# Patient Record
Sex: MALE | Race: WHITE | HISPANIC OR LATINO | Employment: UNEMPLOYED | URBAN - METROPOLITAN AREA
[De-identification: names, ages, dates, MRNs, and addresses within clinical notes are randomized per-mention and may not be internally consistent; named-entity substitution may affect disease eponyms.]

---

## 2017-12-24 ENCOUNTER — HOSPITAL ENCOUNTER (EMERGENCY)
Facility: MEDICAL CENTER | Age: 52
End: 2017-12-24
Payer: MEDICAID

## 2017-12-24 ENCOUNTER — APPOINTMENT (OUTPATIENT)
Dept: RADIOLOGY | Facility: MEDICAL CENTER | Age: 52
End: 2017-12-24
Attending: ORTHOPAEDIC SURGERY
Payer: MEDICAID

## 2017-12-24 ENCOUNTER — HOSPITAL ENCOUNTER (EMERGENCY)
Facility: MEDICAL CENTER | Age: 52
End: 2017-12-24
Attending: EMERGENCY MEDICINE | Admitting: ORTHOPAEDIC SURGERY
Payer: MEDICAID

## 2017-12-24 ENCOUNTER — APPOINTMENT (OUTPATIENT)
Dept: RADIOLOGY | Facility: MEDICAL CENTER | Age: 52
End: 2017-12-24
Attending: EMERGENCY MEDICINE
Payer: MEDICAID

## 2017-12-24 VITALS
HEART RATE: 94 BPM | BODY MASS INDEX: 29.76 KG/M2 | RESPIRATION RATE: 18 BRPM | HEIGHT: 66 IN | WEIGHT: 185.19 LBS | OXYGEN SATURATION: 93 % | DIASTOLIC BLOOD PRESSURE: 90 MMHG | TEMPERATURE: 97.7 F | SYSTOLIC BLOOD PRESSURE: 184 MMHG

## 2017-12-24 DIAGNOSIS — S42.92XA CLOSED FRACTURE OF LEFT SHOULDER, INITIAL ENCOUNTER: ICD-10-CM

## 2017-12-24 DIAGNOSIS — S42.92XA CLOSED FRACTURE DISLOCATION OF LEFT SHOULDER JOINT, INITIAL ENCOUNTER: ICD-10-CM

## 2017-12-24 DIAGNOSIS — S44.22XA INJURY OF RADIAL NERVE AT LEFT UPPER ARM LEVEL, INITIAL ENCOUNTER: ICD-10-CM

## 2017-12-24 DIAGNOSIS — S43.005A DISLOCATION OF LEFT SHOULDER JOINT, INITIAL ENCOUNTER: ICD-10-CM

## 2017-12-24 PROCEDURE — 96376 TX/PRO/DX INJ SAME DRUG ADON: CPT | Mod: XU

## 2017-12-24 PROCEDURE — 99152 MOD SED SAME PHYS/QHP 5/>YRS: CPT

## 2017-12-24 PROCEDURE — A9270 NON-COVERED ITEM OR SERVICE: HCPCS | Performed by: EMERGENCY MEDICINE

## 2017-12-24 PROCEDURE — 700111 HCHG RX REV CODE 636 W/ 250 OVERRIDE (IP)

## 2017-12-24 PROCEDURE — 160048 HCHG OR STATISTICAL LEVEL 1-5: Performed by: ORTHOPAEDIC SURGERY

## 2017-12-24 PROCEDURE — 99291 CRITICAL CARE FIRST HOUR: CPT

## 2017-12-24 PROCEDURE — 73030 X-RAY EXAM OF SHOULDER: CPT | Mod: LT

## 2017-12-24 PROCEDURE — A9270 NON-COVERED ITEM OR SERVICE: HCPCS

## 2017-12-24 PROCEDURE — 96374 THER/PROPH/DIAG INJ IV PUSH: CPT | Mod: XU

## 2017-12-24 PROCEDURE — 160035 HCHG PACU - 1ST 60 MINS PHASE I: Performed by: ORTHOPAEDIC SURGERY

## 2017-12-24 PROCEDURE — 700102 HCHG RX REV CODE 250 W/ 637 OVERRIDE(OP): Performed by: EMERGENCY MEDICINE

## 2017-12-24 PROCEDURE — 96375 TX/PRO/DX INJ NEW DRUG ADDON: CPT | Mod: XU

## 2017-12-24 PROCEDURE — 700105 HCHG RX REV CODE 258: Performed by: EMERGENCY MEDICINE

## 2017-12-24 PROCEDURE — 700101 HCHG RX REV CODE 250

## 2017-12-24 PROCEDURE — 700102 HCHG RX REV CODE 250 W/ 637 OVERRIDE(OP)

## 2017-12-24 PROCEDURE — 160009 HCHG ANES TIME/MIN: Performed by: ORTHOPAEDIC SURGERY

## 2017-12-24 PROCEDURE — 160002 HCHG RECOVERY MINUTES (STAT): Performed by: ORTHOPAEDIC SURGERY

## 2017-12-24 PROCEDURE — 23650 CLTX SHO DSLC W/MNPJ WO ANES: CPT

## 2017-12-24 PROCEDURE — 160026 HCHG SURGERY MINUTES - 1ST 30 MINS LEVEL 1: Performed by: ORTHOPAEDIC SURGERY

## 2017-12-24 PROCEDURE — 700111 HCHG RX REV CODE 636 W/ 250 OVERRIDE (IP): Performed by: EMERGENCY MEDICINE

## 2017-12-24 PROCEDURE — L3670 SO ACRO/CLAV CAN WEB PRE OTS: HCPCS | Performed by: ORTHOPAEDIC SURGERY

## 2017-12-24 PROCEDURE — 160036 HCHG PACU - EA ADDL 30 MINS PHASE I: Performed by: ORTHOPAEDIC SURGERY

## 2017-12-24 PROCEDURE — 73020 X-RAY EXAM OF SHOULDER: CPT | Mod: LT

## 2017-12-24 RX ORDER — DOCUSATE SODIUM 100 MG/1
100 CAPSULE, LIQUID FILLED ORAL 2 TIMES DAILY
Qty: 60 CAP | Refills: 0 | Status: SHIPPED | OUTPATIENT
Start: 2017-12-24 | End: 2020-09-11

## 2017-12-24 RX ORDER — HYDROMORPHONE HYDROCHLORIDE 2 MG/ML
1 INJECTION, SOLUTION INTRAMUSCULAR; INTRAVENOUS; SUBCUTANEOUS ONCE
Status: COMPLETED | OUTPATIENT
Start: 2017-12-24 | End: 2017-12-24

## 2017-12-24 RX ORDER — MORPHINE SULFATE 4 MG/ML
4 INJECTION, SOLUTION INTRAMUSCULAR; INTRAVENOUS ONCE
Status: COMPLETED | OUTPATIENT
Start: 2017-12-24 | End: 2017-12-24

## 2017-12-24 RX ORDER — OXYCODONE HYDROCHLORIDE AND ACETAMINOPHEN 5; 325 MG/1; MG/1
TABLET ORAL
Status: COMPLETED
Start: 2017-12-24 | End: 2017-12-24

## 2017-12-24 RX ORDER — MELOXICAM 7.5 MG/1
7.5 TABLET ORAL DAILY
COMMUNITY
End: 2017-12-24

## 2017-12-24 RX ORDER — IBUPROFEN 800 MG/1
800 TABLET ORAL EVERY 8 HOURS PRN
Qty: 30 TAB | Refills: 0 | Status: SHIPPED | OUTPATIENT
Start: 2017-12-24 | End: 2020-09-11

## 2017-12-24 RX ORDER — HYDROCODONE BITARTRATE AND ACETAMINOPHEN 5; 325 MG/1; MG/1
1 TABLET ORAL ONCE
Status: COMPLETED | OUTPATIENT
Start: 2017-12-24 | End: 2017-12-24

## 2017-12-24 RX ORDER — NICOTINE 21 MG/24HR
1 PATCH, TRANSDERMAL 24 HOURS TRANSDERMAL ONCE
Status: DISCONTINUED | OUTPATIENT
Start: 2017-12-24 | End: 2017-12-25 | Stop reason: HOSPADM

## 2017-12-24 RX ORDER — SODIUM CHLORIDE 9 MG/ML
INJECTION, SOLUTION INTRAVENOUS CONTINUOUS
Status: DISCONTINUED | OUTPATIENT
Start: 2017-12-24 | End: 2017-12-25 | Stop reason: HOSPADM

## 2017-12-24 RX ORDER — OXYCODONE AND ACETAMINOPHEN 10; 325 MG/1; MG/1
1 TABLET ORAL EVERY 4 HOURS PRN
Qty: 42 TAB | Refills: 0 | Status: SHIPPED | OUTPATIENT
Start: 2017-12-24 | End: 2017-12-29

## 2017-12-24 RX ORDER — MELOXICAM 15 MG/1
15 TABLET ORAL DAILY
COMMUNITY
End: 2020-09-11

## 2017-12-24 RX ORDER — DOXAZOSIN MESYLATE 4 MG/1
4 TABLET ORAL DAILY
COMMUNITY
End: 2020-09-11

## 2017-12-24 RX ADMIN — FENTANYL CITRATE 50 MCG: 50 INJECTION, SOLUTION INTRAMUSCULAR; INTRAVENOUS at 19:04

## 2017-12-24 RX ADMIN — HYDROCODONE BITARTRATE AND ACETAMINOPHEN 1 TABLET: 5; 325 TABLET ORAL at 08:53

## 2017-12-24 RX ADMIN — MORPHINE SULFATE 4 MG: 4 INJECTION INTRAVENOUS at 12:14

## 2017-12-24 RX ADMIN — PROPOFOL 140 MG: 10 INJECTION, EMULSION INTRAVENOUS at 10:46

## 2017-12-24 RX ADMIN — SODIUM CHLORIDE: 9 INJECTION, SOLUTION INTRAVENOUS at 14:50

## 2017-12-24 RX ADMIN — HYDROMORPHONE HYDROCHLORIDE 1 MG: 2 INJECTION INTRAMUSCULAR; INTRAVENOUS; SUBCUTANEOUS at 15:53

## 2017-12-24 RX ADMIN — NICOTINE 1 PATCH: 21 PATCH TRANSDERMAL at 12:30

## 2017-12-24 RX ADMIN — FENTANYL CITRATE 50 MCG: 50 INJECTION, SOLUTION INTRAMUSCULAR; INTRAVENOUS at 18:50

## 2017-12-24 RX ADMIN — MORPHINE SULFATE 4 MG: 4 INJECTION INTRAVENOUS at 14:24

## 2017-12-24 RX ADMIN — OXYCODONE AND ACETAMINOPHEN 2 TABLET: 5; 325 TABLET ORAL at 18:25

## 2017-12-24 RX ADMIN — HYDROMORPHONE HYDROCHLORIDE 1 MG: 2 INJECTION INTRAMUSCULAR; INTRAVENOUS; SUBCUTANEOUS at 14:49

## 2017-12-24 ASSESSMENT — PAIN SCALES - GENERAL
PAINLEVEL_OUTOF10: 2
PAINLEVEL_OUTOF10: 4
PAINLEVEL_OUTOF10: 4
PAINLEVEL_OUTOF10: 10
PAINLEVEL_OUTOF10: 6
PAINLEVEL_OUTOF10: 5
PAINLEVEL_OUTOF10: 4
PAINLEVEL_OUTOF10: 4
PAINLEVEL_OUTOF10: 2
PAINLEVEL_OUTOF10: 2
PAINLEVEL_OUTOF10: 4
PAINLEVEL_OUTOF10: 10

## 2017-12-24 NOTE — ED PROVIDER NOTES
"ED Provider Note    CHIEF COMPLAINT  Chief Complaint   Patient presents with   • Arm Injury     left        HPI  Dillon Centeno is a 52 y.o. male who presents with history of falling in the shower last night. He slipped, no loss of consciousness no neck pain complains of left upper arm. No other injuries. Unable to extend left wrist.    REVIEW OF SYSTEMS  See HPI for further details. All other systems are negative.     PAST MEDICAL HISTORY  No past medical history on file.    FAMILY HISTORY  No family history on file.    SOCIAL HISTORY  Social History     Social History   • Marital status: N/A     Spouse name: N/A   • Number of children: N/A   • Years of education: N/A     Social History Main Topics   • Smoking status: Not on file   • Smokeless tobacco: Not on file   • Alcohol use Not on file   • Drug use: Unknown   • Sexual activity: Not on file     Other Topics Concern   • Not on file     Social History Narrative   • No narrative on file       SURGICAL HISTORY  No past surgical history on file.    CURRENT MEDICATIONS  Home Medications    **Home medications have not yet been reviewed for this encounter**         ALLERGIES  Allergies   Allergen Reactions   • Sulfa Drugs Hives and Shortness of Breath       PHYSICAL EXAM  VITAL SIGNS: /97   Pulse 92   Temp 37.2 °C (99 °F)   Ht 1.676 m (5' 6\")   Wt 84 kg (185 lb 3 oz)   BMI 29.89 kg/m²   Constitutional: Well developed, Well nourished,Appears to be in moderate pain  Extremities: Intact distal pulses, examination of left upper arm, tenderness, laterally, possibly in the glenoid, unable to move the shoulder. Unable to extend left wrist  Musculoskeletal: Good range of motion in all major joints. Except left shoulder as above Neurologic: Alert & oriented x 3, unable to extend left wrist, decreased sensation tips of fingers, left hand, No focal deficits noted.   Psychiatric: Affect normal, Judgment normal, Mood normal.       RADIOLOGY/PROCEDURES  DX-SHOULDER " 2+ LEFT   Final Result      Fracture of the left greater tuberosity with anterior dislocation of the humeral head            COURSE & MEDICAL DECISION MAKING  Pertinent Labs & Imaging studies reviewed. (See chart for details)    He had a fall in the shower, causing a dislocation of his left shoulder, along with fracture of the greater tuberosity, left shoulder. He was given protocol for conscious sedation, multiple maneuvers were done to reduce the fracture/dislocation without success. I will talk with the on-call orthopedist about further disposition. Patient also has been given morphine and Zofran IV  FINAL IMPRESSION  1.   1. Dislocation of left shoulder joint, initial encounter    2. Closed fracture of left shoulder, initial encounter    3. Injury of radial nerve at left upper arm level, initial encounter              2.   3.     Disposition  Procedure note: This patient has a dislocated left shoulder with avulsion, greater tuberosity. I have talked to the patient about indications and complications of anesthesia and closed reduction. He appears to understand his indications palpitations. He was connected to a blood pressure monitor. EKG monitor. Oxygen monitor. He was then given a total of 120 mg of propofol, 40 mg increments until he had a reached adequate sedation. When adequate anesthesia had been achieved I attempted multiple maneuvers to reduce the dislocation, internal/external rotation, flexion and extension however was unable to reduce the dislocation.. He continued to be monitored, blood pressure monitor, EKG monitor, oxygen monitor until he is once again awake alert and oriented.    I have attempted to call the on-call orthopedic surgeon about further disposition  Electronically signed by: Hollis Burns, 12/24/2017 8:36 AM

## 2017-12-24 NOTE — ED NOTES
Patient was in the shower last night when he knee when out causing him to fall.  Injury, deformity noted to the left arm.  Pain rated at 15-10.  Patient called ems this am.  Unable to really move that arm at all due to deformity and pain.

## 2017-12-24 NOTE — ED PROVIDER NOTES
2:12 PM I received check out from Dr. Burns stating that he had spoken with the OR to consult orthopedics for the patient after failed sedation/reduction of a left fracture shoulder dislocation.    DX-SHOULDER 2+ LEFT   Final Result      Persistent anterior dislocation of the left humeral head with greater tuberosity fracture.      DX-SHOULDER 2+ LEFT   Final Result      Fracture of the left greater tuberosity with anterior dislocation of the humeral head            2:38 PM I spoke with Dr. Subramanian, orthopedics. He states he will take the patient to the OR for a closed reduction/possible open reduction of this fracture dislocation. I informed the patient of the plan of care, started maintenance IV fluids to keep him nothing by mouth. He has gotten another dose of IV morphine as well as IV Dilaudid for pain control.    Dr. Subramanian came down to evaluate the patient is going to take him to the OR. Patient was Comfortable with multiple doses of IV narcotics.  Orthopedist will try to discharge him home after being in the OR. I have requested a bed for the patient just in case he does need to be admitted after his procedure.    Disposition:  Admission for observation by orthopedist versus discharge after OR to be determined by Dr. Subramanian.    1. Dislocation of left shoulder joint, initial encounter    2. Closed fracture of left shoulder, initial encounter    3. Injury of radial nerve at left upper arm level, initial encounter    4. Closed fracture dislocation of left shoulder joint, initial encounter

## 2017-12-25 NOTE — DISCHARGE INSTRUCTIONS
ACTIVITY: Rest and take it easy for the first 24 hours.  A responsible adult is recommended to remain with you during that time.  It is normal to feel sleepy.  We encourage you to not do anything that requires balance, judgment or coordination.    MILD FLU-LIKE SYMPTOMS ARE NORMAL. YOU MAY EXPERIENCE GENERALIZED MUSCLE ACHES, THROAT IRRITATION, HEADACHE AND/OR SOME NAUSEA.    FOR 24 HOURS DO NOT:  Drive, operate machinery or run household appliances.  Drink beer or alcoholic beverages.   Make important decisions or sign legal documents.    SPECIAL INSTRUCTIONS: How to Use a Shoulder Immobilizer  A shoulder immobilizer is a device that you may have to wear after a shoulder injury or surgery. This device keeps your arm from moving. This prevents additional pain or injury. It also supports your arm next to your body as your shoulder heals.  You may need to wear a shoulder immobilizer to treat a broken bone (fracture) in your shoulder. You may also need to wear one if you have an injury that moves your shoulder out of position (dislocation). There are different types of shoulder immobilizers. The one that you get depends on your injury.  RISKS AND COMPLICATIONS  Wearing a shoulder immobilizer in the wrong way can let your injured shoulder move around too much. This may delay healing and make your pain and swelling worse.  HOW TO USE YOUR SHOULDER IMMOBILIZER  · The part of the immobilizer that goes around your neck (sling) should support your upper arm, with your elbow bent and your lower arm and hand across your chest.  · Make sure that your elbow:  ¨ Is snug against the back pocket of the sling.  ¨ Does not move away from your body.  · The strap of the immobilizer should go over your shoulder and support your arm and hand. Your hand should be slightly higher than your elbow. It should not hang loosely over the edge of the sling.  · If the long strap has a pad, place it where it is most comfortable on your  neck.  · Carefully follow your health care provider's instructions for wearing your shoulder immobilizer. Your health care provider may want you to:  ¨ Loosen your immobilizer to straighten your elbow and move your wrist and fingers. You may have to do this several times each day. Ask your health care provider when you should do this and how often.  ¨ Remove your immobilizer once every day to shower, but limit the movement in your injured arm. Before putting the immobilizer back on, use a towel to dry the area under your arm completely.  ¨ Remove your immobilizer to do shoulder exercises at home as directed by your health care provider.  ¨ Wear your immobilizer while you sleep. You may sleep more comfortably if you have your upper body raised on pillows.  SEEK MEDICAL CARE IF:  · Your immobilizer is not supporting your arm properly.  · Your immobilizer gets damaged.  · You have worsening pain or swelling in your shoulder, arm, or hand.  · Your shoulder, arm, or hand changes color or temperature.  · You lose feeling in your shoulder, arm, or hand.     This information is not intended to replace advice given to you by your health care provider. Make sure you discuss any questions you have with your health care provider.     Document Released: 01/25/2006 Document Revised: 05/03/2016 Document Reviewed: 11/25/2015  [employer] Patient Education ©2016 [employer]      DIET: To avoid nausea, slowly advance diet as tolerated, avoiding spicy or greasy foods for the first day.  Add more substantial food to your diet according to your physician's instructions.  Babies can be fed formula or breast milk as soon as they are hungry.  INCREASE FLUIDS AND FIBER TO AVOID CONSTIPATION.    SURGICAL DRESSING/BATHING: No dressing. May shower as needed.    FOLLOW-UP APPOINTMENT:  A follow-up appointment should be arranged with your doctor in 12/29/17, Friday; call to schedule.    You should CALL YOUR PHYSICIAN if you  develop:  Fever greater than 101 degrees F.  Pain not relieved by medication, or persistent nausea or vomiting.  Excessive bleeding (blood soaking through dressing) or unexpected drainage from the wound.  Extreme redness or swelling around the incision site, drainage of pus or foul smelling drainage.  Inability to urinate or empty your bladder within 8 hours.  Problems with breathing or chest pain.    You should call 911 if you develop problems with breathing or chest pain.  If you are unable to contact your doctor or surgical center, you should go to the nearest emergency room or urgent care center.  Physician's telephone #: [phone]    If any questions arise, call your doctor.  If your doctor is not available, please feel free to call the Surgical Center at [phone].  The Center is open Monday through Friday from 7AM to 7PM.  You can also call the [facility] HOTLINE open 24 hours/day, 7 days/week and speak to a nurse at [phone], or toll free at [phone].    A registered nurse may call you a few days after your surgery to see how you are doing after your procedure.    MEDICATIONS: Resume taking daily medication.  Take prescribed pain medication with food.  If no medication is prescribed, you may take non-aspirin pain medication if needed.  PAIN MEDICATION CAN BE VERY CONSTIPATING.  Take a stool softener or laxative such as senokot, pericolace, or milk of magnesia if needed.    Prescription given for Motrin, Percocet and Colace.  Last pain medication given at 6:25PM.    If your physician has prescribed pain medication that includes Acetaminophen (Tylenol), do not take additional Acetaminophen (Tylenol) while taking the prescribed medication.    Depression / Suicide Risk    As you are discharged from this [facility] facility, it is important to learn how to keep safe from harming yourself.    Recognize the warning signs:  · Abrupt changes in personality, positive or negative- including increase  in energy   · Giving away possessions  · Change in eating patterns- significant weight changes-  positive or negative  · Change in sleeping patterns- unable to sleep or sleeping all the time   · Unwillingness or inability to communicate  · Depression  · Unusual sadness, discouragement and loneliness  · Talk of wanting to die  · Neglect of personal appearance   · Rebelliousness- reckless behavior  · Withdrawal from people/activities they love  · Confusion- inability to concentrate     If you or a loved one observes any of these behaviors or has concerns about self-harm, here's what you can do:  · Talk about it- your feelings and reasons for harming yourself  · Remove any means that you might use to hurt yourself (examples: pills, rope, extension cords, firearm)  · Get professional help from the community (Mental Health, Substance Abuse, psychological counseling)  · Do not be alone:Call your Safe Contact- someone whom you trust who will be there for you.  · Call your local CRISIS HOTLINE 974-7567 or 992-940-8881  · Call your local Children's Mobile Crisis Response Team [place] (892) 116-5663 or www.[employer]  · Call the toll free National Suicide Prevention Hotlines   · National Suicide Prevention Lifeline 107-144-OPFZ (3899)  · National Hope Line Network 800-SUICIDE (603-6774)

## 2017-12-25 NOTE — PROGRESS NOTES
"Brief Ortho Trauma PA note  52 M fell in shower last night with persistent pain and loss of ROM  Eval in ER by Dr Subramanian, on call  BP (!) 184/90   Pulse 93   Temp 37.2 °C (99 °F)   Resp 16   Ht 1.676 m (5' 6\")   Wt 84 kg (185 lb 3 oz)   SpO2 94%   BMI 29.89 kg/m²     Pt XR reveals tuberosity fx prox humerus with dislocation  Consents for closed, possible open relocation of shoulder , possible ORIF proximal L humerus  Dr Subramanian has seen and evaluated pt  To OR this afternoon  "

## 2017-12-25 NOTE — OP REPORT
DATE OF SERVICE:  12/24/2017    PREOPERATIVE DIAGNOSIS:  Left shoulder fracture dislocation, left upper   extremity palsy.    POSTOPERATIVE DIAGNOSIS:  Left shoulder fracture dislocation, left upper   extremity palsy.    PROCEDURE:  Left shoulder closed reduction.    SURGEON:  Keith Subramanian MD    ASSISTANT:  Carlos Manuel Marcano PA-C    ANESTHESIA:  General.    SPECIMENS:  None.    IMPLANTS:  None.    DRAINS:  None.    CONDITION:  Stable to PACU.    INDICATIONS:  The patient is a very pleasant 52-year-old gentleman with left   shoulder fracture dislocation with an obvious extension palsy.    Preoperatively, he is indicated for closed reduction versus open reduction and   taken to the operating room in stable condition.  Following smooth induction   of anesthesia, the patient was placed in a traction countertraction setup.    The arm was pulled using gentle traction, and the shoulder was readily   relocated with one attempt.  The patient was then awakened by anesthesia.  I   was present for the entirety of the procedure.    POSTOPERATIVE PLAN:  The patient would be discharged the same night.   He   would follow up with Dr. Sergio Watkins in his clinic on Friday.  I have   already discussed the case with Dr. Watkins.       ____________________________________     Keith Subramanian MD    BTB / NTS    DD:  12/24/2017 17:33:00  DT:  12/24/2017 18:22:08    D#:  [id number]  Job#:  [id number]

## 2017-12-25 NOTE — OR NURSING
Patient awake; denies numbness or tingling in left arm/hand and fingers.States pain is tolerable.  Up with assist to the bathroom, voided QS.    Discharged with brother via wheelchair.

## 2017-12-25 NOTE — CONSULTS
DATE OF SERVICE:  12/24/2017    I was asked to see the patient by the emergency room physician.    HISTORY OF PRESENT ILLNESS:  The patient is a very pleasant 52-year-old   gentleman who sustained a left shoulder injury while slipping and falling. He   has been unable to extend the wrist at this time.  He underwent a closed   reduction.     IMAGING:  Includes x-rays that demonstrate greater tuberosity fracture with a   glenohumeral dislocation.    IMPRESSION:  Glenohumeral dislocation with greater tuberosity fracture.    ASSESSMENT AND PLAN:  I feel the patient would benefit from a closed reduction   again.  At this point, I would prefer not to do this in the emergency room as   one has already been attempted.  I have indicated the patient to have this   procedure done under general anesthesia in the operating room as I would much   prefer to do this all in one go and certainly there is a chance that he would   need a closed versus open reduction of the left shoulder.  I do not see any   sort of surgical neck fracture and I think the patient can probably just be   treated the same with this greater tuberosity fracture once he goes back into   place.  We will put him in a sling.  Obviously the patient has some sort of   neurological injury at this time.  I do not think we know the extent of the   neurological injury until following an additional closed reduction in the   operating room.  Patient expressed understanding of the procedure that I have   indicated him for and will move forward with that operation later today.       ____________________________________     MD PAMELA Ramon / HALINA    DD:  12/24/2017 16:52:11  DT:  12/24/2017 18:16:54    D#:  [id number]  Job#:  [id number]

## 2018-01-04 ENCOUNTER — APPOINTMENT (OUTPATIENT)
Dept: RADIOLOGY | Facility: MEDICAL CENTER | Age: 53
End: 2018-01-04
Attending: ORTHOPAEDIC SURGERY
Payer: MEDICAID

## 2018-01-04 ENCOUNTER — HOSPITAL ENCOUNTER (OUTPATIENT)
Facility: MEDICAL CENTER | Age: 53
End: 2018-01-04
Attending: ORTHOPAEDIC SURGERY | Admitting: ORTHOPAEDIC SURGERY
Payer: MEDICAID

## 2018-01-04 VITALS
WEIGHT: 181.44 LBS | SYSTOLIC BLOOD PRESSURE: 168 MMHG | RESPIRATION RATE: 18 BRPM | HEIGHT: 71 IN | DIASTOLIC BLOOD PRESSURE: 90 MMHG | BODY MASS INDEX: 25.4 KG/M2 | HEART RATE: 70 BPM | TEMPERATURE: 98.7 F | OXYGEN SATURATION: 97 %

## 2018-01-04 DIAGNOSIS — S42.92XA CLOSED FRACTURE DISLOCATION OF LEFT SHOULDER JOINT, INITIAL ENCOUNTER: ICD-10-CM

## 2018-01-04 PROCEDURE — A6454 SELF-ADHER BAND W>=3" <5"/YD: HCPCS | Performed by: ORTHOPAEDIC SURGERY

## 2018-01-04 PROCEDURE — 160029 HCHG SURGERY MINUTES - 1ST 30 MINS LEVEL 4: Performed by: ORTHOPAEDIC SURGERY

## 2018-01-04 PROCEDURE — 700101 HCHG RX REV CODE 250

## 2018-01-04 PROCEDURE — 700111 HCHG RX REV CODE 636 W/ 250 OVERRIDE (IP)

## 2018-01-04 PROCEDURE — C1713 ANCHOR/SCREW BN/BN,TIS/BN: HCPCS | Performed by: ORTHOPAEDIC SURGERY

## 2018-01-04 PROCEDURE — 160046 HCHG PACU - 1ST 60 MINS PHASE II: Performed by: ORTHOPAEDIC SURGERY

## 2018-01-04 PROCEDURE — 160009 HCHG ANES TIME/MIN: Performed by: ORTHOPAEDIC SURGERY

## 2018-01-04 PROCEDURE — 73030 X-RAY EXAM OF SHOULDER: CPT | Mod: LT

## 2018-01-04 PROCEDURE — 501480 HCHG STOCKINETTE: Performed by: ORTHOPAEDIC SURGERY

## 2018-01-04 PROCEDURE — 160041 HCHG SURGERY MINUTES - EA ADDL 1 MIN LEVEL 4: Performed by: ORTHOPAEDIC SURGERY

## 2018-01-04 PROCEDURE — 500891 HCHG PACK, ORTHO MAJOR: Performed by: ORTHOPAEDIC SURGERY

## 2018-01-04 PROCEDURE — A6402 STERILE GAUZE <= 16 SQ IN: HCPCS | Performed by: ORTHOPAEDIC SURGERY

## 2018-01-04 PROCEDURE — 160002 HCHG RECOVERY MINUTES (STAT): Performed by: ORTHOPAEDIC SURGERY

## 2018-01-04 PROCEDURE — 160035 HCHG PACU - 1ST 60 MINS PHASE I: Performed by: ORTHOPAEDIC SURGERY

## 2018-01-04 PROCEDURE — 160036 HCHG PACU - EA ADDL 30 MINS PHASE I: Performed by: ORTHOPAEDIC SURGERY

## 2018-01-04 PROCEDURE — 160048 HCHG OR STATISTICAL LEVEL 1-5: Performed by: ORTHOPAEDIC SURGERY

## 2018-01-04 PROCEDURE — 160025 RECOVERY II MINUTES (STATS): Performed by: ORTHOPAEDIC SURGERY

## 2018-01-04 PROCEDURE — 700102 HCHG RX REV CODE 250 W/ 637 OVERRIDE(OP)

## 2018-01-04 PROCEDURE — A6222 GAUZE <=16 IN NO W/SAL W/O B: HCPCS | Performed by: ORTHOPAEDIC SURGERY

## 2018-01-04 PROCEDURE — 501838 HCHG SUTURE GENERAL: Performed by: ORTHOPAEDIC SURGERY

## 2018-01-04 PROCEDURE — 500562 HCHG FIBERWIRE: Performed by: ORTHOPAEDIC SURGERY

## 2018-01-04 PROCEDURE — A9270 NON-COVERED ITEM OR SERVICE: HCPCS

## 2018-01-04 PROCEDURE — A9270 NON-COVERED ITEM OR SERVICE: HCPCS | Performed by: ORTHOPAEDIC SURGERY

## 2018-01-04 PROCEDURE — 700102 HCHG RX REV CODE 250 W/ 637 OVERRIDE(OP): Performed by: ORTHOPAEDIC SURGERY

## 2018-01-04 DEVICE — SCREW 3.5 MM LOCKING TI X 30MM LONG (6TX8=48): Type: IMPLANTABLE DEVICE | Status: FUNCTIONAL

## 2018-01-04 DEVICE — SCREW 3.5 MM LOCKING TI X 50MM LONG (6TX8=48): Type: IMPLANTABLE DEVICE | Status: FUNCTIONAL

## 2018-01-04 DEVICE — SCREW 3.5 MM LOCKING TI X 38MM LONG (6TX8=48): Type: IMPLANTABLE DEVICE | Status: FUNCTIONAL

## 2018-01-04 DEVICE — SCREW 3.5 MM NON-LOCKING TI X 30MM LONG (6TX8=48): Type: IMPLANTABLE DEVICE | Status: FUNCTIONAL

## 2018-01-04 DEVICE — SCREW 3.5 MM LOCKING TO X 45 MM LONG (6TX8=48): Type: IMPLANTABLE DEVICE | Status: FUNCTIONAL

## 2018-01-04 DEVICE — SCREW 3.5 MM LOCKING TI X 36MM LONG (6TX8=48): Type: IMPLANTABLE DEVICE | Status: FUNCTIONAL

## 2018-01-04 DEVICE — SCREW 3.5 MM LOCKING TI X 40MM LONG (6TX8=48): Type: IMPLANTABLE DEVICE | Status: FUNCTIONAL

## 2018-01-04 DEVICE — PLATE PROX HUM (PHP) LEFT 3HX27X100MM (2TX2=4): Type: IMPLANTABLE DEVICE | Status: FUNCTIONAL

## 2018-01-04 RX ORDER — HALOPERIDOL 5 MG/ML
1 INJECTION INTRAMUSCULAR EVERY 6 HOURS PRN
Status: DISCONTINUED | OUTPATIENT
Start: 2018-01-04 | End: 2018-01-04 | Stop reason: HOSPADM

## 2018-01-04 RX ORDER — SODIUM CHLORIDE, SODIUM LACTATE, POTASSIUM CHLORIDE, CALCIUM CHLORIDE 600; 310; 30; 20 MG/100ML; MG/100ML; MG/100ML; MG/100ML
INJECTION, SOLUTION INTRAVENOUS CONTINUOUS
Status: DISCONTINUED | OUTPATIENT
Start: 2018-01-04 | End: 2018-01-04 | Stop reason: HOSPADM

## 2018-01-04 RX ORDER — MIDAZOLAM HYDROCHLORIDE 1 MG/ML
INJECTION INTRAMUSCULAR; INTRAVENOUS
Status: COMPLETED
Start: 2018-01-04 | End: 2018-01-04

## 2018-01-04 RX ORDER — ONDANSETRON 2 MG/ML
4 INJECTION INTRAMUSCULAR; INTRAVENOUS EVERY 4 HOURS PRN
Status: DISCONTINUED | OUTPATIENT
Start: 2018-01-04 | End: 2018-01-04 | Stop reason: HOSPADM

## 2018-01-04 RX ORDER — LIDOCAINE AND PRILOCAINE 25; 25 MG/G; MG/G
1 CREAM TOPICAL
Status: DISCONTINUED | OUTPATIENT
Start: 2018-01-04 | End: 2018-01-04 | Stop reason: HOSPADM

## 2018-01-04 RX ORDER — LIDOCAINE HYDROCHLORIDE 10 MG/ML
0.5 INJECTION, SOLUTION INFILTRATION; PERINEURAL
Status: DISCONTINUED | OUTPATIENT
Start: 2018-01-04 | End: 2018-01-04 | Stop reason: HOSPADM

## 2018-01-04 RX ORDER — LIDOCAINE HYDROCHLORIDE 10 MG/ML
INJECTION, SOLUTION INFILTRATION; PERINEURAL
Status: COMPLETED
Start: 2018-01-04 | End: 2018-01-04

## 2018-01-04 RX ORDER — DEXAMETHASONE SODIUM PHOSPHATE 4 MG/ML
4 INJECTION, SOLUTION INTRA-ARTICULAR; INTRALESIONAL; INTRAMUSCULAR; INTRAVENOUS; SOFT TISSUE
Status: DISCONTINUED | OUTPATIENT
Start: 2018-01-04 | End: 2018-01-04 | Stop reason: HOSPADM

## 2018-01-04 RX ORDER — HYDRALAZINE HYDROCHLORIDE 20 MG/ML
INJECTION INTRAMUSCULAR; INTRAVENOUS
Status: COMPLETED
Start: 2018-01-04 | End: 2018-01-04

## 2018-01-04 RX ORDER — OXYCODONE HYDROCHLORIDE 5 MG/1
5-10 TABLET ORAL EVERY 4 HOURS PRN
Qty: 60 TAB | Refills: 0 | Status: SHIPPED | OUTPATIENT
Start: 2018-01-04 | End: 2018-01-09

## 2018-01-04 RX ORDER — OXYCODONE HCL 5 MG/5 ML
SOLUTION, ORAL ORAL
Status: COMPLETED
Start: 2018-01-04 | End: 2018-01-04

## 2018-01-04 RX ORDER — SCOLOPAMINE TRANSDERMAL SYSTEM 1 MG/1
1 PATCH, EXTENDED RELEASE TRANSDERMAL
Status: DISCONTINUED | OUTPATIENT
Start: 2018-01-04 | End: 2018-01-04 | Stop reason: HOSPADM

## 2018-01-04 RX ORDER — METOPROLOL TARTRATE 1 MG/ML
INJECTION, SOLUTION INTRAVENOUS
Status: COMPLETED
Start: 2018-01-04 | End: 2018-01-04

## 2018-01-04 RX ORDER — HYDROMORPHONE HYDROCHLORIDE 2 MG/ML
INJECTION, SOLUTION INTRAMUSCULAR; INTRAVENOUS; SUBCUTANEOUS
Status: COMPLETED
Start: 2018-01-04 | End: 2018-01-04

## 2018-01-04 RX ORDER — HYDROMORPHONE HYDROCHLORIDE 2 MG/ML
0.5 INJECTION, SOLUTION INTRAMUSCULAR; INTRAVENOUS; SUBCUTANEOUS
Status: DISCONTINUED | OUTPATIENT
Start: 2018-01-04 | End: 2018-01-04 | Stop reason: HOSPADM

## 2018-01-04 RX ORDER — OXYCODONE HYDROCHLORIDE 10 MG/1
10 TABLET ORAL EVERY 4 HOURS PRN
Status: DISCONTINUED | OUTPATIENT
Start: 2018-01-04 | End: 2018-01-04 | Stop reason: HOSPADM

## 2018-01-04 RX ORDER — DIPHENHYDRAMINE HYDROCHLORIDE 50 MG/ML
25 INJECTION INTRAMUSCULAR; INTRAVENOUS EVERY 6 HOURS PRN
Status: DISCONTINUED | OUTPATIENT
Start: 2018-01-04 | End: 2018-01-04 | Stop reason: HOSPADM

## 2018-01-04 RX ORDER — OXYCODONE HYDROCHLORIDE 10 MG/1
5 TABLET ORAL EVERY 4 HOURS PRN
Status: DISCONTINUED | OUTPATIENT
Start: 2018-01-04 | End: 2018-01-04 | Stop reason: HOSPADM

## 2018-01-04 RX ADMIN — HYDROMORPHONE HYDROCHLORIDE 0.5 MG: 2 INJECTION INTRAMUSCULAR; INTRAVENOUS; SUBCUTANEOUS at 16:35

## 2018-01-04 RX ADMIN — HYDROMORPHONE HYDROCHLORIDE 0.5 MG: 2 INJECTION INTRAMUSCULAR; INTRAVENOUS; SUBCUTANEOUS at 16:25

## 2018-01-04 RX ADMIN — LIDOCAINE HYDROCHLORIDE 0.5 ML: 10 INJECTION, SOLUTION INFILTRATION; PERINEURAL at 11:10

## 2018-01-04 RX ADMIN — MIDAZOLAM 0.5 MG: 1 INJECTION INTRAMUSCULAR; INTRAVENOUS at 16:15

## 2018-01-04 RX ADMIN — HYDROMORPHONE HYDROCHLORIDE 0.5 MG: 2 INJECTION INTRAMUSCULAR; INTRAVENOUS; SUBCUTANEOUS at 16:45

## 2018-01-04 RX ADMIN — SODIUM CHLORIDE, SODIUM LACTATE, POTASSIUM CHLORIDE, CALCIUM CHLORIDE: 600; 310; 30; 20 INJECTION, SOLUTION INTRAVENOUS at 11:10

## 2018-01-04 RX ADMIN — OXYCODONE HYDROCHLORIDE 10 MG: 5 SOLUTION ORAL at 16:10

## 2018-01-04 RX ADMIN — METOPROLOL TARTRATE 1 MG: 5 INJECTION INTRAVENOUS at 16:45

## 2018-01-04 RX ADMIN — HYDROMORPHONE HYDROCHLORIDE 0.5 MG: 2 INJECTION INTRAMUSCULAR; INTRAVENOUS; SUBCUTANEOUS at 16:05

## 2018-01-04 RX ADMIN — FENTANYL CITRATE 50 MCG: 50 INJECTION, SOLUTION INTRAMUSCULAR; INTRAVENOUS at 17:05

## 2018-01-04 RX ADMIN — METOPROLOL TARTRATE 1 MG: 5 INJECTION INTRAVENOUS at 16:04

## 2018-01-04 RX ADMIN — METOPROLOL TARTRATE 1 MG: 5 INJECTION INTRAVENOUS at 16:15

## 2018-01-04 RX ADMIN — FENTANYL CITRATE 50 MCG: 50 INJECTION, SOLUTION INTRAMUSCULAR; INTRAVENOUS at 16:08

## 2018-01-04 RX ADMIN — HYDRALAZINE HYDROCHLORIDE 5 MG: 20 INJECTION INTRAMUSCULAR; INTRAVENOUS at 17:10

## 2018-01-04 ASSESSMENT — PAIN SCALES - GENERAL
PAINLEVEL_OUTOF10: 9
PAINLEVEL_OUTOF10: 5
PAINLEVEL_OUTOF10: 5
PAINLEVEL_OUTOF10: 8
PAINLEVEL_OUTOF10: 7
PAINLEVEL_OUTOF10: 4
PAINLEVEL_OUTOF10: 5
PAINLEVEL_OUTOF10: 9
PAINLEVEL_OUTOF10: 9

## 2018-01-04 NOTE — H&P
1/4/2018    Dillon Centeno is a 52 y.o. male who presents with a left proximal humerus fracture dislocation s/p closed reduction, with subsequent greater tuberosity fracture.  The patient noted immediate pain and inability to move the affected extremity due to pain.  Patient denies numbness, paresthesias, loss of consciousness or other symptoms.    Past Medical History:   Diagnosis Date   • Hypertension        Past Surgical History:   Procedure Laterality Date   • CLOSED REDUCTION Left 12/24/2017    Procedure: CLOSED REDUCTION;  Surgeon: Keith Subramanian M.D.;  Location: SURGERY [facility];  Service: Orthopedics   • OTHER      multiple surgeries lower legs from past injuries       Medications  No current facility-administered medications on file prior to encounter.      Current Outpatient Prescriptions on File Prior to Encounter   Medication Sig Dispense Refill   • meloxicam (MOBIC) 15 MG tablet Take 15 mg by mouth every day.     • doxazosin (CARDURA) 4 MG Tab Take 4 mg by mouth every day.     • docusate sodium (COLACE) 100 MG Cap Take 1 Cap by mouth 2 times a day. 60 Cap 0   • ibuprofen (MOTRIN) 800 MG Tab Take 1 Tab by mouth every 8 hours as needed. 30 Tab 0       Allergies  Sulfa drugs    ROS  Per HPI. All other systems were reviewed and found to be negative    History reviewed. No pertinent family history.    Social History     Social History   • Marital status: Single     Spouse name: N/A   • Number of children: N/A   • Years of education: N/A     Social History Main Topics   • Smoking status: Current Every Day Smoker     Years: 30.00     Types: Cigars   • Smokeless tobacco: Never Used   • Alcohol use Yes      Comment: a few beers a week   • Drug use: No   • Sexual activity: Not on file     Other Topics Concern   • Not on file     Social History Narrative   • No narrative on file       Physical Exam  Vitals  Blood pressure 154/99, pulse 78, temperature 37.5 °C (99.5 °F), resp. rate 18, height 1.803  "m (5' 11\"), weight 82.3 kg (181 lb 7 oz), SpO2 96 %.  General: Well Developed, Well Nourished, no acute distress  Psychiatric: Alert and oriented x3, appropriate responses to questions, pleasant mood and affect.  HEENT: Normocephalic, atraumatic  Eyes: Anicteric, PERRLA, EOMI  Neck: Supple, nontender, no masses  Chest: Symmetric expansion of the chest wall, non-tender to palpation, no distress.  Heart: RRR, palpable peripheral pulses  Abdomen: Soft, NT, ND  Skin: Intact, no open wounds  Extremities: Tender to palpation left shoulder  Neuro: Intact light touch and motor function median/radial/ulnar distribution left upper extremity  Vascular: 2+ radial on left, Capillary refill <2 seconds    Radiographs:  DX-PORTABLE FLUORO > 1 HOUR    (Results Pending)   DX-SHOULDER 2+ LEFT    (Results Pending)       Laboratory Values      No results for input(s): SODIUM, POTASSIUM, CHLORIDE, CO2, GLUCOSE, BUN, CPKTOTAL in the last 72 hours.          Impression:    #1 Left proximal humerus greater tuberosity fracture    Plan:    I recommended operative treatment of his freacture. Risks and benefits of surgery were discussed which include, but are not limited to bleeding, infection, neurovascular damage, malunion, nonunion, instability, stiffness, symptomatic hardware, DVT, PE, MI, Stroke and death.  Benefits of surgery discussed included improved chance of union in acceptable alignment and improved function.  We also discussed therapeutic alternatives to surgery, including non-operative management, which he did not elect.    They understand these risks and benefits and wish to proceed.      Please keep NPO pending surgery.  Non-weightbearing affected extremity pending surgery.    Please see operative note for detailed post-operative plan, including post-op weightbearing status.      "

## 2018-01-05 NOTE — OP REPORT
DATE OF SERVICE:  01/04/2018    PREOPERATIVE DIAGNOSIS:  Left proximal humeral fracture dislocation with   greater tuberosity fracture.    POSTOPERATIVE DIAGNOSIS:  Left proximal humeral fracture dislocation with   greater tuberosity fracture.    PROCEDURE:  Open reduction internal fixation of left shoulder greater   tuberosity fracture.    SURGEON:  Sergio Watkins MD    ASSISTANT:  Roman Wright MD    ANESTHESIOLOGIST:  Easton Bailey MD    ANESTHESIA TYPE:  General.    SPECIMENS:  None.    ESTIMATED BLOOD LOSS:  100 mL.    COMPLICATIONS:  None.    OPERATIVE INDICATIONS:  The patient is a 52-year-old male, who sustained a   glenohumeral dislocation with subsequent reduction by Dr. Subramanian.  He was   referred to my clinic.  Radiographs demonstrated displaced greater tuberosity   fracture with significant interval displacement since his reduction.   He has   a normal neurovascular exam.  He is a smoker.  He has normal skin envelope.    Given these findings, he is an appropriately indicated candidate for open   reduction and internal fixation of his greater tuberosity fracture.  I   discussed the risks, benefits, alternatives including risk of infection, wound   healing complications, neurovascular injury, blood loss, DVT, PE, malunion,   nonunion, stiffness, symptomatic hardware, need for additional procedures, and   the medical risk of anesthesia including MI, stroke, and death.  We discussed   alternatives including nonoperative management.  We discussed benefits   including improved chance of union and acceptable alignment, improved   function.  He is happy with the plan to proceed.  His informed consent was   signed and documented in the medical record.  I met with him preoperatively   and marked the operative extremity with his agreement.  We proceeded to the   operating room at [facility].    OPERATIVE COURSE:  He underwent general anesthesia with Dr. Bailey and was   positioned supine.  All bony prominences were well  padded.  The left upper   extremity was prepped and draped in the sterile orthopedic fashion using   ChloraPrep and the surgical team scrubbed in.  Procedural pause was conducted   to verify correct patient, correct extremity, presence of the surgeon's   initials on the operative extremity, and administration of IV antibiotics, in   this case Ancef.  Following generalized agreement, a deltopectoral approach to   the proximal humerus was performed incising the skin and subcutaneous tissue   sharply, dissected down to the deltopectoral interval.  The cephalic vein was   initially preserved, but later, a traction injury to it distally required   ligation.  Some of the clavipectoral fascia was incised, taking care to   preserve the CA ligament.  Some sort of hypertrophic bursa was debrided away.    The fracture fragment was identified and found to be quite comminuted.  On   attempt to placing of traction sutures, one piece of comminution gave way and   was removed from the wound with no soft tissue attachments.  Sequential 0   Vicryl traction sutures were placed and used to mobilize the greater   tuberosity fracture from posterior to the humerus anteriorly into the fracture   bed.  We progressively mobilized the piece anteriorly.  Given the amount of   comminution and the lack of a large enough individual fragment to achieve an   anatomic reduction, we reduced the fragments into the fracture bed, although   an anatomic reduction was not achieved.  When we were satisfied with our   reduction using the traction sutures, some #2 FiberWire sutures were placed in   the rotator cuff superiorly and posteriorly, and [facility] proximal humeral locking   plate was then positioned within the wound.  The rotator cuff sutures were   passed through the plate.  The plate was then secured to the shaft with a   combination of 3.5 mm locking and nonlocking screws to the humeral head with   multiple 3.5 mm locking screws trapping the  comminution and portions of the   rotator cuff tendon beneath the plate.  The FiberWire sutures then tied to the   plate holes.  All screws were final tightened.  Final fluoroscopic images   demonstrated good reduction of fracture, good position of all hardware.  We   then thoroughly irrigated the wound and closed in layers with 0 Vicryl in the   deltopectoral interval, 2-0 Vicryl in the subcutaneous tissue, and staples on   the skin.  Sterile dressings were applied.  Patient was placed back in his   immobilizer and returned to recovery room in stable condition with no   complications.    POSTOPERATIVE PLAN:  1.  Nonweightbearing operative extremity, remain in immobilizer at all times   except hygiene.  2.  DVT prophylaxis -- none.  3.  IV antibiotic prophylaxis -- none.  4.  Discharge when criteria met.  Follow up with the [facility] in   2 weeks.       ____________________________________     MD TARA Koch / HALINA    DD:  01/04/2018 16:02:58  DT:  01/04/2018 17:16:52    D#:  [id number]  Job#:  [id number]

## 2018-01-05 NOTE — DISCHARGE INSTRUCTIONS
ACTIVITY: Rest and take it easy for the first 24 hours.  A responsible adult is recommended to remain with you during that time.  It is normal to feel sleepy.  We encourage you to not do anything that requires balance, judgment or coordination.    MILD FLU-LIKE SYMPTOMS ARE NORMAL. YOU MAY EXPERIENCE GENERALIZED MUSCLE ACHES, THROAT IRRITATION, HEADACHE AND/OR SOME NAUSEA.    FOR 24 HOURS DO NOT:  Drive, operate machinery or run household appliances.  Drink beer or alcoholic beverages.   Make important decisions or sign legal documents.    SPECIAL INSTRUCTIONS & SURGICAL DRESSING/BATHIN.  Nonweightbearing operative extremity, remain in immobilizer at all times   except hygiene.  2.  DVT prophylaxis -- none.  3.  IV antibiotic prophylaxis -- none.  4.  Discharge when criteria met.  Follow up with the [facility] in   2 weeks.    DIET: To avoid nausea, slowly advance diet as tolerated, avoiding spicy or greasy foods for the first day.  Add more substantial food to your diet according to your physician's instructions. INCREASE FLUIDS AND FIBER TO AVOID CONSTIPATION.    FOLLOW-UP APPOINTMENT:  A follow-up appointment should be arranged with your doctor; call to schedule.    You should CALL YOUR PHYSICIAN if you develop:  Fever greater than 101 degrees F.  Pain not relieved by medication, or persistent nausea or vomiting.  Excessive bleeding (blood soaking through dressing) or unexpected drainage from the wound.  Extreme redness or swelling around the incision site, drainage of pus or foul smelling drainage.  Inability to urinate or empty your bladder within 8 hours.  Problems with breathing or chest pain.    You should call 911 if you develop problems with breathing or chest pain.  If you are unable to contact your doctor or surgical center, you should go to the nearest emergency room or urgent care center.  Physician's telephone #: [phone]    If any questions arise, call your doctor.  If your  doctor is not available, please feel free to call the Surgical Center at [phone].  The Center is open Monday through Friday from 7AM to 7PM.  You can also call the HEALTH HOTLINE open 24 hours/day, 7 days/week and speak to a nurse at [phone], or toll free at [phone].    A registered nurse may call you a few days after your surgery to see how you are doing after your procedure.    MEDICATIONS: Resume taking daily medication.  Take prescribed pain medication with food.  If no medication is prescribed, you may take non-aspirin pain medication if needed.  PAIN MEDICATION CAN BE VERY CONSTIPATING.  Take a stool softener or laxative such as senokot, pericolace, or milk of magnesia if needed.    Prescription given for Oxycodone.  Last pain medication given at 4:10 PM.    If your physician has prescribed pain medication that includes Acetaminophen (Tylenol), do not take additional Acetaminophen (Tylenol) while taking the prescribed medication.    Depression / Suicide Risk    As you are discharged from this Critical access hospital facility, it is important to learn how to keep safe from harming yourself.    Recognize the warning signs:  · Abrupt changes in personality, positive or negative- including increase in energy   · Giving away possessions  · Change in eating patterns- significant weight changes-  positive or negative  · Change in sleeping patterns- unable to sleep or sleeping all the time   · Unwillingness or inability to communicate  · Depression  · Unusual sadness, discouragement and loneliness  · Talk of wanting to die  · Neglect of personal appearance   · Rebelliousness- reckless behavior  · Withdrawal from people/activities they love  · Confusion- inability to concentrate     If you or a loved one observes any of these behaviors or has concerns about self-harm, here's what you can do:  · Talk about it- your feelings and reasons for harming yourself  · Remove any means that you might use to hurt  yourself (examples: pills, rope, extension cords, firearm)  · Get professional help from the community (Mental Health, Substance Abuse, psychological counseling)  · Do not be alone:Call your Safe Contact- someone whom you trust who will be there for you.  · Call your local CRISIS HOTLINE 652-3403 or 915-726-7358  · Call your local Children's Mobile Crisis Response Team [place] (620) 829-6885 or www.[employer]  · Call the toll free National Suicide Prevention Hotlines   · National Suicide Prevention Lifeline 558-577-NHSN (9493)  · National Hope Line Network 800-SUICIDE (100-7033)

## 2018-01-05 NOTE — PROGRESS NOTES
Late entry 1750    Pt has not been taking his BP meds because he ran out, pt urged to refill his BP meds ASAP    Pt's VSS within 20% of baseline, pt alert/awake/oriented, pt states he wants to go home, pt voided in restroom, pt states his pain is tolerable, denies nausea, pt d/c with pt's friend.

## 2018-02-27 ENCOUNTER — HOSPITAL ENCOUNTER (OUTPATIENT)
Facility: MEDICAL CENTER | Age: 53
End: 2018-02-27
Attending: ORTHOPAEDIC SURGERY | Admitting: ORTHOPAEDIC SURGERY
Payer: MEDICAID

## 2018-02-28 ENCOUNTER — HOSPITAL ENCOUNTER (OUTPATIENT)
Facility: MEDICAL CENTER | Age: 53
End: 2018-02-28
Attending: ORTHOPAEDIC SURGERY | Admitting: ORTHOPAEDIC SURGERY
Payer: MEDICAID

## 2018-03-04 ENCOUNTER — HOSPITAL ENCOUNTER (OUTPATIENT)
Facility: MEDICAL CENTER | Age: 53
End: 2018-03-04
Attending: ORTHOPAEDIC SURGERY | Admitting: ORTHOPAEDIC SURGERY
Payer: MEDICAID

## 2018-03-04 VITALS
OXYGEN SATURATION: 97 % | WEIGHT: 186.29 LBS | RESPIRATION RATE: 16 BRPM | HEIGHT: 71 IN | TEMPERATURE: 97.2 F | BODY MASS INDEX: 26.08 KG/M2 | SYSTOLIC BLOOD PRESSURE: 153 MMHG | HEART RATE: 87 BPM | DIASTOLIC BLOOD PRESSURE: 87 MMHG

## 2018-03-04 DIAGNOSIS — S42.92XA CLOSED FRACTURE DISLOCATION OF LEFT SHOULDER JOINT, INITIAL ENCOUNTER: ICD-10-CM

## 2018-03-04 LAB
GRAM STN SPEC: NORMAL
SIGNIFICANT IND 70042: NORMAL
SITE SITE: NORMAL
SOURCE SOURCE: NORMAL

## 2018-03-04 PROCEDURE — 160038 HCHG SURGERY MINUTES - EA ADDL 1 MIN LEVEL 2: Performed by: ORTHOPAEDIC SURGERY

## 2018-03-04 PROCEDURE — 500881 HCHG PACK, EXTREMITY: Performed by: ORTHOPAEDIC SURGERY

## 2018-03-04 PROCEDURE — 87070 CULTURE OTHR SPECIMN AEROBIC: CPT

## 2018-03-04 PROCEDURE — 160048 HCHG OR STATISTICAL LEVEL 1-5: Performed by: ORTHOPAEDIC SURGERY

## 2018-03-04 PROCEDURE — 160036 HCHG PACU - EA ADDL 30 MINS PHASE I: Performed by: ORTHOPAEDIC SURGERY

## 2018-03-04 PROCEDURE — 87075 CULTR BACTERIA EXCEPT BLOOD: CPT

## 2018-03-04 PROCEDURE — A6222 GAUZE <=16 IN NO W/SAL W/O B: HCPCS | Performed by: ORTHOPAEDIC SURGERY

## 2018-03-04 PROCEDURE — A9270 NON-COVERED ITEM OR SERVICE: HCPCS

## 2018-03-04 PROCEDURE — A9270 NON-COVERED ITEM OR SERVICE: HCPCS | Performed by: ORTHOPAEDIC SURGERY

## 2018-03-04 PROCEDURE — 160009 HCHG ANES TIME/MIN: Performed by: ORTHOPAEDIC SURGERY

## 2018-03-04 PROCEDURE — 501838 HCHG SUTURE GENERAL: Performed by: ORTHOPAEDIC SURGERY

## 2018-03-04 PROCEDURE — A6454 SELF-ADHER BAND W>=3" <5"/YD: HCPCS | Performed by: ORTHOPAEDIC SURGERY

## 2018-03-04 PROCEDURE — 160035 HCHG PACU - 1ST 60 MINS PHASE I: Performed by: ORTHOPAEDIC SURGERY

## 2018-03-04 PROCEDURE — 501330 HCHG SET, CYSTO IRRIG TUBING: Performed by: ORTHOPAEDIC SURGERY

## 2018-03-04 PROCEDURE — 160046 HCHG PACU - 1ST 60 MINS PHASE II: Performed by: ORTHOPAEDIC SURGERY

## 2018-03-04 PROCEDURE — 160025 RECOVERY II MINUTES (STATS): Performed by: ORTHOPAEDIC SURGERY

## 2018-03-04 PROCEDURE — 700102 HCHG RX REV CODE 250 W/ 637 OVERRIDE(OP): Performed by: ORTHOPAEDIC SURGERY

## 2018-03-04 PROCEDURE — 700111 HCHG RX REV CODE 636 W/ 250 OVERRIDE (IP)

## 2018-03-04 PROCEDURE — 700102 HCHG RX REV CODE 250 W/ 637 OVERRIDE(OP)

## 2018-03-04 PROCEDURE — 160027 HCHG SURGERY MINUTES - 1ST 30 MINS LEVEL 2: Performed by: ORTHOPAEDIC SURGERY

## 2018-03-04 PROCEDURE — 700111 HCHG RX REV CODE 636 W/ 250 OVERRIDE (IP): Performed by: ORTHOPAEDIC SURGERY

## 2018-03-04 PROCEDURE — 160002 HCHG RECOVERY MINUTES (STAT): Performed by: ORTHOPAEDIC SURGERY

## 2018-03-04 PROCEDURE — 87205 SMEAR GRAM STAIN: CPT

## 2018-03-04 RX ORDER — SODIUM CHLORIDE, SODIUM LACTATE, POTASSIUM CHLORIDE, CALCIUM CHLORIDE 600; 310; 30; 20 MG/100ML; MG/100ML; MG/100ML; MG/100ML
INJECTION, SOLUTION INTRAVENOUS CONTINUOUS
Status: DISCONTINUED | OUTPATIENT
Start: 2018-03-04 | End: 2018-03-04 | Stop reason: HOSPADM

## 2018-03-04 RX ORDER — OXYCODONE HYDROCHLORIDE 10 MG/1
10 TABLET ORAL EVERY 4 HOURS PRN
Status: DISCONTINUED | OUTPATIENT
Start: 2018-03-04 | End: 2018-03-04 | Stop reason: HOSPADM

## 2018-03-04 RX ORDER — SCOLOPAMINE TRANSDERMAL SYSTEM 1 MG/1
1 PATCH, EXTENDED RELEASE TRANSDERMAL
Status: DISCONTINUED | OUTPATIENT
Start: 2018-03-04 | End: 2018-03-04 | Stop reason: HOSPADM

## 2018-03-04 RX ORDER — DEXAMETHASONE SODIUM PHOSPHATE 4 MG/ML
4 INJECTION, SOLUTION INTRA-ARTICULAR; INTRALESIONAL; INTRAMUSCULAR; INTRAVENOUS; SOFT TISSUE
Status: DISCONTINUED | OUTPATIENT
Start: 2018-03-04 | End: 2018-03-04 | Stop reason: HOSPADM

## 2018-03-04 RX ORDER — ONDANSETRON 2 MG/ML
4 INJECTION INTRAMUSCULAR; INTRAVENOUS EVERY 4 HOURS PRN
Status: DISCONTINUED | OUTPATIENT
Start: 2018-03-04 | End: 2018-03-04 | Stop reason: HOSPADM

## 2018-03-04 RX ORDER — DIPHENHYDRAMINE HYDROCHLORIDE 50 MG/ML
25 INJECTION INTRAMUSCULAR; INTRAVENOUS EVERY 6 HOURS PRN
Status: DISCONTINUED | OUTPATIENT
Start: 2018-03-04 | End: 2018-03-04 | Stop reason: HOSPADM

## 2018-03-04 RX ORDER — OXYCODONE HYDROCHLORIDE 10 MG/1
5 TABLET ORAL EVERY 4 HOURS PRN
Status: DISCONTINUED | OUTPATIENT
Start: 2018-03-04 | End: 2018-03-04 | Stop reason: HOSPADM

## 2018-03-04 RX ORDER — HYDROMORPHONE HYDROCHLORIDE 2 MG/ML
0.5 INJECTION, SOLUTION INTRAMUSCULAR; INTRAVENOUS; SUBCUTANEOUS
Status: DISCONTINUED | OUTPATIENT
Start: 2018-03-04 | End: 2018-03-04 | Stop reason: HOSPADM

## 2018-03-04 RX ORDER — OXYCODONE HYDROCHLORIDE 5 MG/1
5 TABLET ORAL EVERY 4 HOURS PRN
Qty: 40 TAB | Refills: 0 | Status: SHIPPED | OUTPATIENT
Start: 2018-03-04 | End: 2018-03-11

## 2018-03-04 RX ORDER — CLINDAMYCIN HYDROCHLORIDE 300 MG/1
300 CAPSULE ORAL 3 TIMES DAILY
Qty: 63 CAP | Refills: 0 | Status: SHIPPED | OUTPATIENT
Start: 2018-03-04 | End: 2018-03-25

## 2018-03-04 RX ORDER — HALOPERIDOL 5 MG/ML
1 INJECTION INTRAMUSCULAR EVERY 6 HOURS PRN
Status: DISCONTINUED | OUTPATIENT
Start: 2018-03-04 | End: 2018-03-04 | Stop reason: HOSPADM

## 2018-03-04 RX ORDER — DOXYCYCLINE 100 MG/1
100 CAPSULE ORAL 2 TIMES DAILY
Qty: 42 CAP | Refills: 0 | Status: SHIPPED | OUTPATIENT
Start: 2018-03-04 | End: 2018-03-25

## 2018-03-04 RX ADMIN — FENTANYL CITRATE 25 MCG: 50 INJECTION, SOLUTION INTRAMUSCULAR; INTRAVENOUS at 14:34

## 2018-03-04 RX ADMIN — SODIUM CHLORIDE, SODIUM LACTATE, POTASSIUM CHLORIDE, CALCIUM CHLORIDE: 600; 310; 30; 20 INJECTION, SOLUTION INTRAVENOUS at 10:25

## 2018-03-04 RX ADMIN — FENTANYL CITRATE 25 MCG: 50 INJECTION, SOLUTION INTRAMUSCULAR; INTRAVENOUS at 14:45

## 2018-03-04 RX ADMIN — HYDROCODONE BITARTRATE AND ACETAMINOPHEN 30 ML: 2.5; 108 SOLUTION ORAL at 14:13

## 2018-03-04 ASSESSMENT — PAIN SCALES - GENERAL
PAINLEVEL_OUTOF10: 5
PAINLEVEL_OUTOF10: 3
PAINLEVEL_OUTOF10: 2
PAINLEVEL_OUTOF10: 0
PAINLEVEL_OUTOF10: 7
PAINLEVEL_OUTOF10: 7
PAINLEVEL_OUTOF10: 8
PAINLEVEL_OUTOF10: 2

## 2018-03-04 NOTE — DISCHARGE INSTRUCTIONS
ACTIVITY: Rest and take it easy for the first 24 hours.  A responsible adult is recommended to remain with you during that time.  It is normal to feel sleepy.  We encourage you to not do anything that requires balance, judgment or coordination.    MILD FLU-LIKE SYMPTOMS ARE NORMAL. YOU MAY EXPERIENCE GENERALIZED MUSCLE ACHES, THROAT IRRITATION, HEADACHE AND/OR SOME NAUSEA.    FOR 24 HOURS DO NOT:  Drive, operate machinery or run household appliances.  Drink beer or alcoholic beverages.   Make important decisions or sign legal documents.    SPECIAL INSTRUCTIONS & SURGICAL DRESSING/BATHIN) Okay to shower tomorrow 3/5/18 with wound covered   2) weight bearing as tolerated    DIET: To avoid nausea, slowly advance diet as tolerated, avoiding spicy or greasy foods for the first day.  Add more substantial food to your diet according to your physician's instructions.  Babies can be fed formula or breast milk as soon as they are hungry.  INCREASE FLUIDS AND FIBER TO AVOID CONSTIPATION.    FOLLOW-UP APPOINTMENT:  A follow-up appointment should be arranged with your doctor please call Monday March 3, 2018 to schedule [phone]     You should CALL YOUR PHYSICIAN if you develop:  Fever greater than 101 degrees F.  Pain not relieved by medication, or persistent nausea or vomiting.  Excessive bleeding (blood soaking through dressing) or unexpected drainage from the wound.  Extreme redness or swelling around the incision site, drainage of pus or foul smelling drainage.  Inability to urinate or empty your bladder within 8 hours.  Problems with breathing or chest pain.    You should call 911 if you develop problems with breathing or chest pain.  If you are unable to contact your doctor or surgical center, you should go to the nearest emergency room or urgent care center.  Physician's telephone #: *Dr. Watkins [phone]*    If any questions arise, call your doctor.  If your doctor is not available, please feel free to  call the Surgical Center at [phone].  The Center is open Monday through Friday from 7AM to 7PM.  You can also call the HEALTH HOTLINE open 24 hours/day, 7 days/week and speak to a nurse at [phone], or toll free at [phone].    A registered nurse may call you a few days after your surgery to see how you are doing after your procedure.    MEDICATIONS: Resume taking daily medication.  Take prescribed pain medication with food.  If no medication is prescribed, you may take non-aspirin pain medication if needed.  PAIN MEDICATION CAN BE VERY CONSTIPATING.  Take a stool softener or laxative such as senokot, pericolace, or milk of magnesia if needed.    Prescription given for **Clidamycin, Monodox, Oxycodone*.  Last pain medication given at *2:13pm*.    If your physician has prescribed pain medication that includes Acetaminophen (Tylenol), do not take additional Acetaminophen (Tylenol) while taking the prescribed medication.    Depression / Suicide Risk    As you are discharged from this [facility] facility, it is important to learn how to keep safe from harming yourself.    Recognize the warning signs:  · Abrupt changes in personality, positive or negative- including increase in energy   · Giving away possessions  · Change in eating patterns- significant weight changes-  positive or negative  · Change in sleeping patterns- unable to sleep or sleeping all the time   · Unwillingness or inability to communicate  · Depression  · Unusual sadness, discouragement and loneliness  · Talk of wanting to die  · Neglect of personal appearance   · Rebelliousness- reckless behavior  · Withdrawal from people/activities they love  · Confusion- inability to concentrate     If you or a loved one observes any of these behaviors or has concerns about self-harm, here's what you can do:  · Talk about it- your feelings and reasons for harming yourself  · Remove any means that you might use to hurt yourself (examples: pills,  rope, extension cords, firearm)  · Get professional help from the community (Mental Health, Substance Abuse, psychological counseling)  · Do not be alone:Call your Safe Contact- someone whom you trust who will be there for you.  · Call your local CRISIS HOTLINE 865-3878 or 608-956-7351  · Call your local Children's Mobile Crisis Response Team [place] (480) 662-7377 or www.[employer]  · Call the toll free National Suicide Prevention Hotlines   · National Suicide Prevention Lifeline 003-075-LNJQ (3633)  · National Hope Line Network 800-SUICIDE (116-5044)

## 2018-03-04 NOTE — H&P
3/4/2018    Dillon Centeno is a 52 y.o. male who presents with an infected left shoulder/arm wound after left greater tuberosity ORIF.  We have attempted surgical treatment of this infection on several occasions before today , but the patient did not show for surgery and was not NPO.  He endorses occasional drainage, denies fevers, chills, or night sweats.    Past Medical History:   Diagnosis Date   • Hypertension        Past Surgical History:   Procedure Laterality Date   • SHOULDER ORIF Left 1/4/2018    Procedure: SHOULDER ORIF;  Surgeon: Sergio Watkins M.D.;  Location: [facility];  Service: Orthopedics   • CLOSED REDUCTION Left 12/24/2017    Procedure: CLOSED REDUCTION;  Surgeon: Keith Subramanian M.D.;  Location: [facility];  Service: Orthopedics   • OTHER      multiple surgeries lower legs from past injuries       Medications  No current facility-administered medications on file prior to encounter.      Current Outpatient Prescriptions on File Prior to Encounter   Medication Sig Dispense Refill   • meloxicam (MOBIC) 15 MG tablet Take 15 mg by mouth every day.     • doxazosin (CARDURA) 4 MG Tab Take 4 mg by mouth every day.     • docusate sodium (COLACE) 100 MG Cap Take 1 Cap by mouth 2 times a day. 60 Cap 0   • ibuprofen (MOTRIN) 800 MG Tab Take 1 Tab by mouth every 8 hours as needed. 30 Tab 0       Allergies  Sulfa drugs    ROS  Per HPI. All other systems were reviewed and found to be negative    History reviewed. No pertinent family history.    Social History     Social History   • Marital status: Single     Spouse name: N/A   • Number of children: N/A   • Years of education: N/A     Social History Main Topics   • Smoking status: Current Every Day Smoker     Years: 30.00     Types: Cigars   • Smokeless tobacco: Never Used   • Alcohol use Yes      Comment: a few beers a week   • Drug use: No   • Sexual activity: Not on file     Other Topics Concern   • Not on file     Social  "History Narrative   • No narrative on file       Physical Exam  Vitals  Blood pressure 153/87, pulse 69, temperature 36.9 °C (98.4 °F), resp. rate 16, height 1.803 m (5' 11\"), weight 84.5 kg (186 lb 4.6 oz), SpO2 97 %.  General: Well Developed, Well Nourished, no acute distress  Psychiatric: Alert and oriented x3, appropriate responses to questions, pleasant mood and affect.  HEENT: Normocephalic, atraumatic  Eyes: Anicteric, PERRLA, EOMI  Neck: Supple, nontender, no masses  Chest: Symmetric expansion of the chest wall, non-tender to palpation, no distress.  Heart: RRR, palpable peripheral pulses  Abdomen: Soft, NT, ND  Skin: Small area of drainage from left arm wound, mostly serous, no erythema  Extremities: No deformity, limited ROM  Neuro: Intact motor and sensory function median/radial/ulnar left arm  Vascular: 2+ radial pulse on left, Capillary refill <2 seconds    Radiographs:  No orders to display       Laboratory Values      No results for input(s): SODIUM, POTASSIUM, CHLORIDE, CO2, GLUCOSE, BUN, CPKTOTAL in the last 72 hours.          Impression:    #1 Left shoulder infected surgical wound    Plan:    I recommended operative treatment of his infection - incision and drainage. Risks and benefits of surgery were discussed which include, but are not limited to bleeding, infection, neurovascular damage, malunion, nonunion, stiffness, symptomatic hardware, DVT, PE, MI, Stroke and death.  Benefits of surgery discussed included improved chance of infection clearance.  We also discussed therapeutic alternatives to surgery, including non-operative management, which I did not recommend.    They understand these risks and benefits and wish to proceed.      Please keep NPO pending surgery.  Non-weightbearing affected extremity pending surgery.    Please see operative note for detailed post-operative plan, including post-op weightbearing status.      "

## 2018-03-04 NOTE — OP REPORT
DATE OF SERVICE:  03/04/2018    PREOPERATIVE DIAGNOSIS:  Left shoulder wound infection.    POSTOPERATIVE DIAGNOSIS:  Left shoulder wound infection.    PROCEDURE:  Incision and drainage of left shoulder wound infection with   primary closure.    SURGEON:  Sergio Watkins MD    ASSISTANT:  None.    ANESTHESIOLOGIST:  Easton Laurent MD    ANESTHESIA TYPE:  General.    SPECIMENS:  Cultures.    ESTIMATED BLOOD LOSS:  50 mL    COMPLICATIONS:  None.    OPERATIVE INDICATIONS:  The patient is a 52-year-old male who underwent ORIF   of greater tuberosity months ago.  He subsequently developed a small what   appeared to be a suture abscess, which did not resolve with overall   antibiotics.  He has had increased drainage from this area.  We attempted to   get him in for surgical management multiple times; however, he has not shown.    He returns today now for surgical management of his infection.  We discussed   risks, benefits, and alternatives of the planned procedure including the risk   of failure to clear the infection, wound healing complication, neurovascular   injury, blood loss, DVT, PE, and medical the risk of anesthesia.  We discussed   benefits including improved chance of infection clearance and alternatives   including medical management, which I advised to stay out at this point.  He   is in agreement with the plan to proceed.  His informed consent was signed and   documented.  Of note, I did advise him that he did need to be admitted for IV   antibiotics and culture results, but he has entirely refused stating that he   needs to go get a hearing aid tomorrow.  He cannot miss this appointment and   will not be staying in the hospital.  I did advise him this is not in   accordance with the best medical practice, he indicates understanding.  I did   indicate that we would proceed given the need to clean out his infection to gain at least partial control of his infection and empiric   antibiotics than to simply move  on with medical management.  I met with him   preoperatively and marked the operative extremity.  We proceeded to the   operating room at [facility].    OPERATIVE COURSE:  He underwent general anesthesia with Dr. Laurent.  He was   positioned supine.  All bony prominences were well padded.  The left shoulder   and upper extremity were prepped and draped in the sterile orthopedic fashion   using iodine prep given his open wound.  The surgical team scrubbed in.  A   procedural pause was conducted to verify correct patient, correct extremity,   presence of the surgeon's initials on the operative extremity, administration   of antibiotics, in this case Ancef.  Following generalized agreement, his old   deltopectoral approach was opened sharply.  We dissected down to the deltoid   musculature.  The deltopectoral interval was difficult to identify given the   abundance of scar tissue present.  We dissected in this interval.  We also   followed the area of purulent drainage distally down.  This extended down to   the plate.  Cultures were obtained from the purulent material distally.  We   opened the deltopectoral interval and also the area of purulent drainage from   about the plate.  Once the initial purulence was irrigated and debrided away,   there were no additional signs of purulence.  The majority of the tissues   appeared to be healthy and quite viable.  The infection did not appear to   track up to plate and indeed, we had to dissect scar tissue off the plate   proximal to the area of infection in order to further expose it.  We then   thoroughly irrigated the wound with copious amounts of normal saline.  All   unhealthy-appearing tissue was removed with forceps and scissors.  When   irrigation and excisional debridement of the wound was complete, only healthy   remaining tissue appeared.  Deepest level of debridement was bone.  When we   completed irrigating with copious amounts of normal saline, we then loosely   closed  with some 2-0 Monocryl suture and nylon interrupted suture in the skin.    The wound was left loosely closed distally to allow drainage.  A drain was   not placed nor was the wound left open, as the patient is refusing to stay   inpatient.  When that was complete, sterile dressings were applied.  The   patient was returned to the recovery room in stable condition suffering no   complications.    POSTOPERATIVE PLAN:  1.  Weightbearing as tolerated to operative extremity.  2.  Deep venous thrombosis prophylaxis with sequential compression devices in   the hospital, none upon discharge.  3.  Antibiotics with clindamycin and doxycycline.  Given his sulfa allergy,   Bactrim will not be employed.  We will await results of his cultures.  He   understands he may need to see our infectious disease specialist to arrange IV   antibiotic management after his hearing aids are obtained.  4.  Discharge him when criteria met.  I did write a prescription for narcotics   with the patient and discussed the risks of narcotics with the patient.  We   had the discussion previously at our office and a risk assessment was   performed on the patient.  His  was checked today.  He has not received any   prescriptions since he received his last one from me postoperatively in   January.  He does not appear elevated risk for abuse.       ____________________________________     MD TARA Koch / HALINA    DD:  03/04/2018 14:01:07  DT:  03/04/2018 15:12:16    D#:  [id number]  Job#:  [id number]

## 2018-03-07 LAB
BACTERIA SPEC ANAEROBE CULT: ABNORMAL
BACTERIA SPEC ANAEROBE CULT: ABNORMAL
BACTERIA WND AEROBE CULT: NORMAL
GRAM STN SPEC: NORMAL
SIGNIFICANT IND 70042: ABNORMAL
SIGNIFICANT IND 70042: NORMAL
SITE SITE: ABNORMAL
SITE SITE: NORMAL
SOURCE SOURCE: ABNORMAL
SOURCE SOURCE: NORMAL

## 2020-02-26 ENCOUNTER — HOSPITAL ENCOUNTER (EMERGENCY)
Dept: HOSPITAL 8 - ED | Age: 55
Discharge: LEFT BEFORE DISCHARGE | End: 2020-02-26
Payer: MEDICAID

## 2020-02-26 VITALS — SYSTOLIC BLOOD PRESSURE: 195 MMHG | DIASTOLIC BLOOD PRESSURE: 113 MMHG

## 2020-02-26 VITALS — WEIGHT: 202.83 LBS | HEIGHT: 72 IN | BODY MASS INDEX: 27.47 KG/M2

## 2020-02-26 DIAGNOSIS — S00.81XA: Primary | ICD-10-CM
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
## 2020-02-26 DIAGNOSIS — F10.120: ICD-10-CM
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
## 2020-02-26 PROCEDURE — 99283 EMERGENCY DEPT VISIT LOW MDM: CPT

## 2020-02-26 NOTE — NUR
biba. glf. witnessed. no loc. small abrasion l forehead. admits 2 beers. fsbs 
125. breathalyzer per rpd .182. sutures noted in L shoulder. sts has had them 
in x1 year. psych hx. appears unkempt. labile mood. hypertensive. c/o some 
dizziness and ha. supposed to be on meds but cannot recall names and is 
nonompliant. call bell in reach. awaiting md. as

## 2020-09-11 ENCOUNTER — APPOINTMENT (OUTPATIENT)
Dept: RADIOLOGY | Facility: MEDICAL CENTER | Age: 55
DRG: 183 | End: 2020-09-11
Attending: EMERGENCY MEDICINE
Payer: MEDICAID

## 2020-09-11 ENCOUNTER — HOSPITAL ENCOUNTER (INPATIENT)
Facility: MEDICAL CENTER | Age: 55
LOS: 8 days | DRG: 183 | End: 2020-09-19
Attending: EMERGENCY MEDICINE | Admitting: SURGERY
Payer: MEDICAID

## 2020-09-11 DIAGNOSIS — J93.83 PNEUMOTHORAX, ACUTE: ICD-10-CM

## 2020-09-11 DIAGNOSIS — S22.5XXA CLOSED FRACTURE OF MULTIPLE RIBS WITH FLAIL CHEST, INITIAL ENCOUNTER: ICD-10-CM

## 2020-09-11 DIAGNOSIS — S27.2XXA TRAUMATIC PNEUMOHEMOTHORAX, INITIAL ENCOUNTER: ICD-10-CM

## 2020-09-11 DIAGNOSIS — S22.41XA CLOSED FRACTURE OF MULTIPLE RIBS OF RIGHT SIDE, INITIAL ENCOUNTER: ICD-10-CM

## 2020-09-11 DIAGNOSIS — T14.90XA TRAUMA: ICD-10-CM

## 2020-09-11 PROBLEM — J98.2 PNEUMOMEDIASTINUM (HCC): Status: ACTIVE | Noted: 2020-09-11

## 2020-09-11 PROBLEM — T79.7XXA SUBCUTANEOUS EMPHYSEMA (HCC): Status: ACTIVE | Noted: 2020-09-11

## 2020-09-11 PROBLEM — N28.9 KIDNEY LESION: Status: ACTIVE | Noted: 2020-09-11

## 2020-09-11 PROBLEM — Z53.09 CONTRAINDICATION TO DEEP VEIN THROMBOSIS (DVT) PROPHYLAXIS: Status: ACTIVE | Noted: 2020-09-11

## 2020-09-11 PROBLEM — R94.31 ABNORMAL EKG: Status: ACTIVE | Noted: 2020-09-11

## 2020-09-11 PROBLEM — Z11.9 SCREENING EXAMINATION FOR INFECTIOUS DISEASE: Status: ACTIVE | Noted: 2020-09-11

## 2020-09-11 PROBLEM — N28.89 NODULE OF KIDNEY: Status: ACTIVE | Noted: 2020-09-11

## 2020-09-11 LAB
ALBUMIN SERPL BCP-MCNC: 4.6 G/DL (ref 3.2–4.9)
ALBUMIN/GLOB SERPL: 1.5 G/DL
ALP SERPL-CCNC: 50 U/L (ref 30–99)
ALT SERPL-CCNC: 22 U/L (ref 2–50)
ANION GAP SERPL CALC-SCNC: 19 MMOL/L (ref 7–16)
APTT PPP: 24.5 SEC (ref 24.7–36)
AST SERPL-CCNC: 29 U/L (ref 12–45)
BASOPHILS # BLD AUTO: 0.3 % (ref 0–1.8)
BASOPHILS # BLD: 0.04 K/UL (ref 0–0.12)
BILIRUB SERPL-MCNC: 1.1 MG/DL (ref 0.1–1.5)
BUN SERPL-MCNC: 14 MG/DL (ref 8–22)
CALCIUM SERPL-MCNC: 9.5 MG/DL (ref 8.5–10.5)
CHLORIDE SERPL-SCNC: 91 MMOL/L (ref 96–112)
CO2 SERPL-SCNC: 22 MMOL/L (ref 20–33)
COVID ORDER STATUS COVID19: NORMAL
CREAT SERPL-MCNC: 0.6 MG/DL (ref 0.5–1.4)
EKG IMPRESSION: NORMAL
EOSINOPHIL # BLD AUTO: 0.01 K/UL (ref 0–0.51)
EOSINOPHIL NFR BLD: 0.1 % (ref 0–6.9)
ERYTHROCYTE [DISTWIDTH] IN BLOOD BY AUTOMATED COUNT: 48.3 FL (ref 35.9–50)
ETHANOL BLD-MCNC: <10.1 MG/DL (ref 0–10.1)
GLOBULIN SER CALC-MCNC: 3 G/DL (ref 1.9–3.5)
GLUCOSE SERPL-MCNC: 142 MG/DL (ref 65–99)
HCT VFR BLD AUTO: 49.7 % (ref 42–52)
HGB BLD-MCNC: 17.6 G/DL (ref 14–18)
IMM GRANULOCYTES # BLD AUTO: 0.07 K/UL (ref 0–0.11)
IMM GRANULOCYTES NFR BLD AUTO: 0.5 % (ref 0–0.9)
INR PPP: 0.91 (ref 0.87–1.13)
LYMPHOCYTES # BLD AUTO: 0.62 K/UL (ref 1–4.8)
LYMPHOCYTES NFR BLD: 4.2 % (ref 22–41)
MCH RBC QN AUTO: 35.7 PG (ref 27–33)
MCHC RBC AUTO-ENTMCNC: 35.4 G/DL (ref 33.7–35.3)
MCV RBC AUTO: 100.8 FL (ref 81.4–97.8)
MONOCYTES # BLD AUTO: 0.73 K/UL (ref 0–0.85)
MONOCYTES NFR BLD AUTO: 5 % (ref 0–13.4)
NEUTROPHILS # BLD AUTO: 13.13 K/UL (ref 1.82–7.42)
NEUTROPHILS NFR BLD: 89.9 % (ref 44–72)
NRBC # BLD AUTO: 0 K/UL
NRBC BLD-RTO: 0 /100 WBC
PLATELET # BLD AUTO: 288 K/UL (ref 164–446)
PMV BLD AUTO: 9.3 FL (ref 9–12.9)
POTASSIUM SERPL-SCNC: 4.3 MMOL/L (ref 3.6–5.5)
PROT SERPL-MCNC: 7.6 G/DL (ref 6–8.2)
PROTHROMBIN TIME: 12.5 SEC (ref 12–14.6)
RBC # BLD AUTO: 4.93 M/UL (ref 4.7–6.1)
SARS-COV-2 RNA RESP QL NAA+PROBE: NOTDETECTED
SODIUM SERPL-SCNC: 132 MMOL/L (ref 135–145)
SPECIMEN SOURCE: NORMAL
WBC # BLD AUTO: 14.6 K/UL (ref 4.8–10.8)

## 2020-09-11 PROCEDURE — 85610 PROTHROMBIN TIME: CPT

## 2020-09-11 PROCEDURE — 70450 CT HEAD/BRAIN W/O DYE: CPT

## 2020-09-11 PROCEDURE — 80307 DRUG TEST PRSMV CHEM ANLYZR: CPT

## 2020-09-11 PROCEDURE — 72131 CT LUMBAR SPINE W/O DYE: CPT

## 2020-09-11 PROCEDURE — 71045 X-RAY EXAM CHEST 1 VIEW: CPT

## 2020-09-11 PROCEDURE — 96374 THER/PROPH/DIAG INJ IV PUSH: CPT

## 2020-09-11 PROCEDURE — U0003 INFECTIOUS AGENT DETECTION BY NUCLEIC ACID (DNA OR RNA); SEVERE ACUTE RESPIRATORY SYNDROME CORONAVIRUS 2 (SARS-COV-2) (CORONAVIRUS DISEASE [COVID-19]), AMPLIFIED PROBE TECHNIQUE, MAKING USE OF HIGH THROUGHPUT TECHNOLOGIES AS DESCRIBED BY CMS-2020-01-R: HCPCS

## 2020-09-11 PROCEDURE — 80053 COMPREHEN METABOLIC PANEL: CPT

## 2020-09-11 PROCEDURE — 99291 CRITICAL CARE FIRST HOUR: CPT

## 2020-09-11 PROCEDURE — 93005 ELECTROCARDIOGRAM TRACING: CPT

## 2020-09-11 PROCEDURE — 93005 ELECTROCARDIOGRAM TRACING: CPT | Performed by: EMERGENCY MEDICINE

## 2020-09-11 PROCEDURE — 700111 HCHG RX REV CODE 636 W/ 250 OVERRIDE (IP): Performed by: EMERGENCY MEDICINE

## 2020-09-11 PROCEDURE — 302220 CHEST TUBE TRAY: Performed by: EMERGENCY MEDICINE

## 2020-09-11 PROCEDURE — 72125 CT NECK SPINE W/O DYE: CPT

## 2020-09-11 PROCEDURE — 770022 HCHG ROOM/CARE - ICU (200)

## 2020-09-11 PROCEDURE — 700111 HCHG RX REV CODE 636 W/ 250 OVERRIDE (IP): Performed by: SURGERY

## 2020-09-11 PROCEDURE — 700117 HCHG RX CONTRAST REV CODE 255: Performed by: EMERGENCY MEDICINE

## 2020-09-11 PROCEDURE — 85025 COMPLETE CBC W/AUTO DIFF WBC: CPT

## 2020-09-11 PROCEDURE — G0390 TRAUMA RESPONS W/HOSP CRITI: HCPCS

## 2020-09-11 PROCEDURE — C9803 HOPD COVID-19 SPEC COLLECT: HCPCS | Performed by: EMERGENCY MEDICINE

## 2020-09-11 PROCEDURE — 85730 THROMBOPLASTIN TIME PARTIAL: CPT

## 2020-09-11 PROCEDURE — 72128 CT CHEST SPINE W/O DYE: CPT

## 2020-09-11 PROCEDURE — 700102 HCHG RX REV CODE 250 W/ 637 OVERRIDE(OP): Performed by: SURGERY

## 2020-09-11 PROCEDURE — 96375 TX/PRO/DX INJ NEW DRUG ADDON: CPT

## 2020-09-11 PROCEDURE — 700105 HCHG RX REV CODE 258: Performed by: SURGERY

## 2020-09-11 PROCEDURE — 0W9930Z DRAINAGE OF RIGHT PLEURAL CAVITY WITH DRAINAGE DEVICE, PERCUTANEOUS APPROACH: ICD-10-PCS | Performed by: EMERGENCY MEDICINE

## 2020-09-11 PROCEDURE — 32551 INSERTION OF CHEST TUBE: CPT

## 2020-09-11 PROCEDURE — A9270 NON-COVERED ITEM OR SERVICE: HCPCS | Performed by: SURGERY

## 2020-09-11 PROCEDURE — 70486 CT MAXILLOFACIAL W/O DYE: CPT

## 2020-09-11 PROCEDURE — 74177 CT ABD & PELVIS W/CONTRAST: CPT

## 2020-09-11 RX ORDER — FAMOTIDINE 20 MG/1
20 TABLET, FILM COATED ORAL 2 TIMES DAILY
Status: DISCONTINUED | OUTPATIENT
Start: 2020-09-11 | End: 2020-09-14

## 2020-09-11 RX ORDER — BISACODYL 10 MG
10 SUPPOSITORY, RECTAL RECTAL
Status: DISCONTINUED | OUTPATIENT
Start: 2020-09-11 | End: 2020-09-19 | Stop reason: HOSPADM

## 2020-09-11 RX ORDER — MIDAZOLAM HYDROCHLORIDE 1 MG/ML
1-5 INJECTION INTRAMUSCULAR; INTRAVENOUS ONCE
Status: COMPLETED | OUTPATIENT
Start: 2020-09-11 | End: 2020-09-11

## 2020-09-11 RX ORDER — HYDROMORPHONE HYDROCHLORIDE 2 MG/ML
.5-1 INJECTION, SOLUTION INTRAMUSCULAR; INTRAVENOUS; SUBCUTANEOUS
Status: DISCONTINUED | OUTPATIENT
Start: 2020-09-11 | End: 2020-09-12

## 2020-09-11 RX ORDER — DOCUSATE SODIUM 100 MG/1
100 CAPSULE, LIQUID FILLED ORAL 2 TIMES DAILY
Status: DISCONTINUED | OUTPATIENT
Start: 2020-09-11 | End: 2020-09-19 | Stop reason: HOSPADM

## 2020-09-11 RX ORDER — MORPHINE SULFATE 4 MG/ML
4 INJECTION, SOLUTION INTRAMUSCULAR; INTRAVENOUS ONCE
Status: COMPLETED | OUTPATIENT
Start: 2020-09-11 | End: 2020-09-11

## 2020-09-11 RX ORDER — IBUPROFEN 800 MG/1
800 TABLET ORAL 3 TIMES DAILY
Status: DISPENSED | OUTPATIENT
Start: 2020-09-11 | End: 2020-09-16

## 2020-09-11 RX ORDER — SODIUM CHLORIDE, SODIUM LACTATE, POTASSIUM CHLORIDE, CALCIUM CHLORIDE 600; 310; 30; 20 MG/100ML; MG/100ML; MG/100ML; MG/100ML
INJECTION, SOLUTION INTRAVENOUS CONTINUOUS
Status: DISCONTINUED | OUTPATIENT
Start: 2020-09-11 | End: 2020-09-13

## 2020-09-11 RX ORDER — ONDANSETRON 2 MG/ML
4 INJECTION INTRAMUSCULAR; INTRAVENOUS EVERY 4 HOURS PRN
Status: DISCONTINUED | OUTPATIENT
Start: 2020-09-11 | End: 2020-09-19 | Stop reason: HOSPADM

## 2020-09-11 RX ORDER — ACETAMINOPHEN 325 MG/1
650 TABLET ORAL EVERY 6 HOURS
Status: DISCONTINUED | OUTPATIENT
Start: 2020-09-11 | End: 2020-09-12

## 2020-09-11 RX ORDER — LABETALOL HYDROCHLORIDE 5 MG/ML
10 INJECTION, SOLUTION INTRAVENOUS EVERY 4 HOURS PRN
Status: DISCONTINUED | OUTPATIENT
Start: 2020-09-11 | End: 2020-09-19

## 2020-09-11 RX ORDER — AMOXICILLIN 250 MG
1 CAPSULE ORAL
Status: DISCONTINUED | OUTPATIENT
Start: 2020-09-11 | End: 2020-09-19 | Stop reason: HOSPADM

## 2020-09-11 RX ORDER — IBUPROFEN 200 MG
800-1000 TABLET ORAL EVERY 8 HOURS PRN
COMMUNITY
End: 2023-09-26

## 2020-09-11 RX ORDER — AMOXICILLIN 250 MG
1 CAPSULE ORAL NIGHTLY
Status: DISCONTINUED | OUTPATIENT
Start: 2020-09-11 | End: 2020-09-19 | Stop reason: HOSPADM

## 2020-09-11 RX ORDER — POLYETHYLENE GLYCOL 3350 17 G/17G
1 POWDER, FOR SOLUTION ORAL 2 TIMES DAILY
Status: DISCONTINUED | OUTPATIENT
Start: 2020-09-11 | End: 2020-09-19 | Stop reason: HOSPADM

## 2020-09-11 RX ORDER — OXYCODONE HYDROCHLORIDE 5 MG/1
5-15 TABLET ORAL
Status: DISCONTINUED | OUTPATIENT
Start: 2020-09-11 | End: 2020-09-12

## 2020-09-11 RX ORDER — HYDROMORPHONE HYDROCHLORIDE 1 MG/ML
0.5 INJECTION, SOLUTION INTRAMUSCULAR; INTRAVENOUS; SUBCUTANEOUS
Status: DISCONTINUED | OUTPATIENT
Start: 2020-09-11 | End: 2020-09-11

## 2020-09-11 RX ORDER — ENEMA 19; 7 G/133ML; G/133ML
1 ENEMA RECTAL
Status: DISCONTINUED | OUTPATIENT
Start: 2020-09-11 | End: 2020-09-19 | Stop reason: HOSPADM

## 2020-09-11 RX ADMIN — HYDROMORPHONE HYDROCHLORIDE 0.5 MG: 1 INJECTION, SOLUTION INTRAMUSCULAR; INTRAVENOUS; SUBCUTANEOUS at 17:35

## 2020-09-11 RX ADMIN — MORPHINE SULFATE 4 MG: 4 INJECTION INTRAVENOUS at 12:15

## 2020-09-11 RX ADMIN — OXYCODONE 10 MG: 5 TABLET ORAL at 21:29

## 2020-09-11 RX ADMIN — IOHEXOL 100 ML: 350 INJECTION, SOLUTION INTRAVENOUS at 11:30

## 2020-09-11 RX ADMIN — HYDROMORPHONE HYDROCHLORIDE 0.5 MG: 1 INJECTION, SOLUTION INTRAMUSCULAR; INTRAVENOUS; SUBCUTANEOUS at 15:35

## 2020-09-11 RX ADMIN — SODIUM CHLORIDE, POTASSIUM CHLORIDE, SODIUM LACTATE AND CALCIUM CHLORIDE: 600; 310; 30; 20 INJECTION, SOLUTION INTRAVENOUS at 14:04

## 2020-09-11 RX ADMIN — MIDAZOLAM HYDROCHLORIDE 5 MG: 1 INJECTION, SOLUTION INTRAMUSCULAR; INTRAVENOUS at 10:30

## 2020-09-11 RX ADMIN — HYDROMORPHONE HYDROCHLORIDE 0.5 MG: 1 INJECTION, SOLUTION INTRAMUSCULAR; INTRAVENOUS; SUBCUTANEOUS at 14:04

## 2020-09-11 RX ADMIN — HYDROMORPHONE HYDROCHLORIDE 0.5 MG: 1 INJECTION, SOLUTION INTRAMUSCULAR; INTRAVENOUS; SUBCUTANEOUS at 19:53

## 2020-09-11 RX ADMIN — FAMOTIDINE 20 MG: 10 INJECTION INTRAVENOUS at 17:46

## 2020-09-11 RX ADMIN — FENTANYL CITRATE 100 MCG: 50 INJECTION INTRAMUSCULAR; INTRAVENOUS at 10:30

## 2020-09-11 RX ADMIN — HYDROMORPHONE HYDROCHLORIDE 0.5 MG: 1 INJECTION, SOLUTION INTRAMUSCULAR; INTRAVENOUS; SUBCUTANEOUS at 18:49

## 2020-09-11 ASSESSMENT — FIBROSIS 4 INDEX: FIB4 SCORE: 1.2

## 2020-09-11 ASSESSMENT — COPD QUESTIONNAIRES
DO YOU EVER COUGH UP ANY MUCUS OR PHLEGM?: NO/ONLY WITH OCCASIONAL COLDS OR INFECTIONS
DURING THE PAST 4 WEEKS HOW MUCH DID YOU FEEL SHORT OF BREATH: NONE/LITTLE OF THE TIME
HAVE YOU SMOKED AT LEAST 100 CIGARETTES IN YOUR ENTIRE LIFE: NO/DON'T KNOW
COPD SCREENING SCORE: 2

## 2020-09-11 NOTE — ED NOTES
Pt noted to have sub cuteneous air in chest, face, bilateral extremities. Upon xray it was noted that pt had a pneumothorax on Right side. Chest tube setup and ERP preformed a right sided chest tube insertion.

## 2020-09-11 NOTE — PROGRESS NOTES
This ICU RN to red 11. Patient alert, oriented x 4, calm, cooperative and in no apparent distress. Given call button to be able to watch TV. Jarvis with RT at bedside. Blanca WADDELL at bedside.

## 2020-09-11 NOTE — ED NOTES
Pt BIB EMS from home.  Pt woke up this morning with swelling to left eye and face, SOB and hives to chest.  Pt denies allergies except to sulfa but hasn't take any of this.  Pt c/o right shoulder pain going down his back starting yesterday.  Pt hypertensive for /142.  Pt placed on 6 liters NC by EMS, 88% on RA.  ERP to see.

## 2020-09-11 NOTE — ASSESSMENT & PLAN NOTE
Complex hypodense left superior pole kidney mass with possible posterior enhancement. Renal malignancy is possible.  Recommend outpatient follow up with renal ultrasound and urology.     IP consult oncology

## 2020-09-11 NOTE — ED PROVIDER NOTES
ED Provider Note    Scribed for Naren Sellers M.D. by Maribel Miramontes. 9/11/2020, 9:52 AM.    Primary care provider: Pcp Pt States None  Means of arrival: Ambulance  History obtained from: Patient  History limited by: None    CHIEF COMPLAINT  Chief Complaint   Patient presents with   • Shortness of Breath   • Eye Swelling   • Facial Swelling   • Hives     chest    • Blood Pressure Problem     HPI  Dillon Centeno is a 55 y.o. male who presents to the Emergency Department brought in by ambulance from home for sudden left sided facial swelling and left eye swelling since waking up this morning. Associated symptoms include hives to his chest and shortness of breath since this morning. Patient reports allergies to Sulfa but denies taking any of this and states he just woke up like this. Additionally, he endorses right shoulder pain radiating to his back starting yesterday. Denies fall yesterday, although does mention a fall a couple of days ago from his bed after having a nightmare. Per nurses note, patient has a past medical history of hypertension and was hypertensive at 214/142 for EMS.  She stated that he fell out of his bed because of a bad dream 2 nights ago and is complaining of right shoulder pain as well.    REVIEW OF SYSTEMS  As above otherwise all other systems are negative    PAST MEDICAL HISTORY  Patient has a past medical history of Hypertension.    SURGICAL HISTORY  Patient has a past surgical history that includes closed reduction (Left, 12/24/2017); other; shoulder orif (Left, 1/4/2018); and irrigation & debridement ortho (Left, 3/4/2018).    SOCIAL HISTORY  Social History     Tobacco Use   • Smoking status: Current Every Day Smoker     Years: 30.00     Types: Cigars   • Smokeless tobacco: Never Used   Substance Use Topics   • Alcohol use: Yes     Comment: a few beers a week   • Drug use: No      Social History     Substance and Sexual Activity   Drug Use No       FAMILY HISTORY  No family  "history on file.    CURRENT MEDICATIONS  Home Medications     Reviewed by Karen Manuel (Pharmacy Tech) on 09/11/20 at 1252  Med List Status: Complete   Medication Last Dose Status   Ascorbic Acid (VITAMIN C PO) LAST WEEK Active   ibuprofen (MOTRIN) 200 MG Tab 9/10/2020 Active                ALLERGIES  Allergies   Allergen Reactions   • Sulfa Drugs Hives and Shortness of Breath       PHYSICAL EXAM  VITAL SIGNS: BP (!) 235/127   Pulse (!) 112   Temp 37.4 °C (99.4 °F) (Temporal)   Resp (!) 24   Ht 1.803 m (5' 11\")   Wt 86.2 kg (190 lb)   SpO2 97%   BMI 26.50 kg/m²     Constitutional: Appears to be in significant distress, Non-toxic appearance.   HENT: Left side of his face is exquisitely swollen, feels like subcutaneous air. Atraumatic, Bilateral external ears normal, oropharynx moist, No oral exudates, Nose normal.   Eyes:conjunctiva is normal, there are no signs of exudate.   Neck: Supple, no meningeal signs. Nontender.. Has subcutaneous air up into the neck and chest.   Lymphatic: No lymphadenopathy noted.   Cardiovascular: Tachycardic rate and rhythm without murmurs gallops or rubs.   Thorax & Lungs: Tachypnic. Breath sounds are diminished. Bedside ultrasonography shows good sliding on the left but no sliding on the right side.There are no wheezes no rales.   Abdomen: Soft, mildly tender in right upper quadrant. Nondistended. Bowel sounds are present.   Skin: Warm, Dry, No erythema,   Back: Tender about the right flank.   Musculoskeletal: Good range of motion in all major joints. Tender about the right shoulder and right flank. No major deformities noted. Intact distal pulses, no clubbing, no cyanosis, no edema,   Neurologic: Alert & oriented x 3, Moving all extremities. No gross abnormalities.    Psychiatric: Affect normal, Judgment normal, Mood normal.       PROCEDURE    Chest Tube Placement Procedure Note    Indication: pneumothorax    Consent: Unable to be obtained due to the emergent nature of " this procedure.    Pre-Medication: midazolam (Versed) intravenously and fentanyl intravenously    Procedure: The patient was placed in a semirecumbent position with the head of the bed at 30 degrees. The right side was prepped with betadine and draped in a sterile fashion and prepped with chlorhexidine.  Local anesthesia over the insertion site was obtained by infiltration using 1% Lidocaine without epinephrine.  An incision was made laterally in the midaxillary line.  Blunt dissection up and over the rib was performed until access was obtained into the pleural cavity.  A 36 Swedish chest tube was placed and connected to a pleurivac at 20 cm H2O.  Initial output from the tube was air. The tube was sutured in place and the site was covered with an occlusive dressing.  All connections were banded.  Breath sounds after the procedure were improved.  A chest x-ray was obtained to evaluate placement which showed good tube position.    The patient tolerated the procedure well.    Complications: None      LABS  Results for orders placed or performed during the hospital encounter of 09/11/20   DIAGNOSTIC ALCOHOL   Result Value Ref Range    Diagnostic Alcohol <10.1 0.0 - 10.1 mg/dL   CBC WITH DIFFERENTIAL   Result Value Ref Range    WBC 14.6 (H) 4.8 - 10.8 K/uL    RBC 4.93 4.70 - 6.10 M/uL    Hemoglobin 17.6 14.0 - 18.0 g/dL    Hematocrit 49.7 42.0 - 52.0 %    .8 (H) 81.4 - 97.8 fL    MCH 35.7 (H) 27.0 - 33.0 pg    MCHC 35.4 (H) 33.7 - 35.3 g/dL    RDW 48.3 35.9 - 50.0 fL    Platelet Count 288 164 - 446 K/uL    MPV 9.3 9.0 - 12.9 fL    Neutrophils-Polys 89.90 (H) 44.00 - 72.00 %    Lymphocytes 4.20 (L) 22.00 - 41.00 %    Monocytes 5.00 0.00 - 13.40 %    Eosinophils 0.10 0.00 - 6.90 %    Basophils 0.30 0.00 - 1.80 %    Immature Granulocytes 0.50 0.00 - 0.90 %    Nucleated RBC 0.00 /100 WBC    Neutrophils (Absolute) 13.13 (H) 1.82 - 7.42 K/uL    Lymphs (Absolute) 0.62 (L) 1.00 - 4.80 K/uL    Monos (Absolute) 0.73 0.00 -  0.85 K/uL    Eos (Absolute) 0.01 0.00 - 0.51 K/uL    Baso (Absolute) 0.04 0.00 - 0.12 K/uL    Immature Granulocytes (abs) 0.07 0.00 - 0.11 K/uL    NRBC (Absolute) 0.00 K/uL   PROTHROMBIN TIME   Result Value Ref Range    PT 12.5 12.0 - 14.6 sec    INR 0.91 0.87 - 1.13   APTT   Result Value Ref Range    APTT 24.5 (L) 24.7 - 36.0 sec   COMP METABOLIC PANEL   Result Value Ref Range    Sodium 132 (L) 135 - 145 mmol/L    Potassium 4.3 3.6 - 5.5 mmol/L    Chloride 91 (L) 96 - 112 mmol/L    Co2 22 20 - 33 mmol/L    Anion Gap 19.0 (H) 7.0 - 16.0    Glucose 142 (H) 65 - 99 mg/dL    Bun 14 8 - 22 mg/dL    Creatinine 0.60 0.50 - 1.40 mg/dL    Calcium 9.5 8.5 - 10.5 mg/dL    AST(SGOT) 29 12 - 45 U/L    ALT(SGPT) 22 2 - 50 U/L    Alkaline Phosphatase 50 30 - 99 U/L    Total Bilirubin 1.1 0.1 - 1.5 mg/dL    Albumin 4.6 3.2 - 4.9 g/dL    Total Protein 7.6 6.0 - 8.2 g/dL    Globulin 3.0 1.9 - 3.5 g/dL    A-G Ratio 1.5 g/dL   ESTIMATED GFR   Result Value Ref Range    GFR If African American >60 >60 mL/min/1.73 m 2    GFR If Non African American >60 >60 mL/min/1.73 m 2   COVID/SARS CoV-2 PCR    Specimen: Nasopharyngeal; Respirate   Result Value Ref Range    COVID Order Status Received    SARS-CoV-2, PCR (In-House)   Result Value Ref Range    SARS-CoV-2 Source NP Swab     SARS-CoV-2 by PCR NotDetected    EKG   Result Value Ref Range    Report       [facility] Emergency Dept[facility]    Test Date:  2020  Pt Name:    ELI GREWAL                 Department: ER  MRN:        [id number]                      Room:       RD 11  Gender:     Male                         Technician: [id number]  :        1965                   Requested By:ER TRIAGE PROTOCOL  Order #:    [id number]                    Reading MD: OWEN SHERWOOD MD    Measurements  Intervals                                Axis  Rate:       111                          P:          80  AK:         172                          QRS:        -56  QRSD:       68                            T:          82  QT:         312  QTc:        424    Interpretive Statements  SINUS TACHYCARDIA  CONSIDER INFERIOR INFARCT  ANTERIOR INFARCT, AGE INDETERMINATE  No previous ECG available for comparison  Electronically Signed On 9- 12:06:19 PDT by OWEN SHERWOOD MD       All labs reviewed by me.    EKG  Interpreted by me as above.      RADIOLOGY  CT-CHEST,ABDOMEN,PELVIS WITH   Final Result      1.  There are right-sided rib fractures, some of which are displaced and segmental involving the right 2nd through 9th rib levels which may represent a flail chest.   2.  There are bilateral anterior and apical pneumothoraces with a right chest tube in place. There is no left-sided chest tube identified.   3.  There is dependent atelectasis. There is no pulmonary contusion.   4.  There is no evidence of solid organ injury or bowel injury.   5.  There is extensive subcutaneous air throughout the entire lower neck, chest, abdomen, and upper pelvis. There is diffuse mediastinal air in all compartments as discussed above in further detail.   6.  There is a complex hypodense left superior pole kidney mass with possible posterior enhancement. Renal malignancy is possible.   7.  There is possible enlarged prostate gland extending into the posterior bladder base versus underlying bladder mass or area of hematoma.      CT-TSPINE W/O PLUS RECONS   Final Result      1.  There is no acute fracture of the thoracic spine.      CT-LSPINE W/O PLUS RECONS   Final Result      1.  No fracture   2.  Multilevel multifactorial degenerative changes   3.  Soft tissue gas   4.  Atherosclerosis   5.  Hypodense LEFT upper pole renal lesion, incompletely evaluated on this exam      CT-MAXILLOFACIAL W/O PLUS RECONS   Final Result      Diffuse soft tissue gas as described above which extends into the LEFT orbit where there is mild LEFT proptosis      CT-HEAD W/O   Final Result      1.  Unremarkable appearance of the brain for  age with mild atrophy   2.  Diffuse soft tissue gas as described above including LEFT orbital extraconal gas      CT-CSPINE WITHOUT PLUS RECONS   Final Result      1.  There is no acute fracture of the cervical spine.   2.  There is degenerative disc disease and arthropathy with a variable amount of neural foraminal narrowing as discussed above.   3.  There are biapical pneumothoraces with superior pneumomediastinum.   4.  There is extensive soft tissue subcutaneous air bilaterally with associated retropharyngeal and prevertebral air.         DX-CHEST-FOR LINE PLACEMENT Perform procedure in: Patient's Room   Final Result      1.  Interval placement of a right-sided chest tube with small amount of residual pneumothorax.   2.  There is a persistent left basilar pneumothorax as well.      DX-CHEST-PORTABLE (1 VIEW)   Final Result      1.  There are bibasilar bilateral pneumothoraces.   2.  There is a large amount of bilateral subcutaneous air.   3.  Linear opacities in the central and lower lung zones may be related to vascular congestion or atelectasis.      Findings were discussed with the nurse working with OWEN SHERWOOD on 9/11/2020 at 10:25 AM.        The radiologist's interpretation of all radiological studies have been reviewed by me.      COURSE & MEDICAL DECISION MAKING  Pertinent Labs & Imaging studies reviewed. (See chart for details)    9:52 AM - Patient seen and examined at bedside. The differential diagnoses include but are not limited to: Spontaneous pneumothorax, traumatic pneumothorax    9:56 AM - Performed bedside ultrasound.     9:59 AM - Called for stat chest x-ray. DX-chest ordered.     10:06 AM - Given bedside US and presentation, I suspect pneumothorax and am waiting for pending chest x-ray to confirm. Prepared for chest tube placement.    10:15 AM - Administered Versed 4 mg.     10:21 AM - Administered lidocaine.     10:24 AM - Performed chest tube procedure at this time. Patient appears to  have multiple rib fractures which I was unaware about prior to procedure.     10:38 AM - Repeat chest x-ray post chest-tube placement.     10:42 AM - Paged trauma. Patient upgraded to trauma green.     10:48 AM - Consulted with Dr. Salazar (Trauma).     10:51 AM - Ordered for CT-head, CT-chest, abdomen, pelvis, CBC with differential, prothrombin time, APTT, CMP, diagnostic alcohol, and EKG to further evaluate.    11:26 AM - Patient will switch to nasal cannula and we will continue to monitor his sats.    12:16 PM - Recheck. Blood pressure is improved and coming down. Patient does not feel much better. Discussed with him that he appears more comfortable and he is breathing more comfortably which is reassuring. Updated him on his radiology results, see above.    CRITICAL CARE  The very real possibilty of a deterioration of this patient's condition required the highest level of my preparedness for sudden, emergent intervention.  I provided critical care services, which included medication orders, frequent reevaluations of the patient's condition and response to treatment, ordering and reviewing test results, and discussing the case with various consultants.  The critical care time associated with the care of the patient was 45 minutes. This time is exclusive of any other billable procedures.     Decision Making:  Patient presented the emergency department and stated that he woke up in this fashion and thinks that he might have an allergic reaction.  Clinically it was very obvious that this was subcutaneous air I did use an ultrasound to identify the side of the pneumothorax.  Chest x-ray actually arrived before I was able to get the chest tube set up so we did get a chest tube which identified bilateral pneumothoraces however based on the ultrasound I just chose to do the right tube thoracostomy first, during the thoracostomy however I noticed that the rib was clinically fractured by feeling a fractured rib in the site  of the tube thoracostomy but chest tube is still placed in the area.  Repeat chest x-ray does confirm multiple rib fractures on the right side.  After the tube thoracostomy was done the patient did require a small amount of sedation for this ox saturations were improved his patient's heart rate did improve and his blood pressure has come down repeat chest x-ray shows much improved right-sided lung.  Because of the both clinical as well as now seen on x-ray multiple rib fractures I did do CT scans of the whole body because the patient stated he rolled out he does have some mild blood to his right side of the head and this is most likely all traumatic CT scan of the chest and pelvis does confirm 2-9 rib fractures.  The patient still has a residual pneumothorax in the left side but he did have multiple rib fractures that are old on that side he states that in 1996 he had bilateral pneumothoraces with multiple rib fractures in the past.  At this point I do feel that this is a traumatic pneumothorax secondary to his falling out of the bed with multiple rib fractures.  I did upgrade the patient to a trauma green after I placed the chest tube.  Dr. Berry he has evaluate the patient will admit the patient to the ICU for further treatment and care.    DISPOSITION:  Patient will be hospitalized by Dr. Berry he in guarded condition.      FINAL IMPRESSION   Chest Tube Procedure, see above.   Performed 45 minutes of Critical Care Time  1. Closed fracture of multiple ribs of right side, initial encounter    2. Pneumothorax, acute          Maribel ABDALLA (Juan F), am scribing for, and in the presence of, Naren Sellers M.D..    Electronically signed by: Maribel Miramontes (Juan F), 9/11/2020    Naren ABDALLA M.D. personally performed the services described in this documentation, as scribed by Maribel Miramontes in my presence, and it is both accurate and complete. C    The note accurately reflects work and  decisions made by me.  Naren Sellers M.D.  9/11/2020  3:51 PM

## 2020-09-11 NOTE — ASSESSMENT & PLAN NOTE
Extensive subcutaneous air throughout the entire lower neck, chest, abdomen, and upper pelvis. Extensive soft tissue subcutaneous air bilaterally with associated retropharyngeal and prevertebral air. Diffuse soft tissue gas as described above which extends into the left orbit where there is mild left proptosis.  9/18 CXR with decreasing SQ emphysema   9/19 CXR stable.

## 2020-09-11 NOTE — H&P
Trauma History and Physical  9/11/2020    Attending Physician: Ambrose Salazar MD.     CC: Trauma The patient was triaged as a T-[id number] in accordance with established pre hospital protocols. An expeditious primary and secondary survey with required adjuncts was conducted. See trauma narrator for full details.    HPI: This is a 55 y.o. male who woke up this morning with significant left sided facial swelling and associated shortness of breath. He says he fell out of bed two days ago but had no fall yesterday. He was brought in with concern for an acute allergic reaction but was found to have SQ air instead of facial swelling. A right sided chest tube was placed and I have been called to evaluate the patient for multiple rib fractures and a pneumothorax.     Past Medical History:   Diagnosis Date   • Hypertension        Past Surgical History:   Procedure Laterality Date   • IRRIGATION & DEBRIDEMENT ORTHO Left 3/4/2018    Procedure: IRRIGATION & DEBRIDEMENT ORTHO - HUMERUS;  Surgeon: Sergio Watkins M.D.;  Location: [facility];  Service: Orthopedics   • SHOULDER ORIF Left 1/4/2018    Procedure: SHOULDER ORIF;  Surgeon: Sergio Watkins M.D.;  Location: [facility];  Service: Orthopedics   • CLOSED REDUCTION Left 12/24/2017    Procedure: CLOSED REDUCTION;  Surgeon: Keith Subramanian M.D.;  Location: [facility];  Service: Orthopedics   • OTHER      multiple surgeries lower legs from past injuries       Current Facility-Administered Medications   Medication Dose Route Frequency Provider Last Rate Last Dose   • iohexol (OMNIPAQUE) 350 mg/mL  100 mL Intravenous Once Naren Sellers M.D.       • Respiratory Therapy Consult   Nebulization Continuous RT Ambrose Salazar M.D.       • Pharmacy Consult Request ...Pain Management Review 1 Each  1 Each Other PHARMACY TO DOSE Ambrose Salazar M.D.       • docusate sodium (COLACE) capsule 100 mg  100 mg Oral BID Ambrose aSlazar M.D.        • senna-docusate (PERICOLACE or SENOKOT S) 8.6-50 MG per tablet 1 Tab  1 Tab Oral Nightly Ambrose Salazar M.D.       • senna-docusate (PERICOLACE or SENOKOT S) 8.6-50 MG per tablet 1 Tab  1 Tab Oral Q24HRS PRN Ambrose Salazar M.D.       • polyethylene glycol/lytes (MIRALAX) PACKET 1 Packet  1 Packet Oral BID Ambrose Salazar M.D.       • magnesium hydroxide (MILK OF MAGNESIA) suspension 30 mL  30 mL Oral DAILY Ambrose Salazar M.D.   Stopped at 09/11/20 1230   • bisacodyl (DULCOLAX) suppository 10 mg  10 mg Rectal Q24HRS PRN Ambrose Salazar M.D.       • fleet enema 133 mL  1 Each Rectal Once PRN Ambrose Salazar M.D.       • LR infusion   Intravenous Continuous Ambrose Salazar M.D.       • acetaminophen (TYLENOL) tablet 650 mg  650 mg Oral Q6HRS Ambrose Salazar M.D.   Stopped at 09/11/20 1230   • ibuprofen (MOTRIN) tablet 800 mg  800 mg Oral TID Ambrose Salazar M.D.   Stopped at 09/11/20 1230   • oxyCODONE immediate-release (ROXICODONE) tablet 5-15 mg  5-15 mg Oral Q3HRS PRN Ambrose Salazar M.D.       • HYDROmorphone pf (DILAUDID) injection 0.5 mg  0.5 mg Intravenous Q HOUR PRN Ambrose Salazar M.D.       • famotidine (PEPCID) tablet 20 mg  20 mg Oral BID Ambrose Salazar M.D.        Or   • famotidine (PEPCID) injection 20 mg  20 mg Intravenous BID Ambrose Salaazr M.D.       • ondansetron (ZOFRAN) syringe/vial injection 4 mg  4 mg Intravenous Q4HRS PRN Ambrose Salazar M.D.       • labetalol (NORMODYNE/TRANDATE) injection 10 mg  10 mg Intravenous Q4HRS PRN Ambrose Salazar M.D.         Current Outpatient Medications   Medication Sig Dispense Refill   • ibuprofen (MOTRIN) 200 MG Tab Take 800-1,000 mg by mouth every 8 hours as needed for Mild Pain.     • Ascorbic Acid (VITAMIN C PO) Take 1 Tab by mouth every morning.         Social History     Tobacco Use   • Smoking status: Current Every Day Smoker     Years: 30.00     Types: Cigars   • Smokeless tobacco:  "Never Used   Substance Use Topics   • Alcohol use: Yes     Comment: a few beers a week       No family history on file.    Allergies:  Sulfa drugs    Review of Systems:  Unable to assess due to the acuity of the situation    Physical Exam:  BP (!) 167/85   Pulse 95   Temp 37.4 °C (99.4 °F) (Temporal)   Resp 20   Ht 1.803 m (5' 11\")   Wt 86.2 kg (190 lb)   SpO2 92%     Constitutional: Large amount of SQ air in face, neck, chest, abdomen.  Awake, alert, oriented x3. No acute distress. GCS 15. E4 V5 M6.  Head: No cephalohematoma. Pupils 4-3 reactive bilaterally. Midface stable. No malocclusion.    Neck: No tracheal deviation. No midline cervical spine tenderness. No cervical seatbelt sign.  Cardiovascular: Normal rate, regular rhythm, normal heart sounds and intact distal pulses.    Pulmonary/Chest: Clavicles nontender to palpation. He has right sided chest wall tenderness on the right. Right chest tube in place.  No crepitus. Positive breath sounds bilaterally.   Abdominal: Soft, nondistended. Nontender to palpation. Pelvis is stable to anterior-posterior compression. No abdominal seatbelt sign.   Musculoskeletal: Right upper extremity grossly atraumatic, palpable radial pulse. 5/5  strength. Full ROM and strength at elbow.  Left upper extremity grossly atraumatic, palpable radial pulse. 5/5  strength. Full ROM and strength at elbow.  Right lower extremity grossly atraumatic. 5/5 strength in ankle plantar flexion and dorsiflexion. No pain and full ROM at right knee and hip. 2+ DP pulse.  Left  lower extremity grossly atraumatic. 5/5 strength in ankle plantar flexion and dorsiflexion. No pain and full ROM at left knee and hip. 2+ DP pulse.  Back: Midline thoracic and lumbar spines are nontender to palpation. No step-offs. Mild sacral erythema present.  : Normal male external genitalia. Rectal exam not done. No blood visible at urethral meatus.   Neurological: Sensation grossly intact to light touch " dorsum and plantar surfaces of both feet and the medial and lateral aspects of both lower legs.  Sensation grossly intact to light touch dorsum and plantar surfaces of both hands.   Skin: Skin is warm and dry.  No diaphoresis. No erythema. No pallor.   Psychiatric:  Normal mood and affect.  Behavior is appropriate.       Labs:  Recent Labs     09/11/20  0959   WBC 14.6*   RBC 4.93   HEMOGLOBIN 17.6   HEMATOCRIT 49.7   .8*   MCH 35.7*   MCHC 35.4*   RDW 48.3   PLATELETCT 288   MPV 9.3     Recent Labs     09/11/20  0959   SODIUM 132*   POTASSIUM 4.3   CHLORIDE 91*   CO2 22   GLUCOSE 142*   BUN 14   CREATININE 0.60   CALCIUM 9.5     Recent Labs     09/11/20  0959   APTT 24.5*   INR 0.91     Recent Labs     09/11/20  0959   ASTSGOT 29   ALTSGPT 22   TBILIRUBIN 1.1   ALKPHOSPHAT 50   GLOBULIN 3.0   INR 0.91         Radiology:  CT-CHEST,ABDOMEN,PELVIS WITH   Final Result      1.  There are right-sided rib fractures, some of which are displaced and segmental involving the right 2nd through 9th rib levels which may represent a flail chest.   2.  There are bilateral anterior and apical pneumothoraces with a right chest tube in place. There is no left-sided chest tube identified.   3.  There is dependent atelectasis. There is no pulmonary contusion.   4.  There is no evidence of solid organ injury or bowel injury.   5.  There is extensive subcutaneous air throughout the entire lower neck, chest, abdomen, and upper pelvis. There is diffuse mediastinal air in all compartments as discussed above in further detail.   6.  There is a complex hypodense left superior pole kidney mass with possible posterior enhancement. Renal malignancy is possible.   7.  There is possible enlarged prostate gland extending into the posterior bladder base versus underlying bladder mass or area of hematoma.      CT-TSPINE W/O PLUS RECONS   Final Result      1.  There is no acute fracture of the thoracic spine.      CT-LSPINE W/O PLUS RECONS    Final Result      1.  No fracture   2.  Multilevel multifactorial degenerative changes   3.  Soft tissue gas   4.  Atherosclerosis   5.  Hypodense LEFT upper pole renal lesion, incompletely evaluated on this exam      CT-MAXILLOFACIAL W/O PLUS RECONS   Final Result      Diffuse soft tissue gas as described above which extends into the LEFT orbit where there is mild LEFT proptosis      CT-HEAD W/O   Final Result      1.  Unremarkable appearance of the brain for age with mild atrophy   2.  Diffuse soft tissue gas as described above including LEFT orbital extraconal gas      CT-CSPINE WITHOUT PLUS RECONS   Final Result      1.  There is no acute fracture of the cervical spine.   2.  There is degenerative disc disease and arthropathy with a variable amount of neural foraminal narrowing as discussed above.   3.  There are biapical pneumothoraces with superior pneumomediastinum.   4.  There is extensive soft tissue subcutaneous air bilaterally with associated retropharyngeal and prevertebral air.         DX-CHEST-FOR LINE PLACEMENT Perform procedure in: Patient's Room   Final Result      1.  Interval placement of a right-sided chest tube with small amount of residual pneumothorax.   2.  There is a persistent left basilar pneumothorax as well.      DX-CHEST-PORTABLE (1 VIEW)   Final Result      1.  There are bibasilar bilateral pneumothoraces.   2.  There is a large amount of bilateral subcutaneous air.   3.  Linear opacities in the central and lower lung zones may be related to vascular congestion or atelectasis.      Findings were discussed with the nurse working with OWEN SHERWOOD on 9/11/2020 at 10:25 AM.            Assessment: This is a 55 y.o. male with underlying chronic pulmonary disease with severe blunt chest trauma and a pneumothorax with a large amount of SQ air.    Plan: Admit to ICU.   Closed fracture of multiple ribs of right side- (present on admission)  Assessment & Plan  Right-sided rib fractures,  some of which are displaced and segmental involving the right 2nd through 9th rib levels which may represent a flail chest.  Aggressive pulmonary hygiene and multimodal pain management.    Subcutaneous emphysema (HCC)- (present on admission)  Assessment & Plan  Extensive subcutaneous air throughout the entire lower neck, chest, abdomen, and upper pelvis. Extensive soft tissue subcutaneous air bilaterally with associated retropharyngeal and prevertebral air. Diffuse soft tissue gas as described above which extends into the left orbit where there is mild left proptosis.    Pneumomediastinum (HCC)- (present on admission)  Assessment & Plan  Superior pneumomediastinum.    Traumatic pneumohemothorax- (present on admission)  Assessment & Plan  Biapical pneumothoraces.  Right chest tube placement.  Chest tube suction.    Abnormal EKG- (present on admission)  Assessment & Plan  Admission EKG:  Sinus tachycardia.  Consider inferior infarct.  Anterior infarct, age indeterminate.    Screening examination for infectious disease- (present on admission)  Assessment & Plan  9/11 COVID-19 specimen sent.    Contraindication to deep vein thrombosis (DVT) prophylaxis- (present on admission)  Assessment & Plan  Systemic anticoagulation contraindicated secondary to elevated bleeding risk.  Consider surveillance venous duplex scanning if unable to initiate chemical DVT prophylaxis within 48 hrs of admission.    Kidney lesion- (present on admission)  Assessment & Plan  Complex hypodense left superior pole kidney mass with possible posterior enhancement. Renal malignancy is possible.    Trauma- (present on admission)  Assessment & Plan  Unclear to circumstances surround injury.  Non Trauma Activation.  Ambrose Salazar MD. Trauma Surgery.        The patient is/remains critically ill with severe blunt chest trauma. Patient is at high risk for decompensation with the threat of imminent deterioration, life threatening deterioration, and  loss of vital organ function.    I provided the following critical care services: resuscitation, management of above.    Critical care time spent exclusive of procedures: 84 minutes thus far.            Ambrose Salazar MD  [phone]

## 2020-09-11 NOTE — ASSESSMENT & PLAN NOTE
Systemic anticoagulation contraindicated secondary to elevated bleeding risk.  9/12 Chemical DVT prophylaxis (Lovenox) initiated  Ambulate TID.

## 2020-09-11 NOTE — ASSESSMENT & PLAN NOTE
Admission SARS-CoV-2 testing negative. LOW RISK patient. Repeat SARS-CoV-2 testing not indicated. Isolation precautions de-escalated.

## 2020-09-11 NOTE — ASSESSMENT & PLAN NOTE
Right-sided rib fractures, some of which are displaced and segmental involving the right 2nd through 9th rib levels which represents a flail chest.  Aggressive multimodal pain management, and pulmonary hygiene.   Serial chest radiographs.

## 2020-09-11 NOTE — CARE PLAN
Problem: Pain Management  Goal: Pain level will decrease to patient's comfort goal  Outcome: DISCHARGED-GOAL NOT MET   Assessed for pain and medicated per the MAR  Problem: Urinary Elimination:  Goal: Ability to reestablish a normal urinary elimination pattern will improve  Outcome: DISCHARGED-GOAL NOT MET  Voiding appropriately at this time. Will continue to monitor.

## 2020-09-11 NOTE — PROGRESS NOTES
Pt transferred to S124 with 2 ACLS RN's on monitor, VSS en route.   Time: 1340  Temp: 97.4  Weight: 83.2 kg  Belongings: shirt, jeans, 2 socks, boots, RTC bus ticket, set of keys, small amount of pocket change, less than a dollar of coins.  2 RN skin check complete:  Areas of note include:  Severe swelling/subcutaneous air to left eye. Subcutaneous air also present in neck, posterior chest, left arm to back of palm, right arm to wrist.  Small scattered abrasions to arms and hands.  Red peeling skin between all toes, with dirt - cleaned with CHG and soap and water.  Sacrum intact, mepilex in place  Elbows red and blanching  Right chest tube hooked up to -20 cm suction per order, no leaks present.

## 2020-09-11 NOTE — ASSESSMENT & PLAN NOTE
Unclear to circumstances surround injury. Fell out of bed two days prior to presentation.   Non Trauma Activation.  Ambrose Salazar MD. Trauma Surgery.

## 2020-09-11 NOTE — ASSESSMENT & PLAN NOTE
Admission EKG:  Sinus tachycardia.  Consider inferior infarct.  Anterior infarct, age indeterminate.

## 2020-09-11 NOTE — ED NOTES
Pharmacy Medication Reconciliation      Medication reconciliation updated and complete per pt at bedside  Allergies have been verified and updated   No oral ABX within the last 14 days  Pt home pharmacy:[facility]

## 2020-09-11 NOTE — ASSESSMENT & PLAN NOTE
Biapical pneumothoraces.  Right chest tube placement.  9/15 Chest tube to water seal  9/17 Interval removal of chest tube  9/18 Follow up CXR post chest removal with no pneumothorax  9/19 Follow up CXR with no pneumothorax

## 2020-09-12 ENCOUNTER — APPOINTMENT (OUTPATIENT)
Dept: RADIOLOGY | Facility: MEDICAL CENTER | Age: 55
DRG: 183 | End: 2020-09-12
Attending: SURGERY
Payer: MEDICAID

## 2020-09-12 LAB
ALBUMIN SERPL BCP-MCNC: 3.7 G/DL (ref 3.2–4.9)
ALBUMIN/GLOB SERPL: 1.4 G/DL
ALP SERPL-CCNC: 41 U/L (ref 30–99)
ALT SERPL-CCNC: 18 U/L (ref 2–50)
ANION GAP SERPL CALC-SCNC: 13 MMOL/L (ref 7–16)
AST SERPL-CCNC: 24 U/L (ref 12–45)
BASOPHILS # BLD AUTO: 0.4 % (ref 0–1.8)
BASOPHILS # BLD: 0.05 K/UL (ref 0–0.12)
BILIRUB SERPL-MCNC: 1 MG/DL (ref 0.1–1.5)
BUN SERPL-MCNC: 15 MG/DL (ref 8–22)
CALCIUM SERPL-MCNC: 8.8 MG/DL (ref 8.5–10.5)
CHLORIDE SERPL-SCNC: 96 MMOL/L (ref 96–112)
CO2 SERPL-SCNC: 22 MMOL/L (ref 20–33)
CREAT SERPL-MCNC: 0.44 MG/DL (ref 0.5–1.4)
EOSINOPHIL # BLD AUTO: 0.26 K/UL (ref 0–0.51)
EOSINOPHIL NFR BLD: 2.3 % (ref 0–6.9)
ERYTHROCYTE [DISTWIDTH] IN BLOOD BY AUTOMATED COUNT: 47.8 FL (ref 35.9–50)
GLOBULIN SER CALC-MCNC: 2.6 G/DL (ref 1.9–3.5)
GLUCOSE SERPL-MCNC: 108 MG/DL (ref 65–99)
HCT VFR BLD AUTO: 46.4 % (ref 42–52)
HGB BLD-MCNC: 16 G/DL (ref 14–18)
IMM GRANULOCYTES # BLD AUTO: 0.04 K/UL (ref 0–0.11)
IMM GRANULOCYTES NFR BLD AUTO: 0.4 % (ref 0–0.9)
LYMPHOCYTES # BLD AUTO: 0.97 K/UL (ref 1–4.8)
LYMPHOCYTES NFR BLD: 8.7 % (ref 22–41)
MCH RBC QN AUTO: 35 PG (ref 27–33)
MCHC RBC AUTO-ENTMCNC: 34.5 G/DL (ref 33.7–35.3)
MCV RBC AUTO: 101.5 FL (ref 81.4–97.8)
MONOCYTES # BLD AUTO: 0.46 K/UL (ref 0–0.85)
MONOCYTES NFR BLD AUTO: 4.1 % (ref 0–13.4)
NEUTROPHILS # BLD AUTO: 9.4 K/UL (ref 1.82–7.42)
NEUTROPHILS NFR BLD: 84.1 % (ref 44–72)
NRBC # BLD AUTO: 0 K/UL
NRBC BLD-RTO: 0 /100 WBC
PLATELET # BLD AUTO: 256 K/UL (ref 164–446)
PMV BLD AUTO: 9.2 FL (ref 9–12.9)
POTASSIUM SERPL-SCNC: 4.3 MMOL/L (ref 3.6–5.5)
PROT SERPL-MCNC: 6.3 G/DL (ref 6–8.2)
RBC # BLD AUTO: 4.57 M/UL (ref 4.7–6.1)
SODIUM SERPL-SCNC: 131 MMOL/L (ref 135–145)
WBC # BLD AUTO: 11.2 K/UL (ref 4.8–10.8)

## 2020-09-12 PROCEDURE — 700102 HCHG RX REV CODE 250 W/ 637 OVERRIDE(OP): Performed by: SURGERY

## 2020-09-12 PROCEDURE — 80053 COMPREHEN METABOLIC PANEL: CPT

## 2020-09-12 PROCEDURE — 700105 HCHG RX REV CODE 258: Performed by: SURGERY

## 2020-09-12 PROCEDURE — 700111 HCHG RX REV CODE 636 W/ 250 OVERRIDE (IP): Performed by: NURSE PRACTITIONER

## 2020-09-12 PROCEDURE — 71045 X-RAY EXAM CHEST 1 VIEW: CPT

## 2020-09-12 PROCEDURE — 99233 SBSQ HOSP IP/OBS HIGH 50: CPT | Performed by: SURGERY

## 2020-09-12 PROCEDURE — A9270 NON-COVERED ITEM OR SERVICE: HCPCS | Performed by: NURSE PRACTITIONER

## 2020-09-12 PROCEDURE — 85025 COMPLETE CBC W/AUTO DIFF WBC: CPT

## 2020-09-12 PROCEDURE — 770001 HCHG ROOM/CARE - MED/SURG/GYN PRIV*

## 2020-09-12 PROCEDURE — 700102 HCHG RX REV CODE 250 W/ 637 OVERRIDE(OP): Performed by: NURSE PRACTITIONER

## 2020-09-12 PROCEDURE — A9270 NON-COVERED ITEM OR SERVICE: HCPCS | Performed by: SURGERY

## 2020-09-12 RX ORDER — OXYCODONE HYDROCHLORIDE 5 MG/1
5-10 TABLET ORAL
Status: DISCONTINUED | OUTPATIENT
Start: 2020-09-12 | End: 2020-09-19 | Stop reason: HOSPADM

## 2020-09-12 RX ORDER — HYDROMORPHONE HYDROCHLORIDE 2 MG/ML
0.5 INJECTION, SOLUTION INTRAMUSCULAR; INTRAVENOUS; SUBCUTANEOUS
Status: DISCONTINUED | OUTPATIENT
Start: 2020-09-12 | End: 2020-09-14

## 2020-09-12 RX ORDER — ACETAMINOPHEN 500 MG
1000 TABLET ORAL EVERY 6 HOURS
Status: COMPLETED | OUTPATIENT
Start: 2020-09-12 | End: 2020-09-16

## 2020-09-12 RX ADMIN — ACETAMINOPHEN 1000 MG: 500 TABLET ORAL at 17:27

## 2020-09-12 RX ADMIN — IBUPROFEN 800 MG: 800 TABLET, FILM COATED ORAL at 06:01

## 2020-09-12 RX ADMIN — OXYCODONE 10 MG: 5 TABLET ORAL at 13:47

## 2020-09-12 RX ADMIN — FAMOTIDINE 20 MG: 20 TABLET, FILM COATED ORAL at 17:27

## 2020-09-12 RX ADMIN — OXYCODONE 10 MG: 5 TABLET ORAL at 07:37

## 2020-09-12 RX ADMIN — SODIUM CHLORIDE, POTASSIUM CHLORIDE, SODIUM LACTATE AND CALCIUM CHLORIDE: 600; 310; 30; 20 INJECTION, SOLUTION INTRAVENOUS at 10:55

## 2020-09-12 RX ADMIN — DOCUSATE SODIUM 100 MG: 100 CAPSULE ORAL at 17:27

## 2020-09-12 RX ADMIN — FAMOTIDINE 20 MG: 20 TABLET, FILM COATED ORAL at 06:01

## 2020-09-12 RX ADMIN — HYDROMORPHONE HYDROCHLORIDE 1 MG: 2 INJECTION, SOLUTION INTRAMUSCULAR; INTRAVENOUS; SUBCUTANEOUS at 09:54

## 2020-09-12 RX ADMIN — HYDROMORPHONE HYDROCHLORIDE 0.5 MG: 2 INJECTION, SOLUTION INTRAMUSCULAR; INTRAVENOUS; SUBCUTANEOUS at 15:24

## 2020-09-12 RX ADMIN — OXYCODONE 15 MG: 5 TABLET ORAL at 04:27

## 2020-09-12 RX ADMIN — HYDROMORPHONE HYDROCHLORIDE 1 MG: 2 INJECTION, SOLUTION INTRAMUSCULAR; INTRAVENOUS; SUBCUTANEOUS at 06:47

## 2020-09-12 RX ADMIN — DOCUSATE SODIUM 100 MG: 100 CAPSULE ORAL at 06:01

## 2020-09-12 RX ADMIN — OXYCODONE HYDROCHLORIDE 10 MG: 5 TABLET ORAL at 21:21

## 2020-09-12 RX ADMIN — HYDROMORPHONE HYDROCHLORIDE 0.5 MG: 2 INJECTION, SOLUTION INTRAMUSCULAR; INTRAVENOUS; SUBCUTANEOUS at 19:19

## 2020-09-12 RX ADMIN — ENOXAPARIN SODIUM 30 MG: 30 INJECTION SUBCUTANEOUS at 17:27

## 2020-09-12 RX ADMIN — SODIUM CHLORIDE, POTASSIUM CHLORIDE, SODIUM LACTATE AND CALCIUM CHLORIDE: 600; 310; 30; 20 INJECTION, SOLUTION INTRAVENOUS at 00:18

## 2020-09-12 RX ADMIN — IBUPROFEN 800 MG: 800 TABLET, FILM COATED ORAL at 11:49

## 2020-09-12 RX ADMIN — ACETAMINOPHEN 1000 MG: 500 TABLET ORAL at 11:49

## 2020-09-12 RX ADMIN — ACETAMINOPHEN 650 MG: 325 TABLET, FILM COATED ORAL at 06:01

## 2020-09-12 RX ADMIN — IBUPROFEN 800 MG: 800 TABLET, FILM COATED ORAL at 17:27

## 2020-09-12 RX ADMIN — ACETAMINOPHEN 650 MG: 325 TABLET, FILM COATED ORAL at 00:18

## 2020-09-12 ASSESSMENT — COGNITIVE AND FUNCTIONAL STATUS - GENERAL
SUGGESTED CMS G CODE MODIFIER DAILY ACTIVITY: CK
SUGGESTED CMS G CODE MODIFIER MOBILITY: CK
HELP NEEDED FOR BATHING: A LITTLE
MOVING FROM LYING ON BACK TO SITTING ON SIDE OF FLAT BED: A LITTLE
TURNING FROM BACK TO SIDE WHILE IN FLAT BAD: A LITTLE
STANDING UP FROM CHAIR USING ARMS: A LITTLE
DRESSING REGULAR UPPER BODY CLOTHING: A LITTLE
MOVING TO AND FROM BED TO CHAIR: A LITTLE
MOBILITY SCORE: 18
DAILY ACTIVITIY SCORE: 19
PERSONAL GROOMING: A LITTLE
WALKING IN HOSPITAL ROOM: A LITTLE
TOILETING: A LITTLE
CLIMB 3 TO 5 STEPS WITH RAILING: A LITTLE
DRESSING REGULAR LOWER BODY CLOTHING: A LITTLE

## 2020-09-12 ASSESSMENT — FIBROSIS 4 INDEX: FIB4 SCORE: 1.2

## 2020-09-12 ASSESSMENT — LIFESTYLE VARIABLES
HAVE PEOPLE ANNOYED YOU BY CRITICIZING YOUR DRINKING: NO
TOTAL SCORE: 0
TOTAL SCORE: 0
ON A TYPICAL DAY WHEN YOU DRINK ALCOHOL HOW MANY DRINKS DO YOU HAVE: 1
SUBSTANCE_ABUSE: 0
HAVE YOU EVER FELT YOU SHOULD CUT DOWN ON YOUR DRINKING: NO
EVER HAD A DRINK FIRST THING IN THE MORNING TO STEADY YOUR NERVES TO GET RID OF A HANGOVER: NO
AVERAGE NUMBER OF DAYS PER WEEK YOU HAVE A DRINK CONTAINING ALCOHOL: 0.5
ALCOHOL_USE: YES
CONSUMPTION TOTAL: NEGATIVE
TOTAL SCORE: 0
HOW MANY TIMES IN THE PAST YEAR HAVE YOU HAD 5 OR MORE DRINKS IN A DAY: 0
DOES PATIENT WANT TO STOP DRINKING: NO
EVER FELT BAD OR GUILTY ABOUT YOUR DRINKING: NO

## 2020-09-12 ASSESSMENT — ENCOUNTER SYMPTOMS
SHORTNESS OF BREATH: 1
ROS GI COMMENTS: PTA
MYALGIAS: 1
SPEECH CHANGE: 0
FEVER: 0
DOUBLE VISION: 0

## 2020-09-12 ASSESSMENT — PATIENT HEALTH QUESTIONNAIRE - PHQ9
SUM OF ALL RESPONSES TO PHQ9 QUESTIONS 1 AND 2: 0
1. LITTLE INTEREST OR PLEASURE IN DOING THINGS: NOT AT ALL
2. FEELING DOWN, DEPRESSED, IRRITABLE, OR HOPELESS: NOT AT ALL

## 2020-09-12 NOTE — PROGRESS NOTES
"Trauma Progress Note 9/12/2020 4:25 AM    This is a 55 y.o. male who reportedly fell out of bed two days prior to presentation. He sustained multiple right sided rib fractures that are segmental with bilateral pneumothoraces and extensive subcutaneous emphysema. Right chest tube was placed in the ED.    Plan:   - continue chest tube to suction   - aggressive pulmonary hygiene   - pain management   - AM chest xray pending     Assessment: awake, alert, improved pain control through the night, IS ~1000ml,     /87   Pulse 87   Temp 37.1 °C (98.8 °F) (Temporal)   Resp 16   Ht 1.803 m (5' 11\")   Wt 84.5 kg (186 lb 4.6 oz)   SpO2 95%   BMI 25.98 kg/m²     Hemoglobin: 16.0 g/dL  Hematocrit: 46.4 %    Urine Output: voiding / adequate amount    Recent Labs     09/11/20  0959   APTT 24.5*   INR 0.91      Flail chest- (present on admission)  Assessment & Plan  Right-sided rib fractures, some of which are displaced and segmental involving the right 2nd through 9th rib levels which may represent a flail chest.  Aggressive multimodal pain management, and pulmonary hygiene.   Serial chest radiographs.    Subcutaneous emphysema (HCC)- (present on admission)  Assessment & Plan  Extensive subcutaneous air throughout the entire lower neck, chest, abdomen, and upper pelvis. Extensive soft tissue subcutaneous air bilaterally with associated retropharyngeal and prevertebral air. Diffuse soft tissue gas as described above which extends into the left orbit where there is mild left proptosis.    Pneumomediastinum (HCC)- (present on admission)  Assessment & Plan  Superior pneumomediastinum.    Traumatic pneumohemothorax- (present on admission)  Assessment & Plan  Biapical pneumothoraces.  Right chest tube placement.  Chest tube to suction.  Supplemental oxygen to maintain O2 saturations greater than 95%  Aggressive pulmonary hygiene. Serial chest radiographs.    Abnormal EKG- (present on admission)  Assessment & Plan  Admission " EKG:  Sinus tachycardia.  Consider inferior infarct.  Anterior infarct, age indeterminate.    Screening examination for infectious disease- (present on admission)  Assessment & Plan  Admission SARS-CoV-2 testing negative. LOW RISK patient. Repeat SARS-CoV-2 testing not indicated. Isolation precautions de-escalated.    Contraindication to deep vein thrombosis (DVT) prophylaxis- (present on admission)  Assessment & Plan  Systemic anticoagulation contraindicated secondary to elevated bleeding risk.  Consider surveillance venous duplex scanning if unable to initiate chemical DVT prophylaxis within 48 hrs of admission.    Kidney lesion- (present on admission)  Assessment & Plan  Complex hypodense left superior pole kidney mass with possible posterior enhancement. Renal malignancy is possible.  Recommend outpatient follow up with renal ultrasound and urology.     Trauma- (present on admission)  Assessment & Plan  Unclear to circumstances surround injury. Fell out of bed two days prior to presentation.   Non Trauma Activation.  Ambrose Salazar MD. Trauma Surgery.

## 2020-09-12 NOTE — PROGRESS NOTES
"TRAUMA TERTIARY SURVEY     Mental status adequate for full examination?: Yes    Spine cleared (radiologically and/or clinically): Yes    PHYSICAL EXAMINATION:  Vitals: /87   Pulse 87   Temp 36.9 °C (98.4 °F) (Temporal)   Resp 13   Ht 1.803 m (5' 11\")   Wt 84.5 kg (186 lb 4.6 oz)   SpO2 100%   BMI 25.98 kg/m²   Constitutional:     General Appearance: appears stated age.  HEENT:    facial edema from subcutaneous emphysema. The pupils are equal, round, and reactive to light bilaterally. The extraocular muscles are intact bilaterally. The extraocular muscles cannot be assessed..of left eye with edema, pt able to open eye slightly and states he has good vision. The nares and oropharynx are clear. The midface and jaw are stable. No malocclusion is evident.  Neck:    Normal range of motion.  Respiratory:   Inspection: Unlabored respirations, no intercostal retractions, paradoxical motion, or accessory muscle use.   Palpation:  The chest is crepitus noted. The clavicles are non deformed bilaterally..   Auscultation: normal.  Cardiovascular:   Auscultation: normal.   Peripheral Pulses: Normal.   Abdomen:   Abdomen is soft, nontender, without organomegaly or masses.  Genitourinary:   (MALE):  Musculoskeletal:   The pelvis is stable.  No significant angulation, deformity, or soft tissue injury involving the upper and lower extremities. Normal range of motion.   Back:   The thoracolumbar spine was examined. Examination is remarkable for no significant tenderness, swelling, or deformity in the thoracolumbar region.  Skin:   The skin is warm.  Neurologic:    Sharath Coma Scale (GCS) 15 E4V5M6. Neurologic examination revealed no focal deficits noted, mental status intact.  Psychiatric:   The patient does not appear depressed or anxious.    IMAGING:  DX-CHEST-PORTABLE (1 VIEW)   Final Result         1.  Hazy left lung base infiltrates. Air density around the left lung and mediastinum, slightly increased since prior " study, appearance suggests subpleural aorta, small left pneumothorax not excluded.   2.  Cardiomegaly   3.  Atherosclerosis   4.  Subcutaneous gas in the bilateral chest wall and neck.   5.  Right rib fractures      CT-CHEST,ABDOMEN,PELVIS WITH   Final Result      1.  There are right-sided rib fractures, some of which are displaced and segmental involving the right 2nd through 9th rib levels which may represent a flail chest.   2.  There are bilateral anterior and apical pneumothoraces with a right chest tube in place. There is no left-sided chest tube identified.   3.  There is dependent atelectasis. There is no pulmonary contusion.   4.  There is no evidence of solid organ injury or bowel injury.   5.  There is extensive subcutaneous air throughout the entire lower neck, chest, abdomen, and upper pelvis. There is diffuse mediastinal air in all compartments as discussed above in further detail.   6.  There is a complex hypodense left superior pole kidney mass with possible posterior enhancement. Renal malignancy is possible.   7.  There is possible enlarged prostate gland extending into the posterior bladder base versus underlying bladder mass or area of hematoma.      CT-TSPINE W/O PLUS RECONS   Final Result      1.  There is no acute fracture of the thoracic spine.      CT-LSPINE W/O PLUS RECONS   Final Result      1.  No fracture   2.  Multilevel multifactorial degenerative changes   3.  Soft tissue gas   4.  Atherosclerosis   5.  Hypodense LEFT upper pole renal lesion, incompletely evaluated on this exam      CT-MAXILLOFACIAL W/O PLUS RECONS   Final Result      Diffuse soft tissue gas as described above which extends into the LEFT orbit where there is mild LEFT proptosis      CT-HEAD W/O   Final Result      1.  Unremarkable appearance of the brain for age with mild atrophy   2.  Diffuse soft tissue gas as described above including LEFT orbital extraconal gas      CT-CSPINE WITHOUT PLUS RECONS   Final Result       1.  There is no acute fracture of the cervical spine.   2.  There is degenerative disc disease and arthropathy with a variable amount of neural foraminal narrowing as discussed above.   3.  There are biapical pneumothoraces with superior pneumomediastinum.   4.  There is extensive soft tissue subcutaneous air bilaterally with associated retropharyngeal and prevertebral air.         DX-CHEST-FOR LINE PLACEMENT Perform procedure in: Patient's Room   Final Result      1.  Interval placement of a right-sided chest tube with small amount of residual pneumothorax.   2.  There is a persistent left basilar pneumothorax as well.      DX-CHEST-PORTABLE (1 VIEW)   Final Result      1.  There are bibasilar bilateral pneumothoraces.   2.  There is a large amount of bilateral subcutaneous air.   3.  Linear opacities in the central and lower lung zones may be related to vascular congestion or atelectasis.      Findings were discussed with the nurse working with OWEN SHERWOOD on 9/11/2020 at 10:25 AM.        All current laboratory studies/radiology exams reviewed: Yes    Completed Consultations:  No consult at this time     Pending Consultations:  No pending consults    Newly Identified Diagnoses and Injuries:  No further findings during this exam    TOTAL RAP SCORE:  RAP Score Total: 4      ETOH Screening     Assessment complete date: 9/12/2020

## 2020-09-12 NOTE — PROGRESS NOTES
Trauma / Surgical Daily Progress Note    Date of Service  9/12/2020    Chief Complaint  55 y.o. male admitted 9/11/2020 with Trauma - Fall with PTX and subcutaneous empysema    Interval Events    Pneumothorax requiring CT placement yesterday  No air leak noted on CXR.   Significant decrease in subcutaneous emphysema.  CXR shows air density around left lung and mediastinum, increased since prior study, small left pneumothorax    Continue CT on suction  Serial CXRs  Lovenox initiated  Encourage ambulation.    Review of Systems  Review of Systems   Constitutional: Negative for fever.   HENT: Negative for hearing loss.    Eyes: Negative for double vision.   Respiratory: Positive for shortness of breath.         Supplemental O2   Cardiovascular: Negative for chest pain.   Gastrointestinal:        PTA   Genitourinary:        Voiding   Musculoskeletal: Positive for myalgias.   Skin: Negative for rash.   Neurological: Negative for speech change.   Psychiatric/Behavioral: Negative for substance abuse.        Vital Signs  Temp:  [36.4 °C (97.6 °F)-37.2 °C (98.9 °F)] 36.4 °C (97.6 °F)  Pulse:  [] 65  Resp:  [11-25] 11  BP: (129-186)/() 163/94  SpO2:  [90 %-100 %] 95 %    Physical Exam  Physical Exam  HENT:      Head:      Comments: Facial edema with sub cutaneous  empysema     Mouth/Throat:      Mouth: Mucous membranes are moist.   Eyes:      Comments: Left eye swollen shut, able to open and vision clear   Neck:      Musculoskeletal: Normal range of motion.   Cardiovascular:      Rate and Rhythm: Normal rate.   Pulmonary:      Effort: Pulmonary effort is normal.   Abdominal:      General: There is distension.      Comments: obese   Musculoskeletal: Normal range of motion.   Skin:     General: Skin is warm.      Capillary Refill: Capillary refill takes less than 2 seconds.   Neurological:      General: No focal deficit present.      Mental Status: He is alert and oriented to person, place, and time.   Psychiatric:          Mood and Affect: Mood normal.         Behavior: Behavior normal.         Laboratory  Recent Results (from the past 24 hour(s))   CBC with Differential: Tomorrow AM    Collection Time: 09/12/20  4:45 AM   Result Value Ref Range    WBC 11.2 (H) 4.8 - 10.8 K/uL    RBC 4.57 (L) 4.70 - 6.10 M/uL    Hemoglobin 16.0 14.0 - 18.0 g/dL    Hematocrit 46.4 42.0 - 52.0 %    .5 (H) 81.4 - 97.8 fL    MCH 35.0 (H) 27.0 - 33.0 pg    MCHC 34.5 33.7 - 35.3 g/dL    RDW 47.8 35.9 - 50.0 fL    Platelet Count 256 164 - 446 K/uL    MPV 9.2 9.0 - 12.9 fL    Neutrophils-Polys 84.10 (H) 44.00 - 72.00 %    Lymphocytes 8.70 (L) 22.00 - 41.00 %    Monocytes 4.10 0.00 - 13.40 %    Eosinophils 2.30 0.00 - 6.90 %    Basophils 0.40 0.00 - 1.80 %    Immature Granulocytes 0.40 0.00 - 0.90 %    Nucleated RBC 0.00 /100 WBC    Neutrophils (Absolute) 9.40 (H) 1.82 - 7.42 K/uL    Lymphs (Absolute) 0.97 (L) 1.00 - 4.80 K/uL    Monos (Absolute) 0.46 0.00 - 0.85 K/uL    Eos (Absolute) 0.26 0.00 - 0.51 K/uL    Baso (Absolute) 0.05 0.00 - 0.12 K/uL    Immature Granulocytes (abs) 0.04 0.00 - 0.11 K/uL    NRBC (Absolute) 0.00 K/uL   Comp Metabolic Panel (CMP): Tomorrow AM    Collection Time: 09/12/20  4:45 AM   Result Value Ref Range    Sodium 131 (L) 135 - 145 mmol/L    Potassium 4.3 3.6 - 5.5 mmol/L    Chloride 96 96 - 112 mmol/L    Co2 22 20 - 33 mmol/L    Anion Gap 13.0 7.0 - 16.0    Glucose 108 (H) 65 - 99 mg/dL    Bun 15 8 - 22 mg/dL    Creatinine 0.44 (L) 0.50 - 1.40 mg/dL    Calcium 8.8 8.5 - 10.5 mg/dL    AST(SGOT) 24 12 - 45 U/L    ALT(SGPT) 18 2 - 50 U/L    Alkaline Phosphatase 41 30 - 99 U/L    Total Bilirubin 1.0 0.1 - 1.5 mg/dL    Albumin 3.7 3.2 - 4.9 g/dL    Total Protein 6.3 6.0 - 8.2 g/dL    Globulin 2.6 1.9 - 3.5 g/dL    A-G Ratio 1.4 g/dL   ESTIMATED GFR    Collection Time: 09/12/20  4:45 AM   Result Value Ref Range    GFR If African American >60 >60 mL/min/1.73 m 2    GFR If Non African American >60 >60 mL/min/1.73 m 2        Fluids    Intake/Output Summary (Last 24 hours) at 9/12/2020 1359  Last data filed at 9/12/2020 1200  Gross per 24 hour   Intake 3603.95 ml   Output 1275 ml   Net 2328.95 ml       Core Measures & Quality Metrics  Labs reviewed, Medications reviewed and Radiology images reviewed  Holguin catheter: No Holguin      DVT Prophylaxis: Enoxaparin (Lovenox)    Ulcer prophylaxis: Not indicated    Assessed for rehab: Patient returned to prior level of function, rehabilitation not indicated at this time    RAP Score Total: 6    ETOH Screening     Assessment complete date: 9/12/2020        Assessment/Plan  Flail chest- (present on admission)  Assessment & Plan  Right-sided rib fractures, some of which are displaced and segmental involving the right 2nd through 9th rib levels which may represent a flail chest.  Aggressive multimodal pain management, and pulmonary hygiene.   Serial chest radiographs.    Traumatic pneumohemothorax- (present on admission)  Assessment & Plan  Biapical pneumothoraces.  Right chest tube placement.  Chest tube to suction.  Supplemental oxygen to maintain O2 saturations greater than 95%  Aggressive pulmonary hygiene. Serial chest radiographs.    Abnormal EKG- (present on admission)  Assessment & Plan  Admission EKG:  Sinus tachycardia.  Consider inferior infarct.  Anterior infarct, age indeterminate.    Contraindication to deep vein thrombosis (DVT) prophylaxis- (present on admission)  Assessment & Plan  Systemic anticoagulation contraindicated secondary to elevated bleeding risk.  Consider surveillance venous duplex scanning if unable to initiate chemical DVT prophylaxis within 48 hrs of admission.    Subcutaneous emphysema (HCC)- (present on admission)  Assessment & Plan  Extensive subcutaneous air throughout the entire lower neck, chest, abdomen, and upper pelvis. Extensive soft tissue subcutaneous air bilaterally with associated retropharyngeal and prevertebral air. Diffuse soft tissue gas as  described above which extends into the left orbit where there is mild left proptosis.    Pneumomediastinum (HCC)- (present on admission)  Assessment & Plan  Superior pneumomediastinum.    Screening examination for infectious disease- (present on admission)  Assessment & Plan  Admission SARS-CoV-2 testing negative. LOW RISK patient. Repeat SARS-CoV-2 testing not indicated. Isolation precautions de-escalated.    Trauma- (present on admission)  Assessment & Plan  Unclear to circumstances surround injury. Fell out of bed two days prior to presentation.   Non Trauma Activation.  Ambrose Salazar MD. Trauma Surgery.    Kidney lesion- (present on admission)  Assessment & Plan  Complex hypodense left superior pole kidney mass with possible posterior enhancement. Renal malignancy is possible.  Recommend outpatient follow up with renal ultrasound and urology.         Discussed patient condition with RN, Patient and trauma surgery. Dr. RUSTY Levin.    SUPERVISING PHYSICIAN ADDENDUM:     I have personally evaluated and reviewed pertinent aspects of the chart for this patient.  I have independently evaluated this patient at bedside.  The patient was discussed with the mid-level practitioner on the day of this addendum.  I agree with the assessment and plan as outlined above by the mid-level practitioner with these exceptions/changes:   Continue chest tube to suction  Multimodal pain management      Medically cleared for ICU transfer = Day 1  ____________________________________   I have independently reviewed pertinent clinical lab tests from the last 48 hours and ordered additional follow up clinical lab tests.  I have independently reviewed pertinent radiographic images and the radiologist's reports from the last 48 hours and ordered additional follow up radiographic studies.  I have reviewed the details of the available patient records and documentation by consulting physicians in [facility] up to today, summated the information,  and utilized the information as warranted in today's medical decision making for this patient.  I have personally evaluated the patient condition at bedside, discussed the daily plan(s) with available nursing staff, dieticians, social workers, pharmacists on rounds and performed reassessments through out the day as clinically warranted.     Aggregated care time spent evaluating, reassessing, reviewing documentation, providing care, and managing this patient exclusive of procedures: 35 minutes

## 2020-09-12 NOTE — CARE PLAN
Problem: Respiratory:  Goal: Respiratory status will improve  Intervention: Assess and monitor pulmonary status  Note: Pulmonary status assessed with each encounter. RT notified of changes, and oxygen titrated as needed. Oral care provided per protocol. Will continue to monitor.          Problem: Pain Management  Goal: Pain level will decrease to patient's comfort goal  Intervention: Follow pain managment plan developed in collaboration with patient and Interdisciplinary Team  Note: Pharmacological and nonpharmacological methods used to control pain. Pain assessed Q2. Medications administered as needed per the MAR.

## 2020-09-12 NOTE — PROGRESS NOTES
IDT at bedside for interdisciplinary rounds. Orders received to increase tylenol dose per MAR, start a clear liquid diet with place to advance to regular diet, if tolerate by dinner, mobilize patient as tolerated, and saline lock if taking adequate PO.

## 2020-09-12 NOTE — CARE PLAN
Problem: Venous Thromboembolism (VTW)/Deep Vein Thrombosis (DVT) Prevention:  Goal: Patient will participate in Venous Thrombosis (VTE)/Deep Vein Thrombosis (DVT)Prevention Measures  Outcome: PROGRESSING AS EXPECTED  Intervention: Assess and monitor for anticoagulation complications  Note: Pt wearing SCD's.     Problem: Pain Management  Goal: Pain level will decrease to patient's comfort goal  Outcome: PROGRESSING AS EXPECTED  Intervention: Follow pain managment plan developed in collaboration with patient and Interdisciplinary Team  Note: Pt medicated with appropriate PRN's per MAR.

## 2020-09-13 ENCOUNTER — APPOINTMENT (OUTPATIENT)
Dept: RADIOLOGY | Facility: MEDICAL CENTER | Age: 55
DRG: 183 | End: 2020-09-13
Attending: SURGERY
Payer: MEDICAID

## 2020-09-13 PROBLEM — Z78.9 NO CONTRAINDICATION TO DEEP VEIN THROMBOSIS (DVT) PROPHYLAXIS: Status: ACTIVE | Noted: 2020-09-11

## 2020-09-13 LAB
ALBUMIN SERPL BCP-MCNC: 3.2 G/DL (ref 3.2–4.9)
ALBUMIN/GLOB SERPL: 1.3 G/DL
ALP SERPL-CCNC: 39 U/L (ref 30–99)
ALT SERPL-CCNC: 12 U/L (ref 2–50)
ANION GAP SERPL CALC-SCNC: 11 MMOL/L (ref 7–16)
AST SERPL-CCNC: 15 U/L (ref 12–45)
BASOPHILS # BLD AUTO: 0.3 % (ref 0–1.8)
BASOPHILS # BLD: 0.03 K/UL (ref 0–0.12)
BILIRUB SERPL-MCNC: 0.6 MG/DL (ref 0.1–1.5)
BUN SERPL-MCNC: 12 MG/DL (ref 8–22)
CALCIUM SERPL-MCNC: 8.3 MG/DL (ref 8.5–10.5)
CHLORIDE SERPL-SCNC: 95 MMOL/L (ref 96–112)
CO2 SERPL-SCNC: 25 MMOL/L (ref 20–33)
CREAT SERPL-MCNC: 0.46 MG/DL (ref 0.5–1.4)
EOSINOPHIL # BLD AUTO: 0.4 K/UL (ref 0–0.51)
EOSINOPHIL NFR BLD: 4.4 % (ref 0–6.9)
ERYTHROCYTE [DISTWIDTH] IN BLOOD BY AUTOMATED COUNT: 45.5 FL (ref 35.9–50)
GLOBULIN SER CALC-MCNC: 2.5 G/DL (ref 1.9–3.5)
GLUCOSE SERPL-MCNC: 106 MG/DL (ref 65–99)
HCT VFR BLD AUTO: 43.4 % (ref 42–52)
HGB BLD-MCNC: 15 G/DL (ref 14–18)
IMM GRANULOCYTES # BLD AUTO: 0.06 K/UL (ref 0–0.11)
IMM GRANULOCYTES NFR BLD AUTO: 0.7 % (ref 0–0.9)
LYMPHOCYTES # BLD AUTO: 1.01 K/UL (ref 1–4.8)
LYMPHOCYTES NFR BLD: 11.1 % (ref 22–41)
MCH RBC QN AUTO: 35.4 PG (ref 27–33)
MCHC RBC AUTO-ENTMCNC: 34.6 G/DL (ref 33.7–35.3)
MCV RBC AUTO: 102.4 FL (ref 81.4–97.8)
MONOCYTES # BLD AUTO: 0.41 K/UL (ref 0–0.85)
MONOCYTES NFR BLD AUTO: 4.5 % (ref 0–13.4)
NEUTROPHILS # BLD AUTO: 7.22 K/UL (ref 1.82–7.42)
NEUTROPHILS NFR BLD: 79 % (ref 44–72)
NRBC # BLD AUTO: 0 K/UL
NRBC BLD-RTO: 0 /100 WBC
PLATELET # BLD AUTO: 236 K/UL (ref 164–446)
PMV BLD AUTO: 9.3 FL (ref 9–12.9)
POTASSIUM SERPL-SCNC: 3.8 MMOL/L (ref 3.6–5.5)
PROT SERPL-MCNC: 5.7 G/DL (ref 6–8.2)
RBC # BLD AUTO: 4.24 M/UL (ref 4.7–6.1)
SODIUM SERPL-SCNC: 131 MMOL/L (ref 135–145)
WBC # BLD AUTO: 9.1 K/UL (ref 4.8–10.8)

## 2020-09-13 PROCEDURE — 85025 COMPLETE CBC W/AUTO DIFF WBC: CPT

## 2020-09-13 PROCEDURE — 700102 HCHG RX REV CODE 250 W/ 637 OVERRIDE(OP): Performed by: NURSE PRACTITIONER

## 2020-09-13 PROCEDURE — A9270 NON-COVERED ITEM OR SERVICE: HCPCS | Performed by: SURGERY

## 2020-09-13 PROCEDURE — 700101 HCHG RX REV CODE 250: Performed by: SURGERY

## 2020-09-13 PROCEDURE — 94760 N-INVAS EAR/PLS OXIMETRY 1: CPT

## 2020-09-13 PROCEDURE — 700102 HCHG RX REV CODE 250 W/ 637 OVERRIDE(OP): Performed by: SURGERY

## 2020-09-13 PROCEDURE — 770001 HCHG ROOM/CARE - MED/SURG/GYN PRIV*

## 2020-09-13 PROCEDURE — 700111 HCHG RX REV CODE 636 W/ 250 OVERRIDE (IP): Performed by: NURSE PRACTITIONER

## 2020-09-13 PROCEDURE — A9270 NON-COVERED ITEM OR SERVICE: HCPCS | Performed by: NURSE PRACTITIONER

## 2020-09-13 PROCEDURE — 99232 SBSQ HOSP IP/OBS MODERATE 35: CPT | Performed by: SURGERY

## 2020-09-13 PROCEDURE — 71045 X-RAY EXAM CHEST 1 VIEW: CPT

## 2020-09-13 PROCEDURE — 700111 HCHG RX REV CODE 636 W/ 250 OVERRIDE (IP): Performed by: SURGERY

## 2020-09-13 PROCEDURE — 80053 COMPREHEN METABOLIC PANEL: CPT

## 2020-09-13 RX ORDER — METAXALONE 800 MG/1
800 TABLET ORAL 3 TIMES DAILY
Status: DISCONTINUED | OUTPATIENT
Start: 2020-09-13 | End: 2020-09-17

## 2020-09-13 RX ORDER — GABAPENTIN 100 MG/1
100 CAPSULE ORAL 3 TIMES DAILY
Status: DISCONTINUED | OUTPATIENT
Start: 2020-09-13 | End: 2020-09-19 | Stop reason: HOSPADM

## 2020-09-13 RX ADMIN — ACETAMINOPHEN 1000 MG: 500 TABLET ORAL at 17:13

## 2020-09-13 RX ADMIN — ENOXAPARIN SODIUM 30 MG: 30 INJECTION SUBCUTANEOUS at 05:18

## 2020-09-13 RX ADMIN — HYDROMORPHONE HYDROCHLORIDE 0.5 MG: 2 INJECTION, SOLUTION INTRAMUSCULAR; INTRAVENOUS; SUBCUTANEOUS at 04:00

## 2020-09-13 RX ADMIN — ACETAMINOPHEN 1000 MG: 500 TABLET ORAL at 05:18

## 2020-09-13 RX ADMIN — NICOTINE 7 MG: 7 PATCH TRANSDERMAL at 15:40

## 2020-09-13 RX ADMIN — HYDROMORPHONE HYDROCHLORIDE 0.5 MG: 2 INJECTION, SOLUTION INTRAMUSCULAR; INTRAVENOUS; SUBCUTANEOUS at 13:37

## 2020-09-13 RX ADMIN — IBUPROFEN 800 MG: 800 TABLET, FILM COATED ORAL at 11:31

## 2020-09-13 RX ADMIN — ACETAMINOPHEN 1000 MG: 500 TABLET ORAL at 11:31

## 2020-09-13 RX ADMIN — HYDROMORPHONE HYDROCHLORIDE 0.5 MG: 2 INJECTION, SOLUTION INTRAMUSCULAR; INTRAVENOUS; SUBCUTANEOUS at 00:15

## 2020-09-13 RX ADMIN — GABAPENTIN 100 MG: 100 CAPSULE ORAL at 17:13

## 2020-09-13 RX ADMIN — ENOXAPARIN SODIUM 30 MG: 30 INJECTION SUBCUTANEOUS at 17:13

## 2020-09-13 RX ADMIN — IBUPROFEN 800 MG: 800 TABLET, FILM COATED ORAL at 05:18

## 2020-09-13 RX ADMIN — HYDROMORPHONE HYDROCHLORIDE 0.5 MG: 2 INJECTION, SOLUTION INTRAMUSCULAR; INTRAVENOUS; SUBCUTANEOUS at 19:10

## 2020-09-13 RX ADMIN — GABAPENTIN 100 MG: 100 CAPSULE ORAL at 11:31

## 2020-09-13 RX ADMIN — FAMOTIDINE 20 MG: 10 INJECTION INTRAVENOUS at 05:19

## 2020-09-13 RX ADMIN — IBUPROFEN 800 MG: 800 TABLET, FILM COATED ORAL at 17:13

## 2020-09-13 RX ADMIN — HYDROMORPHONE HYDROCHLORIDE 0.5 MG: 2 INJECTION, SOLUTION INTRAMUSCULAR; INTRAVENOUS; SUBCUTANEOUS at 10:46

## 2020-09-13 RX ADMIN — ACETAMINOPHEN 1000 MG: 500 TABLET ORAL at 00:15

## 2020-09-13 RX ADMIN — LABETALOL HYDROCHLORIDE 10 MG: 5 INJECTION, SOLUTION INTRAVENOUS at 00:15

## 2020-09-13 RX ADMIN — OXYCODONE HYDROCHLORIDE 10 MG: 5 TABLET ORAL at 05:18

## 2020-09-13 RX ADMIN — DOCUSATE SODIUM 100 MG: 100 CAPSULE ORAL at 17:13

## 2020-09-13 RX ADMIN — METAXALONE 800 MG: 800 TABLET ORAL at 17:13

## 2020-09-13 RX ADMIN — FAMOTIDINE 20 MG: 20 TABLET, FILM COATED ORAL at 17:13

## 2020-09-13 RX ADMIN — NICOTINE POLACRILEX 2 MG: 2 GUM, CHEWING BUCCAL at 15:39

## 2020-09-13 RX ADMIN — DOCUSATE SODIUM 100 MG: 100 CAPSULE ORAL at 05:18

## 2020-09-13 RX ADMIN — HYDROMORPHONE HYDROCHLORIDE 0.5 MG: 2 INJECTION, SOLUTION INTRAMUSCULAR; INTRAVENOUS; SUBCUTANEOUS at 21:07

## 2020-09-13 RX ADMIN — OXYCODONE HYDROCHLORIDE 10 MG: 5 TABLET ORAL at 09:26

## 2020-09-13 RX ADMIN — METAXALONE 800 MG: 800 TABLET ORAL at 11:31

## 2020-09-13 RX ADMIN — HYDROMORPHONE HYDROCHLORIDE 0.5 MG: 2 INJECTION, SOLUTION INTRAMUSCULAR; INTRAVENOUS; SUBCUTANEOUS at 07:59

## 2020-09-13 RX ADMIN — OXYCODONE HYDROCHLORIDE 10 MG: 5 TABLET ORAL at 15:21

## 2020-09-13 RX ADMIN — POLYETHYLENE GLYCOL 3350 1 PACKET: 17 POWDER, FOR SOLUTION ORAL at 17:13

## 2020-09-13 ASSESSMENT — ENCOUNTER SYMPTOMS
DOUBLE VISION: 0
SPEECH CHANGE: 0
SHORTNESS OF BREATH: 1
FEVER: 0
ROS GI COMMENTS: PTA
MYALGIAS: 1
NERVOUS/ANXIOUS: 1

## 2020-09-13 ASSESSMENT — FIBROSIS 4 INDEX: FIB4 SCORE: 1.03

## 2020-09-13 ASSESSMENT — PATIENT HEALTH QUESTIONNAIRE - PHQ9
1. LITTLE INTEREST OR PLEASURE IN DOING THINGS: NOT AT ALL
SUM OF ALL RESPONSES TO PHQ9 QUESTIONS 1 AND 2: 0
SUM OF ALL RESPONSES TO PHQ9 QUESTIONS 1 AND 2: 0
2. FEELING DOWN, DEPRESSED, IRRITABLE, OR HOPELESS: NOT AT ALL
2. FEELING DOWN, DEPRESSED, IRRITABLE, OR HOPELESS: NOT AT ALL
1. LITTLE INTEREST OR PLEASURE IN DOING THINGS: NOT AT ALL

## 2020-09-13 ASSESSMENT — LIFESTYLE VARIABLES: SUBSTANCE_ABUSE: 0

## 2020-09-13 NOTE — PROGRESS NOTES
Trauma / Surgical Daily Progress Note    Date of Service  9/13/2020    Chief Complaint  55 y.o. male admitted 9/11/2020 with Trauma - [facility]    Interval Events    No acute events overnight  Chest tube drainage at 220 in 24 hours  Pain remains poorly controlled with movement  Very anxious over needing to work    - Multimodals added  - Has hx of home oxygen   - Bowel protocol    -Transfer to [facility]    Review of Systems  Review of Systems   Constitutional: Negative for fever.   HENT: Negative for hearing loss.    Eyes: Negative for double vision.   Respiratory: Positive for shortness of breath.    Cardiovascular: Negative for chest pain.   Gastrointestinal:        PTA   Genitourinary:        Voiding   Musculoskeletal: Positive for myalgias.   Skin: Negative for rash.   Neurological: Negative for speech change.   Psychiatric/Behavioral: Negative for substance abuse. The patient is nervous/anxious.         Vital Signs  Temp:  [36.1 °C (97 °F)-37.2 °C (98.9 °F)] 36.2 °C (97.2 °F)  Pulse:  [64-97] 90  Resp:  [11-27] 18  BP: (132-185)/(71-94) 143/74  SpO2:  [90 %-98 %] 93 %    Physical Exam  Physical Exam  HENT:      Head:      Comments: Facial edema with sub cutaneous emphysema     Mouth/Throat:      Mouth: Mucous membranes are moist.   Eyes:      Comments: Left eyelid swelling   Neck:      Musculoskeletal: Normal range of motion.   Cardiovascular:      Rate and Rhythm: Normal rate.   Pulmonary:      Effort: Pulmonary effort is normal.      Comments: Chest tube with no air leak noted  Chest:      Chest wall: Tenderness present.   Abdominal:      General: There is distension.      Comments: obese   Musculoskeletal: Normal range of motion.      Comments: Moves all extremities    Skin:     General: Skin is warm and dry.      Capillary Refill: Capillary refill takes 2 to 3 seconds.   Neurological:      General: No focal deficit present.      Mental Status: He is alert and oriented to person, place, and time.   Psychiatric:          Mood and Affect: Mood normal.         Behavior: Behavior normal.         Laboratory  Recent Results (from the past 24 hour(s))   CBC with Differential: Tomorrow AM    Collection Time: 09/13/20  3:50 AM   Result Value Ref Range    WBC 9.1 4.8 - 10.8 K/uL    RBC 4.24 (L) 4.70 - 6.10 M/uL    Hemoglobin 15.0 14.0 - 18.0 g/dL    Hematocrit 43.4 42.0 - 52.0 %    .4 (H) 81.4 - 97.8 fL    MCH 35.4 (H) 27.0 - 33.0 pg    MCHC 34.6 33.7 - 35.3 g/dL    RDW 45.5 35.9 - 50.0 fL    Platelet Count 236 164 - 446 K/uL    MPV 9.3 9.0 - 12.9 fL    Neutrophils-Polys 79.00 (H) 44.00 - 72.00 %    Lymphocytes 11.10 (L) 22.00 - 41.00 %    Monocytes 4.50 0.00 - 13.40 %    Eosinophils 4.40 0.00 - 6.90 %    Basophils 0.30 0.00 - 1.80 %    Immature Granulocytes 0.70 0.00 - 0.90 %    Nucleated RBC 0.00 /100 WBC    Neutrophils (Absolute) 7.22 1.82 - 7.42 K/uL    Lymphs (Absolute) 1.01 1.00 - 4.80 K/uL    Monos (Absolute) 0.41 0.00 - 0.85 K/uL    Eos (Absolute) 0.40 0.00 - 0.51 K/uL    Baso (Absolute) 0.03 0.00 - 0.12 K/uL    Immature Granulocytes (abs) 0.06 0.00 - 0.11 K/uL    NRBC (Absolute) 0.00 K/uL   Comp Metabolic Panel (CMP): Tomorrow AM    Collection Time: 09/13/20  3:50 AM   Result Value Ref Range    Sodium 131 (L) 135 - 145 mmol/L    Potassium 3.8 3.6 - 5.5 mmol/L    Chloride 95 (L) 96 - 112 mmol/L    Co2 25 20 - 33 mmol/L    Anion Gap 11.0 7.0 - 16.0    Glucose 106 (H) 65 - 99 mg/dL    Bun 12 8 - 22 mg/dL    Creatinine 0.46 (L) 0.50 - 1.40 mg/dL    Calcium 8.3 (L) 8.5 - 10.5 mg/dL    AST(SGOT) 15 12 - 45 U/L    ALT(SGPT) 12 2 - 50 U/L    Alkaline Phosphatase 39 30 - 99 U/L    Total Bilirubin 0.6 0.1 - 1.5 mg/dL    Albumin 3.2 3.2 - 4.9 g/dL    Total Protein 5.7 (L) 6.0 - 8.2 g/dL    Globulin 2.5 1.9 - 3.5 g/dL    A-G Ratio 1.3 g/dL   ESTIMATED GFR    Collection Time: 09/13/20  3:50 AM   Result Value Ref Range    GFR If African American >60 >60 mL/min/1.73 m 2    GFR If Non African American >60 >60 mL/min/1.73 m 2        Fluids    Intake/Output Summary (Last 24 hours) at 9/13/2020 0944  Last data filed at 9/13/2020 0937  Gross per 24 hour   Intake 1760.62 ml   Output 720 ml   Net 1040.62 ml       Core Measures & Quality Metrics  Labs reviewed, Medications reviewed and Radiology images reviewed  Holguin catheter: No Holguin      DVT Prophylaxis: Enoxaparin (Lovenox)    Ulcer prophylaxis: Not indicated    Assessed for rehab: Patient returned to prior level of function, rehabilitation not indicated at this time    RAP Score Total: 6    ETOH Screening  CAGE Score: 0  Assessment complete date: 9/12/2020        Assessment/Plan  Flail chest- (present on admission)  Assessment & Plan  Right-sided rib fractures, some of which are displaced and segmental involving the right 2nd through 9th rib levels which may represent a flail chest.  Aggressive multimodal pain management, and pulmonary hygiene.   Serial chest radiographs.    Traumatic pneumohemothorax- (present on admission)  Assessment & Plan  Biapical pneumothoraces.  Right chest tube placement.  9/13 Chest tube to suction/No air leak/400ml in Pleural Vac/220 in 24 hours  Supplemental oxygen to maintain O2 saturations greater than 95%  Aggressive pulmonary hygiene. Serial chest radiographs.    Abnormal EKG- (present on admission)  Assessment & Plan  Admission EKG:  Sinus tachycardia.  Consider inferior infarct.  Anterior infarct, age indeterminate.    Subcutaneous emphysema (HCC)- (present on admission)  Assessment & Plan  Extensive subcutaneous air throughout the entire lower neck, chest, abdomen, and upper pelvis. Extensive soft tissue subcutaneous air bilaterally with associated retropharyngeal and prevertebral air. Diffuse soft tissue gas as described above which extends into the left orbit where there is mild left proptosis.    Pneumomediastinum (HCC)- (present on admission)  Assessment & Plan  Superior pneumomediastinum.    Screening examination for infectious disease- (present on  admission)  Assessment & Plan  Admission SARS-CoV-2 testing negative. LOW RISK patient. Repeat SARS-CoV-2 testing not indicated. Isolation precautions de-escalated.    No contraindication to deep vein thrombosis (DVT) prophylaxis- (present on admission)  Assessment & Plan  Systemic anticoagulation contraindicated secondary to elevated bleeding risk.  Consider surveillance venous duplex scanning if unable to initiate chemical DVT prophylaxis within 48 hrs of admission.    Trauma- (present on admission)  Assessment & Plan  Unclear to circumstances surround injury. Fell out of bed two days prior to presentation.   Non Trauma Activation.  Ambrose Salazar MD. Trauma Surgery.    Kidney lesion- (present on admission)  Assessment & Plan  Complex hypodense left superior pole kidney mass with possible posterior enhancement. Renal malignancy is possible.  Recommend outpatient follow up with renal ultrasound and urology.       Discussed patient condition with RN, Patient and trauma surgery Dr. Levin.    SUPERVISING PHYSICIAN ADDENDUM:     I have personally evaluated and reviewed pertinent aspects of the chart for this patient.  I have independently evaluated this patient at bedside.  The patient was discussed with the mid-level practitioner on the day of this addendum.  I agree with the assessment and plan as outlined above by the mid-level practitioner with these exceptions/changes:   Add multimodal pain management  Continue CT to suction        Medically cleared for ICU transfer = Day 1  ____________________________________   I have independently reviewed pertinent clinical lab tests from the last 48 hours and ordered additional follow up clinical lab tests.  I have independently reviewed pertinent radiographic images and the radiologist's reports from the last 48 hours and ordered additional follow up radiographic studies.  I have reviewed the details of the available patient records and documentation by consulting  physicians in [facility] up to today, summated the information, and utilized the information as warranted in today's medical decision making for this patient.  I have personally evaluated the patient condition at bedside, discussed the daily plan(s) with available nursing staff, dieticians, social workers, pharmacists on rounds and performed reassessments through out the day as clinically warranted.     Aggregated care time spent evaluating, reassessing, reviewing documentation, providing care, and managing this patient exclusive of procedures: 33 minutes

## 2020-09-14 ENCOUNTER — APPOINTMENT (OUTPATIENT)
Dept: RADIOLOGY | Facility: MEDICAL CENTER | Age: 55
DRG: 183 | End: 2020-09-14
Attending: SURGERY
Payer: MEDICAID

## 2020-09-14 LAB
ALBUMIN SERPL BCP-MCNC: 3.4 G/DL (ref 3.2–4.9)
ALBUMIN/GLOB SERPL: 1.3 G/DL
ALP SERPL-CCNC: 38 U/L (ref 30–99)
ALT SERPL-CCNC: 11 U/L (ref 2–50)
ANION GAP SERPL CALC-SCNC: 13 MMOL/L (ref 7–16)
AST SERPL-CCNC: 15 U/L (ref 12–45)
BASOPHILS # BLD AUTO: 0.3 % (ref 0–1.8)
BASOPHILS # BLD: 0.02 K/UL (ref 0–0.12)
BILIRUB SERPL-MCNC: 0.6 MG/DL (ref 0.1–1.5)
BUN SERPL-MCNC: 12 MG/DL (ref 8–22)
CALCIUM SERPL-MCNC: 8.7 MG/DL (ref 8.5–10.5)
CHLORIDE SERPL-SCNC: 92 MMOL/L (ref 96–112)
CO2 SERPL-SCNC: 23 MMOL/L (ref 20–33)
CREAT SERPL-MCNC: 0.46 MG/DL (ref 0.5–1.4)
EOSINOPHIL # BLD AUTO: 0.35 K/UL (ref 0–0.51)
EOSINOPHIL NFR BLD: 4.5 % (ref 0–6.9)
ERYTHROCYTE [DISTWIDTH] IN BLOOD BY AUTOMATED COUNT: 43.8 FL (ref 35.9–50)
GLOBULIN SER CALC-MCNC: 2.6 G/DL (ref 1.9–3.5)
GLUCOSE SERPL-MCNC: 120 MG/DL (ref 65–99)
HCT VFR BLD AUTO: 41.9 % (ref 42–52)
HGB BLD-MCNC: 14.7 G/DL (ref 14–18)
IMM GRANULOCYTES # BLD AUTO: 0.05 K/UL (ref 0–0.11)
IMM GRANULOCYTES NFR BLD AUTO: 0.6 % (ref 0–0.9)
LYMPHOCYTES # BLD AUTO: 0.74 K/UL (ref 1–4.8)
LYMPHOCYTES NFR BLD: 9.4 % (ref 22–41)
MAGNESIUM SERPL-MCNC: 1.8 MG/DL (ref 1.5–2.5)
MCH RBC QN AUTO: 35.5 PG (ref 27–33)
MCHC RBC AUTO-ENTMCNC: 35.1 G/DL (ref 33.7–35.3)
MCV RBC AUTO: 101.2 FL (ref 81.4–97.8)
MONOCYTES # BLD AUTO: 0.46 K/UL (ref 0–0.85)
MONOCYTES NFR BLD AUTO: 5.9 % (ref 0–13.4)
NEUTROPHILS # BLD AUTO: 6.24 K/UL (ref 1.82–7.42)
NEUTROPHILS NFR BLD: 79.3 % (ref 44–72)
NRBC # BLD AUTO: 0 K/UL
NRBC BLD-RTO: 0 /100 WBC
PHOSPHATE SERPL-MCNC: 3.2 MG/DL (ref 2.5–4.5)
PLATELET # BLD AUTO: 243 K/UL (ref 164–446)
PMV BLD AUTO: 9.2 FL (ref 9–12.9)
POTASSIUM SERPL-SCNC: 3.6 MMOL/L (ref 3.6–5.5)
PROT SERPL-MCNC: 6 G/DL (ref 6–8.2)
RBC # BLD AUTO: 4.14 M/UL (ref 4.7–6.1)
SODIUM SERPL-SCNC: 128 MMOL/L (ref 135–145)
WBC # BLD AUTO: 7.9 K/UL (ref 4.8–10.8)

## 2020-09-14 PROCEDURE — 80053 COMPREHEN METABOLIC PANEL: CPT

## 2020-09-14 PROCEDURE — 700111 HCHG RX REV CODE 636 W/ 250 OVERRIDE (IP): Performed by: SURGERY

## 2020-09-14 PROCEDURE — A9270 NON-COVERED ITEM OR SERVICE: HCPCS | Performed by: SURGERY

## 2020-09-14 PROCEDURE — 700102 HCHG RX REV CODE 250 W/ 637 OVERRIDE(OP): Performed by: NURSE PRACTITIONER

## 2020-09-14 PROCEDURE — 85025 COMPLETE CBC W/AUTO DIFF WBC: CPT

## 2020-09-14 PROCEDURE — 700102 HCHG RX REV CODE 250 W/ 637 OVERRIDE(OP): Performed by: SURGERY

## 2020-09-14 PROCEDURE — 99232 SBSQ HOSP IP/OBS MODERATE 35: CPT | Performed by: SURGERY

## 2020-09-14 PROCEDURE — 71045 X-RAY EXAM CHEST 1 VIEW: CPT

## 2020-09-14 PROCEDURE — A9270 NON-COVERED ITEM OR SERVICE: HCPCS | Performed by: NURSE PRACTITIONER

## 2020-09-14 PROCEDURE — 94760 N-INVAS EAR/PLS OXIMETRY 1: CPT

## 2020-09-14 PROCEDURE — 83735 ASSAY OF MAGNESIUM: CPT

## 2020-09-14 PROCEDURE — 700111 HCHG RX REV CODE 636 W/ 250 OVERRIDE (IP): Performed by: NURSE PRACTITIONER

## 2020-09-14 PROCEDURE — 84100 ASSAY OF PHOSPHORUS: CPT

## 2020-09-14 PROCEDURE — 770001 HCHG ROOM/CARE - MED/SURG/GYN PRIV*

## 2020-09-14 RX ORDER — HYDROMORPHONE HYDROCHLORIDE 2 MG/ML
.5-1 INJECTION, SOLUTION INTRAMUSCULAR; INTRAVENOUS; SUBCUTANEOUS
Status: DISCONTINUED | OUTPATIENT
Start: 2020-09-14 | End: 2020-09-16

## 2020-09-14 RX ORDER — ALPRAZOLAM 0.5 MG/1
0.5 TABLET ORAL 3 TIMES DAILY PRN
Status: DISCONTINUED | OUTPATIENT
Start: 2020-09-14 | End: 2020-09-17

## 2020-09-14 RX ADMIN — ACETAMINOPHEN 1000 MG: 500 TABLET ORAL at 05:04

## 2020-09-14 RX ADMIN — HYDROMORPHONE HYDROCHLORIDE 0.5 MG: 2 INJECTION, SOLUTION INTRAMUSCULAR; INTRAVENOUS; SUBCUTANEOUS at 05:03

## 2020-09-14 RX ADMIN — IBUPROFEN 800 MG: 800 TABLET, FILM COATED ORAL at 05:06

## 2020-09-14 RX ADMIN — IBUPROFEN 800 MG: 800 TABLET, FILM COATED ORAL at 12:37

## 2020-09-14 RX ADMIN — DOCUSATE SODIUM 50 MG AND SENNOSIDES 8.6 MG 1 TABLET: 8.6; 5 TABLET, FILM COATED ORAL at 20:28

## 2020-09-14 RX ADMIN — GABAPENTIN 100 MG: 100 CAPSULE ORAL at 12:37

## 2020-09-14 RX ADMIN — FAMOTIDINE 20 MG: 10 INJECTION INTRAVENOUS at 05:04

## 2020-09-14 RX ADMIN — OXYCODONE HYDROCHLORIDE 10 MG: 5 TABLET ORAL at 08:43

## 2020-09-14 RX ADMIN — ACETAMINOPHEN 1000 MG: 500 TABLET ORAL at 12:37

## 2020-09-14 RX ADMIN — OXYCODONE HYDROCHLORIDE 10 MG: 5 TABLET ORAL at 02:48

## 2020-09-14 RX ADMIN — ACETAMINOPHEN 1000 MG: 500 TABLET ORAL at 17:57

## 2020-09-14 RX ADMIN — METAXALONE 800 MG: 800 TABLET ORAL at 05:05

## 2020-09-14 RX ADMIN — OXYCODONE HYDROCHLORIDE 5 MG: 5 TABLET ORAL at 20:29

## 2020-09-14 RX ADMIN — ENOXAPARIN SODIUM 30 MG: 30 INJECTION SUBCUTANEOUS at 05:04

## 2020-09-14 RX ADMIN — HYDROMORPHONE HYDROCHLORIDE 1 MG: 2 INJECTION, SOLUTION INTRAMUSCULAR; INTRAVENOUS; SUBCUTANEOUS at 11:02

## 2020-09-14 RX ADMIN — GABAPENTIN 100 MG: 100 CAPSULE ORAL at 05:06

## 2020-09-14 RX ADMIN — ALPRAZOLAM 0.5 MG: 0.5 TABLET ORAL at 20:28

## 2020-09-14 RX ADMIN — METAXALONE 800 MG: 800 TABLET ORAL at 12:37

## 2020-09-14 RX ADMIN — ALPRAZOLAM 0.5 MG: 0.5 TABLET ORAL at 11:02

## 2020-09-14 RX ADMIN — OXYCODONE HYDROCHLORIDE 10 MG: 5 TABLET ORAL at 16:00

## 2020-09-14 RX ADMIN — HYDROMORPHONE HYDROCHLORIDE 1 MG: 2 INJECTION, SOLUTION INTRAMUSCULAR; INTRAVENOUS; SUBCUTANEOUS at 21:37

## 2020-09-14 RX ADMIN — NICOTINE 7 MG: 7 PATCH TRANSDERMAL at 05:05

## 2020-09-14 RX ADMIN — ENOXAPARIN SODIUM 30 MG: 30 INJECTION SUBCUTANEOUS at 17:57

## 2020-09-14 RX ADMIN — GABAPENTIN 100 MG: 100 CAPSULE ORAL at 17:58

## 2020-09-14 RX ADMIN — IBUPROFEN 800 MG: 800 TABLET, FILM COATED ORAL at 17:57

## 2020-09-14 RX ADMIN — DOCUSATE SODIUM 100 MG: 100 CAPSULE ORAL at 17:58

## 2020-09-14 RX ADMIN — DOCUSATE SODIUM 100 MG: 100 CAPSULE ORAL at 05:04

## 2020-09-14 RX ADMIN — NICOTINE POLACRILEX 2 MG: 2 GUM, CHEWING BUCCAL at 11:02

## 2020-09-14 RX ADMIN — METAXALONE 800 MG: 800 TABLET ORAL at 17:58

## 2020-09-14 RX ADMIN — ACETAMINOPHEN 1000 MG: 500 TABLET ORAL at 00:33

## 2020-09-14 RX ADMIN — HYDROMORPHONE HYDROCHLORIDE 1 MG: 2 INJECTION, SOLUTION INTRAMUSCULAR; INTRAVENOUS; SUBCUTANEOUS at 16:58

## 2020-09-14 ASSESSMENT — ENCOUNTER SYMPTOMS
DOUBLE VISION: 0
MYALGIAS: 1
COUGH: 0
FEVER: 0
ROS GI COMMENTS: PTA
NERVOUS/ANXIOUS: 1
SPEECH CHANGE: 0

## 2020-09-14 ASSESSMENT — LIFESTYLE VARIABLES: SUBSTANCE_ABUSE: 0

## 2020-09-14 ASSESSMENT — FIBROSIS 4 INDEX: FIB4 SCORE: 1

## 2020-09-14 NOTE — PROGRESS NOTES
Trauma / Surgical Daily Progress Note    Date of Service  9/14/2020    Chief Complaint  55 y.o. male admitted 9/11/2020 with Trauma - [facility]    Interval Events    Patient very anxious this am and threatened to leave AMA  Does take Xanax at home for anxiety  No BM since admit    - Chest tube remains on suction /200 out in 24hrs / no air leak noted  - Pain better managed this am  - Ambulate TID  - Advance bowel protocol     Review of Systems  Review of Systems   Constitutional: Negative for fever.   HENT: Negative for hearing loss.    Eyes: Negative for double vision.   Respiratory: Negative for cough.    Cardiovascular: Negative for chest pain.   Gastrointestinal:        PTA   Genitourinary:        Voiding   Musculoskeletal: Positive for myalgias.   Skin: Negative for rash.   Neurological: Negative for speech change.   Psychiatric/Behavioral: Negative for substance abuse. The patient is nervous/anxious.         Vital Signs  Temp:  [36.3 °C (97.4 °F)-36.8 °C (98.2 °F)] 36.8 °C (98.2 °F)  Pulse:  [] 88  Resp:  [16-18] 18  BP: (139-166)/(82-94) 151/89  SpO2:  [80 %-98 %] 93 %    Physical Exam  Physical Exam  HENT:      Head:      Comments: Facial edema with sub cutaneous emphysema     Mouth/Throat:      Mouth: Mucous membranes are moist.   Eyes:      Comments: Left eyelid swelling   Neck:      Musculoskeletal: Normal range of motion.   Cardiovascular:      Rate and Rhythm: Normal rate.   Pulmonary:      Effort: Pulmonary effort is normal.      Comments: Chest tube with no air leak noted  Chest:      Chest wall: Tenderness present.   Abdominal:      General: There is distension.      Comments: obese   Musculoskeletal: Normal range of motion.      Comments: Moves all extremities    Skin:     General: Skin is warm and dry.      Capillary Refill: Capillary refill takes 2 to 3 seconds.   Neurological:      General: No focal deficit present.      Mental Status: He is alert and oriented to person, place, and time.    Psychiatric:         Mood and Affect: Mood is anxious.         Behavior: Behavior normal.         Laboratory  Recent Results (from the past 24 hour(s))   Comp Metabolic Panel (CMP): Tomorrow AM    Collection Time: 09/14/20  4:10 AM   Result Value Ref Range    Sodium 128 (L) 135 - 145 mmol/L    Potassium 3.6 3.6 - 5.5 mmol/L    Chloride 92 (L) 96 - 112 mmol/L    Co2 23 20 - 33 mmol/L    Anion Gap 13.0 7.0 - 16.0    Glucose 120 (H) 65 - 99 mg/dL    Bun 12 8 - 22 mg/dL    Creatinine 0.46 (L) 0.50 - 1.40 mg/dL    Calcium 8.7 8.5 - 10.5 mg/dL    AST(SGOT) 15 12 - 45 U/L    ALT(SGPT) 11 2 - 50 U/L    Alkaline Phosphatase 38 30 - 99 U/L    Total Bilirubin 0.6 0.1 - 1.5 mg/dL    Albumin 3.4 3.2 - 4.9 g/dL    Total Protein 6.0 6.0 - 8.2 g/dL    Globulin 2.6 1.9 - 3.5 g/dL    A-G Ratio 1.3 g/dL   CBC with Differential: Tomorrow AM    Collection Time: 09/14/20  4:10 AM   Result Value Ref Range    WBC 7.9 4.8 - 10.8 K/uL    RBC 4.14 (L) 4.70 - 6.10 M/uL    Hemoglobin 14.7 14.0 - 18.0 g/dL    Hematocrit 41.9 (L) 42.0 - 52.0 %    .2 (H) 81.4 - 97.8 fL    MCH 35.5 (H) 27.0 - 33.0 pg    MCHC 35.1 33.7 - 35.3 g/dL    RDW 43.8 35.9 - 50.0 fL    Platelet Count 243 164 - 446 K/uL    MPV 9.2 9.0 - 12.9 fL    Neutrophils-Polys 79.30 (H) 44.00 - 72.00 %    Lymphocytes 9.40 (L) 22.00 - 41.00 %    Monocytes 5.90 0.00 - 13.40 %    Eosinophils 4.50 0.00 - 6.90 %    Basophils 0.30 0.00 - 1.80 %    Immature Granulocytes 0.60 0.00 - 0.90 %    Nucleated RBC 0.00 /100 WBC    Neutrophils (Absolute) 6.24 1.82 - 7.42 K/uL    Lymphs (Absolute) 0.74 (L) 1.00 - 4.80 K/uL    Monos (Absolute) 0.46 0.00 - 0.85 K/uL    Eos (Absolute) 0.35 0.00 - 0.51 K/uL    Baso (Absolute) 0.02 0.00 - 0.12 K/uL    Immature Granulocytes (abs) 0.05 0.00 - 0.11 K/uL    NRBC (Absolute) 0.00 K/uL   MAGNESIUM    Collection Time: 09/14/20  4:10 AM   Result Value Ref Range    Magnesium 1.8 1.5 - 2.5 mg/dL   PHOSPHORUS    Collection Time: 09/14/20  4:10 AM   Result Value  Ref Range    Phosphorus 3.2 2.5 - 4.5 mg/dL   ESTIMATED GFR    Collection Time: 09/14/20  4:10 AM   Result Value Ref Range    GFR If African American >60 >60 mL/min/1.73 m 2    GFR If Non African American >60 >60 mL/min/1.73 m 2       Fluids    Intake/Output Summary (Last 24 hours) at 9/14/2020 1021  Last data filed at 9/14/2020 0800  Gross per 24 hour   Intake 400 ml   Output 1510 ml   Net -1110 ml       Core Measures & Quality Metrics  Labs reviewed, Medications reviewed and Radiology images reviewed  Holguin catheter: No Holguin      DVT Prophylaxis: Enoxaparin (Lovenox)    Ulcer prophylaxis: Not indicated    Assessed for rehab: Patient returned to prior level of function, rehabilitation not indicated at this time    JAMES Score  ETOH Screening    Assessment/Plan  Flail chest- (present on admission)  Assessment & Plan  Right-sided rib fractures, some of which are displaced and segmental involving the right 2nd through 9th rib levels which represents a flail chest.  Aggressive multimodal pain management, and pulmonary hygiene.   Serial chest radiographs.    Traumatic pneumohemothorax- (present on admission)  Assessment & Plan  Biapical pneumothoraces.  Right chest tube placement.  9/13 Chest tube to suction/No air leak/400ml in Pleural Vac/220 in 24 hours  Supplemental oxygen to maintain O2 saturations greater than 95%  Aggressive pulmonary hygiene. Serial chest radiographs.    Abnormal EKG- (present on admission)  Assessment & Plan  Admission EKG:  Sinus tachycardia.  Consider inferior infarct.  Anterior infarct, age indeterminate.    Subcutaneous emphysema (HCC)- (present on admission)  Assessment & Plan  Extensive subcutaneous air throughout the entire lower neck, chest, abdomen, and upper pelvis. Extensive soft tissue subcutaneous air bilaterally with associated retropharyngeal and prevertebral air. Diffuse soft tissue gas as described above which extends into the left orbit where there is mild left  proptosis.    Pneumomediastinum (HCC)- (present on admission)  Assessment & Plan  Superior pneumomediastinum.    Screening examination for infectious disease- (present on admission)  Assessment & Plan  Admission SARS-CoV-2 testing negative. LOW RISK patient. Repeat SARS-CoV-2 testing not indicated. Isolation precautions de-escalated.    No contraindication to deep vein thrombosis (DVT) prophylaxis- (present on admission)  Assessment & Plan  Systemic anticoagulation contraindicated secondary to elevated bleeding risk.  Chemical DVT prophylaxis (Lovenox) initiated  Ambulate TID.      Trauma- (present on admission)  Assessment & Plan  Unclear to circumstances surround injury. Fell out of bed two days prior to presentation.   Non Trauma Activation.  Ambrose Salazar MD. Trauma Surgery.    Kidney lesion- (present on admission)  Assessment & Plan  Complex hypodense left superior pole kidney mass with possible posterior enhancement. Renal malignancy is possible.  Recommend outpatient follow up with renal ultrasound and urology.         Discussed patient condition with RN, Patient and trauma surgery Dr. Levin.    SUPERVISING PHYSICIAN ADDENDUM:     I have personally evaluated and reviewed pertinent aspects of the chart for this patient.  I have independently evaluated this patient at bedside.  The patient was discussed with the mid-level practitioner on the day of this addendum.  I agree with the assessment and plan as outlined above by the mid-level practitioner with these exceptions/changes:   Continue CT to suction  MOM      Medically cleared for ICU transfer = Day 2  ____________________________________   I have independently reviewed pertinent clinical lab tests from the last 48 hours and ordered additional follow up clinical lab tests.  I have independently reviewed pertinent radiographic images and the radiologist's reports from the last 48 hours and ordered additional follow up radiographic studies.  I have  reviewed the details of the available patient records and documentation by consulting physicians in [facility] up to today, summated the information, and utilized the information as warranted in today's medical decision making for this patient.  I have personally evaluated the patient condition at bedside, discussed the daily plan(s) with available nursing staff, dieticians, social workers, pharmacists on rounds and performed reassessments through out the day as clinically warranted.     Aggregated care time spent evaluating, reassessing, reviewing documentation, providing care, and managing this patient exclusive of procedures: 32 minutes

## 2020-09-14 NOTE — CARE PLAN
Problem: Hyperinflation:  Goal: Prevent or improve atelectasis  Outcome: PROGRESSING AS EXPECTED   IS qid

## 2020-09-15 ENCOUNTER — HOSPITAL ENCOUNTER (OUTPATIENT)
Dept: RADIOLOGY | Facility: MEDICAL CENTER | Age: 55
End: 2020-09-15
Attending: SURGERY
Payer: MEDICAID

## 2020-09-15 ENCOUNTER — PATIENT OUTREACH (OUTPATIENT)
Dept: HEALTH INFORMATION MANAGEMENT | Facility: OTHER | Age: 55
End: 2020-09-15

## 2020-09-15 LAB
ALBUMIN SERPL BCP-MCNC: 3.4 G/DL (ref 3.2–4.9)
ALBUMIN/GLOB SERPL: 1.4 G/DL
ALP SERPL-CCNC: 42 U/L (ref 30–99)
ALT SERPL-CCNC: 14 U/L (ref 2–50)
ANION GAP SERPL CALC-SCNC: 10 MMOL/L (ref 7–16)
AST SERPL-CCNC: 13 U/L (ref 12–45)
BASOPHILS # BLD AUTO: 0.4 % (ref 0–1.8)
BASOPHILS # BLD: 0.03 K/UL (ref 0–0.12)
BILIRUB SERPL-MCNC: 0.5 MG/DL (ref 0.1–1.5)
BUN SERPL-MCNC: 10 MG/DL (ref 8–22)
CALCIUM SERPL-MCNC: 8.8 MG/DL (ref 8.5–10.5)
CHLORIDE SERPL-SCNC: 96 MMOL/L (ref 96–112)
CO2 SERPL-SCNC: 27 MMOL/L (ref 20–33)
CREAT SERPL-MCNC: 0.4 MG/DL (ref 0.5–1.4)
EOSINOPHIL # BLD AUTO: 0.43 K/UL (ref 0–0.51)
EOSINOPHIL NFR BLD: 6 % (ref 0–6.9)
ERYTHROCYTE [DISTWIDTH] IN BLOOD BY AUTOMATED COUNT: 45.3 FL (ref 35.9–50)
GLOBULIN SER CALC-MCNC: 2.4 G/DL (ref 1.9–3.5)
GLUCOSE SERPL-MCNC: 107 MG/DL (ref 65–99)
HCT VFR BLD AUTO: 43.1 % (ref 42–52)
HGB BLD-MCNC: 15 G/DL (ref 14–18)
IMM GRANULOCYTES # BLD AUTO: 0.06 K/UL (ref 0–0.11)
IMM GRANULOCYTES NFR BLD AUTO: 0.8 % (ref 0–0.9)
LYMPHOCYTES # BLD AUTO: 0.98 K/UL (ref 1–4.8)
LYMPHOCYTES NFR BLD: 13.6 % (ref 22–41)
MCH RBC QN AUTO: 35.2 PG (ref 27–33)
MCHC RBC AUTO-ENTMCNC: 34.8 G/DL (ref 33.7–35.3)
MCV RBC AUTO: 101.2 FL (ref 81.4–97.8)
MONOCYTES # BLD AUTO: 0.56 K/UL (ref 0–0.85)
MONOCYTES NFR BLD AUTO: 7.8 % (ref 0–13.4)
NEUTROPHILS # BLD AUTO: 5.15 K/UL (ref 1.82–7.42)
NEUTROPHILS NFR BLD: 71.4 % (ref 44–72)
NRBC # BLD AUTO: 0 K/UL
NRBC BLD-RTO: 0 /100 WBC
PLATELET # BLD AUTO: 268 K/UL (ref 164–446)
PMV BLD AUTO: 8.9 FL (ref 9–12.9)
POTASSIUM SERPL-SCNC: 3.6 MMOL/L (ref 3.6–5.5)
PROT SERPL-MCNC: 5.8 G/DL (ref 6–8.2)
RBC # BLD AUTO: 4.26 M/UL (ref 4.7–6.1)
SODIUM SERPL-SCNC: 133 MMOL/L (ref 135–145)
WBC # BLD AUTO: 7.2 K/UL (ref 4.8–10.8)

## 2020-09-15 PROCEDURE — A9270 NON-COVERED ITEM OR SERVICE: HCPCS | Performed by: SURGERY

## 2020-09-15 PROCEDURE — 700102 HCHG RX REV CODE 250 W/ 637 OVERRIDE(OP): Performed by: SURGERY

## 2020-09-15 PROCEDURE — 770001 HCHG ROOM/CARE - MED/SURG/GYN PRIV*

## 2020-09-15 PROCEDURE — 71045 X-RAY EXAM CHEST 1 VIEW: CPT

## 2020-09-15 PROCEDURE — A9270 NON-COVERED ITEM OR SERVICE: HCPCS | Performed by: NURSE PRACTITIONER

## 2020-09-15 PROCEDURE — 80053 COMPREHEN METABOLIC PANEL: CPT

## 2020-09-15 PROCEDURE — 99233 SBSQ HOSP IP/OBS HIGH 50: CPT | Performed by: SURGERY

## 2020-09-15 PROCEDURE — 700102 HCHG RX REV CODE 250 W/ 637 OVERRIDE(OP): Performed by: NURSE PRACTITIONER

## 2020-09-15 PROCEDURE — 700111 HCHG RX REV CODE 636 W/ 250 OVERRIDE (IP): Performed by: NURSE PRACTITIONER

## 2020-09-15 PROCEDURE — 85025 COMPLETE CBC W/AUTO DIFF WBC: CPT

## 2020-09-15 PROCEDURE — 700101 HCHG RX REV CODE 250: Performed by: SURGERY

## 2020-09-15 RX ORDER — MORPHINE SULFATE 15 MG/1
15 TABLET, FILM COATED, EXTENDED RELEASE ORAL EVERY 12 HOURS
Status: DISCONTINUED | OUTPATIENT
Start: 2020-09-15 | End: 2020-09-16

## 2020-09-15 RX ADMIN — IBUPROFEN 800 MG: 800 TABLET, FILM COATED ORAL at 04:59

## 2020-09-15 RX ADMIN — MORPHINE SULFATE 15 MG: 15 TABLET, FILM COATED, EXTENDED RELEASE ORAL at 17:19

## 2020-09-15 RX ADMIN — ENOXAPARIN SODIUM 30 MG: 30 INJECTION SUBCUTANEOUS at 17:18

## 2020-09-15 RX ADMIN — OXYCODONE HYDROCHLORIDE 5 MG: 5 TABLET ORAL at 02:17

## 2020-09-15 RX ADMIN — IBUPROFEN 800 MG: 800 TABLET, FILM COATED ORAL at 17:18

## 2020-09-15 RX ADMIN — MAGNESIUM HYDROXIDE 30 ML: 400 SUSPENSION ORAL at 04:59

## 2020-09-15 RX ADMIN — DOCUSATE SODIUM 100 MG: 100 CAPSULE ORAL at 04:59

## 2020-09-15 RX ADMIN — GABAPENTIN 100 MG: 100 CAPSULE ORAL at 04:59

## 2020-09-15 RX ADMIN — METAXALONE 800 MG: 800 TABLET ORAL at 11:37

## 2020-09-15 RX ADMIN — OXYCODONE HYDROCHLORIDE 10 MG: 5 TABLET ORAL at 14:30

## 2020-09-15 RX ADMIN — HYDROMORPHONE HYDROCHLORIDE 1 MG: 2 INJECTION, SOLUTION INTRAMUSCULAR; INTRAVENOUS; SUBCUTANEOUS at 23:19

## 2020-09-15 RX ADMIN — ACETAMINOPHEN 1000 MG: 500 TABLET ORAL at 00:07

## 2020-09-15 RX ADMIN — NICOTINE 7 MG: 7 PATCH TRANSDERMAL at 05:00

## 2020-09-15 RX ADMIN — ACETAMINOPHEN 1000 MG: 500 TABLET ORAL at 23:19

## 2020-09-15 RX ADMIN — ALPRAZOLAM 0.5 MG: 0.5 TABLET ORAL at 21:50

## 2020-09-15 RX ADMIN — DOCUSATE SODIUM 50 MG AND SENNOSIDES 8.6 MG 1 TABLET: 8.6; 5 TABLET, FILM COATED ORAL at 20:28

## 2020-09-15 RX ADMIN — ACETAMINOPHEN 1000 MG: 500 TABLET ORAL at 04:59

## 2020-09-15 RX ADMIN — MORPHINE SULFATE 15 MG: 15 TABLET, FILM COATED, EXTENDED RELEASE ORAL at 11:38

## 2020-09-15 RX ADMIN — GABAPENTIN 100 MG: 100 CAPSULE ORAL at 11:38

## 2020-09-15 RX ADMIN — ACETAMINOPHEN 1000 MG: 500 TABLET ORAL at 17:19

## 2020-09-15 RX ADMIN — ACETAMINOPHEN 1000 MG: 500 TABLET ORAL at 11:38

## 2020-09-15 RX ADMIN — METAXALONE 800 MG: 800 TABLET ORAL at 17:19

## 2020-09-15 RX ADMIN — ENOXAPARIN SODIUM 30 MG: 30 INJECTION SUBCUTANEOUS at 05:00

## 2020-09-15 RX ADMIN — HYDROMORPHONE HYDROCHLORIDE 1 MG: 2 INJECTION, SOLUTION INTRAMUSCULAR; INTRAVENOUS; SUBCUTANEOUS at 09:08

## 2020-09-15 RX ADMIN — POLYETHYLENE GLYCOL 3350 1 PACKET: 17 POWDER, FOR SOLUTION ORAL at 04:59

## 2020-09-15 RX ADMIN — HYDROMORPHONE HYDROCHLORIDE 1 MG: 2 INJECTION, SOLUTION INTRAMUSCULAR; INTRAVENOUS; SUBCUTANEOUS at 03:24

## 2020-09-15 RX ADMIN — DOCUSATE SODIUM 100 MG: 100 CAPSULE ORAL at 17:18

## 2020-09-15 RX ADMIN — OXYCODONE HYDROCHLORIDE 5 MG: 5 TABLET ORAL at 21:51

## 2020-09-15 RX ADMIN — METAXALONE 800 MG: 800 TABLET ORAL at 04:59

## 2020-09-15 RX ADMIN — OXYCODONE HYDROCHLORIDE 10 MG: 5 TABLET ORAL at 08:11

## 2020-09-15 RX ADMIN — IBUPROFEN 800 MG: 800 TABLET, FILM COATED ORAL at 11:37

## 2020-09-15 RX ADMIN — GABAPENTIN 100 MG: 100 CAPSULE ORAL at 17:19

## 2020-09-15 ASSESSMENT — ENCOUNTER SYMPTOMS
SPEECH CHANGE: 0
DOUBLE VISION: 0
ROS GI COMMENTS: PTA
FEVER: 0
MYALGIAS: 1
NERVOUS/ANXIOUS: 0
COUGH: 0

## 2020-09-15 ASSESSMENT — FIBROSIS 4 INDEX: FIB4 SCORE: 1.04

## 2020-09-15 ASSESSMENT — LIFESTYLE VARIABLES: SUBSTANCE_ABUSE: 0

## 2020-09-15 NOTE — PROGRESS NOTES
Patient states has pain 12/10. Given PO pain medication and he states is ineffective. Explained that we can give him IV pain medication in between doses. Will continue to monitor.

## 2020-09-15 NOTE — PROGRESS NOTES
Patient given Oxy for c/o pain 110/10 in left rib area. After approximately 1 hour patient states he has had no relief and requesting additional pain medication. Given Dilauadid 1mg IV at this time.

## 2020-09-15 NOTE — CARE PLAN
Problem: Communication  Goal: The ability to communicate needs accurately and effectively will improve  Outcome: PROGRESSING AS EXPECTED     Problem: Safety  Goal: Will remain free from injury  Outcome: PROGRESSING AS EXPECTED  Goal: Will remain free from falls  Outcome: PROGRESSING AS EXPECTED     Problem: Infection  Goal: Will remain free from infection  Outcome: PROGRESSING AS EXPECTED     Problem: Venous Thromboembolism (VTW)/Deep Vein Thrombosis (DVT) Prevention:  Goal: Patient will participate in Venous Thrombosis (VTE)/Deep Vein Thrombosis (DVT)Prevention Measures  Outcome: PROGRESSING AS EXPECTED     Problem: Psychosocial Needs:  Goal: Level of anxiety will decrease  Outcome: PROGRESSING AS EXPECTED     Problem: Fluid Volume:  Goal: Will maintain balanced intake and output  Outcome: PROGRESSING AS EXPECTED     Problem: Respiratory:  Goal: Respiratory status will improve  Outcome: PROGRESSING AS EXPECTED     Problem: Bowel/Gastric:  Goal: Normal bowel function is maintained or improved  Outcome: PROGRESSING AS EXPECTED  Goal: Will not experience complications related to bowel motility  Outcome: PROGRESSING AS EXPECTED     Problem: Skin Integrity  Goal: Risk for impaired skin integrity will decrease  Outcome: PROGRESSING AS EXPECTED     Problem: Mobility  Goal: Risk for activity intolerance will decrease  Outcome: PROGRESSING AS EXPECTED     Problem: Medication  Goal: Compliance with prescribed medication will improve  Outcome: PROGRESSING AS EXPECTED     Problem: Knowledge Deficit  Goal: Knowledge of disease process/condition, treatment plan, diagnostic tests, and medications will improve  Outcome: PROGRESSING AS EXPECTED  Goal: Knowledge of the prescribed therapeutic regimen will improve  Outcome: PROGRESSING AS EXPECTED     Problem: Discharge Barriers/Planning  Goal: Patient's continuum of care needs will be met  Outcome: PROGRESSING AS EXPECTED     Problem: Pain Management  Goal: Pain level will decrease  to patient's comfort goal  Outcome: PROGRESSING AS EXPECTED

## 2020-09-15 NOTE — PROGRESS NOTES
Trauma / Surgical Daily Progress Note    Date of Service  9/15/2020    Chief Complaint  55 y.o. male admitted 9/11/2020 with Trauma    Interval Events  No acute events overnight  No BM since admit  Placed on MS contin for better overall pain control  Anxiety is improved     -Advance bowel protocol  -Chest tube to water seal  -Ambulate  -Has used home oxygen in the past       Review of Systems  Review of Systems   Constitutional: Negative for fever.   HENT: Negative for hearing loss.    Eyes: Negative for double vision.   Respiratory: Negative for cough.    Cardiovascular: Negative for chest pain.   Gastrointestinal:        PTA   Genitourinary:        Voiding   Musculoskeletal: Positive for myalgias.   Skin: Negative for rash.   Neurological: Negative for speech change.   Psychiatric/Behavioral: Negative for substance abuse. The patient is not nervous/anxious.         Vital Signs  Temp:  [36.2 °C (97.2 °F)-37.1 °C (98.8 °F)] 36.6 °C (97.9 °F)  Pulse:  [] 84  Resp:  [18-22] 18  BP: (108-153)/(59-86) 108/59  SpO2:  [92 %-99 %] 95 %    Physical Exam  Physical Exam  HENT:      Head:      Comments: Facial edema with sub cutaneous emphysema improved     Mouth/Throat:      Mouth: Mucous membranes are moist.   Eyes:      Comments: Left eyelid swelling resolving    Neck:      Musculoskeletal: Normal range of motion.   Cardiovascular:      Rate and Rhythm: Normal rate.   Pulmonary:      Effort: Pulmonary effort is normal.      Comments: Chest tube with no air leak noted  Supplemental oxygen   Chest:      Chest wall: Tenderness present.   Abdominal:      General: There is distension.      Comments: obese   Musculoskeletal: Normal range of motion.      Comments: Moves all extremities    Skin:     General: Skin is warm and dry.      Capillary Refill: Capillary refill takes 2 to 3 seconds.   Neurological:      General: No focal deficit present.      Mental Status: He is alert and oriented to person, place, and time.    Psychiatric:         Mood and Affect: Mood is anxious.         Behavior: Behavior normal.         Laboratory  Recent Results (from the past 24 hour(s))   Comp Metabolic Panel (CMP): Tomorrow AM    Collection Time: 09/15/20  4:00 AM   Result Value Ref Range    Sodium 133 (L) 135 - 145 mmol/L    Potassium 3.6 3.6 - 5.5 mmol/L    Chloride 96 96 - 112 mmol/L    Co2 27 20 - 33 mmol/L    Anion Gap 10.0 7.0 - 16.0    Glucose 107 (H) 65 - 99 mg/dL    Bun 10 8 - 22 mg/dL    Creatinine 0.40 (L) 0.50 - 1.40 mg/dL    Calcium 8.8 8.5 - 10.5 mg/dL    AST(SGOT) 13 12 - 45 U/L    ALT(SGPT) 14 2 - 50 U/L    Alkaline Phosphatase 42 30 - 99 U/L    Total Bilirubin 0.5 0.1 - 1.5 mg/dL    Albumin 3.4 3.2 - 4.9 g/dL    Total Protein 5.8 (L) 6.0 - 8.2 g/dL    Globulin 2.4 1.9 - 3.5 g/dL    A-G Ratio 1.4 g/dL   CBC with Differential: Tomorrow AM    Collection Time: 09/15/20  4:00 AM   Result Value Ref Range    WBC 7.2 4.8 - 10.8 K/uL    RBC 4.26 (L) 4.70 - 6.10 M/uL    Hemoglobin 15.0 14.0 - 18.0 g/dL    Hematocrit 43.1 42.0 - 52.0 %    .2 (H) 81.4 - 97.8 fL    MCH 35.2 (H) 27.0 - 33.0 pg    MCHC 34.8 33.7 - 35.3 g/dL    RDW 45.3 35.9 - 50.0 fL    Platelet Count 268 164 - 446 K/uL    MPV 8.9 (L) 9.0 - 12.9 fL    Neutrophils-Polys 71.40 44.00 - 72.00 %    Lymphocytes 13.60 (L) 22.00 - 41.00 %    Monocytes 7.80 0.00 - 13.40 %    Eosinophils 6.00 0.00 - 6.90 %    Basophils 0.40 0.00 - 1.80 %    Immature Granulocytes 0.80 0.00 - 0.90 %    Nucleated RBC 0.00 /100 WBC    Neutrophils (Absolute) 5.15 1.82 - 7.42 K/uL    Lymphs (Absolute) 0.98 (L) 1.00 - 4.80 K/uL    Monos (Absolute) 0.56 0.00 - 0.85 K/uL    Eos (Absolute) 0.43 0.00 - 0.51 K/uL    Baso (Absolute) 0.03 0.00 - 0.12 K/uL    Immature Granulocytes (abs) 0.06 0.00 - 0.11 K/uL    NRBC (Absolute) 0.00 K/uL   ESTIMATED GFR    Collection Time: 09/15/20  4:00 AM   Result Value Ref Range    GFR If African American >60 >60 mL/min/1.73 m 2    GFR If Non  >60 >60  mL/min/1.73 m 2       Fluids    Intake/Output Summary (Last 24 hours) at 9/15/2020 1153  Last data filed at 9/15/2020 1100  Gross per 24 hour   Intake 1400 ml   Output 2110 ml   Net -710 ml       Core Measures & Quality Metrics  Labs reviewed, Medications reviewed and Radiology images reviewed  Holguin catheter: No Holguin      DVT Prophylaxis: Enoxaparin (Lovenox)    Ulcer prophylaxis: Not indicated    Assessed for rehab: Patient returned to prior level of function, rehabilitation not indicated at this time    RAP Score Total: 6    ETOH Screening  CAGE Score: 0  Assessment complete date: 9/12/2020        Assessment/Plan  Flail chest- (present on admission)  Assessment & Plan  Right-sided rib fractures, some of which are displaced and segmental involving the right 2nd through 9th rib levels which represents a flail chest.  Aggressive multimodal pain management, and pulmonary hygiene.   Serial chest radiographs.    Traumatic pneumohemothorax- (present on admission)  Assessment & Plan  Biapical pneumothoraces.  Right chest tube placement.  9/15 Chest tube to water seal/No air leak/750ml in Pleural Vac/150ml in 24 hours  Supplemental oxygen to maintain O2 saturations greater than 95%  Aggressive pulmonary hygiene. Serial chest radiographs.    Abnormal EKG- (present on admission)  Assessment & Plan  Admission EKG:  Sinus tachycardia.  Consider inferior infarct.  Anterior infarct, age indeterminate.    Subcutaneous emphysema (HCC)- (present on admission)  Assessment & Plan  Extensive subcutaneous air throughout the entire lower neck, chest, abdomen, and upper pelvis. Extensive soft tissue subcutaneous air bilaterally with associated retropharyngeal and prevertebral air. Diffuse soft tissue gas as described above which extends into the left orbit where there is mild left proptosis.    Pneumomediastinum (HCC)- (present on admission)  Assessment & Plan  Superior pneumomediastinum.    Screening examination for infectious  disease- (present on admission)  Assessment & Plan  Admission SARS-CoV-2 testing negative. LOW RISK patient. Repeat SARS-CoV-2 testing not indicated. Isolation precautions de-escalated.    No contraindication to deep vein thrombosis (DVT) prophylaxis- (present on admission)  Assessment & Plan  Systemic anticoagulation contraindicated secondary to elevated bleeding risk.  Chemical DVT prophylaxis (Lovenox) initiated  Ambulate TID.      Trauma- (present on admission)  Assessment & Plan  Unclear to circumstances surround injury. Fell out of bed two days prior to presentation.   Non Trauma Activation.  Ambrose Salazar MD. Trauma Surgery.    Kidney lesion- (present on admission)  Assessment & Plan  Complex hypodense left superior pole kidney mass with possible posterior enhancement. Renal malignancy is possible.  Recommend outpatient follow up with renal ultrasound and urology.         Discussed patient condition with RN, Patient and trauma surgery   Dr. Bower.  Patient seen, data reviewed and discussed.  Agree with assessment and plan.   The APN and I have collaborated in the patient assessment chart documentation and care plan. The clinical decision making , diagnoses and management are mine and the APN has assisted in the creation of the clinical document . The [facility] has no independent billing for this patient.      Resuming PO and home meds   I have personally evaluated this patient at the bedside and performed a global high level assessment to allow clinical decision making regarding the patient's risk tolerance for transfer to a lower level of care in this in-house risk environment. This decision and takes into account the patient's current active clinical problems and  Comorbidities. This includes:  Complete neurologic assessment  Assessment of respiratory function considering the need his ongoing therapies and the patient's tolerance'  Cardiovascular status  Coordination of care needs

## 2020-09-15 NOTE — DISCHARGE PLANNING
Anticipated Discharge Disposition: TBD    Action: Pt discussed in IDT rounds. No case management/dc planning needs at this time. Spoke with bedside RN who reports pt is much more relaxed today & has no concerns regarding the safety of his step father.      Barriers to Discharge: medical clearance, unknown dc needs    Plan: Continue to follow up and assist with social/dc needs

## 2020-09-16 ENCOUNTER — APPOINTMENT (OUTPATIENT)
Dept: RADIOLOGY | Facility: MEDICAL CENTER | Age: 55
DRG: 183 | End: 2020-09-16
Attending: SURGERY
Payer: MEDICAID

## 2020-09-16 LAB
BASOPHILS # BLD AUTO: 0.6 % (ref 0–1.8)
BASOPHILS # BLD: 0.05 K/UL (ref 0–0.12)
EOSINOPHIL # BLD AUTO: 0.43 K/UL (ref 0–0.51)
EOSINOPHIL NFR BLD: 5.1 % (ref 0–6.9)
ERYTHROCYTE [DISTWIDTH] IN BLOOD BY AUTOMATED COUNT: 45.8 FL (ref 35.9–50)
HCT VFR BLD AUTO: 44.2 % (ref 42–52)
HGB BLD-MCNC: 15.4 G/DL (ref 14–18)
IMM GRANULOCYTES # BLD AUTO: 0.09 K/UL (ref 0–0.11)
IMM GRANULOCYTES NFR BLD AUTO: 1.1 % (ref 0–0.9)
LYMPHOCYTES # BLD AUTO: 1.11 K/UL (ref 1–4.8)
LYMPHOCYTES NFR BLD: 13.2 % (ref 22–41)
MCH RBC QN AUTO: 35.2 PG (ref 27–33)
MCHC RBC AUTO-ENTMCNC: 34.8 G/DL (ref 33.7–35.3)
MCV RBC AUTO: 101.1 FL (ref 81.4–97.8)
MONOCYTES # BLD AUTO: 0.76 K/UL (ref 0–0.85)
MONOCYTES NFR BLD AUTO: 9.1 % (ref 0–13.4)
NEUTROPHILS # BLD AUTO: 5.94 K/UL (ref 1.82–7.42)
NEUTROPHILS NFR BLD: 70.9 % (ref 44–72)
NRBC # BLD AUTO: 0 K/UL
NRBC BLD-RTO: 0 /100 WBC
PLATELET # BLD AUTO: 281 K/UL (ref 164–446)
PMV BLD AUTO: 8.8 FL (ref 9–12.9)
RBC # BLD AUTO: 4.37 M/UL (ref 4.7–6.1)
WBC # BLD AUTO: 8.4 K/UL (ref 4.8–10.8)

## 2020-09-16 PROCEDURE — 700111 HCHG RX REV CODE 636 W/ 250 OVERRIDE (IP): Performed by: NURSE PRACTITIONER

## 2020-09-16 PROCEDURE — A9270 NON-COVERED ITEM OR SERVICE: HCPCS | Performed by: NURSE PRACTITIONER

## 2020-09-16 PROCEDURE — 700101 HCHG RX REV CODE 250: Performed by: SURGERY

## 2020-09-16 PROCEDURE — 700102 HCHG RX REV CODE 250 W/ 637 OVERRIDE(OP): Performed by: NURSE PRACTITIONER

## 2020-09-16 PROCEDURE — 85025 COMPLETE CBC W/AUTO DIFF WBC: CPT

## 2020-09-16 PROCEDURE — 700102 HCHG RX REV CODE 250 W/ 637 OVERRIDE(OP): Performed by: SURGERY

## 2020-09-16 PROCEDURE — 71045 X-RAY EXAM CHEST 1 VIEW: CPT

## 2020-09-16 PROCEDURE — 99233 SBSQ HOSP IP/OBS HIGH 50: CPT | Performed by: SURGERY

## 2020-09-16 PROCEDURE — A9270 NON-COVERED ITEM OR SERVICE: HCPCS | Performed by: SURGERY

## 2020-09-16 PROCEDURE — 770001 HCHG ROOM/CARE - MED/SURG/GYN PRIV*

## 2020-09-16 RX ORDER — CLONIDINE HYDROCHLORIDE 0.1 MG/1
0.2 TABLET ORAL TWICE DAILY
Status: DISCONTINUED | OUTPATIENT
Start: 2020-09-16 | End: 2020-09-19 | Stop reason: HOSPADM

## 2020-09-16 RX ORDER — HYDROMORPHONE HYDROCHLORIDE 2 MG/ML
.5-1 INJECTION, SOLUTION INTRAMUSCULAR; INTRAVENOUS; SUBCUTANEOUS
Status: DISCONTINUED | OUTPATIENT
Start: 2020-09-16 | End: 2020-09-18

## 2020-09-16 RX ORDER — MORPHINE SULFATE 15 MG/1
30 TABLET, FILM COATED, EXTENDED RELEASE ORAL EVERY 12 HOURS
Status: DISCONTINUED | OUTPATIENT
Start: 2020-09-16 | End: 2020-09-17

## 2020-09-16 RX ADMIN — OXYCODONE HYDROCHLORIDE 10 MG: 5 TABLET ORAL at 13:49

## 2020-09-16 RX ADMIN — ALPRAZOLAM 0.5 MG: 0.5 TABLET ORAL at 19:53

## 2020-09-16 RX ADMIN — DOCUSATE SODIUM 100 MG: 100 CAPSULE ORAL at 05:15

## 2020-09-16 RX ADMIN — ENOXAPARIN SODIUM 30 MG: 30 INJECTION SUBCUTANEOUS at 17:23

## 2020-09-16 RX ADMIN — GABAPENTIN 100 MG: 100 CAPSULE ORAL at 05:15

## 2020-09-16 RX ADMIN — METAXALONE 800 MG: 800 TABLET ORAL at 17:22

## 2020-09-16 RX ADMIN — NICOTINE 7 MG: 7 PATCH TRANSDERMAL at 05:14

## 2020-09-16 RX ADMIN — MAGNESIUM HYDROXIDE 30 ML: 400 SUSPENSION ORAL at 05:15

## 2020-09-16 RX ADMIN — OXYCODONE HYDROCHLORIDE 10 MG: 5 TABLET ORAL at 08:57

## 2020-09-16 RX ADMIN — OXYCODONE HYDROCHLORIDE 10 MG: 5 TABLET ORAL at 22:36

## 2020-09-16 RX ADMIN — IBUPROFEN 800 MG: 800 TABLET, FILM COATED ORAL at 05:15

## 2020-09-16 RX ADMIN — CLONIDINE HYDROCHLORIDE 0.2 MG: 0.1 TABLET ORAL at 22:36

## 2020-09-16 RX ADMIN — GABAPENTIN 100 MG: 100 CAPSULE ORAL at 17:23

## 2020-09-16 RX ADMIN — ENOXAPARIN SODIUM 30 MG: 30 INJECTION SUBCUTANEOUS at 05:15

## 2020-09-16 RX ADMIN — MORPHINE SULFATE 30 MG: 15 TABLET, FILM COATED, EXTENDED RELEASE ORAL at 17:23

## 2020-09-16 RX ADMIN — DOCUSATE SODIUM 100 MG: 100 CAPSULE ORAL at 17:23

## 2020-09-16 RX ADMIN — MORPHINE SULFATE 15 MG: 15 TABLET, FILM COATED, EXTENDED RELEASE ORAL at 05:15

## 2020-09-16 RX ADMIN — ACETAMINOPHEN 1000 MG: 500 TABLET ORAL at 05:15

## 2020-09-16 RX ADMIN — METAXALONE 800 MG: 800 TABLET ORAL at 05:15

## 2020-09-16 RX ADMIN — HYDROMORPHONE HYDROCHLORIDE 1 MG: 2 INJECTION, SOLUTION INTRAMUSCULAR; INTRAVENOUS; SUBCUTANEOUS at 19:53

## 2020-09-16 RX ADMIN — DOCUSATE SODIUM 50 MG AND SENNOSIDES 8.6 MG 1 TABLET: 8.6; 5 TABLET, FILM COATED ORAL at 19:53

## 2020-09-16 RX ADMIN — POLYETHYLENE GLYCOL 3350 1 PACKET: 17 POWDER, FOR SOLUTION ORAL at 05:14

## 2020-09-16 RX ADMIN — METAXALONE 800 MG: 800 TABLET ORAL at 11:28

## 2020-09-16 RX ADMIN — GABAPENTIN 100 MG: 100 CAPSULE ORAL at 11:28

## 2020-09-16 ASSESSMENT — ENCOUNTER SYMPTOMS
MYALGIAS: 1
COUGH: 0
SENSORY CHANGE: 0
FEVER: 0
SHORTNESS OF BREATH: 1
CHILLS: 0
NAUSEA: 0
PALPITATIONS: 0
DOUBLE VISION: 0
VOMITING: 0
ABDOMINAL PAIN: 0

## 2020-09-16 NOTE — PROGRESS NOTES
Trauma / Surgical Daily Progress Note    Date of Service  9/16/2020    Chief Complaint  55 y.o. male admitted 9/11/2020 with Trauma    Interval Events  Ongoing complaints of pain  Chest x-ray with stable small apical pneumothorax  Oxygen demands stable    - Increase MS Contin  - Continue chest tube to water seal  - Continue aggressive pulmonary hygiene  - Mobilize  - Transfer to [facility]     Review of Systems  Review of Systems   Constitutional: Negative for chills and fever.   Eyes: Negative for double vision.   Respiratory: Positive for shortness of breath. Negative for cough.    Cardiovascular: Negative for palpitations.   Gastrointestinal: Negative for abdominal pain, nausea and vomiting.   Genitourinary:        Voiding   Musculoskeletal: Positive for myalgias (right chest wall). Negative for joint pain.   Neurological: Negative for sensory change.        Vital Signs  Temp:  [36.5 °C (97.7 °F)-36.6 °C (97.8 °F)] 36.5 °C (97.7 °F)  Pulse:  [61-96] 76  Resp:  [18] 18  BP: (108-171)/(59-89) 145/87  SpO2:  [82 %-98 %] 97 %    Physical Exam  Physical Exam  Vitals signs and nursing note reviewed.   Constitutional:       General: He is not in acute distress.     Appearance: He is not toxic-appearing.      Interventions: Nasal cannula in place.   HENT:      Head: Normocephalic.      Nose: Nose normal.      Mouth/Throat:      Mouth: Mucous membranes are moist.   Eyes:      Extraocular Movements: Extraocular movements intact.      Conjunctiva/sclera: Conjunctivae normal.   Neck:      Musculoskeletal: Neck supple.   Cardiovascular:      Rate and Rhythm: Normal rate and regular rhythm.      Pulses: Normal pulses.   Pulmonary:      Effort: Pulmonary effort is normal. No respiratory distress.      Breath sounds: Examination of the right-lower field reveals decreased breath sounds. Examination of the left-lower field reveals decreased breath sounds. Decreased breath sounds present.      Comments: Crepitus noted palpable throughout  chest wall  Right chest tube in place with serosanguineous drainage, no air leak   IS 1250  Chest:      Chest wall: Tenderness present.   Abdominal:      Palpations: Abdomen is soft.      Tenderness: There is no abdominal tenderness.   Musculoskeletal:         General: No tenderness.      Comments: Moves all extremities   Skin:     General: Skin is warm and dry.      Capillary Refill: Capillary refill takes less than 2 seconds.   Neurological:      Mental Status: He is alert and oriented to person, place, and time.   Psychiatric:         Behavior: Behavior normal.         Laboratory  Recent Results (from the past 24 hour(s))   CBC with Differential: Tomorrow AM    Collection Time: 09/16/20  4:15 AM   Result Value Ref Range    WBC 8.4 4.8 - 10.8 K/uL    RBC 4.37 (L) 4.70 - 6.10 M/uL    Hemoglobin 15.4 14.0 - 18.0 g/dL    Hematocrit 44.2 42.0 - 52.0 %    .1 (H) 81.4 - 97.8 fL    MCH 35.2 (H) 27.0 - 33.0 pg    MCHC 34.8 33.7 - 35.3 g/dL    RDW 45.8 35.9 - 50.0 fL    Platelet Count 281 164 - 446 K/uL    MPV 8.8 (L) 9.0 - 12.9 fL    Neutrophils-Polys 70.90 44.00 - 72.00 %    Lymphocytes 13.20 (L) 22.00 - 41.00 %    Monocytes 9.10 0.00 - 13.40 %    Eosinophils 5.10 0.00 - 6.90 %    Basophils 0.60 0.00 - 1.80 %    Immature Granulocytes 1.10 (H) 0.00 - 0.90 %    Nucleated RBC 0.00 /100 WBC    Neutrophils (Absolute) 5.94 1.82 - 7.42 K/uL    Lymphs (Absolute) 1.11 1.00 - 4.80 K/uL    Monos (Absolute) 0.76 0.00 - 0.85 K/uL    Eos (Absolute) 0.43 0.00 - 0.51 K/uL    Baso (Absolute) 0.05 0.00 - 0.12 K/uL    Immature Granulocytes (abs) 0.09 0.00 - 0.11 K/uL    NRBC (Absolute) 0.00 K/uL       Fluids    Intake/Output Summary (Last 24 hours) at 9/16/2020 0849  Last data filed at 9/16/2020 0800  Gross per 24 hour   Intake 1200 ml   Output 1250 ml   Net -50 ml       Core Measures & Quality Metrics  Labs reviewed, Medications reviewed and Radiology images reviewed  Holguin catheter: No Holguin      DVT Prophylaxis: Enoxaparin  (Lovenox)  DVT prophylaxis - mechanical: SCDs  Ulcer prophylaxis: Not indicated        RAP Score Total: 6    ETOH Screening  CAGE Score: 0  Assessment complete date: 9/12/2020        Assessment/Plan  Flail chest- (present on admission)  Assessment & Plan  Right-sided rib fractures, some of which are displaced and segmental involving the right 2nd through 9th rib levels which represents a flail chest.  Aggressive multimodal pain management, and pulmonary hygiene.   Serial chest radiographs.     Traumatic pneumohemothorax- (present on admission)  Assessment & Plan  Biapical pneumothoraces.  Right chest tube placement.  9/16 Chest x-ray with small apical pneumothorax  - PleuraVac total 850ml / 100ml 24 hour total output  Aggressive pulmonary hygiene. Serial chest radiographs.     Abnormal EKG- (present on admission)  Assessment & Plan  Admission EKG:  Sinus tachycardia.  Consider inferior infarct.  Anterior infarct, age indeterminate.    Subcutaneous emphysema (HCC)- (present on admission)  Assessment & Plan  Extensive subcutaneous air throughout the entire lower neck, chest, abdomen, and upper pelvis. Extensive soft tissue subcutaneous air bilaterally with associated retropharyngeal and prevertebral air. Diffuse soft tissue gas as described above which extends into the left orbit where there is mild left proptosis.    Pneumomediastinum (HCC)- (present on admission)  Assessment & Plan  Superior pneumomediastinum.    Screening examination for infectious disease- (present on admission)  Assessment & Plan  Admission SARS-CoV-2 testing negative. LOW RISK patient. Repeat SARS-CoV-2 testing not indicated. Isolation precautions de-escalated.    No contraindication to deep vein thrombosis (DVT) prophylaxis- (present on admission)  Assessment & Plan  Systemic anticoagulation contraindicated secondary to elevated bleeding risk.  9/12 Chemical DVT prophylaxis (Lovenox) initiated  Ambulate TID.      Trauma- (present on  admission)  Assessment & Plan  Unclear to circumstances surround injury. Fell out of bed two days prior to presentation.   Non Trauma Activation.  Ambrose Salazar MD. Trauma Surgery.    Kidney lesion- (present on admission)  Assessment & Plan  Complex hypodense left superior pole kidney mass with possible posterior enhancement. Renal malignancy is possible.  Recommend outpatient follow up with renal ultrasound and urology.         Discussed patient condition with RN, Patient and trauma surgery, Dr. Bower.  Patient seen, data reviewed and discussed.  Agree with assessment and plan.   The APN and I have collaborated in the patient assessment chart documentation and care plan. The clinical decision making , diagnoses and management are mine and the APN has assisted in the creation of the clinical document . The APN has no independent billing for this patient.      I have personally evaluated this patient at the bedside and performed a global high level assessment to allow clinical decision making regarding the patient's risk tolerance for transfer to a lower level of care in this in-house risk environment. This decision and takes into account the patient's current active clinical problems and  Comorbidities. This includes:  Complete neurologic assessment  Assessment of respiratory function considering the need his ongoing therapies and the patient's tolerance'  Cardiovascular status  Coordination of care needs  30 minutes    ALLIE Bower MD

## 2020-09-17 ENCOUNTER — APPOINTMENT (OUTPATIENT)
Dept: RADIOLOGY | Facility: MEDICAL CENTER | Age: 55
DRG: 183 | End: 2020-09-17
Attending: SURGERY
Payer: MEDICAID

## 2020-09-17 LAB
BASOPHILS # BLD AUTO: 0.5 % (ref 0–1.8)
BASOPHILS # BLD: 0.05 K/UL (ref 0–0.12)
EOSINOPHIL # BLD AUTO: 0.38 K/UL (ref 0–0.51)
EOSINOPHIL NFR BLD: 3.9 % (ref 0–6.9)
ERYTHROCYTE [DISTWIDTH] IN BLOOD BY AUTOMATED COUNT: 45.6 FL (ref 35.9–50)
HCT VFR BLD AUTO: 43.9 % (ref 42–52)
HGB BLD-MCNC: 15 G/DL (ref 14–18)
IMM GRANULOCYTES # BLD AUTO: 0.13 K/UL (ref 0–0.11)
IMM GRANULOCYTES NFR BLD AUTO: 1.3 % (ref 0–0.9)
LYMPHOCYTES # BLD AUTO: 1.14 K/UL (ref 1–4.8)
LYMPHOCYTES NFR BLD: 11.6 % (ref 22–41)
MCH RBC QN AUTO: 34.7 PG (ref 27–33)
MCHC RBC AUTO-ENTMCNC: 34.2 G/DL (ref 33.7–35.3)
MCV RBC AUTO: 101.6 FL (ref 81.4–97.8)
MONOCYTES # BLD AUTO: 1.07 K/UL (ref 0–0.85)
MONOCYTES NFR BLD AUTO: 10.9 % (ref 0–13.4)
NEUTROPHILS # BLD AUTO: 7.08 K/UL (ref 1.82–7.42)
NEUTROPHILS NFR BLD: 71.8 % (ref 44–72)
NRBC # BLD AUTO: 0 K/UL
NRBC BLD-RTO: 0 /100 WBC
PLATELET # BLD AUTO: 341 K/UL (ref 164–446)
PMV BLD AUTO: 9 FL (ref 9–12.9)
RBC # BLD AUTO: 4.32 M/UL (ref 4.7–6.1)
WBC # BLD AUTO: 9.9 K/UL (ref 4.8–10.8)

## 2020-09-17 PROCEDURE — 85025 COMPLETE CBC W/AUTO DIFF WBC: CPT

## 2020-09-17 PROCEDURE — 700102 HCHG RX REV CODE 250 W/ 637 OVERRIDE(OP): Performed by: NURSE PRACTITIONER

## 2020-09-17 PROCEDURE — 770006 HCHG ROOM/CARE - MED/SURG/GYN SEMI*

## 2020-09-17 PROCEDURE — A9270 NON-COVERED ITEM OR SERVICE: HCPCS | Performed by: NURSE PRACTITIONER

## 2020-09-17 PROCEDURE — 700101 HCHG RX REV CODE 250: Performed by: SURGERY

## 2020-09-17 PROCEDURE — 99233 SBSQ HOSP IP/OBS HIGH 50: CPT | Performed by: SURGERY

## 2020-09-17 PROCEDURE — 71045 X-RAY EXAM CHEST 1 VIEW: CPT

## 2020-09-17 PROCEDURE — 700102 HCHG RX REV CODE 250 W/ 637 OVERRIDE(OP): Performed by: SURGERY

## 2020-09-17 PROCEDURE — 700111 HCHG RX REV CODE 636 W/ 250 OVERRIDE (IP): Performed by: NURSE PRACTITIONER

## 2020-09-17 PROCEDURE — A9270 NON-COVERED ITEM OR SERVICE: HCPCS | Performed by: SURGERY

## 2020-09-17 RX ORDER — MORPHINE SULFATE 15 MG/1
30 TABLET, FILM COATED, EXTENDED RELEASE ORAL EVERY 8 HOURS
Status: DISCONTINUED | OUTPATIENT
Start: 2020-09-17 | End: 2020-09-19

## 2020-09-17 RX ORDER — DIAZEPAM 2 MG/1
2 TABLET ORAL EVERY 6 HOURS PRN
Status: DISCONTINUED | OUTPATIENT
Start: 2020-09-17 | End: 2020-09-18

## 2020-09-17 RX ADMIN — DIAZEPAM 2 MG: 2 TABLET ORAL at 12:39

## 2020-09-17 RX ADMIN — NICOTINE 7 MG: 7 PATCH TRANSDERMAL at 05:40

## 2020-09-17 RX ADMIN — POLYETHYLENE GLYCOL 3350 1 PACKET: 17 POWDER, FOR SOLUTION ORAL at 05:40

## 2020-09-17 RX ADMIN — GABAPENTIN 100 MG: 100 CAPSULE ORAL at 05:49

## 2020-09-17 RX ADMIN — MORPHINE SULFATE 30 MG: 15 TABLET, FILM COATED, EXTENDED RELEASE ORAL at 13:53

## 2020-09-17 RX ADMIN — GABAPENTIN 100 MG: 100 CAPSULE ORAL at 17:14

## 2020-09-17 RX ADMIN — DOCUSATE SODIUM 100 MG: 100 CAPSULE ORAL at 17:14

## 2020-09-17 RX ADMIN — OXYCODONE HYDROCHLORIDE 10 MG: 5 TABLET ORAL at 09:20

## 2020-09-17 RX ADMIN — MORPHINE SULFATE 30 MG: 15 TABLET, FILM COATED, EXTENDED RELEASE ORAL at 05:41

## 2020-09-17 RX ADMIN — DOCUSATE SODIUM 100 MG: 100 CAPSULE ORAL at 05:41

## 2020-09-17 RX ADMIN — DOCUSATE SODIUM 50 MG AND SENNOSIDES 8.6 MG 1 TABLET: 8.6; 5 TABLET, FILM COATED ORAL at 21:53

## 2020-09-17 RX ADMIN — ENOXAPARIN SODIUM 30 MG: 30 INJECTION SUBCUTANEOUS at 17:13

## 2020-09-17 RX ADMIN — MAGNESIUM CITRATE 148 ML: 1.75 LIQUID ORAL at 13:54

## 2020-09-17 RX ADMIN — CLONIDINE HYDROCHLORIDE 0.2 MG: 0.1 TABLET ORAL at 05:40

## 2020-09-17 RX ADMIN — CLONIDINE HYDROCHLORIDE 0.2 MG: 0.1 TABLET ORAL at 17:14

## 2020-09-17 RX ADMIN — HYDROMORPHONE HYDROCHLORIDE 1 MG: 2 INJECTION, SOLUTION INTRAMUSCULAR; INTRAVENOUS; SUBCUTANEOUS at 05:49

## 2020-09-17 RX ADMIN — METAXALONE 800 MG: 800 TABLET ORAL at 05:40

## 2020-09-17 RX ADMIN — MORPHINE SULFATE 30 MG: 15 TABLET, FILM COATED, EXTENDED RELEASE ORAL at 21:53

## 2020-09-17 RX ADMIN — GABAPENTIN 100 MG: 100 CAPSULE ORAL at 12:40

## 2020-09-17 RX ADMIN — MAGNESIUM HYDROXIDE 30 ML: 400 SUSPENSION ORAL at 05:40

## 2020-09-17 RX ADMIN — ENOXAPARIN SODIUM 30 MG: 30 INJECTION SUBCUTANEOUS at 05:41

## 2020-09-17 RX ADMIN — OXYCODONE HYDROCHLORIDE 10 MG: 5 TABLET ORAL at 15:30

## 2020-09-17 ASSESSMENT — ENCOUNTER SYMPTOMS
MYALGIAS: 1
DOUBLE VISION: 0
NAUSEA: 0
VOMITING: 0
CONSTIPATION: 1
COUGH: 0
ABDOMINAL PAIN: 0
CHILLS: 0
SENSORY CHANGE: 0
PALPITATIONS: 0
FEVER: 0
SHORTNESS OF BREATH: 1

## 2020-09-17 NOTE — PROGRESS NOTES
2 RN skin check complete    -subcutaneous air present in neck, chest, left arm and hand  -scattered abrasions to bilateral arms and hands  -elbows red and blanching, mepilex applied  -sacrum intact  -right chest tube dressing C/D/I, to water seal, no leaks present

## 2020-09-17 NOTE — PROGRESS NOTES
"Patient states pain is 10/10 and is requesting IV Dilaudid. Patient states when he got out of bed earlier today heard and felt a \"pop\"  To right side of chest wall when he moved. Remains on O2 NC at 1L with O2 sat's greater than 95%. Is able to sit up and reposition self with minimal assistance. VSS at this time. Good aeration to all lung fields. CT remains to water seal with minimal amount of serous drainage in chest tube. Also requests to speak with  regarding home services.   "

## 2020-09-17 NOTE — PROGRESS NOTES
"Pt arrived to floor from [facility]. Pt A+Ox4, able to make needs known, PIV to right forearm Patent and SL. Pain 9/10 to right sided rib fx, PRN pain medication given.  IS education provided, Oxygen via NC at 2 liter for comfort in place. Declined ice pack at this time. CSMT's and NADJA's WNL, Dressing to chest tube entrance CDISCD\"s inplace.  Educated pt on call light and TV remote and Incentive Spirometer.   Skin:  scattered abrasions to bilateral arms and hands  elbows red and blanching, mepilex  sacrum intact  right chest tube dressing C/D/I.      "

## 2020-09-17 NOTE — CARE PLAN
Problem: Communication  Goal: The ability to communicate needs accurately and effectively will improve  Outcome: PROGRESSING AS EXPECTED     Problem: Venous Thromboembolism (VTW)/Deep Vein Thrombosis (DVT) Prevention:  Goal: Patient will participate in Venous Thrombosis (VTE)/Deep Vein Thrombosis (DVT)Prevention Measures  Outcome: PROGRESSING AS EXPECTED     Problem: Respiratory:  Goal: Respiratory status will improve  Outcome: PROGRESSING AS EXPECTED

## 2020-09-17 NOTE — PROGRESS NOTES
Interval removal of chest tube  Patient tolerated procedure well  Follow up chest x-ray ordered   no

## 2020-09-17 NOTE — PROGRESS NOTES
Trauma / Surgical Daily Progress Note    Date of Service  9/17/2020    Chief Complaint  55 y.o. male admitted 9/11/2020 with blunt chest trauma after a fall    Interval Events  Ongoing complaints of pain  Chest tube with minimal output, no air leak  Constipation     - Plan chest tube removal  - Continue aggressive pulmonary hygiene  - MS Contin increased to TID  - Valium for muscle spasm, stop Skelaxin  - Mag citrate today   - Transfer to [facility]      Review of Systems  Review of Systems   Constitutional: Negative for chills and fever.   Eyes: Negative for double vision.   Respiratory: Positive for shortness of breath. Negative for cough.    Cardiovascular: Negative for palpitations.   Gastrointestinal: Positive for constipation. Negative for abdominal pain, nausea and vomiting.   Genitourinary:        Voiding    Musculoskeletal: Positive for myalgias (right chest wall). Negative for joint pain.   Neurological: Negative for sensory change.        Vital Signs  Temp:  [36.4 °C (97.6 °F)-36.9 °C (98.4 °F)] 36.4 °C (97.6 °F)  Pulse:  [64-96] 80  Resp:  [18] 18  BP: (116-191)/(69-99) 116/78  SpO2:  [91 %-97 %] 95 %    Physical Exam  Physical Exam  Vitals signs and nursing note reviewed.   Constitutional:       General: He is not in acute distress.     Appearance: He is not toxic-appearing.      Interventions: Nasal cannula in place.   HENT:      Head: Normocephalic.      Nose: Nose normal.      Mouth/Throat:      Mouth: Mucous membranes are moist.   Eyes:      Extraocular Movements: Extraocular movements intact.      Conjunctiva/sclera: Conjunctivae normal.   Neck:      Musculoskeletal: Neck supple.   Cardiovascular:      Rate and Rhythm: Normal rate and regular rhythm.      Pulses: Normal pulses.   Pulmonary:      Effort: Pulmonary effort is normal. No respiratory distress.      Breath sounds: Examination of the right-lower field reveals decreased breath sounds. Examination of the left-lower field reveals decreased breath  sounds. Decreased breath sounds present.      Comments: Crepitus noted palpable throughout chest wall  Right chest tube in place with serosanguineous drainage, no air leak    IS 1250   Chest:      Chest wall: Tenderness present.   Abdominal:      General: Bowel sounds are normal.      Palpations: Abdomen is soft.      Tenderness: There is no abdominal tenderness.   Musculoskeletal:         General: No tenderness.      Comments: Moves all extremities    Skin:     General: Skin is warm and dry.      Capillary Refill: Capillary refill takes less than 2 seconds.   Neurological:      Mental Status: He is alert and oriented to person, place, and time.   Psychiatric:         Behavior: Behavior normal.         Laboratory  Recent Results (from the past 24 hour(s))   CBC with Differential: Tomorrow AM    Collection Time: 09/17/20  3:30 AM   Result Value Ref Range    WBC 9.9 4.8 - 10.8 K/uL    RBC 4.32 (L) 4.70 - 6.10 M/uL    Hemoglobin 15.0 14.0 - 18.0 g/dL    Hematocrit 43.9 42.0 - 52.0 %    .6 (H) 81.4 - 97.8 fL    MCH 34.7 (H) 27.0 - 33.0 pg    MCHC 34.2 33.7 - 35.3 g/dL    RDW 45.6 35.9 - 50.0 fL    Platelet Count 341 164 - 446 K/uL    MPV 9.0 9.0 - 12.9 fL    Neutrophils-Polys 71.80 44.00 - 72.00 %    Lymphocytes 11.60 (L) 22.00 - 41.00 %    Monocytes 10.90 0.00 - 13.40 %    Eosinophils 3.90 0.00 - 6.90 %    Basophils 0.50 0.00 - 1.80 %    Immature Granulocytes 1.30 (H) 0.00 - 0.90 %    Nucleated RBC 0.00 /100 WBC    Neutrophils (Absolute) 7.08 1.82 - 7.42 K/uL    Lymphs (Absolute) 1.14 1.00 - 4.80 K/uL    Monos (Absolute) 1.07 (H) 0.00 - 0.85 K/uL    Eos (Absolute) 0.38 0.00 - 0.51 K/uL    Baso (Absolute) 0.05 0.00 - 0.12 K/uL    Immature Granulocytes (abs) 0.13 (H) 0.00 - 0.11 K/uL    NRBC (Absolute) 0.00 K/uL       Fluids    Intake/Output Summary (Last 24 hours) at 9/17/2020 1000  Last data filed at 9/17/2020 0800  Gross per 24 hour   Intake 980 ml   Output 2150 ml   Net -1170 ml       Core Measures & Quality  Metrics  Labs reviewed, Medications reviewed and Radiology images reviewed  Holguin catheter: No Holguin      DVT Prophylaxis: Enoxaparin (Lovenox)  DVT prophylaxis - mechanical: SCDs  Ulcer prophylaxis: Not indicated        RAP Score Total: 6    ETOH Screening  CAGE Score: 0  Assessment complete date: 9/12/2020        Assessment/Plan  Flail chest- (present on admission)  Assessment & Plan  Right-sided rib fractures, some of which are displaced and segmental involving the right 2nd through 9th rib levels which represents a flail chest.  Aggressive multimodal pain management, and pulmonary hygiene.   Serial chest radiographs.    Traumatic pneumohemothorax- (present on admission)  Assessment & Plan  Biapical pneumothoraces.  Right chest tube placement.  9/15 Chest tube to water seal  9/17 Chest x-ray with slightly small right apical pneumothorax  - PleuraVac total 870ml / 20ml 24 hour total output  Aggressive pulmonary hygiene. Serial chest radiographs.     Abnormal EKG- (present on admission)  Assessment & Plan  Admission EKG:  Sinus tachycardia.  Consider inferior infarct.  Anterior infarct, age indeterminate.     Subcutaneous emphysema (HCC)- (present on admission)  Assessment & Plan  Extensive subcutaneous air throughout the entire lower neck, chest, abdomen, and upper pelvis. Extensive soft tissue subcutaneous air bilaterally with associated retropharyngeal and prevertebral air. Diffuse soft tissue gas as described above which extends into the left orbit where there is mild left proptosis.    Pneumomediastinum (HCC)- (present on admission)  Assessment & Plan  Superior pneumomediastinum.    Screening examination for infectious disease- (present on admission)  Assessment & Plan  Admission SARS-CoV-2 testing negative. LOW RISK patient. Repeat SARS-CoV-2 testing not indicated. Isolation precautions de-escalated.    No contraindication to deep vein thrombosis (DVT) prophylaxis- (present on admission)  Assessment &  Plan  Systemic anticoagulation contraindicated secondary to elevated bleeding risk.  9/12 Chemical DVT prophylaxis (Lovenox) initiated  Ambulate TID.      Trauma- (present on admission)  Assessment & Plan  Unclear to circumstances surround injury. Fell out of bed two days prior to presentation.   Non Trauma Activation.  Ambrose Salazar MD. Trauma Surgery.    Kidney lesion- (present on admission)  Assessment & Plan  Complex hypodense left superior pole kidney mass with possible posterior enhancement. Renal malignancy is possible.  Recommend outpatient follow up with renal ultrasound and urology.         Discussed patient condition with RN, Patient and trauma surgery, Dr. Bower.  Patient seen, data reviewed and discussed.  Agree with assessment and plan.   The APN and I have collaborated in the patient assessment chart documentation and care plan. The clinical decision making , diagnoses and management are mine and the APN has assisted in the creation of the clinical document . The APN has no independent billing for this patient.        I independently reviewed pertinent clinical lab tests from the last 48 hours and ordered additional follow up clinical lab tests.  I independently reviewed pertinent radiographic images and the radiologist's reports from the last 48 hours and ordered additional follow up radiographic studies.  I reviewed the details of the available patient records and documentation by consulting physicians in [facility] up to today, summated the information, and utilized the information as warranted in today's medical decision making for this patient.  I personally evaluated the patient condition at bedside and discussed the daily plan(s) with available nursing staff, dieticians, social workers, pharmacists on rounds.  I am actively managing this patient based on my personal bedside evaluation of this patient's radiographic, and laboratory findings and clinical changes throughout the day.   Removed   Chest Tube   I have personally evaluated this patient at the bedside and performed a global high level assessment to allow clinical decision making regarding the patient's risk tolerance for transfer to a lower level of care in this in-house risk environment. This decision and takes into account the patient's current active clinical problems and  Comorbidities. This includes:  Complete neurologic assessment  Assessment of respiratory function considering the need his ongoing therapies and the patient's tolerance'  Cardiovascular status  Coordination of care needs    Mallika MACHADO

## 2020-09-17 NOTE — PROGRESS NOTES
Spoke with Dr. Bower regarding patient ongoing pain and request for pain management medications. Orders received.

## 2020-09-18 ENCOUNTER — APPOINTMENT (OUTPATIENT)
Dept: RADIOLOGY | Facility: MEDICAL CENTER | Age: 55
DRG: 183 | End: 2020-09-18
Attending: SURGERY
Payer: MEDICAID

## 2020-09-18 LAB
BASOPHILS # BLD AUTO: 0.4 % (ref 0–1.8)
BASOPHILS # BLD: 0.03 K/UL (ref 0–0.12)
EOSINOPHIL # BLD AUTO: 0.27 K/UL (ref 0–0.51)
EOSINOPHIL NFR BLD: 3.3 % (ref 0–6.9)
ERYTHROCYTE [DISTWIDTH] IN BLOOD BY AUTOMATED COUNT: 46.4 FL (ref 35.9–50)
HCT VFR BLD AUTO: 43.1 % (ref 42–52)
HGB BLD-MCNC: 14.9 G/DL (ref 14–18)
IMM GRANULOCYTES # BLD AUTO: 0.18 K/UL (ref 0–0.11)
IMM GRANULOCYTES NFR BLD AUTO: 2.2 % (ref 0–0.9)
LYMPHOCYTES # BLD AUTO: 1.25 K/UL (ref 1–4.8)
LYMPHOCYTES NFR BLD: 15.2 % (ref 22–41)
MCH RBC QN AUTO: 34.9 PG (ref 27–33)
MCHC RBC AUTO-ENTMCNC: 34.6 G/DL (ref 33.7–35.3)
MCV RBC AUTO: 100.9 FL (ref 81.4–97.8)
MONOCYTES # BLD AUTO: 0.98 K/UL (ref 0–0.85)
MONOCYTES NFR BLD AUTO: 11.9 % (ref 0–13.4)
NEUTROPHILS # BLD AUTO: 5.54 K/UL (ref 1.82–7.42)
NEUTROPHILS NFR BLD: 67 % (ref 44–72)
NRBC # BLD AUTO: 0 K/UL
NRBC BLD-RTO: 0 /100 WBC
PLATELET # BLD AUTO: 356 K/UL (ref 164–446)
PMV BLD AUTO: 8.6 FL (ref 9–12.9)
RBC # BLD AUTO: 4.27 M/UL (ref 4.7–6.1)
WBC # BLD AUTO: 8.3 K/UL (ref 4.8–10.8)

## 2020-09-18 PROCEDURE — 700102 HCHG RX REV CODE 250 W/ 637 OVERRIDE(OP): Performed by: SURGERY

## 2020-09-18 PROCEDURE — 85025 COMPLETE CBC W/AUTO DIFF WBC: CPT

## 2020-09-18 PROCEDURE — 700111 HCHG RX REV CODE 636 W/ 250 OVERRIDE (IP): Performed by: NURSE PRACTITIONER

## 2020-09-18 PROCEDURE — 700101 HCHG RX REV CODE 250: Performed by: SURGERY

## 2020-09-18 PROCEDURE — 71045 X-RAY EXAM CHEST 1 VIEW: CPT

## 2020-09-18 PROCEDURE — 700102 HCHG RX REV CODE 250 W/ 637 OVERRIDE(OP): Performed by: NURSE PRACTITIONER

## 2020-09-18 PROCEDURE — A9270 NON-COVERED ITEM OR SERVICE: HCPCS | Performed by: NURSE PRACTITIONER

## 2020-09-18 PROCEDURE — A9270 NON-COVERED ITEM OR SERVICE: HCPCS | Performed by: SURGERY

## 2020-09-18 PROCEDURE — 770006 HCHG ROOM/CARE - MED/SURG/GYN SEMI*

## 2020-09-18 PROCEDURE — 36415 COLL VENOUS BLD VENIPUNCTURE: CPT

## 2020-09-18 RX ORDER — HYDROMORPHONE HYDROCHLORIDE 1 MG/ML
0.5 INJECTION, SOLUTION INTRAMUSCULAR; INTRAVENOUS; SUBCUTANEOUS
Status: DISCONTINUED | OUTPATIENT
Start: 2020-09-18 | End: 2020-09-19

## 2020-09-18 RX ADMIN — OXYCODONE HYDROCHLORIDE 10 MG: 5 TABLET ORAL at 01:07

## 2020-09-18 RX ADMIN — ENOXAPARIN SODIUM 30 MG: 30 INJECTION SUBCUTANEOUS at 17:57

## 2020-09-18 RX ADMIN — GABAPENTIN 100 MG: 100 CAPSULE ORAL at 17:57

## 2020-09-18 RX ADMIN — POLYETHYLENE GLYCOL 3350 1 PACKET: 17 POWDER, FOR SOLUTION ORAL at 05:16

## 2020-09-18 RX ADMIN — MORPHINE SULFATE 30 MG: 15 TABLET, FILM COATED, EXTENDED RELEASE ORAL at 13:24

## 2020-09-18 RX ADMIN — GABAPENTIN 100 MG: 100 CAPSULE ORAL at 05:16

## 2020-09-18 RX ADMIN — OXYCODONE HYDROCHLORIDE 10 MG: 5 TABLET ORAL at 07:55

## 2020-09-18 RX ADMIN — CLONIDINE HYDROCHLORIDE 0.2 MG: 0.1 TABLET ORAL at 05:17

## 2020-09-18 RX ADMIN — OXYCODONE HYDROCHLORIDE 10 MG: 5 TABLET ORAL at 11:11

## 2020-09-18 RX ADMIN — MAGNESIUM HYDROXIDE 30 ML: 400 SUSPENSION ORAL at 05:16

## 2020-09-18 RX ADMIN — MORPHINE SULFATE 30 MG: 15 TABLET, FILM COATED, EXTENDED RELEASE ORAL at 21:27

## 2020-09-18 RX ADMIN — ENOXAPARIN SODIUM 30 MG: 30 INJECTION SUBCUTANEOUS at 05:16

## 2020-09-18 RX ADMIN — MORPHINE SULFATE 30 MG: 15 TABLET, FILM COATED, EXTENDED RELEASE ORAL at 05:16

## 2020-09-18 RX ADMIN — NICOTINE 7 MG: 7 PATCH TRANSDERMAL at 05:16

## 2020-09-18 RX ADMIN — GABAPENTIN 100 MG: 100 CAPSULE ORAL at 11:11

## 2020-09-18 RX ADMIN — CLONIDINE HYDROCHLORIDE 0.2 MG: 0.1 TABLET ORAL at 17:57

## 2020-09-18 RX ADMIN — DOCUSATE SODIUM 100 MG: 100 CAPSULE ORAL at 05:16

## 2020-09-18 RX ADMIN — OXYCODONE HYDROCHLORIDE 10 MG: 5 TABLET ORAL at 17:57

## 2020-09-18 ASSESSMENT — COGNITIVE AND FUNCTIONAL STATUS - GENERAL
WALKING IN HOSPITAL ROOM: A LITTLE
STANDING UP FROM CHAIR USING ARMS: A LITTLE
MOBILITY SCORE: 18
HELP NEEDED FOR BATHING: A LITTLE
STANDING UP FROM CHAIR USING ARMS: A LITTLE
EATING MEALS: A LITTLE
SUGGESTED CMS G CODE MODIFIER DAILY ACTIVITY: CK
EATING MEALS: A LITTLE
DRESSING REGULAR UPPER BODY CLOTHING: A LITTLE
DAILY ACTIVITIY SCORE: 18
DRESSING REGULAR UPPER BODY CLOTHING: A LITTLE
SUGGESTED CMS G CODE MODIFIER MOBILITY: CK
MOVING FROM LYING ON BACK TO SITTING ON SIDE OF FLAT BED: A LITTLE
DAILY ACTIVITIY SCORE: 18
TURNING FROM BACK TO SIDE WHILE IN FLAT BAD: A LITTLE
PERSONAL GROOMING: A LITTLE
SUGGESTED CMS G CODE MODIFIER DAILY ACTIVITY: CK
MOVING TO AND FROM BED TO CHAIR: A LITTLE
TURNING FROM BACK TO SIDE WHILE IN FLAT BAD: A LITTLE
MOVING TO AND FROM BED TO CHAIR: A LITTLE
PERSONAL GROOMING: A LITTLE
DRESSING REGULAR LOWER BODY CLOTHING: A LITTLE
CLIMB 3 TO 5 STEPS WITH RAILING: A LITTLE
DRESSING REGULAR LOWER BODY CLOTHING: A LITTLE
CLIMB 3 TO 5 STEPS WITH RAILING: A LITTLE
TOILETING: A LITTLE
WALKING IN HOSPITAL ROOM: A LITTLE
HELP NEEDED FOR BATHING: A LITTLE
TOILETING: A LITTLE

## 2020-09-18 ASSESSMENT — ENCOUNTER SYMPTOMS
FEVER: 0
CONSTIPATION: 0
MYALGIAS: 1
VOMITING: 0
CHILLS: 0
ABDOMINAL PAIN: 0
SHORTNESS OF BREATH: 1
NAUSEA: 0

## 2020-09-18 NOTE — PROGRESS NOTES
Trauma / Surgical Daily Progress Note    Date of Service  9/18/2020    Chief Complaint  55 y.o. male admitted 9/11/2020 with Trauma    Interval Events    Transferred to [facility].  CXR with improving subcutaneous emphysema and no pneumothorax post chest removal.  Nasal cannula.   IS 1500 cc.     - Continue multimodal pain control,   - AM CXR,   - Obtain resting and walking oxygen saturations. Obtain DME for supplemental oxygen if needed  - PT/OT evals   - Disposition: home when medically cleared.  - Counseld    Review of Systems  Review of Systems   Constitutional: Negative for chills and fever.   Respiratory: Positive for shortness of breath.    Cardiovascular: Negative for chest pain.   Gastrointestinal: Negative for abdominal pain, constipation (9/18 (+) BM ), nausea and vomiting.   Musculoskeletal: Positive for myalgias.        Vital Signs  Temp:  [36.4 °C (97.6 °F)-36.8 °C (98.3 °F)] 36.8 °C (98.3 °F)  Pulse:  [63-72] 69  Resp:  [15-18] 18  BP: (111-153)/(71-84) 111/71  SpO2:  [91 %-98 %] 92 %    Physical Exam  Physical Exam  Vitals signs and nursing note reviewed.   Constitutional:       General: He is not in acute distress.     Appearance: He is not ill-appearing, toxic-appearing or diaphoretic.      Interventions: Nasal cannula in place.   HENT:      Head: Normocephalic.      Nose: Nose normal.      Mouth/Throat:      Mouth: Mucous membranes are moist.   Eyes:      Conjunctiva/sclera: Conjunctivae normal.   Neck:      Musculoskeletal: Neck supple.   Cardiovascular:      Rate and Rhythm: Normal rate and regular rhythm.      Pulses: Normal pulses.   Pulmonary:      Effort: Pulmonary effort is normal. No respiratory distress.      Breath sounds: Examination of the right-lower field reveals decreased breath sounds. Examination of the left-lower field reveals decreased breath sounds. Decreased breath sounds present.      Comments: IS 1500 cc  Nasal cannula   Chest:      Chest wall: Tenderness present.   Abdominal:       General: Bowel sounds are normal.      Palpations: Abdomen is soft.      Tenderness: There is no abdominal tenderness.   Musculoskeletal:         General: No tenderness.      Comments: Moves all extremities    Skin:     General: Skin is warm and dry.      Capillary Refill: Capillary refill takes less than 2 seconds.   Neurological:      Mental Status: He is alert and oriented to person, place, and time.   Psychiatric:         Behavior: Behavior normal.         Laboratory  Recent Results (from the past 24 hour(s))   CBC with Differential: Tomorrow AM    Collection Time: 09/18/20  5:35 AM   Result Value Ref Range    WBC 8.3 4.8 - 10.8 K/uL    RBC 4.27 (L) 4.70 - 6.10 M/uL    Hemoglobin 14.9 14.0 - 18.0 g/dL    Hematocrit 43.1 42.0 - 52.0 %    .9 (H) 81.4 - 97.8 fL    MCH 34.9 (H) 27.0 - 33.0 pg    MCHC 34.6 33.7 - 35.3 g/dL    RDW 46.4 35.9 - 50.0 fL    Platelet Count 356 164 - 446 K/uL    MPV 8.6 (L) 9.0 - 12.9 fL    Neutrophils-Polys 67.00 44.00 - 72.00 %    Lymphocytes 15.20 (L) 22.00 - 41.00 %    Monocytes 11.90 0.00 - 13.40 %    Eosinophils 3.30 0.00 - 6.90 %    Basophils 0.40 0.00 - 1.80 %    Immature Granulocytes 2.20 (H) 0.00 - 0.90 %    Nucleated RBC 0.00 /100 WBC    Neutrophils (Absolute) 5.54 1.82 - 7.42 K/uL    Lymphs (Absolute) 1.25 1.00 - 4.80 K/uL    Monos (Absolute) 0.98 (H) 0.00 - 0.85 K/uL    Eos (Absolute) 0.27 0.00 - 0.51 K/uL    Baso (Absolute) 0.03 0.00 - 0.12 K/uL    Immature Granulocytes (abs) 0.18 (H) 0.00 - 0.11 K/uL    NRBC (Absolute) 0.00 K/uL       Fluids    Intake/Output Summary (Last 24 hours) at 9/18/2020 1224  Last data filed at 9/18/2020 0701  Gross per 24 hour   Intake 480 ml   Output 900 ml   Net -420 ml       Core Measures & Quality Metrics  Labs reviewed, Medications reviewed and Radiology images reviewed  Holguin catheter: No Holguin      DVT Prophylaxis: Enoxaparin (Lovenox)  DVT prophylaxis - mechanical: SCDs  Ulcer prophylaxis: Not indicated        RAP Score Total:  6    ETOH Screening  CAGE Score: 0  Assessment complete date: 9/12/2020        Assessment/Plan  Flail chest- (present on admission)  Assessment & Plan  Right-sided rib fractures, some of which are displaced and segmental involving the right 2nd through 9th rib levels which represents a flail chest.  Aggressive multimodal pain management, and pulmonary hygiene.   Serial chest radiographs.     Subcutaneous emphysema (HCC)- (present on admission)  Assessment & Plan  Extensive subcutaneous air throughout the entire lower neck, chest, abdomen, and upper pelvis. Extensive soft tissue subcutaneous air bilaterally with associated retropharyngeal and prevertebral air. Diffuse soft tissue gas as described above which extends into the left orbit where there is mild left proptosis.  9/18 CXR with decreasing SQ emphysema     Pneumomediastinum (HCC)- (present on admission)  Assessment & Plan  Superior pneumomediastinum.     Traumatic pneumohemothorax- (present on admission)  Assessment & Plan  Biapical pneumothoraces.  Right chest tube placement.  9/15 Chest tube to water seal  9/17 Interval removal of chest tube  9/18 Follow up CXR post chest removal with no pneumothorax    Abnormal EKG- (present on admission)  Assessment & Plan  Admission EKG:  Sinus tachycardia.  Consider inferior infarct.  Anterior infarct, age indeterminate.     Screening examination for infectious disease- (present on admission)  Assessment & Plan  Admission SARS-CoV-2 testing negative. LOW RISK patient. Repeat SARS-CoV-2 testing not indicated. Isolation precautions de-escalated.     No contraindication to deep vein thrombosis (DVT) prophylaxis- (present on admission)  Assessment & Plan  Systemic anticoagulation contraindicated secondary to elevated bleeding risk.  9/12 Chemical DVT prophylaxis (Lovenox) initiated  Ambulate TID.    Trauma- (present on admission)  Assessment & Plan  Unclear to circumstances surround injury. Fell out of bed two days prior to  presentation.   Non Trauma Activation.  Ambrose Salazar MD. Trauma Surgery.     Kidney lesion- (present on admission)  Assessment & Plan  Complex hypodense left superior pole kidney mass with possible posterior enhancement. Renal malignancy is possible.  Recommend outpatient follow up with renal ultrasound and urology.          Discussed patient condition with Patient and trauma surgery, Dr. Ambrose Salazar.    Patient seen, data reviewed and discussed.  Agree with assessment and plan.         Ambrose Salazar MD  [phone]

## 2020-09-18 NOTE — PROGRESS NOTES
Bedside report received. Assessment completed.  Pt is A&O x4. Pt on 1L nasal cannula  Medicating for pain PRN per MAR Pain 3/10  Denies nausea.   - numbness, - tingling.  Last BM 9/15/20 . +flatus,   +void.  Reg diet. Tolerates well.   Pt up SBA. Bed alarm on.  Call light and belongings within reach. All needs met at this time. Fall Precautions and hourly rounding in place.

## 2020-09-18 NOTE — PROGRESS NOTES
Bedside report received.  Assessment complete.  A&O x 4. Patient calls appropriately.  Patient ambulates with stand by assist. Bed alarm on.   Patient has 8/10 pain. Pain managed with prescribed medications.  Denies N&V. Tolerating regular diet.  Surgical old chest tube site with gauze and tegaderm, scattered bruiseing and abrasions..  + void, + flatus, + BM.  Patient denies SOB.  SCD's on.  Patient is calm and cooperative resting in bed.  Review plan with of care with patient. Call light and personal belongings with in reach. Hourly rounding in place. All needs met at this time.

## 2020-09-18 NOTE — CARE PLAN
Problem: Communication  Goal: The ability to communicate needs accurately and effectively will improve  Outcome: PROGRESSING AS EXPECTED     Problem: Safety  Goal: Will remain free from falls  9/18/2020 0004 by Dionna Mercedes R.N.  Outcome: PROGRESSING AS EXPECTED  9/18/2020 0004 by Dionna Mercedes RMELIDA.  Outcome: PROGRESSING AS EXPECTED

## 2020-09-18 NOTE — FACE TO FACE
"Face to Face Note  -  Durable Medical Equipment    CHARLES Odom - NPI: [id number]  I certify that this patient is under my care and that they had a durable medical equipment(DME)face to face encounter by myself that meets the physician DME face-to-face encounter requirements with this patient on:    Date of encounter:   Patient:                    MRN:                       YOB: 2021  Dillon Centeno  [id number]  1965     The encounter with the patient was in whole, or in part, for the following medical condition, which is the primary reason for durable medical equipment:  Other - Trauma resulting in pneumothorax and rib fractures    I certify that, based on my findings, the following durable medical equipment is medically necessary:  Oxygen.    HOME O2 Saturation Measurements:(Values must be present for Home Oxygen orders)  Room air sat at rest: 82  Room air sat with amb: 79  With liters of O2: 3, O2 sat at rest with O2: 95  With Liters of O2: 3, O2 sat with amb with O2 : 86  Is the patient mobile?: Yes    My Clinical findings support the need for the above equipment due to:  Hypoxia    Supporting Symptoms: The patient requires supplemental oxygen, as the following interventions have been tried with limited or no improvement: \"Ambulation with oximetry and \"Incentive spirometry    "

## 2020-09-19 ENCOUNTER — APPOINTMENT (OUTPATIENT)
Dept: RADIOLOGY | Facility: MEDICAL CENTER | Age: 55
DRG: 183 | End: 2020-09-19
Attending: SURGERY
Payer: MEDICAID

## 2020-09-19 VITALS
HEART RATE: 63 BPM | RESPIRATION RATE: 18 BRPM | DIASTOLIC BLOOD PRESSURE: 67 MMHG | WEIGHT: 179.9 LBS | SYSTOLIC BLOOD PRESSURE: 116 MMHG | HEIGHT: 71 IN | BODY MASS INDEX: 25.19 KG/M2 | TEMPERATURE: 97.7 F | OXYGEN SATURATION: 95 %

## 2020-09-19 PROCEDURE — A9270 NON-COVERED ITEM OR SERVICE: HCPCS | Performed by: NURSE PRACTITIONER

## 2020-09-19 PROCEDURE — 97165 OT EVAL LOW COMPLEX 30 MIN: CPT

## 2020-09-19 PROCEDURE — 700102 HCHG RX REV CODE 250 W/ 637 OVERRIDE(OP): Performed by: NURSE PRACTITIONER

## 2020-09-19 PROCEDURE — A9270 NON-COVERED ITEM OR SERVICE: HCPCS | Performed by: SURGERY

## 2020-09-19 PROCEDURE — 700102 HCHG RX REV CODE 250 W/ 637 OVERRIDE(OP): Performed by: SURGERY

## 2020-09-19 PROCEDURE — 71045 X-RAY EXAM CHEST 1 VIEW: CPT

## 2020-09-19 PROCEDURE — 700111 HCHG RX REV CODE 636 W/ 250 OVERRIDE (IP): Performed by: NURSE PRACTITIONER

## 2020-09-19 PROCEDURE — 97161 PT EVAL LOW COMPLEX 20 MIN: CPT

## 2020-09-19 RX ORDER — OXYCODONE HYDROCHLORIDE 5 MG/1
5 TABLET ORAL EVERY 4 HOURS PRN
Qty: 30 TAB | Refills: 0 | Status: SHIPPED | OUTPATIENT
Start: 2020-09-19 | End: 2020-09-24

## 2020-09-19 RX ORDER — GABAPENTIN 100 MG/1
100 CAPSULE ORAL 3 TIMES DAILY
Qty: 21 CAP | Refills: 0 | Status: SHIPPED | OUTPATIENT
Start: 2020-09-19 | End: 2020-09-26

## 2020-09-19 RX ORDER — ACETAMINOPHEN 325 MG/1
650 TABLET ORAL
COMMUNITY
Start: 2020-09-19 | End: 2023-09-26

## 2020-09-19 RX ORDER — LIDOCAINE 50 MG/G
1-2 PATCH TOPICAL EVERY 24 HOURS
Qty: 14 PATCH | Refills: 0 | Status: SHIPPED | OUTPATIENT
Start: 2020-09-19 | End: 2020-09-26

## 2020-09-19 RX ORDER — CLONIDINE HYDROCHLORIDE 0.2 MG/1
0.2 TABLET ORAL 2 TIMES DAILY
Qty: 28 TAB | Refills: 0 | Status: SHIPPED | OUTPATIENT
Start: 2020-09-19 | End: 2020-10-03

## 2020-09-19 RX ORDER — MORPHINE SULFATE 15 MG/1
15 TABLET, FILM COATED, EXTENDED RELEASE ORAL 3 TIMES DAILY
Status: DISCONTINUED | OUTPATIENT
Start: 2020-09-19 | End: 2020-09-19 | Stop reason: HOSPADM

## 2020-09-19 RX ORDER — MORPHINE SULFATE 15 MG/1
15 TABLET, FILM COATED, EXTENDED RELEASE ORAL 3 TIMES DAILY
Qty: 15 TAB | Refills: 0 | Status: SHIPPED | OUTPATIENT
Start: 2020-09-19 | End: 2020-09-24

## 2020-09-19 RX ORDER — PSEUDOEPHEDRINE HCL 30 MG
100 TABLET ORAL 2 TIMES DAILY PRN
Qty: 10 CAP | Refills: 0 | Status: SHIPPED | OUTPATIENT
Start: 2020-09-19 | End: 2020-09-24

## 2020-09-19 RX ADMIN — ENOXAPARIN SODIUM 30 MG: 30 INJECTION SUBCUTANEOUS at 05:02

## 2020-09-19 RX ADMIN — OXYCODONE HYDROCHLORIDE 10 MG: 5 TABLET ORAL at 08:48

## 2020-09-19 RX ADMIN — MORPHINE SULFATE 30 MG: 15 TABLET, FILM COATED, EXTENDED RELEASE ORAL at 05:02

## 2020-09-19 RX ADMIN — NICOTINE 7 MG: 7 PATCH TRANSDERMAL at 05:01

## 2020-09-19 RX ADMIN — GABAPENTIN 100 MG: 100 CAPSULE ORAL at 12:19

## 2020-09-19 RX ADMIN — GABAPENTIN 100 MG: 100 CAPSULE ORAL at 05:02

## 2020-09-19 RX ADMIN — MORPHINE SULFATE 15 MG: 15 TABLET, FILM COATED, EXTENDED RELEASE ORAL at 12:19

## 2020-09-19 RX ADMIN — CLONIDINE HYDROCHLORIDE 0.2 MG: 0.1 TABLET ORAL at 05:02

## 2020-09-19 ASSESSMENT — COGNITIVE AND FUNCTIONAL STATUS - GENERAL
SUGGESTED CMS G CODE MODIFIER DAILY ACTIVITY: CI
HELP NEEDED FOR BATHING: A LITTLE
DAILY ACTIVITIY SCORE: 23
SUGGESTED CMS G CODE MODIFIER MOBILITY: CJ
MOVING FROM LYING ON BACK TO SITTING ON SIDE OF FLAT BED: A LITTLE
MOBILITY SCORE: 21
TURNING FROM BACK TO SIDE WHILE IN FLAT BAD: A LITTLE
MOVING TO AND FROM BED TO CHAIR: A LITTLE

## 2020-09-19 ASSESSMENT — ENCOUNTER SYMPTOMS
MYALGIAS: 1
NAUSEA: 0
FEVER: 0
SHORTNESS OF BREATH: 1
CONSTIPATION: 0
ABDOMINAL PAIN: 0
CHILLS: 0
VOMITING: 0

## 2020-09-19 ASSESSMENT — ACTIVITIES OF DAILY LIVING (ADL): TOILETING: INDEPENDENT

## 2020-09-19 ASSESSMENT — GAIT ASSESSMENTS
GAIT LEVEL OF ASSIST: SUPERVISED
DISTANCE (FEET): 200

## 2020-09-19 NOTE — PROGRESS NOTES
Trauma / Surgical Daily Progress Note    Date of Service  9/19/2020    Chief Complaint  55 y.o. male admitted 9/11/2020 with Trauma    Interval Events    CXR stable, No pneumothorax.   Arranging home oxygen   Afebrile and nontoxic in appearance  Therapy note reviewed     - Kidney lesion and importance of urology follow up, renal ultrasound, discussed.  - HTN discussed and importance of follow up with PCP.  - Follow up with Dr. Ambrose Salazar, trauma surgery, discussed.  Script provided for follow up 2 view CXR provided to patient.  -  Discharge pending arrangement of home oxygen.  - Counseled     Review of Systems  Review of Systems   Constitutional: Negative for chills and fever.   Respiratory: Positive for shortness of breath.    Cardiovascular: Negative for chest pain.   Gastrointestinal: Negative for abdominal pain, constipation (9/18 (+) BM ), nausea and vomiting.   Musculoskeletal: Positive for myalgias.        Vital Signs  Temp:  [36.4 °C (97.6 °F)-36.8 °C (98.2 °F)] 36.5 °C (97.7 °F)  Pulse:  [63-73] 63  Resp:  [17-19] 18  BP: (116-137)/(67-82) 116/67  SpO2:  [94 %-95 %] 95 %    Physical Exam  Physical Exam  Vitals signs and nursing note reviewed.   Constitutional:       General: He is not in acute distress.     Appearance: He is not ill-appearing, toxic-appearing or diaphoretic.      Interventions: Nasal cannula in place.   HENT:      Head: Normocephalic.      Nose: Nose normal.      Mouth/Throat:      Mouth: Mucous membranes are moist.   Eyes:      Conjunctiva/sclera: Conjunctivae normal.   Neck:      Musculoskeletal: Neck supple.   Cardiovascular:      Rate and Rhythm: Normal rate and regular rhythm.      Pulses: Normal pulses.   Pulmonary:      Effort: Pulmonary effort is normal. No respiratory distress.      Breath sounds: Examination of the right-lower field reveals decreased breath sounds. Examination of the left-lower field reveals decreased breath sounds. Decreased breath sounds present.       Comments: IS 1500 cc  Nasal cannula   Chest:      Chest wall: Tenderness present.   Abdominal:      General: Bowel sounds are normal.      Palpations: Abdomen is soft.      Tenderness: There is no abdominal tenderness.   Musculoskeletal:         General: No tenderness.      Comments: Moves all extremities    Skin:     General: Skin is warm and dry.      Capillary Refill: Capillary refill takes less than 2 seconds.   Neurological:      Mental Status: He is alert and oriented to person, place, and time.   Psychiatric:         Behavior: Behavior normal.         Laboratory  No results found for this or any previous visit (from the past 24 hour(s)).    Fluids    Intake/Output Summary (Last 24 hours) at 9/19/2020 1145  Last data filed at 9/19/2020 0850  Gross per 24 hour   Intake 480 ml   Output 1150 ml   Net -670 ml       Core Measures & Quality Metrics  Labs reviewed, Medications reviewed and Radiology images reviewed  Holguin catheter: No Holguin      DVT Prophylaxis: Enoxaparin (Lovenox)  DVT prophylaxis - mechanical: SCDs  Ulcer prophylaxis: Not indicated        RAP Score Total: 6    ETOH Screening  CAGE Score: 0  Assessment complete date: 9/12/2020        Assessment/Plan  Flail chest- (present on admission)  Assessment & Plan  Right-sided rib fractures, some of which are displaced and segmental involving the right 2nd through 9th rib levels which represents a flail chest.  Aggressive multimodal pain management, and pulmonary hygiene.   Serial chest radiographs.      Subcutaneous emphysema (HCC)- (present on admission)  Assessment & Plan  Extensive subcutaneous air throughout the entire lower neck, chest, abdomen, and upper pelvis. Extensive soft tissue subcutaneous air bilaterally with associated retropharyngeal and prevertebral air. Diffuse soft tissue gas as described above which extends into the left orbit where there is mild left proptosis.  9/18 CXR with decreasing SQ emphysema   9/19 CXR stable.      Pneumomediastinum (HCC)- (present on admission)  Assessment & Plan  Superior pneumomediastinum.      Traumatic pneumohemothorax- (present on admission)  Assessment & Plan  Biapical pneumothoraces.  Right chest tube placement.  9/15 Chest tube to water seal  9/17 Interval removal of chest tube  9/18 Follow up CXR post chest removal with no pneumothorax  9/19 Follow up CXR with no pneumothorax    Abnormal EKG- (present on admission)  Assessment & Plan  Admission EKG:  Sinus tachycardia.  Consider inferior infarct.  Anterior infarct, age indeterminate.      Screening examination for infectious disease- (present on admission)  Assessment & Plan  Admission SARS-CoV-2 testing negative. LOW RISK patient. Repeat SARS-CoV-2 testing not indicated. Isolation precautions de-escalated.      No contraindication to deep vein thrombosis (DVT) prophylaxis- (present on admission)  Assessment & Plan  Systemic anticoagulation contraindicated secondary to elevated bleeding risk.  9/12 Chemical DVT prophylaxis (Lovenox) initiated  Ambulate TID.     Trauma- (present on admission)  Assessment & Plan  Unclear to circumstances surround injury. Fell out of bed two days prior to presentation.   Non Trauma Activation.  Ambrose Salazar MD. Trauma Surgery.      Kidney lesion- (present on admission)  Assessment & Plan  Complex hypodense left superior pole kidney mass with possible posterior enhancement. Renal malignancy is possible.  Recommend outpatient follow up with renal ultrasound and urology.     IP consult oncology         Discussed patient condition with Patient and trauma surgery, Dr. Ashley Baird.

## 2020-09-19 NOTE — PROGRESS NOTES
Bedside report received. Assessment completed.  Pt is A&O x4. Pt on 1L nasal cannula  Medicating for pain PRN per MAR Pain 7/10  Old chest tube site with gauze and tegaderm; CDI  Scattered bruising and abrasions.  Denies nausea.   - numbness, - tingling.  Last BM 9/18/20 . +flatus,   +void.  Reg diet. Tolerates well.   Pt up SBA. Bed alarm off.  SCD's refused.  Call light and belongings within reach. All needs met at this time. Fall Precautions and hourly rounding in place.

## 2020-09-19 NOTE — THERAPY
Physical Therapy   Initial Evaluation     Patient Name: Dillon Centeno  Age:  55 y.o., Sex:  male  Medical Record #: [id number]  Today's Date: 9/19/2020     Precautions: Fall Risk    Assessment  Patient is 55 y.o. male with a diagnosis of traumatic pneumothorax.  Patient demonstrates community level functional mobility, with good safety awareness.  Recommend home oxygen as patient desaturated to high 70s after ambulation.  PT will not follow, recommend home discharge.         09/19/20 1305   Precautions   Precautions Fall Risk   Comments pt reports PTSD   Prior Living Situation   Prior Services None   Housing / Facility 1 Story House   Steps Into Home 0   Steps In Home 0   Equipment Owned Single Point Cane   Lives with - Patient's Self Care Capacity Other (Comments)   Comments lives with brother who is an RN   Prior Level of Functional Mobility   Bed Mobility Independent   Transfer Status Independent   Ambulation Independent   Distance Ambulation (Feet) 150   Assistive Devices Used Single Point Cane   Stairs Independent   Cognition    Cognition / Consciousness WDL   Level of Consciousness Alert   Passive ROM Lower Body   Passive ROM Lower Body WDL   Active ROM Lower Body    Active ROM Lower Body  WDL   Strength Lower Body   Lower Body Strength  WDL   Sensation Lower Body   Lower Extremity Sensation   WDL   Balance Assessment   Sitting Balance (Static) Good   Sitting Balance (Dynamic) Good   Standing Balance (Static) Fair +   Standing Balance (Dynamic) Fair   Weight Shift Sitting Good   Weight Shift Standing Good   Comments w/ 4L O2   Gait Analysis   Gait Level Of Assist Supervised   Assistive Device None   Distance (Feet) 200   # of Times Distance was Traveled 1   Weight Bearing Status fwb   Bed Mobility    Supine to Sit Supervised   Sit to Supine Supervised   Scooting Supervised   Functional Mobility   Sit to Stand Supervised   Bed, Chair, Wheelchair Transfer Supervised   How much difficulty does the patient  currently have...   Turning over in bed (including adjusting bedclothes, sheets and blankets)? 3   Sitting down on and standing up from a chair with arms (e.g., wheelchair, bedside commode, etc.) 3   Moving from lying on back to sitting on the side of the bed? 3   How much help from another person does the patient currently need...   Moving to and from a bed to a chair (including a wheelchair)? 4   Need to walk in a hospital room? 4   Climbing 3-5 steps with a railing? 4   6 clicks Mobility Score 21   Education Group   Education Provided Role of Physical Therapist;Gait Training   Role of Physical Therapist Patient Response Patient;Acceptance;Explanation;Demonstration;Verbal Demonstration;Action Demonstration   Gait Training Patient Response Patient;Acceptance;Explanation;Demonstration;Verbal Demonstration;Action Demonstration   Anticipated Discharge Equipment and Recommendations   DC Equipment Recommendations None     Plan    Recommend Physical Therapy for Evaluation only     DC Equipment Recommendations: None  Discharge Recommendations: Anticipate that the patient will have no further physical therapy needs after discharge from the hospital

## 2020-09-19 NOTE — DISCHARGE INSTRUCTIONS
Discharge Instructions    Discharged to home by car with friend. Discharged via wheelchair, hospital escort: Yes.  Special equipment needed: Oxygen    Be sure to schedule a follow-up appointment with your primary care doctor or any specialists as instructed.     Discharge Plan:   Diet Plan: Discussed  Activity Level: Discussed  Confirmed Follow up Appointment: Patient to Call and Schedule Appointment  Confirmed Symptoms Management: Discussed  Medication Reconciliation Updated: Yes    I understand that a diet low in cholesterol, fat, and sodium is recommended for good health. Unless I have been given specific instructions below for another diet, I accept this instruction as my diet prescription.   Other diet: Advance diet as tolerated.    Special Instructions: None    · Is patient discharged on Warfarin / Coumadin?   No   1.  Call or seek medical attention if questions or concerns arise   2.  Follow up with Dr. Ambrose Salazar, trauma surgery, within one weeks time with a repeat CXR, as needed, if symptoms worsen.  3. Follow up with Urology for kidney lesion, renal ultra sound, as soon as possible upon discharge.    4. Follow up with primary care provider as soon as possible upon discharge for ongoing assessment, management, and hypertension care  5.  If unable to follow up with Urology or PCP please notify Dr. Ambrose Salazar, at follow up appointment.   6.  No contact sports, heavy lifting, or strenuous activities until cleared by Ambrose Salazar  7. No flying or scuba diving x 2 weeks  8.  May remove chest tube dressing 72 hours post discharge and apply dry dressing.  Change as needed  9.. No swimming, hot tubs, baths or wound submersion. May shower  10.  Supplemental oxygen via nasal cannula at 3 liters at all times  11.  No smoking with oxygen in place  12.  No operation of machinery or motorized vehicles under the influence of narcotics  13. No alcohol use under the influence of narcotics  14. Seek immediate  medical attention for signs and symptoms of infection and/or new or respiratory decompensation      Fracture Care, Generic  The 206 bones in our body are important for supporting our body (skeleton) and also for production of blood cells by the bone marrow. A fracture is a break in a tissue. A tissue is a collection of cells that performs a function or job in our body. We most commonly think of fractures in bones.  When a bone is broken, or fractured, it affects not only blood production and function. There may also be other damage when structures near the bones are injured.  There are three main types of fractures:  · Open - where there is a wound leading to the fracture site or the bone is protruding from the skin.   · Closed - where the bone has fractured but has no external wound.   · Complicated - this may involve damage to associated vital organs and major blood vessels as a result of the fracture.   Fractures are usually managed by keeping the bones in place long enough for them to heal. This is usually done with splints or casts. Sometimes surgery is required and pins, plates and screws may be used to hold fractures in proper position. The amount of time it takes a fracture to heal depends mostly on the age and health of the patient.  Young children are prone to fractures. These fractures heal rather quickly. The common fractures suffered by children tend to be associated with the arms and wrists. As young bones do not harden for some years, children's fractures tend to 'bend and splinter', similar to a broken branch on a tree. This the reason for the name, 'greenstick fracture'.  As we grow older, there may be a loss of bone known as osteopenia or osteoporosis. These conditions make breaking a bone much easier. Sometimes a minor accident or simply over-use may produce a fracture. These fractures do not heal as fast a younger person's.  SYMPTOMS  The signs and symptoms of fractures of bones depend on how bad  the injury is. If shock is present, there may be pale, cool, clammy skin with a rapid, weak pulse. There is usually pain and tenderness in the area of the fracture. There may or may not be deformity of the bone. There may be injury to surrounding tissues.  TREATMENT  · Care and treatment of fractures relies on immobilization and adequate splinting of the injury. If the fracture is complex, the wound associated with an open fracture may be difficult to handle without professional help.   · If the pulse to the end part of the limb (distal pulse) cannot be restored by gentle traction, then the limb should be stabilized in its current position. Urgent ambulance transport should be obtained. Do not waste time with splinting.   · Generally, fractured limbs should be made immobile and left for medical aid. In remote areas or some distance from medical aid, you may be required to treat as follows.   FRACTURED FOREARM  · Check for pulse at the end part of the limb. If none - gentle traction until pulse returns   · Treat any wounds   · Pad bony prominences   · Apply adequate splinting.   · Secure above and below fracture, secure wrist.   · Reassess pulse or return of color and/or warmth.   · Elevate injury with arm sling.    FRACTURED UPPER ARM  · Check for pulse at the end part of the limb, if none - gentle traction until pulse returns.   · Treat any wounds.   · Pad between arm and chest.   · Apply 'collar and cuff' sling, secure above and below fracture firmly against chest with triangular bandages.   · Reassess pulse or return of color and/or warmth.    FRACTURED LEG  · Check for circulation and pulse at the end part of the limb (skin color and temperature). If no circulation, apply gentle traction until pulse or color returns.   · Call '911' for an ambulance.   · Treat any wounds.   · Immobilize (keep it from moving) the limb.   · Pad bony prominences.   · Reassess circulation below injury.   FRACTURED PELVIS  · Call  '911' for an ambulance.   · Check for pulses in both legs.   · Bend legs at knees, elevate lower legs slightly and support on pillows or something padded.   · Support both hips with folded blankets either side.   · Discourage attempts to urinate.   · Care must be exercised with a suspected fractured pelvis. This injury may have serious complications. The casualty should always be transported by ambulance and not by alternative means unless absolutely necessary.   FRACTURED JAW  · A common injury in certain contact sports is dislocation, or fracture of the lower jaw (mandible). The casualty will have pain in the jaw, be unable to speak properly, and may have trouble swallowing.   · Call '911' for an ambulance.   · Support the jaw.   · Sit the injured person leaning slightly forward.   · Rest the injured jaw on a pad held by the injured person.   · DO NOT apply a bandage to support the jaw.   · Observe the casualty carefully for signs of breathing difficulties and any indication that he or she is becoming drowsy or unconscious.   SLINGS  · Slings are used to support an injured arm, or to supplement treatment for another injury such as fractured ribs. Generally, the most effective sling is made with a triangular bandage. Every first aid kit, no matter how small, should have at least one of these bandages.   · Although triangular bandages are preferable, any material (tie, belt, or piece of thick twine or rope) can be used in an emergency. If no likely material is at hand, an injured arm can be adequately supported by inserting it inside the injured person's shirt or blouse. Similarly, a safety pin applied to a sleeve and secured to clothing on the chest may work well enough.   · There are essentially three types of sling; the arm sling for injuries to the forearm, the elevated sling for injuries to the shoulder, and the 'collar-and-cuff' or clove hitch for injuries to the upper arm and as supplementary support to  "fractured ribs.   · After application of any sling, always check the circulation to the limb by feeling for the pulse at the wrist, or by squeezing a fingernail and observing for change of color in the nail bed. All slings must be in a position that is comfortable for the injured person. Never force an arm into the 'right position'.   ARM SLING  · Support the injured forearm approximately parallel to the ground with the wrist slightly higher than the elbow.   · Place an opened triangular bandage between the body and the arm, with its apex towards the elbow.   · Extend the upper point of the bandage over the shoulder on the uninjured side.   · Bring the lower point up over the arm, across the shoulder on the injured side to join the upper point and tie firmly with a reef knot.   · Ensure the elbow is secured by folding the excess bandage over the elbow and securing with a safety pin.   ELEVATED SLING  · Support the injured arm with the elbow beside the body and the hand extended towards the uninjured shoulder.   · Place an opened triangular bandage over the forearm and hand, with the apex towards the elbow.   · Extend the upper point of the bandage over the uninjured shoulder.   · Tuck the lower part of the bandage under the injured arm, bring it under the elbow and around the back and extend the lower point up to meet the upper point at the shoulder.   · Tie firmly with a reef knot.   · Secure the elbow by folding the excess material and applying a safety pin, then ensure that the sling is tucked under the arm giving firm support.   COLLAR-AND-CUFF (CLOVE HITCH)   · Allow the elbow to hang naturally at the side and place the hand extended towards the shoulder on the uninjured side.   · Form a clove hitch by forming two loops - one towards you, one away from you.   · Put the loops together by sliding your hands under the loops and closing with a \"clapping\" motion. If you are experienced at forming a clove hitch, then " apply a clove hitch directly on the wrist, but take care not to move the injured arm.   · Slide the clove hitch over the hand and gently pull it firmly to secure the wrist.   · Extend the points of the bandage to either side of the neck and tie firmly with a reef knot.   · Allow the arm to hang comfortably. For support to fractured ribs, apply triangular bandages around the body and upper arm to hold the arm firmly against the chest.   If your caregiver has given you a follow-up appointment, it is very important to keep that appointment. This includes any orthopedic referrals, physical therapy, and rehabilitation. Any delay in obtaining necessary care could result in a delay or failure of the bones to heal. Not keeping the appointment could result in a chronic or permanent injury, pain, and disability. If there is any problem keeping the appointment, you must call back to this facility for assistance.       Depression / Suicide Risk    As you are discharged from this [facility] facility, it is important to learn how to keep safe from harming yourself.    Recognize the warning signs:  · Abrupt changes in personality, positive or negative- including increase in energy   · Giving away possessions  · Change in eating patterns- significant weight changes-  positive or negative  · Change in sleeping patterns- unable to sleep or sleeping all the time   · Unwillingness or inability to communicate  · Depression  · Unusual sadness, discouragement and loneliness  · Talk of wanting to die  · Neglect of personal appearance   · Rebelliousness- reckless behavior  · Withdrawal from people/activities they love  · Confusion- inability to concentrate     If you or a loved one observes any of these behaviors or has concerns about self-harm, here's what you can do:  · Talk about it- your feelings and reasons for harming yourself  · Remove any means that you might use to hurt yourself (examples: pills, rope, extension cords,  firearm)  · Get professional help from the community (Mental Health, Substance Abuse, psychological counseling)  · Do not be alone:Call your Safe Contact- someone whom you trust who will be there for you.  · Call your local CRISIS HOTLINE 671-7992 or 442-555-3811  · Call your local Children's Mobile Crisis Response Team [place] (804) 746-5342 or www.[employer]  · Call the toll free National Suicide Prevention Hotlines   · National Suicide Prevention Lifeline 482-930-WURX (9043)  · National Hope Line Network 800-SUICIDE (120-7844)

## 2020-09-19 NOTE — PROGRESS NOTES
Patient discharged to home with friend and staff escort. IV discontinued. Prescriptions given to patient, verbalized understanding, consent signed and in chart. Discharge instructions given regarding follow up appointments, home O2, and activity.  Education provided, patient asked questions and verbalized understanding. Discharge paperwork signed and in chart. Patient is tolerating diet, stable when ambulating, and pain is well controlled. All belongings collected. No further questions or concerns at this time.

## 2020-09-19 NOTE — DISCHARGE PLANNING
Anticipated Discharge Disposition: home with oxygen    Action: RN HIRAM met with patient at bedside to discuss home oxygen as ordered. Patient verified his address on facesheet and gave verbal consent to send referral to [facility]. [facility] faxed to [facility].  Per BS RNMini, patient is cleared to discharge today if oxygen delivered.    Barriers to Discharge: oxygen set up    Plan: Case coordination to f/u with oxygen referral

## 2020-09-19 NOTE — DISCHARGE PLANNING
Received Choice form at 1008  Agency/Facility Name: [facility]   Referral sent per Choice form @ 1016  For DME O2      @1103  Agency/Facility Name: [facility]   Spoke To: Ritesh   Outcome: Per Ritesh, O2 will be delivered today and  will call to provide an ETA.     @1302  Agency/Facility Name: [facility]   Spoke To: Veronica   Outcome: Per Veronica, O2 has been delivered to pt.

## 2020-09-19 NOTE — PROGRESS NOTES
Bedside report received.  Assessment complete.  A&O x 4. Patient calls appropriately.  Patient ambulates with stand by assist. Bed alarm off.   Patient has 7/10 pain. Pain managed with prescribed medications.  Denies N&V. Tolerating regular diet.  Old chest tube site dressing in place, with old drainage noted.  + void, + flatus, - BM.  Patient denies SOB.  SCD's off.  Patient is awaiting PT sign off and home O2 for discharge.  Review plan with of care with patient. Call light and personal belongings with in reach. Hourly rounding in place. All needs met at this time.

## 2020-09-19 NOTE — THERAPY
"Occupational Therapy   Initial Evaluation     Patient Name: Dillon Centeno  Age:  55 y.o., Sex:  male  Medical Record #: [id number]  Today's Date: 9/19/2020     Precautions:  Fall Risk, Other (See Comments)  Comments:  new O2 use; per pt report hx of PTSD     Assessment  Patient is 55 y.o. male admitted for left facial swelling and SOB, also noted to have fallen out of bed ~2 days prior to admit. Pt self reports hx of PTSD, this admission pt is dx w/flail chest, subcutaneous emphysema, pneumomediastinum, traumatic pneumohemothorax, and kidney lesion. During today's' session pt demonstrated ability to complete ADL's and txfs w/spv, pt will likely have no O2 use at d/c and was able to manage tank during OT session. Pt expresses no concerns regarding d/c and will have support from brother.     Plan  Recommend Occupational Therapy for Evaluation only   .    DC Equipment Recommendations:  None  Discharge Recommendations:  Anticipate that the patient will have no further occupational therapy needs after discharge from the hospital     Subjective  \"Lets do this, I was in the  for 25 yrs I am good at adapting\"      Objective     09/19/20 0849   Prior Living Situation   Prior Services None   Housing / Facility 1 Story House   Steps Into Home 0   Steps In Home 0   Bathroom Set up Walk In Shower   Equipment Owned Single Point Cane   Lives with - Patient's Self Care Capacity Other (Comments)   Comments pt reports living w/brother who is an RN    Prior Level of ADL Function   Self Feeding Independent   Grooming / Hygiene Independent   Bathing Independent   Dressing Independent   Toileting Independent   Prior Level of IADL Function   Medication Management Independent   Laundry Independent   Kitchen Mobility Independent   Finances Independent   Home Management Independent   Shopping Independent   Prior Level Of Mobility Independent With Device in Community   Occupation (Pre-Hospital Vocational)   (vet)   Precautions "   Precautions Fall Risk;Other (See Comments)   Comments new O2 use; per pt report hx of PTSD    Pain 0 - 10 Group   Therapist Pain Assessment Nurse Notified;7  (RN present to medicate )   Cognition    Cognition / Consciousness WDL   Level of Consciousness Alert   Passive ROM Upper Body   Passive ROM Upper Body X   Comments frozen shoulder L hx of sx    Active ROM Upper Body   Active ROM Upper Body  X   Dominant Hand Right   Comments see above    Strength Upper Body   Upper Body Strength  WDL   Coordination Upper Body   Coordination X   Comments L shoulder grossly limited at baseline    Bed Mobility    Supine to Sit Supervised   Sit to Supine Supervised   Scooting Supervised   Rolling Supervised   ADL Assessment   Grooming Supervision;Standing   Upper Body Dressing Supervision   Lower Body Dressing Supervision   Toileting Supervision   Functional Mobility   Sit to Stand Supervised   Bed, Chair, Wheelchair Transfer Supervised   Toilet Transfers Supervised   Mobility walking in room no AD use of O2 tank in hallway    Visual Perception   Visual Perception  Not Tested   Activity Tolerance   Comments limited by pain    Education Group   Education Provided Role of Occupational Therapist   Role of Occupational Therapist Patient Response Patient;Acceptance;Demonstration   Problem List   Problem List None   Anticipated Discharge Equipment and Recommendations   DC Equipment Recommendations None   Discharge Recommendations Anticipate that the patient will have no further occupational therapy needs after discharge from the hospital   Interdisciplinary Plan of Care Collaboration   IDT Collaboration with  Nursing   Patient Position at End of Therapy In Bed;Tray Table within Reach;Call Light within Reach   Collaboration Comments RN aware of OT eval and pts efforts    Session Information   Date / Session Number  9/19 #1    Priority 0  (1x only )

## 2020-09-20 NOTE — DISCHARGE SUMMARY
DATE OF ADMISSION:  09/11/2020    DATE OF DISCHARGE:  09/19/2020    LENGTH OF STAY:  8 days.    ATTENDING PHYSICIAN:  Ambrose Salazar MD, trauma surgery    CONSULTING PHYSICIANS:  None.    DISCHARGE DIAGNOSES:  1. Trauma sustained from a ground-level fall.  2. Flail chest.  3. Pneumomediastinum.  4. Subcutaneous emphysema.  5. Traumatic pneumothorax.  6. Abnormal EKG.  7. Kidney lesion.  8. No contraindication to deep vein thrombosis (DVT prophylaxis).  9. Screening examination for infectious disease.    PROCEDURES:  None.    HISTORY OF PRESENT ILLNESS:  The patient is a 55-year-old male who reportedly   woke up on the day of admission with significant left-sided facial swelling   and associated shortness of breath.  Review of the records indicate that the   patient fell out of bed approximately 48 hours prior to this.  He had concern   about an allergic reaction and was found to have subcutaneous air instead of   facial swelling.  A right chest tube was placed.  The patient was triaged as a    in accordance with established prehospital protocols.    HOSPITAL COURSE:  On arrival, the patient underwent extensive radiographic and   laboratory studies.  He was admitted to the critical care team under the   direction and supervision of Dr. Ambrose Salazar.  He sustained the above   injuries and incurred the above diagnoses during his stay.    He transferred from the trauma bay to the trauma intensive care unit for   ongoing resuscitation, evaluation, and completion of a tertiary exam.  CT   imaging demonstrated multiple right-sided rib fractures, some of which were   displaced and segmental.  These involved the right 2nd through 9th ribs, which   did represent a flail chest.  He further sustained a pneumomediastinum,   significant subcutaneous emphysema that radiated throughout his lower neck,   chest, abdomen, and upper pelvis.  Biapical pneumothoraces were also noted.    He did have a right-sided chest tube  placement by the ER physician in the   emergency department.  The patient did not require intubation or mechanical   ventilation.  His chest tube was trended and ultimately removed on 09/17/2020.    Followup chest x-ray imaging post chest tube removal demonstrated no   pneumothorax with improving to a stable subcutaneous emphysema.    An incidental finding did include a complex hypodense left superior pole   kidney mass with possible posterior enhancement.  Renal malignancy was   possible.  An IP oncology consult was placed and the patient was extensively   counseled regarding this with a urology followup.    Screening examination for infectious disease was negative for COVID.  The   patient was placed on chemical DVT prophylaxis on 09/12/2020.    Admission EKG did show sinus tachycardia.  There was a consideration for an   anterior age-indeterminate infarcts.  He had no acute cardiac chest pain   during his stay.    He transferred from the trauma intensive care unit to the general surgical   [facility].  Therapists worked with him.  On the day of discharge, the patient was a   Sharath coma score of 15.  He was afebrile, nontoxic in appearance.  He still   did require supplemental oxygen and outpatient oxygen therapy was arranged.    He was tolerating an oral diet.  His bowel and bladder function had returned   to normal and smoking cessation counseling was provided.    DISCHARGE PHYSICAL EXAM:  Please see exam dated 09/19/2020.    DISCHARGE MEDICATIONS:  1. A narcotic check was completed as provided by [facility].  2. The opioid risk tool was completed.  3. Clonidine (Catapres) 0.2 mg tablet, take 1 tablet by mouth 2 times a day   for 14 days, dispensed 28 tabs, refills 0.  4. Docusate sodium 100 mg capsules, take 100 mg by mouth 2 times a day as   needed for constipation for up to 5 days, dispensed 10 capsules, refills 0.  5. Neurontin 100 mg capsule, take 1 capsule by mouth 3 times a day for 7  days,   dispensed 21 capsules, refills 0.  6. Lidocaine 5% patch, apply 1-2 patches to skin as directed every 24 hours   for 7 days, dispensed 14 patches, refills 0.  7. MS Contin 15 mg sustained-release tablet, take 1 tablet by mouth 3 times a   day for 5 days, dispensed 15 tablets, refills 0.  8. Oxycodone 5 mg tablet, take 1 tablet by mouth every 4 hours as needed for   up to 5 days, dispensed 30 tablets, refills 0.    DISPOSITION:  Patient will be discharged on 09/19/2020 with home oxygen at 3   liters nasal cannula.  He will follow up with Dr. Ambrose Salazar, trauma   surgery, within 1 week's time with a repeat chest x-ray, a script was   provided.  He may follow with him as needed or if symptoms worsen.  He will   need to follow up with the urologist for his renal lesion as soon as possible   upon discharge.  He will need to follow with a primary care provider as soon   as possible upon discharge for ongoing assessment, management, preventative   care, and reassessment of his hypertension.  The patient was instructed if he   was unable to follow up with the urologist or the primary care provider that   he may follow with Dr. Ambrose Salazar since he is established at his   followup appointment in 1 week's time.  Special attention was paid to the   signs and symptoms of infection, respiratory decompensation, and not smoking   while having oxygen in place.  He is to seek immediate medical attention if he   develops respiratory decompensation or the signs and symptoms of infection.    He demonstrated understanding of his discharge instructions and gave verbal   compliance.    TIME SPENT ON DISCHARGE:  55 minutes.             ____________________________________     LYN LANDERS / HALINA    DD:  09/19/2020 13:47:21  DT:  09/19/2020 15:15:08    D#:  [id number]  Job#:  [id number]

## 2020-09-21 NOTE — PROGRESS NOTES
9/21/2020- PHUONG Reyes contacted pt via home phone to introduce CCM services. Pt states he only needs contact info for scheduling follow up appointment. Pt acknowledges he will follow up with Dr. Ambrose Salazar. But was also given [facility] information for PCP follow up. Also gave patient CCM contact info for any future questions or concerns. Pt declined all CCM services at this time. Pt will be d/c from CCM services due to declining.

## 2020-09-26 ENCOUNTER — HOSPITAL ENCOUNTER (OUTPATIENT)
Dept: RADIOLOGY | Facility: MEDICAL CENTER | Age: 55
End: 2020-09-26
Attending: NURSE PRACTITIONER
Payer: MEDICAID

## 2021-03-15 DIAGNOSIS — Z23 NEED FOR VACCINATION: ICD-10-CM

## 2021-08-01 ENCOUNTER — HOSPITAL ENCOUNTER (EMERGENCY)
Dept: HOSPITAL 8 - ED | Age: 56
Discharge: HOME | End: 2021-08-01
Payer: MEDICAID

## 2021-08-01 VITALS — WEIGHT: 198.42 LBS | BODY MASS INDEX: 26.87 KG/M2 | HEIGHT: 72 IN

## 2021-08-01 VITALS — DIASTOLIC BLOOD PRESSURE: 90 MMHG | SYSTOLIC BLOOD PRESSURE: 140 MMHG

## 2021-08-01 DIAGNOSIS — M54.2: ICD-10-CM

## 2021-08-01 DIAGNOSIS — S09.90XA: Primary | ICD-10-CM
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
## 2021-08-01 DIAGNOSIS — F10.120: ICD-10-CM
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
## 2021-08-01 LAB
INR PPP: 0.98 (ref 0.93–1.1)
PROTHROMBIN TIME: 10.5 SECONDS (ref 9.6–11.5)

## 2021-08-01 PROCEDURE — 72125 CT NECK SPINE W/O DYE: CPT

## 2021-08-01 PROCEDURE — 36415 COLL VENOUS BLD VENIPUNCTURE: CPT

## 2021-08-01 PROCEDURE — 99285 EMERGENCY DEPT VISIT HI MDM: CPT

## 2021-08-01 PROCEDURE — 85610 PROTHROMBIN TIME: CPT

## 2021-08-01 PROCEDURE — 70450 CT HEAD/BRAIN W/O DYE: CPT

## 2021-08-01 NOTE — NUR
INITIAL PT CONTACT. PT BIBA C/O GLF AFTER TRIPPING ON SHOE LACE, "SOMETIMES IT 
JUST HAPPENS." +ETOH. +BLOOD THINNER USE, HX OF STROKE AND DVT. NO LOC. PT 
SITTING UPRIGHT ON GUABRAN DISLA, VSS. PT DENIES ANY NEEDS AT THIS TIME. CALL 
LIGHT AND BELONGINGS WITHIN REACH. ERP AT BEDSIDE.

## 2023-03-21 ENCOUNTER — HOSPITAL ENCOUNTER (EMERGENCY)
Facility: MEDICAL CENTER | Age: 58
End: 2023-03-21
Attending: STUDENT IN AN ORGANIZED HEALTH CARE EDUCATION/TRAINING PROGRAM
Payer: MEDICAID

## 2023-03-21 ENCOUNTER — APPOINTMENT (OUTPATIENT)
Dept: RADIOLOGY | Facility: MEDICAL CENTER | Age: 58
End: 2023-03-21
Attending: STUDENT IN AN ORGANIZED HEALTH CARE EDUCATION/TRAINING PROGRAM
Payer: MEDICAID

## 2023-03-21 VITALS
OXYGEN SATURATION: 97 % | HEART RATE: 88 BPM | TEMPERATURE: 97.3 F | WEIGHT: 180 LBS | BODY MASS INDEX: 25.2 KG/M2 | SYSTOLIC BLOOD PRESSURE: 138 MMHG | RESPIRATION RATE: 17 BRPM | DIASTOLIC BLOOD PRESSURE: 88 MMHG | HEIGHT: 71 IN

## 2023-03-21 DIAGNOSIS — M79.642 PAIN IN BOTH HANDS: ICD-10-CM
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
## 2023-03-21 DIAGNOSIS — F10.930 ALCOHOL WITHDRAWAL SYNDROME WITHOUT COMPLICATION (HCC): ICD-10-CM

## 2023-03-21 DIAGNOSIS — I96 NECROTIC TOES (HCC): ICD-10-CM

## 2023-03-21 DIAGNOSIS — M79.641 PAIN IN BOTH HANDS: ICD-10-CM

## 2023-03-21 LAB
ALBUMIN SERPL BCP-MCNC: 4.1 G/DL (ref 3.2–4.9)
ALBUMIN/GLOB SERPL: 1.6 G/DL
ALP SERPL-CCNC: 67 U/L (ref 30–99)
ALT SERPL-CCNC: 36 U/L (ref 2–50)
ANION GAP SERPL CALC-SCNC: 14 MMOL/L (ref 7–16)
AST SERPL-CCNC: 32 U/L (ref 12–45)
BASOPHILS # BLD AUTO: 0.1 % (ref 0–1.8)
BASOPHILS # BLD: 0.01 K/UL (ref 0–0.12)
BILIRUB SERPL-MCNC: 0.4 MG/DL (ref 0.1–1.5)
BUN SERPL-MCNC: 39 MG/DL (ref 8–22)
CALCIUM ALBUM COR SERPL-MCNC: 9.3 MG/DL (ref 8.5–10.5)
CALCIUM SERPL-MCNC: 9.4 MG/DL (ref 8.5–10.5)
CHLORIDE SERPL-SCNC: 96 MMOL/L (ref 96–112)
CO2 SERPL-SCNC: 23 MMOL/L (ref 20–33)
CREAT SERPL-MCNC: 0.71 MG/DL (ref 0.5–1.4)
EKG IMPRESSION: NORMAL
EOSINOPHIL # BLD AUTO: 0.05 K/UL (ref 0–0.51)
EOSINOPHIL NFR BLD: 0.6 % (ref 0–6.9)
ERYTHROCYTE [DISTWIDTH] IN BLOOD BY AUTOMATED COUNT: 53.6 FL (ref 35.9–50)
GFR SERPLBLD CREATININE-BSD FMLA CKD-EPI: 106 ML/MIN/1.73 M 2
GLOBULIN SER CALC-MCNC: 2.6 G/DL (ref 1.9–3.5)
GLUCOSE SERPL-MCNC: 124 MG/DL (ref 65–99)
HCT VFR BLD AUTO: 42.6 % (ref 42–52)
HGB BLD-MCNC: 14.9 G/DL (ref 14–18)
IMM GRANULOCYTES # BLD AUTO: 0.08 K/UL (ref 0–0.11)
IMM GRANULOCYTES NFR BLD AUTO: 0.9 % (ref 0–0.9)
LYMPHOCYTES # BLD AUTO: 0.61 K/UL (ref 1–4.8)
LYMPHOCYTES NFR BLD: 7.2 % (ref 22–41)
MCH RBC QN AUTO: 35.3 PG (ref 27–33)
MCHC RBC AUTO-ENTMCNC: 35 G/DL (ref 33.7–35.3)
MCV RBC AUTO: 100.9 FL (ref 81.4–97.8)
MONOCYTES # BLD AUTO: 0.62 K/UL (ref 0–0.85)
MONOCYTES NFR BLD AUTO: 7.3 % (ref 0–13.4)
NEUTROPHILS # BLD AUTO: 7.12 K/UL (ref 1.82–7.42)
NEUTROPHILS NFR BLD: 83.9 % (ref 44–72)
NRBC # BLD AUTO: 0 K/UL
NRBC BLD-RTO: 0 /100 WBC
PLATELET # BLD AUTO: 195 K/UL (ref 164–446)
PMV BLD AUTO: 9.3 FL (ref 9–12.9)
POTASSIUM SERPL-SCNC: 3.8 MMOL/L (ref 3.6–5.5)
PROT SERPL-MCNC: 6.7 G/DL (ref 6–8.2)
RBC # BLD AUTO: 4.22 M/UL (ref 4.7–6.1)
SODIUM SERPL-SCNC: 133 MMOL/L (ref 135–145)
WBC # BLD AUTO: 8.5 K/UL (ref 4.8–10.8)

## 2023-03-21 PROCEDURE — 80053 COMPREHEN METABOLIC PANEL: CPT

## 2023-03-21 PROCEDURE — 85025 COMPLETE CBC W/AUTO DIFF WBC: CPT

## 2023-03-21 PROCEDURE — 73130 X-RAY EXAM OF HAND: CPT | Mod: RT

## 2023-03-21 PROCEDURE — 700111 HCHG RX REV CODE 636 W/ 250 OVERRIDE (IP): Performed by: STUDENT IN AN ORGANIZED HEALTH CARE EDUCATION/TRAINING PROGRAM

## 2023-03-21 PROCEDURE — 700101 HCHG RX REV CODE 250: Performed by: STUDENT IN AN ORGANIZED HEALTH CARE EDUCATION/TRAINING PROGRAM

## 2023-03-21 PROCEDURE — 96366 THER/PROPH/DIAG IV INF ADDON: CPT

## 2023-03-21 PROCEDURE — 99285 EMERGENCY DEPT VISIT HI MDM: CPT

## 2023-03-21 PROCEDURE — 73130 X-RAY EXAM OF HAND: CPT | Mod: LT

## 2023-03-21 PROCEDURE — 700102 HCHG RX REV CODE 250 W/ 637 OVERRIDE(OP): Performed by: STUDENT IN AN ORGANIZED HEALTH CARE EDUCATION/TRAINING PROGRAM

## 2023-03-21 PROCEDURE — 93005 ELECTROCARDIOGRAM TRACING: CPT | Performed by: STUDENT IN AN ORGANIZED HEALTH CARE EDUCATION/TRAINING PROGRAM

## 2023-03-21 PROCEDURE — 96375 TX/PRO/DX INJ NEW DRUG ADDON: CPT

## 2023-03-21 PROCEDURE — 36415 COLL VENOUS BLD VENIPUNCTURE: CPT

## 2023-03-21 PROCEDURE — A9270 NON-COVERED ITEM OR SERVICE: HCPCS | Performed by: STUDENT IN AN ORGANIZED HEALTH CARE EDUCATION/TRAINING PROGRAM

## 2023-03-21 PROCEDURE — 96365 THER/PROPH/DIAG IV INF INIT: CPT

## 2023-03-21 RX ORDER — KETOROLAC TROMETHAMINE 30 MG/ML
15 INJECTION, SOLUTION INTRAMUSCULAR; INTRAVENOUS ONCE
Status: COMPLETED | OUTPATIENT
Start: 2023-03-21 | End: 2023-03-21

## 2023-03-21 RX ORDER — DIAZEPAM 5 MG/ML
10 INJECTION, SOLUTION INTRAMUSCULAR; INTRAVENOUS ONCE
Status: COMPLETED | OUTPATIENT
Start: 2023-03-21 | End: 2023-03-21

## 2023-03-21 RX ORDER — OXYCODONE HCL 10 MG/1
10 TABLET, FILM COATED, EXTENDED RELEASE ORAL ONCE
Status: COMPLETED | OUTPATIENT
Start: 2023-03-21 | End: 2023-03-21

## 2023-03-21 RX ADMIN — KETOROLAC TROMETHAMINE 15 MG: 30 INJECTION, SOLUTION INTRAMUSCULAR at 02:39

## 2023-03-21 RX ADMIN — OXYCODONE HYDROCHLORIDE 10 MG: 10 TABLET, FILM COATED, EXTENDED RELEASE ORAL at 04:42

## 2023-03-21 RX ADMIN — THIAMINE HYDROCHLORIDE: 100 INJECTION, SOLUTION INTRAMUSCULAR; INTRAVENOUS at 02:48

## 2023-03-21 RX ADMIN — DIAZEPAM 10 MG: 5 INJECTION, SOLUTION INTRAMUSCULAR; INTRAVENOUS at 02:40

## 2023-03-21 ASSESSMENT — LIFESTYLE VARIABLES
TOTAL SCORE: 0
HAVE YOU EVER FELT YOU SHOULD CUT DOWN ON YOUR DRINKING: NO
CONSUMPTION TOTAL: INCOMPLETE
TOTAL SCORE: 0
EVER FELT BAD OR GUILTY ABOUT YOUR DRINKING: NO
HEADACHE, FULLNESS IN HEAD: NOT PRESENT
TREMOR: MODERATE TREMOR WITH ARMS EXTENDED
AGITATION: SOMEWHAT MORE THAN NORMAL ACTIVITY
TOTAL SCORE: 0
ORIENTATION AND CLOUDING OF SENSORIUM: CANNOT DO SERIAL ADDITIONS OR IS UNCERTAIN ABOUT DATE
NAUSEA AND VOMITING: NO NAUSEA AND NO VOMITING
TOTAL SCORE: 14
PAROXYSMAL SWEATS: BEADS OF SWEAT OBVIOUS ON FOREHEAD
VISUAL DISTURBANCES: NOT PRESENT
DO YOU DRINK ALCOHOL: YES
AUDITORY DISTURBANCES: NOT PRESENT
ANXIETY: MODERATELY ANXIOUS OR GUARDED, SO ANXIETY IS INFERRED
HAVE PEOPLE ANNOYED YOU BY CRITICIZING YOUR DRINKING: NO
EVER HAD A DRINK FIRST THING IN THE MORNING TO STEADY YOUR NERVES TO GET RID OF A HANGOVER: NO

## 2023-03-21 ASSESSMENT — PAIN DESCRIPTION - PAIN TYPE: TYPE: ACUTE PAIN

## 2023-03-21 NOTE — ED NOTES
Attempted to assist pt with calling [facility] for transport home. [facility] unable to provide pt with a ride. [employer] able to give pt a bus pass.   Pt discharged independent via wheelchair and all belongings to the lobby. Discharge instructions reviewed with pt and pt has no follow up questions. Vitals and condition stable for discharge.

## 2023-03-21 NOTE — ED PROVIDER NOTES
ED Provider Note    CHIEF COMPLAINT  Chief Complaint   Patient presents with    GLF     Pt states that he tripped and fell this evening at home. Pt reports drinking 2 beers and a mixed drink this evening. Pt is disoriented to time and states that that is not normal for him to not know the year  (-)LOC  (-)head strike (-)thinners       EXTERNAL RECORDS REVIEWED  External ED Note 8/2021 at [facility] for alcohol intoxication     HPI/ROS  LIMITATION TO HISTORY   Select: Poor Historian  OUTSIDE HISTORIAN(S):  EMS Report included below    Dillon Centeno is a 58 y.o. male who presents s/p GLF.  He states he tripped and fell earlier this evening at home.  He denies hitting his head and says he put his hands out to prevent him from hitting his head.  His main complaint is 'hand pain' but states 'I know I didn't break nothing'.  He denies other injuries.     Patient states he has never had alcohol withdrawal however says he has seizures, though not in setting of withdrawal 'doesn't seem to matter if I drink or not'.  He says he last drank last night he does not drink every day.  He states he has tremors 'from time to time'    PAST MEDICAL HISTORY   has a past medical history of Hypertension.    SURGICAL HISTORY   has a past surgical history that includes closed reduction (Left, 12/24/2017); other; orif, shoulder (Left, 1/4/2018); and irrigation & debridement ortho (Left, 3/4/2018).    FAMILY HISTORY  No family history on file.    SOCIAL HISTORY  Social History     Tobacco Use    Smoking status: Every Day     Types: Cigars    Smokeless tobacco: Never   Substance and Sexual Activity    Alcohol use: Yes     Comment: a few beers a week    Drug use: No    Sexual activity: Not on file       CURRENT MEDICATIONS  Home Medications    **Home medications have not yet been reviewed for this encounter**         ALLERGIES  Allergies   Allergen Reactions    Sulfa Drugs Hives and Shortness of Breath       PHYSICAL EXAM  VITAL SIGNS: BP  "138/88   Pulse 88   Temp 36.3 °C (97.3 °F) (Temporal)   Resp 17   Ht 1.803 m (5' 11\")   Wt 81.6 kg (180 lb)   SpO2 97%   BMI 25.10 kg/m²    Constitutional: Awake and alert. Diaphoretic  HENT: Normocephalic, atraumatic  Eyes: Grossly normal  Neck: Normal range of motion  Cardiovascular: Normal heart rate   Thorax & Lungs: No respiratory distress  Abdomen: Nontender  Skin:  No pathologic rash.   Extremities:Bilateral tremors  Bilateral Hand Exam  No erythema, edema, ecchymosis, or broken skin.    Vascular: Symmetrically palpable radial and ulnar pulses. Capillary refill <2 seconds to all digits.  Sensory: Intact sensation to light touch of the radial, median and ulnar nerves demonstrated by testing in the dorsal web space of the thumb, the distal palmar aspect of the index finger, and the lateral surface of the fifth finger.  Motor: Intact motor function of the radial, median and ulnar nerves demonstrated by strength of '”Thumb's Up”, “Ok Sign”, and spreading of the 2nd through 5th digits. Intact recurrent median nerve as demonstrated by ability to move thumb fully through opposition, abduction and flexion. Full ROM intact at the PIP and DIP of all digits.  No snuffbox TTP or pain with axial load  Neuro: He is alert and oriented x 3, moving all extremities symmetrically  Psychiatric: Affect normal      DIAGNOSTIC STUDIES / PROCEDURES  EKG  I have independently interpreted this EKG  Sinus tachycardia, normal rhythm, normal axis.  No ST changes.  No significant and changes compared to prior.    LABS  Results for orders placed or performed during the hospital encounter of 03/21/23   CBC WITH DIFFERENTIAL   Result Value Ref Range    WBC 8.5 4.8 - 10.8 K/uL    RBC 4.22 (L) 4.70 - 6.10 M/uL    Hemoglobin 14.9 14.0 - 18.0 g/dL    Hematocrit 42.6 42.0 - 52.0 %    .9 (H) 81.4 - 97.8 fL    MCH 35.3 (H) 27.0 - 33.0 pg    MCHC 35.0 33.7 - 35.3 g/dL    RDW 53.6 (H) 35.9 - 50.0 fL    Platelet Count 195 164 - 446 K/uL "    MPV 9.3 9.0 - 12.9 fL    Neutrophils-Polys 83.90 (H) 44.00 - 72.00 %    Lymphocytes 7.20 (L) 22.00 - 41.00 %    Monocytes 7.30 0.00 - 13.40 %    Eosinophils 0.60 0.00 - 6.90 %    Basophils 0.10 0.00 - 1.80 %    Immature Granulocytes 0.90 0.00 - 0.90 %    Nucleated RBC 0.00 /100 WBC    Neutrophils (Absolute) 7.12 1.82 - 7.42 K/uL    Lymphs (Absolute) 0.61 (L) 1.00 - 4.80 K/uL    Monos (Absolute) 0.62 0.00 - 0.85 K/uL    Eos (Absolute) 0.05 0.00 - 0.51 K/uL    Baso (Absolute) 0.01 0.00 - 0.12 K/uL    Immature Granulocytes (abs) 0.08 0.00 - 0.11 K/uL    NRBC (Absolute) 0.00 K/uL   COMP METABOLIC PANEL   Result Value Ref Range    Sodium 133 (L) 135 - 145 mmol/L    Potassium 3.8 3.6 - 5.5 mmol/L    Chloride 96 96 - 112 mmol/L    Co2 23 20 - 33 mmol/L    Anion Gap 14.0 7.0 - 16.0    Glucose 124 (H) 65 - 99 mg/dL    Bun 39 (H) 8 - 22 mg/dL    Creatinine 0.71 0.50 - 1.40 mg/dL    Calcium 9.4 8.5 - 10.5 mg/dL    AST(SGOT) 32 12 - 45 U/L    ALT(SGPT) 36 2 - 50 U/L    Alkaline Phosphatase 67 30 - 99 U/L    Total Bilirubin 0.4 0.1 - 1.5 mg/dL    Albumin 4.1 3.2 - 4.9 g/dL    Total Protein 6.7 6.0 - 8.2 g/dL    Globulin 2.6 1.9 - 3.5 g/dL    A-G Ratio 1.6 g/dL   ESTIMATED GFR   Result Value Ref Range    GFR (CKD-EPI) 106 >60 mL/min/1.73 m 2   CORRECTED CALCIUM   Result Value Ref Range    Correct Calcium 9.3 8.5 - 10.5 mg/dL   EKG   Result Value Ref Range    Report       [facility] Emergency Dept.    Test Date:  2023  Pt Name:    ELI GREWAL                 Department: ER  MRN:        [id number]                      Room:        12  Gender:     Male                         Technician: [id number]  :        1965                   Requested By:ER TRIAGE PROTOCOL  Order #:    [id number]                    Reading MD:    Measurements  Intervals                                Axis  Rate:       115                          P:          26  AZ:         166                          QRS:        15  QRSD:        78                           T:          76  QT:         328  QTc:        454    Interpretive Statements  Sinus tachycardia  Anterior infarct, old  Baseline wander in lead(s) V5  Compared to ECG 09/11/2020 09:31:05  No significant changes           RADIOLOGY  I have independently interpreted the diagnostic imaging associated with this visit and am waiting the final reading from the radiologist.   My preliminary interpretation is as follows: XR no fracture  Radiologist interpretation:   DX-HAND 3+ RIGHT   Final Result         1.  No acute traumatic bony injury.      DX-HAND 3+ LEFT   Final Result         1.  No acute traumatic bony injury.              COURSE & MEDICAL DECISION MAKING    ED Observation Status? Yes; I am placing the patient in to an observation status due to a diagnostic uncertainty as well as therapeutic intensity. Patient placed in observation status at 2:19 AM, 3/21/2023.     Observation plan is as follows: IV, Labs, Fluids, Reevaluation    Upon Reevaluation, the patient's condition has: Not improved, leaving AMA    Patient discharged from ED Observation status at 3.46 AM 3/21    INITIAL ASSESSMENT, COURSE AND PLAN  Care Narrative: This is a 58-year-old male who is extremely pleasant but a very poor historian.  He presents initially for hand pain after sustaining a ground-level fall.  He adamantly denies any head strike and says that this is actually why he put his hands out to prevent this.  X-ray does not reveal any evidence of fracture.   Of note however patient arrives diaphoretic, tachycardic and hypertensive and tremulous and I am quite concerned for alcohol withdrawal.  He is a very poor historian but it sounds like he has been binge drinking and has not drunk in for a day also.  I did give him Valium and a rally bag which he responded well to and his vital signs improved as did his symptoms.  I reevaluated the patient and he does have significantly improved.  I had planned to observe him  in the ER to ensure that we had adequate control of his alcohol withdrawal . During this time, he tells his primary nurse that he is having shooting pain in his toes.  His sock was removed and he was noted to have a completely necrotic toe in addition to many open wounds.  These do appear to be fairly chronic without any obvious signs to suggest an acute infection.  He is additionally not septic.  I am concerned about the possibility of osteomyelitis.  Patient is stating that he needs to leave immediately to let his brother into the house as he has keys.  He does not want to stay in the emergency department for any further testing or work-up of this at this time.  He says that he will go home and then will return immediately to the emergency department for evaluation of this.  He clearly has decisional capacity is not altered or intoxicated and understands the risk of leaving including disability and even death and is willing to accept these risks. Patient left AMA in stable condition      HYDRATION: Based on the patient's presentation of Tachycardia the patient was given IV fluids. IV Hydration was used because oral hydration was not adequate alone. Upon recheck following hydration, the patient was improved.        DISPOSITION AND DISCUSSIONS  I have discussed management of the patient with the following physicians and KIMBERLI's:  None    Discussion of management with other QHP or appropriate source(s): None     Escalation of care considered, and ultimately not performed:after discussion with the patient / family, they have elected to decline an escalation in care    Barriers to care at this time, including but not limited to:  None .     Decision tools and prescription drugs considered including, but not limited to: Antibiotics Not currently indicated .    FINAL DIAGNOSIS  1. Fall from ground level Acute   2. Pain in both hands Acute   3. Alcohol withdrawal syndrome without complication (HCC) Acute   4. Necrotic toes  (HCC) Acute          Electronically signed by: Tenisha Marsh M.D., 3/21/2023 2:09 AM

## 2023-03-21 NOTE — DISCHARGE INSTRUCTIONS
Please return to the ER for evaluation of your feet as you will need further work-up for this to make sure that there is no infection and to provide wound care.

## 2023-03-21 NOTE — ED TRIAGE NOTES
"Chief Complaint   Patient presents with    GLF     Pt states that he tripped and fell this evening at home. Pt reports drinking 2 beers and a mixed drink this evening. Pt is disoriented to time and states that that is not normal for him to not know the year  (-)LOC  (-)head strike (-)thinners      Pt BIB EMS from home for the above complaint. Pt states he was drinking and tripped and fell onto his hands. Pt only complains of mild hand pain and denies hitting his head. Pt is tachycardic, diaphoretic, and tremulous upon arrival. He denies hx of alcohol withdrawal and drug use.     Pt was restrained by EMS for attempting to jump out of the ambulance upon arrival, but pt is cooperative with staff. No interventions from EMS PTA. EKG protocol ordered for tachycardia    BP (!) 161/97   Pulse (!) 114   Temp 36.6 °C (97.8 °F) (Temporal)   Resp (!) 21   Ht 1.803 m (5' 11\")   Wt 81.6 kg (180 lb)   SpO2 92%   BMI 25.10 kg/m²    "

## 2023-03-21 NOTE — ED NOTES
"Pt complaining of sharp pain in both of his feet. This RN took off pt's socks to examine feet, while pulling the sock on his right foot down his ankle, I noticed a hard, shriveled, dead toe stuck to the pt's sock. This RN proceeded to pull the sock off his foot and noticed a wound at the base of the third toe that appeared red with purulent drainage and that his 3rd toe was missing. (Pictures of wounds uploaded to pt's chart). An additional wound on pt's left 2nd toe was also noted with redness to the entire left foot. ERP notified of finding and at bedside to assess wounds.     Pt states that he wants to leave AMA and go home because \"I have to let my brother into the house and I am the only one with a key\". ERP at bedside to discuss risks of leaving AMA. Pt agreeing to finish IV fluids before he leaves to go home.  "

## 2023-09-26 ENCOUNTER — APPOINTMENT (OUTPATIENT)
Dept: RADIOLOGY | Facility: MEDICAL CENTER | Age: 58
DRG: 239 | End: 2023-09-26
Attending: EMERGENCY MEDICINE
Payer: MEDICAID

## 2023-09-26 ENCOUNTER — HOSPITAL ENCOUNTER (INPATIENT)
Facility: MEDICAL CENTER | Age: 58
LOS: 38 days | DRG: 239 | End: 2023-11-03
Attending: EMERGENCY MEDICINE | Admitting: INTERNAL MEDICINE
Payer: MEDICAID

## 2023-09-26 DIAGNOSIS — I99.8 LIMB ISCHEMIA: ICD-10-CM

## 2023-09-26 DIAGNOSIS — R78.81 BACTEREMIA: ICD-10-CM

## 2023-09-26 DIAGNOSIS — E87.1 HYPONATREMIA: ICD-10-CM

## 2023-09-26 DIAGNOSIS — N40.0 BENIGN PROSTATIC HYPERPLASIA, UNSPECIFIED WHETHER LOWER URINARY TRACT SYMPTOMS PRESENT: ICD-10-CM

## 2023-09-26 DIAGNOSIS — T14.90XA TRAUMA: ICD-10-CM

## 2023-09-26 DIAGNOSIS — I73.9 PAD (PERIPHERAL ARTERY DISEASE) (HCC): ICD-10-CM

## 2023-09-26 DIAGNOSIS — I96 GANGRENE OF LEFT FOOT (HCC): ICD-10-CM

## 2023-09-26 DIAGNOSIS — S31.000A SACRAL WOUND, INITIAL ENCOUNTER: ICD-10-CM

## 2023-09-26 DIAGNOSIS — S42.92XA CLOSED FRACTURE DISLOCATION OF LEFT SHOULDER JOINT, INITIAL ENCOUNTER: ICD-10-CM

## 2023-09-26 PROBLEM — D72.829 LEUKOCYTOSIS: Status: ACTIVE | Noted: 2023-09-26

## 2023-09-26 PROBLEM — R74.8 ELEVATED LIVER ENZYMES: Status: ACTIVE | Noted: 2023-09-26

## 2023-09-26 LAB
ALBUMIN SERPL BCP-MCNC: 3 G/DL (ref 3.2–4.9)
ALBUMIN/GLOB SERPL: 0.8 G/DL
ALP SERPL-CCNC: 100 U/L (ref 30–99)
ALT SERPL-CCNC: 47 U/L (ref 2–50)
AMORPH CRY #/AREA URNS HPF: PRESENT /HPF
ANION GAP SERPL CALC-SCNC: 11 MMOL/L (ref 7–16)
APPEARANCE UR: CLEAR
AST SERPL-CCNC: 46 U/L (ref 12–45)
BACTERIA #/AREA URNS HPF: ABNORMAL /HPF
BASOPHILS # BLD AUTO: 0.3 % (ref 0–1.8)
BASOPHILS # BLD: 0.03 K/UL (ref 0–0.12)
BILIRUB SERPL-MCNC: 0.4 MG/DL (ref 0.1–1.5)
BILIRUB UR QL STRIP.AUTO: ABNORMAL
BUN SERPL-MCNC: 14 MG/DL (ref 8–22)
CALCIUM ALBUM COR SERPL-MCNC: 9.1 MG/DL (ref 8.5–10.5)
CALCIUM SERPL-MCNC: 8.3 MG/DL (ref 8.5–10.5)
CHLORIDE SERPL-SCNC: 91 MMOL/L (ref 96–112)
CO2 SERPL-SCNC: 23 MMOL/L (ref 20–33)
COLOR UR: ABNORMAL
CREAT SERPL-MCNC: 0.47 MG/DL (ref 0.5–1.4)
EKG IMPRESSION: NORMAL
EOSINOPHIL # BLD AUTO: 0.02 K/UL (ref 0–0.51)
EOSINOPHIL NFR BLD: 0.2 % (ref 0–6.9)
EPI CELLS #/AREA URNS HPF: NEGATIVE /HPF
ERYTHROCYTE [DISTWIDTH] IN BLOOD BY AUTOMATED COUNT: 45.3 FL (ref 35.9–50)
GFR SERPLBLD CREATININE-BSD FMLA CKD-EPI: 119 ML/MIN/1.73 M 2
GLOBULIN SER CALC-MCNC: 3.9 G/DL (ref 1.9–3.5)
GLUCOSE BLD STRIP.AUTO-MCNC: 119 MG/DL (ref 65–99)
GLUCOSE SERPL-MCNC: 114 MG/DL (ref 65–99)
GLUCOSE UR STRIP.AUTO-MCNC: NEGATIVE MG/DL
HCT VFR BLD AUTO: 30.6 % (ref 42–52)
HGB BLD-MCNC: 9.9 G/DL (ref 14–18)
HYALINE CASTS #/AREA URNS LPF: ABNORMAL /LPF
IMM GRANULOCYTES # BLD AUTO: 0.09 K/UL (ref 0–0.11)
IMM GRANULOCYTES NFR BLD AUTO: 0.8 % (ref 0–0.9)
KETONES UR STRIP.AUTO-MCNC: ABNORMAL MG/DL
LACTATE SERPL-SCNC: 1.8 MMOL/L (ref 0.5–2)
LEUKOCYTE ESTERASE UR QL STRIP.AUTO: ABNORMAL
LYMPHOCYTES # BLD AUTO: 0.99 K/UL (ref 1–4.8)
LYMPHOCYTES NFR BLD: 8.9 % (ref 22–41)
MCH RBC QN AUTO: 28.1 PG (ref 27–33)
MCHC RBC AUTO-ENTMCNC: 32.4 G/DL (ref 32.3–36.5)
MCV RBC AUTO: 86.9 FL (ref 81.4–97.8)
MICRO URNS: ABNORMAL
MONOCYTES # BLD AUTO: 0.56 K/UL (ref 0–0.85)
MONOCYTES NFR BLD AUTO: 5 % (ref 0–13.4)
NEUTROPHILS # BLD AUTO: 9.46 K/UL (ref 1.82–7.42)
NEUTROPHILS NFR BLD: 84.8 % (ref 44–72)
NITRITE UR QL STRIP.AUTO: POSITIVE
NRBC # BLD AUTO: 0 K/UL
NRBC BLD-RTO: 0 /100 WBC (ref 0–0.2)
PH UR STRIP.AUTO: 5.5 [PH] (ref 5–8)
PLATELET # BLD AUTO: 805 K/UL (ref 164–446)
PMV BLD AUTO: 9.1 FL (ref 9–12.9)
POTASSIUM SERPL-SCNC: 4.6 MMOL/L (ref 3.6–5.5)
PROT SERPL-MCNC: 6.9 G/DL (ref 6–8.2)
PROT UR QL STRIP: NEGATIVE MG/DL
RBC # BLD AUTO: 3.52 M/UL (ref 4.7–6.1)
RBC # URNS HPF: ABNORMAL /HPF
RBC UR QL AUTO: NEGATIVE
SODIUM SERPL-SCNC: 125 MMOL/L (ref 135–145)
SP GR UR STRIP.AUTO: 1.02
UROBILINOGEN UR STRIP.AUTO-MCNC: 2 MG/DL
WBC # BLD AUTO: 11.2 K/UL (ref 4.8–10.8)
WBC #/AREA URNS HPF: ABNORMAL /HPF

## 2023-09-26 PROCEDURE — 700111 HCHG RX REV CODE 636 W/ 250 OVERRIDE (IP): Performed by: INTERNAL MEDICINE

## 2023-09-26 PROCEDURE — 99254 IP/OBS CNSLTJ NEW/EST MOD 60: CPT | Mod: 57 | Performed by: STUDENT IN AN ORGANIZED HEALTH CARE EDUCATION/TRAINING PROGRAM

## 2023-09-26 PROCEDURE — 93005 ELECTROCARDIOGRAM TRACING: CPT | Performed by: EMERGENCY MEDICINE

## 2023-09-26 PROCEDURE — 99254 IP/OBS CNSLTJ NEW/EST MOD 60: CPT | Performed by: SURGERY

## 2023-09-26 PROCEDURE — 83605 ASSAY OF LACTIC ACID: CPT

## 2023-09-26 PROCEDURE — 700102 HCHG RX REV CODE 250 W/ 637 OVERRIDE(OP): Performed by: INTERNAL MEDICINE

## 2023-09-26 PROCEDURE — 99223 1ST HOSP IP/OBS HIGH 75: CPT | Performed by: INTERNAL MEDICINE

## 2023-09-26 PROCEDURE — 99285 EMERGENCY DEPT VISIT HI MDM: CPT

## 2023-09-26 PROCEDURE — 82962 GLUCOSE BLOOD TEST: CPT

## 2023-09-26 PROCEDURE — 36415 COLL VENOUS BLD VENIPUNCTURE: CPT

## 2023-09-26 PROCEDURE — 87015 SPECIMEN INFECT AGNT CONCNTJ: CPT | Mod: 91

## 2023-09-26 PROCEDURE — 80053 COMPREHEN METABOLIC PANEL: CPT

## 2023-09-26 PROCEDURE — 770006 HCHG ROOM/CARE - MED/SURG/GYN SEMI*

## 2023-09-26 PROCEDURE — 96375 TX/PRO/DX INJ NEW DRUG ADDON: CPT

## 2023-09-26 PROCEDURE — 71045 X-RAY EXAM CHEST 1 VIEW: CPT

## 2023-09-26 PROCEDURE — 85025 COMPLETE CBC W/AUTO DIFF WBC: CPT

## 2023-09-26 PROCEDURE — 87086 URINE CULTURE/COLONY COUNT: CPT

## 2023-09-26 PROCEDURE — 87077 CULTURE AEROBIC IDENTIFY: CPT | Mod: 91

## 2023-09-26 PROCEDURE — 96376 TX/PRO/DX INJ SAME DRUG ADON: CPT

## 2023-09-26 PROCEDURE — 87186 SC STD MICRODIL/AGAR DIL: CPT | Mod: 91

## 2023-09-26 PROCEDURE — A9270 NON-COVERED ITEM OR SERVICE: HCPCS | Performed by: INTERNAL MEDICINE

## 2023-09-26 PROCEDURE — 96374 THER/PROPH/DIAG INJ IV PUSH: CPT

## 2023-09-26 PROCEDURE — 81001 URINALYSIS AUTO W/SCOPE: CPT

## 2023-09-26 PROCEDURE — 700105 HCHG RX REV CODE 258: Performed by: INTERNAL MEDICINE

## 2023-09-26 PROCEDURE — 87040 BLOOD CULTURE FOR BACTERIA: CPT | Mod: 91

## 2023-09-26 RX ORDER — NICOTINE 21 MG/24HR
21 PATCH, TRANSDERMAL 24 HOURS TRANSDERMAL
Status: DISCONTINUED | OUTPATIENT
Start: 2023-09-26 | End: 2023-10-26

## 2023-09-26 RX ORDER — OXYCODONE HYDROCHLORIDE 5 MG/1
2.5 TABLET ORAL
Status: DISCONTINUED | OUTPATIENT
Start: 2023-09-26 | End: 2023-09-27

## 2023-09-26 RX ORDER — POLYETHYLENE GLYCOL 3350 17 G/17G
1 POWDER, FOR SOLUTION ORAL
Status: DISCONTINUED | OUTPATIENT
Start: 2023-09-26 | End: 2023-11-03 | Stop reason: HOSPADM

## 2023-09-26 RX ORDER — OXYCODONE HYDROCHLORIDE 5 MG/1
5 TABLET ORAL
Status: DISCONTINUED | OUTPATIENT
Start: 2023-09-26 | End: 2023-09-27

## 2023-09-26 RX ORDER — PROMETHAZINE HYDROCHLORIDE 25 MG/1
12.5-25 TABLET ORAL EVERY 4 HOURS PRN
Status: DISCONTINUED | OUTPATIENT
Start: 2023-09-26 | End: 2023-10-26

## 2023-09-26 RX ORDER — ONDANSETRON 2 MG/ML
4 INJECTION INTRAMUSCULAR; INTRAVENOUS EVERY 4 HOURS PRN
Status: DISCONTINUED | OUTPATIENT
Start: 2023-09-26 | End: 2023-10-01

## 2023-09-26 RX ORDER — AMOXICILLIN 250 MG
2 CAPSULE ORAL 2 TIMES DAILY
Status: DISCONTINUED | OUTPATIENT
Start: 2023-09-26 | End: 2023-11-03 | Stop reason: HOSPADM

## 2023-09-26 RX ORDER — ACETAMINOPHEN 325 MG/1
650 TABLET ORAL EVERY 6 HOURS PRN
Status: DISCONTINUED | OUTPATIENT
Start: 2023-09-26 | End: 2023-10-01

## 2023-09-26 RX ORDER — HYDRALAZINE HYDROCHLORIDE 20 MG/ML
10 INJECTION INTRAMUSCULAR; INTRAVENOUS EVERY 4 HOURS PRN
Status: DISCONTINUED | OUTPATIENT
Start: 2023-09-26 | End: 2023-10-01

## 2023-09-26 RX ORDER — ONDANSETRON 4 MG/1
4 TABLET, ORALLY DISINTEGRATING ORAL EVERY 4 HOURS PRN
Status: DISCONTINUED | OUTPATIENT
Start: 2023-09-26 | End: 2023-11-03 | Stop reason: HOSPADM

## 2023-09-26 RX ORDER — SODIUM CHLORIDE, SODIUM LACTATE, POTASSIUM CHLORIDE, CALCIUM CHLORIDE 600; 310; 30; 20 MG/100ML; MG/100ML; MG/100ML; MG/100ML
INJECTION, SOLUTION INTRAVENOUS CONTINUOUS
Status: DISCONTINUED | OUTPATIENT
Start: 2023-09-26 | End: 2023-09-29

## 2023-09-26 RX ORDER — PROCHLORPERAZINE EDISYLATE 5 MG/ML
5-10 INJECTION INTRAMUSCULAR; INTRAVENOUS EVERY 4 HOURS PRN
Status: DISCONTINUED | OUTPATIENT
Start: 2023-09-26 | End: 2023-10-01

## 2023-09-26 RX ORDER — HYDROMORPHONE HYDROCHLORIDE 1 MG/ML
0.25 INJECTION, SOLUTION INTRAMUSCULAR; INTRAVENOUS; SUBCUTANEOUS
Status: DISCONTINUED | OUTPATIENT
Start: 2023-09-26 | End: 2023-09-27

## 2023-09-26 RX ORDER — BISACODYL 10 MG
10 SUPPOSITORY, RECTAL RECTAL
Status: DISCONTINUED | OUTPATIENT
Start: 2023-09-26 | End: 2023-11-03 | Stop reason: HOSPADM

## 2023-09-26 RX ORDER — PROMETHAZINE HYDROCHLORIDE 25 MG/1
12.5-25 SUPPOSITORY RECTAL EVERY 4 HOURS PRN
Status: DISCONTINUED | OUTPATIENT
Start: 2023-09-26 | End: 2023-10-26

## 2023-09-26 RX ADMIN — SODIUM CHLORIDE, POTASSIUM CHLORIDE, SODIUM LACTATE AND CALCIUM CHLORIDE: 600; 310; 30; 20 INJECTION, SOLUTION INTRAVENOUS at 15:21

## 2023-09-26 RX ADMIN — VANCOMYCIN HYDROCHLORIDE 2000 MG: 5 INJECTION, POWDER, LYOPHILIZED, FOR SOLUTION INTRAVENOUS at 12:55

## 2023-09-26 RX ADMIN — PIPERACILLIN AND TAZOBACTAM 4.5 G: 4; .5 INJECTION, POWDER, FOR SOLUTION INTRAVENOUS at 21:55

## 2023-09-26 RX ADMIN — OXYCODONE HYDROCHLORIDE 5 MG: 5 TABLET ORAL at 20:50

## 2023-09-26 RX ADMIN — NICOTINE TRANSDERMAL SYSTEM 21 MG: 21 PATCH, EXTENDED RELEASE TRANSDERMAL at 15:20

## 2023-09-26 RX ADMIN — PIPERACILLIN AND TAZOBACTAM 4.5 G: 4; .5 INJECTION, POWDER, FOR SOLUTION INTRAVENOUS at 11:44

## 2023-09-26 RX ADMIN — PIPERACILLIN AND TAZOBACTAM 4.5 G: 4; .5 INJECTION, POWDER, FOR SOLUTION INTRAVENOUS at 16:49

## 2023-09-26 RX ADMIN — OXYCODONE HYDROCHLORIDE 5 MG: 5 TABLET ORAL at 16:57

## 2023-09-26 RX ADMIN — HYDROMORPHONE HYDROCHLORIDE 0.25 MG: 1 INJECTION, SOLUTION INTRAMUSCULAR; INTRAVENOUS; SUBCUTANEOUS at 18:35

## 2023-09-26 RX ADMIN — OXYCODONE HYDROCHLORIDE 5 MG: 5 TABLET ORAL at 13:02

## 2023-09-26 ASSESSMENT — PAIN DESCRIPTION - PAIN TYPE
TYPE: ACUTE PAIN
TYPE: ACUTE PAIN

## 2023-09-26 ASSESSMENT — ENCOUNTER SYMPTOMS
PHOTOPHOBIA: 0
SPEECH CHANGE: 0
DIARRHEA: 0
CHILLS: 0
ABDOMINAL PAIN: 0
SENSORY CHANGE: 0
MYALGIAS: 1
CONSTIPATION: 0
DOUBLE VISION: 0
ORTHOPNEA: 0
HEADACHES: 0
SPUTUM PRODUCTION: 0
WEIGHT LOSS: 0
EYE PAIN: 0
HALLUCINATIONS: 0
NAUSEA: 0
TREMORS: 0
VOMITING: 0
FOCAL WEAKNESS: 0
BLURRED VISION: 0
NECK PAIN: 0
PALPITATIONS: 0
BACK PAIN: 0
TINGLING: 0
SHORTNESS OF BREATH: 0
COUGH: 0
FEVER: 0
DIZZINESS: 0

## 2023-09-26 ASSESSMENT — LIFESTYLE VARIABLES: SUBSTANCE_ABUSE: 1

## 2023-09-26 ASSESSMENT — FIBROSIS 4 INDEX: FIB4 SCORE: 1.61

## 2023-09-26 NOTE — H&P (VIEW-ONLY)
"9/26/2023    Time Called: 1300  Time Arrived: 1320    HPI: Dillon Centeno is a 58 y.o. male who presents with left foot necrosis/dry gangrene.  He presented to the ED with longstanding necrotic foot.  Patient is reportedly homeless previous diagnosis of diabetes and peripheral vascular disease.    Past Medical History:   Diagnosis Date    Hypertension        Past Surgical History:   Procedure Laterality Date    IRRIGATION & DEBRIDEMENT ORTHO Left 3/4/2018    Procedure: IRRIGATION & DEBRIDEMENT ORTHO - HUMERUS;  Surgeon: Sergio Watkins M.D.;  Location: SURGERY [facility];  Service: Orthopedics    ORIF, SHOULDER Left 1/4/2018    Procedure: SHOULDER ORIF;  Surgeon: Sergio Watkins M.D.;  Location: SURGERY [facility];  Service: Orthopedics    CLOSED REDUCTION Left 12/24/2017    Procedure: CLOSED REDUCTION;  Surgeon: Keith Subramanian M.D.;  Location: SURGERY [facility];  Service: Orthopedics    OTHER      multiple surgeries lower legs from past injuries       Medications  No current facility-administered medications on file prior to encounter.     No current outpatient medications on file prior to encounter.       Allergies  Sulfa drugs    ROS  . All other systems were reviewed and found to be negative    No family history on file.    Social History     Socioeconomic History    Marital status: Single   Tobacco Use    Smoking status: Every Day     Types: Cigars    Smokeless tobacco: Never   Substance and Sexual Activity    Alcohol use: Yes     Comment: a few beers a week    Drug use: No       Physical Exam  Vitals  /83   Pulse 83   Temp 36.1 °C (97 °F)   Resp 18   Ht 1.803 m (5' 11\")   Wt 77.1 kg (170 lb)   SpO2 97%   General: Well Developed, Well Nourished, Age appropriate appearance  HEENT: Normocephalic, atraumatic  Psych: Normal mood and affect  Neck: Supple, nontender, no masses  Lungs: Breathing unlabored, No audible wheezing  Heart: Regular heart rate and rhythm  Abdomen: Soft, " NT, ND  Skin: Necrosis left foot  Vascular: No flow distally.  Obviously necrotic foot and distal leg  MSK: Dry gangrene left foot and distal leg.      Radiographs:  DX-CHEST-PORTABLE (1 VIEW)   Final Result      1.  LEFT basilar atelectasis or pneumonia   2.  Trace LEFT pleural effusion or pleural scar          Laboratory Values  Recent Labs     09/26/23  0846   WBC 11.2*   RBC 3.52*   HEMOGLOBIN 9.9*   HEMATOCRIT 30.6*   MCV 86.9   MCH 28.1   MCHC 32.4   RDW 45.3   PLATELETCT 805*   MPV 9.1     Recent Labs     09/26/23  0938   SODIUM 125*   POTASSIUM 4.6   CHLORIDE 91*   CO2 23   GLUCOSE 114*   BUN 14             Impression: 58-year-old male history of peripheral vascular disease with left foot dry gangrene/necrosis.  Discussed with vascular surgery.  No plan for revascularization of the left lower extremity as he is obviously well past this point.  Patient will require below-knee amputation.  Discussed high risk of wound complications and/or need for further surgery including above-knee amputation possibly if he is unable to heal balloon amputation.  We will wait for medical clearance and plan for surgery likely tomorrow if cleared.    Plan:We discussed the diagnosis and findings with the patient at length.  We reviewed possible non operative and operative interventions and the risks and benefits of each of these.  he had a chance to ask questions and all of these were answered to his satisfaction. The patient chose to proceed with surgical intervention. Risks and benefits of surgery were discussed which include but are not limited to bleeding, infection, neurovascular damage, malunion, nonunion, instability, limb length discrepancy, DVT, PE, MI, Stroke and death. They understand these risks and wish to proceed.      Boo Dumont MD  Orthopedic Trauma Surgery

## 2023-09-26 NOTE — ED NOTES
Med Rec COMPLETE per PATIENT   Allergies reviewed  No oral antibiotics in the last 30 days  Preferred pharmacy: [facility] on [address]    Pt states he does not take any RX medications

## 2023-09-26 NOTE — CONSULTS
Vascular Surgery          New Patient Consultation    Patient:Dillon Centeno  MRN:[id number]    Date: 9/26/2023    Referring Provider: Luz Mejia M.d.    Consulting Physician: Quan Seth MD  _____________________________________________________    Reason for consultation:  Evaluate patient with necrotic left foot    HPI:  This is a 58 y.o. male who is homeless and has been to the emergency room several times for various issues.  The patient has known gangrene of his left foot.  The patient presents with essentially a dead foot.  There is substantial tissue loss which is clearly unsalvageable.  Patient has some scattered ulcerations on the contralateral foot.  Patient's disheveled and dirty.  Patient is being admitted by medicine and will require a left lower extremity amputation.  See discussion below    Past Medical History:   Diagnosis Date    Hypertension        Past Surgical History:   Procedure Laterality Date    IRRIGATION & DEBRIDEMENT ORTHO Left 3/4/2018    Procedure: IRRIGATION & DEBRIDEMENT ORTHO - HUMERUS;  Surgeon: Sergio Watkins M.D.;  Location: SURGERY [facility];  Service: Orthopedics    ORIF, SHOULDER Left 1/4/2018    Procedure: SHOULDER ORIF;  Surgeon: Sergio Watkins M.D.;  Location: SURGERY [facility];  Service: Orthopedics    CLOSED REDUCTION Left 12/24/2017    Procedure: CLOSED REDUCTION;  Surgeon: Keith Subramanian M.D.;  Location: SURGERY [facility];  Service: Orthopedics    OTHER      multiple surgeries lower legs from past injuries       Current Facility-Administered Medications   Medication Dose Route Frequency Provider Last Rate Last Admin    senna-docusate (Pericolace Or Senokot S) 8.6-50 MG per tablet 2 Tablet  2 Tablet Oral BID Luz Mejia M.D.        And    polyethylene glycol/lytes (Miralax) PACKET 1 Packet  1 Packet Oral QDAY PRN Luz Mejia M.D.        And    magnesium hydroxide (Milk Of Magnesia) suspension  30 mL  30 mL Oral QDAY PRN Luz Mejia M.D.        And    bisacodyl (Dulcolax) suppository 10 mg  10 mg Rectal QDAY PRN Luz Mejia M.D.        lactated ringers infusion   Intravenous Continuous Luz Mejia M.D.        acetaminophen (Tylenol) tablet 650 mg  650 mg Oral Q6HRS PRN Luz Mejia M.D.        Pharmacy Consult Request ...Pain Management Review 1 Each  1 Each Other PHARMACY TO DOSE Luz Mejia M.D.        oxyCODONE immediate-release (Roxicodone) tablet 2.5 mg  2.5 mg Oral Q3HRS PRN Luz Mejia M.D.        Or    oxyCODONE immediate-release (Roxicodone) tablet 5 mg  5 mg Oral Q3HRS PRN Luz Mejia M.D.        Or    HYDROmorphone (Dilaudid) injection 0.25 mg  0.25 mg Intravenous Q3HRS PRN Luz Mejia M.D.        ondansetron (Zofran) syringe/vial injection 4 mg  4 mg Intravenous Q4HRS PRN Luz Mejia M.D.        ondansetron (Zofran ODT) dispertab 4 mg  4 mg Oral Q4HRS PRN Luz Mejia M.D.        promethazine (Phenergan) tablet 12.5-25 mg  12.5-25 mg Oral Q4HRS PRN Luz Mejia M.D.        promethazine (Phenergan) suppository 12.5-25 mg  12.5-25 mg Rectal Q4HRS PRN Luz Mejia M.D.        prochlorperazine (Compazine) injection 5-10 mg  5-10 mg Intravenous Q4HRS PRN Luz Mejia M.D.        hydrALAZINE (Apresoline) injection 10 mg  10 mg Intravenous Q4HRS PRN Luz Mejia M.D.        nicotine (Nicoderm) 21 MG/24HR 21 mg  21 mg Transdermal Daily-0600 Luz Mejia M.D.        And    nicotine polacrilex (Nicorette) 2 MG piece 2 mg  2 mg Oral Q HOUR PRN Luz Mejia M.D.        piperacillin-tazobactam (Zosyn) 4.5 g in  mL IVPB  4.5 g Intravenous Q8HRS Luz Mejia M.D.        MD Alert...Vancomycin per Pharmacy   Other PHARMACY TO DOSE Luz Mejia M.D.        vancomycin (Vancocin) 2,000 mg in  mL IVPB  25 mg/kg  "Intravenous Once Luz Mejia M.D.         No current outpatient medications on file.       Social History     Socioeconomic History    Marital status: Single     Spouse name: Not on file    Number of children: Not on file    Years of education: Not on file    Highest education level: Not on file   Occupational History    Not on file   Tobacco Use    Smoking status: Every Day     Types: Cigars    Smokeless tobacco: Never   Substance and Sexual Activity    Alcohol use: Yes     Comment: a few beers a week    Drug use: No    Sexual activity: Not on file   Other Topics Concern    Not on file   Social History Narrative    Not on file     Social Determinants of Health     Financial Resource Strain: Not on file   Food Insecurity: Not on file   Transportation Needs: Not on file   Physical Activity: Not on file   Stress: Not on file   Social Connections: Not on file   Intimate Partner Violence: Not on file   Housing Stability: Not on file       No family history on file.    Allergies:  Sulfa drugs    Review of Systems:     Poor historian    Physical Exam:  /83   Pulse 83   Temp 36.1 °C (97 °F)   Resp 18   Ht 1.803 m (5' 11\")   Wt 77.1 kg (170 lb)   SpO2 97%   BMI 23.71 kg/m²     Constitutional: Disheveled deconditioned , poor dentition   HEENT:  Normocephalic and atraumatic, EOMI  Neck:   Supple, no JVD,   Cardiovascular: Regular rate and rhythm,   Pulmonary:  Good air entry bilaterally,    Abdominal:  Soft, non-tender, non-distended       Musculoskeletal: Extensive gangrene left foot scattered ulcerations right lower extremity   neurological:  CN II-XII grossly intact, no focal deficits  Skin:   Skin is warm and dry. No rash noted.  Vascular exam: Palpable femoral but absent pedal pulses     Labs:  Recent Labs     09/26/23  0846   WBC 11.2*   RBC 3.52*   HEMOGLOBIN 9.9*   HEMATOCRIT 30.6*   MCV 86.9   MCH 28.1   MCHC 32.4   RDW 45.3   PLATELETCT 805*   MPV 9.1     Recent Labs     09/26/23  0938 "   SODIUM 125*   POTASSIUM 4.6   CHLORIDE 91*   CO2 23   GLUCOSE 114*   BUN 14   CREATININE 0.47*   CALCIUM 8.3*         Recent Labs     09/26/23  0938   ASTSGOT 46*   ALTSGPT 47   TBILIRUBIN 0.4   ALKPHOSPHAT 100*   GLOBULIN 3.9*         Radiology:        Assessment/Plan:   -Severe gangrene left foot unsalvageable patient will require below the knee amputation.  Have recommended orthopedic surgery be consulted for the amputation.  Right lower extremity has some scattered ulcerations and will obtain arterial duplex to further assess the circulation of the right leg.  Patient needs the left leg removed before proceeding with any consideration for vascular reconstruction of the contralateral leg.  Vascular surgery will follow along.        Quan Seth MD  [facility]   Vo[place] preferred or call my office [phone]  __________________________________________________________________  Patient:Dillon Centeno   MRN:[id number]   CSN:[id number]

## 2023-09-26 NOTE — ED TRIAGE NOTES
"Chief Complaint   Patient presents with    Wound Check     Bilateral feet. Pts L foot is black and sloughing. Pt states unable to ambulate due to pain       Pt BIB EMS from his home. Pt states this happened over a few weeks after a scratch to his foot and wearing boots a size too small. Pt given 1 gram tylenol IV and 600 of ibuprofen by [facility]. Maggots noted in L foot wound    Pt aox4, gcs 15 .          /86   Pulse 90   Temp 36.1 °C (97 °F)   Resp 18   Ht 1.803 m (5' 11\")   Wt 77.1 kg (170 lb)   SpO2 98%   BMI 23.71 kg/m²   "

## 2023-09-26 NOTE — ASSESSMENT & PLAN NOTE
- s/p Left BKA on 9/27/2023.   - Vascular studies showed severe arterial disease. S/p bilateral common femoral artery endarterectomy and profundoplasty with saphenous vein angioplasty with stent placement in the right external iliac artery on 9/30/2023. Prevena was placed (5-7 days).  Continue Plavix for 1 month (ends 10/31) and aspirin for life. Follow-up with Dr. Navarro (vascular surgery). Staples to be removed 14 to 21 days postoperatively.   - Completed course of IV Zosyn on 10/6/2023.   - Noted dehiscence of incision site on the left BKA.  s/p debridement and secondary closure of dehisced surgical wound 10/20. Maintain NWB LLE.  -Now also having wound dehiscence on the left upper thigh surgical site.    S/p L AKA 10/23 by vascular surgery, wound vac on  Completed perioperative antibiotics  -Continue pain control with oral oxycodone and IV Dilaudid.  Wound care, wound vac management

## 2023-09-26 NOTE — ED NOTES
Medicated pt per MAR. Hospitalist okay for pt to have regular diet as surgery will happen tomorrow

## 2023-09-26 NOTE — PROGRESS NOTES
"Pharmacy Vancomycin Kinetics Note for 9/26/2023     58 y.o. male on Vancomycin day # 1 (Extensive L foot gangrene and tissue necrosis)     Vancomycin Indication (AUC Dosing): Skin/skin structure infection    Provider specified end date: 10/01/23    Active Antibiotics (From admission, onward)      Ordered     Ordering Provider       Tue Sep 26, 2023 11:33 AM    09/26/23 1133  MD Alert...Vancomycin per Pharmacy  PHARMACY TO DOSE        Question:  Indication(s) for vancomycin?  Answer:  Skin and soft tissue infection    Luz Mejia M.D.    09/26/23 1133  piperacillin-tazobactam (Zosyn) 4.5 g in  mL IVPB  (piperacillin-tazobactam (ZOSYN) IV (Extended-infusion) PANEL )  EVERY 8 HOURS        See [facility] for full Linked Orders Report.    Luz Mejia M.D.    09/26/23 1133  vancomycin (Vancocin) 2,000 mg in  mL IVPB  (vancomycin (VANCOCIN) IV (LD + Maintenance))  ONCE         Luz Mejia M.D.            Dosing Weight: 77.1 kg (169 lb 15.6 oz)      Admission History: Admitted on 9/26/2023 for Gangrene of left foot (HCC) [I96]  Pertinent history: Patient admitted w/ extensive L foot gangrene and tissue necrosis, PMH of diabetes and PVD. Vascular and orthopedic surgery consulted, planning for BKA. WBC 11, afebrile. Patient started on vancomycin and Zosyn.    Allergies:     Sulfa drugs     Pertinent cultures to date:     Results       Procedure Component Value Units Date/Time    BLOOD CULTURE [[id number]] Collected: 09/26/23 0937    Order Status: Sent Specimen: Blood from Peripheral Updated: 09/26/23 0956    Narrative:      Per Hospital Policy: Only change Specimen Src: to \"Line\" if  specified by physician order.  Release to patient->Immediate    URINALYSIS [[id number]]  (Abnormal) Collected: 09/26/23 0901    Order Status: Completed Specimen: Urine Updated: 09/26/23 0951     Color DK Yellow     Character Clear     Specific Gravity 1.021     Ph 5.5     Glucose Negative mg/dL      " "Ketones Trace mg/dL      Protein Negative mg/dL      Bilirubin Small     Urobilinogen, Urine 2.0     Nitrite Positive     Leukocyte Esterase Trace     Occult Blood Negative     Micro Urine Req Microscopic    Narrative:      Release to patient->Immediate  Indication for culture:->Emergency Room Patient    URINE CULTURE(NEW) [[id number]] Collected: 23    Order Status: Sent Specimen: Urine Updated: 23    Narrative:      Release to patient->Immediate  Indication for culture:->Emergency Room Patient    BLOOD CULTURE [[id number]] Collected: 23    Order Status: Sent Specimen: Blood from Peripheral Updated: 23    Narrative:      Per Hospital Policy: Only change Specimen Src: to \"Line\" if  specified by physician order.  Release to patient->Immediate            Labs:     Estimated Creatinine Clearance: 180.2 mL/min (A) (by C-G formula based on SCr of 0.47 mg/dL (L)).  Recent Labs     23   WBC 11.2*   NEUTSPOLYS 84.80*     Recent Labs     23   BUN 14   CREATININE 0.47*   ALBUMIN 3.0*     No intake or output data in the 24 hours ending 23 1331   /83   Pulse 83   Temp 36.1 °C (97 °F)   Resp 18   Ht 1.803 m (5' 11\")   Wt 77.1 kg (170 lb)   SpO2 97%  Temp (24hrs), Av.1 °C (97 °F), Min:36.1 °C (97 °F), Max:36.1 °C (97 °F)      List concerns for Vancomycin clearance:     Malnutrition/Low albumin    Pharmacokinetics:     AUC kinetics:   Ke (hr ^-1): 0.104 hr^-1  Half life: 6.66 hr  Clearance: 5.212  Estimated TDD: 2606  Estimated Dose: 945  Estimated interval: 8.7    A/P:     -  Vancomycin dose: 1250mg q12h (0100, 1300    -  Next vancomycin level(s): Should treatment continue would obtain levels prior to 5th maintenance dose.     -  Predicted vancomycin AUC from initial AUC test calculator: 480 mg·hr/L    -  Comments: Scr 0.47, stable within historical range, calculations made based off rounded Scr of 0.7. Surgery planned, follow for any operative " cultures. CMP ordered for 9/27.     Keith Witt, PharmD

## 2023-09-26 NOTE — H&P
Hospital Medicine History & Physical Note    Date of Service  9/26/2023    Primary Care Physician  Pcp Pt States None    Consultants  vascular surgery    Specialist Names: Dr. Seth    Code Status  Full Code    Chief Complaint  Chief Complaint   Patient presents with    Wound Check     Bilateral feet. Pts L foot is black and sloughing. Pt states unable to ambulate due to pain       History of Presenting Illness    Dillon Centeno is a 58 y.o. male with past medical history of hypertension who presented to the hospital on 9/26/2023 with complaint of bilateral lower extremity wound.  Patient reported that he found to a significant wound on his left lower extremity especially involving his left foot that for started 2 weeks ago.  He reported that he was wearing a shoe that was to size smaller than his actual size and he developed this wound.  He reported pain in his left lower extremity that is radiating towards his whole leg.  He was taking Tylenol at home but there is no specific improvement.  There is no specific aggravating or alleviating factors.  There is no specific associated symptoms.  He does not take any medications on regular basis.  He smokes 1 pack a day and drinks 1-2 drinks of alcohol daily and sometimes he does not drink alcohol at all.  He denies any history of symptoms of alcohol withdrawal.    ER course: Patient found to have gangrene on his left foot most likely due to vascular disease.  ER physician consulted with vascular surgeon Dr. Seth.    I discussed about this admission with ER physician Dr. Dey    I discussed the plan of care with patient, bedside RN, and pharmacy.    Review of Systems  Review of Systems   Constitutional:  Negative for chills, fever and weight loss.   HENT:  Negative for hearing loss and tinnitus.    Eyes:  Negative for blurred vision, double vision, photophobia and pain.   Respiratory:  Negative for cough, sputum production and shortness of breath.     Cardiovascular:  Negative for chest pain, palpitations, orthopnea and leg swelling.   Gastrointestinal:  Negative for abdominal pain, constipation, diarrhea, nausea and vomiting.   Genitourinary:  Negative for dysuria, frequency and urgency.   Musculoskeletal:  Positive for myalgias. Negative for back pain, joint pain and neck pain.   Skin:  Negative for rash.        Wounds on bilateral legs   Neurological:  Negative for dizziness, tingling, tremors, sensory change, speech change, focal weakness and headaches.   Psychiatric/Behavioral:  Positive for substance abuse. Negative for hallucinations.    All other systems reviewed and are negative.      Past Medical History   has a past medical history of Hypertension.    Surgical History   has a past surgical history that includes closed reduction (Left, 12/24/2017); other; orif, shoulder (Left, 1/4/2018); and irrigation & debridement ortho (Left, 3/4/2018).     Family History  family history is not on file.   Family history reviewed with patient. There is no family history that is pertinent to the chief complaint.     Social History   reports that he has been smoking cigars. He has never used smokeless tobacco. He reports current alcohol use. He reports that he does not use drugs.    Allergies  Allergies   Allergen Reactions    Sulfa Drugs Hives and Shortness of Breath       Medications  None       Physical Exam  Temp:  [36.1 °C (97 °F)] 36.1 °C (97 °F)  Pulse:  [77-90] 83  Resp:  [18] 18  BP: (131-153)/(83-89) 138/83  SpO2:  [97 %-98 %] 97 %  Blood Pressure: 138/83   Temperature: 36.1 °C (97 °F)   Pulse: 83   Respiration: 18   Pulse Oximetry: 97 %       Physical Exam  Vitals reviewed.   Constitutional:       General: He is not in acute distress.     Appearance: He is ill-appearing.   HENT:      Head: Normocephalic and atraumatic. No contusion.      Right Ear: External ear normal.      Left Ear: External ear normal.      Nose: Nose normal.      Mouth/Throat:       "Mouth: Mucous membranes are dry.      Pharynx: No oropharyngeal exudate.   Eyes:      General:         Right eye: No discharge.         Left eye: No discharge.      Pupils: Pupils are equal, round, and reactive to light.   Cardiovascular:      Rate and Rhythm: Normal rate and regular rhythm.      Heart sounds: No murmur heard.     No friction rub. No gallop.   Pulmonary:      Effort: Pulmonary effort is normal.      Breath sounds: No wheezing or rhonchi.   Abdominal:      General: Bowel sounds are normal. There is no distension.      Palpations: Abdomen is soft.      Tenderness: There is no abdominal tenderness. There is no rebound.   Musculoskeletal:         General: No swelling or tenderness.      Cervical back: No rigidity. No muscular tenderness.      Comments: Gangrene on left foot   Skin:     General: Skin is warm and dry.      Coloration: Skin is not jaundiced.   Neurological:      General: No focal deficit present.      Mental Status: He is alert and oriented to person, place, and time.      Cranial Nerves: No cranial nerve deficit.      Sensory: No sensory deficit.   Psychiatric:         Mood and Affect: Mood normal.         Laboratory:  Recent Labs     09/26/23  0846   WBC 11.2*   RBC 3.52*   HEMOGLOBIN 9.9*   HEMATOCRIT 30.6*   MCV 86.9   MCH 28.1   MCHC 32.4   RDW 45.3   PLATELETCT 805*   MPV 9.1     Recent Labs     09/26/23  0938   SODIUM 125*   POTASSIUM 4.6   CHLORIDE 91*   CO2 23   GLUCOSE 114*   BUN 14   CREATININE 0.47*   CALCIUM 8.3*     Recent Labs     09/26/23  0938   ALTSGPT 47   ASTSGOT 46*   ALKPHOSPHAT 100*   TBILIRUBIN 0.4   GLUCOSE 114*         No results for input(s): \"NTPROBNP\" in the last 72 hours.      No results for input(s): \"TROPONINT\" in the last 72 hours.    Imaging:  DX-CHEST-PORTABLE (1 VIEW)   Final Result      1.  LEFT basilar atelectasis or pneumonia   2.  Trace LEFT pleural effusion or pleural scar          X-Ray:  My impression is: Chest x-ray my interpretation showed " possible small left pleural effusion    Assessment/Plan:  Justification for Admission Status  I anticipate this patient will require at least two midnights for appropriate medical management, necessitating inpatient admission because due to left foot gangrene that will require surgical intervention and he will need broad-spectrum antibiotic for underlying infection    Patient will need a Med/Surg bed on MEDICAL service .  The need is secondary to left foot gangrene.    * Gangrene of left foot (HCC)- (present on admission)  Assessment & Plan  Patient found to have significant gangrene on his left foot that may need surgical intervention.  Vascular surgery consulted by ERP  I started him on pain medication with oxycodone and IV Dilaudid.  Monitor for adverse effect of narcotic pain medications.  Due to signs of cellulitis I started him on broad-spectrum antibiotic vancomycin and Zosyn.  Vancomycin dose adjustment as per vancomycin trough level and monitor for vancomycin toxicity.  Follow culture results been  I am keeping him n.p.o. for now.  Holding pharmacological DVT prophylaxis for possible surgical intervention.    Hyponatremia  Assessment & Plan  Patient found to have hyponatremia most likely due to dehydration.  I started him on IV fluid  Ordered repeat sodium after 4 hours.      Elevated liver enzymes  Assessment & Plan  Elevated liver enzymes.  No abdominal pain.  I have ordered acute hepatitis panel.    Leukocytosis  Assessment & Plan  Patient found to have leukocytosis most likely secondary to lower extremity wound.  Ordered CBC for tomorrow.  I started him on broad-spectrum antibiotics.      I discussed about this admission with ER physician Dr. Dey    VTE prophylaxis: SCDs/TEDs

## 2023-09-26 NOTE — ED NOTES
Pt being transported to [place] [id number] in stable condition with transport. All belongings and chart with pt

## 2023-09-26 NOTE — ED PROVIDER NOTES
"ED Provider Note    CHIEF COMPLAINT  Chief Complaint   Patient presents with    Wound Check     Bilateral feet. Pts L foot is black and sloughing. Pt states unable to ambulate due to pain       EXTERNAL RECORDS REVIEWED  Reviewed previous ED visits and baseline imaging for comparison    HPI/ROS  LIMITATION TO HISTORY   The patient is a poor historian.  OUTSIDE HISTORIAN(S):  None    Dillon Centeno is a 58 y.o. male who presents to the emergency department for evaluation of black feet.  The patient's left foot is black and clearly necrotic.  Right foot has some discoloration as well.  Patient has been there for 2 weeks.  Denies any pain or injury.  Denies any fevers or chills.    PAST MEDICAL HISTORY   has a past medical history of Hypertension.    SURGICAL HISTORY   has a past surgical history that includes closed reduction (Left, 12/24/2017); other; orif, shoulder (Left, 1/4/2018); and irrigation & debridement ortho (Left, 3/4/2018).    FAMILY HISTORY  No family history on file.    SOCIAL HISTORY  Social History     Tobacco Use    Smoking status: Every Day     Types: Cigars    Smokeless tobacco: Never   Substance and Sexual Activity    Alcohol use: Yes     Comment: a few beers a week    Drug use: No    Sexual activity: Not on file       CURRENT MEDICATIONS  Home Medications       Reviewed by Svetlana Brandon R.N. (Registered Nurse) on 09/26/23 at 0837  Med List Status: Partial     Medication Last Dose Status   acetaminophen (TYLENOL) 325 MG Tab  Active   Ascorbic Acid (VITAMIN C PO)  Active   ibuprofen (MOTRIN) 200 MG Tab  Active                    ALLERGIES  Allergies   Allergen Reactions    Sulfa Drugs Hives and Shortness of Breath       PHYSICAL EXAM  VITAL SIGNS: /86   Pulse 90   Temp 36.1 °C (97 °F)   Resp 18   Ht 1.803 m (5' 11\")   Wt 77.1 kg (170 lb)   SpO2 98%   BMI 23.71 kg/m²    Constitutional: Awake alert chronically ill-appearing no acute cardiopulmonary distress.  HENT: Normocephalic, " Atraumatic,   Eyes: PERRL, EOMI, Conjunctiva normal, No discharge.   Neck: Normal range of motion, No tenderness, Supple, No stridor.   Cardiovascular: Normal heart rate, Normal rhythm, No murmurs, No rubs, No gallops.   Thorax & Lungs: Normal breath sounds, No respiratory distress,  Abdomen: Bowel sounds normal, Soft, No tenderness  Musculoskeletal: Good range of motion in all major joints.  Right lower extremity  Left lower extremity from the mid shin down is black and necrotic.  Neurologic: Alert, No focal deficits noted.   Psychiatric: Affect normal      DIAGNOSTIC STUDIES / PROCEDURES    Results for orders placed or performed during the hospital encounter of 09/26/23   LACTIC ACID   Result Value Ref Range    Lactic Acid 1.8 0.5 - 2.0 mmol/L   CBC WITH DIFFERENTIAL   Result Value Ref Range    WBC 11.2 (H) 4.8 - 10.8 K/uL    RBC 3.52 (L) 4.70 - 6.10 M/uL    Hemoglobin 9.9 (L) 14.0 - 18.0 g/dL    Hematocrit 30.6 (L) 42.0 - 52.0 %    MCV 86.9 81.4 - 97.8 fL    MCH 28.1 27.0 - 33.0 pg    MCHC 32.4 32.3 - 36.5 g/dL    RDW 45.3 35.9 - 50.0 fL    Platelet Count 805 (H) 164 - 446 K/uL    MPV 9.1 9.0 - 12.9 fL    Neutrophils-Polys 84.80 (H) 44.00 - 72.00 %    Lymphocytes 8.90 (L) 22.00 - 41.00 %    Monocytes 5.00 0.00 - 13.40 %    Eosinophils 0.20 0.00 - 6.90 %    Basophils 0.30 0.00 - 1.80 %    Immature Granulocytes 0.80 0.00 - 0.90 %    Nucleated RBC 0.00 0.00 - 0.20 /100 WBC    Neutrophils (Absolute) 9.46 (H) 1.82 - 7.42 K/uL    Lymphs (Absolute) 0.99 (L) 1.00 - 4.80 K/uL    Monos (Absolute) 0.56 0.00 - 0.85 K/uL    Eos (Absolute) 0.02 0.00 - 0.51 K/uL    Baso (Absolute) 0.03 0.00 - 0.12 K/uL    Immature Granulocytes (abs) 0.09 0.00 - 0.11 K/uL    NRBC (Absolute) 0.00 K/uL   URINALYSIS    Specimen: Urine   Result Value Ref Range    Color DK Yellow     Character Clear     Specific Gravity 1.021 <1.035    Ph 5.5 5.0 - 8.0    Glucose Negative Negative mg/dL    Ketones Trace (A) Negative mg/dL    Protein Negative Negative  mg/dL    Bilirubin Small (A) Negative    Urobilinogen, Urine 2.0 Negative    Nitrite Positive (A) Negative    Leukocyte Esterase Trace (A) Negative    Occult Blood Negative Negative    Micro Urine Req Microscopic    URINE MICROSCOPIC (W/UA)   Result Value Ref Range    WBC 0-2 (A) /hpf    RBC 0-2 (A) /hpf    Bacteria Many (A) None /hpf    Epithelial Cells Negative /hpf    Amorphous Crystal Present /hpf    Hyaline Cast 0-2 /lpf   Comp Metabolic Panel   Result Value Ref Range    Sodium 125 (L) 135 - 145 mmol/L    Potassium 4.6 3.6 - 5.5 mmol/L    Chloride 91 (L) 96 - 112 mmol/L    Co2 23 20 - 33 mmol/L    Anion Gap 11.0 7.0 - 16.0    Glucose 114 (H) 65 - 99 mg/dL    Bun 14 8 - 22 mg/dL    Creatinine 0.47 (L) 0.50 - 1.40 mg/dL    Calcium 8.3 (L) 8.5 - 10.5 mg/dL    Correct Calcium 9.1 8.5 - 10.5 mg/dL    AST(SGOT) 46 (H) 12 - 45 U/L    ALT(SGPT) 47 2 - 50 U/L    Alkaline Phosphatase 100 (H) 30 - 99 U/L    Total Bilirubin 0.4 0.1 - 1.5 mg/dL    Albumin 3.0 (L) 3.2 - 4.9 g/dL    Total Protein 6.9 6.0 - 8.2 g/dL    Globulin 3.9 (H) 1.9 - 3.5 g/dL    A-G Ratio 0.8 g/dL   ESTIMATED GFR   Result Value Ref Range    GFR (CKD-EPI) 119 >60 mL/min/1.73 m 2   EKG (NOW)   Result Value Ref Range    Report       [facility] Emergency Dept.    Test Date:  2023  Pt Name:    ELI GREWAL                 Department: ER  MRN:        [id number]                      Room:       Health system  Gender:     Male                         Technician: [id number]  :        1965                   Requested By:SAMIRA JACOB  Order #:    [id number]                    Reading MD: SAMIRA JACOB. AMD    Measurements  Intervals                                Axis  Rate:       87                           P:          -36  MO:         187                          QRS:        34  QRSD:       87                           T:          80  QT:         400  QTc:        482    Interpretive Statements  Sinus rhythm  Low voltage, extremity and  precordial leads  Borderline prolonged QT interval  Compared to ECG 03/21/2023 01:43:00  Low QRS voltage now present  Sinus tachycardia no longer present  Myocardial infarct finding no longer present  Electronically Signed On 09- 09:18:37 PDT by SAMIRA CLARK. [facility]          RADIOLOGY  I have independently interpreted the diagnostic imaging associated with this visit and am waiting the final reading from the radiologist.   My preliminary interpretation is as follows: I reviewed the images and agree with radiologist interpretation.  Radiologist interpretation:     DX-CHEST-PORTABLE (1 VIEW)   Final Result      1.  LEFT basilar atelectasis or pneumonia   2.  Trace LEFT pleural effusion or pleural scar            COURSE & MEDICAL DECISION MAKING    ED Observation Status? No; Patient does not meet criteria for ED Observation.     INITIAL ASSESSMENT, COURSE AND PLAN  Care Narrative:     Patient presents the ER with a necrotic left foot and wounds on his right foot.  He states he been present only for a short period of time but this is chronically ischemic.    Differential diagnosis includes vascular etiology versus infection versus frostbite versus other.    The patient's work-up with labs, and imaging.  I do not think x-rays of his foot again to be helpful he has a delay that will need to be amputated.  He has poor perfusion to both legs and difficult to appreciate a pulse in the right groin but are present.  Left groin.  I suspect this is most likely to be vascular.    Labs are obtained a consult to the pharmacy for antibiotic administration.  Consulted vascular surgery to get some direction on imaging and they want to see the patient in consultation prior to imaging.    I spoke with Dr. Dumont on-call for orthopedics who will see the patient for amputation surgical management of his wounds.    Patient be hospitalized under the care of the hospital further work-up and treatment.  Care transferred to   Samantha            DISPOSITION AND DISCUSSIONS  I have discussed management of the patient with the following physicians and KIMBERLI's: Vascular surgery, hospitalist and orthopedic surgeon..    Case discussed with pharmacy for antibiotic selection.    Barriers to care at this time, including but not limited to: Patient does not have established PCP and Patient lacks transportation .       FINAL DIAGNOSIS  1. Limb ischemia    2. Hyponatremia    3.  Necrotic left leg   4.  Necrotic wound on right foot.    Electronically signed by: Easton Dey M.D., 9/26/2023 8:58 AM

## 2023-09-26 NOTE — ED NOTES
Break RN: Patient medicated per MAR for pain. IV abx started per MAR. No other needs or complaints at this time.

## 2023-09-26 NOTE — CONSULTS
"9/26/2023    Time Called: 1300  Time Arrived: 1320    HPI: Dillon Centeno is a 58 y.o. male who presents with left foot necrosis/dry gangrene.  He presented to the ED with longstanding necrotic foot.  Patient is reportedly homeless previous diagnosis of diabetes and peripheral vascular disease.    Past Medical History:   Diagnosis Date    Hypertension        Past Surgical History:   Procedure Laterality Date    IRRIGATION & DEBRIDEMENT ORTHO Left 3/4/2018    Procedure: IRRIGATION & DEBRIDEMENT ORTHO - HUMERUS;  Surgeon: Sergio Watkins M.D.;  Location: SURGERY [facility];  Service: Orthopedics    ORIF, SHOULDER Left 1/4/2018    Procedure: SHOULDER ORIF;  Surgeon: Sergio Watkins M.D.;  Location: SURGERY [facility];  Service: Orthopedics    CLOSED REDUCTION Left 12/24/2017    Procedure: CLOSED REDUCTION;  Surgeon: Keith Subramanian M.D.;  Location: SURGERY [facility];  Service: Orthopedics    OTHER      multiple surgeries lower legs from past injuries       Medications  No current facility-administered medications on file prior to encounter.     No current outpatient medications on file prior to encounter.       Allergies  Sulfa drugs    ROS  . All other systems were reviewed and found to be negative    No family history on file.    Social History     Socioeconomic History    Marital status: Single   Tobacco Use    Smoking status: Every Day     Types: Cigars    Smokeless tobacco: Never   Substance and Sexual Activity    Alcohol use: Yes     Comment: a few beers a week    Drug use: No       Physical Exam  Vitals  /83   Pulse 83   Temp 36.1 °C (97 °F)   Resp 18   Ht 1.803 m (5' 11\")   Wt 77.1 kg (170 lb)   SpO2 97%   General: Well Developed, Well Nourished, Age appropriate appearance  HEENT: Normocephalic, atraumatic  Psych: Normal mood and affect  Neck: Supple, nontender, no masses  Lungs: Breathing unlabored, No audible wheezing  Heart: Regular heart rate and rhythm  Abdomen: Soft, " NT, ND  Skin: Necrosis left foot  Vascular: No flow distally.  Obviously necrotic foot and distal leg  MSK: Dry gangrene left foot and distal leg.      Radiographs:  DX-CHEST-PORTABLE (1 VIEW)   Final Result      1.  LEFT basilar atelectasis or pneumonia   2.  Trace LEFT pleural effusion or pleural scar          Laboratory Values  Recent Labs     09/26/23  0846   WBC 11.2*   RBC 3.52*   HEMOGLOBIN 9.9*   HEMATOCRIT 30.6*   MCV 86.9   MCH 28.1   MCHC 32.4   RDW 45.3   PLATELETCT 805*   MPV 9.1     Recent Labs     09/26/23  0938   SODIUM 125*   POTASSIUM 4.6   CHLORIDE 91*   CO2 23   GLUCOSE 114*   BUN 14             Impression: 58-year-old male history of peripheral vascular disease with left foot dry gangrene/necrosis.  Discussed with vascular surgery.  No plan for revascularization of the left lower extremity as he is obviously well past this point.  Patient will require below-knee amputation.  Discussed high risk of wound complications and/or need for further surgery including above-knee amputation possibly if he is unable to heal balloon amputation.  We will wait for medical clearance and plan for surgery likely tomorrow if cleared.    Plan:We discussed the diagnosis and findings with the patient at length.  We reviewed possible non operative and operative interventions and the risks and benefits of each of these.  he had a chance to ask questions and all of these were answered to his satisfaction. The patient chose to proceed with surgical intervention. Risks and benefits of surgery were discussed which include but are not limited to bleeding, infection, neurovascular damage, malunion, nonunion, instability, limb length discrepancy, DVT, PE, MI, Stroke and death. They understand these risks and wish to proceed.      Boo Dumont MD  Orthopedic Trauma Surgery

## 2023-09-27 ENCOUNTER — ANESTHESIA EVENT (OUTPATIENT)
Dept: SURGERY | Facility: MEDICAL CENTER | Age: 58
DRG: 239 | End: 2023-09-27
Payer: MEDICAID

## 2023-09-27 ENCOUNTER — ANESTHESIA (OUTPATIENT)
Dept: SURGERY | Facility: MEDICAL CENTER | Age: 58
DRG: 239 | End: 2023-09-27
Payer: MEDICAID

## 2023-09-27 PROBLEM — D64.9 ANEMIA: Status: ACTIVE | Noted: 2023-09-27

## 2023-09-27 PROBLEM — R78.81 BACTEREMIA: Status: ACTIVE | Noted: 2023-09-27

## 2023-09-27 LAB
ALBUMIN SERPL BCP-MCNC: 2.4 G/DL (ref 3.2–4.9)
ALBUMIN/GLOB SERPL: 0.7 G/DL
ALP SERPL-CCNC: 77 U/L (ref 30–99)
ALT SERPL-CCNC: 34 U/L (ref 2–50)
ANION GAP SERPL CALC-SCNC: 9 MMOL/L (ref 7–16)
AST SERPL-CCNC: 32 U/L (ref 12–45)
BILIRUB SERPL-MCNC: 0.2 MG/DL (ref 0.1–1.5)
BUN SERPL-MCNC: 12 MG/DL (ref 8–22)
CALCIUM ALBUM COR SERPL-MCNC: 9 MG/DL (ref 8.5–10.5)
CALCIUM SERPL-MCNC: 7.7 MG/DL (ref 8.5–10.5)
CHLORIDE SERPL-SCNC: 96 MMOL/L (ref 96–112)
CO2 SERPL-SCNC: 21 MMOL/L (ref 20–33)
CREAT SERPL-MCNC: 0.55 MG/DL (ref 0.5–1.4)
ERYTHROCYTE [DISTWIDTH] IN BLOOD BY AUTOMATED COUNT: 45.7 FL (ref 35.9–50)
FERRITIN SERPL-MCNC: 743 NG/ML (ref 22–322)
GFR SERPLBLD CREATININE-BSD FMLA CKD-EPI: 114 ML/MIN/1.73 M 2
GLOBULIN SER CALC-MCNC: 3.5 G/DL (ref 1.9–3.5)
GLUCOSE SERPL-MCNC: 106 MG/DL (ref 65–99)
HAV IGM SERPL QL IA: NORMAL
HBV CORE IGM SER QL: NORMAL
HBV SURFACE AG SER QL: NORMAL
HCT VFR BLD AUTO: 26.9 % (ref 42–52)
HCV AB SER QL: NORMAL
HGB BLD-MCNC: 8.3 G/DL (ref 14–18)
IRON SATN MFR SERPL: 11 % (ref 15–55)
IRON SERPL-MCNC: 18 UG/DL (ref 50–180)
MAGNESIUM SERPL-MCNC: 1.8 MG/DL (ref 1.5–2.5)
MCH RBC QN AUTO: 27.2 PG (ref 27–33)
MCHC RBC AUTO-ENTMCNC: 30.9 G/DL (ref 32.3–36.5)
MCV RBC AUTO: 88.2 FL (ref 81.4–97.8)
PARASITE SPEC INSPECT: ABNORMAL
PARASITE SPEC INSPECT: ABNORMAL
PATHOLOGY CONSULT NOTE: NORMAL
PLATELET # BLD AUTO: 670 K/UL (ref 164–446)
PMV BLD AUTO: 8.7 FL (ref 9–12.9)
POTASSIUM SERPL-SCNC: 4.5 MMOL/L (ref 3.6–5.5)
PROT SERPL-MCNC: 5.9 G/DL (ref 6–8.2)
RBC # BLD AUTO: 3.05 M/UL (ref 4.7–6.1)
SIGNIFICANT IND 70042: ABNORMAL
SITE SITE: ABNORMAL
SODIUM SERPL-SCNC: 126 MMOL/L (ref 135–145)
SOURCE SOURCE: ABNORMAL
TIBC SERPL-MCNC: 157 UG/DL (ref 250–450)
TSH SERPL DL<=0.005 MIU/L-ACNC: 1.54 UIU/ML (ref 0.38–5.33)
UIBC SERPL-MCNC: 139 UG/DL (ref 110–370)
VIT B12 SERPL-MCNC: 1021 PG/ML (ref 211–911)
WBC # BLD AUTO: 9.1 K/UL (ref 4.8–10.8)

## 2023-09-27 PROCEDURE — 700101 HCHG RX REV CODE 250: Performed by: STUDENT IN AN ORGANIZED HEALTH CARE EDUCATION/TRAINING PROGRAM

## 2023-09-27 PROCEDURE — 83540 ASSAY OF IRON: CPT

## 2023-09-27 PROCEDURE — 27880 AMPUTATION OF LOWER LEG: CPT | Mod: LT | Performed by: ORTHOPAEDIC SURGERY

## 2023-09-27 PROCEDURE — 80053 COMPREHEN METABOLIC PANEL: CPT

## 2023-09-27 PROCEDURE — 80074 ACUTE HEPATITIS PANEL: CPT

## 2023-09-27 PROCEDURE — 160036 HCHG PACU - EA ADDL 30 MINS PHASE I: Performed by: ORTHOPAEDIC SURGERY

## 2023-09-27 PROCEDURE — 84443 ASSAY THYROID STIM HORMONE: CPT

## 2023-09-27 PROCEDURE — A9270 NON-COVERED ITEM OR SERVICE: HCPCS | Performed by: STUDENT IN AN ORGANIZED HEALTH CARE EDUCATION/TRAINING PROGRAM

## 2023-09-27 PROCEDURE — 36415 COLL VENOUS BLD VENIPUNCTURE: CPT

## 2023-09-27 PROCEDURE — 700105 HCHG RX REV CODE 258: Performed by: INTERNAL MEDICINE

## 2023-09-27 PROCEDURE — 160002 HCHG RECOVERY MINUTES (STAT): Performed by: ORTHOPAEDIC SURGERY

## 2023-09-27 PROCEDURE — 83735 ASSAY OF MAGNESIUM: CPT

## 2023-09-27 PROCEDURE — 160048 HCHG OR STATISTICAL LEVEL 1-5: Performed by: ORTHOPAEDIC SURGERY

## 2023-09-27 PROCEDURE — 700111 HCHG RX REV CODE 636 W/ 250 OVERRIDE (IP): Mod: JZ | Performed by: INTERNAL MEDICINE

## 2023-09-27 PROCEDURE — 88311 DECALCIFY TISSUE: CPT

## 2023-09-27 PROCEDURE — 87169 MACROSCOPIC EXAM PARASITE: CPT

## 2023-09-27 PROCEDURE — 83550 IRON BINDING TEST: CPT

## 2023-09-27 PROCEDURE — 700102 HCHG RX REV CODE 250 W/ 637 OVERRIDE(OP): Performed by: STUDENT IN AN ORGANIZED HEALTH CARE EDUCATION/TRAINING PROGRAM

## 2023-09-27 PROCEDURE — 82607 VITAMIN B-12: CPT

## 2023-09-27 PROCEDURE — 160039 HCHG SURGERY MINUTES - EA ADDL 1 MIN LEVEL 3: Performed by: ORTHOPAEDIC SURGERY

## 2023-09-27 PROCEDURE — 160028 HCHG SURGERY MINUTES - 1ST 30 MINS LEVEL 3: Performed by: ORTHOPAEDIC SURGERY

## 2023-09-27 PROCEDURE — 700111 HCHG RX REV CODE 636 W/ 250 OVERRIDE (IP): Performed by: STUDENT IN AN ORGANIZED HEALTH CARE EDUCATION/TRAINING PROGRAM

## 2023-09-27 PROCEDURE — 85027 COMPLETE CBC AUTOMATED: CPT

## 2023-09-27 PROCEDURE — A9270 NON-COVERED ITEM OR SERVICE: HCPCS | Performed by: INTERNAL MEDICINE

## 2023-09-27 PROCEDURE — 770006 HCHG ROOM/CARE - MED/SURG/GYN SEMI*

## 2023-09-27 PROCEDURE — 88307 TISSUE EXAM BY PATHOLOGIST: CPT

## 2023-09-27 PROCEDURE — 700111 HCHG RX REV CODE 636 W/ 250 OVERRIDE (IP): Mod: JZ | Performed by: STUDENT IN AN ORGANIZED HEALTH CARE EDUCATION/TRAINING PROGRAM

## 2023-09-27 PROCEDURE — 700102 HCHG RX REV CODE 250 W/ 637 OVERRIDE(OP): Performed by: INTERNAL MEDICINE

## 2023-09-27 PROCEDURE — 700111 HCHG RX REV CODE 636 W/ 250 OVERRIDE (IP): Performed by: INTERNAL MEDICINE

## 2023-09-27 PROCEDURE — 82728 ASSAY OF FERRITIN: CPT

## 2023-09-27 PROCEDURE — 27880 AMPUTATION OF LOWER LEG: CPT | Mod: 80ROC,LT | Performed by: STUDENT IN AN ORGANIZED HEALTH CARE EDUCATION/TRAINING PROGRAM

## 2023-09-27 PROCEDURE — 99233 SBSQ HOSP IP/OBS HIGH 50: CPT | Performed by: INTERNAL MEDICINE

## 2023-09-27 PROCEDURE — 160009 HCHG ANES TIME/MIN: Performed by: ORTHOPAEDIC SURGERY

## 2023-09-27 PROCEDURE — 0Y6J0Z1 DETACHMENT AT LEFT LOWER LEG, HIGH, OPEN APPROACH: ICD-10-PCS | Performed by: ORTHOPAEDIC SURGERY

## 2023-09-27 PROCEDURE — 160035 HCHG PACU - 1ST 60 MINS PHASE I: Performed by: ORTHOPAEDIC SURGERY

## 2023-09-27 PROCEDURE — 87040 BLOOD CULTURE FOR BACTERIA: CPT

## 2023-09-27 PROCEDURE — 700101 HCHG RX REV CODE 250: Performed by: ORTHOPAEDIC SURGERY

## 2023-09-27 RX ORDER — OXYCODONE HYDROCHLORIDE 5 MG/1
5 TABLET ORAL
Status: DISCONTINUED | OUTPATIENT
Start: 2023-09-27 | End: 2023-09-28

## 2023-09-27 RX ORDER — MEPERIDINE HYDROCHLORIDE 25 MG/ML
12.5 INJECTION INTRAMUSCULAR; INTRAVENOUS; SUBCUTANEOUS
Status: DISCONTINUED | OUTPATIENT
Start: 2023-09-27 | End: 2023-09-27 | Stop reason: HOSPADM

## 2023-09-27 RX ORDER — GABAPENTIN 300 MG/1
300 CAPSULE ORAL ONCE
Status: COMPLETED | OUTPATIENT
Start: 2023-09-27 | End: 2023-09-27

## 2023-09-27 RX ORDER — DEXAMETHASONE SODIUM PHOSPHATE 4 MG/ML
INJECTION, SOLUTION INTRA-ARTICULAR; INTRALESIONAL; INTRAMUSCULAR; INTRAVENOUS; SOFT TISSUE PRN
Status: DISCONTINUED | OUTPATIENT
Start: 2023-09-27 | End: 2023-09-28 | Stop reason: SURG

## 2023-09-27 RX ORDER — OXYCODONE HYDROCHLORIDE 10 MG/1
10 TABLET ORAL
Status: DISCONTINUED | OUTPATIENT
Start: 2023-09-27 | End: 2023-09-28

## 2023-09-27 RX ORDER — IPRATROPIUM BROMIDE AND ALBUTEROL SULFATE 2.5; .5 MG/3ML; MG/3ML
3 SOLUTION RESPIRATORY (INHALATION)
Status: DISCONTINUED | OUTPATIENT
Start: 2023-09-27 | End: 2023-09-27 | Stop reason: HOSPADM

## 2023-09-27 RX ORDER — HYDROMORPHONE HYDROCHLORIDE 2 MG/ML
INJECTION, SOLUTION INTRAMUSCULAR; INTRAVENOUS; SUBCUTANEOUS PRN
Status: DISCONTINUED | OUTPATIENT
Start: 2023-09-27 | End: 2023-09-28 | Stop reason: SURG

## 2023-09-27 RX ORDER — KETAMINE HYDROCHLORIDE 50 MG/ML
INJECTION, SOLUTION, CONCENTRATE INTRAMUSCULAR; INTRAVENOUS PRN
Status: DISCONTINUED | OUTPATIENT
Start: 2023-09-27 | End: 2023-09-28 | Stop reason: SURG

## 2023-09-27 RX ORDER — ONDANSETRON 2 MG/ML
INJECTION INTRAMUSCULAR; INTRAVENOUS PRN
Status: DISCONTINUED | OUTPATIENT
Start: 2023-09-27 | End: 2023-09-28 | Stop reason: SURG

## 2023-09-27 RX ORDER — DIPHENHYDRAMINE HYDROCHLORIDE 50 MG/ML
12.5 INJECTION INTRAMUSCULAR; INTRAVENOUS
Status: DISCONTINUED | OUTPATIENT
Start: 2023-09-27 | End: 2023-09-27 | Stop reason: HOSPADM

## 2023-09-27 RX ORDER — LABETALOL HYDROCHLORIDE 5 MG/ML
5 INJECTION, SOLUTION INTRAVENOUS
Status: DISCONTINUED | OUTPATIENT
Start: 2023-09-27 | End: 2023-09-27 | Stop reason: HOSPADM

## 2023-09-27 RX ORDER — HALOPERIDOL 5 MG/ML
1 INJECTION INTRAMUSCULAR
Status: DISCONTINUED | OUTPATIENT
Start: 2023-09-27 | End: 2023-09-27 | Stop reason: HOSPADM

## 2023-09-27 RX ORDER — CEFAZOLIN SODIUM 1 G/3ML
INJECTION, POWDER, FOR SOLUTION INTRAMUSCULAR; INTRAVENOUS PRN
Status: DISCONTINUED | OUTPATIENT
Start: 2023-09-27 | End: 2023-09-28 | Stop reason: SURG

## 2023-09-27 RX ORDER — HYDROMORPHONE HYDROCHLORIDE 1 MG/ML
0.5 INJECTION, SOLUTION INTRAMUSCULAR; INTRAVENOUS; SUBCUTANEOUS
Status: DISCONTINUED | OUTPATIENT
Start: 2023-09-27 | End: 2023-09-28

## 2023-09-27 RX ORDER — EPHEDRINE SULFATE 50 MG/ML
5 INJECTION, SOLUTION INTRAVENOUS
Status: DISCONTINUED | OUTPATIENT
Start: 2023-09-27 | End: 2023-09-27 | Stop reason: HOSPADM

## 2023-09-27 RX ORDER — ACETAMINOPHEN 500 MG
1000 TABLET ORAL ONCE
Status: COMPLETED | OUTPATIENT
Start: 2023-09-27 | End: 2023-09-27

## 2023-09-27 RX ORDER — OXYCODONE HCL 5 MG/5 ML
5 SOLUTION, ORAL ORAL
Status: COMPLETED | OUTPATIENT
Start: 2023-09-27 | End: 2023-09-27

## 2023-09-27 RX ORDER — HYDRALAZINE HYDROCHLORIDE 20 MG/ML
5 INJECTION INTRAMUSCULAR; INTRAVENOUS
Status: DISCONTINUED | OUTPATIENT
Start: 2023-09-27 | End: 2023-09-27 | Stop reason: HOSPADM

## 2023-09-27 RX ORDER — HYDROMORPHONE HYDROCHLORIDE 1 MG/ML
0.4 INJECTION, SOLUTION INTRAMUSCULAR; INTRAVENOUS; SUBCUTANEOUS
Status: DISCONTINUED | OUTPATIENT
Start: 2023-09-27 | End: 2023-09-27 | Stop reason: HOSPADM

## 2023-09-27 RX ORDER — HYDROMORPHONE HYDROCHLORIDE 1 MG/ML
0.1 INJECTION, SOLUTION INTRAMUSCULAR; INTRAVENOUS; SUBCUTANEOUS
Status: DISCONTINUED | OUTPATIENT
Start: 2023-09-27 | End: 2023-09-27 | Stop reason: HOSPADM

## 2023-09-27 RX ORDER — ONDANSETRON 2 MG/ML
4 INJECTION INTRAMUSCULAR; INTRAVENOUS
Status: DISCONTINUED | OUTPATIENT
Start: 2023-09-27 | End: 2023-09-27 | Stop reason: HOSPADM

## 2023-09-27 RX ORDER — PHENYLEPHRINE HYDROCHLORIDE 10 MG/ML
INJECTION, SOLUTION INTRAMUSCULAR; INTRAVENOUS; SUBCUTANEOUS PRN
Status: DISCONTINUED | OUTPATIENT
Start: 2023-09-27 | End: 2023-09-28 | Stop reason: SURG

## 2023-09-27 RX ORDER — METOPROLOL TARTRATE 1 MG/ML
1 INJECTION, SOLUTION INTRAVENOUS
Status: DISCONTINUED | OUTPATIENT
Start: 2023-09-27 | End: 2023-09-27 | Stop reason: HOSPADM

## 2023-09-27 RX ORDER — MAGNESIUM HYDROXIDE 1200 MG/15ML
LIQUID ORAL
Status: COMPLETED | OUTPATIENT
Start: 2023-09-27 | End: 2023-09-27

## 2023-09-27 RX ORDER — OXYCODONE HCL 5 MG/5 ML
10 SOLUTION, ORAL ORAL
Status: COMPLETED | OUTPATIENT
Start: 2023-09-27 | End: 2023-09-27

## 2023-09-27 RX ORDER — HYDROMORPHONE HYDROCHLORIDE 1 MG/ML
0.2 INJECTION, SOLUTION INTRAMUSCULAR; INTRAVENOUS; SUBCUTANEOUS
Status: DISCONTINUED | OUTPATIENT
Start: 2023-09-27 | End: 2023-09-27 | Stop reason: HOSPADM

## 2023-09-27 RX ADMIN — ROCURONIUM BROMIDE 50 MG: 10 INJECTION, SOLUTION INTRAVENOUS at 13:27

## 2023-09-27 RX ADMIN — HYDROMORPHONE HYDROCHLORIDE 0.25 MG: 1 INJECTION, SOLUTION INTRAMUSCULAR; INTRAVENOUS; SUBCUTANEOUS at 00:57

## 2023-09-27 RX ADMIN — FENTANYL CITRATE 50 MCG: 50 INJECTION, SOLUTION INTRAMUSCULAR; INTRAVENOUS at 13:26

## 2023-09-27 RX ADMIN — PROPOFOL 50 MG: 10 INJECTION, EMULSION INTRAVENOUS at 13:48

## 2023-09-27 RX ADMIN — OXYCODONE HYDROCHLORIDE 10 MG: 10 TABLET ORAL at 18:14

## 2023-09-27 RX ADMIN — OXYCODONE HYDROCHLORIDE 5 MG: 5 TABLET ORAL at 09:51

## 2023-09-27 RX ADMIN — HYDROMORPHONE HYDROCHLORIDE 0.4 MG: 2 INJECTION INTRAMUSCULAR; INTRAVENOUS; SUBCUTANEOUS at 13:46

## 2023-09-27 RX ADMIN — HYDROMORPHONE HYDROCHLORIDE 0.2 MG: 2 INJECTION INTRAMUSCULAR; INTRAVENOUS; SUBCUTANEOUS at 13:57

## 2023-09-27 RX ADMIN — HYDROMORPHONE HYDROCHLORIDE 0.5 MG: 1 INJECTION, SOLUTION INTRAMUSCULAR; INTRAVENOUS; SUBCUTANEOUS at 19:37

## 2023-09-27 RX ADMIN — CEFEPIME 2 G: 2 INJECTION, POWDER, FOR SOLUTION INTRAVENOUS at 23:05

## 2023-09-27 RX ADMIN — PHENYLEPHRINE HYDROCHLORIDE 100 MCG: 10 INJECTION INTRAVENOUS at 14:12

## 2023-09-27 RX ADMIN — HYDROMORPHONE HYDROCHLORIDE 0.2 MG: 2 INJECTION INTRAMUSCULAR; INTRAVENOUS; SUBCUTANEOUS at 13:50

## 2023-09-27 RX ADMIN — PROPOFOL 50 MG: 10 INJECTION, EMULSION INTRAVENOUS at 13:27

## 2023-09-27 RX ADMIN — FENTANYL CITRATE 25 MCG: 50 INJECTION, SOLUTION INTRAMUSCULAR; INTRAVENOUS at 15:05

## 2023-09-27 RX ADMIN — OXYCODONE HYDROCHLORIDE 10 MG: 5 SOLUTION ORAL at 14:49

## 2023-09-27 RX ADMIN — HYDROMORPHONE HYDROCHLORIDE 0.4 MG: 1 INJECTION, SOLUTION INTRAMUSCULAR; INTRAVENOUS; SUBCUTANEOUS at 15:19

## 2023-09-27 RX ADMIN — OXYCODONE HYDROCHLORIDE 10 MG: 10 TABLET ORAL at 21:31

## 2023-09-27 RX ADMIN — VANCOMYCIN HYDROCHLORIDE 1250 MG: 5 INJECTION, POWDER, LYOPHILIZED, FOR SOLUTION INTRAVENOUS at 00:57

## 2023-09-27 RX ADMIN — ONDANSETRON 4 MG: 2 INJECTION INTRAMUSCULAR; INTRAVENOUS at 13:28

## 2023-09-27 RX ADMIN — Medication 50 MG: at 13:48

## 2023-09-27 RX ADMIN — OXYCODONE HYDROCHLORIDE 5 MG: 5 TABLET ORAL at 04:36

## 2023-09-27 RX ADMIN — SODIUM CHLORIDE, POTASSIUM CHLORIDE, SODIUM LACTATE AND CALCIUM CHLORIDE: 600; 310; 30; 20 INJECTION, SOLUTION INTRAVENOUS at 04:36

## 2023-09-27 RX ADMIN — FENTANYL CITRATE 50 MCG: 50 INJECTION, SOLUTION INTRAMUSCULAR; INTRAVENOUS at 14:26

## 2023-09-27 RX ADMIN — ACETAMINOPHEN 1000 MG: 500 TABLET, FILM COATED ORAL at 13:17

## 2023-09-27 RX ADMIN — MIDAZOLAM 2 MG: 1 INJECTION, SOLUTION INTRAMUSCULAR; INTRAVENOUS at 13:23

## 2023-09-27 RX ADMIN — FENTANYL CITRATE 25 MCG: 50 INJECTION, SOLUTION INTRAMUSCULAR; INTRAVENOUS at 14:49

## 2023-09-27 RX ADMIN — CEFEPIME 2 G: 2 INJECTION, POWDER, FOR SOLUTION INTRAVENOUS at 16:36

## 2023-09-27 RX ADMIN — HYDROMORPHONE HYDROCHLORIDE 0.2 MG: 1 INJECTION, SOLUTION INTRAMUSCULAR; INTRAVENOUS; SUBCUTANEOUS at 15:25

## 2023-09-27 RX ADMIN — DOCUSATE SODIUM 50 MG AND SENNOSIDES 8.6 MG 2 TABLET: 8.6; 5 TABLET, FILM COATED ORAL at 18:16

## 2023-09-27 RX ADMIN — PIPERACILLIN AND TAZOBACTAM 4.5 G: 4; .5 INJECTION, POWDER, FOR SOLUTION INTRAVENOUS at 04:37

## 2023-09-27 RX ADMIN — FENTANYL CITRATE 50 MCG: 50 INJECTION, SOLUTION INTRAMUSCULAR; INTRAVENOUS at 15:00

## 2023-09-27 RX ADMIN — GABAPENTIN 300 MG: 300 CAPSULE ORAL at 13:17

## 2023-09-27 RX ADMIN — CEFAZOLIN 2 G: 1 INJECTION, POWDER, FOR SOLUTION INTRAMUSCULAR; INTRAVENOUS at 13:28

## 2023-09-27 RX ADMIN — NICOTINE TRANSDERMAL SYSTEM 21 MG: 21 PATCH, EXTENDED RELEASE TRANSDERMAL at 04:35

## 2023-09-27 RX ADMIN — HYDROMORPHONE HYDROCHLORIDE 0.2 MG: 2 INJECTION INTRAMUSCULAR; INTRAVENOUS; SUBCUTANEOUS at 14:26

## 2023-09-27 RX ADMIN — DEXAMETHASONE SODIUM PHOSPHATE 4 MG: 4 INJECTION INTRA-ARTICULAR; INTRALESIONAL; INTRAMUSCULAR; INTRAVENOUS; SOFT TISSUE at 13:28

## 2023-09-27 ASSESSMENT — COGNITIVE AND FUNCTIONAL STATUS - GENERAL
TURNING FROM BACK TO SIDE WHILE IN FLAT BAD: A LITTLE
MOVING FROM LYING ON BACK TO SITTING ON SIDE OF FLAT BED: A LOT
WALKING IN HOSPITAL ROOM: A LOT
DRESSING REGULAR LOWER BODY CLOTHING: A LOT
MOVING TO AND FROM BED TO CHAIR: A LITTLE
DRESSING REGULAR UPPER BODY CLOTHING: A LOT
SUGGESTED CMS G CODE MODIFIER DAILY ACTIVITY: CK
SUGGESTED CMS G CODE MODIFIER MOBILITY: CL
DAILY ACTIVITIY SCORE: 16
CLIMB 3 TO 5 STEPS WITH RAILING: A LOT
MOBILITY SCORE: 14
STANDING UP FROM CHAIR USING ARMS: A LOT
HELP NEEDED FOR BATHING: A LOT
PERSONAL GROOMING: A LOT

## 2023-09-27 ASSESSMENT — ENCOUNTER SYMPTOMS
VOMITING: 0
CHILLS: 0
HEADACHES: 0
SHORTNESS OF BREATH: 0
DIZZINESS: 0
FEVER: 0
DEPRESSION: 0
MYALGIAS: 1
ABDOMINAL PAIN: 0
NAUSEA: 0

## 2023-09-27 ASSESSMENT — PAIN DESCRIPTION - PAIN TYPE
TYPE: SURGICAL PAIN
TYPE: ACUTE PAIN
TYPE: SURGICAL PAIN

## 2023-09-27 ASSESSMENT — PATIENT HEALTH QUESTIONNAIRE - PHQ9
1. LITTLE INTEREST OR PLEASURE IN DOING THINGS: NOT AT ALL
SUM OF ALL RESPONSES TO PHQ9 QUESTIONS 1 AND 2: 0
2. FEELING DOWN, DEPRESSED, IRRITABLE, OR HOPELESS: NOT AT ALL

## 2023-09-27 ASSESSMENT — FIBROSIS 4 INDEX: FIB4 SCORE: 0.48

## 2023-09-27 NOTE — PROGRESS NOTES
[facility] Medicine Daily Progress Note    Date of Service  9/27/2023    Chief Complaint  Dillon Centeno is a 58 y.o. male admitted 9/26/2023 with bilateral foot wounds.    Hospital Course  58 y.o. male with past medical history of hypertension who presented to the hospital on 9/26/2023 with complaint of bilateral lower extremity wound.  Patient reported that he found to a significant wound on his left lower extremity especially involving his left foot that for started 2 weeks ago.  He reported that he was wearing a shoe that was to size smaller than his actual size and he developed this wound. He does not take any medications on regular basis.  He smokes 1 pack a day and drinks 1-2 drinks of alcohol daily and sometimes he does not drink alcohol at all.  He denies any history of symptoms of alcohol withdrawal. In the ER patient found to have gangrene on his left foot most likely due to vascular disease.  Vascular surgery and orthopedic surgery consulted.    Interval Problem Update  Vascular surgery reported that severe gangrene of the left foot was unsalvageable and he will require a amputation, right lower extremity has some scattered ulcerations, arterial ultrasound ordered to evaluate.  Per orthopedic surgery plan for amputation of left leg today.  Vital signs stable.  Leukocytosis resolved.  Hepatitis panel negative.  Hemoglobin 8.3, will check iron panel, ferritin, B12. Sodium still low 126, check TSH, urine Na and Urine Osm. Blood culture with 1/2 with morganella morganii and the other with possible strep. Echo pending. Repeat blood cultures ordered.  Patient's main complaint is pain in his bilateral feet.    I have discussed this patient's plan of care and discharge plan at IDT rounds today with Case Management, Nursing, Nursing leadership, and other members of the IDT team.    Consultants/Specialty  orthopedics and vascular surgery    Code Status  Full Code    Disposition  The patient is not medically  cleared for discharge to home or a post-acute facility.  Anticipate discharge to: skilled nursing facility    I have placed the appropriate orders for post-discharge needs.    Review of Systems  Review of Systems   Constitutional:  Positive for malaise/fatigue. Negative for chills and fever.   Respiratory:  Negative for shortness of breath.    Cardiovascular:  Negative for chest pain.   Gastrointestinal:  Negative for abdominal pain, nausea and vomiting.   Genitourinary:  Negative for dysuria.   Musculoskeletal:  Positive for joint pain and myalgias.   Skin:  Negative for rash.   Neurological:  Negative for dizziness and headaches.   Psychiatric/Behavioral:  Negative for depression.    All other systems reviewed and are negative.       Physical Exam  Temp:  [36.2 °C (97.2 °F)-36.9 °C (98.4 °F)] 36.4 °C (97.5 °F)  Pulse:  [78-99] 85  Resp:  [14-20] 14  BP: (106-159)/(74-88) 159/84  SpO2:  [94 %-100 %] 94 %    Physical Exam  Vitals and nursing note reviewed.   Constitutional:       General: He is not in acute distress.     Appearance: He is ill-appearing.   HENT:      Head: Normocephalic and atraumatic.      Mouth/Throat:      Pharynx: Oropharynx is clear.   Eyes:      Conjunctiva/sclera: Conjunctivae normal.   Cardiovascular:      Rate and Rhythm: Normal rate and regular rhythm.      Pulses: Normal pulses.   Pulmonary:      Effort: Pulmonary effort is normal. No respiratory distress.      Breath sounds: Normal breath sounds. No wheezing.   Abdominal:      General: Abdomen is flat. There is no distension.      Palpations: Abdomen is soft.      Tenderness: There is no abdominal tenderness.   Musculoskeletal:         General: Tenderness, deformity and signs of injury present.      Cervical back: Normal range of motion and neck supple.      Right lower leg: No edema.      Left lower leg: Edema present.   Skin:     General: Skin is warm and dry.      Findings: Lesion present.      Comments: Dry gangrene left foot   Right  foot with multiple scabbed lesions  (Pictures below)   Neurological:      General: No focal deficit present.      Mental Status: He is alert and oriented to person, place, and time.      Cranial Nerves: No cranial nerve deficit.   Psychiatric:         Mood and Affect: Mood normal.         Behavior: Behavior normal.      Left foot      Right foot       Fluids    Intake/Output Summary (Last 24 hours) at 9/27/2023 1342  Last data filed at 9/27/2023 1333  Gross per 24 hour   Intake 650 ml   Output 400 ml   Net 250 ml       Laboratory  Recent Labs     09/26/23  0846 09/27/23  0503   WBC 11.2* 9.1   RBC 3.52* 3.05*   HEMOGLOBIN 9.9* 8.3*   HEMATOCRIT 30.6* 26.9*   MCV 86.9 88.2   MCH 28.1 27.2   MCHC 32.4 30.9*   RDW 45.3 45.7   PLATELETCT 805* 670*   MPV 9.1 8.7*     Recent Labs     09/26/23  0938 09/27/23  0503   SODIUM 125* 126*   POTASSIUM 4.6 4.5   CHLORIDE 91* 96   CO2 23 21   GLUCOSE 114* 106*   BUN 14 12   CREATININE 0.47* 0.55   CALCIUM 8.3* 7.7*                   Imaging  DX-CHEST-PORTABLE (1 VIEW)   Final Result      1.  LEFT basilar atelectasis or pneumonia   2.  Trace LEFT pleural effusion or pleural scar      US-EXTREMITY ARTERY LOWER UNILAT W/INGA (COMBO) RIGHT    (Results Pending)   EC-ECHOCARDIOGRAM COMPLETE W/O CONT    (Results Pending)        Assessment/Plan  * Gangrene of left foot (HCC)- (present on admission)  Assessment & Plan  Patient found to have significant gangrene on his left foot that may need surgical intervention.  Vascular surgery consulted, right LE arterial ultrasound pending  I started him on pain medication with oxycodone and IV Dilaudid.  Monitor for adverse effect of narcotic pain medications.  Due to signs of cellulitis I started him on broad-spectrum antibiotic vancomycin and Zosyn.  Vancomycin dose adjustment as per vancomycin trough level and monitor for vancomycin toxicity.  Orthopedic surgery consulted, plan for amputation today    Anemia- (present on admission)  Assessment &  Plan  Iron studies consistent with chronic disease   B12 wnl   Trend CBC, transfuse Hb<7     Bacteremia- (present on admission)  Assessment & Plan  1/2 bcx with morganella   1/2 bcx with possible strep   Repeat blood cultures ordered   Continue current abx   Echo pending     Hyponatremia  Assessment & Plan  Patient found to have hyponatremia most likely due to dehydration.  Continue IV fluids  Check TSH, urine osmolality, urine sodium  Daily BMP    Elevated liver enzymes  Assessment & Plan  Elevated liver enzymes.  No abdominal pain.  Hepatitis panel negative    Leukocytosis  Assessment & Plan  Patient found to have leukocytosis most likely secondary to lower extremity wound.  Ordered CBC for tomorrow.  I started him on broad-spectrum antibiotics.         VTE prophylaxis: SCDs    I have performed a physical exam and reviewed and updated ROS and Plan today (9/27/2023). In review of yesterday's note (9/26/2023), there are no changes except as documented above.

## 2023-09-27 NOTE — PROGRESS NOTES
Maggots were found in patients room crawling on the floor and in prevalon boot. Removed patients roommate. Isolated the patient.  Changed all linen for patient.  Double wrapped patients bilateral feet with clear bags    NADIRA Urban was notified about maggots.

## 2023-09-27 NOTE — OR NURSING
1432: Pt arrives to PACU asleep and calm. VSS. Left leg Is ace bandaged and elevated on pillows.    1530: Dressing CDI. PT reports pain is now tolerable and denies nausea. Report called to Elizabeth LÓPEZ.

## 2023-09-27 NOTE — CARE PLAN
The patient is Stable - Low risk of patient condition declining or worsening    Shift Goals  Clinical Goals: remain safe  Patient Goals: pain control  Family Goals: LUTHER    Progress made toward(s) clinical / shift goals:  pain controlled    Patient is not progressing towards the following goals:

## 2023-09-27 NOTE — ASSESSMENT & PLAN NOTE
- Blood cultures from 9/26/2023 grew Morganella morganii and Vagococcus. Echocardiogram showed no evidence of vegetations.   - ID consulted, completed course of IV Zosyn on 10/6/2023. Repeat blood cultures from 9/27/2023 have been negative.

## 2023-09-27 NOTE — OP REPORT
DATE OF OPERATION: 9/27/2023     PREOPERATIVE DIAGNOSIS: Left foot gangrene and critical limb ischemia, maggots present    POSTOPERATIVE DIAGNOSIS: Same    PROCEDURE PERFORMED: Left below-knee amputation    SURGEON: Pradip Altamirano M.D.     ASSISTANT: Cain Prieto MD     ANESTHESIA: General    SPECIMEN: Gross pathology    ESTIMATED BLOOD LOSS: 10 mL    IMPLANTS: None      INDICATIONS: The patient is a 58 y.o. male who presented with critical limb ischemia to the left lower extremity with dry gangrene and mummification of the left foot.  Vascular surgery and I recommended below-knee amputation.  He still has a high risk of nonhealing wounds given his poor vascular status.  This may need conversion to a higher level of amputation in the future if this does not heal.  I discussed the risks and benefits of the procedure which include but are not limited to risks of infection, wound healing complication, neurovascular injury, pain, phantom limb pain, and the medical risks of anesthesia including MI, stroke, and death.  Alternatives to surgery were also discussed, including non-operative management, which I did not recommend.  The patient was in agreement with the plan to proceed, and the informed consent was signed and documented.  I met with the patient pre-operatively and marked the operative extremity with their agreement.  We proceeded to the operating room.     DESCRIPTION OF PROCEDURE:  Patient was seen in the preoperative holding area on the day of surgery. The operative site was marked with my initials.  he was taken to the operating room and placed supine on the operative table.  Anesthesia was induced.  The operative extremity was prepped and draped in the normal sterile fashion.  Operative pause was conducted and the correct patient, site, side, procedure, and surgeon's initials on extremity were identified.  the left foot was bagged with an impervious bag prior to prepping.  This was then prepped and  draped normally.  This was then again rebagged with a separate sterile bag and coban.  A below-knee amputation was planned out with the tibial cut hand breath beneath the tibial tubercle.  The skin incision made just centimeter distal to this.  The posterior limb was defined.  The skin was incised with a knife Bovie cautery was used to control bleeding in the subcutaneous tissues.  The anterior compartment was incised and the anterior neurovascular structures were clamped.  The fibula was cut first centimeter proximal to the planned tibial cut.  The tibia was Parallel to the joint.  The anterior edge was chamfered.  A amputation knife was then used to fillet the posterior musculature off of the tibia and fibula.  The distal limb was then removed from the field.  The deep posterior compartment and soleus were then excised.  This allowed for coverage of the stump with the gastroc and gastroc fascia.  The posterior neurovascular structures were  identified.  These were multiply tied with 0 silk suture as well as the anterior neurovascular bundles.  Neural structures were cut and allowed to retract.  The flap was again contoured and then thoroughly irrigated.  No further bleeding tissues were present.  The wounds were closed in layered fashion with #1 and 2-0 Vicryl and 3-0 nylon.  Sterile dressings were applied and the patient awoke in the operating room and was taken to PACU in stable condition.  Nonweightbearing to the    POSTOPERATIVE PLAN: Left lower extremity.  Elevate the leg at rest.  Dressing changes as needed.  Sutures out in 4 weeks if wound has healed well.      ____________________________________   Pradip Altamirano M.D.   DD: 9/27/2023  2:36 PM

## 2023-09-27 NOTE — ANESTHESIA PROCEDURE NOTES
Airway    Date/Time: 9/27/2023 1:28 PM    Performed by: Naren Cabrera M.D.  Authorized by: Naren Cabrera M.D.    Location:  OR  Urgency:  Elective  Indications for Airway Management:  Anesthesia      Spontaneous Ventilation: absent    Sedation Level:  Deep  Preoxygenated: Yes    Final Airway Type:  Supraglottic airway  Final Supraglottic Airway:  Standard LMA    SGA Size:  5  Number of Attempts at Approach:  1  Ventilation Between Attempts:  None  Number of Other Approaches Attempted:  0

## 2023-09-27 NOTE — ANESTHESIA PREPROCEDURE EVALUATION
Case: [id number] Date/Time: 09/27/23 1135    Procedure: AMPUTATION, BELOW KNEE (Left)    Location: [facility] OR  / SURGERY [facility]    Surgeons: Pradip Altamirano M.D.          Relevant Problems      (positive) Kidney lesion       Physical Exam    Airway   Mallampati: II  TM distance: >3 FB  Neck ROM: full       Cardiovascular - normal exam  Rhythm: regular  Rate: normal  (-) murmur     Dental - normal exam           Pulmonary - normal exam  Breath sounds clear to auscultation     Abdominal    Neurological - normal exam                 Anesthesia Plan    ASA 3- EMERGENT   ASA physical status 3 criteria: hypertension - poorly controlledASA physical status emergent criteria: compromised vital organ, limb or tissue and acutely contaminated wound or identified infection source    Plan - general       Airway plan will be ETT          Induction: intravenous    Postoperative Plan: Postoperative administration of opioids is intended.    Pertinent diagnostic labs and testing reviewed    Informed Consent:    Anesthetic plan and risks discussed with patient.    Use of blood products discussed with: patient whom consented to blood products.

## 2023-09-27 NOTE — PROGRESS NOTES
4 Eyes Skin Assessment Completed by MARIBEL Cassidy and MARIBEL Barfield.    Head WDL  Ears WDL  Nose WDL  Mouth WDL  Neck WDL  Breast/Chest Scar  Shoulder Blades WDL  Spine WDL  (R) Arm/Elbow/Hand Scab and Discoloration  (L) Arm/Elbow/Hand Bruising and Discoloration  Abdomen WDL  Groin WDL  Scrotum/Coccyx/Buttocks WDL  (R) Leg Redness, Bruising, and Swelling, dark discolorations flaking  (L) Leg Bruising and Swelling; dry/dark-dead tissue/black necrotic  (R) Heel/Foot/Toe Discoloration and Boggy  (L) Heel/Foot/Toe Discoloration and Boggy          Devices In Places SCD's-refusal      Interventions In Place Heel Mepilex, Heel Float Boots, and TAP System    Possible Skin Injury Yes    Pictures Uploaded Into Epic Yes  Wound Consult Placed Yes  RN Wound Prevention Protocol Ordered Yes

## 2023-09-27 NOTE — PROGRESS NOTES
Pt to return from surgery f/u L BKA. Pt states pain is manageable at this time at a 7/10 now. Pt answering appropriately. Report was given prior to pt arrival.

## 2023-09-27 NOTE — CARE PLAN
The patient is Watcher - Medium risk of patient condition declining or worsening    Shift Goals  Clinical Goals: Assist pt to admit/room set up  Patient Goals: to get better  Family Goals: LUTHER    Progress made toward(s) clinical / shift goals:        Problem: Knowledge Deficit - Standard  Goal: Patient and family/care givers will demonstrate understanding of plan of care, disease process/condition, diagnostic tests and medications  Outcome: Progressing     Problem: Pain - Standard  Goal: Alleviation of pain or a reduction in pain to the patient’s comfort goal  Outcome: Progressing

## 2023-09-28 ENCOUNTER — APPOINTMENT (OUTPATIENT)
Dept: RADIOLOGY | Facility: MEDICAL CENTER | Age: 58
DRG: 239 | End: 2023-09-28
Attending: INTERNAL MEDICINE
Payer: MEDICAID

## 2023-09-28 ENCOUNTER — APPOINTMENT (OUTPATIENT)
Dept: RADIOLOGY | Facility: MEDICAL CENTER | Age: 58
DRG: 239 | End: 2023-09-28
Attending: SURGERY
Payer: MEDICAID

## 2023-09-28 ENCOUNTER — APPOINTMENT (OUTPATIENT)
Dept: CARDIOLOGY | Facility: MEDICAL CENTER | Age: 58
DRG: 239 | End: 2023-09-28
Attending: INTERNAL MEDICINE
Payer: MEDICAID

## 2023-09-28 LAB
ANION GAP SERPL CALC-SCNC: 10 MMOL/L (ref 7–16)
BACTERIA UR CULT: ABNORMAL
BACTERIA UR CULT: ABNORMAL
BUN SERPL-MCNC: 9 MG/DL (ref 8–22)
CALCIUM SERPL-MCNC: 8 MG/DL (ref 8.5–10.5)
CHLORIDE SERPL-SCNC: 97 MMOL/L (ref 96–112)
CO2 SERPL-SCNC: 22 MMOL/L (ref 20–33)
CREAT SERPL-MCNC: 0.38 MG/DL (ref 0.5–1.4)
ERYTHROCYTE [DISTWIDTH] IN BLOOD BY AUTOMATED COUNT: 44.3 FL (ref 35.9–50)
GFR SERPLBLD CREATININE-BSD FMLA CKD-EPI: 127 ML/MIN/1.73 M 2
GLUCOSE SERPL-MCNC: 120 MG/DL (ref 65–99)
HCT VFR BLD AUTO: 28.8 % (ref 42–52)
HGB BLD-MCNC: 8.9 G/DL (ref 14–18)
LV EJECT FRACT  99904: 60
LV EJECT FRACT MOD 2C 99903: 60.2
LV EJECT FRACT MOD 4C 99902: 58.29
LV EJECT FRACT MOD BP 99901: 59.07
MCH RBC QN AUTO: 26.9 PG (ref 27–33)
MCHC RBC AUTO-ENTMCNC: 30.9 G/DL (ref 32.3–36.5)
MCV RBC AUTO: 87 FL (ref 81.4–97.8)
PLATELET # BLD AUTO: 638 K/UL (ref 164–446)
PMV BLD AUTO: 8.9 FL (ref 9–12.9)
POTASSIUM SERPL-SCNC: 4.6 MMOL/L (ref 3.6–5.5)
RBC # BLD AUTO: 3.31 M/UL (ref 4.7–6.1)
SIGNIFICANT IND 70042: ABNORMAL
SITE SITE: ABNORMAL
SODIUM SERPL-SCNC: 129 MMOL/L (ref 135–145)
SOURCE SOURCE: ABNORMAL
WBC # BLD AUTO: 10.2 K/UL (ref 4.8–10.8)

## 2023-09-28 PROCEDURE — 700111 HCHG RX REV CODE 636 W/ 250 OVERRIDE (IP): Mod: JZ | Performed by: INTERNAL MEDICINE

## 2023-09-28 PROCEDURE — A9270 NON-COVERED ITEM OR SERVICE: HCPCS | Performed by: INTERNAL MEDICINE

## 2023-09-28 PROCEDURE — 93306 TTE W/DOPPLER COMPLETE: CPT

## 2023-09-28 PROCEDURE — 700102 HCHG RX REV CODE 250 W/ 637 OVERRIDE(OP): Performed by: INTERNAL MEDICINE

## 2023-09-28 PROCEDURE — 770006 HCHG ROOM/CARE - MED/SURG/GYN SEMI*

## 2023-09-28 PROCEDURE — 93926 LOWER EXTREMITY STUDY: CPT | Mod: RT

## 2023-09-28 PROCEDURE — 700117 HCHG RX CONTRAST REV CODE 255: Performed by: SURGERY

## 2023-09-28 PROCEDURE — 36415 COLL VENOUS BLD VENIPUNCTURE: CPT

## 2023-09-28 PROCEDURE — 93306 TTE W/DOPPLER COMPLETE: CPT | Mod: 26 | Performed by: INTERNAL MEDICINE

## 2023-09-28 PROCEDURE — 93922 UPR/L XTREMITY ART 2 LEVELS: CPT | Mod: 26,52 | Performed by: INTERNAL MEDICINE

## 2023-09-28 PROCEDURE — 75635 CT ANGIO ABDOMINAL ARTERIES: CPT

## 2023-09-28 PROCEDURE — 700105 HCHG RX REV CODE 258: Performed by: INTERNAL MEDICINE

## 2023-09-28 PROCEDURE — 93978 VASCULAR STUDY: CPT | Mod: 26 | Performed by: INTERNAL MEDICINE

## 2023-09-28 PROCEDURE — 93922 UPR/L XTREMITY ART 2 LEVELS: CPT | Mod: 52,RT

## 2023-09-28 PROCEDURE — 85027 COMPLETE CBC AUTOMATED: CPT

## 2023-09-28 PROCEDURE — 93978 VASCULAR STUDY: CPT

## 2023-09-28 PROCEDURE — 97166 OT EVAL MOD COMPLEX 45 MIN: CPT

## 2023-09-28 PROCEDURE — 80048 BASIC METABOLIC PNL TOTAL CA: CPT

## 2023-09-28 PROCEDURE — 97162 PT EVAL MOD COMPLEX 30 MIN: CPT

## 2023-09-28 PROCEDURE — 99233 SBSQ HOSP IP/OBS HIGH 50: CPT | Performed by: INTERNAL MEDICINE

## 2023-09-28 PROCEDURE — 93926 LOWER EXTREMITY STUDY: CPT | Mod: 26,RT | Performed by: INTERNAL MEDICINE

## 2023-09-28 RX ORDER — OXYCODONE HYDROCHLORIDE 15 MG/1
15 TABLET ORAL
Status: DISCONTINUED | OUTPATIENT
Start: 2023-09-28 | End: 2023-10-01

## 2023-09-28 RX ORDER — ENOXAPARIN SODIUM 100 MG/ML
40 INJECTION SUBCUTANEOUS DAILY
Status: DISCONTINUED | OUTPATIENT
Start: 2023-09-28 | End: 2023-10-01

## 2023-09-28 RX ORDER — GABAPENTIN 100 MG/1
100 CAPSULE ORAL 3 TIMES DAILY
Status: DISCONTINUED | OUTPATIENT
Start: 2023-09-28 | End: 2023-10-01

## 2023-09-28 RX ORDER — OXYCODONE HYDROCHLORIDE 10 MG/1
10 TABLET ORAL
Status: DISCONTINUED | OUTPATIENT
Start: 2023-09-28 | End: 2023-09-28

## 2023-09-28 RX ORDER — OXYCODONE HYDROCHLORIDE 10 MG/1
10 TABLET ORAL
Status: DISCONTINUED | OUTPATIENT
Start: 2023-09-28 | End: 2023-10-01

## 2023-09-28 RX ORDER — HYDROMORPHONE HYDROCHLORIDE 1 MG/ML
0.5 INJECTION, SOLUTION INTRAMUSCULAR; INTRAVENOUS; SUBCUTANEOUS
Status: DISCONTINUED | OUTPATIENT
Start: 2023-09-28 | End: 2023-09-28

## 2023-09-28 RX ORDER — CYCLOBENZAPRINE HCL 10 MG
10 TABLET ORAL 3 TIMES DAILY PRN
Status: DISCONTINUED | OUTPATIENT
Start: 2023-09-28 | End: 2023-11-03 | Stop reason: HOSPADM

## 2023-09-28 RX ORDER — HYDROMORPHONE HYDROCHLORIDE 1 MG/ML
1 INJECTION, SOLUTION INTRAMUSCULAR; INTRAVENOUS; SUBCUTANEOUS
Status: DISCONTINUED | OUTPATIENT
Start: 2023-09-28 | End: 2023-10-01

## 2023-09-28 RX ORDER — OXYCODONE HYDROCHLORIDE 15 MG/1
15 TABLET ORAL
Status: DISCONTINUED | OUTPATIENT
Start: 2023-09-28 | End: 2023-09-28

## 2023-09-28 RX ADMIN — SODIUM CHLORIDE, POTASSIUM CHLORIDE, SODIUM LACTATE AND CALCIUM CHLORIDE 1000 ML: 600; 310; 30; 20 INJECTION, SOLUTION INTRAVENOUS at 17:57

## 2023-09-28 RX ADMIN — HYDROMORPHONE HYDROCHLORIDE 0.5 MG: 1 INJECTION, SOLUTION INTRAMUSCULAR; INTRAVENOUS; SUBCUTANEOUS at 00:10

## 2023-09-28 RX ADMIN — DOCUSATE SODIUM 50 MG AND SENNOSIDES 8.6 MG 2 TABLET: 8.6; 5 TABLET, FILM COATED ORAL at 17:11

## 2023-09-28 RX ADMIN — OXYCODONE HYDROCHLORIDE 15 MG: 15 TABLET ORAL at 15:29

## 2023-09-28 RX ADMIN — OXYCODONE HYDROCHLORIDE 15 MG: 15 TABLET ORAL at 19:28

## 2023-09-28 RX ADMIN — ENOXAPARIN SODIUM 40 MG: 100 INJECTION SUBCUTANEOUS at 17:10

## 2023-09-28 RX ADMIN — OXYCODONE HYDROCHLORIDE 15 MG: 15 TABLET ORAL at 10:11

## 2023-09-28 RX ADMIN — CEFEPIME 2 G: 2 INJECTION, POWDER, FOR SOLUTION INTRAVENOUS at 05:07

## 2023-09-28 RX ADMIN — HYDROMORPHONE HYDROCHLORIDE 0.5 MG: 1 INJECTION, SOLUTION INTRAMUSCULAR; INTRAVENOUS; SUBCUTANEOUS at 07:47

## 2023-09-28 RX ADMIN — HYDROMORPHONE HYDROCHLORIDE 0.5 MG: 1 INJECTION, SOLUTION INTRAMUSCULAR; INTRAVENOUS; SUBCUTANEOUS at 12:24

## 2023-09-28 RX ADMIN — GABAPENTIN 100 MG: 100 CAPSULE ORAL at 13:46

## 2023-09-28 RX ADMIN — IOHEXOL 95 ML: 350 INJECTION, SOLUTION INTRAVENOUS at 23:40

## 2023-09-28 RX ADMIN — PIPERACILLIN AND TAZOBACTAM 4.5 G: 4; .5 INJECTION, POWDER, FOR SOLUTION INTRAVENOUS at 12:04

## 2023-09-28 RX ADMIN — PIPERACILLIN AND TAZOBACTAM 4.5 G: 4; .5 INJECTION, POWDER, FOR SOLUTION INTRAVENOUS at 13:49

## 2023-09-28 RX ADMIN — NICOTINE TRANSDERMAL SYSTEM 21 MG: 21 PATCH, EXTENDED RELEASE TRANSDERMAL at 05:07

## 2023-09-28 RX ADMIN — GABAPENTIN 100 MG: 100 CAPSULE ORAL at 19:28

## 2023-09-28 RX ADMIN — OXYCODONE HYDROCHLORIDE 15 MG: 15 TABLET ORAL at 22:57

## 2023-09-28 RX ADMIN — PIPERACILLIN AND TAZOBACTAM 4.5 G: 4; .5 INJECTION, POWDER, FOR SOLUTION INTRAVENOUS at 19:30

## 2023-09-28 RX ADMIN — HYDROMORPHONE HYDROCHLORIDE 1 MG: 1 INJECTION, SOLUTION INTRAMUSCULAR; INTRAVENOUS; SUBCUTANEOUS at 21:39

## 2023-09-28 RX ADMIN — DOCUSATE SODIUM 50 MG AND SENNOSIDES 8.6 MG 2 TABLET: 8.6; 5 TABLET, FILM COATED ORAL at 05:07

## 2023-09-28 RX ADMIN — CYCLOBENZAPRINE 10 MG: 10 TABLET, FILM COATED ORAL at 21:39

## 2023-09-28 RX ADMIN — HYDROMORPHONE HYDROCHLORIDE 1 MG: 1 INJECTION, SOLUTION INTRAMUSCULAR; INTRAVENOUS; SUBCUTANEOUS at 17:11

## 2023-09-28 RX ADMIN — OXYCODONE HYDROCHLORIDE 10 MG: 10 TABLET ORAL at 05:07

## 2023-09-28 ASSESSMENT — COGNITIVE AND FUNCTIONAL STATUS - GENERAL
TOILETING: A LOT
SUGGESTED CMS G CODE MODIFIER DAILY ACTIVITY: CK
DRESSING REGULAR LOWER BODY CLOTHING: A LOT
MOVING FROM LYING ON BACK TO SITTING ON SIDE OF FLAT BED: UNABLE
DRESSING REGULAR UPPER BODY CLOTHING: A LITTLE
MOBILITY SCORE: 10
SUGGESTED CMS G CODE MODIFIER MOBILITY: CL
WALKING IN HOSPITAL ROOM: TOTAL
MOVING TO AND FROM BED TO CHAIR: A LOT
CLIMB 3 TO 5 STEPS WITH RAILING: TOTAL
STANDING UP FROM CHAIR USING ARMS: A LOT
TURNING FROM BACK TO SIDE WHILE IN FLAT BAD: A LITTLE
DAILY ACTIVITIY SCORE: 15
EATING MEALS: A LITTLE
PERSONAL GROOMING: A LITTLE
HELP NEEDED FOR BATHING: A LOT

## 2023-09-28 ASSESSMENT — GAIT ASSESSMENTS: GAIT LEVEL OF ASSIST: UNABLE TO PARTICIPATE

## 2023-09-28 ASSESSMENT — PAIN SCALES - GENERAL: PAIN_LEVEL: 10

## 2023-09-28 ASSESSMENT — ENCOUNTER SYMPTOMS
VOMITING: 0
HEADACHES: 0
ABDOMINAL PAIN: 0
FEVER: 0
SHORTNESS OF BREATH: 0
NAUSEA: 0
DIZZINESS: 0
DEPRESSION: 0
MYALGIAS: 1
CHILLS: 0

## 2023-09-28 ASSESSMENT — PAIN DESCRIPTION - PAIN TYPE
TYPE: SURGICAL PAIN

## 2023-09-28 ASSESSMENT — ACTIVITIES OF DAILY LIVING (ADL): TOILETING: INDEPENDENT

## 2023-09-28 NOTE — CARE PLAN
The patient is Watcher - Medium risk of patient condition declining or worsening    Shift Goals  Clinical Goals: pain control, monitor vs  Patient Goals: comfort and rest  Family Goals: LUTHER    Patient is aaox4, c/o pain 7/9 at incisional site to L leg. He is pleasant and cooperative. Prn dilaudid and oxycodone available for pain control. Surgical site assessed - dressing cdi with no drainage present. Patient has extremity elevated. Call light and personal items within reach. Encouraged to use call light for assistance

## 2023-09-28 NOTE — PROGRESS NOTES
"      Orthopaedic Progress Note    Interval changes:  Patient doing well post op  L BKA dressings are CDI  Patient with knee bent and refusing to straighten leg- explained the need to keep leg straight to prevent contracture  Cleared for DC to SNF by ortho pending medicine clearance    ROS - Patient denies any new issues.  Pain well controlled.    BP (!) 156/82   Pulse 83   Temp 36.2 °C (97.2 °F) (Temporal)   Resp 18   Ht 1.803 m (5' 11\")   Wt 62.7 kg (138 lb 3.7 oz)   SpO2 100%     Patient seen and examined  No acute distress  Breathing non labored  RRR  LLE dressings CDI, knee bent at 90 deg, refusing to straighten leg.     Recent Labs     09/26/23  0846 09/27/23  0503 09/28/23  0621   WBC 11.2* 9.1 10.2   RBC 3.52* 3.05* 3.31*   HEMOGLOBIN 9.9* 8.3* 8.9*   HEMATOCRIT 30.6* 26.9* 28.8*   MCV 86.9 88.2 87.0   MCH 28.1 27.2 26.9*   MCHC 32.4 30.9* 30.9*   RDW 45.3 45.7 44.3   PLATELETCT 805* 670* 638*   MPV 9.1 8.7* 8.9*       Active Hospital Problems    Diagnosis     Bacteremia [R78.81]     Anemia [D64.9]     Gangrene of left foot (HCC) [I96]     Leukocytosis [D72.829]     Elevated liver enzymes [R74.8]     Hyponatremia [E87.1]        Assessment/Plan:  Patient doing well post op  L BKA dressings are CDI  Patient with knee bent and refusing to straighten leg- explained the need to keep leg straight to prevent contracture  Cleared for DC to SNF by ortho pending medicine clearance  POD#1 S/P Left below-knee amputation  Wt bearing status - NWB LLE  Wound care/Drains - Dressings to be changed every other day by nursing. Or PRN for saturation starting POD#2  Future Procedures - none planned   Lovenox: Start 9/28, Duration-until ambulatory > 150'  Sutures/Staples out- 14-21 days post operatively. Removal will completed by ortho mid levels only.  PT/OT-initiated  Antibiotics: zosyn 4.5 g IV Q8  DVT Prophylaxis- TEDS/SCDs/Foot pumps  Holguin-not needed per ortho  Case Coordination for Discharge Planning - Disposition " per therapy recs.

## 2023-09-28 NOTE — PROGRESS NOTES
[facility] Medicine Daily Progress Note    Date of Service  9/28/2023    Chief Complaint  Dillon Centeno is a 58 y.o. male admitted 9/26/2023 with bilateral foot wounds.    Hospital Course  58 y.o. male with past medical history of hypertension who presented to the hospital on 9/26/2023 with complaint of bilateral lower extremity wound.  Patient reported that he found to a significant wound on his left lower extremity especially involving his left foot that for started 2 weeks ago.  He reported that he was wearing a shoe that was to size smaller than his actual size and he developed this wound. He does not take any medications on regular basis.  He smokes 1 pack a day and drinks 1-2 drinks of alcohol daily and sometimes he does not drink alcohol at all.  He denies any history of symptoms of alcohol withdrawal. In the ER patient found to have gangrene on his left foot most likely due to vascular disease.  Vascular surgery and orthopedic surgery consulted.    Interval Problem Update  9/27 Vascular surgery reported that severe gangrene of the left foot was unsalvageable and he will require a amputation, right lower extremity has some scattered ulcerations, arterial ultrasound ordered to evaluate.  Per orthopedic surgery plan for amputation of left leg today.  Vital signs stable.  Leukocytosis resolved.  Hepatitis panel negative.  Hemoglobin 8.3, will check iron panel, ferritin, B12. Sodium still low 126, check TSH, urine Na and Urine Osm. Blood culture with 1/2 with morganella morganii and the other with possible strep. Echo pending. Repeat blood cultures ordered.  Patient's main complaint is pain in his bilateral feet.    9/28 Vitals stable on room air.  Notified by ID pharmacy that blood cultures from 9/26 with Morganella and Enterococcus.  Urine culture with staph.  I have discussed the case with infectious disease.  Na with mild improvement 129, urine studies still pending. Repeat blood cultures pending. Echo  and arterial US pending.  Patient complaining of severe pain in his leg and stump.  He has been requiring multiple doses of oral and IV opiates, continue to monitor respiratory status closely.  I have added gabapentin as he is complaining of nerve pain as well as Flexeril as needed.    I have discussed this patient's plan of care and discharge plan at IDT rounds today with Case Management, Nursing, Nursing leadership, and other members of the IDT team.    Consultants/Specialty  orthopedics and vascular surgery  Infectious disease     Code Status  Full Code    Disposition  The patient is not medically cleared for discharge to home or a post-acute facility.  Anticipate discharge to: skilled nursing facility    I have placed the appropriate orders for post-discharge needs.    Review of Systems  Review of Systems   Constitutional:  Positive for malaise/fatigue. Negative for chills and fever.   Respiratory:  Negative for shortness of breath.    Cardiovascular:  Negative for chest pain.   Gastrointestinal:  Negative for abdominal pain, nausea and vomiting.   Genitourinary:  Negative for dysuria.   Musculoskeletal:  Positive for joint pain and myalgias.   Skin:  Negative for rash.   Neurological:  Negative for dizziness and headaches.   Psychiatric/Behavioral:  Negative for depression.    All other systems reviewed and are negative.       Physical Exam  Temp:  [35.8 °C (96.5 °F)-36.5 °C (97.7 °F)] 36.2 °C (97.2 °F)  Pulse:  [] 83  Resp:  [12-18] 18  BP: ()/(57-90) 156/82  SpO2:  [90 %-100 %] 100 %    Physical Exam  Vitals and nursing note reviewed.   Constitutional:       General: He is not in acute distress.     Appearance: He is ill-appearing.      Comments: Friends at bedside    HENT:      Head: Normocephalic and atraumatic.      Mouth/Throat:      Pharynx: Oropharynx is clear.   Eyes:      Conjunctiva/sclera: Conjunctivae normal.   Cardiovascular:      Rate and Rhythm: Normal rate and regular rhythm.       Pulses: Normal pulses.   Pulmonary:      Effort: Pulmonary effort is normal. No respiratory distress.      Breath sounds: Normal breath sounds. No wheezing.   Abdominal:      General: Abdomen is flat. There is no distension.      Palpations: Abdomen is soft.      Tenderness: There is no abdominal tenderness.   Musculoskeletal:         General: Tenderness, deformity and signs of injury present.      Cervical back: Normal range of motion and neck supple.      Right lower leg: No edema.      Comments: Left BKA    Skin:     General: Skin is warm and dry.      Findings: Lesion present.      Comments: Right foot with multiple scabbed lesions  (Picture below)   Neurological:      General: No focal deficit present.      Mental Status: He is alert and oriented to person, place, and time.      Cranial Nerves: No cranial nerve deficit.      Motor: Weakness present.   Psychiatric:         Mood and Affect: Mood normal.         Behavior: Behavior normal.          Right foot       Fluids    Intake/Output Summary (Last 24 hours) at 9/28/2023 1415  Last data filed at 9/28/2023 1000  Gross per 24 hour   Intake 1360 ml   Output 1700 ml   Net -340 ml       Laboratory  Recent Labs     09/26/23  0846 09/27/23  0503 09/28/23  0621   WBC 11.2* 9.1 10.2   RBC 3.52* 3.05* 3.31*   HEMOGLOBIN 9.9* 8.3* 8.9*   HEMATOCRIT 30.6* 26.9* 28.8*   MCV 86.9 88.2 87.0   MCH 28.1 27.2 26.9*   MCHC 32.4 30.9* 30.9*   RDW 45.3 45.7 44.3   PLATELETCT 805* 670* 638*   MPV 9.1 8.7* 8.9*     Recent Labs     09/26/23  0938 09/27/23  0503 09/28/23  0621   SODIUM 125* 126* 129*   POTASSIUM 4.6 4.5 4.6   CHLORIDE 91* 96 97   CO2 23 21 22   GLUCOSE 114* 106* 120*   BUN 14 12 9   CREATININE 0.47* 0.55 0.38*   CALCIUM 8.3* 7.7* 8.0*                   Imaging  US-EXTREMITY ARTERY LOWER UNILAT RIGHT         US-INGA SINGLE LEVEL UNILAT RIGHT   Final Result      DX-CHEST-PORTABLE (1 VIEW)   Final Result      1.  LEFT basilar atelectasis or pneumonia   2.  Trace LEFT  pleural effusion or pleural scar      EC-ECHOCARDIOGRAM COMPLETE W/O CONT    (Results Pending)   US-AORTA/ILIACS DUPLEX COMPLETE    (Results Pending)        Assessment/Plan  * Gangrene of left foot (HCC)- (present on admission)  Assessment & Plan  Patient found to have significant gangrene on his left foot that may need surgical intervention.  Vascular surgery consulted, right LE arterial ultrasound pending  Pain medication with oxycodone and IV Dilaudid.  Monitor for adverse effect of narcotic pain medications.  Gabapentin  Vancomycin dose adjustment as per vancomycin trough level and monitor for vancomycin toxicity.  Orthopedic surgery consulted, s/p Left BKA on 9/27  ID consulted   Continue IV abx     Anemia- (present on admission)  Assessment & Plan  Iron studies consistent with chronic disease   B12 wnl   Trend CBC, transfuse Hb<7     Bacteremia- (present on admission)  Assessment & Plan  1/2 bcx with morganella   1/2 bcx with enterococcus   Repeat blood cultures ordered   Continue current abx   Echo pending   Infectious disease consulted     Hyponatremia  Assessment & Plan  Patient found to have hyponatremia most likely due to dehydration.  Continue IV fluids  TSH wnl   check urine osmolality, urine sodium  Daily BMP    Elevated liver enzymes  Assessment & Plan  Elevated liver enzymes.  No abdominal pain.  Hepatitis panel negative    Leukocytosis  Assessment & Plan  Patient found to have leukocytosis most likely secondary to lower extremity wound.  Ordered CBC for tomorrow.  I started him on broad-spectrum antibiotics.         VTE prophylaxis: SCDs, lovenox ppx    I have performed a physical exam and reviewed and updated ROS and Plan today (9/28/2023). In review of yesterday's note (9/27/2023), there are no changes except as documented above.

## 2023-09-28 NOTE — DISCHARGE PLANNING
Care Transition Team Assessment    LMSW met with pt at bedside to complete assessment. Pt A&Ox4 and able to verify the information on the face sheet. Pt lives with his friend in a single-story house that has one step to enter. Prior to this hospitalization pt was independent at home with ADLs and most IADLs. Pt does not use any DME at baseline. Pt reported that his friend is good support for him. Pt is disabled and receives monthly deposits. Pt denies any SA or MH concerns. Pt does not have an advance directive.      LMSW spoke with pt about SNF. Pt open to going to SNF for rehabilitation as long as it is covered by insurance. LMSW informed pt that LMSW will sent out the referrals to the local SNFs and then come back to pt for SNF.     Information Source  Orientation Level: Oriented X4  Information Given By: Patient  Who is responsible for making decisions for patient? : Patient    Readmission Evaluation  Is this a readmission?: No    Elopement Risk  Legal Hold: No  Ambulatory or Self Mobile in Wheelchair: No-Not an Elopement Risk  Elopement Risk: Not at Risk for Elopement    Discharge Preparedness  What is your plan after discharge?: Skilled nursing facility  What are your discharge supports?: Other (comment) (Friends)  Prior Functional Level: Independent with Activities of Daily Living  Difficulity with ADLs: None  Difficulity with IADLs: None    Functional Assesment  Prior Functional Level: Independent with Activities of Daily Living    Finances  Financial Barriers to Discharge: No  Prescription Coverage: Yes    Advance Directive  Advance Directive?: None    Domestic Abuse  Have you ever been the victim of abuse or violence?: No    Psychological Assessment  History of Substance Abuse: None  History of Psychiatric Problems: No  Non-compliant with Treatment: No  Newly Diagnosed Illness: No    Discharge Risks or Barriers  Discharge risks or barriers?: No  Patient risk factors: Lack of outside supports    Anticipated  Discharge Information  Discharge Disposition: D/T to SNF with Medicare cert in anticipation of skilled care (03)

## 2023-09-28 NOTE — THERAPY
Physical Therapy   Initial Evaluation     Patient Name: Dillon Centeno  Age:  58 y.o., Sex:  male  Medical Record #: [id number]  Today's Date: 9/28/2023     Precautions  Precautions: Fall Risk;Swallow Precautions;Other (See Comments)  Comments: Elevate LLE at rest    Assessment  Patient is 58 y.o. male presented to the hospital on 9/26/2023 with complaint of bilateral lower extremity wound.   Patient found to have gangrene of L foot, hyponatremia, elevated liver enzymes, leukocytosis.   Patient underwent L BKA on 9/27.   Past medical history of hypertension     Patient seen for PT evaluation. Patient reporting pain despite pain medications. Patient unable to tolerate L BKA stump knee extension at this time. Patient keeps his knee bent. Educated on appropriate positioning. Was able to participate with functional mobility as detailed below. Patient attempted partial sit-stand with support from bed rail. Will continue to benefit from PT services and recommend post-acute placement to help improve functional mobility as able.     Plan    Physical Therapy Initial Treatment Plan   Treatment Plan : Bed Mobility, Neuro Re-Education / Balance, Therapeutic Activities, Therapeutic Exercise  Treatment Frequency: 4 Times per Week  Duration: Until Therapy Goals Met    DC Equipment Recommendations: Unable to determine at this time  Discharge Recommendations: Recommend post-acute placement for additional physical therapy services prior to discharge home     Objective       09/28/23 1120   Charge Group   PT Evaluation PT Evaluation Mod   Total Time Spent   PT Evaluation Time Spent (Mins) 20   PT Total Time Spent (Calculated) 20   Initial Contact Note    Initial Contact Note Order Received and Verified, Physical Therapy Evaluation in Progress with Full Report to Follow.   Precautions   Precautions Fall Risk;Swallow Precautions;Other (See Comments)   Comments Elevate LLE at rest   Vitals   O2 Delivery Device None - Room Air   Pain    Pain Scales 0 to 10 Scale    Intervention Medication (see MAR);Rest;Repositioned   Pain 0 - 10 Group   Location Other (Comments)  (L BKA stump)   Therapist Pain Assessment Prior to Activity;During Activity;Post Activity  (Patient reporting severe pain with mobility despite pain medications)   Prior Living Situation   Prior Services Home-Independent   Housing / Facility 1 Story House   Steps Into Home 1   Steps In Home 0   Lives with - Patient's Self Care Capacity Sibling  (Brother who is a CNA)   Prior Level of Functional Mobility   Bed Mobility Independent   Transfer Status Independent   Ambulation Independent   Cognition    Level of Consciousness Alert   Comments Patient required encouragement to participate in the session; patient apprehensive to move in anticipation of pain with movement   Passive ROM Upper Body   Comments   (Please refer to OT notes for upper body assessment)   Passive ROM Lower Body   Passive ROM Lower Body X   Comments LLE-BKA stump-knee in flexion at rest; limited by pain upon attempting to extend; Hip Flexion-WFL   Active ROM Lower Body    Active ROM Lower Body  X   Comments L BKA stump-knee extension-limited due to pain   Strength Lower Body   Lower Body Strength  X   Comments Grossly RLE 3+/5, LLE 3/5   Other Treatments   Other Treatments Provided Patient was educated about importance of mobility   Balance Assessment   Sitting Balance (Static) Fair   Sitting Balance (Dynamic) Fair   Standing Balance (Static) Poor   Comments Use of bed rail   Bed Mobility    Supine to Sit Minimal Assist   Sit to Supine Minimal Assist   Scooting Maximal Assist   Comments HOB raised; cues for sequencing of task   Gait Analysis   Gait Level Of Assist Unable to Participate   Comments New BKA amputation, patient not agreeable   Functional Mobility   Sit to Stand Moderate Assist  (Partial stand only)   Bed, Chair, Wheelchair Transfer Refused  (Due to pain)   Comments Patient tolerated partial standing for few  seconds to assist with changing of the bed linen   How much difficulty does the patient currently have...   Turning over in bed (including adjusting bedclothes, sheets and blankets)? 3   Sitting down on and standing up from a chair with arms (e.g., wheelchair, bedside commode, etc.) 1   Moving from lying on back to sitting on the side of the bed? 2   How much help from another person does the patient currently need...   Moving to and from a bed to a chair (including a wheelchair)? 2   Need to walk in a hospital room? 1   Climbing 3-5 steps with a railing? 1   6 clicks Mobility Score 10   Edema / Skin Assessment   Edema / Skin  X   Comments Ace wrap-L BKA stump; RLE-wound/ulcers/discolored skin/unkept/long nails   Patient / Family Goals    Patient / Family Goal #1 To be able to get prosthetics for his LLE   Short Term Goals    Short Term Goal # 1 Patient will perform supine-sit with HOB flat with supervision in 6 visits   Short Term Goal # 2 Patient will perform chair transfers with supervision in 6 visits   Short Term Goal # 3 Patient will perform sit-stand with FWW with supervision in 6 visits   Education Group   Education Provided Role of Physical Therapist   Role of Physical Therapist Patient Response Patient;Acceptance;Demonstration;Action Demonstration;Reinforcement Needed   Physical Therapy Initial Treatment Plan    Treatment Plan  Bed Mobility;Neuro Re-Education / Balance;Therapeutic Activities;Therapeutic Exercise   Treatment Frequency 4 Times per Week   Duration Until Therapy Goals Met   Problem List    Problems Pain;Impaired Bed Mobility;Impaired Transfers;Impaired Ambulation;Functional ROM Deficit;Functional Strength Deficit   Anticipated Discharge Equipment and Recommendations   DC Equipment Recommendations Unable to determine at this time   Discharge Recommendations Recommend post-acute placement for additional physical therapy services prior to discharge home   Interdisciplinary Plan of Care  Collaboration   IDT Collaboration with  Nursing;Occupational Therapist   Patient Position at End of Therapy In Bed;Bed Alarm On;Call Light within Reach;Tray Table within Reach   Session Information   Date / Session Number  9/28-1(1/4, 10/4)

## 2023-09-28 NOTE — PROGRESS NOTES
Communication with provider, review pt poc and pain management, plan for new orders to focus nerve pain, continues with pain to LLE, notified pt refusal of moving leg at knee, appears to be stiff with movement, elevation on pillow.

## 2023-09-28 NOTE — ANESTHESIA TIME REPORT
Anesthesia Start and Stop Event Times     Date Time Event    9/27/2023 1311 Ready for Procedure     1323 Anesthesia Start     1435 Anesthesia Stop        Responsible Staff  09/27/23    Name Role Begin End    Naren Cabrera M.D. Anesth 1323 1435        Overtime Reason:  no overtime (within assigned shift)    Comments:

## 2023-09-28 NOTE — PROGRESS NOTES
Communication with Dr Trinity Slaughter DO, of pt pain assessment and medication given. New orders placed for pain management. Communication with therapies, PT with plan to return in one hour f/u medication given f/u new orders to decrease pain prior to therapies, Nonpharm interventions with slight helpfulness, continues with pain and need of pain medication to manage pain.

## 2023-09-28 NOTE — CONSULTS
ID consult  Admitted 9/26 with Left foot gangrene and critical limb ischemia, maggots present. Foot rotting and mummified appearance by admission pictures  Gangrene +blood cxs  Now s/p emergent BKA  Bcx Morganella, possible strep  Currently on Zosyn  ID will see tomorrow

## 2023-09-28 NOTE — THERAPY
"Occupational Therapy   Initial Evaluation     Patient Name: Dillon Centeno  Age:  58 y.o., Sex:  male  Medical Record #: [id number]  Today's Date: 9/28/2023     Precautions  Precautions: Fall Risk, Non Weight Bearing Left Lower Extremity    Assessment  Patient is 58 y.o. male with a diagnosis of L foot gangrene, s/p L BKA.  Additional factors influencing patient status / progress: weakness, fatigue, impaired balance, pain, impaired cognition.  Attempted to educate patient on importance of maintaining L knee extension for a prosthetic as L knee was very flexed, however he would not try to straighten it and would not let therapist try to straighten it.    Plan    Occupational Therapy Initial Treatment Plan   Treatment Interventions: Self Care / Activities of Daily Living, Adaptive Equipment, Neuro Re-Education / Balance, Therapeutic Exercises, Therapeutic Activity  Treatment Frequency: 3 Times per Week  Duration: Until Therapy Goals Met    DC Equipment Recommendations: Unable to determine at this time  Discharge Recommendations: Recommend post-acute placement for additional occupational therapy services prior to discharge home     Subjective    \"I can't do any more than this you guys, I'm sorry.\"     Objective       09/28/23 1106   Prior Living Situation   Prior Services Home-Independent   Housing / Facility 1 Story House   Steps Into Home 1   Bathroom Set up Walk In Shower   Equipment Owned Single Point Cane   Lives with - Patient's Self Care Capacity Sibling   Comments Pt lives with his brother who is a CNA.   Prior Level of ADL Function   Self Feeding Independent   Grooming / Hygiene Independent   Bathing Independent   Dressing Independent   Toileting Independent   Prior Level of IADL Function   Prior Level Of Mobility Independent With Device in Community;Independent With Device in Home   Precautions   Precautions Fall Risk;Non Weight Bearing Left Lower Extremity   Pain 0 - 10 Group   Therapist Pain Assessment " Nurse Notified;During Activity  (premedicated, still c/o pain throughout L BKA site)   Cognition    Cognition / Consciousness X   Level of Consciousness Alert   Comments cooperative with encouragement, difficulty processing directions and information throughout, appeared very easily overwhelmed, not always receptive to education   Active ROM Upper Body   Active ROM Upper Body  WDL   Strength Upper Body   Upper Body Strength  WDL   Balance Assessment   Sitting Balance (Static) Fair   Sitting Balance (Dynamic) Fair -   Standing Balance (Static) Trace   Standing Balance (Dynamic) Dependent   Weight Shift Sitting Poor   Weight Shift Standing Poor   Comments w/ therapist assist for partial stand   Bed Mobility    Supine to Sit Minimal Assist   Sit to Supine Minimal Assist   Scooting Maximal Assist   ADL Assessment   Grooming   (refused)   Upper Body Dressing Minimal Assist  (don/doff gown)   Lower Body Dressing   (refused)   Toileting   (refused)   Comments pt found in bed soaked in urine with multiple full urinals at bedside. pt's hair also matted in the back but would not let therapist help him brush it.   How much help from another person does the patient currently need...   Putting on and taking off regular lower body clothing? 2   Bathing (including washing, rinsing, and drying)? 2   Toileting, which includes using a toilet, bedpan, or urinal? 2   Putting on and taking off regular upper body clothing? 3   Taking care of personal grooming such as brushing teeth? 3   Eating meals? 3   6 Clicks Daily Activity Score 15   Functional Mobility   Sit to Stand Moderate Assist  (partial stand)   Mobility EOB>partial stand for linen change>BTB   Comments pt refused further OOB activity   Patient / Family Goals   Patient / Family Goal #1 none stated   Short Term Goals   Short Term Goal # 1 pt will demo ADL txfs with supv   Short Term Goal # 2 pt will dress LB with supv   Short Term Goal # 3 pt will demo toileting with supv

## 2023-09-29 PROBLEM — N28.1 RENAL CYST: Status: ACTIVE | Noted: 2020-09-11

## 2023-09-29 LAB
AMPHET UR QL SCN: NEGATIVE
ANION GAP SERPL CALC-SCNC: 10 MMOL/L (ref 7–16)
BACTERIA BLD CULT: ABNORMAL
BARBITURATES UR QL SCN: NEGATIVE
BENZODIAZ UR QL SCN: NEGATIVE
BUN SERPL-MCNC: 9 MG/DL (ref 8–22)
BZE UR QL SCN: NEGATIVE
CALCIUM SERPL-MCNC: 8.3 MG/DL (ref 8.5–10.5)
CANNABINOIDS UR QL SCN: NEGATIVE
CHLORIDE SERPL-SCNC: 89 MMOL/L (ref 96–112)
CO2 SERPL-SCNC: 27 MMOL/L (ref 20–33)
CREAT SERPL-MCNC: 0.59 MG/DL (ref 0.5–1.4)
ERYTHROCYTE [DISTWIDTH] IN BLOOD BY AUTOMATED COUNT: 43.9 FL (ref 35.9–50)
FENTANYL UR QL: NEGATIVE
GFR SERPLBLD CREATININE-BSD FMLA CKD-EPI: 111 ML/MIN/1.73 M 2
GLUCOSE SERPL-MCNC: 91 MG/DL (ref 65–99)
HCT VFR BLD AUTO: 28.7 % (ref 42–52)
HGB BLD-MCNC: 9.4 G/DL (ref 14–18)
MCH RBC QN AUTO: 27.9 PG (ref 27–33)
MCHC RBC AUTO-ENTMCNC: 32.8 G/DL (ref 32.3–36.5)
MCV RBC AUTO: 85.2 FL (ref 81.4–97.8)
METHADONE UR QL SCN: NEGATIVE
OPIATES UR QL SCN: NEGATIVE
OSMOLALITY UR: 220 MOSM/KG H2O (ref 300–900)
OXYCODONE UR QL SCN: POSITIVE
PCP UR QL SCN: NEGATIVE
PLATELET # BLD AUTO: 697 K/UL (ref 164–446)
PMV BLD AUTO: 8.7 FL (ref 9–12.9)
POTASSIUM SERPL-SCNC: 4.5 MMOL/L (ref 3.6–5.5)
PROPOXYPH UR QL SCN: NEGATIVE
RBC # BLD AUTO: 3.37 M/UL (ref 4.7–6.1)
SCCMEC + MECA PNL NOSE NAA+PROBE: NEGATIVE
SIGNIFICANT IND 70042: ABNORMAL
SIGNIFICANT IND 70042: ABNORMAL
SITE SITE: ABNORMAL
SITE SITE: ABNORMAL
SODIUM SERPL-SCNC: 126 MMOL/L (ref 135–145)
SODIUM UR-SCNC: 70 MMOL/L
SOURCE SOURCE: ABNORMAL
SOURCE SOURCE: ABNORMAL
WBC # BLD AUTO: 11.3 K/UL (ref 4.8–10.8)

## 2023-09-29 PROCEDURE — 84300 ASSAY OF URINE SODIUM: CPT

## 2023-09-29 PROCEDURE — 83935 ASSAY OF URINE OSMOLALITY: CPT

## 2023-09-29 PROCEDURE — 85027 COMPLETE CBC AUTOMATED: CPT

## 2023-09-29 PROCEDURE — 700105 HCHG RX REV CODE 258: Performed by: INTERNAL MEDICINE

## 2023-09-29 PROCEDURE — 80307 DRUG TEST PRSMV CHEM ANLYZR: CPT

## 2023-09-29 PROCEDURE — 36415 COLL VENOUS BLD VENIPUNCTURE: CPT

## 2023-09-29 PROCEDURE — 770006 HCHG ROOM/CARE - MED/SURG/GYN SEMI*

## 2023-09-29 PROCEDURE — 700111 HCHG RX REV CODE 636 W/ 250 OVERRIDE (IP): Mod: JZ | Performed by: INTERNAL MEDICINE

## 2023-09-29 PROCEDURE — 99233 SBSQ HOSP IP/OBS HIGH 50: CPT | Performed by: INTERNAL MEDICINE

## 2023-09-29 PROCEDURE — A9270 NON-COVERED ITEM OR SERVICE: HCPCS | Performed by: INTERNAL MEDICINE

## 2023-09-29 PROCEDURE — 700102 HCHG RX REV CODE 250 W/ 637 OVERRIDE(OP): Performed by: INTERNAL MEDICINE

## 2023-09-29 PROCEDURE — 80048 BASIC METABOLIC PNL TOTAL CA: CPT

## 2023-09-29 PROCEDURE — 97161 PT EVAL LOW COMPLEX 20 MIN: CPT

## 2023-09-29 PROCEDURE — 99255 IP/OBS CONSLTJ NEW/EST HI 80: CPT | Performed by: INTERNAL MEDICINE

## 2023-09-29 PROCEDURE — 87641 MR-STAPH DNA AMP PROBE: CPT

## 2023-09-29 RX ORDER — ALPRAZOLAM 0.5 MG/1
0.5 TABLET ORAL
Status: DISCONTINUED | OUTPATIENT
Start: 2023-09-29 | End: 2023-10-21

## 2023-09-29 RX ADMIN — HYDROMORPHONE HYDROCHLORIDE 1 MG: 1 INJECTION, SOLUTION INTRAMUSCULAR; INTRAVENOUS; SUBCUTANEOUS at 19:38

## 2023-09-29 RX ADMIN — OXYCODONE HYDROCHLORIDE 15 MG: 15 TABLET ORAL at 14:21

## 2023-09-29 RX ADMIN — CYCLOBENZAPRINE 10 MG: 10 TABLET, FILM COATED ORAL at 09:42

## 2023-09-29 RX ADMIN — CYCLOBENZAPRINE 10 MG: 10 TABLET, FILM COATED ORAL at 17:56

## 2023-09-29 RX ADMIN — HYDROMORPHONE HYDROCHLORIDE 1 MG: 1 INJECTION, SOLUTION INTRAMUSCULAR; INTRAVENOUS; SUBCUTANEOUS at 03:06

## 2023-09-29 RX ADMIN — OXYCODONE HYDROCHLORIDE 15 MG: 15 TABLET ORAL at 17:57

## 2023-09-29 RX ADMIN — NICOTINE TRANSDERMAL SYSTEM 21 MG: 21 PATCH, EXTENDED RELEASE TRANSDERMAL at 05:04

## 2023-09-29 RX ADMIN — PIPERACILLIN AND TAZOBACTAM 4.5 G: 4; .5 INJECTION, POWDER, FOR SOLUTION INTRAVENOUS at 12:25

## 2023-09-29 RX ADMIN — OXYCODONE HYDROCHLORIDE 15 MG: 15 TABLET ORAL at 09:43

## 2023-09-29 RX ADMIN — DOCUSATE SODIUM 50 MG AND SENNOSIDES 8.6 MG 2 TABLET: 8.6; 5 TABLET, FILM COATED ORAL at 16:05

## 2023-09-29 RX ADMIN — OXYCODONE HYDROCHLORIDE 15 MG: 15 TABLET ORAL at 05:04

## 2023-09-29 RX ADMIN — PIPERACILLIN AND TAZOBACTAM 4.5 G: 4; .5 INJECTION, POWDER, FOR SOLUTION INTRAVENOUS at 05:04

## 2023-09-29 RX ADMIN — GABAPENTIN 100 MG: 100 CAPSULE ORAL at 16:05

## 2023-09-29 RX ADMIN — POLYETHYLENE GLYCOL 3350 1 PACKET: 17 POWDER, FOR SOLUTION ORAL at 16:05

## 2023-09-29 RX ADMIN — GABAPENTIN 100 MG: 100 CAPSULE ORAL at 05:04

## 2023-09-29 RX ADMIN — GABAPENTIN 100 MG: 100 CAPSULE ORAL at 12:24

## 2023-09-29 RX ADMIN — PIPERACILLIN AND TAZOBACTAM 4.5 G: 4; .5 INJECTION, POWDER, FOR SOLUTION INTRAVENOUS at 19:40

## 2023-09-29 RX ADMIN — HYDROMORPHONE HYDROCHLORIDE 1 MG: 1 INJECTION, SOLUTION INTRAMUSCULAR; INTRAVENOUS; SUBCUTANEOUS at 15:57

## 2023-09-29 ASSESSMENT — ENCOUNTER SYMPTOMS
SHORTNESS OF BREATH: 0
MYALGIAS: 1
VOMITING: 0
FEVER: 0
NAUSEA: 0
CHILLS: 0
DEPRESSION: 0
DIZZINESS: 0
ABDOMINAL PAIN: 0
HEADACHES: 0

## 2023-09-29 ASSESSMENT — PAIN DESCRIPTION - PAIN TYPE
TYPE: ACUTE PAIN
TYPE: ACUTE PAIN

## 2023-09-29 ASSESSMENT — FIBROSIS 4 INDEX: FIB4 SCORE: 0.51

## 2023-09-29 NOTE — PROGRESS NOTES
VASCULAR SURGERY SERVICE                        Progress Note  _________________________________      Arterial studies reviewed.  Shows severe arterial insufficiency in both lower extremities.  There is diffuse severe vascular disease on the duplex, including evidence of iliac and common femoral disease.    We will get a CTA to further evaluate as the patient is at risk for limb loss on the right side and he is at risk for nonhealing of the left BKA without revascularization    Further recommendations pending the results of the CTA    Keith Navarro MD  [facility]   Voal[place] preferred or call my office [phone]  __________________________________________________  Patient:Dillon Centeno   MRN:[id number]

## 2023-09-29 NOTE — PROGRESS NOTES
"      Orthopaedic Progress Note    Interval changes:  Patient doing well    L BKA dressings are CDI  Patient with knee bent knee immobilizer ordered   Cleared for DC to SNF by ortho pending medicine clearance    ROS - Patient denies any new issues.  Pain well controlled.    /78   Pulse 97   Temp 36.4 °C (97.5 °F) (Temporal)   Resp 19   Ht 1.803 m (5' 11\")   Wt 61 kg (134 lb 7.7 oz)   SpO2 95%     Patient seen and examined  No acute distress  Breathing non labored  RRR  LLE dressings CDI, knee bent at 90 deg, refusing to straighten leg.     Recent Labs     09/27/23  0503 09/28/23  0621 09/29/23  0318   WBC 9.1 10.2 11.3*   RBC 3.05* 3.31* 3.37*   HEMOGLOBIN 8.3* 8.9* 9.4*   HEMATOCRIT 26.9* 28.8* 28.7*   MCV 88.2 87.0 85.2   MCH 27.2 26.9* 27.9   MCHC 30.9* 30.9* 32.8   RDW 45.7 44.3 43.9   PLATELETCT 670* 638* 697*   MPV 8.7* 8.9* 8.7*       Active Hospital Problems    Diagnosis     Renal cyst [N28.1]      Priority: Low    Bacteremia [R78.81]     Anemia [D64.9]     Gangrene of left foot (HCC) [I96]     Leukocytosis [D72.829]     Elevated liver enzymes [R74.8]     Hyponatremia [E87.1]        Assessment/Plan:  Patient doing well    L BKA dressings are CDI  Patient with knee bent knee immobilizer ordered   Cleared for DC to SNF by ortho pending medicine clearance  POD#2 S/P Left below-knee amputation  Wt bearing status - NWB LLE  Wound care/Drains - Dressings to be changed every other day by nursing. Or PRN for saturation starting POD#2  Future Procedures - none planned   Lovenox: Start 9/28, Duration-until ambulatory > 150'  Sutures/Staples out- 14-21 days post operatively. Removal will completed by ortho mid levels only.  PT/OT-initiated  Antibiotics: zosyn 4.5 g IV Q8  DVT Prophylaxis- TEDS/SCDs/Foot pumps  Holguin-not needed per ortho  Case Coordination for Discharge Planning - Disposition per therapy recs.    "

## 2023-09-29 NOTE — CARE PLAN
The patient is Watcher - Medium risk of patient condition declining or worsening    Shift Goals  Clinical Goals: pain control, monitor incisional site, iv abx  Patient Goals: pain control and rest  Family Goals: ashlie    Patient reports pain 8-9/10. Prn medications administered. Site assessed, dressing cdi. Call light and personal items within reach. Patient encouraged to use call light for assistance

## 2023-09-29 NOTE — CONSULTS
[facility]  Inpatient Services  Initial Wound and LPS RN Evaluation    Admission Date: 9/26/2023     No order of IP CONSULT TO WOUND CARE is found.     HPI, PMH, SH: Reviewed    Past Surgical History:   Procedure Laterality Date    KNEE AMPUTATION BELOW Left 9/27/2023    Procedure: AMPUTATION, BELOW KNEE;  Surgeon: Pradip Altamirano M.D.;  Location: SURGERY [facility];  Service: Orthopedics    IRRIGATION & DEBRIDEMENT ORTHO Left 3/4/2018    Procedure: IRRIGATION & DEBRIDEMENT ORTHO - HUMERUS;  Surgeon: Sergio Watkins M.D.;  Location: SURGERY [facility];  Service: Orthopedics    ORIF, SHOULDER Left 1/4/2018    Procedure: SHOULDER ORIF;  Surgeon: Sergio Watkins M.D.;  Location: SURGERY [facility];  Service: Orthopedics    CLOSED REDUCTION Left 12/24/2017    Procedure: CLOSED REDUCTION;  Surgeon: Keith Subramanian M.D.;  Location: SURGERY [facility];  Service: Orthopedics    OTHER      multiple surgeries lower legs from past injuries     Social History     Tobacco Use    Smoking status: Every Day     Types: Cigars    Smokeless tobacco: Never   Substance Use Topics    Alcohol use: Yes     Comment: a few beers a week     Chief Complaint   Patient presents with    Wound Check     Bilateral feet. Pts L foot is black and sloughing. Pt states unable to ambulate due to pain     Diagnosis: Gangrene of left foot (HCC) [I96]    Unit where seen by Wound Team: S602/01     LPS CONSULT RELATED TO:  L foot POD 2     LIMB PRESERVATION PLAN OF CARE:                   LPS to follow    WOUND HISTORY:       Dillon Centeno is a 58 y.o. who presented to the ED with black feet L>R with a past medical history that includes anemia, elevated liver enzymes, admitted 9/26/2023 for Gangrene of left foot (HCC) [I96].   Pt has wounds documented in Epic B feet in 3/2023.  Pt says he lives in a house.  He has friends who help him.   Pt states he was able to fully extend his L knee prior to surgery        Antibiotics started in the ED  23 Dr Seth with vascular, pt need BKA  23 Dr Boo Dumont, pt agreeable to BKA  23 Dr Altamirano L BKA  23 ALLIE Prieto severe arterial disease, CTA planned    Diabetes Symptoms/Interventions  Does not wear diabetic shoes         LPS Interventions In Place   IV antibiotics were started  Infectious Diseases consulted  Vascular consulted  Ortho consulted    WOUND ASSESSMENT/LDA           L BKA incision is approximated, scant drainage, slight purple discoloration, minimal edema.  Pt flexed > 90 degress at knee.  Hamstring is tight, pt requires cues to get quadriceps to fire.  Pt able to actively extend knee to approximately 70 degrees.  Pt reports some pain with knee extension.  Knee immobilizer bedside but not able to use yet due to excessive flexion.            Vascular:    INGA:   INGA Results, Last 30 Days US-EXTREMITY ARTERY LOWER UNILAT RIGHT    Result Date: 2023  Narrative Lower Extremity  Arterial Duplex Report  Vascular Laboratory  CONCLUSIONS  Findings are consistent with severe arterial insufficiency of the right  lower extremity.  Duplex scanning of the aorto-iliac segment was completed in accordance with  lower extremity arterial evaluation protocol - see separate report.  CARMINA, ELI  Exam Date:     2023 12:06  Room #:     Inpatient  Priority:     Routine  Ht (in):             Wt (lb):  Ordering Physician:        KAITLYNN RANGEL  Referring Physician:       [id number] CARLOS Head  Sonographer:               Blanca Armas RVT  Study Type:                Complete Unilateral  Technical Quality:         Adequate  Age:    58    Gender:     M  MRN:    [id number]  :    1965      BSA:  Indications:     Peripheral vascular disease, unspecified  CPT Codes:       87490  ICD Codes:       I73.9  History:         Peripheral arterial disease. Left BKA 23. No prior                   duplex.  Limitations:                RIGHT  Waveform        Peak  Systolic Velocity (cm/s)                  Prox    Prox-Mid  Mid    Mid-Dist  Distal  Absent                            0                        CFA  Monophasic      16                                         PFA  Absent          0                 0                8       SFA  Monophasic      13                                 17      POP  Monophasic      9                                  10      AT  Monophasic      12                                 8       PT  Monophasic      7                                  7       STEFANIE                LEFT  Waveform        Peak Systolic Velocity (cm/s)                  Prox    Prox-Mid  Mid    Mid-Dist  Distal                                                             CFA                                                             PFA                                                             SFA                                                             POP                                                             AT                                                             PT                                                             STEFANIE  FINDINGS  Right.  No Doppler or color flow demonstrated in the common femoral, proximal and  mid superficial femoral arteries.  Severely dampened, monophasic flow demonstrated in the profunda femoral  arery.  Reconstitution of flow is seen at the distal superficial femoral artery  with monophasic flow.  Atherosclerotic plaque seen in the popliteal artery.  Three vessel runoff to the ankle with monophasic flow.  Duplex scanning of the aorto-iliac segment was completed in accordance with  lower extremity arterial evaluation protocol - see separate report.  Amanda KAISER To  (Electronically Signed)  Final Date:      28 September 2023                   14:31    INGA Results, Last 30 Days US-INGA SINGLE LEVEL UNILAT RIGHT    Result Date: 9/28/2023  Narrative  Vascular Laboratory  Conclusions  Severe arterial insufficiency seen in the  right lower extremity.  An arterial duplex of the right leg was performed in accordance with lower  extremity arterial evaluation protocol - see separate report.  ELI GREWAL  Age:    58    Gender:     M  MRN:    [id number]  :    1965      BSA:  Exam Date:     2023 11:11  Room #:     Inpatient  Priority:     Routine  Ht (in):             Wt (lb):  Ordering Physician:        KAITLYNN RANGEL  Referring Physician:       [id number] CARLOS Head  Sonographer:               Blanca Armas RVT  Study Type:                Complete Unilateral  Technical Quality:         Adequate  Indications:     Atherosclerosis of peripheral arteries  CPT Codes:       65309  ICD Codes:       440.20  History:         Peripheral vascular disease with non-healing wound. Below                   knee amputation of left leg due to dry gangrene (23). No                   prior exam.  Limitations:     IV in right mid bicep.                 RIGHT  Waveform            Systolic BPs (mmHg)                             0             Brachial  Monophasic                               Common Femoral  Monophasic                               Popliteal  Monophasic                 66            Posterior Tibial  Monophasic                               Dorsalis Pedis  Flatlined                                Digit                             0.43          INGA                                           TBI                       LEFT  Waveform        Systolic BPs (mmHg)                             153           Brachial                                           Common Femoral                                           Popliteal                                           Posterior Tibial                                           Dorsalis Pedis                                           Digit                                           INGA                                           TBI  Findings  Right.  There is evidence of  severe arterial disease demonstrated (INGA is < .5) on  the right.  Doppler waveforms of the common femoral, popliteal, and posterior tibial  arteries are of low amplitude and monophasic.  Doppler waveforms of the dorsalis pedis artery are of severely low  amplitude that a index is not obtained.  An arterial duplex of the right leg was performed in accordance with lower  extremity arterial evaluation protocol - see separate report.  Amanda KAISER Noel  (Electronically Signed)  Final Date:      28 September 2023                   12:22             Lab Values:    Lab Results   Component Value Date/Time    WBC 11.3 (H) 09/29/2023 03:18 AM    RBC 3.37 (L) 09/29/2023 03:18 AM    HEMOGLOBIN 9.4 (L) 09/29/2023 03:18 AM    HEMATOCRIT 28.7 (L) 09/29/2023 03:18 AM    Bacteremia    Culture Results show:  No results found for this or any previous visit (from the past 720 hour(s)).    LPS Labs and Studies     INGA :  9/28/23 R foot  Right.    No Doppler or color flow demonstrated in the common femoral, proximal and    mid superficial femoral arteries.    Severely dampened, monophasic flow demonstrated in the profunda femoral    arery.    Reconstitution of flow is seen at the distal superficial femoral artery    with monophasic flow.    Atherosclerotic plaque seen in the popliteal artery.    Three vessel runoff to the ankle with monophasic flow.       Duplex scanning of the aorto-iliac segment was completed in accordance with    lower extremity arterial evaluation protocol - see separate report.        Pain Level/Medicated:  pain trying to extend L knee    INTERVENTIONS BY WOUND TEAM:  Performed standard wound care which includes appropriate positioning, dressing removal and non-selective debridement. Pictures and measurements obtained weekly if/when required.    Wound:  L BKA  Kiara wound: Cleansed with Normal Saline, Prepped with No Sting  Primary Dressing:  mepilex around distal limb to off load.  Adaptic over incision, off  loading foam over this, rolled gauze, ACE.  Secondary (Outer) Dressing: propped limb on a pillow to encourage extension of the knee and off load distal limb.  Knee immobilizer bed side but not able to use yet due to lack for L knee extension.      Interdisciplinary consultation: Patient, Bedside RN (), PT re knee flexion, updated Dr Slaughter    EVALUATION / RATIONALE FOR TREATMENT:    Date:  09/29/23  Wound Status:      L BKA incision approximated with scant old blood and new blood on dressing.  Minimal discoloration along incision line.  Mild edema.  There is some purple discoloration on the pre patellar region that does not dee.  DTI is a concern but this is an unlikely area for a pressure injury.  Will watch closely.    Cleaning pt a distinct nickel size area of blanchable erythema was seen on the sacrum.  Off loading foam placed.          FOOTWEAR:  off loading for R    Weight Bearing Status / Activity Orders (From admission to next 24h)       Start     Ordered    09/26/23 1130  Activity/Weight-bearing/Mobility Order- No Restrictions; Per Nursing Assessment ([facility] Only)  CONTINUOUS        Question Answer Comment   Activity Level: No Restrictions    Requirement: Per Nursing Assessment ([facility] Only)        09/26/23 1131                    CONSULTS:   Infectious Disease consulted  Vascular consulted (Absent pulses or INGA/TBI insufficient)  Ortho consulted                                                                                       Goals: Steady decrease in wound area and depth weekly.    NURSING PLAN OF CARE ORDERS:  Dressing changes: See Dressing Care orders    NUTRITION RECOMMENDATIONS   Wound Team Recommendations:  N/A     DIET ORDERS (From admission to next 24h)       Start     Ordered    09/27/23 1813  Diet Order Diet: Regular  ALL MEALS        Question:  Diet:  Answer:  Regular    09/27/23 1813                    PREVENTATIVE INTERVENTIONS:  Q shift Jae - performed per nursing policy  Q shift  pressure point assessments - performed per nursing policy    Surface/Positioning  Waffle overlay  - Ordered    Offloading/Redistribution  Heel offloading dressing (Silicone dressing) - Ordered    Anticipated discharge plans:  Skilled Nursing    will need dressing changes for BKA incision.    Vac Discharge Needs:  Vac Discharge plan is purely a recommendation from wound team and not a requirement for discharge unless otherwise stated by physician.  Not Applicable Pt not on a wound vac

## 2023-09-29 NOTE — ASSESSMENT & PLAN NOTE
- Noted to have renal cyst on CT.  MRI shows multiseptated cystic lesions in the upper pole of left kidney measuring 3.8 cm without associated enhancement or nodular component.    -Outpatient follow-up with urology.

## 2023-09-29 NOTE — PROGRESS NOTES
[facility] Medicine Daily Progress Note    Date of Service  9/29/2023    Chief Complaint  Dillon Centeno is a 58 y.o. male admitted 9/26/2023 with bilateral foot wounds.    Hospital Course  58 y.o. male with past medical history of hypertension who presented to the hospital on 9/26/2023 with complaint of bilateral lower extremity wound.  Patient reported that he found to a significant wound on his left lower extremity especially involving his left foot that for started 2 weeks ago.  He reported that he was wearing a shoe that was to size smaller than his actual size and he developed this wound. He does not take any medications on regular basis.  He smokes 1 pack a day and drinks 1-2 drinks of alcohol daily and sometimes he does not drink alcohol at all.  He denies any history of symptoms of alcohol withdrawal. In the ER patient found to have gangrene on his left foot most likely due to vascular disease.  Vascular surgery and orthopedic surgery consulted.    Interval Problem Update  9/27 Vascular surgery reported that severe gangrene of the left foot was unsalvageable and he will require a amputation, right lower extremity has some scattered ulcerations, arterial ultrasound ordered to evaluate.  Per orthopedic surgery plan for amputation of left leg today.  Vital signs stable.  Leukocytosis resolved.  Hepatitis panel negative.  Hemoglobin 8.3, will check iron panel, ferritin, B12. Sodium still low 126, check TSH, urine Na and Urine Osm. Blood culture with 1/2 with morganella morganii and the other with possible strep. Echo pending. Repeat blood cultures ordered.  Patient's main complaint is pain in his bilateral feet.    9/28 Vitals stable on room air.  Notified by ID pharmacy that blood cultures from 9/26 with Morganella and Enterococcus.  Urine culture with staph.  I have discussed the case with infectious disease.  Na with mild improvement 129, urine studies still pending. Repeat blood cultures pending. Echo  and arterial US pending.  Patient complaining of severe pain in his leg and stump.  He has been requiring multiple doses of oral and IV opiates, continue to monitor respiratory status closely.  I have added gabapentin as he is complaining of nerve pain as well as Flexeril as needed.    9/29 WBC 11.3, sodium 126.  Arterial ultrasound showed severe PAD of both legs.  Echo showed EF 60% with normal diastolic function.  Discussed ultrasound with vascular surgery, CTA showed occlusion of the right common iliac artery through the distal SFA and diffuse disease.  Vascular surgery planning on taking patient for revascularization.  CT also showed a septated lobulated left upper pole renal cystic mass lesion, MRI pending to further characterize this. Blood culture with Vagococcus carniphilus and morganella. Repeat blood cultures negative thus far. Discussed with ID, continue zosyn.  Discussed with LPS they are working on getting patient a brace as he does not like to keep his leg extended and they are worried about contractures.    I have discussed this patient's plan of care and discharge plan at IDT rounds today with Case Management, Nursing, Nursing leadership, and other members of the IDT team.    Consultants/Specialty  orthopedics and vascular surgery  Infectious disease     Code Status  Full Code    Disposition  The patient is not medically cleared for discharge to home or a post-acute facility.  Anticipate discharge to: skilled nursing facility    I have placed the appropriate orders for post-discharge needs.    Review of Systems  Review of Systems   Constitutional:  Positive for malaise/fatigue. Negative for chills and fever.   Respiratory:  Negative for shortness of breath.    Cardiovascular:  Negative for chest pain.   Gastrointestinal:  Negative for abdominal pain, nausea and vomiting.   Genitourinary:  Negative for dysuria.   Musculoskeletal:  Positive for joint pain and myalgias.   Skin:  Negative for rash.    Neurological:  Negative for dizziness and headaches.   Psychiatric/Behavioral:  Negative for depression.    All other systems reviewed and are negative.       Physical Exam  Temp:  [36.3 °C (97.4 °F)-36.7 °C (98 °F)] 36.4 °C (97.5 °F)  Pulse:  [96-99] 97  Resp:  [18-19] 19  BP: (125-135)/(78-84) 125/78  SpO2:  [95 %-100 %] 95 %    Physical Exam  Vitals and nursing note reviewed.   Constitutional:       General: He is not in acute distress.     Appearance: He is ill-appearing.      Comments: Friends at bedside    HENT:      Head: Normocephalic and atraumatic.      Mouth/Throat:      Pharynx: Oropharynx is clear.   Eyes:      Conjunctiva/sclera: Conjunctivae normal.   Cardiovascular:      Rate and Rhythm: Normal rate and regular rhythm.      Pulses: Normal pulses.   Pulmonary:      Effort: Pulmonary effort is normal. No respiratory distress.      Breath sounds: Normal breath sounds. No wheezing.   Abdominal:      General: Abdomen is flat. There is no distension.      Palpations: Abdomen is soft.      Tenderness: There is no abdominal tenderness.   Musculoskeletal:         General: Tenderness, deformity and signs of injury present.      Cervical back: Normal range of motion and neck supple.      Right lower leg: No edema.      Comments: Left BKA    Skin:     General: Skin is warm and dry.      Findings: Lesion present.      Comments: Right foot with multiple scabbed lesions  (Picture below)   Neurological:      General: No focal deficit present.      Mental Status: He is alert and oriented to person, place, and time.      Cranial Nerves: No cranial nerve deficit.      Motor: Weakness present.   Psychiatric:         Mood and Affect: Mood normal.         Behavior: Behavior normal.          Right foot       Fluids    Intake/Output Summary (Last 24 hours) at 9/29/2023 1339  Last data filed at 9/29/2023 1300  Gross per 24 hour   Intake 840 ml   Output 3700 ml   Net -2860 ml       Laboratory  Recent Labs     09/27/23  1380  09/28/23  0621 09/29/23  0318   WBC 9.1 10.2 11.3*   RBC 3.05* 3.31* 3.37*   HEMOGLOBIN 8.3* 8.9* 9.4*   HEMATOCRIT 26.9* 28.8* 28.7*   MCV 88.2 87.0 85.2   MCH 27.2 26.9* 27.9   MCHC 30.9* 30.9* 32.8   RDW 45.7 44.3 43.9   PLATELETCT 670* 638* 697*   MPV 8.7* 8.9* 8.7*     Recent Labs     09/27/23  0503 09/28/23  0621 09/29/23  0318   SODIUM 126* 129* 126*   POTASSIUM 4.5 4.6 4.5   CHLORIDE 96 97 89*   CO2 21 22 27   GLUCOSE 106* 120* 91   BUN 12 9 9   CREATININE 0.55 0.38* 0.59   CALCIUM 7.7* 8.0* 8.3*                   Imaging  CT-CTA AORTA-RO WITH & W/O-POST PROCESS   Final Result         CTA AORTA      1.  Septated lobulated left upper pole renal cystic mass lesion, recommend follow-up MRI of the kidneys for further characterization.   2.  Atherosclerosis and atherosclerotic coronary artery disease.   3.  Fat-containing right inguinal hernia      CTA LOWER EXTREMITIES      1.  Occlusion of the right common iliac artery through the distal superficial femoral artery   2.  Distal right peroneal artery occlusion with 2-vessel runoff the anterior and posterior tibial arteries at the right ankle.   3.  Occlusion of the left superficial femoral artery at the bifurcation extending through its length to the popliteal artery.   4.  Occlusion of the proximal left profunda femoris just distal to the bifurcation which reconstitutes.   5.  Soft tissue gas at the stump of left below-the-knee amputation, within expected limits for recent postop changes.      3D angiographic/MIP images of the vasculature confirm the vascular findings as described above.      These findings were discussed with the patient's clinician, Keith Navarro, on 9/29/2023 8:06 AM.      EC-ECHOCARDIOGRAM COMPLETE W/O CONT   Final Result      US-AORTA/ILIACS DUPLEX COMPLETE   Final Result      US-EXTREMITY ARTERY LOWER UNILAT RIGHT   Final Result      US-INGA SINGLE LEVEL UNILAT RIGHT   Final Result      DX-CHEST-PORTABLE (1 VIEW)   Final Result      1.   LEFT basilar atelectasis or pneumonia   2.  Trace LEFT pleural effusion or pleural scar      MR-ABDOMEN-WITH & W/O    (Results Pending)        Assessment/Plan  * Gangrene of left foot (HCC)- (present on admission)  Assessment & Plan  Patient found to have significant gangrene on his left foot that may need surgical intervention.  Vascular surgery consulted, CTA with diffused PAD, plan for revascularization  Pain medication with oxycodone and IV Dilaudid.  Monitor for adverse effect of narcotic pain medications.  Gabapentin  Vancomycin dose adjustment as per vancomycin trough level and monitor for vancomycin toxicity.  Orthopedic surgery consulted, s/p Left BKA on 9/27  ID consulted   Continue IV abx     Anemia- (present on admission)  Assessment & Plan  Iron studies consistent with chronic disease   B12 wnl   Trend CBC, transfuse Hb<7     Bacteremia- (present on admission)  Assessment & Plan  1/2 bcx with morganella   1/2 bcx with enterococcus   Repeat blood cultures ordered   Continue current abx   Echo no vegetations  Infectious disease consulted   -anticipate 2 weeks of IV abx from date of negative culture    Hyponatremia  Assessment & Plan  Patient found to have hyponatremia most likely due to dehydration.  Continue IV fluids  TSH wnl   check urine osmolality, urine sodium  Daily BMP    Elevated liver enzymes  Assessment & Plan  Elevated liver enzymes.  No abdominal pain.  Hepatitis panel negative    Leukocytosis  Assessment & Plan  Patient found to have leukocytosis most likely secondary to lower extremity wound.  Ordered CBC for tomorrow.  I started him on broad-spectrum antibiotics.    Renal cyst  Assessment & Plan  On CT, MRI pending to evaluate         VTE prophylaxis: SCDs, lovenox ppx    I have performed a physical exam and reviewed and updated ROS and Plan today (9/29/2023). In review of yesterday's note (9/28/2023), there are no changes except as documented above.

## 2023-09-29 NOTE — CARE PLAN
The patient is Stable - Low risk of patient condition declining or worsening    Shift Goals  Clinical Goals: Monitor LBKA, elevation/drsng changes, pain management  Patient Goals: Get rid of the pain  Family Goals: LUTHER- Pt states doesn't know what his best friend is doing today,    Progress made toward(s) clinical / shift goals:      Patient is not progressing towards the following goals:

## 2023-09-29 NOTE — CONSULTS
"INFECTIOUS DISEASES INPATIENT CONSULT NOTE     Date of Service:9/29/2023    Consult Requested By: Trinity Slaughter D.O.    Reason for Consultation: gangrene and bacteremia    History of Present Illness:   Dillon Centeno is a 58 y.o. male admitted 9/26/2023. Poor historian  From admit \"58 y.o. male with past medical history of hypertension who presented to the hospital on 9/26/2023 with complaint of bilateral lower extremity wound.  Patient reported that he found to a significant wound on his left lower extremity especially involving his left foot that for started 2 weeks ago.  He reported that he was wearing a shoe that was to size smaller than his actual size and he developed this wound.  He reported pain in his left lower extremity that is radiating towards his whole leg.  He was taking Tylenol at home but there is no specific improvement.  There is no specific aggravating or alleviating factors.  There is no specific associated symptoms.  He does not take any medications on regular basis.  He smokes 1 pack a day and drinks 1-2 drinks of alcohol daily and sometimes he does not drink alcohol at all.  He denies any history of symptoms of alcohol withdrawal.\"  Pictures reviewed in media: Left foot ischemic with gangrene, maggots. Foot rotting and mummified appearance    IN ED Afebrile  Vascular surgeon Dr. Seth consulted then Ortho for amputation  Currently on [facility]  Infectious Diseases consulted for antibiotic recommendation and management      Review Of Systems:  Missing right 3rd toe-does not recall how or when  All other systems are reviewed and are negative     PMH:   Past Medical History:   Diagnosis Date    Hypertension          PSH:  Past Surgical History:   Procedure Laterality Date    KNEE AMPUTATION BELOW Left 9/27/2023    Procedure: AMPUTATION, BELOW KNEE;  Surgeon: Pradip Altamirano M.D.;  Location: SURGERY [facility];  Service: Orthopedics    IRRIGATION & DEBRIDEMENT ORTHO Left 3/4/2018    " Procedure: IRRIGATION & DEBRIDEMENT ORTHO - HUMERUS;  Surgeon: Sergio Watkins M.D.;  Location: SURGERY [facility];  Service: Orthopedics    ORIF, SHOULDER Left 1/4/2018    Procedure: SHOULDER ORIF;  Surgeon: Sergio Watkins M.D.;  Location: SURGERY [facility];  Service: Orthopedics    CLOSED REDUCTION Left 12/24/2017    Procedure: CLOSED REDUCTION;  Surgeon: Keith Subramanian M.D.;  Location: SURGERY [facility];  Service: Orthopedics    OTHER      multiple surgeries lower legs from past injuries       FAMILY HX:  History reviewed. No pertinent family history.    SOCIAL HX:  Social History     Socioeconomic History    Marital status: Single     Spouse name: Not on file    Number of children: Not on file    Years of education: Not on file    Highest education level: Not on file   Occupational History    Not on file   Tobacco Use    Smoking status: Every Day     Types: Cigars    Smokeless tobacco: Never   Substance and Sexual Activity    Alcohol use: Yes     Comment: a few beers a week    Drug use: No    Sexual activity: Not on file   Other Topics Concern    Not on file   Social History Narrative    Not on file     Social Determinants of Health     Financial Resource Strain: Not on file   Food Insecurity: Not on file   Transportation Needs: Not on file   Physical Activity: Not on file   Stress: Not on file   Social Connections: Not on file   Intimate Partner Violence: Not on file   Housing Stability: Not on file     Social History     Tobacco Use   Smoking Status Every Day    Types: Cigars   Smokeless Tobacco Never     Social History     Substance and Sexual Activity   Alcohol Use Yes    Comment: a few beers a week       Allergies/Intolerances:  Allergies   Allergen Reactions    Sulfa Drugs Hives and Shortness of Breath         Other Current Medications:    Current Facility-Administered Medications:     ALPRAZolam (Xanax) tablet 0.5 mg, 0.5 mg, Oral, Once PRN, Trinity Slaughter D.O.    [COMPLETED]  piperacillin-tazobactam (Zosyn) 4.5 g in  mL IVPB, 4.5 g, Intravenous, Once, Stopped at 09/28/23 1234 **AND** piperacillin-tazobactam (Zosyn) 4.5 g in  mL IVPB, 4.5 g, Intravenous, Q8HRS, ROME MatiasO., Last Rate: 25 mL/hr at 09/29/23 1225, 4.5 g at 09/29/23 1225    gabapentin (Neurontin) capsule 100 mg, 100 mg, Oral, TID, Trinity Slaughter D.O., 100 mg at 09/29/23 1224    cyclobenzaprine (Flexeril) tablet 10 mg, 10 mg, Oral, TID PRN, Trinity Slaughter D.O., 10 mg at 09/29/23 0942    oxyCODONE immediate release (Roxicodone) tablet 10 mg, 10 mg, Oral, Q3HRS PRN **OR** oxycodone (Oxy-IR) immediate release tablet 15 mg, 15 mg, Oral, Q3HRS PRN, 15 mg at 09/29/23 0943 **OR** HYDROmorphone (Dilaudid) injection 1 mg, 1 mg, Intravenous, Q3HRS PRN, Trinity Slaughter D.O., 1 mg at 09/29/23 0306    enoxaparin (Lovenox) inj 40 mg, 40 mg, Subcutaneous, DAILY AT 1800, Trinity Slaughter D.O., 40 mg at 09/28/23 1710    senna-docusate (Pericolace Or Senokot S) 8.6-50 MG per tablet 2 Tablet, 2 Tablet, Oral, BID, 2 Tablet at 09/28/23 1711 **AND** polyethylene glycol/lytes (Miralax) PACKET 1 Packet, 1 Packet, Oral, QDAY PRN **AND** magnesium hydroxide (Milk Of Magnesia) suspension 30 mL, 30 mL, Oral, QDAY PRN **AND** bisacodyl (Dulcolax) suppository 10 mg, 10 mg, Rectal, QDAY PRN, Luz Mejia M.D.    acetaminophen (Tylenol) tablet 650 mg, 650 mg, Oral, Q6HRS PRN, Luz Mejia M.D.    Notify provider if pain remains uncontrolled, , , CONTINUOUS **AND** Use the Numeric Rating Scale (NRS), Chadwick-Baker Faces (WBF), or FLACC on regular floors and Critical-Care Pain Observation Tool (CPOT) on ICUs/Trauma to assess pain, , , CONTINUOUS **AND** Pulse Ox, , , CONTINUOUS **AND** Pharmacy Consult Request ...Pain Management Review 1 Each, 1 Each, Other, PHARMACY TO DOSE **AND** If patient difficult to arouse and/or has respiratory depression (respiratory rate of 10 or less), stop any opiates that are currently  "infusing and call a Rapid Response., , , CONTINUOUS, Luz Mejia M.D.    ondansetron (Zofran) syringe/vial injection 4 mg, 4 mg, Intravenous, Q4HRS PRN, Luz Mejia M.D.    ondansetron (Zofran ODT) dispertab 4 mg, 4 mg, Oral, Q4HRS PRN, Luz Mejia M.D.    promethazine (Phenergan) tablet 12.5-25 mg, 12.5-25 mg, Oral, Q4HRS PRN, Luz Mejia M.D.    promethazine (Phenergan) suppository 12.5-25 mg, 12.5-25 mg, Rectal, Q4HRS PRN, Luz Mejia M.D.    prochlorperazine (Compazine) injection 5-10 mg, 5-10 mg, Intravenous, Q4HRS PRN, Luz Mejia M.D.    hydrALAZINE (Apresoline) injection 10 mg, 10 mg, Intravenous, Q4HRS PRN, Luz Mejia M.D.    nicotine (Nicoderm) 21 MG/24HR 21 mg, 21 mg, Transdermal, Daily-0600, 21 mg at 23 0504 **AND** Nicotine Replacement Patient Education Materials, , , Once **AND** nicotine polacrilex (Nicorette) 2 MG piece 2 mg, 2 mg, Oral, Q HOUR PRN, Luz Mejia M.D.      Most Recent Vital Signs:  /78   Pulse 97   Temp 36.4 °C (97.5 °F) (Temporal)   Resp 19   Ht 1.803 m (5' 11\")   Wt 61 kg (134 lb 7.7 oz)   SpO2 95%   BMI 18.76 kg/m²   Temp  Av.3 °C (97.4 °F)  Min: 35.8 °C (96.5 °F)  Max: 36.9 °C (98.4 °F)    Physical Exam:  General: wDisheveled and malnourished no acute distress  HEENT: NCAT, PERRLA, sclera anicteric, poor dentition  Neck: supple  Chest: CTAB, unlabored.  Cardiac: RRR,  tachy  Abdomen: non-tender, non-distended  Extremities: No cyanosis, Left BKA with sutures intact both ext warm  Missing right 3rd digit  Skin: severe onchomycosis  Neuro: Alert and oriented times 3, Speech fluent CN intact LUGO  Psych: Mood normal     Pertinent Lab Results:  Recent Labs     23  0503 23  0621 23  0318   WBC 9.1 10.2 11.3*      Recent Labs     23  0503 23  0621 23  0318   HEMOGLOBIN 8.3* 8.9* 9.4*   HEMATOCRIT 26.9* 28.8* 28.7*   MCV 88.2 87.0 85.2   MCH " "27.2 26.9* 27.9   PLATELETCT 670* 638* 697*         Recent Labs     09/27/23  0503 09/28/23  0621 09/29/23  0318   SODIUM 126* 129* 126*   POTASSIUM 4.5 4.6 4.5   CHLORIDE 96 97 89*   CO2 21 22 27   CREATININE 0.55 0.38* 0.59        Recent Labs     09/27/23  0503   ALBUMIN 2.4*        Pertinent Micro:  Results       Procedure Component Value Units Date/Time    MRSA By PCR (Amp) [[id number]] Collected: 09/29/23 0926    Order Status: Completed Specimen: Respirate from Nares Updated: 09/29/23 1136     MRSA by PCR Negative    Narrative:      Collected By: [id number] JB CARD  Release to patient->Immediate    BLOOD CULTURE [[id number]]  (Abnormal)  (Susceptibility) Collected: 09/26/23 0846    Order Status: Completed Specimen: Blood from Peripheral Updated: 09/29/23 1130     Significant Indicator POS     Source BLD     Site PERIPHERAL     Culture Result Growth detected by Bactec instrument. 09/26/2023 23:29      Morganella morganii    Narrative:      CALL  [place]  61 tel. [phone],  CALLED  61 tel. [phone] 09/27/2023, 10:08, RESULTS VOLTED TO: Rosalva Arthur, Pharmacy  Per Hospital Policy: Only change Specimen Src: to \"Line\" if  specified by physician order.  Release to patient->Immediate  No site indicated    Susceptibility       Morganella morganii (1)       Antibiotic Interpretation Microscan   Method Status    Ampicillin Resistant >16 mcg/mL MEENAKSHI Final    Amoxicillin/CA Resistant >16/8 mcg/mL MEENAKSHI Final    Ceftriaxone Sensitive <=1 mcg/mL MEENAKSHI Final    Cefazolin Resistant >16 mcg/mL MEENAKSHI Final    Ciprofloxacin Sensitive <=0.25 mcg/mL MEENAKSHI Final    Cefepime Sensitive <=2 mcg/mL MEENAKSHI Final    Cefuroxime Resistant >16 mcg/mL MEENAKSHI Final    Ertapenem Sensitive <=0.5 mcg/mL MEENAKSHI Final    Ampicillin/sulbactam Resistant >16/8 mcg/mL MEENAKSHI Final    Tobramycin Sensitive <=2 mcg/mL MEENAKSHI Final    Gentamicin Sensitive <=2 mcg/mL MEENAKSHI Final    Levofloxacin Sensitive <=0.5 mcg/mL MEENAKSHI Final    Minocycline Sensitive <=4 mcg/mL MEENAKSHI Final " "   Moxifloxacin Sensitive <=2 mcg/mL MEENAKSHI Final    Pip/Tazobactam Sensitive <=8 mcg/mL MEENAKSHI Final    Trimeth/Sulfa Sensitive <=0.5/9.5 mcg/mL MEENAKSHI Final    Tigecycline Resistant <=2 mcg/mL MEENAKSHI Final                       URINE CULTURE(NEW) [[id number]]  (Abnormal)  (Susceptibility) Collected: 09/26/23 0901    Order Status: Completed Specimen: Urine Updated: 09/28/23 1714     Significant Indicator POS     Source UR     Site -     Culture Result -      Staphylococcus epidermidis  >100,000 cfu/mL      Narrative:      Release to patient->Immediate  Indication for culture:->Emergency Room Patient  Indication for culture:->Emergency Room Patient    Susceptibility       Staphylococcus epidermidis (1)       Antibiotic Interpretation Microscan   Method Status    Cefazolin Sensitive <=8 mcg/mL MEENAKSHI Final    Cefepime Sensitive <=4 mcg/mL MEENAKSHI Final    Daptomycin Sensitive <=0.5 mcg/mL MEENAKSHI Final    Nitrofurantoin Sensitive <=32 mcg/mL MEENAKSHI Final    Ampicillin/sulbactam Sensitive <=8/4 mcg/mL MEENAKSHI Final    Pip/Tazobactam Sensitive <=8 mcg/mL MEENAKSHI Final    Trimeth/Sulfa Resistant >2/38 mcg/mL MEENAKSHI Final    Tetracycline Sensitive <=4 mcg/mL MEENAKSHI Final                       BLOOD CULTURE [[id number]]  (Abnormal) Collected: 09/26/23 0937    Order Status: Completed Specimen: Blood from Peripheral Updated: 09/28/23 1610     Significant Indicator POS     Source BLD     Site PERIPHERAL     Culture Result Growth detected by Bactec instrument. 09/27/2023  09:54      Gram-positive cocci  Vagococcus carniphilus  Organism identified by MALDI-TOF research use only library.      Narrative:      CALL  [place]  61 tel. [phone],  CALLED  61 tel. [phone] 09/27/2023, 10:08, RESULTS VOLTED TO: Rosalva [facility], Pharmacy  Per Hospital Policy: Only change Specimen Src: to \"Line\" if  specified by physician order.  Release to patient->Immediate  Left AC    BLOOD CULTURE [[id number]] Collected: 09/27/23 1734    Order Status: Completed Specimen: Blood from " "Peripheral Updated: 09/28/23 0916     Significant Indicator NEG     Source BLD     Site PERIPHERAL     Culture Result No Growth  Note: Blood cultures are incubated for 5 days and  are monitored continuously.Positive blood cultures  are called to the RN and reported as soon as  they are identified.      Narrative:      Contact  Per Hospital Policy: Only change Specimen Src: to \"Line\" if  specified by physician order.  Release to patient->Immediate  Left Hand    BLOOD CULTURE [[id number]] Collected: 09/27/23 1734    Order Status: Completed Specimen: Blood from Peripheral Updated: 09/28/23 0916     Significant Indicator NEG     Source BLD     Site PERIPHERAL     Culture Result No Growth  Note: Blood cultures are incubated for 5 days and  are monitored continuously.Positive blood cultures  are called to the RN and reported as soon as  they are identified.      Narrative:      Contact  Per Hospital Policy: Only change Specimen Src: to \"Line\" if  specified by physician order.  Release to patient->Immediate  Left AC    Parasite, Gross Identification [[id number]]  (Abnormal) Collected: 09/27/23 1405    Order Status: Completed Specimen: Other Updated: 09/27/23 1424     Significant Indicator POS     Source OTHER     Site -     Parasite, Gross Identification Human parasite identified.     Parasite, Gross Identification Fly larvae (myiasis causing)    Narrative:      CALL  [place]  RST2 tel. ,  CALLED  RST2 tel.  09/27/2023, 14:24, RB PERF. RESULTS CALLED TO:[id number] RN    URINALYSIS [[id number]]  (Abnormal) Collected: 09/26/23 0901    Order Status: Completed Specimen: Urine Updated: 09/26/23 0951     Color DK Yellow     Character Clear     Specific Gravity 1.021     Ph 5.5     Glucose Negative mg/dL      Ketones Trace mg/dL      Protein Negative mg/dL      Bilirubin Small     Urobilinogen, Urine 2.0     Nitrite Positive     Leukocyte Esterase Trace     Occult Blood Negative     Micro Urine Req Microscopic    Narrative:      Release to " "patient->Immediate  Indication for culture:->Emergency Room Patient          No results found for: \"BLOODCULTU\", \"BLDCULT\", \"BCHOLD\"     Studies:    IMPRESSION:   Ischemic gangrene LLE +maggots and bacterial infection  Polymicrobial sepsis  Gangrene treated definitively with amputation  Severe PVD  Homeless  Hep panel neg    PLAN:   BCxs + Morganella and Vagococcus (anaerobe)  Repeat Bcxs every 48 hours until neg  Midline when Bcxs neg 48 hours  Continue Zosyn  Anticipate 1-2 weeks IV abx from date of negative blood cxs  CTA planned  Surgical site currently no evidence of ischemia-monitor closely  High risk for poor wound healing  Recommend check HIV and DOA    Plan of care discussed with IM Trinity Slaughter D.O. and wound care. Will continue to follow    Hue Parkinson M.D.    "

## 2023-09-29 NOTE — CARE PLAN
The patient is Stable - Low risk of patient condition declining or worsening    Shift Goals  Clinical Goals: pain control, monitor incisional site, iv abx  Patient Goals: pain control and rest  Family Goals: ashlie    Progress made toward(s) clinical / shift goals:    Problem: Knowledge Deficit - Standard  Goal: Patient and family/care givers will demonstrate understanding of plan of care, disease process/condition, diagnostic tests and medications  Description: Target End Date:  1-3 days or as soon as patient condition allows    Document in Patient Education    1.  Patient and family/caregiver oriented to unit, equipment, visitation policy and means for communicating concern  2.  Complete/review Learning Assessment  3.  Assess knowledge level of disease process/condition, treatment plan, diagnostic tests and medications  4.  Explain disease process/condition, treatment plan, diagnostic tests and medications  Outcome: Progressing  Note: Pt Alert and oriented. Understands the need of the hospital stay.  Vascular surgery tomorrow.         Patient is not progressing towards the following goals:      Problem: Pain - Standard  Goal: Alleviation of pain or a reduction in pain to the patient’s comfort goal  Description: Target End Date:  Prior to discharge or change in level of care    Document on Vitals flowsheet    1.  Document pain using the appropriate pain scale per order or unit policy  2.  Educate and implement non-pharmacologic comfort measures (i.e. relaxation, distraction, massage, cold/heat therapy, etc.)  3.  Pain management medications as ordered  4.  Reassess pain after pain med administration per policy  5.  If opiods administered assess patient's response to pain medication is appropriate per POSS sedation scale  6.  Follow pain management plan developed in collaboration with patient and interdisciplinary team (including palliative care or pain specialists if applicable)  Outcome: Not Progressing  Note: PRN  pain medications given this shift with little relief, MD aware orders for pain medication were changed yesterday

## 2023-09-29 NOTE — PROGRESS NOTES
VASCULAR SURGERY SERVICE                        Progress Note  _________________________________    Patient underwent a CT angiogram of the aorta and bilateral lower extremities to assess the degree of arterial disease in his lower extremities.  Patient was found to have right external iliac artery occlusion, right common femoral artery occlusion, proximal occlusion of the right profunda femoris artery, and long segment occlusion of the right SFA.  Patient is at risk for limb loss in the right lower extremity without revascularization.  The CTA also shows moderate stenosis of the left common femoral artery and occlusion of the proximal left profunda femoris artery and long segment occlusion of the left SFA.  Patient is at risk for nonhealing of his left below-knee amputation stump without revascularization, and this would necessitate a higher level of amputation, such as an above-knee amputation.    Therefore at this point I am recommending bilateral lower extremity revascularization.  This will consist of bilateral common femoral artery endarterectomy and profundoplasty.  I will also need to recanalize the right external iliac artery which might be possible with thrombectomy/stenting, however if this is unsuccessful then the patient will need a left to right femoral to femoral bypass.  Bypass conduit could be prosthetic or potentially the left greater saphenous vein if it is large enough to be used for bypass conduit.    I discussed the recommendation for surgery with the patient in detail.  I explained that both of his legs are at risk for higher level amputation without revascularization.  We discussed the steps of the operation and the expected recovery.  We also discussed the risks.  Questions were answered and he agreed to proceed.    Tentatively planning to perform the surgery tomorrow, time to be determined based on OR availability.  N.p.o. except for medications after midnight.    Keith Navarro  MD  [facility]   [place] preferred or call my office [phone]  __________________________________________________  Patient:Dillon Centeno   MRN:[id number]

## 2023-09-30 ENCOUNTER — ANESTHESIA (OUTPATIENT)
Dept: SURGERY | Facility: MEDICAL CENTER | Age: 58
DRG: 239 | End: 2023-09-30
Payer: MEDICAID

## 2023-09-30 ENCOUNTER — ANESTHESIA EVENT (OUTPATIENT)
Dept: SURGERY | Facility: MEDICAL CENTER | Age: 58
DRG: 239 | End: 2023-09-30
Payer: MEDICAID

## 2023-09-30 ENCOUNTER — APPOINTMENT (OUTPATIENT)
Dept: RADIOLOGY | Facility: MEDICAL CENTER | Age: 58
DRG: 239 | End: 2023-09-30
Attending: SURGERY
Payer: MEDICAID

## 2023-09-30 PROBLEM — I73.9 PAD (PERIPHERAL ARTERY DISEASE) (HCC): Status: ACTIVE | Noted: 2023-09-30

## 2023-09-30 LAB
ABO + RH BLD: NORMAL
ABO GROUP BLD: NORMAL
ANION GAP SERPL CALC-SCNC: 9 MMOL/L (ref 7–16)
BARCODED ABORH UBTYP: 6200
BARCODED PRD CODE UBPRD: NORMAL
BARCODED UNIT NUM UBUNT: NORMAL
BLD GP AB SCN SERPL QL: NORMAL
BUN SERPL-MCNC: 6 MG/DL (ref 8–22)
CALCIUM SERPL-MCNC: 9.1 MG/DL (ref 8.5–10.5)
CHLORIDE SERPL-SCNC: 95 MMOL/L (ref 96–112)
CO2 SERPL-SCNC: 27 MMOL/L (ref 20–33)
COMPONENT R 8504R: NORMAL
CREAT SERPL-MCNC: 0.53 MG/DL (ref 0.5–1.4)
ERYTHROCYTE [DISTWIDTH] IN BLOOD BY AUTOMATED COUNT: 45.9 FL (ref 35.9–50)
GFR SERPLBLD CREATININE-BSD FMLA CKD-EPI: 115 ML/MIN/1.73 M 2
GLUCOSE SERPL-MCNC: 120 MG/DL (ref 65–99)
HCT VFR BLD AUTO: 27.2 % (ref 42–52)
HGB BLD-MCNC: 8.6 G/DL (ref 14–18)
HIV 1+2 AB+HIV1 P24 AG SERPL QL IA: NORMAL
MCH RBC QN AUTO: 27.7 PG (ref 27–33)
MCHC RBC AUTO-ENTMCNC: 31.6 G/DL (ref 32.3–36.5)
MCV RBC AUTO: 87.5 FL (ref 81.4–97.8)
PLATELET # BLD AUTO: 639 K/UL (ref 164–446)
PMV BLD AUTO: 8.6 FL (ref 9–12.9)
POTASSIUM SERPL-SCNC: 4.3 MMOL/L (ref 3.6–5.5)
PRODUCT TYPE UPROD: NORMAL
RBC # BLD AUTO: 3.11 M/UL (ref 4.7–6.1)
RH BLD: NORMAL
SODIUM SERPL-SCNC: 131 MMOL/L (ref 135–145)
UNIT STATUS USTAT: NORMAL
WBC # BLD AUTO: 10.4 K/UL (ref 4.8–10.8)

## 2023-09-30 PROCEDURE — 04UL07Z SUPPLEMENT LEFT FEMORAL ARTERY WITH AUTOLOGOUS TISSUE SUBSTITUTE, OPEN APPROACH: ICD-10-PCS | Performed by: SURGERY

## 2023-09-30 PROCEDURE — C1894 INTRO/SHEATH, NON-LASER: HCPCS | Performed by: SURGERY

## 2023-09-30 PROCEDURE — 160029 HCHG SURGERY MINUTES - 1ST 30 MINS LEVEL 4: Performed by: SURGERY

## 2023-09-30 PROCEDURE — A9270 NON-COVERED ITEM OR SERVICE: HCPCS | Performed by: INTERNAL MEDICINE

## 2023-09-30 PROCEDURE — 110454 HCHG SHELL REV 250: Performed by: SURGERY

## 2023-09-30 PROCEDURE — 36140 INTRO NDL ICATH UPR/LXTR ART: CPT | Mod: 51,59 | Performed by: SURGERY

## 2023-09-30 PROCEDURE — 75710 ARTERY X-RAYS ARM/LEG: CPT | Mod: 26,RT,59 | Performed by: SURGERY

## 2023-09-30 PROCEDURE — C1769 GUIDE WIRE: HCPCS | Performed by: SURGERY

## 2023-09-30 PROCEDURE — 160002 HCHG RECOVERY MINUTES (STAT): Performed by: SURGERY

## 2023-09-30 PROCEDURE — C1757 CATH, THROMBECTOMY/EMBOLECT: HCPCS | Performed by: SURGERY

## 2023-09-30 PROCEDURE — 37221 PR REVASCULARIZE ILIAC ARTERY,ANGIOPLASTY/ST*: CPT | Mod: RT,51 | Performed by: SURGERY

## 2023-09-30 PROCEDURE — 04UK07Z SUPPLEMENT RIGHT FEMORAL ARTERY WITH AUTOLOGOUS TISSUE SUBSTITUTE, OPEN APPROACH: ICD-10-PCS | Performed by: SURGERY

## 2023-09-30 PROCEDURE — 047H0DZ DILATION OF RIGHT EXTERNAL ILIAC ARTERY WITH INTRALUMINAL DEVICE, OPEN APPROACH: ICD-10-PCS | Performed by: SURGERY

## 2023-09-30 PROCEDURE — 160009 HCHG ANES TIME/MIN: Performed by: SURGERY

## 2023-09-30 PROCEDURE — 86850 RBC ANTIBODY SCREEN: CPT

## 2023-09-30 PROCEDURE — 160036 HCHG PACU - EA ADDL 30 MINS PHASE I: Performed by: SURGERY

## 2023-09-30 PROCEDURE — 160048 HCHG OR STATISTICAL LEVEL 1-5: Performed by: SURGERY

## 2023-09-30 PROCEDURE — 700105 HCHG RX REV CODE 258: Performed by: INTERNAL MEDICINE

## 2023-09-30 PROCEDURE — 700117 HCHG RX CONTRAST REV CODE 255: Performed by: SURGERY

## 2023-09-30 PROCEDURE — 700102 HCHG RX REV CODE 250 W/ 637 OVERRIDE(OP): Performed by: ANESTHESIOLOGY

## 2023-09-30 PROCEDURE — 700111 HCHG RX REV CODE 636 W/ 250 OVERRIDE (IP): Performed by: ANESTHESIOLOGY

## 2023-09-30 PROCEDURE — 700102 HCHG RX REV CODE 250 W/ 637 OVERRIDE(OP): Performed by: INTERNAL MEDICINE

## 2023-09-30 PROCEDURE — 700101 HCHG RX REV CODE 250: Performed by: SURGERY

## 2023-09-30 PROCEDURE — 99233 SBSQ HOSP IP/OBS HIGH 50: CPT | Performed by: INTERNAL MEDICINE

## 2023-09-30 PROCEDURE — 700105 HCHG RX REV CODE 258: Performed by: ANESTHESIOLOGY

## 2023-09-30 PROCEDURE — 160035 HCHG PACU - 1ST 60 MINS PHASE I: Performed by: SURGERY

## 2023-09-30 PROCEDURE — 36415 COLL VENOUS BLD VENIPUNCTURE: CPT

## 2023-09-30 PROCEDURE — 85027 COMPLETE CBC AUTOMATED: CPT

## 2023-09-30 PROCEDURE — 770006 HCHG ROOM/CARE - MED/SURG/GYN SEMI*

## 2023-09-30 PROCEDURE — 86901 BLOOD TYPING SEROLOGIC RH(D): CPT

## 2023-09-30 PROCEDURE — 160041 HCHG SURGERY MINUTES - EA ADDL 1 MIN LEVEL 4: Performed by: SURGERY

## 2023-09-30 PROCEDURE — 04CK0ZZ EXTIRPATION OF MATTER FROM RIGHT FEMORAL ARTERY, OPEN APPROACH: ICD-10-PCS | Performed by: SURGERY

## 2023-09-30 PROCEDURE — 99024 POSTOP FOLLOW-UP VISIT: CPT | Performed by: SURGERY

## 2023-09-30 PROCEDURE — C1876 STENT, NON-COA/NON-COV W/DEL: HCPCS | Performed by: SURGERY

## 2023-09-30 PROCEDURE — 700111 HCHG RX REV CODE 636 W/ 250 OVERRIDE (IP): Performed by: SURGERY

## 2023-09-30 PROCEDURE — 700101 HCHG RX REV CODE 250: Performed by: ANESTHESIOLOGY

## 2023-09-30 PROCEDURE — 35372 RECHANNELING OF ARTERY: CPT | Mod: 50 | Performed by: SURGERY

## 2023-09-30 PROCEDURE — 87389 HIV-1 AG W/HIV-1&-2 AB AG IA: CPT

## 2023-09-30 PROCEDURE — A9270 NON-COVERED ITEM OR SERVICE: HCPCS | Performed by: ANESTHESIOLOGY

## 2023-09-30 PROCEDURE — 04CL0ZZ EXTIRPATION OF MATTER FROM LEFT FEMORAL ARTERY, OPEN APPROACH: ICD-10-PCS | Performed by: SURGERY

## 2023-09-30 PROCEDURE — 700111 HCHG RX REV CODE 636 W/ 250 OVERRIDE (IP): Performed by: INTERNAL MEDICINE

## 2023-09-30 PROCEDURE — 86900 BLOOD TYPING SEROLOGIC ABO: CPT

## 2023-09-30 PROCEDURE — 80048 BASIC METABOLIC PNL TOTAL CA: CPT

## 2023-09-30 PROCEDURE — C1725 CATH, TRANSLUMIN NON-LASER: HCPCS | Performed by: SURGERY

## 2023-09-30 DEVICE — IMPLANTABLE DEVICE: Type: IMPLANTABLE DEVICE | Site: GROIN | Status: FUNCTIONAL

## 2023-09-30 RX ORDER — OXYCODONE HCL 5 MG/5 ML
10 SOLUTION, ORAL ORAL
Status: COMPLETED | OUTPATIENT
Start: 2023-09-30 | End: 2023-09-30

## 2023-09-30 RX ORDER — OXYCODONE HCL 5 MG/5 ML
5 SOLUTION, ORAL ORAL
Status: COMPLETED | OUTPATIENT
Start: 2023-09-30 | End: 2023-09-30

## 2023-09-30 RX ORDER — ONDANSETRON 2 MG/ML
4 INJECTION INTRAMUSCULAR; INTRAVENOUS
Status: DISCONTINUED | OUTPATIENT
Start: 2023-09-30 | End: 2023-10-01 | Stop reason: HOSPADM

## 2023-09-30 RX ORDER — LIDOCAINE HYDROCHLORIDE 20 MG/ML
INJECTION, SOLUTION EPIDURAL; INFILTRATION; INTRACAUDAL; PERINEURAL PRN
Status: DISCONTINUED | OUTPATIENT
Start: 2023-09-30 | End: 2023-10-01 | Stop reason: SURG

## 2023-09-30 RX ORDER — ONDANSETRON 2 MG/ML
INJECTION INTRAMUSCULAR; INTRAVENOUS PRN
Status: DISCONTINUED | OUTPATIENT
Start: 2023-09-30 | End: 2023-10-01 | Stop reason: SURG

## 2023-09-30 RX ORDER — LABETALOL HYDROCHLORIDE 5 MG/ML
5 INJECTION, SOLUTION INTRAVENOUS
Status: DISCONTINUED | OUTPATIENT
Start: 2023-09-30 | End: 2023-10-01 | Stop reason: HOSPADM

## 2023-09-30 RX ORDER — SODIUM CHLORIDE, SODIUM GLUCONATE, SODIUM ACETATE, POTASSIUM CHLORIDE AND MAGNESIUM CHLORIDE 526; 502; 368; 37; 30 MG/100ML; MG/100ML; MG/100ML; MG/100ML; MG/100ML
INJECTION, SOLUTION INTRAVENOUS
Status: DISCONTINUED | OUTPATIENT
Start: 2023-09-30 | End: 2023-10-01 | Stop reason: SURG

## 2023-09-30 RX ORDER — HEPARIN SODIUM,PORCINE 1000/ML
VIAL (ML) INJECTION PRN
Status: DISCONTINUED | OUTPATIENT
Start: 2023-09-30 | End: 2023-10-01 | Stop reason: SURG

## 2023-09-30 RX ORDER — HYDROMORPHONE HYDROCHLORIDE 1 MG/ML
0.1 INJECTION, SOLUTION INTRAMUSCULAR; INTRAVENOUS; SUBCUTANEOUS
Status: DISCONTINUED | OUTPATIENT
Start: 2023-09-30 | End: 2023-10-01 | Stop reason: HOSPADM

## 2023-09-30 RX ORDER — DIPHENHYDRAMINE HYDROCHLORIDE 50 MG/ML
12.5 INJECTION INTRAMUSCULAR; INTRAVENOUS
Status: DISCONTINUED | OUTPATIENT
Start: 2023-09-30 | End: 2023-10-01 | Stop reason: HOSPADM

## 2023-09-30 RX ORDER — KETOROLAC TROMETHAMINE 30 MG/ML
INJECTION, SOLUTION INTRAMUSCULAR; INTRAVENOUS PRN
Status: DISCONTINUED | OUTPATIENT
Start: 2023-09-30 | End: 2023-10-01 | Stop reason: SURG

## 2023-09-30 RX ORDER — PROTAMINE SULFATE 10 MG/ML
INJECTION, SOLUTION INTRAVENOUS PRN
Status: DISCONTINUED | OUTPATIENT
Start: 2023-09-30 | End: 2023-10-01 | Stop reason: SURG

## 2023-09-30 RX ORDER — IODIXANOL 270 MG/ML
INJECTION, SOLUTION INTRAVASCULAR
Status: DISCONTINUED | OUTPATIENT
Start: 2023-09-30 | End: 2023-09-30 | Stop reason: HOSPADM

## 2023-09-30 RX ORDER — HYDRALAZINE HYDROCHLORIDE 20 MG/ML
5 INJECTION INTRAMUSCULAR; INTRAVENOUS
Status: DISCONTINUED | OUTPATIENT
Start: 2023-09-30 | End: 2023-10-01 | Stop reason: HOSPADM

## 2023-09-30 RX ORDER — SODIUM CHLORIDE, SODIUM LACTATE, POTASSIUM CHLORIDE, CALCIUM CHLORIDE 600; 310; 30; 20 MG/100ML; MG/100ML; MG/100ML; MG/100ML
INJECTION, SOLUTION INTRAVENOUS
Status: DISCONTINUED | OUTPATIENT
Start: 2023-09-30 | End: 2023-10-01 | Stop reason: SURG

## 2023-09-30 RX ORDER — HYDROMORPHONE HYDROCHLORIDE 1 MG/ML
0.2 INJECTION, SOLUTION INTRAMUSCULAR; INTRAVENOUS; SUBCUTANEOUS
Status: DISCONTINUED | OUTPATIENT
Start: 2023-09-30 | End: 2023-10-01 | Stop reason: HOSPADM

## 2023-09-30 RX ORDER — HEPARIN SODIUM,PORCINE 1000/ML
VIAL (ML) INJECTION
Status: DISCONTINUED | OUTPATIENT
Start: 2023-09-30 | End: 2023-09-30 | Stop reason: HOSPADM

## 2023-09-30 RX ORDER — MEPERIDINE HYDROCHLORIDE 25 MG/ML
12.5 INJECTION INTRAMUSCULAR; INTRAVENOUS; SUBCUTANEOUS
Status: DISCONTINUED | OUTPATIENT
Start: 2023-09-30 | End: 2023-10-01 | Stop reason: HOSPADM

## 2023-09-30 RX ORDER — CALCIUM CHLORIDE 100 MG/ML
INJECTION INTRAVENOUS; INTRAVENTRICULAR PRN
Status: DISCONTINUED | OUTPATIENT
Start: 2023-09-30 | End: 2023-10-01 | Stop reason: SURG

## 2023-09-30 RX ORDER — PHENYLEPHRINE HYDROCHLORIDE 10 MG/ML
INJECTION, SOLUTION INTRAMUSCULAR; INTRAVENOUS; SUBCUTANEOUS PRN
Status: DISCONTINUED | OUTPATIENT
Start: 2023-09-30 | End: 2023-10-01 | Stop reason: SURG

## 2023-09-30 RX ORDER — SODIUM CHLORIDE, SODIUM LACTATE, POTASSIUM CHLORIDE, CALCIUM CHLORIDE 600; 310; 30; 20 MG/100ML; MG/100ML; MG/100ML; MG/100ML
INJECTION, SOLUTION INTRAVENOUS CONTINUOUS
Status: DISCONTINUED | OUTPATIENT
Start: 2023-09-30 | End: 2023-10-01 | Stop reason: HOSPADM

## 2023-09-30 RX ORDER — ASPIRIN 81 MG/1
81 TABLET ORAL DAILY
Status: DISCONTINUED | OUTPATIENT
Start: 2023-10-01 | End: 2023-11-03 | Stop reason: HOSPADM

## 2023-09-30 RX ORDER — CLOPIDOGREL BISULFATE 75 MG/1
75 TABLET ORAL DAILY
Status: DISPENSED | OUTPATIENT
Start: 2023-10-01 | End: 2023-10-31

## 2023-09-30 RX ORDER — HYDROMORPHONE HYDROCHLORIDE 1 MG/ML
0.4 INJECTION, SOLUTION INTRAMUSCULAR; INTRAVENOUS; SUBCUTANEOUS
Status: DISCONTINUED | OUTPATIENT
Start: 2023-09-30 | End: 2023-10-01 | Stop reason: HOSPADM

## 2023-09-30 RX ORDER — DEXAMETHASONE SODIUM PHOSPHATE 4 MG/ML
INJECTION, SOLUTION INTRA-ARTICULAR; INTRALESIONAL; INTRAMUSCULAR; INTRAVENOUS; SOFT TISSUE PRN
Status: DISCONTINUED | OUTPATIENT
Start: 2023-09-30 | End: 2023-10-01 | Stop reason: SURG

## 2023-09-30 RX ORDER — HALOPERIDOL 5 MG/ML
1 INJECTION INTRAMUSCULAR
Status: DISCONTINUED | OUTPATIENT
Start: 2023-09-30 | End: 2023-10-01 | Stop reason: HOSPADM

## 2023-09-30 RX ADMIN — SODIUM CHLORIDE, POTASSIUM CHLORIDE, SODIUM LACTATE AND CALCIUM CHLORIDE: 600; 310; 30; 20 INJECTION, SOLUTION INTRAVENOUS at 22:04

## 2023-09-30 RX ADMIN — PROTAMINE SULFATE 10 MG: 10 INJECTION, SOLUTION INTRAVENOUS at 21:34

## 2023-09-30 RX ADMIN — CALCIUM CHLORIDE 200 MG: 100 INJECTION, SOLUTION INTRAVENOUS; INTRAVENTRICULAR at 20:05

## 2023-09-30 RX ADMIN — HYDROMORPHONE HYDROCHLORIDE 1 MG: 1 INJECTION, SOLUTION INTRAMUSCULAR; INTRAVENOUS; SUBCUTANEOUS at 07:08

## 2023-09-30 RX ADMIN — OXYCODONE HYDROCHLORIDE 15 MG: 15 TABLET ORAL at 04:49

## 2023-09-30 RX ADMIN — ROCURONIUM BROMIDE 50 MG: 10 INJECTION, SOLUTION INTRAVENOUS at 18:09

## 2023-09-30 RX ADMIN — FENTANYL CITRATE 50 MCG: 50 INJECTION, SOLUTION INTRAMUSCULAR; INTRAVENOUS at 20:20

## 2023-09-30 RX ADMIN — ROCURONIUM BROMIDE 10 MG: 10 INJECTION, SOLUTION INTRAVENOUS at 19:50

## 2023-09-30 RX ADMIN — ROCURONIUM BROMIDE 10 MG: 10 INJECTION, SOLUTION INTRAVENOUS at 20:19

## 2023-09-30 RX ADMIN — GABAPENTIN 100 MG: 100 CAPSULE ORAL at 10:38

## 2023-09-30 RX ADMIN — SUGAMMADEX 200 MG: 100 INJECTION, SOLUTION INTRAVENOUS at 22:34

## 2023-09-30 RX ADMIN — OXYCODONE HYDROCHLORIDE 10 MG: 5 SOLUTION ORAL at 23:12

## 2023-09-30 RX ADMIN — PROPOFOL 20 MG: 10 INJECTION, EMULSION INTRAVENOUS at 22:39

## 2023-09-30 RX ADMIN — KETOROLAC TROMETHAMINE 15 MG: 30 INJECTION, SOLUTION INTRAMUSCULAR; INTRAVENOUS at 22:03

## 2023-09-30 RX ADMIN — CALCIUM CHLORIDE 200 MG: 100 INJECTION, SOLUTION INTRAVENOUS; INTRAVENTRICULAR at 20:01

## 2023-09-30 RX ADMIN — PROTAMINE SULFATE 20 MG: 10 INJECTION, SOLUTION INTRAVENOUS at 21:43

## 2023-09-30 RX ADMIN — OXYCODONE HYDROCHLORIDE 15 MG: 15 TABLET ORAL at 10:38

## 2023-09-30 RX ADMIN — HEPARIN SODIUM 3000 UNITS: 1000 INJECTION, SOLUTION INTRAVENOUS; SUBCUTANEOUS at 19:34

## 2023-09-30 RX ADMIN — ROCURONIUM BROMIDE 10 MG: 10 INJECTION, SOLUTION INTRAVENOUS at 19:09

## 2023-09-30 RX ADMIN — SODIUM CHLORIDE, SODIUM GLUCONATE, SODIUM ACETATE, POTASSIUM CHLORIDE AND MAGNESIUM CHLORIDE: 526; 502; 368; 37; 30 INJECTION, SOLUTION INTRAVENOUS at 18:03

## 2023-09-30 RX ADMIN — PROPOFOL 200 MG: 10 INJECTION, EMULSION INTRAVENOUS at 18:09

## 2023-09-30 RX ADMIN — LIDOCAINE HYDROCHLORIDE 100 MG: 20 INJECTION, SOLUTION EPIDURAL; INFILTRATION; INTRACAUDAL at 18:09

## 2023-09-30 RX ADMIN — CALCIUM CHLORIDE 200 MG: 100 INJECTION, SOLUTION INTRAVENOUS; INTRAVENTRICULAR at 20:09

## 2023-09-30 RX ADMIN — HEPARIN SODIUM 7000 UNITS: 1000 INJECTION, SOLUTION INTRAVENOUS; SUBCUTANEOUS at 19:05

## 2023-09-30 RX ADMIN — FENTANYL CITRATE 100 MCG: 50 INJECTION, SOLUTION INTRAMUSCULAR; INTRAVENOUS at 18:03

## 2023-09-30 RX ADMIN — CALCIUM CHLORIDE 200 MG: 100 INJECTION, SOLUTION INTRAVENOUS; INTRAVENTRICULAR at 20:17

## 2023-09-30 RX ADMIN — HEPARIN SODIUM 2000 UNITS: 1000 INJECTION, SOLUTION INTRAVENOUS; SUBCUTANEOUS at 20:58

## 2023-09-30 RX ADMIN — FENTANYL CITRATE 50 MCG: 50 INJECTION, SOLUTION INTRAMUSCULAR; INTRAVENOUS at 22:37

## 2023-09-30 RX ADMIN — PROTAMINE SULFATE 20 MG: 10 INJECTION, SOLUTION INTRAVENOUS at 21:39

## 2023-09-30 RX ADMIN — GABAPENTIN 100 MG: 100 CAPSULE ORAL at 04:49

## 2023-09-30 RX ADMIN — FENTANYL CITRATE 50 MCG: 50 INJECTION, SOLUTION INTRAMUSCULAR; INTRAVENOUS at 18:40

## 2023-09-30 RX ADMIN — ROCURONIUM BROMIDE 10 MG: 10 INJECTION, SOLUTION INTRAVENOUS at 21:09

## 2023-09-30 RX ADMIN — DOCUSATE SODIUM 50 MG AND SENNOSIDES 8.6 MG 2 TABLET: 8.6; 5 TABLET, FILM COATED ORAL at 04:49

## 2023-09-30 RX ADMIN — NICOTINE TRANSDERMAL SYSTEM 21 MG: 21 PATCH, EXTENDED RELEASE TRANSDERMAL at 04:49

## 2023-09-30 RX ADMIN — OXYCODONE HYDROCHLORIDE 15 MG: 15 TABLET ORAL at 14:37

## 2023-09-30 RX ADMIN — PHENYLEPHRINE HYDROCHLORIDE 300 MCG: 10 INJECTION INTRAVENOUS at 18:14

## 2023-09-30 RX ADMIN — HEPARIN SODIUM 3000 UNITS: 1000 INJECTION, SOLUTION INTRAVENOUS; SUBCUTANEOUS at 20:05

## 2023-09-30 RX ADMIN — CALCIUM CHLORIDE 200 MG: 100 INJECTION, SOLUTION INTRAVENOUS; INTRAVENTRICULAR at 20:13

## 2023-09-30 RX ADMIN — OXYCODONE HYDROCHLORIDE 10 MG: 10 TABLET ORAL at 16:55

## 2023-09-30 RX ADMIN — ROCURONIUM BROMIDE 10 MG: 10 INJECTION, SOLUTION INTRAVENOUS at 21:57

## 2023-09-30 RX ADMIN — SODIUM CHLORIDE, SODIUM GLUCONATE, SODIUM ACETATE, POTASSIUM CHLORIDE AND MAGNESIUM CHLORIDE: 526; 502; 368; 37; 30 INJECTION, SOLUTION INTRAVENOUS at 20:21

## 2023-09-30 RX ADMIN — DEXAMETHASONE SODIUM PHOSPHATE 4 MG: 4 INJECTION INTRA-ARTICULAR; INTRALESIONAL; INTRAMUSCULAR; INTRAVENOUS; SOFT TISSUE at 18:09

## 2023-09-30 RX ADMIN — PIPERACILLIN AND TAZOBACTAM 4.5 G: 4; .5 INJECTION, POWDER, FOR SOLUTION INTRAVENOUS at 14:41

## 2023-09-30 RX ADMIN — ONDANSETRON 4 MG: 2 INJECTION INTRAMUSCULAR; INTRAVENOUS at 22:03

## 2023-09-30 RX ADMIN — PIPERACILLIN AND TAZOBACTAM 4.5 G: 4; .5 INJECTION, POWDER, FOR SOLUTION INTRAVENOUS at 04:50

## 2023-09-30 RX ADMIN — HEPARIN SODIUM 3000 UNITS: 1000 INJECTION, SOLUTION INTRAVENOUS; SUBCUTANEOUS at 20:35

## 2023-09-30 ASSESSMENT — ENCOUNTER SYMPTOMS
ABDOMINAL PAIN: 0
DIARRHEA: 0
NAUSEA: 0
FEVER: 0
DIZZINESS: 0
VOMITING: 0
CHILLS: 0
SHORTNESS OF BREATH: 0
MYALGIAS: 1
HEADACHES: 0
DEPRESSION: 0

## 2023-09-30 ASSESSMENT — PAIN DESCRIPTION - PAIN TYPE
TYPE: CHRONIC PAIN

## 2023-09-30 NOTE — PROGRESS NOTES
[facility] Medicine Daily Progress Note    Date of Service  9/30/2023    Chief Complaint  Dillon Centeno is a 58 y.o. male admitted 9/26/2023 with bilateral foot wounds.    Hospital Course  58 y.o. male with past medical history of hypertension who presented to the hospital on 9/26/2023 with complaint of bilateral lower extremity wound.  Patient reported that he found to a significant wound on his left lower extremity especially involving his left foot that for started 2 weeks ago.  He reported that he was wearing a shoe that was to size smaller than his actual size and he developed this wound. He does not take any medications on regular basis.  He smokes 1 pack a day and drinks 1-2 drinks of alcohol daily and sometimes he does not drink alcohol at all.  He denies any history of symptoms of alcohol withdrawal. In the ER patient found to have gangrene on his left foot most likely due to vascular disease.  Vascular surgery and orthopedic surgery consulted.    Interval Problem Update  9/27 Vascular surgery reported that severe gangrene of the left foot was unsalvageable and he will require a amputation, right lower extremity has some scattered ulcerations, arterial ultrasound ordered to evaluate.  Per orthopedic surgery plan for amputation of left leg today.  Vital signs stable.  Leukocytosis resolved.  Hepatitis panel negative.  Hemoglobin 8.3, will check iron panel, ferritin, B12. Sodium still low 126, check TSH, urine Na and Urine Osm. Blood culture with 1/2 with morganella morganii and the other with possible strep. Echo pending. Repeat blood cultures ordered.  Patient's main complaint is pain in his bilateral feet.    9/28 Vitals stable on room air.  Notified by ID pharmacy that blood cultures from 9/26 with Morganella and Enterococcus.  Urine culture with staph.  I have discussed the case with infectious disease.  Na with mild improvement 129, urine studies still pending. Repeat blood cultures pending. Echo  and arterial US pending.  Patient complaining of severe pain in his leg and stump.  He has been requiring multiple doses of oral and IV opiates, continue to monitor respiratory status closely.  I have added gabapentin as he is complaining of nerve pain as well as Flexeril as needed.    9/29 WBC 11.3, sodium 126.  Arterial ultrasound showed severe PAD of both legs.  Echo showed EF 60% with normal diastolic function.  Discussed ultrasound with vascular surgery, CTA showed occlusion of the right common iliac artery through the distal SFA and diffuse disease.  Vascular surgery planning on taking patient for revascularization.  CT also showed a septated lobulated left upper pole renal cystic mass lesion, MRI pending to further characterize this. Blood culture with Vagococcus carniphilus and morganella. Repeat blood cultures negative thus far. Discussed with ID, continue zosyn.  Discussed with LPS they are working on getting patient a brace as he does not like to keep his leg extended and they are worried about contractures.    9/30 WBC wnl 10.4. Na improving 131, urine studies consistent with SIADH. Renal function stable. Plan for OR today with vascular.  Patient complaining of pain in his stump.  Repeat blood cultures negative.    I have discussed this patient's plan of care and discharge plan at IDT rounds today with Case Management, Nursing, Nursing leadership, and other members of the IDT team.    Consultants/Specialty  orthopedics and vascular surgery  Infectious disease     Code Status  Full Code    Disposition  The patient is not medically cleared for discharge to home or a post-acute facility.  Anticipate discharge to: skilled nursing facility    I have placed the appropriate orders for post-discharge needs.    Review of Systems  Review of Systems   Constitutional:  Positive for malaise/fatigue. Negative for chills and fever.   Respiratory:  Negative for shortness of breath.    Cardiovascular:  Negative for chest  pain.   Gastrointestinal:  Negative for abdominal pain, nausea and vomiting.   Genitourinary:  Negative for dysuria.   Musculoskeletal:  Positive for joint pain and myalgias.   Skin:  Negative for rash.   Neurological:  Negative for dizziness and headaches.   Psychiatric/Behavioral:  Negative for depression.    All other systems reviewed and are negative.       Physical Exam  Temp:  [36.2 °C (97.2 °F)-36.4 °C (97.5 °F)] 36.3 °C (97.4 °F)  Pulse:  [] 90  Resp:  [16-19] 16  BP: (108-118)/(72-81) 118/81  SpO2:  [94 %-98 %] 96 %    Physical Exam  Vitals and nursing note reviewed.   Constitutional:       General: He is not in acute distress.     Appearance: He is ill-appearing.   HENT:      Head: Normocephalic and atraumatic.   Eyes:      Conjunctiva/sclera: Conjunctivae normal.   Cardiovascular:      Rate and Rhythm: Normal rate and regular rhythm.      Pulses: Normal pulses.   Pulmonary:      Effort: Pulmonary effort is normal. No respiratory distress.      Breath sounds: Normal breath sounds. No wheezing.   Abdominal:      General: Abdomen is flat. There is no distension.      Palpations: Abdomen is soft.      Tenderness: There is no abdominal tenderness.   Musculoskeletal:         General: Tenderness, deformity and signs of injury present.      Cervical back: Normal range of motion and neck supple.      Right lower leg: No edema.      Comments: Left BKA    Skin:     General: Skin is warm and dry.      Findings: Lesion present.      Comments: Right foot with multiple scabbed lesions  (Picture below)   Neurological:      General: No focal deficit present.      Mental Status: He is alert and oriented to person, place, and time.      Cranial Nerves: No cranial nerve deficit.      Motor: Weakness present.   Psychiatric:         Mood and Affect: Mood normal.         Behavior: Behavior normal.          Right foot       Fluids    Intake/Output Summary (Last 24 hours) at 9/30/2023 1257  Last data filed at 9/30/2023  0500  Gross per 24 hour   Intake 480 ml   Output 1500 ml   Net -1020 ml       Laboratory  Recent Labs     09/28/23 0621 09/29/23 0318 09/30/23  0549   WBC 10.2 11.3* 10.4   RBC 3.31* 3.37* 3.11*   HEMOGLOBIN 8.9* 9.4* 8.6*   HEMATOCRIT 28.8* 28.7* 27.2*   MCV 87.0 85.2 87.5   MCH 26.9* 27.9 27.7   MCHC 30.9* 32.8 31.6*   RDW 44.3 43.9 45.9   PLATELETCT 638* 697* 639*   MPV 8.9* 8.7* 8.6*     Recent Labs     09/28/23 0621 09/29/23 0318 09/30/23  0549   SODIUM 129* 126* 131*   POTASSIUM 4.6 4.5 4.3   CHLORIDE 97 89* 95*   CO2 22 27 27   GLUCOSE 120* 91 120*   BUN 9 9 6*   CREATININE 0.38* 0.59 0.53   CALCIUM 8.0* 8.3* 9.1                   Imaging  CT-CTA AORTA-RO WITH & W/O-POST PROCESS   Final Result         CTA AORTA      1.  Septated lobulated left upper pole renal cystic mass lesion, recommend follow-up MRI of the kidneys for further characterization.   2.  Atherosclerosis and atherosclerotic coronary artery disease.   3.  Fat-containing right inguinal hernia      CTA LOWER EXTREMITIES      1.  Occlusion of the right common iliac artery through the distal superficial femoral artery   2.  Distal right peroneal artery occlusion with 2-vessel runoff the anterior and posterior tibial arteries at the right ankle.   3.  Occlusion of the left superficial femoral artery at the bifurcation extending through its length to the popliteal artery.   4.  Occlusion of the proximal left profunda femoris just distal to the bifurcation which reconstitutes.   5.  Soft tissue gas at the stump of left below-the-knee amputation, within expected limits for recent postop changes.      3D angiographic/MIP images of the vasculature confirm the vascular findings as described above.      These findings were discussed with the patient's clinician, Keith Navarro, on 9/29/2023 8:06 AM.      EC-ECHOCARDIOGRAM COMPLETE W/O CONT   Final Result      US-AORTA/ILIACS DUPLEX COMPLETE   Final Result      US-EXTREMITY ARTERY LOWER UNILAT RIGHT    Final Result      US-INGA SINGLE LEVEL UNILAT RIGHT   Final Result      DX-CHEST-PORTABLE (1 VIEW)   Final Result      1.  LEFT basilar atelectasis or pneumonia   2.  Trace LEFT pleural effusion or pleural scar      MR-ABDOMEN-WITH & W/O    (Results Pending)   DX-PORTABLE FLUORO > 1 HOUR    (Results Pending)        Assessment/Plan  * Gangrene of left foot (HCC)- (present on admission)  Assessment & Plan  Patient found to have significant gangrene on his left foot that may need surgical intervention.  Vascular surgery consulted, CTA with diffused PAD, plan for revascularization  Pain medication with oxycodone and IV Dilaudid.  Monitor for adverse effect of narcotic pain medications.  Gabapentin  Vancomycin dose adjustment as per vancomycin trough level and monitor for vancomycin toxicity.  Orthopedic surgery consulted, s/p Left BKA on 9/27  ID consulted   Continue IV abx     PAD (peripheral artery disease) (HCC)- (present on admission)  Assessment & Plan  Severe bilateral PAD  Vascular consulted  Plan for or today    Anemia- (present on admission)  Assessment & Plan  Iron studies consistent with chronic disease   B12 wnl   Trend CBC, transfuse Hb<7     Bacteremia- (present on admission)  Assessment & Plan  1/2 bcx with morganella   1/2 bcx with enterococcus   Repeat blood cultures ordered   Continue current abx   Echo no vegetations  Infectious disease consulted   -anticipate 2 weeks of IV abx from date of negative culture    Hyponatremia  Assessment & Plan  Patient found to have hyponatremia most likely due to dehydration.  Continue IV fluids  TSH wnl   check urine osmolality, urine sodium  Daily BMP    Elevated liver enzymes  Assessment & Plan  Elevated liver enzymes.  No abdominal pain.  Hepatitis panel negative    Leukocytosis  Assessment & Plan  Patient found to have leukocytosis most likely secondary to lower extremity wound.  Ordered CBC for tomorrow.  I started him on broad-spectrum antibiotics.    Renal  cyst  Assessment & Plan  On CT, MRI pending to evaluate       Total time spent in chart review, at bedside with the patient, discussing with consultants, nursing and case management: 51 minutes    VTE prophylaxis: SCDs, lovenox ppx    I have performed a physical exam and reviewed and updated ROS and Plan today (9/30/2023). In review of yesterday's note (9/29/2023), there are no changes except as documented above.

## 2023-09-30 NOTE — ASSESSMENT & PLAN NOTE
- s/p bilateral common femoral artery endarterectomy and profundoplasty with saphenous vein angioplasty with stent placement in the right external iliac artery on 9/30/2023. Prevena was placed (5-7 days).   -Continue Plavix for 1 month and aspirin for life.  Follow-up with vascular surgery.  Stump protector for left leg was ordered by vascular surgery.  See gangrene problem

## 2023-09-30 NOTE — CARE PLAN
The patient is Stable - Low risk of patient condition declining or worsening    Shift Goals  Clinical Goals: pain control and comfort  Patient Goals: rest  Family Goals: ashlie    Patient aaox4, pleasant and cooperative with care. Pain is better controlled, states it's 7-8/10 (last night it was a 9-10). Dressing at incisional site to L leg is CDI and was changed today. No needs, patient encouraged to use call light for assistance

## 2023-09-30 NOTE — PROGRESS NOTES
Infectious Disease Progress Note    Author: Yung Esparza M.D. Date & Time of service: 2023  11:55 AM    Chief Complaint:  Follow-up for gangrene and bacteremia    Interval History:   patient remains afebrile, white count is 10.4, tolerating Zosyn, culture results as below, procedures as below, creatinine 0.53, UDS only positive for oxycodone, normal transaminases, albumin 2.4    Labs Reviewed and Medications Reviewed.    Review of Systems:  Review of Systems   Constitutional:  Negative for fever.   Gastrointestinal:  Negative for diarrhea.   Musculoskeletal:  Positive for myalgias.   Skin:  Negative for itching and rash.       Hemodynamics:  Temp (24hrs), Av.3 °C (97.4 °F), Min:36.2 °C (97.2 °F), Max:36.4 °C (97.5 °F)  Temperature: 36.3 °C (97.4 °F)  Pulse  Av.7  Min: 69  Max: 101   Blood Pressure: 118/81       Physical Exam:  Physical Exam  Vitals and nursing note reviewed.   Constitutional:       General: He is not in acute distress.     Comments: Disheveled appearing   HENT:      Mouth/Throat:      Pharynx: No oropharyngeal exudate.   Eyes:      Conjunctiva/sclera: Conjunctivae normal.   Cardiovascular:      Rate and Rhythm: Normal rate.      Heart sounds: No murmur heard.  Pulmonary:      Effort: Pulmonary effort is normal. No respiratory distress.      Breath sounds: No stridor.   Abdominal:      General: There is no distension.      Tenderness: There is no abdominal tenderness.   Musculoskeletal:      Comments: Left BKA site well approximated with no gross external evidence of infection   Skin:     Findings: No erythema or rash.   Neurological:      Mental Status: He is alert and oriented to person, place, and time.   Psychiatric:         Mood and Affect: Mood normal.         Behavior: Behavior normal.         Meds:    Current Facility-Administered Medications:     ALPRAZolam    [COMPLETED] piperacillin-tazobactam **AND** piperacillin-tazobactam    gabapentin    cyclobenzaprine     oxyCODONE immediate-release **OR** oxyCODONE immediate-release **OR** HYDROmorphone    enoxaparin (LOVENOX) injection    senna-docusate **AND** polyethylene glycol/lytes **AND** magnesium hydroxide **AND** bisacodyl    acetaminophen    Notify provider if pain remains uncontrolled **AND** Use the Numeric Rating Scale (NRS), Chadwick-Baker Faces (WBF), or FLACC on regular floors and Critical-Care Pain Observation Tool (CPOT) on ICUs/Trauma to assess pain **AND** Pulse Ox **AND** Pharmacy Consult Request **AND** If patient difficult to arouse and/or has respiratory depression (respiratory rate of 10 or less), stop any opiates that are currently infusing and call a Rapid Response.    ondansetron    ondansetron    promethazine    promethazine    prochlorperazine    hydrALAZINE    nicotine **AND** Nicotine Replacement Patient Education Materials **AND** nicotine polacrilex    Labs:  Recent Labs     09/28/23 0621 09/29/23 0318 09/30/23  0549   WBC 10.2 11.3* 10.4   RBC 3.31* 3.37* 3.11*   HEMOGLOBIN 8.9* 9.4* 8.6*   HEMATOCRIT 28.8* 28.7* 27.2*   MCV 87.0 85.2 87.5   MCH 26.9* 27.9 27.7   RDW 44.3 43.9 45.9   PLATELETCT 638* 697* 639*   MPV 8.9* 8.7* 8.6*     Recent Labs     09/28/23 0621 09/29/23 0318 09/30/23  0549   SODIUM 129* 126* 131*   POTASSIUM 4.6 4.5 4.3   CHLORIDE 97 89* 95*   CO2 22 27 27   GLUCOSE 120* 91 120*   BUN 9 9 6*     Recent Labs     09/28/23 0621 09/29/23 0318 09/30/23  0549   CREATININE 0.38* 0.59 0.53       Imaging:  CT-CTA AORTA-RO WITH & W/O-POST PROCESS    Result Date: 9/29/2023 9/28/2023 11:37 PM HISTORY/REASON FOR EXAM: PAOD, severe bilateral lower extremity arterial insufficiency TECHNIQUE/EXAM DESCRIPTION: CT angiogram of the abdominal aorta with runoff without and with contrast, with reconstructions. Initial precontrast images were obtained from the diaphragm through the lesser trochanters. Subsequently, thin section postcontrast helical images were obtained from the diaphragm through  the ankles following the IV bolus administration of 95 mL of Omnipaque 350. CT angiographic technique was utilized. 3D angiographic images were reviewed on PACS. Maximum intensity projection (MIP) images were generated and reviewed. Low dose optimization technique was utilized for this CT exam including automated exposure control and adjustment of the mA and/or kV according to patient size. COMPARISON: None FINDINGS: CT ABDOMEN WITHOUT CONTRAST: On non-contrast images, no renal or urinary tract stones are identified. CTA AORTA: Atherosclerotic changes are seen including atherosclerotic coronary artery calculations. The aorta is normal in caliber without visualized aneurysm or dissection. There is mural thrombus in the abdominal aorta visualized. Linear densities the bilateral lung bases favors changes of atelectasis. The liver is normal in contour, no intrahepatic biliary ductal dilatation is seen. The gallbladder appears within normal limits. The spleen is unremarkable. The pancreas is grossly normal. Bilateral adrenal glands appear within normal limits. There is lobulated cystic lesion in the upper pole of the left kidney measuring 4.4 x 3.3 cm with septations. The ureters are normal in caliber along their visualized course. The colon appears within normal limits. The small bowel is unremarkable. The bladder is within normal limits. The bony structures are age appropriate. Fat-containing right inguinal hernia is seen. CTA LOWER EXTREMITIES: There is occlusion at the origin of the right common iliac artery visualized with occlusion extending to the level of the distal superficial femoral artery with reconstitution. The distal peroneal artery is not visualized with 2 vessel runoff at the ankle via anterior and posterior tibial arteries. Occlusion of the left superficial femoral artery is seen at the bifurcation which extends to the popliteal artery. There is short segment of occlusion of the profunda femoris just  distal to the bifurcation which reconstitutes. There is left globe amputation. Soft tissue gas is in the soft tissues at the below the knee amputation is seen.     CTA AORTA 1.  Septated lobulated left upper pole renal cystic mass lesion, recommend follow-up MRI of the kidneys for further characterization. 2.  Atherosclerosis and atherosclerotic coronary artery disease. 3.  Fat-containing right inguinal hernia CTA LOWER EXTREMITIES 1.  Occlusion of the right common iliac artery through the distal superficial femoral artery 2.  Distal right peroneal artery occlusion with 2-vessel runoff the anterior and posterior tibial arteries at the right ankle. 3.  Occlusion of the left superficial femoral artery at the bifurcation extending through its length to the popliteal artery. 4.  Occlusion of the proximal left profunda femoris just distal to the bifurcation which reconstitutes. 5.  Soft tissue gas at the stump of left below-the-knee amputation, within expected limits for recent postop changes. 3D angiographic/MIP images of the vasculature confirm the vascular findings as described above. These findings were discussed with the patient's clinician, Keith Navarro, on 2023 8:06 AM.    EC-ECHOCARDIOGRAM COMPLETE W/O CONT    Result Date: 2023  Transthoracic Echo Report Echocardiography Laboratory CONCLUSIONS No prior study is available for comparison. Normal left ventricular systolic function. No evidence of valvular abnormality based on Doppler evaluation. ELI GREWAL Exam Date:         2023                    16:04 Exam Location:     Inpatient Priority:          Routine Ordering Physician:        KAITLYNN RANGEL Referring Physician:       [id number] CARLOS Head Sonographer:               Nneka SMITH Age:    58     Gender:    M MRN:    [id number] :    1965 BSA:    1.8    Ht (in):    71     Wt (lb):    138 Exam Type:     Complete Indications:     Endocarditis ICD Codes:       421 CPT Codes:        81730 BP:   142    /   87     HR:   95 Technical Quality:       Poor, Technically difficult                          study - adequate information is                          obtained MEASUREMENTS  (Male / Female) Normal Values 2D ECHO LV Diastolic Diameter PLAX        3.3 cm                4.2 - 5.9 / 3.9 - 5.3 cm LV Systolic Diameter PLAX         2.3 cm                2.1 - 4.0 cm IVS Diastolic Thickness           1.1 cm                LVPW Diastolic Thickness          1.1 cm                LVOT Diameter                     2.3 cm                Estimated LV Ejection Fraction    60 %                  LV Ejection Fraction MOD BP       59.1 %                >= 55  % LV Ejection Fraction MOD 4C       58.3 %                LV Ejection Fraction MOD 2C       60.2 %                DOPPLER AV Peak Velocity                  1.1 m/s               AV Peak Gradient                  5.3 mmHg              AV Mean Gradient                  2.6 mmHg              LVOT Peak Velocity                0.92 m/s              AV Area Cont Eq vti               3.8 cm2               Mitral E Point Velocity           0.69 m/s              Mitral E to A Ratio               0.68                  MV Pressure Half Time             58.4 ms               MV Area PHT                       3.8 cm2               MV Deceleration Time              201 ms                * Indicates values subject to auto-interpretation LV EF:  60    % FINDINGS Left Ventricle The left ventricle is normal in size.mild concentric left ventricular hypertrophy. Normal left ventricular systolic function. The left ventricular ejection fraction is visually estimated to be 60%. Normal regional wall motion. Normal diastolic function. Right Ventricle The right ventricle is normal in size and systolic function. Right Atrium The right atrium is normal in size.the inferior vena cava is not visualized. Left Atrium The left atrium is normal in size. Left atrial volume index is  19.95  mL/sq m. Mitral Valve Structurally normal mitral valve without significant stenosis or regurgitation. Aortic Valve The aortic valve is not well visualized. Tricuspid Valve Structurally normal tricuspid valve without significant stenosis or regurgitation. Pulmonic Valve The pulmonic valve is not well visualized. Pericardium No pericardial effusion. Aorta Normal aortic root for body surface area. The ascending aorta diameter is 2.7  cm. Amanda [facility] To (Electronically Signed) Final Date:     2023                 17:28    US-AORTA/ILIACS DUPLEX COMPLETE    Result Date: 2023  Abdominal Aorta  Duplex Ultrasound Report  Vascular Laboratory  CONCLUSIONS  No evidence of abdominal aortic or iliac artery aneurysm.  Doppler waveforms of the aorta, right common iliac, left common iliac, and left  external iliac arteries are monophasic with a sharp upstroke.  The right external iliac artery is occluded. Collateral flow is demonstrated  via the right  internal iliac artery with monophasic flow.  Significant atherosclerotic plaque is noted throughout the aorta and bilateral  iliac arteries.  ELI GREWAL  Exam Date:     2023 12:22  Room #:     Inpatient  Priority:     Routine  Ht (in):             Wt (lb):  Ordering Physician:        KAITLYNN RANGEL  Referring Physician:       [id number] CARLOS Head  Sonographer:               Blanca Armas RVRUPERT  Study Type:                Complete Bilateral  Technical Quality:         Adequate  Age:    58    Gender:     M  MRN:    [id number]  :    1965      BSA:  Indications:     Peripheral vascular disease, unspecified  CPT Codes:       04541  ICD Codes:       I73.9  History:         Peripheral arterial disease. Left BKA 23. No prior duplex.  Limitations:     Patient not NPO.               PSV (cm/sec)                                             Diameter     Doppler                                                                        (cm)         Waveform                                   162.0   Celiac Artery                                  137.0   Superior Mesenteric Artery                                  58.0    Aorta - Proximal                0.9        Monophasic                                  47.0    Aorta - Mid                     1.8        Monophasic                                  41.0    Aorta - Distal                  1.8        Monophasic  Proximal:    48.0    Distal:    64.0    Right Common Iliac Artery                  Monophasic  Proximal:    68.0    Distal:    70.0    Left Common Iliac Artery                   Monophasic  Proximal:    0.0     Distal:    0.0     Right External Iliac Artery                Absent  Proximal:  Proximal:  Proximal:    80.0    Distal:    76.0    Left External Iliac Artery                 Monophasic  FINDINGS  No evidence of abdominal aortic or iliac artery aneurysm.  Doppler waveforms of the aorta, right common iliac, left common iliac, and left  external iliac arteries are monophasic with a sharp upstroke.  The right external iliac artery is occluded. Collateral flow is demonstrated  via the right  internal iliac artery with monophasic flow.  Significant atherosclerotic plaque is noted throughout the aorta and bilateral  iliac arteries.  Amanda [facility] To  (Electronically Signed)  Final Date:      28 September 2023 14:33    US-EXTREMITY ARTERY LOWER UNILAT RIGHT    Result Date: 9/28/2023  Lower Extremity  Arterial Duplex Report  Vascular Laboratory  CONCLUSIONS  Findings are consistent with severe arterial insufficiency of the right  lower extremity.  Duplex scanning of the aorto-iliac segment was completed in accordance with  lower extremity arterial evaluation protocol - see separate report.  ELI GREWAL  Exam Date:     09/28/2023 12:06  Room #:     Inpatient  Priority:     Routine  Ht (in):             Wt (lb):  Ordering Physician:        KAITLYNN RANGEL  Referring Physician:       [id number] CARLOS Head  Sonographer:                Blanca Armas RVT  Study Type:                Complete Unilateral  Technical Quality:         Adequate  Age:    58    Gender:     M  MRN:    [id number]  :    1965      BSA:  Indications:     Peripheral vascular disease, unspecified  CPT Codes:       97302  ICD Codes:       I73.9  History:         Peripheral arterial disease. Left BKA 23. No prior                   duplex.  Limitations:                RIGHT  Waveform        Peak Systolic Velocity (cm/s)                  Prox    Prox-Mid  Mid    Mid-Dist  Distal  Absent                            0                        CFA  Monophasic      16                                         PFA  Absent          0                 0                8       SFA  Monophasic      13                                 17      POP  Monophasic      9                                  10      AT  Monophasic      12                                 8       PT  Monophasic      7                                  7       STEFANIE                LEFT  Waveform        Peak Systolic Velocity (cm/s)                  Prox    Prox-Mid  Mid    Mid-Dist  Distal                                                             CFA                                                             PFA                                                             SFA                                                             POP                                                             AT                                                             PT                                                             STEFANIE  FINDINGS  Right.  No Doppler or color flow demonstrated in the common femoral, proximal and  mid superficial femoral arteries.  Severely dampened, monophasic flow demonstrated in the profunda femoral  arery.  Reconstitution of flow is seen at the distal superficial femoral artery  with monophasic flow.  Atherosclerotic plaque seen in the popliteal artery.  Three vessel runoff to the ankle with  monophasic flow.  Duplex scanning of the aorto-iliac segment was completed in accordance with  lower extremity arterial evaluation protocol - see separate report.  Amanda [facility] To  (Electronically Signed)  Final Date:      2023                   14:31    US-INGA SINGLE LEVEL UNILAT RIGHT    Result Date: 2023   Vascular Laboratory  Conclusions  Severe arterial insufficiency seen in the right lower extremity.  An arterial duplex of the right leg was performed in accordance with lower  extremity arterial evaluation protocol - see separate report.  ELI GREWAL  Age:    58    Gender:     M  MRN:    [id number]  :    1965      BSA:  Exam Date:     2023 11:11  Room #:     Inpatient  Priority:     Routine  Ht (in):             Wt (lb):  Ordering Physician:        KAITLYNN RANGEL  Referring Physician:       [id number] CARLOS Head  Sonographer:               Blanca Armas RVRUPERT  Study Type:                Complete Unilateral  Technical Quality:         Adequate  Indications:     Atherosclerosis of peripheral arteries  CPT Codes:       05940  ICD Codes:       440.20  History:         Peripheral vascular disease with non-healing wound. Below                   knee amputation of left leg due to dry gangrene (23). No                   prior exam.  Limitations:     IV in right mid bicep.                 RIGHT  Waveform            Systolic BPs (mmHg)                             0             Brachial  Monophasic                               Common Femoral  Monophasic                               Popliteal  Monophasic                 66            Posterior Tibial  Monophasic                               Dorsalis Pedis  Flatlined                                Digit                             0.43          INGA                                           TBI                       LEFT  Waveform        Systolic BPs (mmHg)                             153           Brachial                                            Common Femoral                                           Popliteal                                           Posterior Tibial                                           Dorsalis Pedis                                           Digit                                           INGA                                           TBI  Findings  Right.  There is evidence of severe arterial disease demonstrated (INGA is < .5) on  the right.  Doppler waveforms of the common femoral, popliteal, and posterior tibial  arteries are of low amplitude and monophasic.  Doppler waveforms of the dorsalis pedis artery are of severely low  amplitude that a index is not obtained.  An arterial duplex of the right leg was performed in accordance with lower  extremity arterial evaluation protocol - see separate report.  Amanda Cohen  (Electronically Signed)  Final Date:      28 September 2023                   12:22    DX-CHEST-PORTABLE (1 VIEW)    Result Date: 9/26/2023 9/26/2023 9:00 AM HISTORY/REASON FOR EXAM:  Sepsis; sepsis TECHNIQUE/EXAM DESCRIPTION AND NUMBER OF VIEWS: Single portable view of the chest. COMPARISON: 9/19/2020 FINDINGS: Old RIGHT rib fractures and old LEFT rib fractures are noted. HEART: Stable size. LUNGS: Lung volumes are low. There is LEFT basilar opacity. PLEURA: There is blunting of the LEFT costophrenic sulcus.     1.  LEFT basilar atelectasis or pneumonia 2.  Trace LEFT pleural effusion or pleural scar      Micro:  Results       Procedure Component Value Units Date/Time    BLOOD CULTURE [[id number]]  (Abnormal)  (Susceptibility) Collected: 09/26/23 0937    Order Status: Completed Specimen: Blood from Peripheral Updated: 09/29/23 1625     Significant Indicator POS     Source BLD     Site PERIPHERAL     Culture Result Growth detected by Bactec instrument. 09/27/2023  09:54      Gram-positive cocci  Vagococcus carniphilus  Organism identified by MALDI-TOF research use only  "library.      Narrative:      CALL  [place]  61 tel. [phone],  CALLED  61 tel. [phone] 09/27/2023, 10:08, RESULTS VOLTED TO: Rosalva Arthur, Pharmacy  Per Hospital Policy: Only change Specimen Src: to \"Line\" if  specified by physician order.  Release to patient->Immediate  Left AC    Susceptibility       Gram-positive cocci (2)       Antibiotic Interpretation Microscan   Method Status    Ciprofloxacin Sensitive <=1 mcg/mL MEENAKSHI Final    Daptomycin Sensitive <=0.5 mcg/mL MEENAKSHI Final    Erythromycin Sensitive <=0.25 mcg/mL MEENAKSHI Final    Ampicillin Sensitive <=2 mcg/mL MEENAKSHI Final    Vancomycin Sensitive 4 mcg/mL MEENAKSHI Final    Penicillin Sensitive 0.06 mcg/mL MEENAKSHI Final    Rifampin Sensitive <=1 mcg/mL MEENAKSHI Final    Tetracycline Sensitive <=4 mcg/mL MEENAKSHI Final                       MRSA By PCR (Amp) [[id number]] Collected: 09/29/23 0926    Order Status: Completed Specimen: Respirate from Nares Updated: 09/29/23 1136     MRSA by PCR Negative    Narrative:      Collected By: [id number] JB CARD  Release to patient->Immediate    BLOOD CULTURE [[id number]]  (Abnormal)  (Susceptibility) Collected: 09/26/23 0846    Order Status: Completed Specimen: Blood from Peripheral Updated: 09/29/23 1130     Significant Indicator POS     Source BLD     Site PERIPHERAL     Culture Result Growth detected by Bactec instrument. 09/26/2023 23:29      Morganella morganii    Narrative:      CALL  [place]  61 tel. [phone],  CALLED  61 tel. [phone] 09/27/2023, 10:08, RESULTS VOLTED TO: Rosalva Arthur, [place]  Per Hospital Policy: Only change Specimen Src: to \"Line\" if  specified by physician order.  Release to patient->Immediate  No site indicated    Susceptibility       Morganella morganii (1)       Antibiotic Interpretation Microscan   Method Status    Ampicillin Resistant >16 mcg/mL MEENAKSHI Final    Amoxicillin/CA Resistant >16/8 mcg/mL MEENAKSHI Final    Ceftriaxone Sensitive <=1 mcg/mL MEENAKSHI Final    Cefazolin Resistant >16 mcg/mL MEENAKSHI Final    " Ciprofloxacin Sensitive <=0.25 mcg/mL MEENAKSHI Final    Cefepime Sensitive <=2 mcg/mL MEENAKSHI Final    Cefuroxime Resistant >16 mcg/mL MEENAKSHI Final    Ertapenem Sensitive <=0.5 mcg/mL MEENAKSHI Final    Ampicillin/sulbactam Resistant >16/8 mcg/mL MEENAKSHI Final    Tobramycin Sensitive <=2 mcg/mL MEENAKSHI Final    Gentamicin Sensitive <=2 mcg/mL MEENAKSHI Final    Levofloxacin Sensitive <=0.5 mcg/mL MEENAKSHI Final    Minocycline Sensitive <=4 mcg/mL MEENAKSHI Final    Moxifloxacin Sensitive <=2 mcg/mL MEENAKSHI Final    Pip/Tazobactam Sensitive <=8 mcg/mL MEENAKSHI Final    Trimeth/Sulfa Sensitive <=0.5/9.5 mcg/mL MEENAKSHI Final    Tigecycline Resistant <=2 mcg/mL MEENAKSHI Final                       URINE CULTURE(NEW) [[id number]]  (Abnormal)  (Susceptibility) Collected: 09/26/23 0901    Order Status: Completed Specimen: Urine Updated: 09/28/23 1714     Significant Indicator POS     Source UR     Site -     Culture Result -      Staphylococcus epidermidis  >100,000 cfu/mL      Narrative:      Release to patient->Immediate  Indication for culture:->Emergency Room Patient  Indication for culture:->Emergency Room Patient    Susceptibility       Staphylococcus epidermidis (1)       Antibiotic Interpretation Microscan   Method Status    Cefazolin Sensitive <=8 mcg/mL MEENAKSHI Final    Cefepime Sensitive <=4 mcg/mL MEENAKSHI Final    Daptomycin Sensitive <=0.5 mcg/mL MEENAKSHI Final    Nitrofurantoin Sensitive <=32 mcg/mL MEENAKSHI Final    Ampicillin/sulbactam Sensitive <=8/4 mcg/mL MEENAKSHI Final    Pip/Tazobactam Sensitive <=8 mcg/mL MEENAKSHI Final    Trimeth/Sulfa Resistant >2/38 mcg/mL MEENAKSHI Final    Tetracycline Sensitive <=4 mcg/mL MEENAKSHI Final                       BLOOD CULTURE [[id number]] Collected: 09/27/23 1734    Order Status: Completed Specimen: Blood from Peripheral Updated: 09/28/23 0916     Significant Indicator NEG     Source BLD     Site PERIPHERAL     Culture Result No Growth  Note: Blood cultures are incubated for 5 days and  are monitored continuously.Positive blood cultures  are called to the RN and  "reported as soon as  they are identified.      Narrative:      Contact  Per Hospital Policy: Only change Specimen Src: to \"Line\" if  specified by physician order.  Release to patient->Immediate  Left Hand    BLOOD CULTURE [[id number]] Collected: 09/27/23 1734    Order Status: Completed Specimen: Blood from Peripheral Updated: 09/28/23 0916     Significant Indicator NEG     Source BLD     Site PERIPHERAL     Culture Result No Growth  Note: Blood cultures are incubated for 5 days and  are monitored continuously.Positive blood cultures  are called to the RN and reported as soon as  they are identified.      Narrative:      Contact  Per Hospital Policy: Only change Specimen Src: to \"Line\" if  specified by physician order.  Release to patient->Immediate  Left AC    Parasite, Gross Identification [[id number]]  (Abnormal) Collected: 09/27/23 1405    Order Status: Completed Specimen: Other Updated: 09/27/23 1424     Significant Indicator POS     Source OTHER     Site -     Parasite, Gross Identification Human parasite identified.     Parasite, Gross Identification Fly larvae (myiasis causing)    Narrative:      CALL  [place]  RST2 tel. ,  CALLED  RST2 tel.  09/27/2023, 14:24, RB PERF. RESULTS CALLED TO:[id number] RN    URINALYSIS [[id number]]  (Abnormal) Collected: 09/26/23 0901    Order Status: Completed Specimen: Urine Updated: 09/26/23 0951     Color DK Yellow     Character Clear     Specific Gravity 1.021     Ph 5.5     Glucose Negative mg/dL      Ketones Trace mg/dL      Protein Negative mg/dL      Bilirubin Small     Urobilinogen, Urine 2.0     Nitrite Positive     Leukocyte Esterase Trace     Occult Blood Negative     Micro Urine Req Microscopic    Narrative:      Release to patient->Immediate  Indication for culture:->Emergency Room Patient            Assessment:  58-year-old male patient with severe peripheral arterial disease, homelessness, chronic bilateral lower extremity wounds, presented with ischemic gangrene of the " left lower extremity with maggots and superimposed bacterial infection.  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  1 blood culture growing Unasyn resistant Morganella, and the other growing pansensitive Vagococcus from 9/26.  Repeat blood cultures x2 from 9/27 no growth till date.    Pertinent Diagnoses:  Ischemic gangrene left lower extremity with superimposed polymicrobial infection  Myiasis  Severe peripheral arterial disease   Homelessness  Chronic bilateral lower extremity wounds    Plan:  -Surgical source control achieved.  Noted vascular surgery plans for revascularization to ensure healing of the BKA stump.  Will complete an antibiotic course for bacteremia.  Okay to continue IV Zosyn 4.5 g every 8 hours while inpatient  -Stop date is 10/6/2023.  If being discharged prior to this, can complete course with p.o. ciprofloxacin 750 mg twice daily + p.o. Flagyl 500 mg twice daily    Disposition: No ID specific disposition needs  Need for PICC line: No, can use orals as above    Discussed with internal medicine, Dr. Slaughter.  ID will follow.  This illness poses a threat of further loss of limb

## 2023-09-30 NOTE — PROGRESS NOTES
"18\" knee immoblizer dropped off at bedside at chair next to bed. 16\" not found, but if it is found 18\" will be taken back. Pt cannot wear brace at this time due to inability to straighten knee. Will follow up if needed by PT or Wound Care.  "

## 2023-09-30 NOTE — PROGRESS NOTES
"Agree, TC          Orthopaedic Progress Note    Interval changes:  Patient doing well    L BKA dressings are CDI  Cleared for DC to SNF by ortho pending medicine clearance    ROS - Patient denies any new issues.  Pain well controlled.    /81   Pulse 90   Temp 36.3 °C (97.4 °F) (Temporal)   Resp 16   Ht 1.803 m (5' 11\")   Wt 61 kg (134 lb 7.7 oz)   SpO2 96%     Patient seen and examined  No acute distress  Breathing non labored  RRR  LLE dressings CDI, knee bent at 90 deg, refusing to straighten leg.     Recent Labs     09/28/23  0621 09/29/23  0318 09/30/23  0549   WBC 10.2 11.3* 10.4   RBC 3.31* 3.37* 3.11*   HEMOGLOBIN 8.9* 9.4* 8.6*   HEMATOCRIT 28.8* 28.7* 27.2*   MCV 87.0 85.2 87.5   MCH 26.9* 27.9 27.7   MCHC 30.9* 32.8 31.6*   RDW 44.3 43.9 45.9   PLATELETCT 638* 697* 639*   MPV 8.9* 8.7* 8.6*       Active Hospital Problems    Diagnosis     Renal cyst [N28.1]      Priority: Low    PAD (peripheral artery disease) (HCC) [I73.9]     Bacteremia [R78.81]     Anemia [D64.9]     Gangrene of left foot (HCC) [I96]     Leukocytosis [D72.829]     Elevated liver enzymes [R74.8]     Hyponatremia [E87.1]        Assessment/Plan:  Patient doing well    L BKA dressings are CDI  Cleared for DC to SNF by ortho pending medicine clearance  POD#3 S/P Left below-knee amputation  Wt bearing status - NWB LLE  Wound care/Drains - Dressings to be changed every other day by nursing. Or PRN for saturation starting POD#2  Future Procedures - none planned   Lovenox: Start 9/28, Duration-until ambulatory > 150'  Sutures/Staples out- 14-21 days post operatively. Removal will completed by ortho mid levels only.  PT/OT-initiated  Antibiotics: zosyn 4.5 g IV Q8  DVT Prophylaxis- TEDS/SCDs/Foot pumps  Holguin-not needed per ortho  Case Coordination for Discharge Planning - Disposition per therapy recs.    "

## 2023-10-01 LAB
ANION GAP SERPL CALC-SCNC: 7 MMOL/L (ref 7–16)
BUN SERPL-MCNC: 9 MG/DL (ref 8–22)
CALCIUM SERPL-MCNC: 8.2 MG/DL (ref 8.5–10.5)
CHLORIDE SERPL-SCNC: 94 MMOL/L (ref 96–112)
CO2 SERPL-SCNC: 27 MMOL/L (ref 20–33)
CREAT SERPL-MCNC: 0.52 MG/DL (ref 0.5–1.4)
ERYTHROCYTE [DISTWIDTH] IN BLOOD BY AUTOMATED COUNT: 48.3 FL (ref 35.9–50)
ERYTHROCYTE [DISTWIDTH] IN BLOOD BY AUTOMATED COUNT: 48.9 FL (ref 35.9–50)
GFR SERPLBLD CREATININE-BSD FMLA CKD-EPI: 116 ML/MIN/1.73 M 2
GLUCOSE SERPL-MCNC: 135 MG/DL (ref 65–99)
HCT VFR BLD AUTO: 21.8 % (ref 42–52)
HCT VFR BLD AUTO: 27.9 % (ref 42–52)
HGB BLD-MCNC: 6.7 G/DL (ref 14–18)
HGB BLD-MCNC: 9 G/DL (ref 14–18)
MCH RBC QN AUTO: 27.6 PG (ref 27–33)
MCH RBC QN AUTO: 29 PG (ref 27–33)
MCHC RBC AUTO-ENTMCNC: 30.7 G/DL (ref 32.3–36.5)
MCHC RBC AUTO-ENTMCNC: 32.3 G/DL (ref 32.3–36.5)
MCV RBC AUTO: 89.7 FL (ref 81.4–97.8)
MCV RBC AUTO: 90 FL (ref 81.4–97.8)
PLATELET # BLD AUTO: 510 K/UL (ref 164–446)
PLATELET # BLD AUTO: 516 K/UL (ref 164–446)
PMV BLD AUTO: 8.5 FL (ref 9–12.9)
PMV BLD AUTO: 8.7 FL (ref 9–12.9)
POTASSIUM SERPL-SCNC: 4.9 MMOL/L (ref 3.6–5.5)
RBC # BLD AUTO: 2.43 M/UL (ref 4.7–6.1)
RBC # BLD AUTO: 3.1 M/UL (ref 4.7–6.1)
SODIUM SERPL-SCNC: 128 MMOL/L (ref 135–145)
WBC # BLD AUTO: 11.3 K/UL (ref 4.8–10.8)
WBC # BLD AUTO: 12.9 K/UL (ref 4.8–10.8)

## 2023-10-01 PROCEDURE — P9016 RBC LEUKOCYTES REDUCED: HCPCS

## 2023-10-01 PROCEDURE — A9270 NON-COVERED ITEM OR SERVICE: HCPCS | Performed by: INTERNAL MEDICINE

## 2023-10-01 PROCEDURE — 700111 HCHG RX REV CODE 636 W/ 250 OVERRIDE (IP): Performed by: SURGERY

## 2023-10-01 PROCEDURE — 85027 COMPLETE CBC AUTOMATED: CPT

## 2023-10-01 PROCEDURE — 700111 HCHG RX REV CODE 636 W/ 250 OVERRIDE (IP): Mod: JZ | Performed by: INTERNAL MEDICINE

## 2023-10-01 PROCEDURE — 770006 HCHG ROOM/CARE - MED/SURG/GYN SEMI*

## 2023-10-01 PROCEDURE — 80048 BASIC METABOLIC PNL TOTAL CA: CPT

## 2023-10-01 PROCEDURE — 700105 HCHG RX REV CODE 258: Performed by: INTERNAL MEDICINE

## 2023-10-01 PROCEDURE — 36430 TRANSFUSION BLD/BLD COMPNT: CPT

## 2023-10-01 PROCEDURE — 700102 HCHG RX REV CODE 250 W/ 637 OVERRIDE(OP): Performed by: INTERNAL MEDICINE

## 2023-10-01 PROCEDURE — 99233 SBSQ HOSP IP/OBS HIGH 50: CPT | Performed by: INTERNAL MEDICINE

## 2023-10-01 PROCEDURE — 700105 HCHG RX REV CODE 258: Performed by: SURGERY

## 2023-10-01 PROCEDURE — 700102 HCHG RX REV CODE 250 W/ 637 OVERRIDE(OP): Performed by: SURGERY

## 2023-10-01 PROCEDURE — 36415 COLL VENOUS BLD VENIPUNCTURE: CPT

## 2023-10-01 PROCEDURE — 86923 COMPATIBILITY TEST ELECTRIC: CPT

## 2023-10-01 PROCEDURE — A9270 NON-COVERED ITEM OR SERVICE: HCPCS | Performed by: SURGERY

## 2023-10-01 RX ORDER — CLONIDINE HYDROCHLORIDE 0.1 MG/1
0.2 TABLET ORAL
Status: DISCONTINUED | OUTPATIENT
Start: 2023-10-01 | End: 2023-10-26

## 2023-10-01 RX ORDER — DIPHENHYDRAMINE HYDROCHLORIDE 50 MG/ML
25 INJECTION INTRAMUSCULAR; INTRAVENOUS EVERY 6 HOURS PRN
Status: DISCONTINUED | OUTPATIENT
Start: 2023-10-01 | End: 2023-10-11

## 2023-10-01 RX ORDER — CLONIDINE HYDROCHLORIDE 0.1 MG/1
0.1 TABLET ORAL
Status: DISCONTINUED | OUTPATIENT
Start: 2023-10-01 | End: 2023-10-26

## 2023-10-01 RX ORDER — OXYCODONE HYDROCHLORIDE 10 MG/1
10 TABLET ORAL
Status: DISCONTINUED | OUTPATIENT
Start: 2023-10-01 | End: 2023-10-01

## 2023-10-01 RX ORDER — SODIUM CHLORIDE 9 MG/ML
INJECTION, SOLUTION INTRAVENOUS CONTINUOUS
Status: ACTIVE | OUTPATIENT
Start: 2023-10-01 | End: 2023-10-01

## 2023-10-01 RX ORDER — ACETAMINOPHEN 325 MG/1
650 TABLET ORAL EVERY 6 HOURS PRN
Status: DISCONTINUED | OUTPATIENT
Start: 2023-10-06 | End: 2023-10-07

## 2023-10-01 RX ORDER — GABAPENTIN 300 MG/1
300 CAPSULE ORAL 3 TIMES DAILY
Status: DISCONTINUED | OUTPATIENT
Start: 2023-10-01 | End: 2023-10-18

## 2023-10-01 RX ORDER — HYDROMORPHONE HYDROCHLORIDE 1 MG/ML
1 INJECTION, SOLUTION INTRAMUSCULAR; INTRAVENOUS; SUBCUTANEOUS
Status: DISCONTINUED | OUTPATIENT
Start: 2023-10-01 | End: 2023-10-01

## 2023-10-01 RX ORDER — OXYCODONE HYDROCHLORIDE 10 MG/1
20 TABLET ORAL
Status: DISCONTINUED | OUTPATIENT
Start: 2023-10-01 | End: 2023-10-01

## 2023-10-01 RX ORDER — SCOLOPAMINE TRANSDERMAL SYSTEM 1 MG/1
1 PATCH, EXTENDED RELEASE TRANSDERMAL
Status: DISCONTINUED | OUTPATIENT
Start: 2023-10-01 | End: 2023-10-26

## 2023-10-01 RX ORDER — HYDROMORPHONE HYDROCHLORIDE 2 MG/ML
1.5 INJECTION, SOLUTION INTRAMUSCULAR; INTRAVENOUS; SUBCUTANEOUS
Status: DISCONTINUED | OUTPATIENT
Start: 2023-10-01 | End: 2023-10-07

## 2023-10-01 RX ORDER — OXYCODONE HYDROCHLORIDE 5 MG/1
5 TABLET ORAL
Status: DISCONTINUED | OUTPATIENT
Start: 2023-10-01 | End: 2023-10-01

## 2023-10-01 RX ORDER — HYDRALAZINE HYDROCHLORIDE 20 MG/ML
10 INJECTION INTRAMUSCULAR; INTRAVENOUS EVERY 4 HOURS PRN
Status: DISCONTINUED | OUTPATIENT
Start: 2023-10-01 | End: 2023-10-26

## 2023-10-01 RX ORDER — DEXAMETHASONE SODIUM PHOSPHATE 4 MG/ML
4 INJECTION, SOLUTION INTRA-ARTICULAR; INTRALESIONAL; INTRAMUSCULAR; INTRAVENOUS; SOFT TISSUE
Status: DISCONTINUED | OUTPATIENT
Start: 2023-10-01 | End: 2023-10-11

## 2023-10-01 RX ORDER — ONDANSETRON 2 MG/ML
4 INJECTION INTRAMUSCULAR; INTRAVENOUS EVERY 4 HOURS PRN
Status: DISCONTINUED | OUTPATIENT
Start: 2023-10-01 | End: 2023-11-03 | Stop reason: HOSPADM

## 2023-10-01 RX ORDER — OXYCODONE HYDROCHLORIDE 10 MG/1
20 TABLET ORAL
Status: DISCONTINUED | OUTPATIENT
Start: 2023-10-01 | End: 2023-10-07

## 2023-10-01 RX ORDER — ACETAMINOPHEN 325 MG/1
650 TABLET ORAL EVERY 6 HOURS
Status: DISPENSED | OUTPATIENT
Start: 2023-10-01 | End: 2023-10-05

## 2023-10-01 RX ORDER — HYDROMORPHONE HYDROCHLORIDE 1 MG/ML
0.5 INJECTION, SOLUTION INTRAMUSCULAR; INTRAVENOUS; SUBCUTANEOUS
Status: DISCONTINUED | OUTPATIENT
Start: 2023-10-01 | End: 2023-10-01

## 2023-10-01 RX ORDER — HEPARIN SODIUM 5000 [USP'U]/ML
5000 INJECTION, SOLUTION INTRAVENOUS; SUBCUTANEOUS EVERY 8 HOURS
Status: DISCONTINUED | OUTPATIENT
Start: 2023-10-02 | End: 2023-11-03 | Stop reason: HOSPADM

## 2023-10-01 RX ORDER — LABETALOL HYDROCHLORIDE 5 MG/ML
10 INJECTION, SOLUTION INTRAVENOUS EVERY 4 HOURS PRN
Status: DISCONTINUED | OUTPATIENT
Start: 2023-10-01 | End: 2023-11-03 | Stop reason: HOSPADM

## 2023-10-01 RX ORDER — OXYCODONE HYDROCHLORIDE 15 MG/1
15 TABLET ORAL
Status: DISCONTINUED | OUTPATIENT
Start: 2023-10-01 | End: 2023-10-07

## 2023-10-01 RX ORDER — HALOPERIDOL 5 MG/ML
1 INJECTION INTRAMUSCULAR EVERY 6 HOURS PRN
Status: DISCONTINUED | OUTPATIENT
Start: 2023-10-01 | End: 2023-10-26

## 2023-10-01 RX ADMIN — HYDROMORPHONE HYDROCHLORIDE 1.5 MG: 2 INJECTION, SOLUTION INTRAMUSCULAR; INTRAVENOUS; SUBCUTANEOUS at 14:06

## 2023-10-01 RX ADMIN — SODIUM CHLORIDE: 9 INJECTION, SOLUTION INTRAVENOUS at 11:35

## 2023-10-01 RX ADMIN — HYDROMORPHONE HYDROCHLORIDE 1.5 MG: 2 INJECTION, SOLUTION INTRAMUSCULAR; INTRAVENOUS; SUBCUTANEOUS at 18:02

## 2023-10-01 RX ADMIN — GABAPENTIN 300 MG: 300 CAPSULE ORAL at 11:48

## 2023-10-01 RX ADMIN — GABAPENTIN 300 MG: 300 CAPSULE ORAL at 18:02

## 2023-10-01 RX ADMIN — ASPIRIN 81 MG: 81 TABLET, COATED ORAL at 05:13

## 2023-10-01 RX ADMIN — HYDROMORPHONE HYDROCHLORIDE 1 MG: 1 INJECTION, SOLUTION INTRAMUSCULAR; INTRAVENOUS; SUBCUTANEOUS at 02:31

## 2023-10-01 RX ADMIN — OXYCODONE HYDROCHLORIDE 20 MG: 10 TABLET ORAL at 07:34

## 2023-10-01 RX ADMIN — CYCLOBENZAPRINE 10 MG: 10 TABLET, FILM COATED ORAL at 22:18

## 2023-10-01 RX ADMIN — OXYCODONE HYDROCHLORIDE 20 MG: 10 TABLET ORAL at 11:48

## 2023-10-01 RX ADMIN — OXYCODONE HYDROCHLORIDE 20 MG: 10 TABLET ORAL at 16:58

## 2023-10-01 RX ADMIN — PIPERACILLIN AND TAZOBACTAM 4.5 G: 4; .5 INJECTION, POWDER, FOR SOLUTION INTRAVENOUS at 14:30

## 2023-10-01 RX ADMIN — PIPERACILLIN AND TAZOBACTAM 4.5 G: 4; .5 INJECTION, POWDER, FOR SOLUTION INTRAVENOUS at 20:33

## 2023-10-01 RX ADMIN — NICOTINE TRANSDERMAL SYSTEM 21 MG: 21 PATCH, EXTENDED RELEASE TRANSDERMAL at 05:11

## 2023-10-01 RX ADMIN — DOCUSATE SODIUM 50 MG AND SENNOSIDES 8.6 MG 2 TABLET: 8.6; 5 TABLET, FILM COATED ORAL at 18:01

## 2023-10-01 RX ADMIN — SODIUM CHLORIDE: 9 INJECTION, SOLUTION INTRAVENOUS at 02:31

## 2023-10-01 RX ADMIN — ACETAMINOPHEN 650 MG: 325 TABLET, FILM COATED ORAL at 18:01

## 2023-10-01 RX ADMIN — OXYCODONE HYDROCHLORIDE 15 MG: 15 TABLET ORAL at 00:49

## 2023-10-01 RX ADMIN — OXYCODONE HYDROCHLORIDE 20 MG: 10 TABLET ORAL at 20:38

## 2023-10-01 RX ADMIN — GABAPENTIN 100 MG: 100 CAPSULE ORAL at 05:12

## 2023-10-01 RX ADMIN — ACETAMINOPHEN 650 MG: 325 TABLET, FILM COATED ORAL at 11:49

## 2023-10-01 RX ADMIN — CLOPIDOGREL BISULFATE 75 MG: 75 TABLET ORAL at 05:12

## 2023-10-01 RX ADMIN — PIPERACILLIN AND TAZOBACTAM 4.5 G: 4; .5 INJECTION, POWDER, FOR SOLUTION INTRAVENOUS at 05:06

## 2023-10-01 ASSESSMENT — PAIN SCALES - GENERAL: PAIN_LEVEL: 6

## 2023-10-01 ASSESSMENT — ENCOUNTER SYMPTOMS
DIZZINESS: 0
CHILLS: 0
NAUSEA: 0
ABDOMINAL PAIN: 0
SHORTNESS OF BREATH: 0
DEPRESSION: 0
MYALGIAS: 1
VOMITING: 0
FEVER: 0
DIARRHEA: 0
HEADACHES: 0

## 2023-10-01 ASSESSMENT — PAIN DESCRIPTION - PAIN TYPE
TYPE: ACUTE PAIN;SURGICAL PAIN
TYPE: SURGICAL PAIN
TYPE: ACUTE PAIN;SURGICAL PAIN
TYPE: CHRONIC PAIN

## 2023-10-01 NOTE — PROGRESS NOTES
[facility] Medicine Daily Progress Note    Date of Service  10/1/2023    Chief Complaint  Dillon Centeno is a 58 y.o. male admitted 9/26/2023 with bilateral foot wounds.    Hospital Course  58 y.o. male with past medical history of hypertension who presented to the hospital on 9/26/2023 with complaint of bilateral lower extremity wound.  Patient reported that he found to a significant wound on his left lower extremity especially involving his left foot that for started 2 weeks ago.  He reported that he was wearing a shoe that was to size smaller than his actual size and he developed this wound. He does not take any medications on regular basis.  He smokes 1 pack a day and drinks 1-2 drinks of alcohol daily and sometimes he does not drink alcohol at all.  He denies any history of symptoms of alcohol withdrawal. In the ER patient found to have gangrene on his left foot most likely due to vascular disease.  Vascular surgery and orthopedic surgery consulted.    Interval Problem Update  9/27 Vascular surgery reported that severe gangrene of the left foot was unsalvageable and he will require a amputation, right lower extremity has some scattered ulcerations, arterial ultrasound ordered to evaluate.  Per orthopedic surgery plan for amputation of left leg today.  Vital signs stable.  Leukocytosis resolved.  Hepatitis panel negative.  Hemoglobin 8.3, will check iron panel, ferritin, B12. Sodium still low 126, check TSH, urine Na and Urine Osm. Blood culture with 1/2 with morganella morganii and the other with possible strep. Echo pending. Repeat blood cultures ordered.  Patient's main complaint is pain in his bilateral feet.    9/28 Vitals stable on room air.  Notified by ID pharmacy that blood cultures from 9/26 with Morganella and Enterococcus.  Urine culture with staph.  I have discussed the case with infectious disease.  Na with mild improvement 129, urine studies still pending. Repeat blood cultures pending. Echo  and arterial US pending.  Patient complaining of severe pain in his leg and stump.  He has been requiring multiple doses of oral and IV opiates, continue to monitor respiratory status closely.  I have added gabapentin as he is complaining of nerve pain as well as Flexeril as needed.    9/29 WBC 11.3, sodium 126.  Arterial ultrasound showed severe PAD of both legs.  Echo showed EF 60% with normal diastolic function.  Discussed ultrasound with vascular surgery, CTA showed occlusion of the right common iliac artery through the distal SFA and diffuse disease.  Vascular surgery planning on taking patient for revascularization.  CT also showed a septated lobulated left upper pole renal cystic mass lesion, MRI pending to further characterize this. Blood culture with Vagococcus carniphilus and morganella. Repeat blood cultures negative thus far. Discussed with ID, continue zosyn.  Discussed with LPS they are working on getting patient a brace as he does not like to keep his leg extended and they are worried about contractures.    9/30 WBC wnl 10.4. Na improving 131, urine studies consistent with SIADH. Renal function stable. Plan for OR today with vascular.  Patient complaining of pain in his stump.  Repeat blood cultures negative.    10/1 vital signs stable.  Hb 6.8 likely post op anemia, s/p 1 unit pRBC. Sodium 128.  HIV nonreactive. POD 1 bilateral common femoral artery endarterectomy and profundoplasty with saphenous vein angioplasty, and placement of a stent in the right external iliac artery. Discussed with vascular, Prevena placed to be in place for 5-7 days, ASA indefinitely, and plavix for a month. Repeat blood cultures negative. Discussed with ID, needs abx 10/6/23, can change to orals if discharges. Patient having uncontrolled pain, increase gabapentin 300 mg TID and prn dilaudid dosing. Respiratory status stable.     I have discussed this patient's plan of care and discharge plan at IDT rounds today with Case  Management, Nursing, Nursing leadership, and other members of the IDT team.    Consultants/Specialty  orthopedics and vascular surgery  Infectious disease     Code Status  Full Code    Disposition  The patient is not medically cleared for discharge to home or a post-acute facility.  Anticipate discharge to: skilled nursing facility    I have placed the appropriate orders for post-discharge needs.    Review of Systems  Review of Systems   Constitutional:  Positive for malaise/fatigue. Negative for chills and fever.   Respiratory:  Negative for shortness of breath.    Cardiovascular:  Negative for chest pain.   Gastrointestinal:  Negative for abdominal pain, nausea and vomiting.   Genitourinary:  Negative for dysuria.   Musculoskeletal:  Positive for joint pain and myalgias.   Skin:  Negative for rash.   Neurological:  Negative for dizziness and headaches.   Psychiatric/Behavioral:  Negative for depression.    All other systems reviewed and are negative.       Physical Exam  Temp:  [36.2 °C (97.1 °F)-36.7 °C (98 °F)] 36.2 °C (97.2 °F)  Pulse:  [] 82  Resp:  [13-20] 18  BP: ()/(50-79) 109/77  SpO2:  [91 %-100 %] 98 %    Physical Exam  Vitals and nursing note reviewed.   Constitutional:       General: He is not in acute distress.     Appearance: He is ill-appearing.   HENT:      Head: Normocephalic and atraumatic.   Eyes:      Conjunctiva/sclera: Conjunctivae normal.   Cardiovascular:      Rate and Rhythm: Normal rate and regular rhythm.      Pulses: Normal pulses.   Pulmonary:      Effort: Pulmonary effort is normal. No respiratory distress.      Breath sounds: Normal breath sounds. No wheezing.   Abdominal:      General: Abdomen is flat. There is no distension.      Palpations: Abdomen is soft.      Tenderness: There is no abdominal tenderness.   Musculoskeletal:         General: Tenderness, deformity and signs of injury present.      Cervical back: Normal range of motion and neck supple.      Right lower  leg: No edema.      Comments: Left BKA    Skin:     General: Skin is warm and dry.      Findings: Lesion present.      Comments: Right foot with multiple scabbed lesions  (Picture below)   Neurological:      General: No focal deficit present.      Mental Status: He is alert and oriented to person, place, and time.      Cranial Nerves: No cranial nerve deficit.      Motor: Weakness present.   Psychiatric:         Mood and Affect: Mood normal.         Behavior: Behavior normal.          Right foot       Fluids    Intake/Output Summary (Last 24 hours) at 10/1/2023 1256  Last data filed at 10/1/2023 0615  Gross per 24 hour   Intake 2300 ml   Output 1600 ml   Net 700 ml       Laboratory  Recent Labs     09/29/23 0318 09/30/23  0549 10/01/23  0554   WBC 11.3* 10.4 12.9*   RBC 3.37* 3.11* 2.43*   HEMOGLOBIN 9.4* 8.6* 6.7*   HEMATOCRIT 28.7* 27.2* 21.8*   MCV 85.2 87.5 89.7   MCH 27.9 27.7 27.6   MCHC 32.8 31.6* 30.7*   RDW 43.9 45.9 48.3   PLATELETCT 697* 639* 510*   MPV 8.7* 8.6* 8.5*     Recent Labs     09/29/23 0318 09/30/23  0549 10/01/23  0554   SODIUM 126* 131* 128*   POTASSIUM 4.5 4.3 4.9   CHLORIDE 89* 95* 94*   CO2 27 27 27   GLUCOSE 91 120* 135*   BUN 9 6* 9   CREATININE 0.59 0.53 0.52   CALCIUM 8.3* 9.1 8.2*                   Imaging  CT-CTA AORTA-RO WITH & W/O-POST PROCESS   Final Result         CTA AORTA      1.  Septated lobulated left upper pole renal cystic mass lesion, recommend follow-up MRI of the kidneys for further characterization.   2.  Atherosclerosis and atherosclerotic coronary artery disease.   3.  Fat-containing right inguinal hernia      CTA LOWER EXTREMITIES      1.  Occlusion of the right common iliac artery through the distal superficial femoral artery   2.  Distal right peroneal artery occlusion with 2-vessel runoff the anterior and posterior tibial arteries at the right ankle.   3.  Occlusion of the left superficial femoral artery at the bifurcation extending through its length to the  popliteal artery.   4.  Occlusion of the proximal left profunda femoris just distal to the bifurcation which reconstitutes.   5.  Soft tissue gas at the stump of left below-the-knee amputation, within expected limits for recent postop changes.      3D angiographic/MIP images of the vasculature confirm the vascular findings as described above.      These findings were discussed with the patient's clinician, Keith Navarro, on 9/29/2023 8:06 AM.      EC-ECHOCARDIOGRAM COMPLETE W/O CONT   Final Result      US-AORTA/ILIACS DUPLEX COMPLETE   Final Result      US-EXTREMITY ARTERY LOWER UNILAT RIGHT   Final Result      US-INGA SINGLE LEVEL UNILAT RIGHT   Final Result      DX-CHEST-PORTABLE (1 VIEW)   Final Result      1.  LEFT basilar atelectasis or pneumonia   2.  Trace LEFT pleural effusion or pleural scar      MR-ABDOMEN-WITH & W/O    (Results Pending)   DX-PORTABLE FLUORO > 1 HOUR    (Results Pending)        Assessment/Plan  * Gangrene of left foot (HCC)- (present on admission)  Assessment & Plan  Patient found to have significant gangrene on his left foot that may need surgical intervention.  Vascular surgery consulted, CTA with diffused PAD, plan for revascularization  Pain medication with oxycodone and IV Dilaudid.  Monitor for adverse effect of narcotic pain medications.  Gabapentin  Vancomycin dose adjustment as per vancomycin trough level and monitor for vancomycin toxicity.  Orthopedic surgery consulted, s/p Left BKA on 9/27  ID consulted   Continue IV abx     PAD (peripheral artery disease) (HCC)- (present on admission)  Assessment & Plan  Severe bilateral PAD  Vascular consulted  S/p revascularization on 9/30  Aspirin for life  Plavix for 1 month    Anemia- (present on admission)  Assessment & Plan  Iron studies consistent with chronic disease   Now with postop anemia  s/p 1 unit PRBC transfusion  B12 wnl   Trend CBC, transfuse Hb<7     Bacteremia- (present on admission)  Assessment & Plan  1/2 bcx with  morganella   1/2 bcx with enterococcus   Repeat blood cultures ordered   Continue current abx   Echo no vegetations  Infectious disease consulted   -anticipate 2 weeks of IV abx from date of negative culture    Hyponatremia  Assessment & Plan  Patient found to have hyponatremia most likely due to dehydration.  Continue IV fluids  TSH wnl   check urine osmolality, urine sodium  Daily BMP    Elevated liver enzymes  Assessment & Plan  Elevated liver enzymes.  No abdominal pain.  Hepatitis panel negative    Leukocytosis  Assessment & Plan  Patient found to have leukocytosis most likely secondary to lower extremity wound.  Ordered CBC for tomorrow.  I started him on broad-spectrum antibiotics.    Renal cyst  Assessment & Plan  On CT, MRI pending to evaluate       VTE prophylaxis: SCDs, lovenox ppx    I have performed a physical exam and reviewed and updated ROS and Plan today (10/1/2023). In review of yesterday's note (9/30/2023), there are no changes except as documented above.

## 2023-10-01 NOTE — CARE PLAN
Problem: Knowledge Deficit - Standard  Goal: Patient and family/care givers will demonstrate understanding of plan of care, disease process/condition, diagnostic tests and medications  Outcome: Progressing     Problem: Pain - Standard  Goal: Alleviation of pain or a reduction in pain to the patient’s comfort goal  Outcome: Progressing     Problem: Skin Integrity  Goal: Skin integrity is maintained or improved  Outcome: Progressing     Problem: Fall Risk  Goal: Patient will remain free from falls  Outcome: Progressing   The patient is Watcher - Medium risk of patient condition declining or worsening    Shift Goals  Clinical Goals: Pain <8, dressing changes as needed  Patient Goals: comfort  Family Goals: ashlie    Progress made toward(s) clinical / shift goals:      Patient is not progressing towards the following goals:    Pt received from PACU ~0030, no distress, wv running, dressings CDI, vss, gonzales in place, HOB <20 degrees. Pt's pain treated via PRN Rx, which allowed pt to rest comfortably. Pt educated on the need to prevent contractures in LLE.

## 2023-10-01 NOTE — ANESTHESIA POSTPROCEDURE EVALUATION
Patient: Dillon Centeno    Procedure Summary     Date: 09/30/23 Room / Location: [place] [address] / SURGERY [facility]    Anesthesia Start: 1803 Anesthesia Stop: 2300    Procedures:       ENDARTERECTOMY, FEMORAL (Bilateral: Groin)      ANGIOGRAM, WITH ILIAC STENT (Right: Groin) Diagnosis: (PERIPHERAL VASCULAR DISEASE, LEFT FOOT GANGRENE)    Surgeons: Keith Navarro M.D. Responsible Provider: Nelly Nettles M.D.    Anesthesia Type: general ASA Status: 3          Final Anesthesia Type: general  Last vitals  BP   Blood Pressure: 101/72, Arterial BP: (!) 93/49    Temp   36.4 °C (97.5 °F)    Pulse   95   Resp   15    SpO2   93 %      Anesthesia Post Evaluation    Patient location during evaluation: PACU  Patient participation: complete - patient participated  Level of consciousness: awake and alert  Pain score: 6    Airway patency: patent  Anesthetic complications: no  Cardiovascular status: hemodynamically stable  Respiratory status: acceptable  Hydration status: euvolemic    PONV: none          No notable events documented.

## 2023-10-01 NOTE — PROGRESS NOTES
4 Eyes Skin Assessment Completed by MARIBEL Walters and MARIBEL Villagran.    Head Scratch  Ears WDL  Nose WDL  Mouth WDL  Neck WDL  Breast/Chest WDL  Shoulder Blades WDL  Spine WDL  (R) Arm/Elbow/Hand WDL  (L) Arm/Elbow/Hand WDL  Abdomen Scab  Groin Redness BL prevena bandages  Scrotum/Coccyx/Buttocks Non-Blanching grey discoloration of the coccyx  (R) Leg Scab  (L) Leg Incision CDI BKA dressing  (R) Heel/Foot/Toe Scab  (L) Heel/Foot/Toe BKA          Devices In Places Holguin, wound vac      Interventions In Place Sacral Mepilex and Low Air Loss Mattress    Possible Skin Injury Yes    Pictures Uploaded Into Epic Yes  Wound Consult Placed N/A  RN Wound Prevention Protocol Ordered No

## 2023-10-01 NOTE — PROGRESS NOTES
Large offloading shoe for pt's RLE fit to pt. Pegs were removed surrounding bandaged wound sites on 1st, 2nd, 4th, and 5th toes. Replacement pegs and cover tape in box if needed.

## 2023-10-01 NOTE — PROGRESS NOTES
VASCULAR SURGERY SERVICE                        Progress Note  _________________________________    OR tonight for bilateral femoral endarterectomy, bilateral lower extremity angiogram with possible stenting, possible left-to-right femoral-femoral bypass.      Keith Navarro MD  [facility]   Voalte preferred or call my office [phone]  __________________________________________________  Patient:Dillon Horowitz Taryn   MRN:[id number]

## 2023-10-01 NOTE — PROGRESS NOTES
"  VASCULAR SURGERY SERVICE  Progress Note  ___________________________________    9/30/23 right femoral endarterectomy with saphenous vein patch angioplasty and stenting of the right iliac artery which restored inline flow to the profunda (patient still has chronic right SFA occlusion), left femoral endarterectomy with saphenous vein patch angioplasty which restored inline flow to the profunda (patient still has chronic left SFA occlusion)    10/1/2023: Hemoglobin did drop to 6.7, patient hemodynamically stable, getting 1 unit of blood transfusion currently.  Pain controlled, right foot feeling better after revascularization.    Vitals  /77   Pulse 82   Temp 36.2 °C (97.2 °F) (Temporal)   Resp 18   Ht 1.803 m (5' 11\")   Wt 61 kg (134 lb 7.7 oz)   SpO2 98%   BMI 18.76 kg/m²     Exam  Extremities: Bilateral groin Prevena bandages clean dry and intact, no output, no hematoma.  Right lower extremity well-perfused with mild numbness, no pain.  Left BKA bandage clean dry and intact    Labs  WBC 11  Hgb was 6.7, went up to 9 after 1 unit PRBCs  Creatinine normal    A/P)  Doing well overall    Both lower extremities are much better perfused.  The patient should have improvement in the symptoms of numbness in the right foot.  The patient also stands a good chance of healing his left BKA incision.    Prevena bandages will stay on for 5 to 7 days    Patient to work with physical therapy as tolerated    Antithrombotic regimen will be aspirin 81 mg daily indefinitely and also Plavix 75 mg daily for a month due to the new iliac stent    No additional concerns from vascular standpoint.  Patient will likely be cleared for discharge from surgical standpoint in the next 1 to 2 days.  He will follow-up in the vascular surgery office for postoperative care      Appreciate Hospitalist services support  Vascular service following along      Keith Navarro MD  [facility]  Vo[employer]te preferred or call my " office [phone]  __________________________________________________________________  Patient:Dillon Centeno   MRN:[id number]   [facility]:[id number]

## 2023-10-01 NOTE — DIETARY
Nutrition services: Day 5 of admit.  Dillon Centeno is a 58 y.o. male with admitting DX of gangrene of left foot.     Consult received for supplements. Pt currently on a Regular diet with % of recorded meals. Pt s/p L BKA d/t severe left foot gangrene. RD to add Boost nutritional supplements BID to aid in healing.       RD to monitor per department policy

## 2023-10-01 NOTE — OR NURSING
2300.Patient arrived from OR in stable condition. Received report from OR nurse and anesthesiologist. VSS. Dressing is CDI. Doppler used to assess pedal pulses. Patient has full sensation, able to wiggle toes.     2317 Patient stated that it was too late to update his friend Deion. Will hold off on contacting him at this moment.     2345 Dr. Navarro at bedside. Verbal order to switch from Prevena to wound vac was put in.     2358 BP 88/61. Contacted Dr. Navarro. He is okay with this pressure and that he can go back to the floor.     0000 Report given to Romeo LÓPEZ    0017.Patient transported to floor via bed with transport. RN notified that pt is on the way to the floor.

## 2023-10-01 NOTE — OP REPORT
VASCULAR SURGERY SERVICE                       Operative Note  _____________________________________________________    Date: 2023    Patient: Dillon Centeno  : 1965  MRN: [id number]  _____________________________________________________      Preoperative Diagnosis:  -Peripheral arterial occlusive disease with severe left lower extremity arterial insufficiency, severe left foot gangrene and subsequent recent left BKA  -Peripheral arterial occlusive disease with severe right lower extremity arterial insufficiency and rest pain    Postoperative Diagnosis:  -Peripheral arterial occlusive disease with severe left lower extremity arterial insufficiency, severe left foot gangrene and subsequent recent left BKA  -Peripheral arterial occlusive disease with severe right lower extremity arterial insufficiency and rest pain    Procedure:  -Bilateral common femoral artery endarterectomy and profundoplasty with saphenous vein patch angioplasty (26895-25, 91062-53)  -Right lower extremity angiogram via direct sheath insertion in the right common femoral artery (33500, 10478)  -Placement of an 8 mm x 60 mm self-expanding uncovered stent in the right external iliac artery and then 7 mm post stent angioplasty ([id number])  _____________________________________________________    Surgeon:                                 Keith Navarro MD    Assistant:   [place] WADDELL    Anesthesia:                             General anesthesia plus local anesthetic    EBL:                                        250 cc    Heparin:                                  Systemically heparinized    Specimen:   None sent    Findings:   Right lower extremity: Restoration of inline flow through the profunda femoris artery.  Long segment occlusion of the right SFA with reconstitution of the popliteal artery and two-vessel runoff to the foot.  It appears that the right below-knee popliteal artery or the right anterior tibial artery would  serve as distal bypass targets.  Left lower extremity: Restoration of inline flow through the left profunda femoris artery.  The left BKA stands a reasonable chance of healing after this revascularization.    Complications:                        none    Disposition:                             Tolerated well, sent to recovery in stable condition    Justification for use of Surgical First Assist:  An experienced first assistant was utilized during this operation due to the complexity of the operation.  My assistant participated with patient preparation for surgery, incision, surgical exposure including retraction, dissection, and ligation to isolate the target structures and preserve nearby structures, and closure of the field of dissection.  The presence of the expert assist increased the efficiency of the operation and decreased the risk of intraoperative surgical complications.    _____________________________________________________      History:  58 y.o. 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  There was evidence of infection and there were also maggots in the left foot.  Patient was taken for a left BKA with primary closure which was performed on 9/27/2023.  Subsequently the patient underwent a vascular work-up which showed severe arterial insufficiency of the right lower extremity including occlusion of the right external iliac and common femoral artery and superficial femoral artery with weak collateral flow through the profunda femoris artery.  The patient was reporting right lower extremity rest pain and numbness.  The arterial work-up also showed occlusion of the left profunda femoris artery and the left superficial femoral artery, and therefore healing of the left BKA would be unlikely without revascularization.  I therefore recommended to the patient that we perform a right femoral endarterectomy along with stenting of the right iliac artery or possibly a femoral to femoral  bypass to restore inflow to the right lower extremity.  I also recommended we perform a left femoral endarterectomy to restore flow to the left profunda femoris artery in hopes of healing his left below the knee amputation, otherwise the patient is facing a left above-the-knee amputation.    I conducted a thorough preoperative discussion regarding our findings and recommendations.  I explained the operation, alternatives, and potential risks, including but not limited to bleeding, infection, injury to vessels or nerves, possible multiple incisions, use of xray and contrast exposure, risks of anesthesia, and global risks such as stroke, heart attack, pulmonary complications, and even potentially not surviving the operation or the recovery.  All questions were answered. They understand and agree to proceed.    Procedure Summary:  Following informed consent, the patient was placed supine on the operating table, and general anesthesia was administered.  The patient was prepped and draped sterile in the usual fashion. Surgical time-out was called, and everyone was in agreement.  The patient did receive preoperative prophylactic antibiotics.      The groins were exposed through longitudinal incisions bilaterally.  We began on the left side.  The common femoral artery was encountered and it was dissected circumferentially all the way up to the inguinal ligament and encircled with a vessel loop.  We carried the dissection down onto the profunda until we reached the first primary branch point of the profunda and each branch was encircled with a vessel loop.  On the right side we dissected all the way up superior to the inguinal ligament and encircled the distal right external iliac artery with a vessel loop.  We carried the dissection down and found to profunda branches, one laterally and one posteriorly and we dissected far out on both of these until we reached the primary branch point and the individual branches were  encircled with Vesseloops.  Doppler assessment showed that I had dissected far enough down onto the profunda branches to reach patent segments of these vessels.  I also planned to repair the arteriotomies with saphenous vein patch after endarterectomy as the patient does have a higher risk of surgical site infection due to the gangrenous foot that was removed just a few days ago.  I identified the left saphenous vein at the saphenofemoral junction and I dissected down along its course along the medial thigh.  I made a skip incision and follow the vein further down to the midportion of the left thigh and once I had harvested adequate length of vein it was transected proximally and distally, side branches were divided between clips and ties, and then the saphenous vein segment was removed.  The vein harvest incision was later closed with multiple layers of absorbable suture and staples for the skin.    At this point the patient was systemically heparinized and this was maintained until the end of the procedure.  The left femoral artery was clamped proximally and distally and a longitudinal arteriotomy was made from the common femoral artery stretching down onto the profunda.  Severe atherosclerotic disease was encountered as expected.  Extensive endarterectomy was performed and the endpoints feathered nicely.  The artery was flushed.  I swept the profunda vessels with a Cipriano catheter and found no thrombus and there was backbleeding from both of the profunda branches.  The arteriotomy was repaired with a saphenous vein patch and a running 6-0 Prolene suture line.  The artery was flushed and irrigated prior to completion and once complete it was pressurized and repair stitches were placed as needed.  Topical hemostatic was applied.  Attention was turned to the right groin.    The right femoral artery was clamped distally out on the profunda branches.  Proximal clamping was not necessary as the right external iliac  artery was known to be occluded.  I made a longitudinal arteriotomy stretching from the common femoral artery down onto the dominant profunda branch which was the posterior branch.  There was severe atherosclerotic disease throughout the common femoral artery and stretching only a short ways into both profunda branches.  Extensive endarterectomy was performed and the endpoints and the profunda femoris branches feathered nicely.  At this point I set about clearing the right external iliac artery.  It appeared that the majority of the disease burden was subacute thrombus and I was able to remove this directly with a clamp.  I was able to advance the clamp far up into the external iliac artery but I was not able to restore inflow.  At this point I advanced a 5 Cipriano catheter retrograde up into the iliac artery and I swept out an organized piece of thrombus measuring about 3 cm in length and at this point we had restoration of brisk pulsatile inflow.  The Cipriano was passed 1 more time and no additional thrombus was retrieved.  The artery was flushed and clamped.  The arteriotomy was repaired with a saphenous vein patch and a running 6-0 Prolene suture line.  The artery was flushed and irrigated prior to completion and once complete it was pressurized and repair stitches were placed as needed followed by topical hemostatic.    At this point I accessed the right vein patch with a micro access sheath and I dilated this up to a 6 French sheath pointed retrograde up toward the external iliac artery.  Angiogram was performed including the external iliac artery all the way up to the aortic bifurcation and runoff was performed all the way down to the right foot.  There was moderate disease in the external iliac artery which was treated with an 8 mm x 60 mm self-expanding uncovered stent and a 7 mm post stent angioplasty.  There was excellent recanalization of the artery and strong pulsation in the right common femoral artery  at this point time.  Runoff showed that the posterior profunda branch was patent as well as the lateral branch although it was heavily diseased and not providing much perfusion to the leg.  Runoff showed reconstitution of the above-knee popliteal artery however there is disease throughout the popliteal artery.  The below-knee popliteal artery could potentially serve as a suitable distal bypass target.  There is two-vessel runoff via the anterior tibial artery and posterior tibial artery both of which have disease however the anterior tibial artery could potentially serve as a distal bypass target as well.  At this point the sheath was removed and the site was closed with a 5-0 Prolene suture.  The heparin was reversed with protamine.  The wounds were irrigated and hemostasis was achieved.  More topical hemostatic agent was applied.  The groin incisions were closed with multiple layers of running absorbable suture followed by staples for the skin and Prevena bandages were applied.  I also trimmed the toenails of the right foot with a bone cutter prior to leaving the OR as they were massively overgrown and at risk for causing soft tissue injury.  A new stump bandage was placed on the left BKA.    The patient was then sent to recovery in stable condition.  All counts were correct at the conclusion of the case.     Postoperative Plan:    Patient will be bedrest for 6 hours after the operation and then after that he can start to mobilize.  Prevena bandages will put stay in place for 5 to 7 days.  We will monitor his clinical progress.  The patient will need to be on aspirin 81 mg daily indefinitely and also Plavix 75 mg daily for a month as he now has a new iliac stent.      Keith Navarro MD  [facility]

## 2023-10-01 NOTE — ANESTHESIA PROCEDURE NOTES
Arterial Line    Performed by: Brian Schmidt M.D.  Authorized by: Brian Schmidt M.D.    Start Time:  9/30/2023 6:13 PM  End Time:  9/30/2023 6:15 PM  Localization: ultrasound guidance  Image captured, interpreted and electronically stored.  Patient Location:  OR  Indication: continuous blood pressure monitoring        Catheter Size:  20 G  Seldinger Technique?: Yes    Laterality:  Right  Site:  Radial artery  Line Secured:  Antimicrobial disc, tape and transparent dressing  Events: patient tolerated procedure well with no complications

## 2023-10-01 NOTE — PROGRESS NOTES
Infectious Disease Progress Note    Author: Yung Esparza M.D. Date & Time of service: 10/1/2023  2:59 PM    Chief Complaint:  Follow-up for gangrene and bacteremia    Interval History:   patient remains afebrile, white count is 10.4, tolerating Zosyn, culture results as below, procedures as below, creatinine 0.53, UDS only positive for oxycodone, normal transaminases, albumin 2.4  10/1 patient remains afebrile, count 7.3, underwent revascularization procedure yesterday, tolerating Zosyn, creatinine 0.52, iron level 18, cultures as below    Labs Reviewed and Medications Reviewed.    Review of Systems:  Review of Systems   Constitutional:  Negative for fever.   Gastrointestinal:  Negative for diarrhea.   Musculoskeletal:  Positive for myalgias.   Skin:  Negative for itching and rash.       Hemodynamics:  Temp (24hrs), Av.3 °C (97.4 °F), Min:36.2 °C (97.1 °F), Max:36.7 °C (98 °F)  Temperature: 36.2 °C (97.2 °F)  Pulse  Av  Min: 60  Max: 115   Blood Pressure: 109/77, Arterial BP: (!) 93/49       Physical Exam:  Physical Exam  Vitals and nursing note reviewed.   Constitutional:       General: He is not in acute distress.     Comments: Disheveled appearing   HENT:      Mouth/Throat:      Pharynx: No oropharyngeal exudate.   Eyes:      Conjunctiva/sclera: Conjunctivae normal.   Cardiovascular:      Rate and Rhythm: Normal rate.      Heart sounds: No murmur heard.  Pulmonary:      Effort: Pulmonary effort is normal. No respiratory distress.      Breath sounds: No stridor.   Abdominal:      General: There is no distension.      Tenderness: There is no abdominal tenderness.   Musculoskeletal:      Comments: Left BKA site well approximated with no gross external evidence of infection   Skin:     Findings: No erythema or rash.   Neurological:      Mental Status: He is alert and oriented to person, place, and time.   Psychiatric:         Mood and Affect: Mood normal.         Behavior: Behavior normal.          Meds:    Current Facility-Administered Medications:     Pharmacy Consult Request    ondansetron    dexamethasone    diphenhydrAMINE    haloperidol lactate    scopolamine    [START ON 10/2/2023] heparin    acetaminophen **FOLLOWED BY** [START ON 10/6/2023] acetaminophen    cloNIDine **AND** cloNIDine    labetalol    hydrALAZINE    NS    oxyCODONE immediate-release **OR** oxyCODONE immediate-release **OR** HYDROmorphone    gabapentin    aspirin    clopidogrel    ALPRAZolam    [COMPLETED] piperacillin-tazobactam **AND** piperacillin-tazobactam    cyclobenzaprine    senna-docusate **AND** polyethylene glycol/lytes **AND** magnesium hydroxide **AND** bisacodyl    ondansetron    promethazine    promethazine    nicotine **AND** Nicotine Replacement Patient Education Materials **AND** nicotine polacrilex    Labs:  Recent Labs     09/30/23  0549 10/01/23  0554 10/01/23  1302   WBC 10.4 12.9* 11.3*   RBC 3.11* 2.43* 3.10*   HEMOGLOBIN 8.6* 6.7* 9.0*   HEMATOCRIT 27.2* 21.8* 27.9*   MCV 87.5 89.7 90.0   MCH 27.7 27.6 29.0   RDW 45.9 48.3 48.9   PLATELETCT 639* 510* 516*   MPV 8.6* 8.5* 8.7*       Recent Labs     09/29/23  0318 09/30/23  0549 10/01/23  0554   SODIUM 126* 131* 128*   POTASSIUM 4.5 4.3 4.9   CHLORIDE 89* 95* 94*   CO2 27 27 27   GLUCOSE 91 120* 135*   BUN 9 6* 9       Recent Labs     09/29/23  0318 09/30/23  0549 10/01/23  0554   CREATININE 0.59 0.53 0.52         Imaging:  CT-CTA AORTA-RO WITH & W/O-POST PROCESS    Result Date: 9/29/2023 9/28/2023 11:37 PM HISTORY/REASON FOR EXAM: PAOD, severe bilateral lower extremity arterial insufficiency TECHNIQUE/EXAM DESCRIPTION: CT angiogram of the abdominal aorta with runoff without and with contrast, with reconstructions. Initial precontrast images were obtained from the diaphragm through the lesser trochanters. Subsequently, thin section postcontrast helical images were obtained from the diaphragm through the ankles following the IV bolus administration of 95  mL of Omnipaque 350. CT angiographic technique was utilized. 3D angiographic images were reviewed on PACS. Maximum intensity projection (MIP) images were generated and reviewed. Low dose optimization technique was utilized for this CT exam including automated exposure control and adjustment of the mA and/or kV according to patient size. COMPARISON: None FINDINGS: CT ABDOMEN WITHOUT CONTRAST: On non-contrast images, no renal or urinary tract stones are identified. CTA AORTA: Atherosclerotic changes are seen including atherosclerotic coronary artery calculations. The aorta is normal in caliber without visualized aneurysm or dissection. There is mural thrombus in the abdominal aorta visualized. Linear densities the bilateral lung bases favors changes of atelectasis. The liver is normal in contour, no intrahepatic biliary ductal dilatation is seen. The gallbladder appears within normal limits. The spleen is unremarkable. The pancreas is grossly normal. Bilateral adrenal glands appear within normal limits. There is lobulated cystic lesion in the upper pole of the left kidney measuring 4.4 x 3.3 cm with septations. The ureters are normal in caliber along their visualized course. The colon appears within normal limits. The small bowel is unremarkable. The bladder is within normal limits. The bony structures are age appropriate. Fat-containing right inguinal hernia is seen. CTA LOWER EXTREMITIES: There is occlusion at the origin of the right common iliac artery visualized with occlusion extending to the level of the distal superficial femoral artery with reconstitution. The distal peroneal artery is not visualized with 2 vessel runoff at the ankle via anterior and posterior tibial arteries. Occlusion of the left superficial femoral artery is seen at the bifurcation which extends to the popliteal artery. There is short segment of occlusion of the profunda femoris just distal to the bifurcation which reconstitutes. There is  left globe amputation. Soft tissue gas is in the soft tissues at the below the knee amputation is seen.     CTA AORTA 1.  Septated lobulated left upper pole renal cystic mass lesion, recommend follow-up MRI of the kidneys for further characterization. 2.  Atherosclerosis and atherosclerotic coronary artery disease. 3.  Fat-containing right inguinal hernia CTA LOWER EXTREMITIES 1.  Occlusion of the right common iliac artery through the distal superficial femoral artery 2.  Distal right peroneal artery occlusion with 2-vessel runoff the anterior and posterior tibial arteries at the right ankle. 3.  Occlusion of the left superficial femoral artery at the bifurcation extending through its length to the popliteal artery. 4.  Occlusion of the proximal left profunda femoris just distal to the bifurcation which reconstitutes. 5.  Soft tissue gas at the stump of left below-the-knee amputation, within expected limits for recent postop changes. 3D angiographic/MIP images of the vasculature confirm the vascular findings as described above. These findings were discussed with the patient's clinician, Keith Navarro, on 2023 8:06 AM.    EC-ECHOCARDIOGRAM COMPLETE W/O CONT    Result Date: 2023  Transthoracic Echo Report Echocardiography Laboratory CONCLUSIONS No prior study is available for comparison. Normal left ventricular systolic function. No evidence of valvular abnormality based on Doppler evaluation. ELI GREWAL Exam Date:         2023                    16:04 Exam Location:     Inpatient Priority:          Routine Ordering Physician:        KAITLYNN RANGEL Referring Physician:       [id number] CARLOS Head Sonographer:               Nneka SMITH Age:    58     Gender:    M MRN:    [id number] :    1965 BSA:    1.8    Ht (in):    71     Wt (lb):    138 Exam Type:     Complete Indications:     Endocarditis ICD Codes:       421 CPT Codes:       95076 BP:   142    /   87     HR:   95 Technical  Quality:       Poor, Technically difficult                          study - adequate information is                          obtained MEASUREMENTS  (Male / Female) Normal Values 2D ECHO LV Diastolic Diameter PLAX        3.3 cm                4.2 - 5.9 / 3.9 - 5.3 cm LV Systolic Diameter PLAX         2.3 cm                2.1 - 4.0 cm IVS Diastolic Thickness           1.1 cm                LVPW Diastolic Thickness          1.1 cm                LVOT Diameter                     2.3 cm                Estimated LV Ejection Fraction    60 %                  LV Ejection Fraction MOD BP       59.1 %                >= 55  % LV Ejection Fraction MOD 4C       58.3 %                LV Ejection Fraction MOD 2C       60.2 %                DOPPLER AV Peak Velocity                  1.1 m/s               AV Peak Gradient                  5.3 mmHg              AV Mean Gradient                  2.6 mmHg              LVOT Peak Velocity                0.92 m/s              AV Area Cont Eq vti               3.8 cm2               Mitral E Point Velocity           0.69 m/s              Mitral E to A Ratio               0.68                  MV Pressure Half Time             58.4 ms               MV Area PHT                       3.8 cm2               MV Deceleration Time              201 ms                * Indicates values subject to auto-interpretation LV EF:  60    % FINDINGS Left Ventricle The left ventricle is normal in size.mild concentric left ventricular hypertrophy. Normal left ventricular systolic function. The left ventricular ejection fraction is visually estimated to be 60%. Normal regional wall motion. Normal diastolic function. Right Ventricle The right ventricle is normal in size and systolic function. Right Atrium The right atrium is normal in size.the inferior vena cava is not visualized. Left Atrium The left atrium is normal in size. Left atrial volume index is  19.95 mL/sq m. Mitral Valve Structurally normal mitral  valve without significant stenosis or regurgitation. Aortic Valve The aortic valve is not well visualized. Tricuspid Valve Structurally normal tricuspid valve without significant stenosis or regurgitation. Pulmonic Valve The pulmonic valve is not well visualized. Pericardium No pericardial effusion. Aorta Normal aortic root for body surface area. The ascending aorta diameter is 2.7  cm. Amanda [facility] To (Electronically Signed) Final Date:     2023                 17:28    US-AORTA/ILIACS DUPLEX COMPLETE    Result Date: 2023  Abdominal Aorta  Duplex Ultrasound Report  Vascular Laboratory  CONCLUSIONS  No evidence of abdominal aortic or iliac artery aneurysm.  Doppler waveforms of the aorta, right common iliac, left common iliac, and left  external iliac arteries are monophasic with a sharp upstroke.  The right external iliac artery is occluded. Collateral flow is demonstrated  via the right  internal iliac artery with monophasic flow.  Significant atherosclerotic plaque is noted throughout the aorta and bilateral  iliac arteries.  CARMINAELI HOFFMANN  Exam Date:     2023 12:22  Room #:     Inpatient  Priority:     Routine  Ht (in):             Wt (lb):  Ordering Physician:        KAITLYNN RANGEL  Referring Physician:       [id number] CARLOS Head  Sonographer:               Blanca Armas RVRUPERT  Study Type:                Complete Bilateral  Technical Quality:         Adequate  Age:    58    Gender:     M  MRN:    [id number]  :    1965      BSA:  Indications:     Peripheral vascular disease, unspecified  CPT Codes:       97510  ICD Codes:       I73.9  History:         Peripheral arterial disease. Left BKA 23. No prior duplex.  Limitations:     Patient not NPO.               PSV (cm/sec)                                             Diameter     Doppler                                                                        (cm)         Waveform                                  162.0   Celiac Artery                                   137.0   Superior Mesenteric Artery                                  58.0    Aorta - Proximal                0.9        Monophasic                                  47.0    Aorta - Mid                     1.8        Monophasic                                  41.0    Aorta - Distal                  1.8        Monophasic  Proximal:    48.0    Distal:    64.0    Right Common Iliac Artery                  Monophasic  Proximal:    68.0    Distal:    70.0    Left Common Iliac Artery                   Monophasic  Proximal:    0.0     Distal:    0.0     Right External Iliac Artery                Absent  Proximal:  Proximal:  Proximal:    80.0    Distal:    76.0    Left External Iliac Artery                 Monophasic  FINDINGS  No evidence of abdominal aortic or iliac artery aneurysm.  Doppler waveforms of the aorta, right common iliac, left common iliac, and left  external iliac arteries are monophasic with a sharp upstroke.  The right external iliac artery is occluded. Collateral flow is demonstrated  via the right  internal iliac artery with monophasic flow.  Significant atherosclerotic plaque is noted throughout the aorta and bilateral  iliac arteries.  Amanda Cohen  (Electronically Signed)  Final Date:      28 September 2023 14:33    US-EXTREMITY ARTERY LOWER UNILAT RIGHT    Result Date: 9/28/2023  Lower Extremity  Arterial Duplex Report  Vascular Laboratory  CONCLUSIONS  Findings are consistent with severe arterial insufficiency of the right  lower extremity.  Duplex scanning of the aorto-iliac segment was completed in accordance with  lower extremity arterial evaluation protocol - see separate report.  ELI GREWAL  Exam Date:     09/28/2023 12:06  Room #:     Inpatient  Priority:     Routine  Ht (in):             Wt (lb):  Ordering Physician:        KAITLYNN RANGEL  Referring Physician:       [id number] CARLOS Head  Sonographer:               Blanca Armas RVT  Study Type:                 Complete Unilateral  Technical Quality:         Adequate  Age:    58    Gender:     M  MRN:    [id number]  :    1965      BSA:  Indications:     Peripheral vascular disease, unspecified  CPT Codes:       96170  ICD Codes:       I73.9  History:         Peripheral arterial disease. Left BKA 23. No prior                   duplex.  Limitations:                RIGHT  Waveform        Peak Systolic Velocity (cm/s)                  Prox    Prox-Mid  Mid    Mid-Dist  Distal  Absent                            0                        CFA  Monophasic      16                                         PFA  Absent          0                 0                8       SFA  Monophasic      13                                 17      POP  Monophasic      9                                  10      AT  Monophasic      12                                 8       PT  Monophasic      7                                  7       STEFANIE                LEFT  Waveform        Peak Systolic Velocity (cm/s)                  Prox    Prox-Mid  Mid    Mid-Dist  Distal                                                             CFA                                                             PFA                                                             SFA                                                             POP                                                             AT                                                             PT                                                             STEFANIE  FINDINGS  Right.  No Doppler or color flow demonstrated in the common femoral, proximal and  mid superficial femoral arteries.  Severely dampened, monophasic flow demonstrated in the profunda femoral  arery.  Reconstitution of flow is seen at the distal superficial femoral artery  with monophasic flow.  Atherosclerotic plaque seen in the popliteal artery.  Three vessel runoff to the ankle with monophasic flow.  Duplex scanning of the  aorto-iliac segment was completed in accordance with  lower extremity arterial evaluation protocol - see separate report.  Amanda KAISER To  (Electronically Signed)  Final Date:      2023                   14:31    US-INGA SINGLE LEVEL UNILAT RIGHT    Result Date: 2023   Vascular Laboratory  Conclusions  Severe arterial insufficiency seen in the right lower extremity.  An arterial duplex of the right leg was performed in accordance with lower  extremity arterial evaluation protocol - see separate report.  ELI GREWAL  Age:    58    Gender:     M  MRN:    [id number]  :    1965      BSA:  Exam Date:     2023 11:11  Room #:     Inpatient  Priority:     Routine  Ht (in):             Wt (lb):  Ordering Physician:        KAITLYNN RANGEL  Referring Physician:       [id number] CARLOS Head  Sonographer:               Blanca Armas RVT  Study Type:                Complete Unilateral  Technical Quality:         Adequate  Indications:     Atherosclerosis of peripheral arteries  CPT Codes:       72039  ICD Codes:       440.20  History:         Peripheral vascular disease with non-healing wound. Below                   knee amputation of left leg due to dry gangrene (23). No                   prior exam.  Limitations:     IV in right mid bicep.                 RIGHT  Waveform            Systolic BPs (mmHg)                             0             Brachial  Monophasic                               Common Femoral  Monophasic                               Popliteal  Monophasic                 66            Posterior Tibial  Monophasic                               Dorsalis Pedis  Flatlined                                Digit                             0.43          INGA                                           TBI                       LEFT  Waveform        Systolic BPs (mmHg)                             153           Brachial                                            Common Femoral                                           Popliteal                                           Posterior Tibial                                           Dorsalis Pedis                                           Digit                                           INGA                                           TBI  Findings  Right.  There is evidence of severe arterial disease demonstrated (INGA is < .5) on  the right.  Doppler waveforms of the common femoral, popliteal, and posterior tibial  arteries are of low amplitude and monophasic.  Doppler waveforms of the dorsalis pedis artery are of severely low  amplitude that a index is not obtained.  An arterial duplex of the right leg was performed in accordance with lower  extremity arterial evaluation protocol - see separate report.  Amanda Cohen  (Electronically Signed)  Final Date:      28 September 2023                   12:22    DX-CHEST-PORTABLE (1 VIEW)    Result Date: 9/26/2023 9/26/2023 9:00 AM HISTORY/REASON FOR EXAM:  Sepsis; sepsis TECHNIQUE/EXAM DESCRIPTION AND NUMBER OF VIEWS: Single portable view of the chest. COMPARISON: 9/19/2020 FINDINGS: Old RIGHT rib fractures and old LEFT rib fractures are noted. HEART: Stable size. LUNGS: Lung volumes are low. There is LEFT basilar opacity. PLEURA: There is blunting of the LEFT costophrenic sulcus.     1.  LEFT basilar atelectasis or pneumonia 2.  Trace LEFT pleural effusion or pleural scar      Micro:  Results       Procedure Component Value Units Date/Time    BLOOD CULTURE [[id number]]  (Abnormal)  (Susceptibility) Collected: 09/26/23 0937    Order Status: Completed Specimen: Blood from Peripheral Updated: 09/29/23 1625     Significant Indicator POS     Source BLD     Site PERIPHERAL     Culture Result Growth detected by Bactec instrument. 09/27/2023  09:54      Gram-positive cocci  Vagococcus carniphilus  Organism identified by MALDI-TOF research use only library.      Narrative:      CALL  [place]   "61 tel. [phone],  CALLED  61 tel. [phone] 09/27/2023, 10:08, RESULTS VOLTED TO: Rosalva Arthur, Pharmacy  Per Hospital Policy: Only change Specimen Src: to \"Line\" if  specified by physician order.  Release to patient->Immediate  Left AC    Susceptibility       Gram-positive cocci (2)       Antibiotic Interpretation Microscan   Method Status    Ciprofloxacin Sensitive <=1 mcg/mL MEENAKSHI Final    Daptomycin Sensitive <=0.5 mcg/mL MEENAKSHI Final    Erythromycin Sensitive <=0.25 mcg/mL MEENAKSHI Final    Ampicillin Sensitive <=2 mcg/mL MEENAKSHI Final    Vancomycin Sensitive 4 mcg/mL MEENAKSHI Final    Penicillin Sensitive 0.06 mcg/mL MEENAKSHI Final    Rifampin Sensitive <=1 mcg/mL MEENAKSHI Final    Tetracycline Sensitive <=4 mcg/mL MEENAKSHI Final                       MRSA By PCR (Amp) [[id number]] Collected: 09/29/23 0926    Order Status: Completed Specimen: Respirate from Nares Updated: 09/29/23 1136     MRSA by PCR Negative    Narrative:      Collected By: [id number] JB CARD  Release to patient->Immediate    BLOOD CULTURE [[id number]]  (Abnormal)  (Susceptibility) Collected: 09/26/23 0846    Order Status: Completed Specimen: Blood from Peripheral Updated: 09/29/23 1130     Significant Indicator POS     Source BLD     Site PERIPHERAL     Culture Result Growth detected by Bactec instrument. 09/26/2023 23:29      Morganella morganii    Narrative:      CALL  [place]  61 tel. [phone],  CALLED  61 tel. [phone] 09/27/2023, 10:08, RESULTS VOLTED TO: Rosalva Arthur, Pharmacy  Per Hospital Policy: Only change Specimen Src: to \"Line\" if  specified by physician order.  Release to patient->Immediate  No site indicated    Susceptibility       Morganella morganii (1)       Antibiotic Interpretation Microscan   Method Status    Ampicillin Resistant >16 mcg/mL MEENAKSHI Final    Amoxicillin/CA Resistant >16/8 mcg/mL MEENAKSHI Final    Ceftriaxone Sensitive <=1 mcg/mL MEENAKSHI Final    Cefazolin Resistant >16 mcg/mL MEENAKSHI Final    Ciprofloxacin Sensitive <=0.25 mcg/mL MEENAKSHI " Final    Cefepime Sensitive <=2 mcg/mL MEENAKSHI Final    Cefuroxime Resistant >16 mcg/mL MEENAKSHI Final    Ertapenem Sensitive <=0.5 mcg/mL MEENAKSHI Final    Ampicillin/sulbactam Resistant >16/8 mcg/mL MEENAKSHI Final    Tobramycin Sensitive <=2 mcg/mL MEENAKSHI Final    Gentamicin Sensitive <=2 mcg/mL MEENAKSHI Final    Levofloxacin Sensitive <=0.5 mcg/mL MEENAKSHI Final    Minocycline Sensitive <=4 mcg/mL MEENAKSHI Final    Moxifloxacin Sensitive <=2 mcg/mL MEENAKSHI Final    Pip/Tazobactam Sensitive <=8 mcg/mL MEENAKSHI Final    Trimeth/Sulfa Sensitive <=0.5/9.5 mcg/mL MEENAKSHI Final    Tigecycline Resistant <=2 mcg/mL MEENAKSHI Final                       URINE CULTURE(NEW) [[id number]]  (Abnormal)  (Susceptibility) Collected: 09/26/23 0901    Order Status: Completed Specimen: Urine Updated: 09/28/23 1714     Significant Indicator POS     Source UR     Site -     Culture Result -      Staphylococcus epidermidis  >100,000 cfu/mL      Narrative:      Release to patient->Immediate  Indication for culture:->Emergency Room Patient  Indication for culture:->Emergency Room Patient    Susceptibility       Staphylococcus epidermidis (1)       Antibiotic Interpretation Microscan   Method Status    Cefazolin Sensitive <=8 mcg/mL MEENAKSHI Final    Cefepime Sensitive <=4 mcg/mL MEENAKSHI Final    Daptomycin Sensitive <=0.5 mcg/mL MEENAKSHI Final    Nitrofurantoin Sensitive <=32 mcg/mL MEENAKSHI Final    Ampicillin/sulbactam Sensitive <=8/4 mcg/mL MEENAKSHI Final    Pip/Tazobactam Sensitive <=8 mcg/mL MEENAKSHI Final    Trimeth/Sulfa Resistant >2/38 mcg/mL MEENAKSHI Final    Tetracycline Sensitive <=4 mcg/mL MEENAKSHI Final                       BLOOD CULTURE [[id number]] Collected: 09/27/23 1734    Order Status: Completed Specimen: Blood from Peripheral Updated: 09/28/23 0916     Significant Indicator NEG     Source BLD     Site PERIPHERAL     Culture Result No Growth  Note: Blood cultures are incubated for 5 days and  are monitored continuously.Positive blood cultures  are called to the RN and reported as soon as  they are identified.    "   Narrative:      Contact  Per Hospital Policy: Only change Specimen Src: to \"Line\" if  specified by physician order.  Release to patient->Immediate  Left Hand    BLOOD CULTURE [[id number]] Collected: 09/27/23 1734    Order Status: Completed Specimen: Blood from Peripheral Updated: 09/28/23 0916     Significant Indicator NEG     Source BLD     Site PERIPHERAL     Culture Result No Growth  Note: Blood cultures are incubated for 5 days and  are monitored continuously.Positive blood cultures  are called to the RN and reported as soon as  they are identified.      Narrative:      Contact  Per Hospital Policy: Only change Specimen Src: to \"Line\" if  specified by physician order.  Release to patient->Immediate  Left AC    Parasite, Gross Identification [[id number]]  (Abnormal) Collected: 09/27/23 1405    Order Status: Completed Specimen: Other Updated: 09/27/23 1424     Significant Indicator POS     Source OTHER     Site -     Parasite, Gross Identification Human parasite identified.     Parasite, Gross Identification Fly larvae (myiasis causing)    Narrative:      CALL  [place]  RST2 tel. ,  CALLED  RST2 tel.  09/27/2023, 14:24, RB PERF. RESULTS CALLED TO:[id number] RN    URINALYSIS [[id number]]  (Abnormal) Collected: 09/26/23 0901    Order Status: Completed Specimen: Urine Updated: 09/26/23 0951     Color DK Yellow     Character Clear     Specific Gravity 1.021     Ph 5.5     Glucose Negative mg/dL      Ketones Trace mg/dL      Protein Negative mg/dL      Bilirubin Small     Urobilinogen, Urine 2.0     Nitrite Positive     Leukocyte Esterase Trace     Occult Blood Negative     Micro Urine Req Microscopic    Narrative:      Release to patient->Immediate  Indication for culture:->Emergency Room Patient            Assessment:  58-year-old male patient with severe peripheral arterial disease, homelessness, chronic bilateral lower extremity wounds, presented with ischemic gangrene of the left lower extremity with maggots and " superimposed bacterial infection.  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  1 blood culture growing Unasyn resistant Morganella, and the other growing pansensitive Vagococcus from 9/26.  Repeat blood cultures x2 from 9/27 no growth till date.    Pertinent Diagnoses:  Ischemic gangrene left lower extremity with superimposed polymicrobial infection  Myiasis  Severe peripheral arterial disease   Homelessness  Chronic bilateral lower extremity wounds    Plan:  -Surgical source control achieved.  Vascular surgery performed revascularization 9/30 to help with healing of the BKA stump.  Will complete an antibiotic course for bacteremia.  Okay to continue IV Zosyn 4.5 g every 8 hours while inpatient  -Stop date is 10/6/2023.  If being discharged prior to this, can complete course with p.o. ciprofloxacin 750 mg twice daily + p.o. Flagyl 500 mg twice daily    Disposition: No ID specific disposition needs  Need for PICC line: No, can use orals as above    Discussed with internal medicine, Dr. Slaughter.  ID will sign off.  This illness poses a threat of further loss of limb

## 2023-10-01 NOTE — ANESTHESIA PREPROCEDURE EVALUATION
Case: [id number] Anesthesia Start Date/Time: 09/30/23 0418    Procedures:       ENDARTERECTOMY, FEMORAL (Bilateral)      ANGIOGRAM, WITH STENT PSB (Bilateral)      CREATION, BYPASS, FEMORAL TO POPLITEAL, USING GRAFT (Bilateral)      CREATION, BYPASS, ARTERIAL, FEMORAL TO FEMORAL, LEFT TO RIGHT    Anesthesia type: general    Location: [place] [address] / SURGERY [facility]    Surgeons: Keith Navarro M.D.      HTN  Chronic pain  Tobacco use/abuse  ETOH use    Relevant Problems   CARDIAC   (positive) PAD (peripheral artery disease) (HCC)       Physical Exam    Airway   Mallampati: II  TM distance: >3 FB  Neck ROM: full       Cardiovascular - normal exam  Rhythm: regular  Rate: normal  (-) murmur     Dental         Very poor dentition   Pulmonary - normal exam  Breath sounds clear to auscultation     Abdominal    Neurological - normal exam                 Anesthesia Plan    ASA 3 (PAD)   ASA physical status 3 criteria: hypertension - poorly controlled, alcohol and/or substance dependence or abuse and other (comment)    Plan - general       Airway plan will be ETT          Induction: intravenous    Postoperative Plan: Postoperative administration of opioids is intended.    Pertinent diagnostic labs and testing reviewed    Informed Consent:    Anesthetic plan and risks discussed with patient.    Use of blood products discussed with: patient whom.       Pre-operative pain:  8/10

## 2023-10-01 NOTE — ANESTHESIA PROCEDURE NOTES
Airway    Date/Time: 9/30/2023 6:10 PM    Performed by: Brian Schmidt M.D.  Authorized by: Brian Schmidt M.D.    Location:  OR  Urgency:  Elective  Difficult Airway: No    Indications for Airway Management:  Anesthesia      Spontaneous Ventilation: absent    Sedation Level:  Deep  Preoxygenated: Yes    Patient Position:  Sniffing  Final Airway Type:  Endotracheal airway  Final Endotracheal Airway:  ETT  Cuffed: Yes    Technique Used for Successful ETT Placement:  Direct laryngoscopy  Devices/Methods Used in Placement:  Intubating stylet    Insertion Site:  Oral  Blade Type:  Marcos  Laryngoscope Blade/Videolaryngoscope Blade Size:  4  ETT Size (mm):  8.0  Measured from:  Teeth  ETT to Teeth (cm):  23  Placement Verified by: auscultation and capnometry    Cormack-Lehane Classification:  Grade I - full view of glottis  Number of Attempts at Approach:  1

## 2023-10-01 NOTE — ANESTHESIA TIME REPORT
Anesthesia Start and Stop Event Times     Date Time Event    9/30/2023 1726 Ready for Procedure     1803 Anesthesia Start     2300 Anesthesia Stop        Responsible Staff  09/30/23    Name Role Begin End    Brian Schmidt M.D. Anesth 1803 2047    Nelly Nettles M.D. Anesth 2047 2300        Overtime Reason:  no overtime (within assigned shift)    Comments:

## 2023-10-02 ENCOUNTER — APPOINTMENT (OUTPATIENT)
Dept: RADIOLOGY | Facility: MEDICAL CENTER | Age: 58
DRG: 239 | End: 2023-10-02
Attending: INTERNAL MEDICINE
Payer: MEDICAID

## 2023-10-02 LAB
ANION GAP SERPL CALC-SCNC: 9 MMOL/L (ref 7–16)
BACTERIA BLD CULT: NORMAL
BACTERIA BLD CULT: NORMAL
BUN SERPL-MCNC: 8 MG/DL (ref 8–22)
CALCIUM SERPL-MCNC: 8.3 MG/DL (ref 8.5–10.5)
CHLORIDE SERPL-SCNC: 94 MMOL/L (ref 96–112)
CO2 SERPL-SCNC: 26 MMOL/L (ref 20–33)
CREAT SERPL-MCNC: 0.6 MG/DL (ref 0.5–1.4)
ERYTHROCYTE [DISTWIDTH] IN BLOOD BY AUTOMATED COUNT: 47.1 FL (ref 35.9–50)
GFR SERPLBLD CREATININE-BSD FMLA CKD-EPI: 111 ML/MIN/1.73 M 2
GLUCOSE SERPL-MCNC: 118 MG/DL (ref 65–99)
HCT VFR BLD AUTO: 25.9 % (ref 42–52)
HGB BLD-MCNC: 8.6 G/DL (ref 14–18)
MCH RBC QN AUTO: 29.2 PG (ref 27–33)
MCHC RBC AUTO-ENTMCNC: 33.2 G/DL (ref 32.3–36.5)
MCV RBC AUTO: 87.8 FL (ref 81.4–97.8)
PLATELET # BLD AUTO: 487 K/UL (ref 164–446)
PMV BLD AUTO: 8.6 FL (ref 9–12.9)
POTASSIUM SERPL-SCNC: 4.4 MMOL/L (ref 3.6–5.5)
RBC # BLD AUTO: 2.95 M/UL (ref 4.7–6.1)
SIGNIFICANT IND 70042: NORMAL
SIGNIFICANT IND 70042: NORMAL
SITE SITE: NORMAL
SITE SITE: NORMAL
SODIUM SERPL-SCNC: 129 MMOL/L (ref 135–145)
SOURCE SOURCE: NORMAL
SOURCE SOURCE: NORMAL
WBC # BLD AUTO: 10.5 K/UL (ref 4.8–10.8)

## 2023-10-02 PROCEDURE — 700117 HCHG RX CONTRAST REV CODE 255: Performed by: INTERNAL MEDICINE

## 2023-10-02 PROCEDURE — 770006 HCHG ROOM/CARE - MED/SURG/GYN SEMI*

## 2023-10-02 PROCEDURE — 97530 THERAPEUTIC ACTIVITIES: CPT

## 2023-10-02 PROCEDURE — 700111 HCHG RX REV CODE 636 W/ 250 OVERRIDE (IP): Mod: JZ | Performed by: INTERNAL MEDICINE

## 2023-10-02 PROCEDURE — A9270 NON-COVERED ITEM OR SERVICE: HCPCS | Performed by: INTERNAL MEDICINE

## 2023-10-02 PROCEDURE — 99233 SBSQ HOSP IP/OBS HIGH 50: CPT | Performed by: INTERNAL MEDICINE

## 2023-10-02 PROCEDURE — 74183 MRI ABD W/O CNTR FLWD CNTR: CPT

## 2023-10-02 PROCEDURE — 36415 COLL VENOUS BLD VENIPUNCTURE: CPT

## 2023-10-02 PROCEDURE — 85027 COMPLETE CBC AUTOMATED: CPT

## 2023-10-02 PROCEDURE — 700111 HCHG RX REV CODE 636 W/ 250 OVERRIDE (IP): Performed by: SURGERY

## 2023-10-02 PROCEDURE — 80048 BASIC METABOLIC PNL TOTAL CA: CPT

## 2023-10-02 PROCEDURE — A9270 NON-COVERED ITEM OR SERVICE: HCPCS | Performed by: SURGERY

## 2023-10-02 PROCEDURE — 700105 HCHG RX REV CODE 258: Performed by: INTERNAL MEDICINE

## 2023-10-02 PROCEDURE — 700102 HCHG RX REV CODE 250 W/ 637 OVERRIDE(OP): Performed by: INTERNAL MEDICINE

## 2023-10-02 PROCEDURE — 700102 HCHG RX REV CODE 250 W/ 637 OVERRIDE(OP): Performed by: SURGERY

## 2023-10-02 PROCEDURE — A9579 GAD-BASE MR CONTRAST NOS,1ML: HCPCS | Performed by: INTERNAL MEDICINE

## 2023-10-02 RX ADMIN — OXYCODONE HYDROCHLORIDE 20 MG: 10 TABLET ORAL at 15:28

## 2023-10-02 RX ADMIN — OXYCODONE HYDROCHLORIDE 20 MG: 10 TABLET ORAL at 04:56

## 2023-10-02 RX ADMIN — GABAPENTIN 300 MG: 300 CAPSULE ORAL at 04:56

## 2023-10-02 RX ADMIN — NICOTINE TRANSDERMAL SYSTEM 21 MG: 21 PATCH, EXTENDED RELEASE TRANSDERMAL at 04:55

## 2023-10-02 RX ADMIN — DOCUSATE SODIUM 50 MG AND SENNOSIDES 8.6 MG 2 TABLET: 8.6; 5 TABLET, FILM COATED ORAL at 04:55

## 2023-10-02 RX ADMIN — PIPERACILLIN AND TAZOBACTAM 4.5 G: 4; .5 INJECTION, POWDER, FOR SOLUTION INTRAVENOUS at 13:00

## 2023-10-02 RX ADMIN — ACETAMINOPHEN 650 MG: 325 TABLET, FILM COATED ORAL at 04:56

## 2023-10-02 RX ADMIN — PIPERACILLIN AND TAZOBACTAM 4.5 G: 4; .5 INJECTION, POWDER, FOR SOLUTION INTRAVENOUS at 05:02

## 2023-10-02 RX ADMIN — CLOPIDOGREL BISULFATE 75 MG: 75 TABLET ORAL at 04:55

## 2023-10-02 RX ADMIN — GABAPENTIN 300 MG: 300 CAPSULE ORAL at 17:07

## 2023-10-02 RX ADMIN — OXYCODONE HYDROCHLORIDE 20 MG: 10 TABLET ORAL at 20:17

## 2023-10-02 RX ADMIN — GABAPENTIN 300 MG: 300 CAPSULE ORAL at 11:58

## 2023-10-02 RX ADMIN — ACETAMINOPHEN 650 MG: 325 TABLET, FILM COATED ORAL at 17:07

## 2023-10-02 RX ADMIN — CYCLOBENZAPRINE 10 MG: 10 TABLET, FILM COATED ORAL at 13:00

## 2023-10-02 RX ADMIN — ASPIRIN 81 MG: 81 TABLET, COATED ORAL at 04:55

## 2023-10-02 RX ADMIN — HEPARIN SODIUM 5000 UNITS: 5000 INJECTION, SOLUTION INTRAVENOUS; SUBCUTANEOUS at 04:56

## 2023-10-02 RX ADMIN — CYCLOBENZAPRINE 10 MG: 10 TABLET, FILM COATED ORAL at 22:12

## 2023-10-02 RX ADMIN — PIPERACILLIN AND TAZOBACTAM 4.5 G: 4; .5 INJECTION, POWDER, FOR SOLUTION INTRAVENOUS at 20:19

## 2023-10-02 RX ADMIN — DOCUSATE SODIUM 50 MG AND SENNOSIDES 8.6 MG 2 TABLET: 8.6; 5 TABLET, FILM COATED ORAL at 17:06

## 2023-10-02 RX ADMIN — HEPARIN SODIUM 5000 UNITS: 5000 INJECTION, SOLUTION INTRAVENOUS; SUBCUTANEOUS at 22:09

## 2023-10-02 RX ADMIN — ACETAMINOPHEN 650 MG: 325 TABLET, FILM COATED ORAL at 11:58

## 2023-10-02 RX ADMIN — HEPARIN SODIUM 5000 UNITS: 5000 INJECTION, SOLUTION INTRAVENOUS; SUBCUTANEOUS at 14:32

## 2023-10-02 RX ADMIN — GADOTERIDOL 12 ML: 279.3 INJECTION, SOLUTION INTRAVENOUS at 04:42

## 2023-10-02 RX ADMIN — OXYCODONE HYDROCHLORIDE 20 MG: 10 TABLET ORAL at 09:57

## 2023-10-02 ASSESSMENT — PAIN DESCRIPTION - PAIN TYPE
TYPE: ACUTE PAIN;CHRONIC PAIN
TYPE: CHRONIC PAIN;ACUTE PAIN
TYPE: ACUTE PAIN
TYPE: ACUTE PAIN;CHRONIC PAIN
TYPE: ACUTE PAIN;SURGICAL PAIN
TYPE: ACUTE PAIN

## 2023-10-02 ASSESSMENT — ENCOUNTER SYMPTOMS
DIZZINESS: 0
HEADACHES: 0
DEPRESSION: 0
MYALGIAS: 1
SHORTNESS OF BREATH: 0
NAUSEA: 0
FEVER: 0
VOMITING: 0
ABDOMINAL PAIN: 0
CHILLS: 0

## 2023-10-02 ASSESSMENT — GAIT ASSESSMENTS: GAIT LEVEL OF ASSIST: REFUSED

## 2023-10-02 NOTE — PROGRESS NOTES
Hospital Medicine Daily Progress Note    Date of Service  10/2/2023    Chief Complaint  Dillon Centeno is a 58 y.o. male admitted 9/26/2023 with bilateral foot wounds.    Hospital Course  58 y.o. male with past medical history of hypertension who presented to the hospital on 9/26/2023 with complaint of bilateral lower extremity wound.  Patient reported that he found to a significant wound on his left lower extremity especially involving his left foot that for started 2 weeks ago.  He reported that he was wearing a shoe that was to size smaller than his actual size and he developed this wound. He does not take any medications on regular basis.  He smokes 1 pack a day and drinks 1-2 drinks of alcohol daily and sometimes he does not drink alcohol at all.  He denies any history of symptoms of alcohol withdrawal. In the ER patient found to have gangrene on his left foot most likely due to vascular disease.  Vascular surgery and orthopedic surgery consulted.  Orthopedic performed a left BKA on 9/27.  Vascular evaluation was done which showed severe arterial disease.  Vascular surgery took the patient for bilateral common femoral artery endarterectomy and profundoplasty with saphenous vein angioplasty, and placement of a stent in the right external iliac artery on 9/30.  Recommended aspirin for life and Plavix for 1 month.  Prevena to be in place for 5 to 7 days.  Cleared for discharge with outpatient follow-up.  Patient had postop anemia required 1 unit PRBC transfusion.  Patient was found to be bacteremic with Morganella and Vagococcus, infectious disease was consulted.  Echo showed EF 60% with no valvular abnormalities.  Repeat blood cultures are negative.  Infectious disease cleared the patient for discharge on Zosyn with end date 10/6/2023, if discharges prior to this date can complete course with ciprofloxacin 750 mg twice a day and Flagyl 500 mg twice a day.  PT/OT evaluated the patient recommended postacute  placement.  Pending placement.    Interval Problem Update  10/2 Mildly tachycardic otherwise stable. MRI done pending read.  Leukocytosis resolved.  Hemoglobin 8.6..  ID and orthopedic surgery cleared the patient for discharge.  Vascular surgery cleared patient for discharge, recommended to paint right toe ulcers with Betadine once a day.  Outpatient follow-up with Dr. Navarro.  Patient reports his pain is better controlled today.  Discussed with social work, currently no SNF accepting.    I have discussed this patient's plan of care and discharge plan at IDT rounds today with Case Management, Nursing, Nursing leadership, and other members of the IDT team.    Consultants/Specialty  orthopedics and vascular surgery  Infectious disease     Code Status  Full Code    Disposition  The patient is medically cleared for discharge to home or a post-acute facility.  Anticipate discharge to: skilled nursing facility    I have placed the appropriate orders for post-discharge needs.    Review of Systems  Review of Systems   Constitutional:  Positive for malaise/fatigue. Negative for chills and fever.   Respiratory:  Negative for shortness of breath.    Cardiovascular:  Negative for chest pain.   Gastrointestinal:  Negative for abdominal pain, nausea and vomiting.   Genitourinary:  Negative for dysuria.   Musculoskeletal:  Positive for joint pain and myalgias.   Skin:  Negative for rash.   Neurological:  Negative for dizziness and headaches.   Psychiatric/Behavioral:  Negative for depression.    All other systems reviewed and are negative.       Physical Exam  Temp:  [36.4 °C (97.6 °F)-36.7 °C (98 °F)] 36.4 °C (97.6 °F)  Pulse:  [] 98  Resp:  [18] 18  BP: (116-141)/(79-97) 118/79  SpO2:  [90 %-93 %] 90 %    Physical Exam  Vitals and nursing note reviewed.   Constitutional:       General: He is not in acute distress.     Appearance: He is ill-appearing.   HENT:      Head: Normocephalic and atraumatic.   Eyes:       Conjunctiva/sclera: Conjunctivae normal.   Cardiovascular:      Rate and Rhythm: Normal rate and regular rhythm.      Pulses: Normal pulses.   Pulmonary:      Effort: Pulmonary effort is normal. No respiratory distress.      Breath sounds: Normal breath sounds. No wheezing.   Abdominal:      General: Abdomen is flat. There is no distension.      Palpations: Abdomen is soft.      Tenderness: There is no abdominal tenderness.   Musculoskeletal:         General: Tenderness, deformity and signs of injury present.      Cervical back: Normal range of motion and neck supple.      Right lower leg: No edema.      Comments: Left BKA    Skin:     General: Skin is warm and dry.      Findings: Lesion present.      Comments: Right foot with multiple scabbed lesions  (Picture below)   Neurological:      General: No focal deficit present.      Mental Status: He is alert and oriented to person, place, and time.      Cranial Nerves: No cranial nerve deficit.      Motor: Weakness present.   Psychiatric:         Mood and Affect: Mood normal.         Behavior: Behavior normal.          Right foot       Fluids    Intake/Output Summary (Last 24 hours) at 10/2/2023 1605  Last data filed at 10/2/2023 1350  Gross per 24 hour   Intake 2535.71 ml   Output 1050 ml   Net 1485.71 ml       Laboratory  Recent Labs     10/01/23  0554 10/01/23  1302 10/02/23  0820   WBC 12.9* 11.3* 10.5   RBC 2.43* 3.10* 2.95*   HEMOGLOBIN 6.7* 9.0* 8.6*   HEMATOCRIT 21.8* 27.9* 25.9*   MCV 89.7 90.0 87.8   MCH 27.6 29.0 29.2   MCHC 30.7* 32.3 33.2   RDW 48.3 48.9 47.1   PLATELETCT 510* 516* 487*   MPV 8.5* 8.7* 8.6*     Recent Labs     09/30/23  0549 10/01/23  0554 10/02/23  0820   SODIUM 131* 128* 129*   POTASSIUM 4.3 4.9 4.4   CHLORIDE 95* 94* 94*   CO2 27 27 26   GLUCOSE 120* 135* 118*   BUN 6* 9 8   CREATININE 0.53 0.52 0.60   CALCIUM 9.1 8.2* 8.3*                   Imaging  MR-ABDOMEN-WITH & W/O   Final Result      Multi septated cystic lesion at the upper pole  LEFT kidney measuring 3.8 cm without associated enhancement or nodular component (Bosniak 2).            DX-PORTABLE FLUORO > 1 HOUR   Preliminary Result      Portable fluoroscopy utilized for 2 minutes 20.4 seconds.         INTERPRETING LOCATION: [address] [facility], [place], [id number]      CT-CTA AORTA-RO WITH & W/O-POST PROCESS   Final Result         CTA AORTA      1.  Septated lobulated left upper pole renal cystic mass lesion, recommend follow-up MRI of the kidneys for further characterization.   2.  Atherosclerosis and atherosclerotic coronary artery disease.   3.  Fat-containing right inguinal hernia      CTA LOWER EXTREMITIES      1.  Occlusion of the right common iliac artery through the distal superficial femoral artery   2.  Distal right peroneal artery occlusion with 2-vessel runoff the anterior and posterior tibial arteries at the right ankle.   3.  Occlusion of the left superficial femoral artery at the bifurcation extending through its length to the popliteal artery.   4.  Occlusion of the proximal left profunda femoris just distal to the bifurcation which reconstitutes.   5.  Soft tissue gas at the stump of left below-the-knee amputation, within expected limits for recent postop changes.      3D angiographic/MIP images of the vasculature confirm the vascular findings as described above.      These findings were discussed with the patient's clinician, Keith Navarro, on 9/29/2023 8:06 AM.      EC-ECHOCARDIOGRAM COMPLETE W/O CONT   Final Result      US-AORTA/ILIACS DUPLEX COMPLETE   Final Result      US-EXTREMITY ARTERY LOWER UNILAT RIGHT   Final Result      US-INGA SINGLE LEVEL UNILAT RIGHT   Final Result      DX-CHEST-PORTABLE (1 VIEW)   Final Result      1.  LEFT basilar atelectasis or pneumonia   2.  Trace LEFT pleural effusion or pleural scar           Assessment/Plan  * Gangrene of left foot (HCC)- (present on admission)  Assessment & Plan  Patient found to have significant gangrene on his left foot that  may need surgical intervention.  Vascular surgery consulted  -Status post revascularization   -aspirin/Plavix  -Cleared for DC, outpatient follow-up with Dr. Navarro  Pain medication with oxycodone and IV Dilaudid.  Monitor for adverse effect of narcotic pain medications.  Gabapentin  Vancomycin dose adjustment as per vancomycin trough level and monitor for vancomycin toxicity.  Orthopedic surgery consulted, s/p Left BKA on 9/27  -staples/sutures 14-21 days post operatively  ID consulted   -Continue IV abx end date 10/6    PAD (peripheral artery disease) (HCC)- (present on admission)  Assessment & Plan  Severe bilateral PAD  Vascular consulted  S/p revascularization on 9/30  Aspirin for life  Plavix for 1 month  -Outpatient follow-up with Dr. Navarro    Anemia- (present on admission)  Assessment & Plan  Iron studies consistent with chronic disease   Now with postop anemia  s/p 1 unit PRBC transfusion  B12 wnl   Trend CBC, transfuse Hb<7     Bacteremia- (present on admission)  Assessment & Plan  1/2 bcx with morganella   1/2 bcx with enterococcus   Repeat blood cultures ordered   Continue current abx   Echo no vegetations  Infectious disease consulted   -Zosyn end date 10/6, changed and changed to oral Cipro and Flagyl at discharges prior    Hyponatremia  Assessment & Plan  Patient found to have hyponatremia most likely due to dehydration.  Continue IV fluids  TSH wnl   check urine osmolality, urine sodium  Daily BMP    Elevated liver enzymes  Assessment & Plan  Elevated liver enzymes.  No abdominal pain.  Hepatitis panel negative    Leukocytosis  Assessment & Plan  Patient found to have leukocytosis most likely secondary to lower extremity wound.  IV abx per ID     resolved    Renal cyst  Assessment & Plan  On CT  MRI done pending read    Total time spent in chart review, at bedside with the patient, discussing with consultants, nursing and case management: 51 minutes    VTE prophylaxis: SCDs, lovenox ppx    I have  performed a physical exam and reviewed and updated ROS and Plan today (10/2/2023). In review of yesterday's note (10/1/2023), there are no changes except as documented above.

## 2023-10-02 NOTE — DOCUMENTATION QUERY
[facility]                                                                       Query Response Note      PATIENT:               ELI GREWAL  ACCT #:                  [id number]  MRN:                     [id number]  :                      1965  ADMIT DATE:       2023 8:30 AM  DISCH DATE:          RESPONDING  PROVIDER #:        [id number]           QUERY TEXT:    The diagnosis of Polymicrobial Sepsis has been documented in  ID consult.    -Patient with 0/4 SIRS on admission with WBC 11.2, T 36.1 HR 90 and RR 18.  -Urine Culture and Blood culture from admission positive and patient treated for significant gangrene on his left foot with BKA.    -2/4 SIRS noted on 10/1 with WBC 12.9 and HR greater than 90.     No further documentation of Sepsis.      Please clarify the status of sepsis.      Sepsis - real or suspected infection plus 2 or more SIRS criteria + organ dysfunction related to sepsis    Temp <96.8 or >101  HR >90  RR >20  WBC <4,000 or > 12,000  >10% bandemia             The patient's Clinical Indicators include:  59 yo M admitted with Gangrene of left foot s/p BKA    Clinical Indicators:   -Labs on admission: WBC 11.2; Lactic Acid 1.8;   -Blood Cx: + Morganella and Vagococcus (anaerobe) ; Urine culture with staph.    -Vitals on admission: T36.1; HR 90, RR 18    + SIRS on 10/1: HR 97; WBC 12.9      Risk Factors: Bacteremia, Positive Urine culture, significant gangrene on his left foot s/p BKA, homelessness, Anemia    Treatments: S/p BKA,  ID consult, repeat cultures, labs, IV abx until 10/6        Contact me with any questions.    Thank you for your time and attention,  Pamela Benitez RN, CDI  [email or web address]  Connect via email, Voalte or messenger.  Options provided:   -- Sepsis exists, POA, (provide SIRS criteria plus sepsis-related organ dysfunction)   -- Sepsis exists, developed after admission, (provide  sepsis-related organ dysfunction)   -- Sepsis does not exist   -- Other explanation, Please specify      Query created by: Pamela Benitez on 10/2/2023 11:05 AM    RESPONSE TEXT:    Sepsis does not exist          Electronically signed by:  KAITLYNN RANGEL MD 10/2/2023 3:57 PM

## 2023-10-02 NOTE — CARE PLAN
The patient is Stable - Low risk of patient condition declining or worsening    Shift Goals  Clinical Goals: pain control, iv abx  Patient Goals: comfort, pain relief  Family Goals: N/A    Progress made toward(s) clinical / shift goals: Plan of care and medications discussed and reviewed over with pt. Medicated per MAR for pain. Skin assessed and wounds assessed. All fall precautions in place. Bed alarm on. Fall education given to pt. Pt currently on iv abx and wound vac to treat and heal wounds.      Problem: Knowledge Deficit - Standard  Goal: Patient and family/care givers will demonstrate understanding of plan of care, disease process/condition, diagnostic tests and medications  Outcome: Progressing     Problem: Pain - Standard  Goal: Alleviation of pain or a reduction in pain to the patient’s comfort goal  Outcome: Progressing     Problem: Skin Integrity  Goal: Skin integrity is maintained or improved  Outcome: Progressing     Problem: Fall Risk  Goal: Patient will remain free from falls  Outcome: Progressing     Problem: Infection - Standard  Goal: Patient will remain free from infection  Outcome: Progressing     Problem: Wound/ / Incision Healing  Goal: Patient's wound/surgical incision will decrease in size and heals properly  Outcome: Progressing

## 2023-10-02 NOTE — PROGRESS NOTES
VASCULAR SURGERY PROGRESS NOTE       Awake, no complaints.  AF, VSS.    General: Pleasant male in none apparent distress.  Lower extremities: Prevena's in place with no surrounding erythema, drainage, or fluctuation.  Right foot is warm.  Chronic small dry ulcers at tip of right toes.  Status post left below-knee amputation with dressing dry and intact.    Labs: Reviewed.    Assessment: Status post bilateral femoral endarterectomy with patch angioplasty and right iliac artery stenting.    Plan:  Stable from vascular standpoint.  Paint right toe ulcers with Betadine once a day.  Patient may be discharged home from vascular standpoint.  Stays on aspirin and Plavix.  Follow-up with Dr. Navarro in the office.    Discussed with patient.  All questions were answered.

## 2023-10-02 NOTE — CARE PLAN
Problem: Pain - Standard  Goal: Alleviation of pain or a reduction in pain to the patient’s comfort goal  Outcome: Progressing     Problem: Infection - Standard  Goal: Patient will remain free from infection  Outcome: Progressing  Flowsheets (Taken 10/2/2023 1656)  Standard Infection Interventions:   Assessed for signs and symptoms of infection   Instructed patient/family on signs and symptoms of infection   Provided education on proper hand hygiene and infection prevention measures   Implemented standard precautions   The patient is Stable - Low risk of patient condition declining or worsening    Shift Goals  Clinical Goals: pain control; iv abx  Patient Goals: pain relief  Family Goals: none presesnt    Progress made toward(s) clinical / shift goals:  Pt required oxycodone 20mg q3-5 hrs. Pt good relief with gabapentin, tylenol. IV zosyn as ordered. Pt afebrile. Pt denies fever/chills. Good hand hygiene before and after pt contact.     Patient is not progressing towards the following goals:

## 2023-10-02 NOTE — THERAPY
"Physical Therapy   Daily Treatment     Patient Name: Dillon Centeno  Age:  58 y.o., Sex:  male  Medical Record #: [id number]  Today's Date: 10/2/2023     Precautions  Precautions: Fall Risk;Swallow Precautions    Assessment    Rec'd pt alert, in bed, agreeable to work w/ PT.  LLE/BKA, noted to be positioned in 90 degrees of knee flexion, resting on the distal part of his stump.  Per nsg, he is resistant to ROM and refused to wear his knee immobilizer.  Today, he did allow PT to extend his knee, obtaining almost full extension w/ gentle/slight over pressure (approx, minus 3 degrees).  Educated pt regarding proper positioning to avoid contracture, w/ no pillow directly behind his knee.  Bed features were locked in full knee extension.  Also educated pt regarding LE therex in supine, ie quad sets and SLR.  Pt does not respond well to education, often cutting off therapist stating \"I know\".  Attempted to work on oob/mobilization, however pt refused, despite much encouragement.      Plan    Treatment Plan Status: Continue Current Treatment Plan  Type of Treatment: Bed Mobility, Neuro Re-Education / Balance, Therapeutic Activities, Therapeutic Exercise  Treatment Frequency: 4 Times per Week  Treatment Duration: Until Therapy Goals Met    DC Equipment Recommendations: Unable to determine at this time  Discharge Recommendations: Recommend post-acute placement for additional physical therapy services prior to discharge home        Objective       10/02/23 1444   Supine Lower Body Exercise   Supine Lower Body Exercises Yes   Straight Leg Raises 1 set of 10   Quadriceps Isometrics 1 set of 10   Balance   Comments Refused   Bed Mobility    Supine to Sit Refused   Gait Analysis   Gait Level Of Assist Refused   Functional Mobility   Sit to Stand Refused   Short Term Goals    Short Term Goal # 1 Patient will perform supine-sit with HOB flat with supervision in 6 visits   Goal Outcome # 1 goal not met   Short Term Goal # 2 " Patient will perform chair transfers with supervision in 6 visits   Goal Outcome # 2 Goal not met   Short Term Goal # 3 Patient will perform sit-stand with FWW with supervision in 6 visits   Goal Outcome # 3 Goal not met   Short Term Goal # 4 Pt to position LLE correctly on pillows to avoid knee flexion contracture w/o cues in 6 visits   Education Group   Additional Comments Educated regarding LLE positioning.  Bed features locked in knee extension   Physical Therapy Treatment Plan   Physical Therapy Treatment Plan Continue Current Treatment Plan   Anticipated Discharge Equipment and Recommendations   DC Equipment Recommendations Unable to determine at this time   Discharge Recommendations Recommend post-acute placement for additional physical therapy services prior to discharge home

## 2023-10-02 NOTE — CARE PLAN
The patient is Stable - Low risk of patient condition declining or worsening    Shift Goals  Clinical Goals: pain less than 7, remove gonzales  Patient Goals: comfort, pain releieef  Family Goals: ashlie    Patient is not progressing towards the following goals:      Problem: Pain - Standard  Goal: Alleviation of pain or a reduction in pain to the patient’s comfort goal  Description: Target End Date:  Prior to discharge or change in level of care    Document on Vitals flowsheet    1.  Document pain using the appropriate pain scale per order or unit policy  2.  Educate and implement non-pharmacologic comfort measures (i.e. relaxation, distraction, massage, cold/heat therapy, etc.)  3.  Pain management medications as ordered  4.  Reassess pain after pain med administration per policy  5.  If opiods administered assess patient's response to pain medication is appropriate per POSS sedation scale  6.  Follow pain management plan developed in collaboration with patient and interdisciplinary team (including palliative care or pain specialists if applicable)  Outcome: Not Progressing

## 2023-10-02 NOTE — PROGRESS NOTES
Received report from Liza LÓPEZ. Assumed care at 0700  Patient a & o x 4  Pt receiving IV Zosyn  Prevena wound vac to b/l groin c/d/I .   Left leg dressing to be changed today and every other day  Pt refuses left leg immobilizer d/t pain. Right offloading boot when OOB.  SNF placement pending/PT/OT eval  Voiding w/o difficulty  Tolerating a  regular diet; BM 9/29  Pt states pain 7-10/10 to left lower extremity; pt requiring oxy 20mg q3hr.   Patient ambulating with max assistance and walker. Gait unsteady.   Patient oriented to room, surroundings and call bell. Bed in lowest position, locked and upper side rails up. Patient demonstrated correct use of call bell. Call bell/belongings within reach. Patient educated on hourly rounding.   Plan   Continue IV Zosyn  PT/OT   Pain control

## 2023-10-03 PROBLEM — D72.829 LEUKOCYTOSIS: Status: RESOLVED | Noted: 2023-09-26 | Resolved: 2023-10-03

## 2023-10-03 LAB
ANION GAP SERPL CALC-SCNC: 9 MMOL/L (ref 7–16)
BUN SERPL-MCNC: 8 MG/DL (ref 8–22)
CALCIUM SERPL-MCNC: 8.6 MG/DL (ref 8.5–10.5)
CHLORIDE SERPL-SCNC: 97 MMOL/L (ref 96–112)
CO2 SERPL-SCNC: 25 MMOL/L (ref 20–33)
CREAT SERPL-MCNC: 0.69 MG/DL (ref 0.5–1.4)
ERYTHROCYTE [DISTWIDTH] IN BLOOD BY AUTOMATED COUNT: 48.6 FL (ref 35.9–50)
GFR SERPLBLD CREATININE-BSD FMLA CKD-EPI: 106 ML/MIN/1.73 M 2
GLUCOSE SERPL-MCNC: 106 MG/DL (ref 65–99)
HCT VFR BLD AUTO: 27.3 % (ref 42–52)
HGB BLD-MCNC: 8.6 G/DL (ref 14–18)
MCH RBC QN AUTO: 28.5 PG (ref 27–33)
MCHC RBC AUTO-ENTMCNC: 31.5 G/DL (ref 32.3–36.5)
MCV RBC AUTO: 90.4 FL (ref 81.4–97.8)
PLATELET # BLD AUTO: 521 K/UL (ref 164–446)
PMV BLD AUTO: 8.7 FL (ref 9–12.9)
POTASSIUM SERPL-SCNC: 4.8 MMOL/L (ref 3.6–5.5)
RBC # BLD AUTO: 3.02 M/UL (ref 4.7–6.1)
SODIUM SERPL-SCNC: 131 MMOL/L (ref 135–145)
WBC # BLD AUTO: 7.8 K/UL (ref 4.8–10.8)

## 2023-10-03 PROCEDURE — 700102 HCHG RX REV CODE 250 W/ 637 OVERRIDE(OP): Performed by: SURGERY

## 2023-10-03 PROCEDURE — 99232 SBSQ HOSP IP/OBS MODERATE 35: CPT | Performed by: INTERNAL MEDICINE

## 2023-10-03 PROCEDURE — 700102 HCHG RX REV CODE 250 W/ 637 OVERRIDE(OP): Performed by: INTERNAL MEDICINE

## 2023-10-03 PROCEDURE — 700111 HCHG RX REV CODE 636 W/ 250 OVERRIDE (IP): Performed by: INTERNAL MEDICINE

## 2023-10-03 PROCEDURE — 80048 BASIC METABOLIC PNL TOTAL CA: CPT

## 2023-10-03 PROCEDURE — 700111 HCHG RX REV CODE 636 W/ 250 OVERRIDE (IP): Performed by: SURGERY

## 2023-10-03 PROCEDURE — A9270 NON-COVERED ITEM OR SERVICE: HCPCS | Performed by: INTERNAL MEDICINE

## 2023-10-03 PROCEDURE — A9270 NON-COVERED ITEM OR SERVICE: HCPCS | Performed by: SURGERY

## 2023-10-03 PROCEDURE — 36415 COLL VENOUS BLD VENIPUNCTURE: CPT

## 2023-10-03 PROCEDURE — 770006 HCHG ROOM/CARE - MED/SURG/GYN SEMI*

## 2023-10-03 PROCEDURE — 700105 HCHG RX REV CODE 258: Performed by: INTERNAL MEDICINE

## 2023-10-03 PROCEDURE — 85027 COMPLETE CBC AUTOMATED: CPT

## 2023-10-03 RX ADMIN — HEPARIN SODIUM 5000 UNITS: 5000 INJECTION, SOLUTION INTRAVENOUS; SUBCUTANEOUS at 04:48

## 2023-10-03 RX ADMIN — NICOTINE TRANSDERMAL SYSTEM 21 MG: 21 PATCH, EXTENDED RELEASE TRANSDERMAL at 04:47

## 2023-10-03 RX ADMIN — GABAPENTIN 300 MG: 300 CAPSULE ORAL at 04:46

## 2023-10-03 RX ADMIN — CYCLOBENZAPRINE 10 MG: 10 TABLET, FILM COATED ORAL at 23:06

## 2023-10-03 RX ADMIN — CLOPIDOGREL BISULFATE 75 MG: 75 TABLET ORAL at 04:47

## 2023-10-03 RX ADMIN — HYDROMORPHONE HYDROCHLORIDE 1.5 MG: 2 INJECTION, SOLUTION INTRAMUSCULAR; INTRAVENOUS; SUBCUTANEOUS at 00:42

## 2023-10-03 RX ADMIN — PIPERACILLIN AND TAZOBACTAM 4.5 G: 4; .5 INJECTION, POWDER, FOR SOLUTION INTRAVENOUS at 12:29

## 2023-10-03 RX ADMIN — HEPARIN SODIUM 5000 UNITS: 5000 INJECTION, SOLUTION INTRAVENOUS; SUBCUTANEOUS at 21:00

## 2023-10-03 RX ADMIN — PIPERACILLIN AND TAZOBACTAM 4.5 G: 4; .5 INJECTION, POWDER, FOR SOLUTION INTRAVENOUS at 04:45

## 2023-10-03 RX ADMIN — ACETAMINOPHEN 650 MG: 325 TABLET, FILM COATED ORAL at 23:06

## 2023-10-03 RX ADMIN — ACETAMINOPHEN 650 MG: 325 TABLET, FILM COATED ORAL at 04:47

## 2023-10-03 RX ADMIN — OXYCODONE HYDROCHLORIDE 15 MG: 15 TABLET ORAL at 23:06

## 2023-10-03 RX ADMIN — ASPIRIN 81 MG: 81 TABLET, COATED ORAL at 04:47

## 2023-10-03 RX ADMIN — DOCUSATE SODIUM 50 MG AND SENNOSIDES 8.6 MG 2 TABLET: 8.6; 5 TABLET, FILM COATED ORAL at 04:47

## 2023-10-03 RX ADMIN — DOCUSATE SODIUM 50 MG AND SENNOSIDES 8.6 MG 2 TABLET: 8.6; 5 TABLET, FILM COATED ORAL at 17:06

## 2023-10-03 RX ADMIN — OXYCODONE HYDROCHLORIDE 20 MG: 10 TABLET ORAL at 06:35

## 2023-10-03 RX ADMIN — PIPERACILLIN AND TAZOBACTAM 4.5 G: 4; .5 INJECTION, POWDER, FOR SOLUTION INTRAVENOUS at 21:00

## 2023-10-03 RX ADMIN — GABAPENTIN 300 MG: 300 CAPSULE ORAL at 12:29

## 2023-10-03 RX ADMIN — GABAPENTIN 300 MG: 300 CAPSULE ORAL at 17:06

## 2023-10-03 RX ADMIN — OXYCODONE HYDROCHLORIDE 20 MG: 10 TABLET ORAL at 19:39

## 2023-10-03 RX ADMIN — ACETAMINOPHEN 650 MG: 325 TABLET, FILM COATED ORAL at 17:06

## 2023-10-03 RX ADMIN — ACETAMINOPHEN 650 MG: 325 TABLET, FILM COATED ORAL at 12:29

## 2023-10-03 RX ADMIN — HEPARIN SODIUM 5000 UNITS: 5000 INJECTION, SOLUTION INTRAVENOUS; SUBCUTANEOUS at 15:05

## 2023-10-03 ASSESSMENT — ENCOUNTER SYMPTOMS
CARDIOVASCULAR NEGATIVE: 1
EYES NEGATIVE: 1
GASTROINTESTINAL NEGATIVE: 1
RESPIRATORY NEGATIVE: 1
NEUROLOGICAL NEGATIVE: 1
PSYCHIATRIC NEGATIVE: 1

## 2023-10-03 ASSESSMENT — PATIENT HEALTH QUESTIONNAIRE - PHQ9
SUM OF ALL RESPONSES TO PHQ9 QUESTIONS 1 AND 2: 0
2. FEELING DOWN, DEPRESSED, IRRITABLE, OR HOPELESS: NOT AT ALL
1. LITTLE INTEREST OR PLEASURE IN DOING THINGS: NOT AT ALL

## 2023-10-03 ASSESSMENT — PAIN DESCRIPTION - PAIN TYPE
TYPE: ACUTE PAIN

## 2023-10-03 NOTE — CARE PLAN
The patient is Stable - Low risk of patient condition declining or worsening    Shift Goals  Clinical Goals: Pain Management / Pt Safety  Patient Goals: Pain Management  Family Goals: none presesnt    Progress made toward(s) clinical / shift goals:  Pain managed w/ PRN analgesics.  No falls. No safety events over night.     Patient is not progressing towards the following goals: n/a  Problem: Knowledge Deficit - Standard  Goal: Patient and family/care givers will demonstrate understanding of plan of care, disease process/condition, diagnostic tests and medications  Outcome: Progressing     Problem: Pain - Standard  Goal: Alleviation of pain or a reduction in pain to the patient’s comfort goal  Outcome: Progressing     Problem: Skin Integrity  Goal: Skin integrity is maintained or improved  Outcome: Progressing     Problem: Fall Risk  Goal: Patient will remain free from falls  Outcome: Progressing     Problem: Infection - Standard  Goal: Patient will remain free from infection  Outcome: Progressing     Problem: Wound/ / Incision Healing  Goal: Patient's wound/surgical incision will decrease in size and heals properly  Outcome: Progressing

## 2023-10-03 NOTE — PROGRESS NOTES
Pt: Dillon Centeno  Room: 602-1    A&Ox4     Safety: Call light within reach, bed in low position, wheels  locked. No Falls. Antiskid socks on.     Resp: WNL on RA    Cardiac:  WNL, NRR     GI: passing gas, BM 9/29 - declining laxative/stool softener   : voiding clear yellow urine     Lines / Draines: PIV  20 gauge R FA -  redressed per protocol. Wound Vac @ 125 mmhg Y-sited - no output.     Surgical wound: Dressing CDI. No drainage. Wound vac dressing in groin are transparent, occlusive, w/o leaks.

## 2023-10-03 NOTE — PROGRESS NOTES
Hospital Medicine Daily Progress Note    Date of Service  10/3/2023    Chief Complaint  Dillon Centeno is a 58 y.o. male admitted 9/26/2023 with bilateral foot wounds. He has hypertension and tobacco dependence.    Hospital Course  In the ER, patient was noted to have gangrene on his left foot.  Vascular surgery and orthopedic surgery were consulted.  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      Vascular studies showed severe arterial disease.  Patient underwent bilateral common femoral artery endarterectomy and profundoplasty with saphenous vein angioplasty with stent placement in the right external iliac artery on 9/30/2023. Prevena was placed (5-7 days). He was recommended to have Plavix for 1 month and aspirin for life.    Blood cultures from 9/26/2023 grew Morganella morganii and Vagococcus.  Urine culture from 9/26/2023 grew Staph epidermidis. ID were consulted.  Patient was placed on Zosyn with end date of 10/6/2023.  Echocardiogram showed LVEF of about 60% with no reported valvular abnormalities.  Repeat blood cultures from 9/27/2023 have been negative.    Noted to have renal cyst on CT.  MRI shows multiseptated cystic lesions in the upper pole of left kidney measuring 3.8 cm without associated enhancement or nodular component.    Interval Problem Update  Awaiting SNF placement.    I have discussed this patient's plan of care and discharge plan at IDT rounds today with Case Management, Nursing, Nursing leadership, and other members of the IDT team.    Consultants/Specialty  infectious disease, orthopedics, and vascular surgery    Code Status  Full Code    Disposition  The patient is not medically cleared for discharge to home or a post-acute facility.      I have placed the appropriate orders for post-discharge needs.    Review of Systems  Review of Systems   Constitutional:  Positive for malaise/fatigue.   HENT: Negative.     Eyes: Negative.    Respiratory: Negative.     Cardiovascular: Negative.     Gastrointestinal: Negative.    Genitourinary: Negative.    Musculoskeletal:         Pain in phantom limb   Skin: Negative.    Neurological: Negative.    Endo/Heme/Allergies: Negative.    Psychiatric/Behavioral: Negative.          Physical Exam  Temp:  [36.2 °C (97.1 °F)-36.7 °C (98 °F)] 36.2 °C (97.1 °F)  Pulse:  [90-93] 93  Resp:  [17-18] 17  BP: (120-123)/(62-80) 121/80  SpO2:  [95 %-99 %] 99 %    Physical Exam  Constitutional:       General: He is not in acute distress.  HENT:      Head: Normocephalic.      Nose: Nose normal.      Mouth/Throat:      Mouth: Mucous membranes are moist.   Eyes:      Pupils: Pupils are equal, round, and reactive to light.   Cardiovascular:      Rate and Rhythm: Normal rate.   Pulmonary:      Effort: Pulmonary effort is normal.   Abdominal:      Palpations: Abdomen is soft.   Musculoskeletal:      Cervical back: Normal range of motion.      Right lower leg: No edema.      Comments: Left BKA   Skin:     General: Skin is warm.   Neurological:      Mental Status: He is alert. Mental status is at baseline.   Psychiatric:         Mood and Affect: Mood normal.         Fluids    Intake/Output Summary (Last 24 hours) at 10/3/2023 1632  Last data filed at 10/3/2023 1525  Gross per 24 hour   Intake 1480 ml   Output 1100 ml   Net 380 ml       Laboratory  Recent Labs     10/01/23  1302 10/02/23  0820 10/03/23  0404   WBC 11.3* 10.5 7.8   RBC 3.10* 2.95* 3.02*   HEMOGLOBIN 9.0* 8.6* 8.6*   HEMATOCRIT 27.9* 25.9* 27.3*   MCV 90.0 87.8 90.4   MCH 29.0 29.2 28.5   MCHC 32.3 33.2 31.5*   RDW 48.9 47.1 48.6   PLATELETCT 516* 487* 521*   MPV 8.7* 8.6* 8.7*     Recent Labs     10/01/23  0554 10/02/23  0820 10/03/23  0404   SODIUM 128* 129* 131*   POTASSIUM 4.9 4.4 4.8   CHLORIDE 94* 94* 97   CO2 27 26 25   GLUCOSE 135* 118* 106*   BUN 9 8 8   CREATININE 0.52 0.60 0.69   CALCIUM 8.2* 8.3* 8.6                   Imaging  MR-ABDOMEN-WITH & W/O   Final Result      Multi septated cystic lesion at the upper  pole LEFT kidney measuring 3.8 cm without associated enhancement or nodular component (Bosniak 2).            DX-PORTABLE FLUORO > 1 HOUR   Final Result      Portable fluoroscopy utilized for 2 minutes 20.4 seconds.         INTERPRETING LOCATION: [address] [facility], [place], [address]      CT-CTA AORTA-RO WITH & W/O-POST PROCESS   Final Result         CTA AORTA      1.  Septated lobulated left upper pole renal cystic mass lesion, recommend follow-up MRI of the kidneys for further characterization.   2.  Atherosclerosis and atherosclerotic coronary artery disease.   3.  Fat-containing right inguinal hernia      CTA LOWER EXTREMITIES      1.  Occlusion of the right common iliac artery through the distal superficial femoral artery   2.  Distal right peroneal artery occlusion with 2-vessel runoff the anterior and posterior tibial arteries at the right ankle.   3.  Occlusion of the left superficial femoral artery at the bifurcation extending through its length to the popliteal artery.   4.  Occlusion of the proximal left profunda femoris just distal to the bifurcation which reconstitutes.   5.  Soft tissue gas at the stump of left below-the-knee amputation, within expected limits for recent postop changes.      3D angiographic/MIP images of the vasculature confirm the vascular findings as described above.      These findings were discussed with the patient's clinician, Keith Navarro, on 9/29/2023 8:06 AM.      EC-ECHOCARDIOGRAM COMPLETE W/O CONT   Final Result      US-AORTA/ILIACS DUPLEX COMPLETE   Final Result      US-EXTREMITY ARTERY LOWER UNILAT RIGHT   Final Result      US-INGA SINGLE LEVEL UNILAT RIGHT   Final Result      DX-CHEST-PORTABLE (1 VIEW)   Final Result      1.  LEFT basilar atelectasis or pneumonia   2.  Trace LEFT pleural effusion or pleural scar           Assessment/Plan  * Gangrene of left foot (HCC)- (present on admission)  Assessment & Plan  Left BKA on 9/27/2023. Vascular studies showed severe arterial  disease. Underwent bilateral common femoral artery endarterectomy and profundoplasty with saphenous vein angioplasty with stent placement in the right external iliac artery on 9/30/2023. Prevena was placed (5-7 days). He was recommended to have Plavix for 1 month and aspirin for life.  Follow-up with Dr. Navarro. Kayleen to be removed 14 to 21 days postoperatively.  On Zosyn with end date 10/6/2023    Bacteremia- (present on admission)  Assessment & Plan  Blood cultures from 9/26/2023 grew Morganella morganii and Vagococcus. ID were consulted.  On Zosyn until 10/6/2023 (may be changed to Cipro on Flagyl at discharge). Repeat blood cultures from 9/27/2023 have been negative.  Echocardiogram showed no evidence of vegetations.     PAD (peripheral artery disease) (HCC)- (present on admission)  Assessment & Plan  Underwent bilateral common femoral artery endarterectomy and profundoplasty with saphenous vein angioplasty with stent placement in the right external iliac artery on 9/30/2023. Prevena was placed (5-7 days). Recommended to have Plavix for 1 month and aspirin for life.  Follow-up with Dr. Navarro    Renal cyst  Assessment & Plan  Noted to have renal cyst on CT.  MRI shows multiseptated cystic lesions in the upper pole of left kidney measuring 3.8 cm without associated enhancement or nodular component.  Recommend outpatient follow-up    Anemia- (present on admission)  Assessment & Plan  Stable.  Monitor    Hyponatremia  Assessment & Plan  Continue IV fluids    Elevated liver enzymes  Assessment & Plan  Resolved.  Hepatitis panel was normal.  Monitor         VTE prophylaxis: SCDs, Lovenox

## 2023-10-04 LAB
ANION GAP SERPL CALC-SCNC: 8 MMOL/L (ref 7–16)
BASOPHILS # BLD AUTO: 0.5 % (ref 0–1.8)
BASOPHILS # BLD: 0.04 K/UL (ref 0–0.12)
BUN SERPL-MCNC: 8 MG/DL (ref 8–22)
CALCIUM SERPL-MCNC: 8.3 MG/DL (ref 8.5–10.5)
CHLORIDE SERPL-SCNC: 97 MMOL/L (ref 96–112)
CO2 SERPL-SCNC: 24 MMOL/L (ref 20–33)
CREAT SERPL-MCNC: 0.55 MG/DL (ref 0.5–1.4)
EOSINOPHIL # BLD AUTO: 0.15 K/UL (ref 0–0.51)
EOSINOPHIL NFR BLD: 1.9 % (ref 0–6.9)
ERYTHROCYTE [DISTWIDTH] IN BLOOD BY AUTOMATED COUNT: 50.7 FL (ref 35.9–50)
GFR SERPLBLD CREATININE-BSD FMLA CKD-EPI: 114 ML/MIN/1.73 M 2
GLUCOSE SERPL-MCNC: 101 MG/DL (ref 65–99)
HCT VFR BLD AUTO: 24.1 % (ref 42–52)
HGB BLD-MCNC: 7.5 G/DL (ref 14–18)
IMM GRANULOCYTES # BLD AUTO: 0.1 K/UL (ref 0–0.11)
IMM GRANULOCYTES NFR BLD AUTO: 1.3 % (ref 0–0.9)
LYMPHOCYTES # BLD AUTO: 1.45 K/UL (ref 1–4.8)
LYMPHOCYTES NFR BLD: 18.2 % (ref 22–41)
MCH RBC QN AUTO: 28.1 PG (ref 27–33)
MCHC RBC AUTO-ENTMCNC: 31.1 G/DL (ref 32.3–36.5)
MCV RBC AUTO: 90.3 FL (ref 81.4–97.8)
MONOCYTES # BLD AUTO: 0.53 K/UL (ref 0–0.85)
MONOCYTES NFR BLD AUTO: 6.7 % (ref 0–13.4)
NEUTROPHILS # BLD AUTO: 5.69 K/UL (ref 1.82–7.42)
NEUTROPHILS NFR BLD: 71.4 % (ref 44–72)
NRBC # BLD AUTO: 0 K/UL
NRBC BLD-RTO: 0 /100 WBC (ref 0–0.2)
PLATELET # BLD AUTO: 478 K/UL (ref 164–446)
PMV BLD AUTO: 8.4 FL (ref 9–12.9)
POTASSIUM SERPL-SCNC: 4.4 MMOL/L (ref 3.6–5.5)
RBC # BLD AUTO: 2.67 M/UL (ref 4.7–6.1)
SODIUM SERPL-SCNC: 129 MMOL/L (ref 135–145)
WBC # BLD AUTO: 8 K/UL (ref 4.8–10.8)

## 2023-10-04 PROCEDURE — 700111 HCHG RX REV CODE 636 W/ 250 OVERRIDE (IP): Mod: JZ | Performed by: INTERNAL MEDICINE

## 2023-10-04 PROCEDURE — 36415 COLL VENOUS BLD VENIPUNCTURE: CPT

## 2023-10-04 PROCEDURE — A9270 NON-COVERED ITEM OR SERVICE: HCPCS | Performed by: INTERNAL MEDICINE

## 2023-10-04 PROCEDURE — 99232 SBSQ HOSP IP/OBS MODERATE 35: CPT | Performed by: INTERNAL MEDICINE

## 2023-10-04 PROCEDURE — 700105 HCHG RX REV CODE 258: Performed by: INTERNAL MEDICINE

## 2023-10-04 PROCEDURE — 700102 HCHG RX REV CODE 250 W/ 637 OVERRIDE(OP): Performed by: SURGERY

## 2023-10-04 PROCEDURE — 85025 COMPLETE CBC W/AUTO DIFF WBC: CPT

## 2023-10-04 PROCEDURE — 80048 BASIC METABOLIC PNL TOTAL CA: CPT

## 2023-10-04 PROCEDURE — 700102 HCHG RX REV CODE 250 W/ 637 OVERRIDE(OP): Performed by: INTERNAL MEDICINE

## 2023-10-04 PROCEDURE — 700111 HCHG RX REV CODE 636 W/ 250 OVERRIDE (IP): Performed by: SURGERY

## 2023-10-04 PROCEDURE — 770006 HCHG ROOM/CARE - MED/SURG/GYN SEMI*

## 2023-10-04 PROCEDURE — A9270 NON-COVERED ITEM OR SERVICE: HCPCS | Performed by: SURGERY

## 2023-10-04 RX ADMIN — HEPARIN SODIUM 5000 UNITS: 5000 INJECTION, SOLUTION INTRAVENOUS; SUBCUTANEOUS at 05:57

## 2023-10-04 RX ADMIN — ACETAMINOPHEN 650 MG: 325 TABLET, FILM COATED ORAL at 12:29

## 2023-10-04 RX ADMIN — PIPERACILLIN AND TAZOBACTAM 4.5 G: 4; .5 INJECTION, POWDER, FOR SOLUTION INTRAVENOUS at 21:08

## 2023-10-04 RX ADMIN — ASPIRIN 81 MG: 81 TABLET, COATED ORAL at 05:57

## 2023-10-04 RX ADMIN — HYDROMORPHONE HYDROCHLORIDE 1.5 MG: 2 INJECTION, SOLUTION INTRAMUSCULAR; INTRAVENOUS; SUBCUTANEOUS at 13:51

## 2023-10-04 RX ADMIN — GABAPENTIN 300 MG: 300 CAPSULE ORAL at 12:29

## 2023-10-04 RX ADMIN — OXYCODONE HYDROCHLORIDE 15 MG: 15 TABLET ORAL at 21:05

## 2023-10-04 RX ADMIN — NICOTINE TRANSDERMAL SYSTEM 21 MG: 21 PATCH, EXTENDED RELEASE TRANSDERMAL at 05:56

## 2023-10-04 RX ADMIN — ACETAMINOPHEN 650 MG: 325 TABLET, FILM COATED ORAL at 05:56

## 2023-10-04 RX ADMIN — HYDROMORPHONE HYDROCHLORIDE 1.5 MG: 2 INJECTION, SOLUTION INTRAMUSCULAR; INTRAVENOUS; SUBCUTANEOUS at 19:30

## 2023-10-04 RX ADMIN — PIPERACILLIN AND TAZOBACTAM 4.5 G: 4; .5 INJECTION, POWDER, FOR SOLUTION INTRAVENOUS at 04:04

## 2023-10-04 RX ADMIN — DOCUSATE SODIUM 50 MG AND SENNOSIDES 8.6 MG 2 TABLET: 8.6; 5 TABLET, FILM COATED ORAL at 05:56

## 2023-10-04 RX ADMIN — HEPARIN SODIUM 5000 UNITS: 5000 INJECTION, SOLUTION INTRAVENOUS; SUBCUTANEOUS at 21:06

## 2023-10-04 RX ADMIN — OXYCODONE HYDROCHLORIDE 20 MG: 10 TABLET ORAL at 11:23

## 2023-10-04 RX ADMIN — GABAPENTIN 300 MG: 300 CAPSULE ORAL at 05:57

## 2023-10-04 RX ADMIN — HEPARIN SODIUM 5000 UNITS: 5000 INJECTION, SOLUTION INTRAVENOUS; SUBCUTANEOUS at 13:40

## 2023-10-04 RX ADMIN — GABAPENTIN 300 MG: 300 CAPSULE ORAL at 17:31

## 2023-10-04 RX ADMIN — PIPERACILLIN AND TAZOBACTAM 4.5 G: 4; .5 INJECTION, POWDER, FOR SOLUTION INTRAVENOUS at 12:32

## 2023-10-04 RX ADMIN — ACETAMINOPHEN 650 MG: 325 TABLET, FILM COATED ORAL at 17:31

## 2023-10-04 RX ADMIN — DOCUSATE SODIUM 50 MG AND SENNOSIDES 8.6 MG 2 TABLET: 8.6; 5 TABLET, FILM COATED ORAL at 17:31

## 2023-10-04 RX ADMIN — ACETAMINOPHEN 650 MG: 325 TABLET, FILM COATED ORAL at 23:54

## 2023-10-04 RX ADMIN — OXYCODONE HYDROCHLORIDE 20 MG: 10 TABLET ORAL at 04:02

## 2023-10-04 RX ADMIN — CLOPIDOGREL BISULFATE 75 MG: 75 TABLET ORAL at 05:57

## 2023-10-04 RX ADMIN — CYCLOBENZAPRINE 10 MG: 10 TABLET, FILM COATED ORAL at 23:54

## 2023-10-04 ASSESSMENT — PAIN DESCRIPTION - PAIN TYPE
TYPE: ACUTE PAIN

## 2023-10-04 ASSESSMENT — PATIENT HEALTH QUESTIONNAIRE - PHQ9
2. FEELING DOWN, DEPRESSED, IRRITABLE, OR HOPELESS: NOT AT ALL
1. LITTLE INTEREST OR PLEASURE IN DOING THINGS: NOT AT ALL
SUM OF ALL RESPONSES TO PHQ9 QUESTIONS 1 AND 2: 0

## 2023-10-04 ASSESSMENT — ENCOUNTER SYMPTOMS
CARDIOVASCULAR NEGATIVE: 1
PSYCHIATRIC NEGATIVE: 1
GASTROINTESTINAL NEGATIVE: 1
RESPIRATORY NEGATIVE: 1
EYES NEGATIVE: 1
NEUROLOGICAL NEGATIVE: 1

## 2023-10-04 NOTE — CARE PLAN
The patient is Stable - Low risk of patient condition declining or worsening    Shift Goals  Clinical Goals: Pain Management / Monitor VS  Patient Goals: Pain Management  Family Goals: none presesnt    Progress made toward(s) clinical / shift goals:  Pain managed w/ PRN medications, VS WNL.      Patient is not progressing towards the following goals: needs to turn proactively to prevent skin breakdown, refusing CNA/ RN turning   Problem: Knowledge Deficit - Standard  Goal: Patient and family/care givers will demonstrate understanding of plan of care, disease process/condition, diagnostic tests and medications  Outcome: Progressing     Problem: Pain - Standard  Goal: Alleviation of pain or a reduction in pain to the patient’s comfort goal  Outcome: Progressing     Problem: Skin Integrity  Goal: Skin integrity is maintained or improved  Outcome: Progressing     Problem: Fall Risk  Goal: Patient will remain free from falls  Outcome: Progressing     Problem: Infection - Standard  Goal: Patient will remain free from infection  Outcome: Progressing     Problem: Wound/ / Incision Healing  Goal: Patient's wound/surgical incision will decrease in size and heals properly  Outcome: Progressing

## 2023-10-04 NOTE — CARE PLAN
The patient is Stable - Low risk of patient condition declining or worsening    Shift Goals  Clinical Goals: Pain management  Patient Goals: Pain control  Family Goals: none presesnt    Progress made toward(s) clinical / shift goals:    Problem: Knowledge Deficit - Standard  Goal: Patient and family/care givers will demonstrate understanding of plan of care, disease process/condition, diagnostic tests and medications  Description: Target End Date:  1-3 days or as soon as patient condition allows    Document in Patient Education    1.  Patient and family/caregiver oriented to unit, equipment, visitation policy and means for communicating concern  2.  Complete/review Learning Assessment  3.  Assess knowledge level of disease process/condition, treatment plan, diagnostic tests and medications  4.  Explain disease process/condition, treatment plan, diagnostic tests and medications  Outcome: Progressing     Problem: Pain - Standard  Goal: Alleviation of pain or a reduction in pain to the patient’s comfort goal  Description: Target End Date:  Prior to discharge or change in level of care    Document on Vitals flowsheet    1.  Document pain using the appropriate pain scale per order or unit policy  2.  Educate and implement non-pharmacologic comfort measures (i.e. relaxation, distraction, massage, cold/heat therapy, etc.)  3.  Pain management medications as ordered  4.  Reassess pain after pain med administration per policy  5.  If opiods administered assess patient's response to pain medication is appropriate per POSS sedation scale  6.  Follow pain management plan developed in collaboration with patient and interdisciplinary team (including palliative care or pain specialists if applicable)  Outcome: Progressing     Problem: Skin Integrity  Goal: Skin integrity is maintained or improved  Description: Target End Date:  Prior to discharge or change in level of care    Document interventions on Skin Risk/Jae flowsheet  groups and corresponding LDA    1.  Assess and monitor skin integrity, appearance and/or temperature  2.  Assess risk factors for impaired skin integrity and/or pressures ulcers  3.  Implement precautions to protect skin integrity in collaboration with interdisciplinary team  4.  Implement pressure ulcer prevention protocol if at risk for skin breakdown  5.  Confirm wound care consult if at risk for skin breakdown  6.  Ensure patient use of pressure relieving devices  (Low air loss bed, waffle overlay, heel protectors, ROHO cushion, etc)  Outcome: Progressing  Note: Pt is A&Ox4, no c/o pain at this time. Wound vac in place to duyen groin, no drainage noted. LBKA dressing is changed and CDI. Pt updated on plan of care, verbalized understanding. Turned and repositioned throughout shift. No acute event during shift. Call light within reach, safety measures in place.     Problem: Fall Risk  Goal: Patient will remain free from falls  Description: Target End Date:  Prior to discharge or change in level of care    Document interventions on the [facility] Fall Risk Assessment    1.  Assess for fall risk factors  2.  Implement fall precautions  Outcome: Progressing     Problem: Infection - Standard  Goal: Patient will remain free from infection  Description: Target End Date:  Prior to discharge or change in level of care    1.  Utilize Standard Precautions at all times to reduce the risk of transmission of microorganisms from both recognized and  unrecognized sources of infection  2.  Infection prevention handouts provided (general/device/diagnosis specific) and documented in Patient Education  3.  Educate patient and family/caregiver on isolation precautions if applicable  Outcome: Progressing     Problem: Wound/ / Incision Healing  Goal: Patient's wound/surgical incision will decrease in size and heals properly  Description: Target End Date:  Prior to discharge or change in level of care    Document on LDA    1.  Assess  and document surgical incision/wound  2.  Provide incision/wound care per policy and/or provider orders  3.  Manage surgical drains per policy if applicable  4.  Encourage adequate nutrition to promote wound healing  5.  Collaborate with Clinical Dietician  Outcome: Progressing       Patient is not progressing towards the following goals:

## 2023-10-04 NOTE — PROGRESS NOTES
[facility] Medicine Daily Progress Note    Date of Service  10/4/2023    Chief Complaint  Dillon Centeno is a 58 y.o. male admitted 9/26/2023 with bilateral foot wounds. He has hypertension and tobacco dependence.    Hospital Course  In the ER, patient was noted to have gangrene on his left foot.  Vascular surgery and orthopedic surgery were consulted.  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      Vascular studies showed severe arterial disease.  Patient underwent bilateral common femoral artery endarterectomy and profundoplasty with saphenous vein angioplasty with stent placement in the right external iliac artery on 9/30/2023. Prevena was placed (5-7 days). He was recommended to have Plavix for 1 month and aspirin for life.    Blood cultures from 9/26/2023 grew Morganella morganii and Vagococcus.  Urine culture from 9/26/2023 grew Staph epidermidis. ID were consulted.  Patient was placed on Zosyn with end date of 10/6/2023.  Echocardiogram showed LVEF of about 60% with no reported valvular abnormalities.  Repeat blood cultures from 9/27/2023 have been negative.    Noted to have renal cyst on CT.  MRI shows multiseptated cystic lesions in the upper pole of left kidney measuring 3.8 cm without associated enhancement or nodular component.    Interval Problem Update  Awaiting SNF placement.  Afebrile and hemodynamically stable.  No new complaints.    I have discussed this patient's plan of care and discharge plan at IDT rounds today with Case Management, Nursing, Nursing leadership, and other members of the IDT team.    Consultants/Specialty  infectious disease, orthopedics, and vascular surgery    Code Status  Full Code    Disposition  The patient is medically cleared for discharge to home or a post-acute facility.  Anticipate discharge to: skilled nursing facility    I have placed the appropriate orders for post-discharge needs.    Review of Systems  Review of Systems   Constitutional:  Positive for  malaise/fatigue.   HENT: Negative.     Eyes: Negative.    Respiratory: Negative.     Cardiovascular: Negative.    Gastrointestinal: Negative.    Genitourinary: Negative.    Musculoskeletal:         Pain in phantom limb   Skin: Negative.    Neurological: Negative.    Endo/Heme/Allergies: Negative.    Psychiatric/Behavioral: Negative.          Physical Exam  Temp:  [36.2 °C (97.1 °F)-37.1 °C (98.7 °F)] 36.9 °C (98.4 °F)  Pulse:  [90-94] 90  Resp:  [17-18] 18  BP: (116-130)/(76-81) 130/80  SpO2:  [95 %-99 %] 96 %    Physical Exam  Constitutional:       General: He is not in acute distress.  HENT:      Head: Normocephalic.      Nose: Nose normal.      Mouth/Throat:      Mouth: Mucous membranes are moist.   Eyes:      Pupils: Pupils are equal, round, and reactive to light.   Cardiovascular:      Rate and Rhythm: Normal rate.   Pulmonary:      Effort: Pulmonary effort is normal.   Abdominal:      Palpations: Abdomen is soft.   Musculoskeletal:      Cervical back: Normal range of motion.      Right lower leg: No edema.      Comments: Left BKA   Skin:     General: Skin is warm.   Neurological:      Mental Status: He is alert. Mental status is at baseline.   Psychiatric:         Mood and Affect: Mood normal.         Fluids    Intake/Output Summary (Last 24 hours) at 10/4/2023 0918  Last data filed at 10/4/2023 0000  Gross per 24 hour   Intake 1111.47 ml   Output 1500 ml   Net -388.53 ml         Laboratory  Recent Labs     10/02/23  0820 10/03/23  0404 10/04/23  0530   WBC 10.5 7.8 8.0   RBC 2.95* 3.02* 2.67*   HEMOGLOBIN 8.6* 8.6* 7.5*   HEMATOCRIT 25.9* 27.3* 24.1*   MCV 87.8 90.4 90.3   MCH 29.2 28.5 28.1   MCHC 33.2 31.5* 31.1*   RDW 47.1 48.6 50.7*   PLATELETCT 487* 521* 478*   MPV 8.6* 8.7* 8.4*       Recent Labs     10/02/23  0820 10/03/23  0404 10/04/23  0530   SODIUM 129* 131* 129*   POTASSIUM 4.4 4.8 4.4   CHLORIDE 94* 97 97   CO2 26 25 24   GLUCOSE 118* 106* 101*   BUN 8 8 8   CREATININE 0.60 0.69 0.55   CALCIUM  8.3* 8.6 8.3*                     Imaging  MR-ABDOMEN-WITH & W/O   Final Result      Multi septated cystic lesion at the upper pole LEFT kidney measuring 3.8 cm without associated enhancement or nodular component (Bosniak 2).            DX-PORTABLE FLUORO > 1 HOUR   Final Result      Portable fluoroscopy utilized for 2 minutes 20.4 seconds.         INTERPRETING LOCATION: [address] [place] [address], [place], [address]      CT-CTA AORTA-RO WITH & W/O-POST PROCESS   Final Result         CTA AORTA      1.  Septated lobulated left upper pole renal cystic mass lesion, recommend follow-up MRI of the kidneys for further characterization.   2.  Atherosclerosis and atherosclerotic coronary artery disease.   3.  Fat-containing right inguinal hernia      CTA LOWER EXTREMITIES      1.  Occlusion of the right common iliac artery through the distal superficial femoral artery   2.  Distal right peroneal artery occlusion with 2-vessel runoff the anterior and posterior tibial arteries at the right ankle.   3.  Occlusion of the left superficial femoral artery at the bifurcation extending through its length to the popliteal artery.   4.  Occlusion of the proximal left profunda femoris just distal to the bifurcation which reconstitutes.   5.  Soft tissue gas at the stump of left below-the-knee amputation, within expected limits for recent postop changes.      3D angiographic/MIP images of the vasculature confirm the vascular findings as described above.      These findings were discussed with the patient's clinician, Keith Navarro, on 9/29/2023 8:06 AM.      EC-ECHOCARDIOGRAM COMPLETE W/O CONT   Final Result      US-AORTA/ILIACS DUPLEX COMPLETE   Final Result      US-EXTREMITY ARTERY LOWER UNILAT RIGHT   Final Result      US-INGA SINGLE LEVEL UNILAT RIGHT   Final Result      DX-CHEST-PORTABLE (1 VIEW)   Final Result      1.  LEFT basilar atelectasis or pneumonia   2.  Trace LEFT pleural effusion or pleural scar             Assessment/Plan  * Gangrene  of left foot (HCC)- (present on admission)  Assessment & Plan  Left BKA on 9/27/2023. Vascular studies showed severe arterial disease. Underwent bilateral common femoral artery endarterectomy and profundoplasty with saphenous vein angioplasty with stent placement in the right external iliac artery on 9/30/2023. Prevena was placed (5-7 days). He was recommended to have Plavix for 1 month and aspirin for life.  Follow-up with Dr. Navarro. Kayleen to be removed 14 to 21 days postoperatively.  On Zosyn with end date 10/6/2023    Bacteremia- (present on admission)  Assessment & Plan  Blood cultures from 9/26/2023 grew Morganella morganii and Vagococcus. ID were consulted.  On Zosyn until 10/6/2023 (may be changed to Cipro on Flagyl at discharge). Repeat blood cultures from 9/27/2023 have been negative.  Echocardiogram showed no evidence of vegetations.     PAD (peripheral artery disease) (HCC)- (present on admission)  Assessment & Plan  Underwent bilateral common femoral artery endarterectomy and profundoplasty with saphenous vein angioplasty with stent placement in the right external iliac artery on 9/30/2023. Prevena was placed (5-7 days). Recommended to have Plavix for 1 month and aspirin for life.  Follow-up with Dr. Navarro    Renal cyst  Assessment & Plan  Noted to have renal cyst on CT.  MRI shows multiseptated cystic lesions in the upper pole of left kidney measuring 3.8 cm without associated enhancement or nodular component.  Recommend outpatient follow-up    Anemia- (present on admission)  Assessment & Plan  Stable.  Monitor    Hyponatremia  Assessment & Plan  Continue IV fluids    Elevated liver enzymes  Assessment & Plan  Resolved.  Hepatitis panel was normal.  Monitor         VTE prophylaxis: SCDs, Lovenox

## 2023-10-04 NOTE — PROGRESS NOTES
VASCULAR SURGERY PROGRESS NOTE       Awake, no complaints.  AF, VSS.     General: Pleasant male in none apparent distress.  Lower extremities: Prevena's in place with no surrounding erythema, drainage, or fluctuation.  Left thigh incision is intact without erythema, drainage, or fluctuation.  Right foot is warm, well perfused.  Chronic small dry ulcers at tip of right toes.  Left below-knee amputation stump is intact with no evidence of ischemia.  Superficial abrasion on left knee.      Labs: Reviewed.     Assessment: Status post bilateral femoral endarterectomy with patch angioplasty and right iliac artery stenting.     Plan:  Stable from vascular standpoint.  Paint right toe ulcers with Betadine once a day.  I have contacted [facility] prost[facility] personnel and asked them to place a stump protector on the left leg.    Patient may be discharged home from vascular standpoint.  Stays on aspirin and Plavix.  Follow-up with Dr. Navarro in the office.     Discussed with patient.  All questions were answered.    Vascular service will sign off.  Please call for questions or concerns.

## 2023-10-04 NOTE — PROGRESS NOTES
Pt: Dillon Centeno  Room:  602-1    A&Ox4    Safety: Call light within reach, bed wheels locked and in low position, antiskid socks on, bed alarm enabled.     Resp:  WNL on RA, needs reminders to perform IS  Cardiac:  WNL      GI: LBM 9/29 refused laxatives    :  continent, using urinal, clear yellow urine     Lines/Drains:   20 gauge R FA , patent, no complications    - Note: patient refusing to turn, refusing Q2 turns, stating he turns himself but observed not moving in bed,RN and CNA repositioned patient throughout the night and educated heavily on pressure ulcer prevention.

## 2023-10-05 LAB
ANION GAP SERPL CALC-SCNC: 11 MMOL/L (ref 7–16)
BASOPHILS # BLD AUTO: 1 % (ref 0–1.8)
BASOPHILS # BLD: 0.08 K/UL (ref 0–0.12)
BUN SERPL-MCNC: 7 MG/DL (ref 8–22)
CALCIUM SERPL-MCNC: 8.9 MG/DL (ref 8.5–10.5)
CHLORIDE SERPL-SCNC: 94 MMOL/L (ref 96–112)
CO2 SERPL-SCNC: 25 MMOL/L (ref 20–33)
CREAT SERPL-MCNC: 0.54 MG/DL (ref 0.5–1.4)
EOSINOPHIL # BLD AUTO: 0.19 K/UL (ref 0–0.51)
EOSINOPHIL NFR BLD: 2.3 % (ref 0–6.9)
ERYTHROCYTE [DISTWIDTH] IN BLOOD BY AUTOMATED COUNT: 49.9 FL (ref 35.9–50)
GFR SERPLBLD CREATININE-BSD FMLA CKD-EPI: 114 ML/MIN/1.73 M 2
GLUCOSE SERPL-MCNC: 110 MG/DL (ref 65–99)
HCT VFR BLD AUTO: 26.5 % (ref 42–52)
HGB BLD-MCNC: 8.3 G/DL (ref 14–18)
IMM GRANULOCYTES # BLD AUTO: 0.09 K/UL (ref 0–0.11)
IMM GRANULOCYTES NFR BLD AUTO: 1.1 % (ref 0–0.9)
LYMPHOCYTES # BLD AUTO: 1.36 K/UL (ref 1–4.8)
LYMPHOCYTES NFR BLD: 16.6 % (ref 22–41)
MCH RBC QN AUTO: 27.9 PG (ref 27–33)
MCHC RBC AUTO-ENTMCNC: 31.3 G/DL (ref 32.3–36.5)
MCV RBC AUTO: 88.9 FL (ref 81.4–97.8)
MONOCYTES # BLD AUTO: 0.5 K/UL (ref 0–0.85)
MONOCYTES NFR BLD AUTO: 6.1 % (ref 0–13.4)
NEUTROPHILS # BLD AUTO: 5.99 K/UL (ref 1.82–7.42)
NEUTROPHILS NFR BLD: 72.9 % (ref 44–72)
NRBC # BLD AUTO: 0 K/UL
NRBC BLD-RTO: 0 /100 WBC (ref 0–0.2)
PLATELET # BLD AUTO: 513 K/UL (ref 164–446)
PMV BLD AUTO: 8.4 FL (ref 9–12.9)
POTASSIUM SERPL-SCNC: 4.5 MMOL/L (ref 3.6–5.5)
RBC # BLD AUTO: 2.98 M/UL (ref 4.7–6.1)
SODIUM SERPL-SCNC: 130 MMOL/L (ref 135–145)
WBC # BLD AUTO: 8.2 K/UL (ref 4.8–10.8)

## 2023-10-05 PROCEDURE — 700102 HCHG RX REV CODE 250 W/ 637 OVERRIDE(OP): Performed by: INTERNAL MEDICINE

## 2023-10-05 PROCEDURE — 99232 SBSQ HOSP IP/OBS MODERATE 35: CPT | Performed by: INTERNAL MEDICINE

## 2023-10-05 PROCEDURE — 700111 HCHG RX REV CODE 636 W/ 250 OVERRIDE (IP): Mod: JZ | Performed by: INTERNAL MEDICINE

## 2023-10-05 PROCEDURE — A9270 NON-COVERED ITEM OR SERVICE: HCPCS | Performed by: SURGERY

## 2023-10-05 PROCEDURE — 700111 HCHG RX REV CODE 636 W/ 250 OVERRIDE (IP): Performed by: SURGERY

## 2023-10-05 PROCEDURE — 770006 HCHG ROOM/CARE - MED/SURG/GYN SEMI*

## 2023-10-05 PROCEDURE — 700102 HCHG RX REV CODE 250 W/ 637 OVERRIDE(OP): Performed by: SURGERY

## 2023-10-05 PROCEDURE — 700111 HCHG RX REV CODE 636 W/ 250 OVERRIDE (IP): Performed by: INTERNAL MEDICINE

## 2023-10-05 PROCEDURE — 700105 HCHG RX REV CODE 258: Performed by: INTERNAL MEDICINE

## 2023-10-05 PROCEDURE — A9270 NON-COVERED ITEM OR SERVICE: HCPCS | Performed by: INTERNAL MEDICINE

## 2023-10-05 PROCEDURE — 85025 COMPLETE CBC W/AUTO DIFF WBC: CPT

## 2023-10-05 PROCEDURE — 97530 THERAPEUTIC ACTIVITIES: CPT

## 2023-10-05 PROCEDURE — 80048 BASIC METABOLIC PNL TOTAL CA: CPT

## 2023-10-05 PROCEDURE — 36415 COLL VENOUS BLD VENIPUNCTURE: CPT

## 2023-10-05 RX ADMIN — OXYCODONE HYDROCHLORIDE 15 MG: 15 TABLET ORAL at 04:07

## 2023-10-05 RX ADMIN — ACETAMINOPHEN 650 MG: 325 TABLET, FILM COATED ORAL at 16:50

## 2023-10-05 RX ADMIN — ASPIRIN 81 MG: 81 TABLET, COATED ORAL at 05:36

## 2023-10-05 RX ADMIN — HEPARIN SODIUM 5000 UNITS: 5000 INJECTION, SOLUTION INTRAVENOUS; SUBCUTANEOUS at 22:45

## 2023-10-05 RX ADMIN — ACETAMINOPHEN 650 MG: 325 TABLET, FILM COATED ORAL at 05:34

## 2023-10-05 RX ADMIN — PIPERACILLIN AND TAZOBACTAM 4.5 G: 4; .5 INJECTION, POWDER, FOR SOLUTION INTRAVENOUS at 21:02

## 2023-10-05 RX ADMIN — DOCUSATE SODIUM 50 MG AND SENNOSIDES 8.6 MG 2 TABLET: 8.6; 5 TABLET, FILM COATED ORAL at 05:34

## 2023-10-05 RX ADMIN — GABAPENTIN 300 MG: 300 CAPSULE ORAL at 12:43

## 2023-10-05 RX ADMIN — CLOPIDOGREL BISULFATE 75 MG: 75 TABLET ORAL at 05:35

## 2023-10-05 RX ADMIN — NICOTINE TRANSDERMAL SYSTEM 21 MG: 21 PATCH, EXTENDED RELEASE TRANSDERMAL at 05:35

## 2023-10-05 RX ADMIN — GABAPENTIN 300 MG: 300 CAPSULE ORAL at 16:50

## 2023-10-05 RX ADMIN — DOCUSATE SODIUM 50 MG AND SENNOSIDES 8.6 MG 2 TABLET: 8.6; 5 TABLET, FILM COATED ORAL at 16:50

## 2023-10-05 RX ADMIN — HYDROMORPHONE HYDROCHLORIDE 1.5 MG: 2 INJECTION, SOLUTION INTRAMUSCULAR; INTRAVENOUS; SUBCUTANEOUS at 10:50

## 2023-10-05 RX ADMIN — HEPARIN SODIUM 5000 UNITS: 5000 INJECTION, SOLUTION INTRAVENOUS; SUBCUTANEOUS at 05:36

## 2023-10-05 RX ADMIN — OXYCODONE HYDROCHLORIDE 20 MG: 10 TABLET ORAL at 15:26

## 2023-10-05 RX ADMIN — OXYCODONE HYDROCHLORIDE 20 MG: 10 TABLET ORAL at 22:45

## 2023-10-05 RX ADMIN — PIPERACILLIN AND TAZOBACTAM 4.5 G: 4; .5 INJECTION, POWDER, FOR SOLUTION INTRAVENOUS at 12:44

## 2023-10-05 RX ADMIN — OXYCODONE HYDROCHLORIDE 20 MG: 10 TABLET ORAL at 07:50

## 2023-10-05 RX ADMIN — PIPERACILLIN AND TAZOBACTAM 4.5 G: 4; .5 INJECTION, POWDER, FOR SOLUTION INTRAVENOUS at 04:08

## 2023-10-05 RX ADMIN — ACETAMINOPHEN 650 MG: 325 TABLET, FILM COATED ORAL at 12:43

## 2023-10-05 RX ADMIN — HEPARIN SODIUM 5000 UNITS: 5000 INJECTION, SOLUTION INTRAVENOUS; SUBCUTANEOUS at 15:05

## 2023-10-05 RX ADMIN — GABAPENTIN 300 MG: 300 CAPSULE ORAL at 05:36

## 2023-10-05 RX ADMIN — OXYCODONE HYDROCHLORIDE 20 MG: 10 TABLET ORAL at 19:33

## 2023-10-05 RX ADMIN — HYDROMORPHONE HYDROCHLORIDE 1.5 MG: 2 INJECTION, SOLUTION INTRAMUSCULAR; INTRAVENOUS; SUBCUTANEOUS at 21:00

## 2023-10-05 ASSESSMENT — PAIN DESCRIPTION - PAIN TYPE
TYPE: ACUTE PAIN

## 2023-10-05 ASSESSMENT — ENCOUNTER SYMPTOMS
RESPIRATORY NEGATIVE: 1
CARDIOVASCULAR NEGATIVE: 1
NEUROLOGICAL NEGATIVE: 1
EYES NEGATIVE: 1
GASTROINTESTINAL NEGATIVE: 1
PSYCHIATRIC NEGATIVE: 1

## 2023-10-05 ASSESSMENT — PATIENT HEALTH QUESTIONNAIRE - PHQ9
2. FEELING DOWN, DEPRESSED, IRRITABLE, OR HOPELESS: NOT AT ALL
SUM OF ALL RESPONSES TO PHQ9 QUESTIONS 1 AND 2: 0
1. LITTLE INTEREST OR PLEASURE IN DOING THINGS: NOT AT ALL

## 2023-10-05 NOTE — PROGRESS NOTES
[facility] Medicine Daily Progress Note    Date of Service  10/5/2023    Chief Complaint  Dillon Centeno is a 58 y.o. male admitted 9/26/2023 with bilateral foot wounds. He has hypertension and tobacco dependence.    Hospital Course  In the ER, patient was noted to have gangrene on his left foot.  Vascular surgery and orthopedic surgery were consulted.  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      Vascular studies showed severe arterial disease.  Patient underwent bilateral common femoral artery endarterectomy and profundoplasty with saphenous vein angioplasty with stent placement in the right external iliac artery on 9/30/2023. Prevena was placed (5-7 days). He was recommended to have Plavix for 1 month and aspirin for life.    Blood cultures from 9/26/2023 grew Morganella morganii and Vagococcus.  Urine culture from 9/26/2023 grew Staph epidermidis. ID were consulted.  Patient was placed on Zosyn with end date of 10/6/2023.  Echocardiogram showed LVEF of about 60% with no reported valvular abnormalities.  Repeat blood cultures from 9/27/2023 have been negative.    Noted to have renal cyst on CT.  MRI shows multiseptated cystic lesions in the upper pole of left kidney measuring 3.8 cm without associated enhancement or nodular component.    Interval Problem Update  Awaiting SNF placement.  Afebrile and hemodynamically stable.  No new complaints.    I have discussed this patient's plan of care and discharge plan at IDT rounds today with Case Management, Nursing, Nursing leadership, and other members of the IDT team.    Consultants/Specialty  infectious disease, orthopedics, and vascular surgery    Code Status  Full Code    Disposition  The patient is medically cleared for discharge to home or a post-acute facility.  Anticipate discharge to: skilled nursing facility    I have placed the appropriate orders for post-discharge needs.    Review of Systems  Review of Systems   Constitutional:  Positive for  malaise/fatigue.   HENT: Negative.     Eyes: Negative.    Respiratory: Negative.     Cardiovascular: Negative.    Gastrointestinal: Negative.    Genitourinary: Negative.    Musculoskeletal:         Pain in phantom limb   Skin: Negative.    Neurological: Negative.    Endo/Heme/Allergies: Negative.    Psychiatric/Behavioral: Negative.          Physical Exam  Temp:  [36.5 °C (97.7 °F)-36.8 °C (98.2 °F)] 36.5 °C (97.7 °F)  Pulse:  [] 88  Resp:  [17-18] 17  BP: (120-148)/(82-93) 120/82  SpO2:  [96 %-97 %] 97 %    Physical Exam  Constitutional:       General: He is not in acute distress.  HENT:      Head: Normocephalic.      Nose: Nose normal.      Mouth/Throat:      Mouth: Mucous membranes are moist.   Eyes:      Pupils: Pupils are equal, round, and reactive to light.   Cardiovascular:      Rate and Rhythm: Normal rate.   Pulmonary:      Effort: Pulmonary effort is normal.   Abdominal:      Palpations: Abdomen is soft.   Musculoskeletal:      Cervical back: Normal range of motion.      Right lower leg: No edema.      Comments: Left BKA   Skin:     General: Skin is warm.      Comments: Third right toe absent due to prior trauma.  Remaining toes with chronic ulcers.   Neurological:      Mental Status: He is alert. Mental status is at baseline.   Psychiatric:         Mood and Affect: Mood normal.         Fluids    Intake/Output Summary (Last 24 hours) at 10/5/2023 1342  Last data filed at 10/5/2023 1050  Gross per 24 hour   Intake 1751.07 ml   Output 700 ml   Net 1051.07 ml       Laboratory  Recent Labs     10/03/23  0404 10/04/23  0530 10/05/23  0629   WBC 7.8 8.0 8.2   RBC 3.02* 2.67* 2.98*   HEMOGLOBIN 8.6* 7.5* 8.3*   HEMATOCRIT 27.3* 24.1* 26.5*   MCV 90.4 90.3 88.9   MCH 28.5 28.1 27.9   MCHC 31.5* 31.1* 31.3*   RDW 48.6 50.7* 49.9   PLATELETCT 521* 478* 513*   MPV 8.7* 8.4* 8.4*     Recent Labs     10/03/23  0404 10/04/23  0530 10/05/23  0629   SODIUM 131* 129* 130*   POTASSIUM 4.8 4.4 4.5   CHLORIDE 97 97 94*    CO2 25 24 25   GLUCOSE 106* 101* 110*   BUN 8 8 7*   CREATININE 0.69 0.55 0.54   CALCIUM 8.6 8.3* 8.9                   Imaging  MR-ABDOMEN-WITH & W/O   Final Result      Multi septated cystic lesion at the upper pole LEFT kidney measuring 3.8 cm without associated enhancement or nodular component (Bosniak 2).            DX-PORTABLE FLUORO > 1 HOUR   Final Result      Portable fluoroscopy utilized for 2 minutes 20.4 seconds.         INTERPRETING LOCATION: [address] [facility], [place], [address]      CT-CTA AORTA-RO WITH & W/O-POST PROCESS   Final Result         CTA AORTA      1.  Septated lobulated left upper pole renal cystic mass lesion, recommend follow-up MRI of the kidneys for further characterization.   2.  Atherosclerosis and atherosclerotic coronary artery disease.   3.  Fat-containing right inguinal hernia      CTA LOWER EXTREMITIES      1.  Occlusion of the right common iliac artery through the distal superficial femoral artery   2.  Distal right peroneal artery occlusion with 2-vessel runoff the anterior and posterior tibial arteries at the right ankle.   3.  Occlusion of the left superficial femoral artery at the bifurcation extending through its length to the popliteal artery.   4.  Occlusion of the proximal left profunda femoris just distal to the bifurcation which reconstitutes.   5.  Soft tissue gas at the stump of left below-the-knee amputation, within expected limits for recent postop changes.      3D angiographic/MIP images of the vasculature confirm the vascular findings as described above.      These findings were discussed with the patient's clinician, Keith Navarro, on 9/29/2023 8:06 AM.      EC-ECHOCARDIOGRAM COMPLETE W/O CONT   Final Result      US-AORTA/ILIACS DUPLEX COMPLETE   Final Result      US-EXTREMITY ARTERY LOWER UNILAT RIGHT   Final Result      US-INGA SINGLE LEVEL UNILAT RIGHT   Final Result      DX-CHEST-PORTABLE (1 VIEW)   Final Result      1.  LEFT basilar atelectasis or pneumonia    2.  Trace LEFT pleural effusion or pleural scar             Assessment/Plan  * Gangrene of left foot (HCC)- (present on admission)  Assessment & Plan  Left BKA on 9/27/2023. Vascular studies showed severe arterial disease. Underwent bilateral common femoral artery endarterectomy and profundoplasty with saphenous vein angioplasty with stent placement in the right external iliac artery on 9/30/2023. Prevena was placed (5-7 days). He was recommended to have Plavix for 1 month and aspirin for life.  Follow-up with Dr. Navarro. Kayleen to be removed 14 to 21 days postoperatively.  On Zosyn with end date 10/6/2023    Bacteremia- (present on admission)  Assessment & Plan  Blood cultures from 9/26/2023 grew Morganella morganii and Vagococcus. ID were consulted.  On Zosyn until 10/6/2023 (may be changed to Cipro on Flagyl at discharge). Repeat blood cultures from 9/27/2023 have been negative.  Echocardiogram showed no evidence of vegetations.     PAD (peripheral artery disease) (HCC)- (present on admission)  Assessment & Plan  Underwent bilateral common femoral artery endarterectomy and profundoplasty with saphenous vein angioplasty with stent placement in the right external iliac artery on 9/30/2023. Prevena was placed (5-7 days). Recommended to have Plavix for 1 month and aspirin for life.  Follow-up with Dr. Navarro.  Stump protector for left leg was ordered by vascular surgery.    Renal cyst  Assessment & Plan  Noted to have renal cyst on CT.  MRI shows multiseptated cystic lesions in the upper pole of left kidney measuring 3.8 cm without associated enhancement or nodular component.  Recommend outpatient follow-up    Anemia- (present on admission)  Assessment & Plan  Stable.  Monitor    Hyponatremia  Assessment & Plan  Continue IV fluids    Elevated liver enzymes  Assessment & Plan  Resolved.  Hepatitis panel was normal.  Monitor         VTE prophylaxis: SCDs, Lovenox

## 2023-10-05 NOTE — THERAPY
"Occupational Therapy Contact Note    Attempted OT treatment, pt refusing OOB activity but more receptive to education today. Reports his leg doesn't hurt but he \"hurts on the inside, like spiritually.\" Provided emotional support. Pt now with stump protector on his leg, opened and checked to make sure it was positioned properly. Overall much better however still knee slightly flexed. Continued to reiterate importance of positioning limb in extension in order for a prosthetic. 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 Will attempt tomorrow AM as able.    Ruthann Posadas, OTR/L  "

## 2023-10-05 NOTE — PROGRESS NOTES
Placed on condom catheter, pt placing urinal between his legs while he sleeps and spills urine on bed. Complete bed change.  [facility]icare.  Repositioned.

## 2023-10-05 NOTE — CARE PLAN
Problem: Knowledge Deficit - Standard  Goal: Patient and family/care givers will demonstrate understanding of plan of care, disease process/condition, diagnostic tests and medications  Description: Target End Date:  1-3 days or as soon as patient condition allows    Document in Patient Education    1.  Patient and family/caregiver oriented to unit, equipment, visitation policy and means for communicating concern  2.  Complete/review Learning Assessment  3.  Assess knowledge level of disease process/condition, treatment plan, diagnostic tests and medications  4.  Explain disease process/condition, treatment plan, diagnostic tests and medications  10/4/2023 1936 by Anastacia Childs, R.N.  Outcome: Progressing  10/4/2023 1935 by Anastacia Childs R.N.  Outcome: Progressing     Problem: Pain - Standard  Goal: Alleviation of pain or a reduction in pain to the patient’s comfort goal  Description: Target End Date:  Prior to discharge or change in level of care    Document on Vitals flowsheet    1.  Document pain using the appropriate pain scale per order or unit policy  2.  Educate and implement non-pharmacologic comfort measures (i.e. relaxation, distraction, massage, cold/heat therapy, etc.)  3.  Pain management medications as ordered  4.  Reassess pain after pain med administration per policy  5.  If opiods administered assess patient's response to pain medication is appropriate per POSS sedation scale  6.  Follow pain management plan developed in collaboration with patient and interdisciplinary team (including palliative care or pain specialists if applicable)  10/4/2023 1936 by Anastacia Childs, R.N.  Outcome: Progressing  Note: Pt is A&Ox4, medicated with PRN pain meds with effectiveness. Repositioned and pillows use for comfort. Knee brace on to LBKA. ROM encouraged. Continues with IV ABX, no adverse reactions noted. Pt will be able to rest with minimal discomfort.  10/4/2023 1935 by Anastacia Childs,  RJongN.  Outcome: Progressing     Problem: Skin Integrity  Goal: Skin integrity is maintained or improved  Description: Target End Date:  Prior to discharge or change in level of care    Document interventions on Skin Risk/Jae flowsheet groups and corresponding LDA    1.  Assess and monitor skin integrity, appearance and/or temperature  2.  Assess risk factors for impaired skin integrity and/or pressures ulcers  3.  Implement precautions to protect skin integrity in collaboration with interdisciplinary team  4.  Implement pressure ulcer prevention protocol if at risk for skin breakdown  5.  Confirm wound care consult if at risk for skin breakdown  6.  Ensure patient use of pressure relieving devices  (Low air loss bed, waffle overlay, heel protectors, ROHO cushion, etc)  10/4/2023 1936 by Anastacia Childs, R.N.  Outcome: Progressing  10/4/2023 1935 by Anastacia hCilds, R.N.  Outcome: Progressing  Note: Pt is A&Ox4,      Problem: Fall Risk  Goal: Patient will remain free from falls  Description: Target End Date:  Prior to discharge or change in level of care    Document interventions on the [facility] Fall Risk Assessment    1.  Assess for fall risk factors  2.  Implement fall precautions  10/4/2023 1936 by Anastacia Childs, R.N.  Outcome: Progressing  10/4/2023 1935 by Anastacia Childs R.N.  Outcome: Progressing     Problem: Infection - Standard  Goal: Patient will remain free from infection  Description: Target End Date:  Prior to discharge or change in level of care    1.  Utilize Standard Precautions at all times to reduce the risk of transmission of microorganisms from both recognized and  unrecognized sources of infection  2.  Infection prevention handouts provided (general/device/diagnosis specific) and documented in Patient Education  3.  Educate patient and family/caregiver on isolation precautions if applicable  10/4/2023 1936 by Anastacia Childs, R.N.  Outcome: Progressing  10/4/2023 1935 by  Anastacia Childs RJongNJong  Outcome: Progressing     Problem: Wound/ / Incision Healing  Goal: Patient's wound/surgical incision will decrease in size and heals properly  Description: Target End Date:  Prior to discharge or change in level of care    Document on LDA    1.  Assess and document surgical incision/wound  2.  Provide incision/wound care per policy and/or provider orders  3.  Manage surgical drains per policy if applicable  4.  Encourage adequate nutrition to promote wound healing  5.  Collaborate with Clinical Dietician  10/4/2023 1936 by Anastacia Childs, R.N.  Outcome: Progressing  10/4/2023 1935 by Anastacia Childs RJongN.  Outcome: Progressing   The patient is Stable - Low risk of patient condition declining or worsening    Shift Goals  Clinical Goals: Pain management  Patient Goals: Pain management  Family Goals: none presesnt    Progress made toward(s) clinical / shift goals:      Patient is not progressing towards the following goals:

## 2023-10-05 NOTE — PROGRESS NOTES
Received report from night shift RN, pt care assumed. Pt is A&Ox4, c/o left leg/knee pain. PRN Oxy 20 mg administered. Pillows use to reposition pt, knee brace on LBKA. Reinforced the use of call light when assistance needed. Hourly rounding in place, safety measures maintained.

## 2023-10-05 NOTE — CARE PLAN
The patient is Stable - Low risk of patient condition declining or worsening    Shift Goals  Clinical Goals: Pain management  Patient Goals: Pain management  Family Goals: none presesnt    Progress made toward(s) clinical / shift goals:  pain managed w/ PRN analgesics   Problem: Knowledge Deficit - Standard  Goal: Patient and family/care givers will demonstrate understanding of plan of care, disease process/condition, diagnostic tests and medications  Outcome: Progressing     Problem: Pain - Standard  Goal: Alleviation of pain or a reduction in pain to the patient’s comfort goal  Outcome: Progressing     Problem: Skin Integrity  Goal: Skin integrity is maintained or improved  Outcome: Progressing     Problem: Fall Risk  Goal: Patient will remain free from falls  Outcome: Progressing     Problem: Infection - Standard  Goal: Patient will remain free from infection  Outcome: Progressing     Problem: Wound/ / Incision Healing  Goal: Patient's wound/surgical incision will decrease in size and heals properly  Outcome: Progressing       Patient is not progressing towards the following goals:

## 2023-10-05 NOTE — PROGRESS NOTES
Pt: Dillon Centeno  Room:  602-1     A&Ox4     Safety: Call light within reach, bed wheels locked and in low position, antiskid socks on, bed alarm enabled. No falls.       Resp:  WNL on RA, clear upper lobes, diminished bases. Strong spontaneous cough, encouraged use of IS   Cardiac:  WNL, NRR      GI: LBM 9/29 - educated regarding constipation w/ narcotics, pt voice understanding.    : Utilizes urinal, c/o dribbling after urination, indicates he knows when he needs to micturate and therefore uses the urinal. Offered condom cath to assist keeping him dry (Night RN and CNA check pt for dryness w/ turns), pt refused, indicates he would rather continue to use the urinal. Pt acknowledges he at times may spill the urinal and is embarrased.      Skin: left BKA - dressing CDI/ wound vac bilateral groin on  at 125 mmhg - zero output. Immobilizer on, pt agreed to keep on. On previous nights pt refused to wear immobilizer.       Lines/Drains:   20 gauge R FA , patent, no complications     - Note: patient areed to repositioning periodically throughout the night, but incists he turns himself. RN repositioned patient as he allowed, and floated heel & stump.

## 2023-10-06 LAB
ANION GAP SERPL CALC-SCNC: 10 MMOL/L (ref 7–16)
BASOPHILS # BLD AUTO: 0.9 % (ref 0–1.8)
BASOPHILS # BLD: 0.07 K/UL (ref 0–0.12)
BUN SERPL-MCNC: 9 MG/DL (ref 8–22)
CALCIUM SERPL-MCNC: 8.8 MG/DL (ref 8.5–10.5)
CHLORIDE SERPL-SCNC: 95 MMOL/L (ref 96–112)
CO2 SERPL-SCNC: 23 MMOL/L (ref 20–33)
CREAT SERPL-MCNC: 0.54 MG/DL (ref 0.5–1.4)
EOSINOPHIL # BLD AUTO: 0.24 K/UL (ref 0–0.51)
EOSINOPHIL NFR BLD: 3.2 % (ref 0–6.9)
ERYTHROCYTE [DISTWIDTH] IN BLOOD BY AUTOMATED COUNT: 49.8 FL (ref 35.9–50)
GFR SERPLBLD CREATININE-BSD FMLA CKD-EPI: 114 ML/MIN/1.73 M 2
GLUCOSE SERPL-MCNC: 109 MG/DL (ref 65–99)
HCT VFR BLD AUTO: 25.9 % (ref 42–52)
HGB BLD-MCNC: 8.2 G/DL (ref 14–18)
IMM GRANULOCYTES # BLD AUTO: 0.09 K/UL (ref 0–0.11)
IMM GRANULOCYTES NFR BLD AUTO: 1.2 % (ref 0–0.9)
LYMPHOCYTES # BLD AUTO: 1.39 K/UL (ref 1–4.8)
LYMPHOCYTES NFR BLD: 18.5 % (ref 22–41)
MCH RBC QN AUTO: 28.2 PG (ref 27–33)
MCHC RBC AUTO-ENTMCNC: 31.7 G/DL (ref 32.3–36.5)
MCV RBC AUTO: 89 FL (ref 81.4–97.8)
MONOCYTES # BLD AUTO: 0.49 K/UL (ref 0–0.85)
MONOCYTES NFR BLD AUTO: 6.5 % (ref 0–13.4)
NEUTROPHILS # BLD AUTO: 5.24 K/UL (ref 1.82–7.42)
NEUTROPHILS NFR BLD: 69.7 % (ref 44–72)
NRBC # BLD AUTO: 0 K/UL
NRBC BLD-RTO: 0 /100 WBC (ref 0–0.2)
OSMOLALITY UR: 203 MOSM/KG H2O (ref 300–900)
PLATELET # BLD AUTO: 483 K/UL (ref 164–446)
PMV BLD AUTO: 8.6 FL (ref 9–12.9)
POTASSIUM SERPL-SCNC: 4 MMOL/L (ref 3.6–5.5)
RBC # BLD AUTO: 2.91 M/UL (ref 4.7–6.1)
SODIUM SERPL-SCNC: 128 MMOL/L (ref 135–145)
WBC # BLD AUTO: 7.5 K/UL (ref 4.8–10.8)

## 2023-10-06 PROCEDURE — 80048 BASIC METABOLIC PNL TOTAL CA: CPT

## 2023-10-06 PROCEDURE — 700102 HCHG RX REV CODE 250 W/ 637 OVERRIDE(OP): Performed by: INTERNAL MEDICINE

## 2023-10-06 PROCEDURE — A9270 NON-COVERED ITEM OR SERVICE: HCPCS | Performed by: SURGERY

## 2023-10-06 PROCEDURE — 97530 THERAPEUTIC ACTIVITIES: CPT

## 2023-10-06 PROCEDURE — 700105 HCHG RX REV CODE 258: Performed by: INTERNAL MEDICINE

## 2023-10-06 PROCEDURE — 99232 SBSQ HOSP IP/OBS MODERATE 35: CPT | Performed by: INTERNAL MEDICINE

## 2023-10-06 PROCEDURE — 700111 HCHG RX REV CODE 636 W/ 250 OVERRIDE (IP): Mod: JZ | Performed by: INTERNAL MEDICINE

## 2023-10-06 PROCEDURE — A9270 NON-COVERED ITEM OR SERVICE: HCPCS | Performed by: INTERNAL MEDICINE

## 2023-10-06 PROCEDURE — 97535 SELF CARE MNGMENT TRAINING: CPT

## 2023-10-06 PROCEDURE — 85025 COMPLETE CBC W/AUTO DIFF WBC: CPT

## 2023-10-06 PROCEDURE — 700111 HCHG RX REV CODE 636 W/ 250 OVERRIDE (IP): Performed by: INTERNAL MEDICINE

## 2023-10-06 PROCEDURE — 770006 HCHG ROOM/CARE - MED/SURG/GYN SEMI*

## 2023-10-06 PROCEDURE — 36415 COLL VENOUS BLD VENIPUNCTURE: CPT

## 2023-10-06 PROCEDURE — 700111 HCHG RX REV CODE 636 W/ 250 OVERRIDE (IP): Performed by: SURGERY

## 2023-10-06 PROCEDURE — 700102 HCHG RX REV CODE 250 W/ 637 OVERRIDE(OP): Performed by: SURGERY

## 2023-10-06 PROCEDURE — 83935 ASSAY OF URINE OSMOLALITY: CPT

## 2023-10-06 RX ORDER — SODIUM CHLORIDE 9 MG/ML
INJECTION, SOLUTION INTRAVENOUS CONTINUOUS
Status: DISCONTINUED | OUTPATIENT
Start: 2023-10-06 | End: 2023-10-11

## 2023-10-06 RX ADMIN — HYDROMORPHONE HYDROCHLORIDE 1.5 MG: 2 INJECTION, SOLUTION INTRAMUSCULAR; INTRAVENOUS; SUBCUTANEOUS at 04:53

## 2023-10-06 RX ADMIN — HEPARIN SODIUM 5000 UNITS: 5000 INJECTION, SOLUTION INTRAVENOUS; SUBCUTANEOUS at 21:22

## 2023-10-06 RX ADMIN — GABAPENTIN 300 MG: 300 CAPSULE ORAL at 16:34

## 2023-10-06 RX ADMIN — PIPERACILLIN AND TAZOBACTAM 4.5 G: 4; .5 INJECTION, POWDER, FOR SOLUTION INTRAVENOUS at 21:25

## 2023-10-06 RX ADMIN — SODIUM CHLORIDE: 9 INJECTION, SOLUTION INTRAVENOUS at 11:24

## 2023-10-06 RX ADMIN — OXYCODONE HYDROCHLORIDE 20 MG: 10 TABLET ORAL at 15:26

## 2023-10-06 RX ADMIN — ASPIRIN 81 MG: 81 TABLET, COATED ORAL at 04:46

## 2023-10-06 RX ADMIN — GABAPENTIN 300 MG: 300 CAPSULE ORAL at 12:24

## 2023-10-06 RX ADMIN — HEPARIN SODIUM 5000 UNITS: 5000 INJECTION, SOLUTION INTRAVENOUS; SUBCUTANEOUS at 13:31

## 2023-10-06 RX ADMIN — HEPARIN SODIUM 5000 UNITS: 5000 INJECTION, SOLUTION INTRAVENOUS; SUBCUTANEOUS at 04:46

## 2023-10-06 RX ADMIN — OXYCODONE HYDROCHLORIDE 20 MG: 10 TABLET ORAL at 21:29

## 2023-10-06 RX ADMIN — CLOPIDOGREL BISULFATE 75 MG: 75 TABLET ORAL at 04:46

## 2023-10-06 RX ADMIN — OXYCODONE HYDROCHLORIDE 20 MG: 10 TABLET ORAL at 03:13

## 2023-10-06 RX ADMIN — PIPERACILLIN AND TAZOBACTAM 4.5 G: 4; .5 INJECTION, POWDER, FOR SOLUTION INTRAVENOUS at 04:46

## 2023-10-06 RX ADMIN — ACETAMINOPHEN 650 MG: 325 TABLET, FILM COATED ORAL at 00:54

## 2023-10-06 RX ADMIN — NICOTINE TRANSDERMAL SYSTEM 21 MG: 21 PATCH, EXTENDED RELEASE TRANSDERMAL at 04:47

## 2023-10-06 RX ADMIN — PIPERACILLIN AND TAZOBACTAM 4.5 G: 4; .5 INJECTION, POWDER, FOR SOLUTION INTRAVENOUS at 12:27

## 2023-10-06 RX ADMIN — GABAPENTIN 300 MG: 300 CAPSULE ORAL at 04:46

## 2023-10-06 ASSESSMENT — ENCOUNTER SYMPTOMS
RESPIRATORY NEGATIVE: 1
GASTROINTESTINAL NEGATIVE: 1
CARDIOVASCULAR NEGATIVE: 1
NEUROLOGICAL NEGATIVE: 1
EYES NEGATIVE: 1
PSYCHIATRIC NEGATIVE: 1

## 2023-10-06 ASSESSMENT — COGNITIVE AND FUNCTIONAL STATUS - GENERAL
MOVING TO AND FROM BED TO CHAIR: UNABLE
TOILETING: A LITTLE
STANDING UP FROM CHAIR USING ARMS: A LITTLE
DRESSING REGULAR LOWER BODY CLOTHING: A LITTLE
SUGGESTED CMS G CODE MODIFIER DAILY ACTIVITY: CJ
MOVING FROM LYING ON BACK TO SITTING ON SIDE OF FLAT BED: UNABLE
MOBILITY SCORE: 11
SUGGESTED CMS G CODE MODIFIER MOBILITY: CL
HELP NEEDED FOR BATHING: A LITTLE
CLIMB 3 TO 5 STEPS WITH RAILING: TOTAL
DAILY ACTIVITIY SCORE: 21
WALKING IN HOSPITAL ROOM: TOTAL

## 2023-10-06 ASSESSMENT — PAIN DESCRIPTION - PAIN TYPE
TYPE: SURGICAL PAIN
TYPE: ACUTE PAIN
TYPE: SURGICAL PAIN
TYPE: SURGICAL PAIN
TYPE: ACUTE PAIN

## 2023-10-06 ASSESSMENT — GAIT ASSESSMENTS: GAIT LEVEL OF ASSIST: UNABLE TO PARTICIPATE

## 2023-10-06 NOTE — CARE PLAN
Problem: Pain - Standard  Goal: Alleviation of pain or a reduction in pain to the patient’s comfort goal  Outcome: Progressing  Note: Education provided regarding pain management including non pharmacological measures such as rest and relaxation   The patient is Stable - Low risk of patient condition declining or worsening    Shift Goals  Clinical Goals: pain management  Patient Goals: Pain management  Family Goals: none presesnt    Progress made toward(s) clinical / shift goals:      Patient is not progressing towards the following goals:

## 2023-10-06 NOTE — PROGRESS NOTES
Hospital Medicine Daily Progress Note    Date of Service  10/6/2023    Chief Complaint  Dillon Centeno is a 58 y.o. male admitted 9/26/2023 with bilateral foot wounds. He has hypertension and tobacco dependence.    Hospital Course  In the ER, patient was noted to have gangrene on his left foot.  Vascular surgery and orthopedic surgery were consulted.  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      Vascular studies showed severe arterial disease.  Patient underwent bilateral common femoral artery endarterectomy and profundoplasty with saphenous vein angioplasty with stent placement in the right external iliac artery on 9/30/2023. Prevena was placed (5-7 days). He was recommended to have Plavix for 1 month and aspirin for life.    Blood cultures from 9/26/2023 grew Morganella morganii and Vagococcus.  Urine culture from 9/26/2023 grew Staph epidermidis. ID were consulted.  Patient was placed on Zosyn with end date of 10/6/2023.  Echocardiogram showed LVEF of about 60% with no reported valvular abnormalities.  Repeat blood cultures from 9/27/2023 have been negative.    Noted to have renal cyst on CT.  MRI shows multiseptated cystic lesions in the upper pole of left kidney measuring 3.8 cm without associated enhancement or nodular component.    Interval Problem Update  Awaiting SNF placement.  Afebrile and hemodynamically stable.  No new complaints.    I have discussed this patient's plan of care and discharge plan at IDT rounds today with Case Management, Nursing, Nursing leadership, and other members of the IDT team.    Consultants/Specialty  infectious disease, orthopedics, and vascular surgery    Code Status  Full Code    Disposition  Medically Cleared  I have placed the appropriate orders for post-discharge needs.    Review of Systems  Review of Systems   Constitutional:  Positive for malaise/fatigue.   HENT: Negative.     Eyes: Negative.    Respiratory: Negative.     Cardiovascular: Negative.     Gastrointestinal: Negative.    Genitourinary: Negative.    Musculoskeletal:         Pain in phantom limb   Skin: Negative.    Neurological: Negative.    Endo/Heme/Allergies: Negative.    Psychiatric/Behavioral: Negative.          Physical Exam  Temp:  [36.3 °C (97.3 °F)-36.6 °C (97.9 °F)] 36.3 °C (97.3 °F)  Pulse:  [] 100  Resp:  [17-18] 18  BP: (120-134)/(78-83) 134/82  SpO2:  [95 %-97 %] 96 %    Physical Exam  Constitutional:       General: He is not in acute distress.  HENT:      Head: Normocephalic.      Nose: Nose normal.      Mouth/Throat:      Mouth: Mucous membranes are moist.   Eyes:      Pupils: Pupils are equal, round, and reactive to light.   Cardiovascular:      Rate and Rhythm: Normal rate.   Pulmonary:      Effort: Pulmonary effort is normal.   Abdominal:      Palpations: Abdomen is soft.   Musculoskeletal:      Cervical back: Normal range of motion.      Right lower leg: No edema.      Comments: Left BKA   Skin:     General: Skin is warm.      Comments: Third right toe absent due to prior trauma.  Remaining toes with chronic ulcers.   Neurological:      Mental Status: He is alert. Mental status is at baseline.   Psychiatric:         Mood and Affect: Mood normal.         Fluids    Intake/Output Summary (Last 24 hours) at 10/6/2023 0759  Last data filed at 10/5/2023 1928  Gross per 24 hour   Intake 120 ml   Output 200 ml   Net -80 ml         Laboratory  Recent Labs     10/04/23  0530 10/05/23  0629 10/06/23  0435   WBC 8.0 8.2 7.5   RBC 2.67* 2.98* 2.91*   HEMOGLOBIN 7.5* 8.3* 8.2*   HEMATOCRIT 24.1* 26.5* 25.9*   MCV 90.3 88.9 89.0   MCH 28.1 27.9 28.2   MCHC 31.1* 31.3* 31.7*   RDW 50.7* 49.9 49.8   PLATELETCT 478* 513* 483*   MPV 8.4* 8.4* 8.6*       Recent Labs     10/04/23  0530 10/05/23  0629 10/06/23  0435   SODIUM 129* 130* 128*   POTASSIUM 4.4 4.5 4.0   CHLORIDE 97 94* 95*   CO2 24 25 23   GLUCOSE 101* 110* 109*   BUN 8 7* 9   CREATININE 0.55 0.54 0.54   CALCIUM 8.3* 8.9 8.8                      Imaging  MR-ABDOMEN-WITH & W/O   Final Result      Multi septated cystic lesion at the upper pole LEFT kidney measuring 3.8 cm without associated enhancement or nodular component (Bosniak 2).            DX-PORTABLE FLUORO > 1 HOUR   Final Result      Portable fluoroscopy utilized for 2 minutes 20.4 seconds.         INTERPRETING LOCATION: [address] [place] [address], [place], [address]      CT-CTA AORTA-RO WITH & W/O-POST PROCESS   Final Result         CTA AORTA      1.  Septated lobulated left upper pole renal cystic mass lesion, recommend follow-up MRI of the kidneys for further characterization.   2.  Atherosclerosis and atherosclerotic coronary artery disease.   3.  Fat-containing right inguinal hernia      CTA LOWER EXTREMITIES      1.  Occlusion of the right common iliac artery through the distal superficial femoral artery   2.  Distal right peroneal artery occlusion with 2-vessel runoff the anterior and posterior tibial arteries at the right ankle.   3.  Occlusion of the left superficial femoral artery at the bifurcation extending through its length to the popliteal artery.   4.  Occlusion of the proximal left profunda femoris just distal to the bifurcation which reconstitutes.   5.  Soft tissue gas at the stump of left below-the-knee amputation, within expected limits for recent postop changes.      3D angiographic/MIP images of the vasculature confirm the vascular findings as described above.      These findings were discussed with the patient's clinician, Keith Navarro, on 9/29/2023 8:06 AM.      EC-ECHOCARDIOGRAM COMPLETE W/O CONT   Final Result      US-AORTA/ILIACS DUPLEX COMPLETE   Final Result      US-EXTREMITY ARTERY LOWER UNILAT RIGHT   Final Result      US-INGA SINGLE LEVEL UNILAT RIGHT   Final Result      DX-CHEST-PORTABLE (1 VIEW)   Final Result      1.  LEFT basilar atelectasis or pneumonia   2.  Trace LEFT pleural effusion or pleural scar             Assessment/Plan  * Gangrene of left foot (HCC)-  (present on admission)  Assessment & Plan  Left BKA on 9/27/2023. Vascular studies showed severe arterial disease. Underwent bilateral common femoral artery endarterectomy and profundoplasty with saphenous vein angioplasty with stent placement in the right external iliac artery on 9/30/2023. Prevena was placed (5-7 days). He was recommended to have Plavix for 1 month and aspirin for life.  Follow-up with Dr. Navarro. Kayleen to be removed 14 to 21 days postoperatively.  On Zosyn with end date 10/6/2023    Bacteremia- (present on admission)  Assessment & Plan  Blood cultures from 9/26/2023 grew Morganella morganii and Vagococcus. ID were consulted.  On Zosyn until 10/6/2023 (may be changed to Cipro on Flagyl at discharge). Repeat blood cultures from 9/27/2023 have been negative.  Echocardiogram showed no evidence of vegetations.     PAD (peripheral artery disease) (HCC)- (present on admission)  Assessment & Plan  Underwent bilateral common femoral artery endarterectomy and profundoplasty with saphenous vein angioplasty with stent placement in the right external iliac artery on 9/30/2023. Prevena was placed (5-7 days). Recommended to have Plavix for 1 month and aspirin for life.  Follow-up with Dr. Navarro.  Stump protector for left leg was ordered by vascular surgery.    Renal cyst  Assessment & Plan  Noted to have renal cyst on CT.  MRI shows multiseptated cystic lesions in the upper pole of left kidney measuring 3.8 cm without associated enhancement or nodular component.  Recommend outpatient follow-up    Anemia- (present on admission)  Assessment & Plan  Stable.  Monitor    Hyponatremia  Assessment & Plan  Continue IV fluids    Elevated liver enzymes  Assessment & Plan  Resolved.  Hepatitis panel was normal.  Monitor         VTE prophylaxis: SCDs, Lovenox

## 2023-10-06 NOTE — THERAPY
"Occupational Therapy  Daily Treatment     Patient Name: Dillon Centeno  Age:  58 y.o., Sex:  male  Medical Record #: [id number]  Today's Date: 10/6/2023     Precautions  Precautions: Fall Risk, Swallow Precautions  Comments: stump protector L STEVEN, post op shoe to right wound vac for groin entry sites    Assessment    Pt participated in seated ADLs today. Discussed goals for therapy and progression of therapy to meet those goals. Pt motivated to regain independence. Remains limited by weakness, fatigue, impaired balance, impaired safety awareness.    Plan    Treatment Plan Status: Continue Current Treatment Plan  Type of Treatment: Self Care / Activities of Daily Living, Adaptive Equipment, Neuro Re-Education / Balance, Therapeutic Exercises, Therapeutic Activity  Treatment Frequency: 3 Times per Week  Treatment Duration: Until Therapy Goals Met    DC Equipment Recommendations: Unable to determine at this time  Discharge Recommendations: Recommend post-acute placement for additional occupational therapy services prior to discharge home    Subjective    \"I wanna get back to doing everything I normally do.\"     Objective       10/06/23 0852   Cognition    Cognition / Consciousness X   Level of Consciousness Alert   Comments pleasant and cooperative today, receptive to education but still with impaired insight   Other Treatments   Other Treatments Provided discussion on goals for therapy, and what the process of working towards those goals looks like   Balance   Sitting Balance (Static) Fair +   Sitting Balance (Dynamic) Fair   Weight Shift Sitting Fair   Skilled Intervention Verbal Cuing   Comments deferred standing today as PT was coming in for their session after OT   Bed Mobility    Supine to Sit Supervised   Scooting Supervised   Skilled Intervention Verbal Cuing;Sequencing   Activities of Daily Living   Eating Supervision   Grooming Minimal Assist;Seated  (wash hands, assistance for thoroughness as there was " visible dried BM in his finger nails)   Lower Body Dressing   (declined)   Toileting Supervision  (utilize urinal while seated EOB)   Skilled Intervention Verbal Cuing;Sequencing;Compensatory Strategies   How much help from another person does the patient currently need...   Putting on and taking off regular lower body clothing? 3   Bathing (including washing, rinsing, and drying)? 3   Toileting, which includes using a toilet, bedpan, or urinal? 3   Putting on and taking off regular upper body clothing? 4   Taking care of personal grooming such as brushing teeth? 4   Eating meals? 4   6 Clicks Daily Activity Score 21   Functional Mobility   Mobility EOB only today, saving energy for working with PT afterwards   Patient / Family Goals   Patient / Family Goal #1 to be independent again   Short Term Goals   Short Term Goal # 1 pt will demo ADL txfs with supv   Goal Outcome # 1 Goal not met   Short Term Goal # 2 pt will dress LB with supv   Goal Outcome # 2 Goal not met   Short Term Goal # 3 pt will demo toileting with supv   Goal Outcome # 3 Progressing as expected

## 2023-10-06 NOTE — THERAPY
Physical Therapy   Daily Treatment     Patient Name: Dillon Centeno  Age:  58 y.o., Sex:  male  Medical Record #: [id number]  Today's Date: 10/6/2023     Precautions  Precautions: Fall Risk;Swallow Precautions  Comments: stump protector to LLE, wound vac to B groin right LE post op shoe     Assessment    Pt with improving motivation and mood, likely given that his health is improving; able to progress standing min A with right post op shoe donned dependently; able to perform a few tricep dips in prep for hopping however could not hop; anticipate easy pivot if needed for w/c/commode; given pts wishes motivation and diagnosis anticipate he would do well with acute rehab for short stay rehab and education for amputation/wound healing; please consult PMR as appropriate; otherwise will follow.     Plan    Treatment Plan Status: Continue Current Treatment Plan  Type of Treatment: Bed Mobility, Neuro Re-Education / Balance, Therapeutic Activities, Therapeutic Exercise  Treatment Frequency: 4 Times per Week  Treatment Duration: Until Therapy Goals Met    DC Equipment Recommendations: Unable to determine at this time  Discharge Recommendations: acute rehab if qualifies; SNF if does not       Abridged Subjective/Objective     10/06/23 0950   Cognition    Cognition / Consciousness X   Level of Consciousness Alert   Comments pleasant and cooperative today; hard of hearing; receptive to education for positioning and exercises for phantom pain; receptive about positioning of legs states it feels good with 1/4 cm lift off bed due to posterior stump pain; maintains extension in brace today;   Sitting Lower Body Exercises   Sitting Lower Body Exercises Yes   Long Arc Quad 1 set of 10;Right   Standing Lower Body Exercises   Standing Lower Body Exercises Yes   Mini Squat Full    Comments 2 reps of tricep dips in prep for weight shifting/hopping; hip extension in standing on left hip   Balance   Sitting Balance (Static) Fair +    Sitting Balance (Dynamic) Fair   Standing Balance (Static) Fair -   Weight Shift Sitting Fair   Weight Shift Standing Poor   Skilled Intervention Sequencing;Postural Facilitation;Tactile Cuing;Verbal Cuing   Comments B UE support in sitting/standing; could weight shift to arms for a dip but could not hop;   Bed Mobility    Supine to Sit   (NT, up wiht OT)   Sit to Supine Standby Assist   Gait Analysis   Gait Level Of Assist Unable to Participate   Functional Mobility   Sit to Stand Minimal Assist  (of 2 with FWW)   Bed, Chair, Wheelchair Transfer Refused   Short Term Goals    Short Term Goal # 1 Patient will perform supine-sit with HOB flat with supervision in 6 visits   Goal Outcome # 1 goal not met   Short Term Goal # 2 Patient will perform chair transfers with supervision in 6 visits   Goal Outcome # 2 Goal not met   Short Term Goal # 3 Patient will perform sit-stand with FWW with supervision in 6 visits   Goal Outcome # 3 Goal not met   Short Term Goal # 4 Pt to position LLE correctly on pillows to avoid knee flexion contracture w/o cues in 6 visits   Goal Outcome # 4 Goal not met

## 2023-10-06 NOTE — DISCHARGE PLANNING
Case Management Discharge Planning    Admission Date: 9/26/2023  GMLOS: 6.8  ALOS: 10    6-Clicks ADL Score: 21  6-Clicks Mobility Score: 11  PT and/or OT Eval ordered: Yes  Post-acute Referrals Ordered: Yes  Post-acute Choice Obtained: Yes  Has referral(s) been sent to post-acute provider:  Yes      Anticipated Discharge Dispo: Discharge Disposition: D/T to SNF with Medicare cert in anticipation of skilled care (03)    DME Needed: No    Action(s) Taken: Pt is being denied by multiple SNF's. Per chart review, pt is motivated for Rehab and wishes to give intense Rehab a try to gain his independence. Pt lives with his friend Otis who is supportive of the Rehab needed. LMSW placed consult to [facility] to assess pt as a candidate and speak with pt.     Escalations Completed: None    Medically Clear: Yes    Next Steps: LMSW to follow as needed.     Barriers to Discharge: Pending Placement    Is the patient up for discharge tomorrow: No

## 2023-10-06 NOTE — CARE PLAN
Problem: Pain - Standard  Goal: Alleviation of pain or a reduction in pain to the patient’s comfort goal  Description: Target End Date:  Prior to discharge or change in level of care    Document on Vitals flowsheet    1.  Document pain using the appropriate pain scale per order or unit policy  2.  Educate and implement non-pharmacologic comfort measures (i.e. relaxation, distraction, massage, cold/heat therapy, etc.)  3.  Pain management medications as ordered  4.  Reassess pain after pain med administration per policy  5.  If opiods administered assess patient's response to pain medication is appropriate per POSS sedation scale  6.  Follow pain management plan developed in collaboration with patient and interdisciplinary team (including palliative care or pain specialists if applicable)  Outcome: Not Progressing  Note: Pt left leg/knee managed with pharmacological and non-pharmacological strategies with effectiveness. Pt will be able to rest with minimal discomfort at the end of shift.   The patient is Stable - Low risk of patient condition declining or worsening    Shift Goals  Clinical Goals: Pain management, wound care, monitor wound vac  Patient Goals: Pain management  Family Goals: none presesnt    Progress made toward(s) clinical / shift goals:      Patient is not progressing towards the following goals:      Problem: Pain - Standard  Goal: Alleviation of pain or a reduction in pain to the patient’s comfort goal  Description: Target End Date:  Prior to discharge or change in level of care    Document on Vitals flowsheet    1.  Document pain using the appropriate pain scale per order or unit policy  2.  Educate and implement non-pharmacologic comfort measures (i.e. relaxation, distraction, massage, cold/heat therapy, etc.)  3.  Pain management medications as ordered  4.  Reassess pain after pain med administration per policy  5.  If opiods administered assess patient's response to pain medication is  appropriate per POSS sedation scale  6.  Follow pain management plan developed in collaboration with patient and interdisciplinary team (including palliative care or pain specialists if applicable)  Outcome: Not Progressing  Note: Pt left leg/knee managed with pharmacological and non-pharmacological strategies with effectiveness.  Knee brace on. Pt will be able to rest with minimal discomfort at the end of shift.

## 2023-10-06 NOTE — PROGRESS NOTES
L BKA dressing clean and intact. Orders for dressing changes q48 hrs. Completed 10/5 at 0200 per note by previous RN

## 2023-10-07 LAB
ANION GAP SERPL CALC-SCNC: 6 MMOL/L (ref 7–16)
ANISOCYTOSIS BLD QL SMEAR: ABNORMAL
BASOPHILS # BLD AUTO: 1.7 % (ref 0–1.8)
BASOPHILS # BLD: 0.12 K/UL (ref 0–0.12)
BUN SERPL-MCNC: 7 MG/DL (ref 8–22)
CALCIUM SERPL-MCNC: 8.5 MG/DL (ref 8.5–10.5)
CHLORIDE SERPL-SCNC: 98 MMOL/L (ref 96–112)
CO2 SERPL-SCNC: 25 MMOL/L (ref 20–33)
CREAT SERPL-MCNC: 0.47 MG/DL (ref 0.5–1.4)
EOSINOPHIL # BLD AUTO: 0.19 K/UL (ref 0–0.51)
EOSINOPHIL NFR BLD: 2.6 % (ref 0–6.9)
ERYTHROCYTE [DISTWIDTH] IN BLOOD BY AUTOMATED COUNT: 51.2 FL (ref 35.9–50)
GFR SERPLBLD CREATININE-BSD FMLA CKD-EPI: 119 ML/MIN/1.73 M 2
GLUCOSE SERPL-MCNC: 117 MG/DL (ref 65–99)
HCT VFR BLD AUTO: 26.6 % (ref 42–52)
HGB BLD-MCNC: 8.4 G/DL (ref 14–18)
HYPOCHROMIA BLD QL SMEAR: ABNORMAL
LYMPHOCYTES # BLD AUTO: 1.07 K/UL (ref 1–4.8)
LYMPHOCYTES NFR BLD: 14.7 % (ref 22–41)
MANUAL DIFF BLD: NORMAL
MCH RBC QN AUTO: 28.7 PG (ref 27–33)
MCHC RBC AUTO-ENTMCNC: 31.6 G/DL (ref 32.3–36.5)
MCV RBC AUTO: 90.8 FL (ref 81.4–97.8)
MONOCYTES # BLD AUTO: 0.44 K/UL (ref 0–0.85)
MONOCYTES NFR BLD AUTO: 6 % (ref 0–13.4)
MORPHOLOGY BLD-IMP: NORMAL
NEUTROPHILS # BLD AUTO: 5.48 K/UL (ref 1.82–7.42)
NEUTROPHILS NFR BLD: 75 % (ref 44–72)
NRBC # BLD AUTO: 0 K/UL
NRBC BLD-RTO: 0 /100 WBC (ref 0–0.2)
PLATELET # BLD AUTO: 505 K/UL (ref 164–446)
PLATELET BLD QL SMEAR: NORMAL
PMV BLD AUTO: 8.5 FL (ref 9–12.9)
POTASSIUM SERPL-SCNC: 4.5 MMOL/L (ref 3.6–5.5)
RBC # BLD AUTO: 2.93 M/UL (ref 4.7–6.1)
RBC BLD AUTO: PRESENT
SODIUM SERPL-SCNC: 129 MMOL/L (ref 135–145)
WBC # BLD AUTO: 7.3 K/UL (ref 4.8–10.8)

## 2023-10-07 PROCEDURE — A9270 NON-COVERED ITEM OR SERVICE: HCPCS | Performed by: SURGERY

## 2023-10-07 PROCEDURE — 97602 WOUND(S) CARE NON-SELECTIVE: CPT

## 2023-10-07 PROCEDURE — 700102 HCHG RX REV CODE 250 W/ 637 OVERRIDE(OP): Performed by: SURGERY

## 2023-10-07 PROCEDURE — 700102 HCHG RX REV CODE 250 W/ 637 OVERRIDE(OP): Performed by: INTERNAL MEDICINE

## 2023-10-07 PROCEDURE — 85007 BL SMEAR W/DIFF WBC COUNT: CPT

## 2023-10-07 PROCEDURE — A9270 NON-COVERED ITEM OR SERVICE: HCPCS | Performed by: INTERNAL MEDICINE

## 2023-10-07 PROCEDURE — 36415 COLL VENOUS BLD VENIPUNCTURE: CPT

## 2023-10-07 PROCEDURE — 80048 BASIC METABOLIC PNL TOTAL CA: CPT

## 2023-10-07 PROCEDURE — 99233 SBSQ HOSP IP/OBS HIGH 50: CPT | Performed by: INTERNAL MEDICINE

## 2023-10-07 PROCEDURE — 99255 IP/OBS CONSLTJ NEW/EST HI 80: CPT | Performed by: PHYSICAL MEDICINE & REHABILITATION

## 2023-10-07 PROCEDURE — 85025 COMPLETE CBC W/AUTO DIFF WBC: CPT

## 2023-10-07 PROCEDURE — 770006 HCHG ROOM/CARE - MED/SURG/GYN SEMI*

## 2023-10-07 PROCEDURE — 700111 HCHG RX REV CODE 636 W/ 250 OVERRIDE (IP): Performed by: SURGERY

## 2023-10-07 RX ORDER — OXYCODONE HYDROCHLORIDE 15 MG/1
15 TABLET ORAL
Status: DISCONTINUED | OUTPATIENT
Start: 2023-10-07 | End: 2023-11-03 | Stop reason: HOSPADM

## 2023-10-07 RX ORDER — OXYCODONE HYDROCHLORIDE 10 MG/1
20 TABLET ORAL
Status: DISCONTINUED | OUTPATIENT
Start: 2023-10-07 | End: 2023-11-03 | Stop reason: HOSPADM

## 2023-10-07 RX ORDER — HYDROMORPHONE HYDROCHLORIDE 2 MG/ML
1.5 INJECTION, SOLUTION INTRAMUSCULAR; INTRAVENOUS; SUBCUTANEOUS
Status: DISCONTINUED | OUTPATIENT
Start: 2023-10-07 | End: 2023-10-11

## 2023-10-07 RX ORDER — TRAMADOL HYDROCHLORIDE 50 MG/1
50 TABLET ORAL EVERY 6 HOURS
Status: DISCONTINUED | OUTPATIENT
Start: 2023-10-07 | End: 2023-11-03 | Stop reason: HOSPADM

## 2023-10-07 RX ORDER — ACETAMINOPHEN 325 MG/1
650 TABLET ORAL EVERY 6 HOURS
Status: DISCONTINUED | OUTPATIENT
Start: 2023-10-07 | End: 2023-10-26

## 2023-10-07 RX ORDER — TRAMADOL HYDROCHLORIDE 50 MG/1
50 TABLET ORAL EVERY 6 HOURS PRN
Status: DISCONTINUED | OUTPATIENT
Start: 2023-10-07 | End: 2023-10-07

## 2023-10-07 RX ADMIN — HEPARIN SODIUM 5000 UNITS: 5000 INJECTION, SOLUTION INTRAVENOUS; SUBCUTANEOUS at 13:03

## 2023-10-07 RX ADMIN — ASPIRIN 81 MG: 81 TABLET, COATED ORAL at 05:55

## 2023-10-07 RX ADMIN — ACETAMINOPHEN 650 MG: 325 TABLET, FILM COATED ORAL at 11:36

## 2023-10-07 RX ADMIN — GABAPENTIN 300 MG: 300 CAPSULE ORAL at 05:55

## 2023-10-07 RX ADMIN — GABAPENTIN 300 MG: 300 CAPSULE ORAL at 19:48

## 2023-10-07 RX ADMIN — OXYCODONE HYDROCHLORIDE 20 MG: 10 TABLET ORAL at 15:39

## 2023-10-07 RX ADMIN — NICOTINE TRANSDERMAL SYSTEM 21 MG: 21 PATCH, EXTENDED RELEASE TRANSDERMAL at 05:54

## 2023-10-07 RX ADMIN — DOCUSATE SODIUM 50 MG AND SENNOSIDES 8.6 MG 2 TABLET: 8.6; 5 TABLET, FILM COATED ORAL at 05:55

## 2023-10-07 RX ADMIN — CLOPIDOGREL BISULFATE 75 MG: 75 TABLET ORAL at 05:55

## 2023-10-07 RX ADMIN — OXYCODONE HYDROCHLORIDE 20 MG: 10 TABLET ORAL at 03:48

## 2023-10-07 RX ADMIN — ACETAMINOPHEN 650 MG: 325 TABLET, FILM COATED ORAL at 17:03

## 2023-10-07 RX ADMIN — OXYCODONE HYDROCHLORIDE 20 MG: 10 TABLET ORAL at 09:36

## 2023-10-07 RX ADMIN — HEPARIN SODIUM 5000 UNITS: 5000 INJECTION, SOLUTION INTRAVENOUS; SUBCUTANEOUS at 21:41

## 2023-10-07 RX ADMIN — TRAMADOL HYDROCHLORIDE 50 MG: 50 TABLET ORAL at 17:02

## 2023-10-07 RX ADMIN — TRAMADOL HYDROCHLORIDE 50 MG: 50 TABLET ORAL at 11:35

## 2023-10-07 RX ADMIN — HEPARIN SODIUM 5000 UNITS: 5000 INJECTION, SOLUTION INTRAVENOUS; SUBCUTANEOUS at 05:55

## 2023-10-07 RX ADMIN — GABAPENTIN 300 MG: 300 CAPSULE ORAL at 13:03

## 2023-10-07 ASSESSMENT — ENCOUNTER SYMPTOMS
CARDIOVASCULAR NEGATIVE: 1
EYES NEGATIVE: 1
NEUROLOGICAL NEGATIVE: 1
GASTROINTESTINAL NEGATIVE: 1
PSYCHIATRIC NEGATIVE: 1
RESPIRATORY NEGATIVE: 1

## 2023-10-07 ASSESSMENT — PAIN DESCRIPTION - PAIN TYPE
TYPE: ACUTE PAIN
TYPE: SURGICAL PAIN
TYPE: ACUTE PAIN
TYPE: SURGICAL PAIN
TYPE: ACUTE PAIN

## 2023-10-07 ASSESSMENT — FIBROSIS 4 INDEX: FIB4 SCORE: 0.64

## 2023-10-07 NOTE — CARE PLAN
Problem: Pain - Standard  Goal: Alleviation of pain or a reduction in pain to the patient’s comfort goal  Outcome: Progressing  Note: Education provided regarding pain management including nonpharmacological measures such as repositioning, rest and relaxation   The patient is Stable - Low risk of patient condition declining or worsening    Shift Goals  Clinical Goals: pain management  Patient Goals: pain management  Family Goals: ashlie    Progress made toward(s) clinical / shift goals:      Patient is not progressing towards the following goals:

## 2023-10-07 NOTE — CARE PLAN
The patient is Stable - Low risk of patient condition declining or worsening    Shift Goals  Clinical Goals: Pain control  Patient Goals: Pain control  Family Goals: none presesnt    Progress made toward(s) clinical / shift goals:  Ongoing    Patient is progressing towards the following goals:      Problem: Pain - Standard  Goal: Alleviation of pain or a reduction in pain to the patient’s comfort goal  Outcome: Progressing  Patient was medicated for pain with the prn's as per MAR with effect.     Problem: Skin Integrity  Goal: Skin integrity is maintained or improved  Outcome: Progressing  Mepilex is on the sacral region for protective purpose of the bony prominence area.     Problem: Fall Risk  Goal: Patient will remain free from falls  Outcome: Progressing  Fall precautions in place. Patient free from falls throughout this shift.     Problem: Knowledge Deficit - Standard  Goal: Patient and family/care givers will demonstrate understanding of plan of care, disease process/condition, diagnostic tests and medications  Outcome: Progressing

## 2023-10-07 NOTE — DISCHARGE PLANNING
[facility] Transitional Care Coordination    Referral from:  Dr. Yaquelin Sidhu  Insurance Provider on Facesheet:  Medicaid FFS  Potential Rehab diagnosis:  BKA    Chart review indicates patient has ongoing medical management and therapy needs    D/C Support:  TBD     Physiatry to consult per protocol.  POD 8 L BKA.  POD 7 right femoral endarterectomy with saphenous vein patch angioplasty and stenting of the right iliac artery.      Last Covid test:    Thank you for the referral.

## 2023-10-07 NOTE — WOUND TEAM
Patient POD#7, per Dr. Melanie amor to remove prevenas and transition to dry dressings to BL groin.    Prevenas removed, incisions CDI with areas of darkened discoloration, no drainage noted.  Covered with island dressings (change q84h).    Wound Vac ordered discontinued and machine placed at Nurse's station for pick-up. Central supply called for notification of machine pick-up.  Wound team signing off, re-consult if patient has further advanced wound care needs.

## 2023-10-07 NOTE — PROGRESS NOTES
Hospital Medicine Daily Progress Note    Date of Service  10/7/2023    Chief Complaint  Dillon Centeno is a 58 y.o. male admitted 9/26/2023 with bilateral foot wounds. He has hypertension and tobacco dependence.    Hospital Course  In the ER, patient was noted to have gangrene on his left foot.  Vascular surgery and orthopedic surgery were consulted.  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      Vascular studies showed severe arterial disease.  Patient underwent bilateral common femoral artery endarterectomy and profundoplasty with saphenous vein angioplasty with stent placement in the right external iliac artery on 9/30/2023. Prevena was placed (5-7 days). He was recommended to have Plavix for 1 month and aspirin for life.    Blood cultures from 9/26/2023 grew Morganella morganii and Vagococcus.  Urine culture from 9/26/2023 grew Staph epidermidis. ID were consulted.  Patient was placed on Zosyn with end date of 10/6/2023.  Echocardiogram showed LVEF of about 60% with no reported valvular abnormalities.  Repeat blood cultures from 9/27/2023 have been negative.    Noted to have renal cyst on CT.  MRI shows multiseptated cystic lesions in the upper pole of left kidney measuring 3.8 cm without associated enhancement or nodular component.    Interval Problem Update  Awaiting SNF placement.  Afebrile and hemodynamically stable. States that pain is not controlled on current regimen. Tramadol added (to be given with tylenol)    I have discussed this patient's plan of care and discharge plan at IDT rounds today with Case Management, Nursing, Nursing leadership, and other members of the IDT team.    Consultants/Specialty  infectious disease, orthopedics, and vascular surgery    Code Status  Full Code    Disposition  Medically Cleared  I have placed the appropriate orders for post-discharge needs.    Review of Systems  Review of Systems   Constitutional:  Positive for malaise/fatigue.   HENT: Negative.     Eyes:  Negative.    Respiratory: Negative.     Cardiovascular: Negative.    Gastrointestinal: Negative.    Genitourinary: Negative.    Musculoskeletal:         Pain in phantom limb   Skin: Negative.    Neurological: Negative.    Endo/Heme/Allergies: Negative.    Psychiatric/Behavioral: Negative.          Physical Exam  Temp:  [36.2 °C (97.1 °F)-36.7 °C (98.1 °F)] 36.2 °C (97.1 °F)  Pulse:  [] 99  Resp:  [16-18] 18  BP: (124-138)/(79-93) 138/79  SpO2:  [97 %-99 %] 97 %    Physical Exam  Constitutional:       General: He is not in acute distress.  HENT:      Head: Normocephalic.      Nose: Nose normal.      Mouth/Throat:      Mouth: Mucous membranes are moist.   Eyes:      Pupils: Pupils are equal, round, and reactive to light.   Cardiovascular:      Rate and Rhythm: Normal rate.   Pulmonary:      Effort: Pulmonary effort is normal.   Abdominal:      Palpations: Abdomen is soft.   Musculoskeletal:      Cervical back: Normal range of motion.      Right lower leg: No edema.      Comments: Left BKA   Skin:     General: Skin is warm.      Comments: Third right toe absent due to prior trauma.  Remaining toes with chronic ulcers.   Neurological:      Mental Status: He is alert. Mental status is at baseline.   Psychiatric:         Mood and Affect: Mood normal.         Fluids    Intake/Output Summary (Last 24 hours) at 10/7/2023 1001  Last data filed at 10/7/2023 0736  Gross per 24 hour   Intake 540 ml   Output 1750 ml   Net -1210 ml       Laboratory  Recent Labs     10/05/23  0629 10/06/23  0435 10/07/23  0657   WBC 8.2 7.5 7.3   RBC 2.98* 2.91* 2.93*   HEMOGLOBIN 8.3* 8.2* 8.4*   HEMATOCRIT 26.5* 25.9* 26.6*   MCV 88.9 89.0 90.8   MCH 27.9 28.2 28.7   MCHC 31.3* 31.7* 31.6*   RDW 49.9 49.8 51.2*   PLATELETCT 513* 483* 505*   MPV 8.4* 8.6* 8.5*     Recent Labs     10/05/23  0629 10/06/23  0435 10/07/23  0657   SODIUM 130* 128* 129*   POTASSIUM 4.5 4.0 4.5   CHLORIDE 94* 95* 98   CO2 25 23 25   GLUCOSE 110* 109* 117*   BUN 7*  9 7*   CREATININE 0.54 0.54 0.47*   CALCIUM 8.9 8.8 8.5                   Imaging  MR-ABDOMEN-WITH & W/O   Final Result      Multi septated cystic lesion at the upper pole LEFT kidney measuring 3.8 cm without associated enhancement or nodular component (Bosniak 2).            DX-PORTABLE FLUORO > 1 HOUR   Final Result      Portable fluoroscopy utilized for 2 minutes 20.4 seconds.         INTERPRETING LOCATION: [facility], [place], [address]      CT-CTA AORTA-RO WITH & W/O-POST PROCESS   Final Result         CTA AORTA      1.  Septated lobulated left upper pole renal cystic mass lesion, recommend follow-up MRI of the kidneys for further characterization.   2.  Atherosclerosis and atherosclerotic coronary artery disease.   3.  Fat-containing right inguinal hernia      CTA LOWER EXTREMITIES      1.  Occlusion of the right common iliac artery through the distal superficial femoral artery   2.  Distal right peroneal artery occlusion with 2-vessel runoff the anterior and posterior tibial arteries at the right ankle.   3.  Occlusion of the left superficial femoral artery at the bifurcation extending through its length to the popliteal artery.   4.  Occlusion of the proximal left profunda femoris just distal to the bifurcation which reconstitutes.   5.  Soft tissue gas at the stump of left below-the-knee amputation, within expected limits for recent postop changes.      3D angiographic/MIP images of the vasculature confirm the vascular findings as described above.      These findings were discussed with the patient's clinician, Keith Navarro, on 9/29/2023 8:06 AM.      EC-ECHOCARDIOGRAM COMPLETE W/O CONT   Final Result      US-AORTA/ILIACS DUPLEX COMPLETE   Final Result      US-EXTREMITY ARTERY LOWER UNILAT RIGHT   Final Result      US-INGA SINGLE LEVEL UNILAT RIGHT   Final Result      DX-CHEST-PORTABLE (1 VIEW)   Final Result      1.  LEFT basilar atelectasis or pneumonia   2.  Trace LEFT pleural effusion or pleural scar              Assessment/Plan  * Gangrene of left foot (HCC)- (present on admission)  Assessment & Plan  Left BKA on 9/27/2023. Vascular studies showed severe arterial disease. Underwent bilateral common femoral artery endarterectomy and profundoplasty with saphenous vein angioplasty with stent placement in the right external iliac artery on 9/30/2023. Prevena was placed (5-7 days). He was recommended to have Plavix for 1 month and aspirin for life. Follow-up with Dr. Navarro. [place] to be removed 14 to 21 days postoperatively. Completed course of Zosyn on 10/6/2023. On multimodal pain regimen, adjust as needed.     Bacteremia- (present on admission)  Assessment & Plan  Blood cultures from 9/26/2023 grew Morganella morganii and Vagococcus. ID were consulted. Completed course of On Zosyn on 10/6/2023. Repeat blood cultures from 9/27/2023 have been negative.  Echocardiogram showed no evidence of vegetations.     PAD (peripheral artery disease) (HCC)- (present on admission)  Assessment & Plan  Underwent bilateral common femoral artery endarterectomy and profundoplasty with saphenous vein angioplasty with stent placement in the right external iliac artery on 9/30/2023. Prevena was placed (5-7 days). Recommended to have Plavix for 1 month and aspirin for life.  Follow-up with Dr. Navarro.  Stump protector for left leg was ordered by vascular surgery.    Renal cyst  Assessment & Plan  Noted to have renal cyst on CT.  MRI shows multiseptated cystic lesions in the upper pole of left kidney measuring 3.8 cm without associated enhancement or nodular component.  Recommend outpatient follow-up    Anemia- (present on admission)  Assessment & Plan  Stable.  Monitor    Hyponatremia  Assessment & Plan  Continue IV fluids    Elevated liver enzymes  Assessment & Plan  Resolved.  Hepatitis panel was normal.  Monitor         VTE prophylaxis: SCDs, Lovenox

## 2023-10-08 LAB
ANION GAP SERPL CALC-SCNC: 10 MMOL/L (ref 7–16)
BASOPHILS # BLD AUTO: 1.1 % (ref 0–1.8)
BASOPHILS # BLD: 0.08 K/UL (ref 0–0.12)
BUN SERPL-MCNC: 9 MG/DL (ref 8–22)
CALCIUM SERPL-MCNC: 8.8 MG/DL (ref 8.5–10.5)
CHLORIDE SERPL-SCNC: 97 MMOL/L (ref 96–112)
CO2 SERPL-SCNC: 24 MMOL/L (ref 20–33)
CREAT SERPL-MCNC: 0.51 MG/DL (ref 0.5–1.4)
EOSINOPHIL # BLD AUTO: 0.29 K/UL (ref 0–0.51)
EOSINOPHIL NFR BLD: 4.1 % (ref 0–6.9)
ERYTHROCYTE [DISTWIDTH] IN BLOOD BY AUTOMATED COUNT: 50.9 FL (ref 35.9–50)
GFR SERPLBLD CREATININE-BSD FMLA CKD-EPI: 116 ML/MIN/1.73 M 2
GLUCOSE SERPL-MCNC: 100 MG/DL (ref 65–99)
HCT VFR BLD AUTO: 27.2 % (ref 42–52)
HGB BLD-MCNC: 8.7 G/DL (ref 14–18)
IMM GRANULOCYTES # BLD AUTO: 0.08 K/UL (ref 0–0.11)
IMM GRANULOCYTES NFR BLD AUTO: 1.1 % (ref 0–0.9)
LYMPHOCYTES # BLD AUTO: 1.4 K/UL (ref 1–4.8)
LYMPHOCYTES NFR BLD: 19.6 % (ref 22–41)
MCH RBC QN AUTO: 28.8 PG (ref 27–33)
MCHC RBC AUTO-ENTMCNC: 32 G/DL (ref 32.3–36.5)
MCV RBC AUTO: 90.1 FL (ref 81.4–97.8)
MONOCYTES # BLD AUTO: 0.52 K/UL (ref 0–0.85)
MONOCYTES NFR BLD AUTO: 7.3 % (ref 0–13.4)
NEUTROPHILS # BLD AUTO: 4.78 K/UL (ref 1.82–7.42)
NEUTROPHILS NFR BLD: 66.8 % (ref 44–72)
NRBC # BLD AUTO: 0 K/UL
NRBC BLD-RTO: 0 /100 WBC (ref 0–0.2)
PLATELET # BLD AUTO: 486 K/UL (ref 164–446)
PMV BLD AUTO: 8.4 FL (ref 9–12.9)
POTASSIUM SERPL-SCNC: 4.8 MMOL/L (ref 3.6–5.5)
RBC # BLD AUTO: 3.02 M/UL (ref 4.7–6.1)
SODIUM SERPL-SCNC: 131 MMOL/L (ref 135–145)
WBC # BLD AUTO: 7.2 K/UL (ref 4.8–10.8)

## 2023-10-08 PROCEDURE — 36415 COLL VENOUS BLD VENIPUNCTURE: CPT

## 2023-10-08 PROCEDURE — 80048 BASIC METABOLIC PNL TOTAL CA: CPT

## 2023-10-08 PROCEDURE — 700105 HCHG RX REV CODE 258: Performed by: INTERNAL MEDICINE

## 2023-10-08 PROCEDURE — 85025 COMPLETE CBC W/AUTO DIFF WBC: CPT

## 2023-10-08 PROCEDURE — A9270 NON-COVERED ITEM OR SERVICE: HCPCS | Performed by: INTERNAL MEDICINE

## 2023-10-08 PROCEDURE — 700102 HCHG RX REV CODE 250 W/ 637 OVERRIDE(OP): Performed by: SURGERY

## 2023-10-08 PROCEDURE — 700102 HCHG RX REV CODE 250 W/ 637 OVERRIDE(OP): Performed by: INTERNAL MEDICINE

## 2023-10-08 PROCEDURE — 770006 HCHG ROOM/CARE - MED/SURG/GYN SEMI*

## 2023-10-08 PROCEDURE — 99232 SBSQ HOSP IP/OBS MODERATE 35: CPT | Performed by: INTERNAL MEDICINE

## 2023-10-08 PROCEDURE — 700111 HCHG RX REV CODE 636 W/ 250 OVERRIDE (IP): Performed by: SURGERY

## 2023-10-08 PROCEDURE — A9270 NON-COVERED ITEM OR SERVICE: HCPCS | Performed by: SURGERY

## 2023-10-08 RX ADMIN — SODIUM CHLORIDE: 9 INJECTION, SOLUTION INTRAVENOUS at 09:21

## 2023-10-08 RX ADMIN — ACETAMINOPHEN 650 MG: 325 TABLET, FILM COATED ORAL at 00:34

## 2023-10-08 RX ADMIN — ACETAMINOPHEN 650 MG: 325 TABLET, FILM COATED ORAL at 12:34

## 2023-10-08 RX ADMIN — HEPARIN SODIUM 5000 UNITS: 5000 INJECTION, SOLUTION INTRAVENOUS; SUBCUTANEOUS at 13:25

## 2023-10-08 RX ADMIN — NICOTINE TRANSDERMAL SYSTEM 21 MG: 21 PATCH, EXTENDED RELEASE TRANSDERMAL at 05:28

## 2023-10-08 RX ADMIN — HEPARIN SODIUM 5000 UNITS: 5000 INJECTION, SOLUTION INTRAVENOUS; SUBCUTANEOUS at 22:29

## 2023-10-08 RX ADMIN — TRAMADOL HYDROCHLORIDE 50 MG: 50 TABLET ORAL at 12:35

## 2023-10-08 RX ADMIN — TRAMADOL HYDROCHLORIDE 50 MG: 50 TABLET ORAL at 17:02

## 2023-10-08 RX ADMIN — GABAPENTIN 300 MG: 300 CAPSULE ORAL at 05:29

## 2023-10-08 RX ADMIN — GABAPENTIN 300 MG: 300 CAPSULE ORAL at 13:25

## 2023-10-08 RX ADMIN — HEPARIN SODIUM 5000 UNITS: 5000 INJECTION, SOLUTION INTRAVENOUS; SUBCUTANEOUS at 05:29

## 2023-10-08 RX ADMIN — ASPIRIN 81 MG: 81 TABLET, COATED ORAL at 05:29

## 2023-10-08 RX ADMIN — DOCUSATE SODIUM 50 MG AND SENNOSIDES 8.6 MG 2 TABLET: 8.6; 5 TABLET, FILM COATED ORAL at 05:29

## 2023-10-08 RX ADMIN — TRAMADOL HYDROCHLORIDE 50 MG: 50 TABLET ORAL at 05:29

## 2023-10-08 RX ADMIN — CLOPIDOGREL BISULFATE 75 MG: 75 TABLET ORAL at 05:29

## 2023-10-08 RX ADMIN — SODIUM CHLORIDE: 9 INJECTION, SOLUTION INTRAVENOUS at 19:56

## 2023-10-08 RX ADMIN — GABAPENTIN 300 MG: 300 CAPSULE ORAL at 19:51

## 2023-10-08 RX ADMIN — ACETAMINOPHEN 650 MG: 325 TABLET, FILM COATED ORAL at 17:02

## 2023-10-08 RX ADMIN — TRAMADOL HYDROCHLORIDE 50 MG: 50 TABLET ORAL at 00:32

## 2023-10-08 RX ADMIN — ACETAMINOPHEN 650 MG: 325 TABLET, FILM COATED ORAL at 06:08

## 2023-10-08 ASSESSMENT — PAIN DESCRIPTION - PAIN TYPE
TYPE: ACUTE PAIN

## 2023-10-08 ASSESSMENT — ENCOUNTER SYMPTOMS
EYES NEGATIVE: 1
CARDIOVASCULAR NEGATIVE: 1
RESPIRATORY NEGATIVE: 1
GASTROINTESTINAL NEGATIVE: 1
PSYCHIATRIC NEGATIVE: 1
NEUROLOGICAL NEGATIVE: 1

## 2023-10-08 NOTE — PROGRESS NOTES
[facility] Medicine Daily Progress Note    Date of Service  10/8/2023    Chief Complaint  Dillon Centeno is a 58 y.o. male admitted 9/26/2023 with bilateral foot wounds. He has hypertension and tobacco dependence.    Hospital Course  In the ER, patient was noted to have gangrene on his left foot.  Vascular surgery and orthopedic surgery were consulted.  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      Vascular studies showed severe arterial disease.  Patient underwent bilateral common femoral artery endarterectomy and profundoplasty with saphenous vein angioplasty with stent placement in the right external iliac artery on 9/30/2023. Prevena was placed (5-7 days). He was recommended to have Plavix for 1 month and aspirin for life.    Blood cultures from 9/26/2023 grew Morganella morganii and Vagococcus.  Urine culture from 9/26/2023 grew Staph epidermidis. ID were consulted.  Patient was placed on Zosyn with end date of 10/6/2023.  Echocardiogram showed LVEF of about 60% with no reported valvular abnormalities.  Repeat blood cultures from 9/27/2023 have been negative.    Noted to have renal cyst on CT.  MRI shows multiseptated cystic lesions in the upper pole of left kidney measuring 3.8 cm without associated enhancement or nodular component.    Interval Problem Update  Evaluated by PM&R on 10/7/2023, not able to be accepted due to lack of discharge support. Awaiting SNF placement.  Afebrile and hemodynamically stable.     I have discussed this patient's plan of care and discharge plan at IDT rounds today with Case Management, Nursing, Nursing leadership, and other members of the IDT team.    Consultants/Specialty  infectious disease, orthopedics, and vascular surgery    Code Status  Full Code    Disposition  The patient is medically cleared for discharge to home or a post-acute facility.  Anticipate discharge to: skilled nursing facility    I have placed the appropriate orders for post-discharge needs.    Review of  Systems  Review of Systems   Constitutional:  Positive for malaise/fatigue.   HENT: Negative.     Eyes: Negative.    Respiratory: Negative.     Cardiovascular: Negative.    Gastrointestinal: Negative.    Genitourinary: Negative.    Musculoskeletal:         Pain in phantom limb   Skin: Negative.    Neurological: Negative.    Endo/Heme/Allergies: Negative.    Psychiatric/Behavioral: Negative.          Physical Exam  Temp:  [36.2 °C (97.1 °F)-36.7 °C (98 °F)] 36.4 °C (97.5 °F)  Pulse:  [89-99] 92  Resp:  [16-18] 17  BP: (136-151)/(79-93) 136/86  SpO2:  [96 %-98 %] 96 %    Physical Exam  Constitutional:       General: He is not in acute distress.  HENT:      Head: Normocephalic.      Nose: Nose normal.      Mouth/Throat:      Mouth: Mucous membranes are moist.   Eyes:      Pupils: Pupils are equal, round, and reactive to light.   Cardiovascular:      Rate and Rhythm: Normal rate.   Pulmonary:      Effort: Pulmonary effort is normal.   Abdominal:      Palpations: Abdomen is soft.   Musculoskeletal:      Cervical back: Normal range of motion.      Right lower leg: No edema.      Comments: Left BKA   Skin:     General: Skin is warm.      Comments: Third right toe absent due to prior trauma.  Remaining toes with chronic ulcers.   Neurological:      Mental Status: He is alert. Mental status is at baseline.   Psychiatric:         Mood and Affect: Mood normal.         Fluids    Intake/Output Summary (Last 24 hours) at 10/8/2023 0722  Last data filed at 10/8/2023 0403  Gross per 24 hour   Intake 720 ml   Output 1000 ml   Net -280 ml         Laboratory  Recent Labs     10/06/23  0435 10/07/23  0657 10/08/23  0639   WBC 7.5 7.3 7.2   RBC 2.91* 2.93* 3.02*   HEMOGLOBIN 8.2* 8.4* 8.7*   HEMATOCRIT 25.9* 26.6* 27.2*   MCV 89.0 90.8 90.1   MCH 28.2 28.7 28.8   MCHC 31.7* 31.6* 32.0*   RDW 49.8 51.2* 50.9*   PLATELETCT 483* 505* 486*   MPV 8.6* 8.5* 8.4*       Recent Labs     10/06/23  0435 10/07/23  0657 10/08/23  0639   SODIUM 128*  129* 131*   POTASSIUM 4.0 4.5 4.8   CHLORIDE 95* 98 97   CO2 23 25 24   GLUCOSE 109* 117* 100*   BUN 9 7* 9   CREATININE 0.54 0.47* 0.51   CALCIUM 8.8 8.5 8.8                     Imaging  MR-ABDOMEN-WITH & W/O   Final Result      Multi septated cystic lesion at the upper pole LEFT kidney measuring 3.8 cm without associated enhancement or nodular component (Bosniak 2).            DX-PORTABLE FLUORO > 1 HOUR   Final Result      Portable fluoroscopy utilized for 2 minutes 20.4 seconds.         INTERPRETING LOCATION: [address], [place], [address]      CT-CTA AORTA-RO WITH & W/O-POST PROCESS   Final Result         CTA AORTA      1.  Septated lobulated left upper pole renal cystic mass lesion, recommend follow-up MRI of the kidneys for further characterization.   2.  Atherosclerosis and atherosclerotic coronary artery disease.   3.  Fat-containing right inguinal hernia      CTA LOWER EXTREMITIES      1.  Occlusion of the right common iliac artery through the distal superficial femoral artery   2.  Distal right peroneal artery occlusion with 2-vessel runoff the anterior and posterior tibial arteries at the right ankle.   3.  Occlusion of the left superficial femoral artery at the bifurcation extending through its length to the popliteal artery.   4.  Occlusion of the proximal left profunda femoris just distal to the bifurcation which reconstitutes.   5.  Soft tissue gas at the stump of left below-the-knee amputation, within expected limits for recent postop changes.      3D angiographic/MIP images of the vasculature confirm the vascular findings as described above.      These findings were discussed with the patient's clinician, Keith Navarro, on 9/29/2023 8:06 AM.      EC-ECHOCARDIOGRAM COMPLETE W/O CONT   Final Result      US-AORTA/ILIACS DUPLEX COMPLETE   Final Result      US-EXTREMITY ARTERY LOWER UNILAT RIGHT   Final Result      US-INGA SINGLE LEVEL UNILAT RIGHT   Final Result      DX-CHEST-PORTABLE (1 VIEW)    Final Result      1.  LEFT basilar atelectasis or pneumonia   2.  Trace LEFT pleural effusion or pleural scar             Assessment/Plan  * Gangrene of left foot (HCC)- (present on admission)  Assessment & Plan  Left BKA on 9/27/2023. Vascular studies showed severe arterial disease. Underwent bilateral common femoral artery endarterectomy and profundoplasty with saphenous vein angioplasty with stent placement in the right external iliac artery on 9/30/2023. Prevena was placed (5-7 days). He was recommended to have Plavix for 1 month and aspirin for life. Follow-up with Dr. Navarro. Kayleen to be removed 14 to 21 days postoperatively. Completed course of Zosyn on 10/6/2023. On multimodal pain regimen, adjust as needed.     Bacteremia- (present on admission)  Assessment & Plan  Blood cultures from 9/26/2023 grew Morganella morganii and Vagococcus. ID were consulted. Completed course of On Zosyn on 10/6/2023. Repeat blood cultures from 9/27/2023 have been negative.  Echocardiogram showed no evidence of vegetations.     PAD (peripheral artery disease) (HCC)- (present on admission)  Assessment & Plan  Underwent bilateral common femoral artery endarterectomy and profundoplasty with saphenous vein angioplasty with stent placement in the right external iliac artery on 9/30/2023. Prevena was placed (5-7 days). Recommended to have Plavix for 1 month and aspirin for life.  Follow-up with Dr. Navarro.  Stump protector for left leg was ordered by vascular surgery.    Renal cyst  Assessment & Plan  Noted to have renal cyst on CT.  MRI shows multiseptated cystic lesions in the upper pole of left kidney measuring 3.8 cm without associated enhancement or nodular component.  Recommend outpatient follow-up    Anemia- (present on admission)  Assessment & Plan  Stable.  Monitor    Hyponatremia  Assessment & Plan  Continue IV fluids    Elevated liver enzymes  Assessment & Plan  Resolved.  Hepatitis panel was normal.  Monitor          VTE prophylaxis: SCDs, Lovenox

## 2023-10-08 NOTE — CARE PLAN
Problem: Fall Risk  Goal: Patient will remain free from falls  Note: Education provided regarding fall safety including use of call bell for assistance, bed alarm on and bed kept in lowest position   The patient is Stable - Low risk of patient condition declining or worsening    Shift Goals  Clinical Goals: pain management  Patient Goals: pain management  Family Goals: ashlie    Progress made toward(s) clinical / shift goals:      Patient is not progressing towards the following goals:

## 2023-10-08 NOTE — CARE PLAN
The patient is Stable - Low risk of patient condition declining or worsening    Shift Goals  Clinical Goals: Pain control  Patient Goals: Pain control  Family Goals: ashlie    Patient did not complain of pain, was only medicated with the scheduled pain medications with effect. Patient reluctant to get out of bed.    Progress made toward(s) clinical / shift goals:  Ongoing    Patient is progressing towards the following goals:      Problem: Pain - Standard  Goal: Alleviation of pain or a reduction in pain to the patient’s comfort goal  Outcome: Progressing     Problem: Skin Integrity  Goal: Skin integrity is maintained or improved  Outcome: Progressing     Problem: Fall Risk  Goal: Patient will remain free from falls  Outcome: Progressing     Problem: Knowledge Deficit - Standard  Goal: Patient and family/care givers will demonstrate understanding of plan of care, disease process/condition, diagnostic tests and medications  Outcome: Progressing

## 2023-10-09 LAB
ANION GAP SERPL CALC-SCNC: 9 MMOL/L (ref 7–16)
BASOPHILS # BLD AUTO: 1.1 % (ref 0–1.8)
BASOPHILS # BLD: 0.07 K/UL (ref 0–0.12)
BUN SERPL-MCNC: 8 MG/DL (ref 8–22)
CALCIUM SERPL-MCNC: 8.4 MG/DL (ref 8.5–10.5)
CHLORIDE SERPL-SCNC: 100 MMOL/L (ref 96–112)
CO2 SERPL-SCNC: 23 MMOL/L (ref 20–33)
CREAT SERPL-MCNC: 0.46 MG/DL (ref 0.5–1.4)
EOSINOPHIL # BLD AUTO: 0.35 K/UL (ref 0–0.51)
EOSINOPHIL NFR BLD: 5.5 % (ref 0–6.9)
ERYTHROCYTE [DISTWIDTH] IN BLOOD BY AUTOMATED COUNT: 51.7 FL (ref 35.9–50)
GFR SERPLBLD CREATININE-BSD FMLA CKD-EPI: 120 ML/MIN/1.73 M 2
GLUCOSE SERPL-MCNC: 118 MG/DL (ref 65–99)
HCT VFR BLD AUTO: 27.5 % (ref 42–52)
HGB BLD-MCNC: 8.6 G/DL (ref 14–18)
IMM GRANULOCYTES # BLD AUTO: 0.07 K/UL (ref 0–0.11)
IMM GRANULOCYTES NFR BLD AUTO: 1.1 % (ref 0–0.9)
LYMPHOCYTES # BLD AUTO: 1.37 K/UL (ref 1–4.8)
LYMPHOCYTES NFR BLD: 21.6 % (ref 22–41)
MCH RBC QN AUTO: 28.4 PG (ref 27–33)
MCHC RBC AUTO-ENTMCNC: 31.3 G/DL (ref 32.3–36.5)
MCV RBC AUTO: 90.8 FL (ref 81.4–97.8)
MONOCYTES # BLD AUTO: 0.5 K/UL (ref 0–0.85)
MONOCYTES NFR BLD AUTO: 7.9 % (ref 0–13.4)
NEUTROPHILS # BLD AUTO: 3.99 K/UL (ref 1.82–7.42)
NEUTROPHILS NFR BLD: 62.8 % (ref 44–72)
NRBC # BLD AUTO: 0 K/UL
NRBC BLD-RTO: 0 /100 WBC (ref 0–0.2)
PLATELET # BLD AUTO: 519 K/UL (ref 164–446)
PMV BLD AUTO: 8.4 FL (ref 9–12.9)
POTASSIUM SERPL-SCNC: 4.4 MMOL/L (ref 3.6–5.5)
RBC # BLD AUTO: 3.03 M/UL (ref 4.7–6.1)
SODIUM SERPL-SCNC: 132 MMOL/L (ref 135–145)
WBC # BLD AUTO: 6.4 K/UL (ref 4.8–10.8)

## 2023-10-09 PROCEDURE — 770006 HCHG ROOM/CARE - MED/SURG/GYN SEMI*

## 2023-10-09 PROCEDURE — 700105 HCHG RX REV CODE 258: Performed by: INTERNAL MEDICINE

## 2023-10-09 PROCEDURE — 80048 BASIC METABOLIC PNL TOTAL CA: CPT

## 2023-10-09 PROCEDURE — 85025 COMPLETE CBC W/AUTO DIFF WBC: CPT

## 2023-10-09 PROCEDURE — 700102 HCHG RX REV CODE 250 W/ 637 OVERRIDE(OP): Performed by: INTERNAL MEDICINE

## 2023-10-09 PROCEDURE — A9270 NON-COVERED ITEM OR SERVICE: HCPCS | Performed by: INTERNAL MEDICINE

## 2023-10-09 PROCEDURE — 36415 COLL VENOUS BLD VENIPUNCTURE: CPT

## 2023-10-09 PROCEDURE — 700111 HCHG RX REV CODE 636 W/ 250 OVERRIDE (IP): Performed by: SURGERY

## 2023-10-09 PROCEDURE — 700102 HCHG RX REV CODE 250 W/ 637 OVERRIDE(OP): Performed by: SURGERY

## 2023-10-09 PROCEDURE — 99232 SBSQ HOSP IP/OBS MODERATE 35: CPT | Performed by: INTERNAL MEDICINE

## 2023-10-09 PROCEDURE — A9270 NON-COVERED ITEM OR SERVICE: HCPCS | Performed by: SURGERY

## 2023-10-09 RX ADMIN — TRAMADOL HYDROCHLORIDE 50 MG: 50 TABLET ORAL at 17:57

## 2023-10-09 RX ADMIN — TRAMADOL HYDROCHLORIDE 50 MG: 50 TABLET ORAL at 05:22

## 2023-10-09 RX ADMIN — ACETAMINOPHEN 650 MG: 325 TABLET, FILM COATED ORAL at 23:50

## 2023-10-09 RX ADMIN — NICOTINE TRANSDERMAL SYSTEM 21 MG: 21 PATCH, EXTENDED RELEASE TRANSDERMAL at 05:24

## 2023-10-09 RX ADMIN — ACETAMINOPHEN 650 MG: 325 TABLET, FILM COATED ORAL at 05:23

## 2023-10-09 RX ADMIN — GABAPENTIN 300 MG: 300 CAPSULE ORAL at 11:56

## 2023-10-09 RX ADMIN — DOCUSATE SODIUM 50 MG AND SENNOSIDES 8.6 MG 2 TABLET: 8.6; 5 TABLET, FILM COATED ORAL at 05:23

## 2023-10-09 RX ADMIN — SODIUM CHLORIDE: 9 INJECTION, SOLUTION INTRAVENOUS at 19:11

## 2023-10-09 RX ADMIN — ACETAMINOPHEN 650 MG: 325 TABLET, FILM COATED ORAL at 17:56

## 2023-10-09 RX ADMIN — CLOPIDOGREL BISULFATE 75 MG: 75 TABLET ORAL at 05:23

## 2023-10-09 RX ADMIN — CYCLOBENZAPRINE 10 MG: 10 TABLET, FILM COATED ORAL at 22:06

## 2023-10-09 RX ADMIN — HEPARIN SODIUM 5000 UNITS: 5000 INJECTION, SOLUTION INTRAVENOUS; SUBCUTANEOUS at 05:22

## 2023-10-09 RX ADMIN — HEPARIN SODIUM 5000 UNITS: 5000 INJECTION, SOLUTION INTRAVENOUS; SUBCUTANEOUS at 22:06

## 2023-10-09 RX ADMIN — ACETAMINOPHEN 650 MG: 325 TABLET, FILM COATED ORAL at 11:59

## 2023-10-09 RX ADMIN — ACETAMINOPHEN 650 MG: 325 TABLET, FILM COATED ORAL at 00:24

## 2023-10-09 RX ADMIN — ASPIRIN 81 MG: 81 TABLET, COATED ORAL at 05:23

## 2023-10-09 RX ADMIN — TRAMADOL HYDROCHLORIDE 50 MG: 50 TABLET ORAL at 00:25

## 2023-10-09 RX ADMIN — HEPARIN SODIUM 5000 UNITS: 5000 INJECTION, SOLUTION INTRAVENOUS; SUBCUTANEOUS at 13:47

## 2023-10-09 RX ADMIN — GABAPENTIN 300 MG: 300 CAPSULE ORAL at 17:57

## 2023-10-09 RX ADMIN — SODIUM CHLORIDE: 9 INJECTION, SOLUTION INTRAVENOUS at 08:25

## 2023-10-09 RX ADMIN — TRAMADOL HYDROCHLORIDE 50 MG: 50 TABLET ORAL at 11:56

## 2023-10-09 RX ADMIN — TRAMADOL HYDROCHLORIDE 50 MG: 50 TABLET ORAL at 23:50

## 2023-10-09 RX ADMIN — GABAPENTIN 300 MG: 300 CAPSULE ORAL at 05:23

## 2023-10-09 ASSESSMENT — PAIN DESCRIPTION - PAIN TYPE
TYPE: ACUTE PAIN

## 2023-10-09 ASSESSMENT — ENCOUNTER SYMPTOMS
EYES NEGATIVE: 1
PSYCHIATRIC NEGATIVE: 1
RESPIRATORY NEGATIVE: 1
GASTROINTESTINAL NEGATIVE: 1
CARDIOVASCULAR NEGATIVE: 1
NEUROLOGICAL NEGATIVE: 1

## 2023-10-09 NOTE — CARE PLAN
The patient is Stable - Low risk of patient condition declining or worsening    Shift Goals  Clinical Goals: Pain control  Patient Goals: Pain control  Family Goals: ashlie    Patient A & O x 4, on room air. No complaints of pain, but was medicated with the scheduled medications with effect. Fall precautions in place. Hourly rounding done and all needs met.    Progress made toward(s) clinical / shift goals:  Ongoing    Patient is not progressing towards the following goals:

## 2023-10-09 NOTE — PROGRESS NOTES
"\    Orthopedic PA Progress Note    Interval changes:  Patient doing well.  LLE dressings are CDI  NWB LLE  Left knee in fitted prosthetic knee immobilizer  Knee remains in slight flexion contracture despite immobilizer  Sutures to remain in place 4-6 weeks   Follow up with Dr. Altamirano 4 weeks postop.   Cleared for DC from orthopedic standpoint pending therapy recs and medical optimization    ROS - Patient denies any new issues.  Denies any numbness or tingling. Pain well controlled.    /80   Pulse 89   Temp 36.2 °C (97.2 °F) (Temporal)   Resp 16   Ht 1.803 m (5' 11\")   Wt 66 kg (145 lb 8.1 oz)   SpO2 96%     Patient seen and examined  No acute distress  Breathing non labored  RRR  LLE: Dressings CDI.     Recent Labs     10/07/23  0657 10/08/23  0639 10/09/23  0427   WBC 7.3 7.2 6.4   RBC 2.93* 3.02* 3.03*   HEMOGLOBIN 8.4* 8.7* 8.6*   HEMATOCRIT 26.6* 27.2* 27.5*   MCV 90.8 90.1 90.8   MCH 28.7 28.8 28.4   MCHC 31.6* 32.0* 31.3*   RDW 51.2* 50.9* 51.7*   PLATELETCT 505* 486* 519*   MPV 8.5* 8.4* 8.4*       Active Hospital Problems    Diagnosis     Renal cyst [N28.1]      Priority: Low    PAD (peripheral artery disease) (HCC) [I73.9]     Bacteremia [R78.81]     Anemia [D64.9]     Gangrene of left foot (HCC) [I96]     Elevated liver enzymes [R74.8]     Hyponatremia [E87.1]        Assessment/Plan:  LLE dressings are CDI  NWB LLE  Left knee in fitted prosthetic knee immobilizer  Knee remains in slight flexion contracture despite immobilizer  Sutures to remain in place 4-6 weeks   Follow up with Dr. Altamirano 4 weeks postop.   Cleared for DC from orthopedic standpoint pending therapy recs and medical optimization    POD#12 S/p  Left below-knee amputation  Wt bearing status - NWB LLE  Wound care/Drains - Dressings to be changed every other day by nursing. Or PRN for saturation starting POD#2  Future Procedures - None planned   Sutures/Staples out-4-6 weeks postop  PT/OT-initiated  Antibiotics:  Perioperative " completed  DVT Prophylaxis- TEDS/SCDs/Foot pumps  [place]-not needed per ortho  Case Coordination for Discharge Planning - Disposition per therapy recs.

## 2023-10-09 NOTE — CARE PLAN
Assumed care of pt at 0700 after receiving report from night shift RN. Bedside rounding complete with nightshift RN. Pt is calm, no SOB or acute distress noted. Pt is A&Ox 3 - disoriented to situation. VSS on RA. Assessment complete and charted. Updated pt on POC - pt verbalized understanding after discussion. Bed alarm in place. Bed is locked and in lowest position, call light within reach, fall and safety precautions in place. All needs met at this time.     The patient is Stable - Low risk of patient condition declining or worsening    Shift Goals  Clinical Goals: wound care/dressing change by end of shift  Patient Goals: Pain control  Family Goals: ashlie    Progress made toward(s) clinical / shift goals: Wound dressing dressing changed per orders, CDI.    Patient is not progressing towards the following goals: Pt c/o discomfort with L knee immobilizer, education provided, despite education pt requesting it to be off/loosened.    Problem: Knowledge Deficit - Standard  Goal: Patient and family/care givers will demonstrate understanding of plan of care, disease process/condition, diagnostic tests and medications  10/9/2023 1548 by Vianey E Rangel-Reyes, R.N.  Outcome: Not Progressing  10/9/2023 0838 by Vianey E Rangel-Reyes, R.N.  Outcome: Not Progressing     Problem: Pain - Standard  Goal: Alleviation of pain or a reduction in pain to the patient’s comfort goal  10/9/2023 1548 by Vianey E Rangel-Reyes, R.N.  Outcome: Progressing  10/9/2023 0838 by Vianey E Rangel-Reyes, R.N.  Outcome: Not Progressing

## 2023-10-09 NOTE — PROGRESS NOTES
[facility] Medicine Daily Progress Note    Date of Service  10/9/2023    Chief Complaint  Dillon Centeno is a 58 y.o. male admitted 9/26/2023 with bilateral foot wounds. He has hypertension and tobacco dependence.    Hospital Course  In the ER, patient was noted to have gangrene on his left foot.  Vascular surgery and orthopedic surgery were consulted.  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      Vascular studies showed severe arterial disease.  Patient underwent bilateral common femoral artery endarterectomy and profundoplasty with saphenous vein angioplasty with stent placement in the right external iliac artery on 9/30/2023. Prevena was placed (5-7 days). He was recommended to have Plavix for 1 month and aspirin for life.    Blood cultures from 9/26/2023 grew Morganella morganii and Vagococcus.  Urine culture from 9/26/2023 grew Staph epidermidis. ID were consulted.  Patient was placed on Zosyn with end date of 10/6/2023.  Echocardiogram showed LVEF of about 60% with no reported valvular abnormalities.  Repeat blood cultures from 9/27/2023 have been negative.    Noted to have renal cyst on CT.  MRI shows multiseptated cystic lesions in the upper pole of left kidney measuring 3.8 cm without associated enhancement or nodular component.    Interval Problem Update  Evaluated by PM&R on 10/7/2023, not able to be accepted due to lack of discharge support. Awaiting SNF placement.  Afebrile and hemodynamically stable.     I have discussed this patient's plan of care and discharge plan at IDT rounds today with Case Management, Nursing, Nursing leadership, and other members of the IDT team.    Consultants/Specialty  infectious disease, orthopedics, and vascular surgery    Code Status  Full Code    Disposition  The patient is medically cleared for discharge to home or a post-acute facility.  Anticipate discharge to: skilled nursing facility    I have placed the appropriate orders for post-discharge needs.    Review of  Systems  Review of Systems   Constitutional:  Positive for malaise/fatigue.   HENT: Negative.     Eyes: Negative.    Respiratory: Negative.     Cardiovascular: Negative.    Gastrointestinal: Negative.    Genitourinary: Negative.    Musculoskeletal:         Pain in phantom limb   Skin: Negative.    Neurological: Negative.    Endo/Heme/Allergies: Negative.    Psychiatric/Behavioral: Negative.          Physical Exam  Temp:  [36.2 °C (97.2 °F)-36.4 °C (97.6 °F)] 36.2 °C (97.2 °F)  Pulse:  [89-99] 89  Resp:  [16-18] 16  BP: (124-141)/(80-95) 133/80  SpO2:  [96 %-97 %] 96 %    Physical Exam  Constitutional:       General: He is not in acute distress.  HENT:      Head: Normocephalic.      Nose: Nose normal.      Mouth/Throat:      Mouth: Mucous membranes are moist.   Eyes:      Pupils: Pupils are equal, round, and reactive to light.   Cardiovascular:      Rate and Rhythm: Normal rate.   Pulmonary:      Effort: Pulmonary effort is normal.   Abdominal:      Palpations: Abdomen is soft.   Musculoskeletal:      Cervical back: Normal range of motion.      Right lower leg: No edema.      Comments: Left BKA   Skin:     General: Skin is warm.      Comments: Third right toe absent due to prior trauma.  Remaining toes with chronic ulcers.   Neurological:      Mental Status: He is alert. Mental status is at baseline.   Psychiatric:         Mood and Affect: Mood normal.         Fluids    Intake/Output Summary (Last 24 hours) at 10/9/2023 1014  Last data filed at 10/9/2023 0600  Gross per 24 hour   Intake 1040 ml   Output 2300 ml   Net -1260 ml         Laboratory  Recent Labs     10/07/23  0657 10/08/23  0639 10/09/23  0427   WBC 7.3 7.2 6.4   RBC 2.93* 3.02* 3.03*   HEMOGLOBIN 8.4* 8.7* 8.6*   HEMATOCRIT 26.6* 27.2* 27.5*   MCV 90.8 90.1 90.8   MCH 28.7 28.8 28.4   MCHC 31.6* 32.0* 31.3*   RDW 51.2* 50.9* 51.7*   PLATELETCT 505* 486* 519*   MPV 8.5* 8.4* 8.4*       Recent Labs     10/07/23  0657 10/08/23  0639 10/09/23  0427   SODIUM  129* 131* 132*   POTASSIUM 4.5 4.8 4.4   CHLORIDE 98 97 100   CO2 25 24 23   GLUCOSE 117* 100* 118*   BUN 7* 9 8   CREATININE 0.47* 0.51 0.46*   CALCIUM 8.5 8.8 8.4*                     Imaging  MR-ABDOMEN-WITH & W/O   Final Result      Multi septated cystic lesion at the upper pole LEFT kidney measuring 3.8 cm without associated enhancement or nodular component (Bosniak 2).            DX-PORTABLE FLUORO > 1 HOUR   Final Result      Portable fluoroscopy utilized for 2 minutes 20.4 seconds.         INTERPRETING LOCATION: [address] [place] [address], [place], [id number]      CT-CTA AORTA-RO WITH & W/O-POST PROCESS   Final Result         CTA AORTA      1.  Septated lobulated left upper pole renal cystic mass lesion, recommend follow-up MRI of the kidneys for further characterization.   2.  Atherosclerosis and atherosclerotic coronary artery disease.   3.  Fat-containing right inguinal hernia      CTA LOWER EXTREMITIES      1.  Occlusion of the right common iliac artery through the distal superficial femoral artery   2.  Distal right peroneal artery occlusion with 2-vessel runoff the anterior and posterior tibial arteries at the right ankle.   3.  Occlusion of the left superficial femoral artery at the bifurcation extending through its length to the popliteal artery.   4.  Occlusion of the proximal left profunda femoris just distal to the bifurcation which reconstitutes.   5.  Soft tissue gas at the stump of left below-the-knee amputation, within expected limits for recent postop changes.      3D angiographic/MIP images of the vasculature confirm the vascular findings as described above.      These findings were discussed with the patient's clinician, Keith Navarro, on 9/29/2023 8:06 AM.      EC-ECHOCARDIOGRAM COMPLETE W/O CONT   Final Result      US-AORTA/ILIACS DUPLEX COMPLETE   Final Result      US-EXTREMITY ARTERY LOWER UNILAT RIGHT   Final Result      US-INGA SINGLE LEVEL UNILAT RIGHT   Final Result      DX-CHEST-PORTABLE (1 VIEW)    Final Result      1.  LEFT basilar atelectasis or pneumonia   2.  Trace LEFT pleural effusion or pleural scar             Assessment/Plan  * Gangrene of left foot (HCC)- (present on admission)  Assessment & Plan  Left BKA on 9/27/2023. Vascular studies showed severe arterial disease. Underwent bilateral common femoral artery endarterectomy and profundoplasty with saphenous vein angioplasty with stent placement in the right external iliac artery on 9/30/2023. Prevena was placed (5-7 days). He was recommended to have Plavix for 1 month and aspirin for life. Follow-up with Dr. Navarro. Kayleen to be removed 14 to 21 days postoperatively. Completed course of Zosyn on 10/6/2023. On multimodal pain regimen, adjust as needed.     Bacteremia- (present on admission)  Assessment & Plan  Blood cultures from 9/26/2023 grew Morganella morganii and Vagococcus. ID were consulted. Completed course of On Zosyn on 10/6/2023. Repeat blood cultures from 9/27/2023 have been negative.  Echocardiogram showed no evidence of vegetations.     PAD (peripheral artery disease) (HCC)- (present on admission)  Assessment & Plan  Underwent bilateral common femoral artery endarterectomy and profundoplasty with saphenous vein angioplasty with stent placement in the right external iliac artery on 9/30/2023. Prevena was placed (5-7 days). Recommended to have Plavix for 1 month and aspirin for life.  Follow-up with Dr. Navarro.  Stump protector for left leg was ordered by vascular surgery.    Renal cyst  Assessment & Plan  Noted to have renal cyst on CT.  MRI shows multiseptated cystic lesions in the upper pole of left kidney measuring 3.8 cm without associated enhancement or nodular component.  Recommend outpatient follow-up    Anemia- (present on admission)  Assessment & Plan  Stable.  Monitor    Hyponatremia  Assessment & Plan  Continue IV fluids    Elevated liver enzymes  Assessment & Plan  Resolved.  Hepatitis panel was normal.  Monitor          VTE prophylaxis: SCDs, Lovenox

## 2023-10-09 NOTE — DISCHARGE PLANNING
Case Management Discharge Planning    Admission Date: 9/26/2023  GMLOS: 6.8  ALOS: 13    6-Clicks ADL Score: 21  6-Clicks Mobility Score: 11  PT and/or OT Eval ordered: Yes  Post-acute Referrals Ordered: Yes  Post-acute Choice Obtained: Yes  Has referral(s) been sent to post-acute provider:  Yes      Anticipated Discharge Dispo: Discharge Disposition: D/T to SNF with Medicare cert in anticipation of skilled care (03)    DME Needed: Yes    DME Ordered: No    Action(s) Taken: LMSW spoke with management. Management looking for alternatives options. Management recomending intensive HH services with 5 days a week care including wound care and PT/OT. LMSW inquired about DC plan with MD. MD requested PT/OT input and for LMSW to expand SNF choice. LMSW pending answer with PT/OT.      Escalations Completed: None    Medically Clear: Yes    Next Steps: LMSW to follow up with PT/OT.    Barriers to Discharge: Placement    Is the patient up for discharge tomorrow: No

## 2023-10-09 NOTE — DISCHARGE PLANNING
PM&R consult complete. Currently not a good candidate for IPR.  Physiatry recommends SNF  [facility] no longer following. Please  reach out if medical management is requested.

## 2023-10-10 LAB
ANION GAP SERPL CALC-SCNC: 9 MMOL/L (ref 7–16)
BASOPHILS # BLD AUTO: 1.1 % (ref 0–1.8)
BASOPHILS # BLD: 0.09 K/UL (ref 0–0.12)
BUN SERPL-MCNC: 9 MG/DL (ref 8–22)
CALCIUM SERPL-MCNC: 8.8 MG/DL (ref 8.5–10.5)
CHLORIDE SERPL-SCNC: 97 MMOL/L (ref 96–112)
CO2 SERPL-SCNC: 23 MMOL/L (ref 20–33)
CREAT SERPL-MCNC: 0.48 MG/DL (ref 0.5–1.4)
EOSINOPHIL # BLD AUTO: 0.29 K/UL (ref 0–0.51)
EOSINOPHIL NFR BLD: 3.6 % (ref 0–6.9)
ERYTHROCYTE [DISTWIDTH] IN BLOOD BY AUTOMATED COUNT: 52.8 FL (ref 35.9–50)
GFR SERPLBLD CREATININE-BSD FMLA CKD-EPI: 119 ML/MIN/1.73 M 2
GLUCOSE SERPL-MCNC: 103 MG/DL (ref 65–99)
HCT VFR BLD AUTO: 30 % (ref 42–52)
HGB BLD-MCNC: 9.4 G/DL (ref 14–18)
IMM GRANULOCYTES # BLD AUTO: 0.06 K/UL (ref 0–0.11)
IMM GRANULOCYTES NFR BLD AUTO: 0.7 % (ref 0–0.9)
LYMPHOCYTES # BLD AUTO: 1.47 K/UL (ref 1–4.8)
LYMPHOCYTES NFR BLD: 18.2 % (ref 22–41)
MCH RBC QN AUTO: 28.2 PG (ref 27–33)
MCHC RBC AUTO-ENTMCNC: 31.3 G/DL (ref 32.3–36.5)
MCV RBC AUTO: 90.1 FL (ref 81.4–97.8)
MONOCYTES # BLD AUTO: 0.52 K/UL (ref 0–0.85)
MONOCYTES NFR BLD AUTO: 6.4 % (ref 0–13.4)
NEUTROPHILS # BLD AUTO: 5.66 K/UL (ref 1.82–7.42)
NEUTROPHILS NFR BLD: 70 % (ref 44–72)
NRBC # BLD AUTO: 0 K/UL
NRBC BLD-RTO: 0 /100 WBC (ref 0–0.2)
PLATELET # BLD AUTO: 568 K/UL (ref 164–446)
PMV BLD AUTO: 8.4 FL (ref 9–12.9)
POTASSIUM SERPL-SCNC: 4.1 MMOL/L (ref 3.6–5.5)
RBC # BLD AUTO: 3.33 M/UL (ref 4.7–6.1)
SODIUM SERPL-SCNC: 129 MMOL/L (ref 135–145)
WBC # BLD AUTO: 8.1 K/UL (ref 4.8–10.8)

## 2023-10-10 PROCEDURE — 97530 THERAPEUTIC ACTIVITIES: CPT

## 2023-10-10 PROCEDURE — A9270 NON-COVERED ITEM OR SERVICE: HCPCS | Performed by: INTERNAL MEDICINE

## 2023-10-10 PROCEDURE — A9270 NON-COVERED ITEM OR SERVICE: HCPCS | Performed by: SURGERY

## 2023-10-10 PROCEDURE — 80048 BASIC METABOLIC PNL TOTAL CA: CPT

## 2023-10-10 PROCEDURE — 700102 HCHG RX REV CODE 250 W/ 637 OVERRIDE(OP): Performed by: INTERNAL MEDICINE

## 2023-10-10 PROCEDURE — 700105 HCHG RX REV CODE 258: Performed by: INTERNAL MEDICINE

## 2023-10-10 PROCEDURE — 36415 COLL VENOUS BLD VENIPUNCTURE: CPT

## 2023-10-10 PROCEDURE — 770006 HCHG ROOM/CARE - MED/SURG/GYN SEMI*

## 2023-10-10 PROCEDURE — 85025 COMPLETE CBC W/AUTO DIFF WBC: CPT

## 2023-10-10 PROCEDURE — 700102 HCHG RX REV CODE 250 W/ 637 OVERRIDE(OP): Performed by: SURGERY

## 2023-10-10 PROCEDURE — 700111 HCHG RX REV CODE 636 W/ 250 OVERRIDE (IP): Performed by: INTERNAL MEDICINE

## 2023-10-10 PROCEDURE — 700111 HCHG RX REV CODE 636 W/ 250 OVERRIDE (IP): Performed by: SURGERY

## 2023-10-10 PROCEDURE — 99232 SBSQ HOSP IP/OBS MODERATE 35: CPT | Performed by: STUDENT IN AN ORGANIZED HEALTH CARE EDUCATION/TRAINING PROGRAM

## 2023-10-10 RX ADMIN — GABAPENTIN 300 MG: 300 CAPSULE ORAL at 05:51

## 2023-10-10 RX ADMIN — GABAPENTIN 300 MG: 300 CAPSULE ORAL at 12:35

## 2023-10-10 RX ADMIN — TRAMADOL HYDROCHLORIDE 50 MG: 50 TABLET ORAL at 17:19

## 2023-10-10 RX ADMIN — HEPARIN SODIUM 5000 UNITS: 5000 INJECTION, SOLUTION INTRAVENOUS; SUBCUTANEOUS at 05:52

## 2023-10-10 RX ADMIN — HYDROMORPHONE HYDROCHLORIDE 1.5 MG: 2 INJECTION, SOLUTION INTRAMUSCULAR; INTRAVENOUS; SUBCUTANEOUS at 12:34

## 2023-10-10 RX ADMIN — DOCUSATE SODIUM 50 MG AND SENNOSIDES 8.6 MG 2 TABLET: 8.6; 5 TABLET, FILM COATED ORAL at 05:52

## 2023-10-10 RX ADMIN — OXYCODONE HYDROCHLORIDE 20 MG: 10 TABLET ORAL at 10:35

## 2023-10-10 RX ADMIN — GABAPENTIN 300 MG: 300 CAPSULE ORAL at 17:19

## 2023-10-10 RX ADMIN — NICOTINE TRANSDERMAL SYSTEM 21 MG: 21 PATCH, EXTENDED RELEASE TRANSDERMAL at 05:52

## 2023-10-10 RX ADMIN — TRAMADOL HYDROCHLORIDE 50 MG: 50 TABLET ORAL at 12:35

## 2023-10-10 RX ADMIN — ACETAMINOPHEN 650 MG: 325 TABLET, FILM COATED ORAL at 12:37

## 2023-10-10 RX ADMIN — ASPIRIN 81 MG: 81 TABLET, COATED ORAL at 05:52

## 2023-10-10 RX ADMIN — OXYCODONE HYDROCHLORIDE 20 MG: 10 TABLET ORAL at 20:23

## 2023-10-10 RX ADMIN — SODIUM CHLORIDE: 9 INJECTION, SOLUTION INTRAVENOUS at 22:02

## 2023-10-10 RX ADMIN — HEPARIN SODIUM 5000 UNITS: 5000 INJECTION, SOLUTION INTRAVENOUS; SUBCUTANEOUS at 16:11

## 2023-10-10 RX ADMIN — SODIUM CHLORIDE: 9 INJECTION, SOLUTION INTRAVENOUS at 08:35

## 2023-10-10 RX ADMIN — HYDROMORPHONE HYDROCHLORIDE 1.5 MG: 2 INJECTION, SOLUTION INTRAMUSCULAR; INTRAVENOUS; SUBCUTANEOUS at 17:20

## 2023-10-10 RX ADMIN — ACETAMINOPHEN 650 MG: 325 TABLET, FILM COATED ORAL at 05:52

## 2023-10-10 RX ADMIN — OXYCODONE HYDROCHLORIDE 20 MG: 10 TABLET ORAL at 23:27

## 2023-10-10 RX ADMIN — CLOPIDOGREL BISULFATE 75 MG: 75 TABLET ORAL at 05:51

## 2023-10-10 RX ADMIN — ACETAMINOPHEN 650 MG: 325 TABLET, FILM COATED ORAL at 17:19

## 2023-10-10 RX ADMIN — OXYCODONE HYDROCHLORIDE 20 MG: 10 TABLET ORAL at 16:10

## 2023-10-10 RX ADMIN — HEPARIN SODIUM 5000 UNITS: 5000 INJECTION, SOLUTION INTRAVENOUS; SUBCUTANEOUS at 20:25

## 2023-10-10 RX ADMIN — TRAMADOL HYDROCHLORIDE 50 MG: 50 TABLET ORAL at 05:51

## 2023-10-10 ASSESSMENT — COGNITIVE AND FUNCTIONAL STATUS - GENERAL
MOVING FROM LYING ON BACK TO SITTING ON SIDE OF FLAT BED: A LITTLE
WALKING IN HOSPITAL ROOM: TOTAL
STANDING UP FROM CHAIR USING ARMS: A LITTLE
SUGGESTED CMS G CODE MODIFIER MOBILITY: CK
CLIMB 3 TO 5 STEPS WITH RAILING: TOTAL
MOBILITY SCORE: 16

## 2023-10-10 ASSESSMENT — ENCOUNTER SYMPTOMS
NEUROLOGICAL NEGATIVE: 1
GASTROINTESTINAL NEGATIVE: 1
CARDIOVASCULAR NEGATIVE: 1
RESPIRATORY NEGATIVE: 1
EYES NEGATIVE: 1
PSYCHIATRIC NEGATIVE: 1

## 2023-10-10 ASSESSMENT — GAIT ASSESSMENTS: GAIT LEVEL OF ASSIST: REFUSED

## 2023-10-10 ASSESSMENT — PAIN DESCRIPTION - PAIN TYPE
TYPE: ACUTE PAIN

## 2023-10-10 NOTE — THERAPY
Physical Therapy   Daily Treatment     Patient Name: Dillon Centeno  Age:  58 y.o., Sex:  male  Medical Record #: [id number]  Today's Date: 10/10/2023     Precautions  Precautions: Fall Risk;Swallow Precautions  Comments: L BKA with stump protector in place. Post op shoe to R.    Assessment    Pt agreeable to work with PT but reluctant to mobilize OOB due to L LE pain. Pt able to transfer supine to EOB with HOB flat. Once seated mainatined good balance with L LE on edge of bed. Pt able to complete parital stand to change bed linen and complete pericare following BM while EOB. Declined transfer to , bedside chair or attempts to ambulate. PT will continue to follow.     Plan    Treatment Plan Status: Continue Current Treatment Plan  Type of Treatment: Bed Mobility, Neuro Re-Education / Balance, Therapeutic Activities, Therapeutic Exercise  Treatment Frequency: 4 Times per Week  Treatment Duration: Until Therapy Goals Met    DC Equipment Recommendations: Unable to determine at this time  Discharge Recommendations: Recommend post-acute placement for additional physical therapy services prior to discharge home         10/10/23 1601   Precautions   Precautions Fall Risk;Swallow Precautions   Comments L BKA with stump protector in place. Post op shoe to R.   Vitals   O2 Delivery Device None - Room Air   Pain 0 - 10 Group   Therapist Pain Assessment Nurse Notified  (L LE pain, not rated)   Cognition    Level of Consciousness Alert   Comments agreeable to sit EOB but reluctant to do more due to pain   Sitting Lower Body Exercises   Sitting Lower Body Exercises Yes   Long Arc Quad 1 set of 15;Bilateral   Marching 1 set of 10  (R LE)   Other Treatments   Other Treatments Provided provided with pillows to elevate L LE   Balance   Sitting Balance (Static) Fair +   Sitting Balance (Dynamic) Fair +   Standing Balance (Static) Fair   Standing Balance (Dynamic) Poor   Weight Shift Sitting Fair   Skilled Intervention Compensatory  Strategies;Verbal Cuing   Comments partial stand from EOB   Bed Mobility    Supine to Sit Supervised   Sit to Supine Supervised   Scooting Supervised   Skilled Intervention Verbal Cuing   Gait Analysis   Gait Level Of Assist Refused   Functional Mobility   Sit to Stand Contact Guard Assist  (partial stand from EOB, UE support on bed. 2x for pericare following BM)   Bed, Chair, Wheelchair Transfer Refused   How much difficulty does the patient currently have...   Turning over in bed (including adjusting bedclothes, sheets and blankets)? 4   Sitting down on and standing up from a chair with arms (e.g., wheelchair, bedside commode, etc.) 3   Moving from lying on back to sitting on the side of the bed? 4   How much help from another person does the patient currently need...   Moving to and from a bed to a chair (including a wheelchair)? 3   Need to walk in a hospital room? 1   Climbing 3-5 steps with a railing? 1   6 clicks Mobility Score 16   Short Term Goals    Short Term Goal # 1 Patient will perform supine-sit with HOB flat with supervision in 6 visits   Goal Outcome # 1 Goal met   Short Term Goal # 2 Patient will perform chair transfers with supervision in 6 visits   Goal Outcome # 2 Goal not met   Short Term Goal # 3 Patient will perform sit-stand with FWW with supervision in 6 visits   Goal Outcome # 3 Goal not met   Short Term Goal # 4 Pt to position LLE correctly on pillows to avoid knee flexion contracture w/o cues in 6 visits   Goal Outcome # 4 Goal not met   Physical Therapy Treatment Plan   Physical Therapy Treatment Plan Continue Current Treatment Plan   Anticipated Discharge Equipment and Recommendations   Discharge Recommendations Recommend post-acute placement for additional physical therapy services prior to discharge home   Interdisciplinary Plan of Care Collaboration   IDT Collaboration with  Nursing   Patient Position at End of Therapy In Bed;Bed Alarm On;Call Light within Reach;Tray Table within  Reach;Phone within Reach  (long sit on bed)   Collaboration Comments aware of session, limited by pain   Session Information   Date / Session Number  10/10- 4 (2/4, 10/11)

## 2023-10-10 NOTE — CARE PLAN
The patient is Stable - Low risk of patient condition declining or worsening    Shift Goals  Clinical Goals: Patient to have pain less than 8 throughout this shift  Patient Goals: Pain control  Family Goals: ashlie    Progress made toward(s) clinical / shift goals:  Ongoing    Patient is progressing towards the following goals:      Problem: Pain - Standard  Goal: Alleviation of pain or a reduction in pain to the patient’s comfort goal  Outcome: Progressing     Problem: Skin Integrity  Goal: Skin integrity is maintained or improved  Outcome: Progressing     Problem: Fall Risk  Goal: Patient will remain free from falls  Outcome: Progressing     Problem: Knowledge Deficit - Standard  Goal: Patient and family/care givers will demonstrate understanding of plan of care, disease process/condition, diagnostic tests and medications  Outcome: Progressing

## 2023-10-10 NOTE — DISCHARGE PLANNING
Case Management Discharge Planning    Admission Date: 9/26/2023  GMLOS: 6.8  ALOS: 14    6-Clicks ADL Score: 21  6-Clicks Mobility Score: 11  PT and/or OT Eval ordered: Yes  Post-acute Referrals Ordered: Yes  Post-acute Choice Obtained: Yes  Has referral(s) been sent to post-acute provider:  Yes      Anticipated Discharge Dispo: Discharge Disposition: D/T to SNF with Medicare cert in anticipation of skilled care (03)    DME Needed: No    Action(s) Taken: LMSW spoke with Mary. [facility] does not have any Medicaid beds at this time. [facility] cannot take pt's Medicaid for coverage for Rehab.     Escalations Completed: None    Medically Clear: Yes    Next Steps: LMSW to follow in IDT rounds for other care options.     Barriers to Discharge: Pending Placement    Is the patient up for discharge tomorrow: No

## 2023-10-10 NOTE — PROGRESS NOTES
Hospital Medicine Daily Progress Note    Date of Service  10/10/2023    Chief Complaint  Dillon Centeno is a 58 y.o. male admitted 9/26/2023 with bilateral foot wounds. He has hypertension and tobacco dependence.    Hospital Course  In the ER, patient was noted to have gangrene on his left foot.  Vascular surgery and orthopedic surgery were consulted.  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      Vascular studies showed severe arterial disease.  Patient underwent bilateral common femoral artery endarterectomy and profundoplasty with saphenous vein angioplasty with stent placement in the right external iliac artery on 9/30/2023. Prevena was placed (5-7 days). He was recommended to have Plavix for 1 month and aspirin for life.    Blood cultures from 9/26/2023 grew Morganella morganii and Vagococcus.  Urine culture from 9/26/2023 grew Staph epidermidis. ID were consulted.  Patient was placed on Zosyn with end date of 10/6/2023.  Echocardiogram showed LVEF of about 60% with no reported valvular abnormalities.  Repeat blood cultures from 9/27/2023 have been negative.    Noted to have renal cyst on CT.  MRI shows multiseptated cystic lesions in the upper pole of left kidney measuring 3.8 cm without associated enhancement or nodular component.    Interval Problem Update  No acute overnight events.   On RA  Labs reviewed, Na 129, check Osm  Continue IVF  Pending SNF  PT/OT    Evaluated by PM&R on 10/7/2023, not able to be accepted due to lack of discharge support. Awaiting SNF placement.  Afebrile and hemodynamically stable.     I have discussed this patient's plan of care and discharge plan at IDT rounds today with Case Management, Nursing, Nursing leadership, and other members of the IDT team.    Consultants/Specialty  infectious disease, orthopedics, and vascular surgery    Code Status  Full Code    Disposition  The patient is medically cleared for discharge to home or a post-acute facility.  Anticipate discharge  to: skilled nursing facility    I have placed the appropriate orders for post-discharge needs.    Review of Systems  Review of Systems   Constitutional:  Positive for malaise/fatigue.   HENT: Negative.     Eyes: Negative.    Respiratory: Negative.     Cardiovascular: Negative.    Gastrointestinal: Negative.    Genitourinary: Negative.    Musculoskeletal:         Pain in phantom limb   Skin: Negative.    Neurological: Negative.    Endo/Heme/Allergies: Negative.    Psychiatric/Behavioral: Negative.          Physical Exam  Temp:  [36.3 °C (97.4 °F)-36.6 °C (97.8 °F)] 36.3 °C (97.4 °F)  Pulse:  [] 93  Resp:  [16-18] 16  BP: (129-164)/(75-88) 144/88  SpO2:  [94 %-100 %] 97 %    Physical Exam  Constitutional:       General: He is not in acute distress.  HENT:      Head: Normocephalic.      Nose: Nose normal.      Mouth/Throat:      Mouth: Mucous membranes are moist.   Eyes:      Pupils: Pupils are equal, round, and reactive to light.   Cardiovascular:      Rate and Rhythm: Normal rate.   Pulmonary:      Effort: Pulmonary effort is normal.   Abdominal:      Palpations: Abdomen is soft.   Musculoskeletal:      Cervical back: Normal range of motion.      Right lower leg: No edema.      Comments: Left BKA   Skin:     General: Skin is warm.      Comments: Third right toe absent due to prior trauma.  Remaining toes with chronic ulcers.   Neurological:      Mental Status: He is alert. Mental status is at baseline.   Psychiatric:         Mood and Affect: Mood normal.         Fluids    Intake/Output Summary (Last 24 hours) at 10/10/2023 1253  Last data filed at 10/10/2023 0539  Gross per 24 hour   Intake 1280 ml   Output 1500 ml   Net -220 ml         Laboratory  Recent Labs     10/08/23  0639 10/09/23  0427 10/10/23  0502   WBC 7.2 6.4 8.1   RBC 3.02* 3.03* 3.33*   HEMOGLOBIN 8.7* 8.6* 9.4*   HEMATOCRIT 27.2* 27.5* 30.0*   MCV 90.1 90.8 90.1   MCH 28.8 28.4 28.2   MCHC 32.0* 31.3* 31.3*   RDW 50.9* 51.7* 52.8*   PLATELETCT  486* 519* 568*   MPV 8.4* 8.4* 8.4*       Recent Labs     10/08/23  0639 10/09/23  0427 10/10/23  0502   SODIUM 131* 132* 129*   POTASSIUM 4.8 4.4 4.1   CHLORIDE 97 100 97   CO2 24 23 23   GLUCOSE 100* 118* 103*   BUN 9 8 9   CREATININE 0.51 0.46* 0.48*   CALCIUM 8.8 8.4* 8.8                     Imaging  MR-ABDOMEN-WITH & W/O   Final Result      Multi septated cystic lesion at the upper pole LEFT kidney measuring 3.8 cm without associated enhancement or nodular component (Bosniak 2).            DX-PORTABLE FLUORO > 1 HOUR   Final Result      Portable fluoroscopy utilized for 2 minutes 20.4 seconds.         INTERPRETING LOCATION: [facility], [place], [address]      CT-CTA AORTA-RO WITH & W/O-POST PROCESS   Final Result         CTA AORTA      1.  Septated lobulated left upper pole renal cystic mass lesion, recommend follow-up MRI of the kidneys for further characterization.   2.  Atherosclerosis and atherosclerotic coronary artery disease.   3.  Fat-containing right inguinal hernia      CTA LOWER EXTREMITIES      1.  Occlusion of the right common iliac artery through the distal superficial femoral artery   2.  Distal right peroneal artery occlusion with 2-vessel runoff the anterior and posterior tibial arteries at the right ankle.   3.  Occlusion of the left superficial femoral artery at the bifurcation extending through its length to the popliteal artery.   4.  Occlusion of the proximal left profunda femoris just distal to the bifurcation which reconstitutes.   5.  Soft tissue gas at the stump of left below-the-knee amputation, within expected limits for recent postop changes.      3D angiographic/MIP images of the vasculature confirm the vascular findings as described above.      These findings were discussed with the patient's clinician, Keith Navarro, on 9/29/2023 8:06 AM.      EC-ECHOCARDIOGRAM COMPLETE W/O CONT   Final Result      US-AORTA/ILIACS DUPLEX COMPLETE   Final Result      US-EXTREMITY ARTERY LOWER  UNILAT RIGHT   Final Result      US-INGA SINGLE LEVEL UNILAT RIGHT   Final Result      DX-CHEST-PORTABLE (1 VIEW)   Final Result      1.  LEFT basilar atelectasis or pneumonia   2.  Trace LEFT pleural effusion or pleural scar             Assessment/Plan  * Gangrene of left foot (HCC)- (present on admission)  Assessment & Plan  Left BKA on 9/27/2023. Vascular studies showed severe arterial disease. Underwent bilateral common femoral artery endarterectomy and profundoplasty with saphenous vein angioplasty with stent placement in the right external iliac artery on 9/30/2023. Prevena was placed (5-7 days). He was recommended to have Plavix for 1 month and aspirin for life. Follow-up with Dr. Navarro. Stewart to be removed 14 to 21 days postoperatively. Completed course of Zosyn on 10/6/2023. On multimodal pain regimen, adjust as needed.     PAD (peripheral artery disease) (HCC)- (present on admission)  Assessment & Plan  Underwent bilateral common femoral artery endarterectomy and profundoplasty with saphenous vein angioplasty with stent placement in the right external iliac artery on 9/30/2023. Prevena was placed (5-7 days). Recommended to have Plavix for 1 month and aspirin for life.  Follow-up with Dr. Navarro.  Stump protector for left leg was ordered by vascular surgery.    Anemia- (present on admission)  Assessment & Plan  Stable.  Monitor    Bacteremia- (present on admission)  Assessment & Plan  Blood cultures from 9/26/2023 grew Morganella morganii and Vagococcus. ID were consulted. Completed course of On Zosyn on 10/6/2023. Repeat blood cultures from 9/27/2023 have been negative.  Echocardiogram showed no evidence of vegetations.     Hyponatremia  Assessment & Plan  Continue IVF  Will check Osm serum and urine     Elevated liver enzymes  Assessment & Plan  Resolved.  Hepatitis panel was normal.  Monitor    Renal cyst  Assessment & Plan  Noted to have renal cyst on CT.  MRI shows multiseptated cystic lesions in  the upper pole of left kidney measuring 3.8 cm without associated enhancement or nodular component.  Recommend outpatient follow-up         VTE prophylaxis: SCDs, Lovenox

## 2023-10-11 PROCEDURE — A9270 NON-COVERED ITEM OR SERVICE: HCPCS | Performed by: INTERNAL MEDICINE

## 2023-10-11 PROCEDURE — 700102 HCHG RX REV CODE 250 W/ 637 OVERRIDE(OP): Performed by: SURGERY

## 2023-10-11 PROCEDURE — 700102 HCHG RX REV CODE 250 W/ 637 OVERRIDE(OP): Performed by: INTERNAL MEDICINE

## 2023-10-11 PROCEDURE — 700105 HCHG RX REV CODE 258: Performed by: INTERNAL MEDICINE

## 2023-10-11 PROCEDURE — 99232 SBSQ HOSP IP/OBS MODERATE 35: CPT | Performed by: STUDENT IN AN ORGANIZED HEALTH CARE EDUCATION/TRAINING PROGRAM

## 2023-10-11 PROCEDURE — 700111 HCHG RX REV CODE 636 W/ 250 OVERRIDE (IP): Performed by: INTERNAL MEDICINE

## 2023-10-11 PROCEDURE — 770006 HCHG ROOM/CARE - MED/SURG/GYN SEMI*

## 2023-10-11 PROCEDURE — A9270 NON-COVERED ITEM OR SERVICE: HCPCS | Performed by: SURGERY

## 2023-10-11 PROCEDURE — 700111 HCHG RX REV CODE 636 W/ 250 OVERRIDE (IP): Performed by: SURGERY

## 2023-10-11 RX ADMIN — GABAPENTIN 300 MG: 300 CAPSULE ORAL at 13:19

## 2023-10-11 RX ADMIN — CYCLOBENZAPRINE 10 MG: 10 TABLET, FILM COATED ORAL at 09:44

## 2023-10-11 RX ADMIN — ASPIRIN 81 MG: 81 TABLET, COATED ORAL at 05:48

## 2023-10-11 RX ADMIN — ACETAMINOPHEN 650 MG: 325 TABLET, FILM COATED ORAL at 17:02

## 2023-10-11 RX ADMIN — OXYCODONE HYDROCHLORIDE 20 MG: 10 TABLET ORAL at 05:49

## 2023-10-11 RX ADMIN — ACETAMINOPHEN 650 MG: 325 TABLET, FILM COATED ORAL at 13:21

## 2023-10-11 RX ADMIN — NICOTINE TRANSDERMAL SYSTEM 21 MG: 21 PATCH, EXTENDED RELEASE TRANSDERMAL at 05:48

## 2023-10-11 RX ADMIN — HEPARIN SODIUM 5000 UNITS: 5000 INJECTION, SOLUTION INTRAVENOUS; SUBCUTANEOUS at 13:16

## 2023-10-11 RX ADMIN — SODIUM CHLORIDE: 9 INJECTION, SOLUTION INTRAVENOUS at 09:45

## 2023-10-11 RX ADMIN — CYCLOBENZAPRINE 10 MG: 10 TABLET, FILM COATED ORAL at 17:02

## 2023-10-11 RX ADMIN — CLOPIDOGREL BISULFATE 75 MG: 75 TABLET ORAL at 05:48

## 2023-10-11 RX ADMIN — GABAPENTIN 300 MG: 300 CAPSULE ORAL at 05:48

## 2023-10-11 RX ADMIN — HEPARIN SODIUM 5000 UNITS: 5000 INJECTION, SOLUTION INTRAVENOUS; SUBCUTANEOUS at 05:48

## 2023-10-11 RX ADMIN — HEPARIN SODIUM 5000 UNITS: 5000 INJECTION, SOLUTION INTRAVENOUS; SUBCUTANEOUS at 20:48

## 2023-10-11 RX ADMIN — TRAMADOL HYDROCHLORIDE 50 MG: 50 TABLET ORAL at 13:19

## 2023-10-11 RX ADMIN — DOCUSATE SODIUM 50 MG AND SENNOSIDES 8.6 MG 2 TABLET: 8.6; 5 TABLET, FILM COATED ORAL at 17:03

## 2023-10-11 RX ADMIN — HYDROMORPHONE HYDROCHLORIDE 1.5 MG: 2 INJECTION, SOLUTION INTRAMUSCULAR; INTRAVENOUS; SUBCUTANEOUS at 09:44

## 2023-10-11 RX ADMIN — OXYCODONE HYDROCHLORIDE 20 MG: 10 TABLET ORAL at 17:01

## 2023-10-11 RX ADMIN — GABAPENTIN 300 MG: 300 CAPSULE ORAL at 17:03

## 2023-10-11 RX ADMIN — OXYCODONE HYDROCHLORIDE 20 MG: 10 TABLET ORAL at 13:15

## 2023-10-11 RX ADMIN — TRAMADOL HYDROCHLORIDE 50 MG: 50 TABLET ORAL at 17:03

## 2023-10-11 RX ADMIN — OXYCODONE HYDROCHLORIDE 20 MG: 10 TABLET ORAL at 20:10

## 2023-10-11 ASSESSMENT — ENCOUNTER SYMPTOMS
EYES NEGATIVE: 1
RESPIRATORY NEGATIVE: 1
GASTROINTESTINAL NEGATIVE: 1
PSYCHIATRIC NEGATIVE: 1
NEUROLOGICAL NEGATIVE: 1
CARDIOVASCULAR NEGATIVE: 1

## 2023-10-11 ASSESSMENT — PAIN DESCRIPTION - PAIN TYPE
TYPE: ACUTE PAIN

## 2023-10-11 NOTE — DISCHARGE PLANNING
Case Management Discharge Planning    Admission Date: 9/26/2023  GMLOS: 6.8  ALOS: 15    6-Clicks ADL Score: 21  6-Clicks Mobility Score: 16  PT and/or OT Eval ordered: Yes  Post-acute Referrals Ordered: Yes  Post-acute Choice Obtained: Yes  Has referral(s) been sent to post-acute provider:  Yes      Anticipated Discharge Dispo: Discharge Disposition: D/T to SNF with Medicare cert in anticipation of skilled care (03)    DME Needed: Yes    DME Ordered: Yes    Action(s) Taken: LMSW met with pt at bedside. LMSW informed pt about therapy including getting up at EOB for meals and not turning down any chances to mobilize. Pt agreeable, just in a lot of pain. Pt is aware that he can be doing more to mobilize.     Escalations Completed: None    Medically Clear: Yes    Next Steps: LMSW to follow as needed.     Barriers to Discharge: Pending Placement    Is the patient up for discharge tomorrow: No

## 2023-10-11 NOTE — DISCHARGE PLANNING
Case Management Discharge Planning    Admission Date: 9/26/2023  GMLOS: 6.8  ALOS: 15    6-Clicks ADL Score: 21  6-Clicks Mobility Score: 16  PT and/or OT Eval ordered: Yes  Post-acute Referrals Ordered: Yes  Post-acute Choice Obtained: Yes  Has referral(s) been sent to post-acute provider:  Yes      Anticipated Discharge Dispo: Discharge Disposition: D/T to SNF with Medicare cert in anticipation of skilled care (03)    DME Needed: Yes    DME Ordered: No    Action(s) Taken: LMSW expanded SNF referrals to outside of the living area of the pt. MD still requesting for LMSW to find SNF option for pt due to pt being a new amputee and not having enough support when DCing home.     Escalations Completed: None    Medically Clear: Yes    Next Steps: LMSW to follow up as needed.     Barriers to Discharge: Pending Placement    Is the patient up for discharge tomorrow: No

## 2023-10-11 NOTE — CARE PLAN
The patient is Stable - Low risk of patient condition declining or worsening    Shift Goals  Clinical Goals: pain control/pain will be rated as <4  Patient Goals: rest and sleep  Family Goals: n/a    Progress made toward(s) clinical / shift goals:      Problem: Infection - Standard  Goal: Patient will remain free from infection  Outcome: Progressing     Problem: Wound/ / Incision Healing  Goal: Patient's wound/surgical incision will decrease in size and heals properly  Outcome: Progressing     Problem: Pain - Standard  Goal: Alleviation of pain or a reduction in pain to the patient’s comfort goal  Outcome: Progressing     Problem: Skin Integrity  Goal: Skin integrity is maintained or improved  Outcome: Progressing         Patient is not progressing towards the following goals:

## 2023-10-11 NOTE — PROGRESS NOTES
"      Orthopaedic Progress Note    Interval changes:  Patient doing well    L BKA dressings are CDI  Patient with knee bent in knee immobilizer    Cleared for DC to SNF by ortho pending medicine clearance    ROS - Patient denies any new issues.  Pain well controlled.    /85   Pulse (!) 102   Temp 36.8 °C (98.2 °F) (Temporal)   Resp 20   Ht 1.803 m (5' 11\")   Wt 66 kg (145 lb 8.1 oz)   SpO2 98%     Patient seen and examined  No acute distress  Breathing non labored  RRR  LLE dressings CDI, knee bent at 90 deg, refusing to straighten leg.     Recent Labs     10/09/23  0427 10/10/23  0502   WBC 6.4 8.1   RBC 3.03* 3.33*   HEMOGLOBIN 8.6* 9.4*   HEMATOCRIT 27.5* 30.0*   MCV 90.8 90.1   MCH 28.4 28.2   MCHC 31.3* 31.3*   RDW 51.7* 52.8*   PLATELETCT 519* 568*   MPV 8.4* 8.4*       Active Hospital Problems    Diagnosis     Renal cyst [N28.1]      Priority: Low    PAD (peripheral artery disease) ([facility]) [I73.9]     Bacteremia [R78.81]     Anemia [D64.9]     Gangrene of left foot ([facility]) [I96]     Elevated liver enzymes [R74.8]     Hyponatremia [E87.1]        Assessment/Plan:  Patient doing well    L BKA dressings are CDI  Patient with knee bent knee in immobilizer     Cleared for DC to SNF by ortho pending medicine clearance  POD#14 S/P Left below-knee amputation  Wt bearing status - NWB LLE  Wound care/Drains - Dressings to be changed every other day by nursing. Or PRN for saturation starting POD#2  Future Procedures - none planned   Lovenox: Start 9/28, Duration-until ambulatory > 150'  Sutures/Staples out-  21 days post operatively. Removal will completed by ortho mid levels only.  PT/OT-initiated  Antibiotics: completed  DVT Prophylaxis- TEDS/SCDs/Foot pumps  [place]-not needed per ortho  Case Coordination for Discharge Planning - Disposition per therapy recs.    "

## 2023-10-11 NOTE — PROGRESS NOTES
[facility] Medicine Daily Progress Note    Date of Service  10/11/2023    Chief Complaint  Dillon Centeno is a 58 y.o. male admitted 9/26/2023 with bilateral foot wounds. He has hypertension and tobacco dependence.    Hospital Course  In the ER, patient was noted to have gangrene on his left foot.  Vascular surgery and orthopedic surgery were consulted.  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      Vascular studies showed severe arterial disease.  Patient underwent bilateral common femoral artery endarterectomy and profundoplasty with saphenous vein angioplasty with stent placement in the right external iliac artery on 9/30/2023. Prevena was placed (5-7 days). He was recommended to have Plavix for 1 month and aspirin for life.    Blood cultures from 9/26/2023 grew Morganella morganii and Vagococcus.  Urine culture from 9/26/2023 grew Staph epidermidis. ID were consulted.  Patient was placed on Zosyn with end date of 10/6/2023.  Echocardiogram showed LVEF of about 60% with no reported valvular abnormalities.  Repeat blood cultures from 9/27/2023 have been negative.    Noted to have renal cyst on CT.  MRI shows multiseptated cystic lesions in the upper pole of left kidney measuring 3.8 cm without associated enhancement or nodular component.    Interval Problem Update  No acute overnight events.   On RA  Labs reviewed  PT/OT  Multimodal pain managements including po and iv narcotics prn. Monitoring respiratory status and sedation score    Evaluated by PM&R on 10/7/2023, not able to be accepted due to lack of discharge support. Awaiting SNF placement.  Afebrile and hemodynamically stable.     I have discussed this patient's plan of care and discharge plan at IDT rounds today with Case Management, Nursing, Nursing leadership, and other members of the IDT team.    Consultants/Specialty  infectious disease, orthopedics, and vascular surgery    Code Status  Full Code    Disposition  The patient is medically cleared for  discharge to home or a post-acute facility.      I have placed the appropriate orders for post-discharge needs.    Review of Systems  Review of Systems   Constitutional:  Positive for malaise/fatigue.   HENT: Negative.     Eyes: Negative.    Respiratory: Negative.     Cardiovascular: Negative.    Gastrointestinal: Negative.    Genitourinary: Negative.    Musculoskeletal:         Pain in phantom limb   Skin: Negative.    Neurological: Negative.    Endo/Heme/Allergies: Negative.    Psychiatric/Behavioral: Negative.          Physical Exam  Temp:  [36.5 °C (97.7 °F)-36.8 °C (98.3 °F)] 36.8 °C (98.2 °F)  Pulse:  [] 102  Resp:  [15-20] 20  BP: (114-135)/(80-87) 134/85  SpO2:  [97 %-100 %] 98 %    Physical Exam  Constitutional:       General: He is not in acute distress.  HENT:      Head: Normocephalic.      Nose: Nose normal.      Mouth/Throat:      Mouth: Mucous membranes are moist.   Eyes:      Pupils: Pupils are equal, round, and reactive to light.   Cardiovascular:      Rate and Rhythm: Normal rate.   Pulmonary:      Effort: Pulmonary effort is normal.   Abdominal:      Palpations: Abdomen is soft.   Musculoskeletal:      Cervical back: Normal range of motion.      Right lower leg: No edema.      Comments: Left BKA   Skin:     General: Skin is warm.      Comments: Third right toe absent due to prior trauma.  Remaining toes with chronic ulcers.   Neurological:      Mental Status: He is alert. Mental status is at baseline.   Psychiatric:         Mood and Affect: Mood normal.         Fluids    Intake/Output Summary (Last 24 hours) at 10/11/2023 1229  Last data filed at 10/11/2023 1200  Gross per 24 hour   Intake 1340 ml   Output 3100 ml   Net -1760 ml         Laboratory  Recent Labs     10/09/23  0427 10/10/23  0502   WBC 6.4 8.1   RBC 3.03* 3.33*   HEMOGLOBIN 8.6* 9.4*   HEMATOCRIT 27.5* 30.0*   MCV 90.8 90.1   MCH 28.4 28.2   MCHC 31.3* 31.3*   RDW 51.7* 52.8*   PLATELETCT 519* 568*   MPV 8.4* 8.4*       Recent  Labs     10/09/23  0427 10/10/23  0502   SODIUM 132* 129*   POTASSIUM 4.4 4.1   CHLORIDE 100 97   CO2 23 23   GLUCOSE 118* 103*   BUN 8 9   CREATININE 0.46* 0.48*   CALCIUM 8.4* 8.8                     Imaging  MR-ABDOMEN-WITH & W/O   Final Result      Multi septated cystic lesion at the upper pole LEFT kidney measuring 3.8 cm without associated enhancement or nodular component (Bosniak 2).            DX-PORTABLE FLUORO > 1 HOUR   Final Result      Portable fluoroscopy utilized for 2 minutes 20.4 seconds.         INTERPRETING LOCATION: [facility], [place], [address]      CT-CTA AORTA-RO WITH & W/O-POST PROCESS   Final Result         CTA AORTA      1.  Septated lobulated left upper pole renal cystic mass lesion, recommend follow-up MRI of the kidneys for further characterization.   2.  Atherosclerosis and atherosclerotic coronary artery disease.   3.  Fat-containing right inguinal hernia      CTA LOWER EXTREMITIES      1.  Occlusion of the right common iliac artery through the distal superficial femoral artery   2.  Distal right peroneal artery occlusion with 2-vessel runoff the anterior and posterior tibial arteries at the right ankle.   3.  Occlusion of the left superficial femoral artery at the bifurcation extending through its length to the popliteal artery.   4.  Occlusion of the proximal left profunda femoris just distal to the bifurcation which reconstitutes.   5.  Soft tissue gas at the stump of left below-the-knee amputation, within expected limits for recent postop changes.      3D angiographic/MIP images of the vasculature confirm the vascular findings as described above.      These findings were discussed with the patient's clinician, Keith Navarro, on 9/29/2023 8:06 AM.      EC-ECHOCARDIOGRAM COMPLETE W/O CONT   Final Result      US-AORTA/ILIACS DUPLEX COMPLETE   Final Result      US-EXTREMITY ARTERY LOWER UNILAT RIGHT   Final Result      US-INGA SINGLE LEVEL UNILAT RIGHT   Final Result       DX-CHEST-PORTABLE (1 VIEW)   Final Result      1.  LEFT basilar atelectasis or pneumonia   2.  Trace LEFT pleural effusion or pleural scar             Assessment/Plan  * Gangrene of left foot (HCC)- (present on admission)  Assessment & Plan  Left BKA on 9/27/2023. Vascular studies showed severe arterial disease. Underwent bilateral common femoral artery endarterectomy and profundoplasty with saphenous vein angioplasty with stent placement in the right external iliac artery on 9/30/2023. Prevena was placed (5-7 days). He was recommended to have Plavix for 1 month and aspirin for life. Follow-up with Dr. Navarro. [place] to be removed 14 to 21 days postoperatively. Completed course of Zosyn on 10/6/2023. On multimodal pain regimen, adjust as needed.     PAD (peripheral artery disease) (HCC)- (present on admission)  Assessment & Plan  Underwent bilateral common femoral artery endarterectomy and profundoplasty with saphenous vein angioplasty with stent placement in the right external iliac artery on 9/30/2023. Prevena was placed (5-7 days). Recommended to have Plavix for 1 month and aspirin for life.  Follow-up with Dr. Navarro.  Stump protector for left leg was ordered by vascular surgery.    Anemia- (present on admission)  Assessment & Plan  Stable.  Monitor    Bacteremia- (present on admission)  Assessment & Plan  Blood cultures from 9/26/2023 grew Morganella morganii and Vagococcus. ID were consulted. Completed course of On Zosyn on 10/6/2023. Repeat blood cultures from 9/27/2023 have been negative.  Echocardiogram showed no evidence of vegetations.     Hyponatremia  Assessment & Plan  Check Osm serum and urine   Continue monitoring     Elevated liver enzymes  Assessment & Plan  Resolved.  Hepatitis panel was normal.  Monitor    Renal cyst  Assessment & Plan  Noted to have renal cyst on CT.  MRI shows multiseptated cystic lesions in the upper pole of left kidney measuring 3.8 cm without associated enhancement or  nodular component.  Recommend outpatient follow-up         VTE prophylaxis: SCDs, Lovenox

## 2023-10-12 LAB
ANION GAP SERPL CALC-SCNC: 8 MMOL/L (ref 7–16)
BUN SERPL-MCNC: 9 MG/DL (ref 8–22)
CALCIUM SERPL-MCNC: 9.1 MG/DL (ref 8.5–10.5)
CHLORIDE SERPL-SCNC: 98 MMOL/L (ref 96–112)
CO2 SERPL-SCNC: 26 MMOL/L (ref 20–33)
CREAT SERPL-MCNC: 0.52 MG/DL (ref 0.5–1.4)
ERYTHROCYTE [DISTWIDTH] IN BLOOD BY AUTOMATED COUNT: 57.5 FL (ref 35.9–50)
GFR SERPLBLD CREATININE-BSD FMLA CKD-EPI: 116 ML/MIN/1.73 M 2
GLUCOSE SERPL-MCNC: 102 MG/DL (ref 65–99)
HCT VFR BLD AUTO: 30.7 % (ref 42–52)
HGB BLD-MCNC: 9.6 G/DL (ref 14–18)
MCH RBC QN AUTO: 28.8 PG (ref 27–33)
MCHC RBC AUTO-ENTMCNC: 31.3 G/DL (ref 32.3–36.5)
MCV RBC AUTO: 92.2 FL (ref 81.4–97.8)
PLATELET # BLD AUTO: 587 K/UL (ref 164–446)
PMV BLD AUTO: 8.3 FL (ref 9–12.9)
POTASSIUM SERPL-SCNC: 4.3 MMOL/L (ref 3.6–5.5)
RBC # BLD AUTO: 3.33 M/UL (ref 4.7–6.1)
SODIUM SERPL-SCNC: 132 MMOL/L (ref 135–145)
WBC # BLD AUTO: 7.7 K/UL (ref 4.8–10.8)

## 2023-10-12 PROCEDURE — 80048 BASIC METABOLIC PNL TOTAL CA: CPT

## 2023-10-12 PROCEDURE — A9270 NON-COVERED ITEM OR SERVICE: HCPCS | Performed by: STUDENT IN AN ORGANIZED HEALTH CARE EDUCATION/TRAINING PROGRAM

## 2023-10-12 PROCEDURE — 85027 COMPLETE CBC AUTOMATED: CPT

## 2023-10-12 PROCEDURE — 36415 COLL VENOUS BLD VENIPUNCTURE: CPT

## 2023-10-12 PROCEDURE — A9270 NON-COVERED ITEM OR SERVICE: HCPCS | Performed by: SURGERY

## 2023-10-12 PROCEDURE — 700102 HCHG RX REV CODE 250 W/ 637 OVERRIDE(OP): Performed by: INTERNAL MEDICINE

## 2023-10-12 PROCEDURE — 700102 HCHG RX REV CODE 250 W/ 637 OVERRIDE(OP): Performed by: SURGERY

## 2023-10-12 PROCEDURE — 99232 SBSQ HOSP IP/OBS MODERATE 35: CPT | Performed by: STUDENT IN AN ORGANIZED HEALTH CARE EDUCATION/TRAINING PROGRAM

## 2023-10-12 PROCEDURE — A9270 NON-COVERED ITEM OR SERVICE: HCPCS | Performed by: INTERNAL MEDICINE

## 2023-10-12 PROCEDURE — 770006 HCHG ROOM/CARE - MED/SURG/GYN SEMI*

## 2023-10-12 PROCEDURE — 97530 THERAPEUTIC ACTIVITIES: CPT

## 2023-10-12 PROCEDURE — 700111 HCHG RX REV CODE 636 W/ 250 OVERRIDE (IP): Performed by: SURGERY

## 2023-10-12 PROCEDURE — 700102 HCHG RX REV CODE 250 W/ 637 OVERRIDE(OP): Performed by: STUDENT IN AN ORGANIZED HEALTH CARE EDUCATION/TRAINING PROGRAM

## 2023-10-12 PROCEDURE — 97535 SELF CARE MNGMENT TRAINING: CPT

## 2023-10-12 RX ORDER — ATORVASTATIN CALCIUM 40 MG/1
40 TABLET, FILM COATED ORAL EVERY EVENING
Status: DISCONTINUED | OUTPATIENT
Start: 2023-10-12 | End: 2023-11-03 | Stop reason: HOSPADM

## 2023-10-12 RX ADMIN — HEPARIN SODIUM 5000 UNITS: 5000 INJECTION, SOLUTION INTRAVENOUS; SUBCUTANEOUS at 05:18

## 2023-10-12 RX ADMIN — OXYCODONE HYDROCHLORIDE 20 MG: 10 TABLET ORAL at 20:44

## 2023-10-12 RX ADMIN — CLOPIDOGREL BISULFATE 75 MG: 75 TABLET ORAL at 05:17

## 2023-10-12 RX ADMIN — GABAPENTIN 300 MG: 300 CAPSULE ORAL at 05:18

## 2023-10-12 RX ADMIN — GABAPENTIN 300 MG: 300 CAPSULE ORAL at 13:16

## 2023-10-12 RX ADMIN — ACETAMINOPHEN 650 MG: 325 TABLET, FILM COATED ORAL at 11:51

## 2023-10-12 RX ADMIN — HEPARIN SODIUM 5000 UNITS: 5000 INJECTION, SOLUTION INTRAVENOUS; SUBCUTANEOUS at 13:16

## 2023-10-12 RX ADMIN — NICOTINE TRANSDERMAL SYSTEM 21 MG: 21 PATCH, EXTENDED RELEASE TRANSDERMAL at 05:19

## 2023-10-12 RX ADMIN — ACETAMINOPHEN 650 MG: 325 TABLET, FILM COATED ORAL at 17:02

## 2023-10-12 RX ADMIN — TRAMADOL HYDROCHLORIDE 50 MG: 50 TABLET ORAL at 11:51

## 2023-10-12 RX ADMIN — HEPARIN SODIUM 5000 UNITS: 5000 INJECTION, SOLUTION INTRAVENOUS; SUBCUTANEOUS at 20:45

## 2023-10-12 RX ADMIN — GABAPENTIN 300 MG: 300 CAPSULE ORAL at 18:19

## 2023-10-12 RX ADMIN — TRAMADOL HYDROCHLORIDE 50 MG: 50 TABLET ORAL at 05:17

## 2023-10-12 RX ADMIN — ASPIRIN 81 MG: 81 TABLET, COATED ORAL at 05:17

## 2023-10-12 RX ADMIN — ATORVASTATIN CALCIUM 40 MG: 40 TABLET, FILM COATED ORAL at 17:02

## 2023-10-12 RX ADMIN — TRAMADOL HYDROCHLORIDE 50 MG: 50 TABLET ORAL at 17:02

## 2023-10-12 RX ADMIN — ACETAMINOPHEN 650 MG: 325 TABLET, FILM COATED ORAL at 05:18

## 2023-10-12 ASSESSMENT — COGNITIVE AND FUNCTIONAL STATUS - GENERAL
MOBILITY SCORE: 12
MOVING FROM LYING ON BACK TO SITTING ON SIDE OF FLAT BED: UNABLE
TOILETING: A LITTLE
WALKING IN HOSPITAL ROOM: A LOT
SUGGESTED CMS G CODE MODIFIER DAILY ACTIVITY: CJ
SUGGESTED CMS G CODE MODIFIER MOBILITY: CL
CLIMB 3 TO 5 STEPS WITH RAILING: TOTAL
STANDING UP FROM CHAIR USING ARMS: A LITTLE
HELP NEEDED FOR BATHING: A LITTLE
MOVING TO AND FROM BED TO CHAIR: UNABLE
DAILY ACTIVITIY SCORE: 21
DRESSING REGULAR LOWER BODY CLOTHING: A LITTLE

## 2023-10-12 ASSESSMENT — ENCOUNTER SYMPTOMS
NEUROLOGICAL NEGATIVE: 1
EYES NEGATIVE: 1
GASTROINTESTINAL NEGATIVE: 1
RESPIRATORY NEGATIVE: 1
CARDIOVASCULAR NEGATIVE: 1
PSYCHIATRIC NEGATIVE: 1

## 2023-10-12 ASSESSMENT — GAIT ASSESSMENTS: GAIT LEVEL OF ASSIST: REFUSED

## 2023-10-12 ASSESSMENT — PAIN DESCRIPTION - PAIN TYPE
TYPE: ACUTE PAIN;SURGICAL PAIN
TYPE: ACUTE PAIN;SURGICAL PAIN

## 2023-10-12 NOTE — CARE PLAN
The patient is Stable - Low risk of patient condition declining or worsening    Shift Goals  Clinical Goals: pain control and mobilization  Patient Goals: rest and pain control  Family Goals: Safe discharge plan    Progress made toward(s) clinical / shift goals:    Problem: Knowledge Deficit - Standard  Goal: Patient and family/care givers will demonstrate understanding of plan of care, disease process/condition, diagnostic tests and medications  Outcome: Progressing     Problem: Pain - Standard  Goal: Alleviation of pain or a reduction in pain to the patient’s comfort goal  Outcome: Progressing     Problem: Skin Integrity  Goal: Skin integrity is maintained or improved  Outcome: Progressing     Problem: Fall Risk  Goal: Patient will remain free from falls  Outcome: Progressing     Problem: Infection - Standard  Goal: Patient will remain free from infection  Outcome: Progressing     Problem: Wound/ / Incision Healing  Goal: Patient's wound/surgical incision will decrease in size and heals properly  Outcome: Progressing       Patient is not progressing towards the following goals: n/a

## 2023-10-12 NOTE — PROGRESS NOTES
[facility] Medicine Daily Progress Note    Date of Service  10/12/2023    Chief Complaint  Dillon Centeno is a 58 y.o. male admitted 9/26/2023 with bilateral foot wounds. He has hypertension and tobacco dependence.    Hospital Course  In the ER, patient was noted to have gangrene on his left foot.  Vascular surgery and orthopedic surgery were consulted.  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      Vascular studies showed severe arterial disease.  Patient underwent bilateral common femoral artery endarterectomy and profundoplasty with saphenous vein angioplasty with stent placement in the right external iliac artery on 9/30/2023. Prevena was placed (5-7 days). He was recommended to have Plavix for 1 month and aspirin for life.    Blood cultures from 9/26/2023 grew Morganella morganii and Vagococcus.  Urine culture from 9/26/2023 grew Staph epidermidis. ID were consulted.  Patient was placed on Zosyn with end date of 10/6/2023.  Echocardiogram showed LVEF of about 60% with no reported valvular abnormalities.  Repeat blood cultures from 9/27/2023 have been negative.    Noted to have renal cyst on CT.  MRI shows multiseptated cystic lesions in the upper pole of left kidney measuring 3.8 cm without associated enhancement or nodular component.    Interval Problem Update  No acute overnight events.   On RA  Labs reviewed: Na 132 improving  PT/OT  Multimodal pain managements including po and iv narcotics prn. Monitoring respiratory status and sedation score    Evaluated by PM&R on 10/7/2023, not able to be accepted due to lack of discharge support. Awaiting SNF placement.  However no accepting SNF    Continue PT/OT in house     I have discussed this patient's plan of care and discharge plan at IDT rounds today with Case Management, Nursing, Nursing leadership, and other members of the IDT team.    Consultants/Specialty  infectious disease, orthopedics, and vascular surgery    Code Status  Full Code    Disposition  The  patient is not medically cleared for discharge to home or a post-acute facility.      I have placed the appropriate orders for post-discharge needs.    Review of Systems  Review of Systems   Constitutional:  Positive for malaise/fatigue.   HENT: Negative.     Eyes: Negative.    Respiratory: Negative.     Cardiovascular: Negative.    Gastrointestinal: Negative.    Genitourinary: Negative.    Musculoskeletal:         Pain in phantom limb   Skin: Negative.    Neurological: Negative.    Endo/Heme/Allergies: Negative.    Psychiatric/Behavioral: Negative.          Physical Exam  Temp:  [36.1 °C (96.9 °F)-36.6 °C (97.8 °F)] 36.4 °C (97.6 °F)  Pulse:  [89-97] 89  Resp:  [16-18] 16  BP: (103-128)/(71-77) 128/77  SpO2:  [97 %-98 %] 97 %    Physical Exam  Constitutional:       General: He is not in acute distress.  HENT:      Head: Normocephalic.      Nose: Nose normal.      Mouth/Throat:      Mouth: Mucous membranes are moist.   Eyes:      Pupils: Pupils are equal, round, and reactive to light.   Cardiovascular:      Rate and Rhythm: Normal rate.   Pulmonary:      Effort: Pulmonary effort is normal.   Abdominal:      Palpations: Abdomen is soft.   Musculoskeletal:      Cervical back: Normal range of motion.      Right lower leg: No edema.      Comments: Left BKA   Skin:     General: Skin is warm.      Comments: Third right toe absent due to prior trauma.  Remaining toes with chronic ulcers.   Neurological:      Mental Status: He is alert. Mental status is at baseline.   Psychiatric:         Mood and Affect: Mood normal.         Fluids    Intake/Output Summary (Last 24 hours) at 10/12/2023 1123  Last data filed at 10/12/2023 0346  Gross per 24 hour   Intake 960 ml   Output 1400 ml   Net -440 ml         Laboratory  Recent Labs     10/10/23  0502 10/12/23  0552   WBC 8.1 7.7   RBC 3.33* 3.33*   HEMOGLOBIN 9.4* 9.6*   HEMATOCRIT 30.0* 30.7*   MCV 90.1 92.2   MCH 28.2 28.8   MCHC 31.3* 31.3*   RDW 52.8* 57.5*   PLATELETCT 568*  587*   MPV 8.4* 8.3*       Recent Labs     10/10/23  0502 10/12/23  0552   SODIUM 129* 132*   POTASSIUM 4.1 4.3   CHLORIDE 97 98   CO2 23 26   GLUCOSE 103* 102*   BUN 9 9   CREATININE 0.48* 0.52   CALCIUM 8.8 9.1                     Imaging  MR-ABDOMEN-WITH & W/O   Final Result      Multi septated cystic lesion at the upper pole LEFT kidney measuring 3.8 cm without associated enhancement or nodular component (Bosniak 2).            DX-PORTABLE FLUORO > 1 HOUR   Final Result      Portable fluoroscopy utilized for 2 minutes 20.4 seconds.         INTERPRETING LOCATION: [address] [place] [address], [place], [address]      CT-CTA AORTA-RO WITH & W/O-POST PROCESS   Final Result         CTA AORTA      1.  Septated lobulated left upper pole renal cystic mass lesion, recommend follow-up MRI of the kidneys for further characterization.   2.  Atherosclerosis and atherosclerotic coronary artery disease.   3.  Fat-containing right inguinal hernia      CTA LOWER EXTREMITIES      1.  Occlusion of the right common iliac artery through the distal superficial femoral artery   2.  Distal right peroneal artery occlusion with 2-vessel runoff the anterior and posterior tibial arteries at the right ankle.   3.  Occlusion of the left superficial femoral artery at the bifurcation extending through its length to the popliteal artery.   4.  Occlusion of the proximal left profunda femoris just distal to the bifurcation which reconstitutes.   5.  Soft tissue gas at the stump of left below-the-knee amputation, within expected limits for recent postop changes.      3D angiographic/MIP images of the vasculature confirm the vascular findings as described above.      These findings were discussed with the patient's clinician, Keith Navarro, on 9/29/2023 8:06 AM.      EC-ECHOCARDIOGRAM COMPLETE W/O CONT   Final Result      US-AORTA/ILIACS DUPLEX COMPLETE   Final Result      US-EXTREMITY ARTERY LOWER UNILAT RIGHT   Final Result      US-INGA SINGLE LEVEL UNILAT RIGHT    Final Result      DX-CHEST-PORTABLE (1 VIEW)   Final Result      1.  LEFT basilar atelectasis or pneumonia   2.  Trace LEFT pleural effusion or pleural scar             Assessment/Plan  * Gangrene of left foot (HCC)- (present on admission)  Assessment & Plan  Left BKA on 9/27/2023. Vascular studies showed severe arterial disease. Underwent bilateral common femoral artery endarterectomy and profundoplasty with saphenous vein angioplasty with stent placement in the right external iliac artery on 9/30/2023. Prevena was placed (5-7 days). He was recommended to have Plavix for 1 month and aspirin for life. Follow-up with Dr. Navarro. [place] to be removed 14 to 21 days postoperatively. Completed course of Zosyn on 10/6/2023. On multimodal pain regimen, adjust as needed.     PAD (peripheral artery disease) (HCC)- (present on admission)  Assessment & Plan  Underwent bilateral common femoral artery endarterectomy and profundoplasty with saphenous vein angioplasty with stent placement in the right external iliac artery on 9/30/2023. Prevena was placed (5-7 days). Recommended to have Plavix for 1 month and aspirin for life.  Follow-up with Dr. Navarro.  Stump protector for left leg was ordered by vascular surgery.  10/12: continue ASA and plavix. I have ordered lipitor    Anemia- (present on admission)  Assessment & Plan  Stable.  Monitor    Bacteremia- (present on admission)  Assessment & Plan  Blood cultures from 9/26/2023 grew Morganella morganii and Vagococcus. ID were consulted. Completed course of On Zosyn on 10/6/2023. Repeat blood cultures from 9/27/2023 have been negative.  Echocardiogram showed no evidence of vegetations.     Hyponatremia  Assessment & Plan  Check Osm serum and urine   Continue monitoring   10/12: IVF discontinued. Na 132, improving    Elevated liver enzymes  Assessment & Plan  Resolved.  Hepatitis panel was normal.  Monitor    Renal cyst  Assessment & Plan  Noted to have renal cyst on CT.  MRI  shows multiseptated cystic lesions in the upper pole of left kidney measuring 3.8 cm without associated enhancement or nodular component.  Recommend outpatient follow-up         VTE prophylaxis: SCDs, Lovenox

## 2023-10-12 NOTE — THERAPY
Physical Therapy   Daily Treatment     Patient Name: Dillon Centeno  Age:  58 y.o., Sex:  male  Medical Record #: [id number]  Today's Date: 10/12/2023     Precautions  Precautions: Fall Risk;Swallow Precautions  Comments: L BKA with stump    Assessment    Pt with improving upright tolerance; needing encouragement for self election of out of bed outside of therapy sessions; knee now held in ~ 30 deg of flexion and cannot manually reduce as pt's hamstrings guard and tighten into knee flexion, cannot overcome with quads; encouraged his PRN muscle relaxer and will premedicate next session; discussed goals to achieve prior to being home, he will need to be mod I if to return home and therefore continue to recommend SNF (though again would be a great acute rehab candidate given diagnosis and need for medical management of knee however requesting 24/7 support which his roommate switches shifts); will follow to optimize independence; left knee placed in low load stretch with gravity support for hopeful achievement of neutral as he is pushing into stump opening;     Plan    Treatment Plan Status: Continue Current Treatment Plan  Type of Treatment: Bed Mobility, Neuro Re-Education / Balance, Therapeutic Activities, Therapeutic Exercise  Treatment Frequency: 4 Times per Week  Treatment Duration: Until Therapy Goals Met    DC Equipment Recommendations: Unable to determine at this time  Discharge Recommendations: SNF      Abridged Subjective/Objective     10/12/23 1150   Cognition    Cognition / Consciousness X   Level of Consciousness Alert   New Learning Impaired   Comments pleasant and cooperative; poor self initiation outside of therapy sessions discussed, some fear noted; easily cued to stay up;   Passive ROM Lower Body   Passive ROM Lower Body X   Comments cannot achieve left knee extension. lacking about 15 deg of extension; painful end range and painful hamstring, with any stretch of hamsting would draw further into  flexion;   Strength Lower Body   Lower Body Strength  X   Comments poor left quad contraction today; barely perceptiable   Sensation Lower Body   Lower Extremity Sensation   X   Comments severe pain per subjective reports but easily cued to participate and perform exercise   Supine Lower Body Exercise   Quadriceps Isometrics 1 set of 10;Left  (attempts wiht contract/relax)   Balance   Sitting Balance (Static) Fair +   Sitting Balance (Dynamic) Fair +   Standing Balance (Static) Fair   Standing Balance (Dynamic) Poor +   Weight Shift Sitting Fair   Weight Shift Standing Poor   Skilled Intervention Postural Facilitation;Sequencing;Tactile Cuing;Verbal Cuing   Comments B UE support in sitting/standing; needs encouragement to self progress   Bed Mobility    Supine to Sit Supervised   Sit to Supine Supervised   Gait Analysis   Gait Level Of Assist Refused   Functional Mobility   Sit to Stand Contact Guard Assist  (with FWW)   Bed, Chair, Wheelchair Transfer Minimal Assist   Transfer Method Stand Pivot   Patient / Family Goals    Patient / Family Goal #1 To be able to get prosthetics for his LLE   Goal #1 Outcome Goal not met   Short Term Goals    Short Term Goal # 1 Patient will perform supine-sit with HOB flat with supervision in 6 visits   Goal Outcome # 1 Goal met   Short Term Goal # 2 Patient will perform chair transfers with supervision in 6 visits   Goal Outcome # 2 Goal not met   Short Term Goal # 3 Patient will perform sit-stand with FWW with supervision in 6 visits   Goal Outcome # 3 Goal not met   Short Term Goal # 4 Pt to position LLE correctly on pillows to avoid knee flexion contracture w/o cues in 6 visits   Goal Outcome # 4 Goal not met   Short Term Goal # 5 Pt will self propel wheelchair with supervision within 6 visits to ensure independent mobility at home.   Education Group   Role of Physical Therapist Patient Response Patient;Acceptance;Demonstration;Explanation;Action Demonstration;Verbal  Demonstration   Exercises - Supine Patient Response Patient;Acceptance;Explanation;Demonstration;Verbal Demonstration;Action Demonstration

## 2023-10-12 NOTE — CARE PLAN
Pt Aox4. Pt c/o both R ankle pain, which they believe may be d/t inactivity, and L BKA pain which were made tolerable with PRN Rx. Knee immobilizer in place, however knee is still slightly bent. Foam dressing in place on R heel.    Problem: Knowledge Deficit - Standard  Goal: Patient and family/care givers will demonstrate understanding of plan of care, disease process/condition, diagnostic tests and medications  Outcome: Progressing     Problem: Pain - Standard  Goal: Alleviation of pain or a reduction in pain to the patient’s comfort goal  Outcome: Progressing     Problem: Skin Integrity  Goal: Skin integrity is maintained or improved  Outcome: Progressing     Problem: Fall Risk  Goal: Patient will remain free from falls  Outcome: Progressing     Problem: Infection - Standard  Goal: Patient will remain free from infection  Outcome: Progressing     Problem: Wound/ / Incision Healing  Goal: Patient's wound/surgical incision will decrease in size and heals properly  Outcome: Progressing   The patient is Stable - Low risk of patient condition declining or worsening    Shift Goals  Clinical Goals: Pain remains < 7 with PO PRN Rx  Patient Goals: rest and pain control  Family Goals: Safe discharge plan    Progress made toward(s) clinical / shift goals:      Patient is not progressing towards the following goals:

## 2023-10-12 NOTE — CARE PLAN
Problem: Pain - Standard  Goal: Alleviation of pain or a reduction in pain to the patient’s comfort goal  Note: Education provided regarding pain management including nonpharmacological measures such as rest and relaxation   The patient is Stable - Low risk of patient condition declining or worsening    Shift Goals  Clinical Goals: pain management  Patient Goals: pain management  Family Goals: ashlie    Progress made toward(s) clinical / shift goals:      Patient is not progressing towards the following goals:

## 2023-10-12 NOTE — PROGRESS NOTES
"      Orthopaedic Progress Note    Interval changes:  Patient doing well    L BKA dressings are CDI  Patient with knee bent in knee immobilizer    Cleared for DC to SNF by ortho pending medicine clearance    ROS - Patient denies any new issues.  Pain well controlled.    /77   Pulse 89   Temp 36.4 °C (97.6 °F) (Skin)   Resp 16   Ht 1.803 m (5' 11\")   Wt 66 kg (145 lb 8.1 oz)   SpO2 97%     Patient seen and examined  No acute distress  Breathing non labored  RRR  LLE dressings CDI, knee bent at 90 deg, refusing to straighten leg.     Recent Labs     10/10/23  0502 10/12/23  0552   WBC 8.1 7.7   RBC 3.33* 3.33*   HEMOGLOBIN 9.4* 9.6*   HEMATOCRIT 30.0* 30.7*   MCV 90.1 92.2   MCH 28.2 28.8   MCHC 31.3* 31.3*   RDW 52.8* 57.5*   PLATELETCT 568* 587*   MPV 8.4* 8.3*       Active Hospital Problems    Diagnosis     Renal cyst [N28.1]      Priority: Low    PAD (peripheral artery disease) ([facility]) [I73.9]     Bacteremia [R78.81]     Anemia [D64.9]     Gangrene of left foot ([facility]) [I96]     Elevated liver enzymes [R74.8]     Hyponatremia [E87.1]        Assessment/Plan:  Patient doing well    L BKA dressings are CDI  Patient with knee bent knee in immobilizer     Cleared for DC to SNF by ortho pending medicine clearance  POD#15 S/P Left below-knee amputation  Wt bearing status - NWB LLE  Wound care/Drains - Dressings to be changed every other day by nursing. Or PRN for saturation starting POD#2  Future Procedures - none planned   Lovenox: Start 9/28, Duration-until ambulatory > 150'  Sutures/Staples out-  21 days post operatively. Removal will completed by ortho mid levels only.  PT/OT-initiated  Antibiotics: completed  DVT Prophylaxis- TEDS/SCDs/Foot pumps  [place]-not needed per ortho  Case Coordination for Discharge Planning - Disposition per therapy recs.    "

## 2023-10-12 NOTE — THERAPY
"Occupational Therapy  Daily Treatment     Patient Name: Dillon Centeno  Age:  58 y.o., Sex:  male  Medical Record #: [id number]  Today's Date: 10/12/2023     Precautions  Precautions: Fall Risk, Swallow Precautions  Comments: L BKA with stump    Assessment    Pt seen for follow up OT tx session, pt progressing well with functional mobility and ADLs. Pt with poor insight into deficits and advocating for self, pt with severe left knee contracture despite brace in place (refer to PT note for further detail). Will continue to see for skilled therapy while admitted as well as recommend post-acute placement at this time.     Plan    Treatment Plan Status: (P) Continue Current Treatment Plan  Type of Treatment: Self Care / Activities of Daily Living, Adaptive Equipment, Neuro Re-Education / Balance, Therapeutic Exercises, Therapeutic Activity  Treatment Frequency: 3 Times per Week  Treatment Duration: Until Therapy Goals Met    DC Equipment Recommendations: (P) Unable to determine at this time  Discharge Recommendations: (P) Recommend post-acute placement for additional occupational therapy services prior to discharge home    Subjective    \"It hurts real bad, nothing works\"     Objective       10/12/23 1136   Precautions   Precautions Fall Risk;Swallow Precautions   Cognition    Comments Pleasant, cooperative, poor insight into deficits   Balance   Sitting Balance (Static) Fair +   Sitting Balance (Dynamic) Fair +   Standing Balance (Static) Fair   Standing Balance (Dynamic) Poor +   Skilled Intervention Verbal Cuing;Facilitation   Bed Mobility    Supine to Sit Supervised   Scooting Supervised   Skilled Intervention Verbal Cuing   Activities of Daily Living   Grooming Supervision   Upper Body Dressing Supervision   Lower Body Dressing Contact Guard Assist   Skilled Intervention Verbal Cuing;Facilitation   How much help from another person does the patient currently need...   Putting on and taking off regular lower body " clothing? 3   Bathing (including washing, rinsing, and drying)? 3   Toileting, which includes using a toilet, bedpan, or urinal? 3   Putting on and taking off regular upper body clothing? 4   Taking care of personal grooming such as brushing teeth? 4   Eating meals? 4   6 Clicks Daily Activity Score 21   Functional Mobility   Sit to Stand Contact Guard Assist   Bed, Chair, Wheelchair Transfer Contact Guard Assist   Transfer Method Stand Pivot   Mobility bed mobility, pivot to wheelchair   Skilled Intervention Verbal Cuing;Facilitation   Activity Tolerance   Sitting in Chair left seated in wheelchair   Sitting Edge of Bed 8 min   Patient / Family Goals   Patient / Family Goal #1 to be independent again   Goal #1 Outcome Progressing as expected   Short Term Goals   Short Term Goal # 1 pt will demo ADL txfs with supv   Goal Outcome # 1 Progressing as expected   Short Term Goal # 2 pt will dress LB with supv   Goal Outcome # 2 Progressing as expected   Short Term Goal # 3 pt will demo toileting with supv   Goal Outcome # 3 Goal not met   Education Group   Education Provided Role of Occupational Therapist   Role of Occupational Therapist Patient Response Patient;Acceptance;Explanation   Occupational Therapy Treatment Plan    O.T. Treatment Plan Continue Current Treatment Plan   Anticipated Discharge Equipment and Recommendations   DC Equipment Recommendations Unable to determine at this time   Discharge Recommendations Recommend post-acute placement for additional occupational therapy services prior to discharge home   Interdisciplinary Plan of Care Collaboration   IDT Collaboration with  Nursing   Patient Position at End of Therapy Seated;Chair Alarm On;Call Light within Reach;Tray Table within Reach;Phone within Reach   Collaboration Comments RN updated

## 2023-10-13 PROCEDURE — 97530 THERAPEUTIC ACTIVITIES: CPT

## 2023-10-13 PROCEDURE — 770006 HCHG ROOM/CARE - MED/SURG/GYN SEMI*

## 2023-10-13 PROCEDURE — A9270 NON-COVERED ITEM OR SERVICE: HCPCS | Performed by: INTERNAL MEDICINE

## 2023-10-13 PROCEDURE — 700102 HCHG RX REV CODE 250 W/ 637 OVERRIDE(OP): Performed by: INTERNAL MEDICINE

## 2023-10-13 PROCEDURE — 700102 HCHG RX REV CODE 250 W/ 637 OVERRIDE(OP): Performed by: SURGERY

## 2023-10-13 PROCEDURE — 99232 SBSQ HOSP IP/OBS MODERATE 35: CPT | Performed by: STUDENT IN AN ORGANIZED HEALTH CARE EDUCATION/TRAINING PROGRAM

## 2023-10-13 PROCEDURE — 700111 HCHG RX REV CODE 636 W/ 250 OVERRIDE (IP): Performed by: SURGERY

## 2023-10-13 PROCEDURE — A9270 NON-COVERED ITEM OR SERVICE: HCPCS | Performed by: SURGERY

## 2023-10-13 PROCEDURE — A9270 NON-COVERED ITEM OR SERVICE: HCPCS | Performed by: STUDENT IN AN ORGANIZED HEALTH CARE EDUCATION/TRAINING PROGRAM

## 2023-10-13 PROCEDURE — 302053 BORDER GAUZE 4 X 4"": Performed by: STUDENT IN AN ORGANIZED HEALTH CARE EDUCATION/TRAINING PROGRAM

## 2023-10-13 PROCEDURE — 700102 HCHG RX REV CODE 250 W/ 637 OVERRIDE(OP): Performed by: STUDENT IN AN ORGANIZED HEALTH CARE EDUCATION/TRAINING PROGRAM

## 2023-10-13 RX ADMIN — ATORVASTATIN CALCIUM 40 MG: 40 TABLET, FILM COATED ORAL at 16:37

## 2023-10-13 RX ADMIN — NICOTINE TRANSDERMAL SYSTEM 21 MG: 21 PATCH, EXTENDED RELEASE TRANSDERMAL at 04:47

## 2023-10-13 RX ADMIN — HEPARIN SODIUM 5000 UNITS: 5000 INJECTION, SOLUTION INTRAVENOUS; SUBCUTANEOUS at 13:42

## 2023-10-13 RX ADMIN — TRAMADOL HYDROCHLORIDE 50 MG: 50 TABLET ORAL at 16:36

## 2023-10-13 RX ADMIN — HEPARIN SODIUM 5000 UNITS: 5000 INJECTION, SOLUTION INTRAVENOUS; SUBCUTANEOUS at 04:46

## 2023-10-13 RX ADMIN — TRAMADOL HYDROCHLORIDE 50 MG: 50 TABLET ORAL at 12:24

## 2023-10-13 RX ADMIN — GABAPENTIN 300 MG: 300 CAPSULE ORAL at 04:47

## 2023-10-13 RX ADMIN — HEPARIN SODIUM 5000 UNITS: 5000 INJECTION, SOLUTION INTRAVENOUS; SUBCUTANEOUS at 21:18

## 2023-10-13 RX ADMIN — OXYCODONE HYDROCHLORIDE 20 MG: 10 TABLET ORAL at 04:46

## 2023-10-13 RX ADMIN — OXYCODONE HYDROCHLORIDE 20 MG: 10 TABLET ORAL at 09:57

## 2023-10-13 RX ADMIN — ACETAMINOPHEN 650 MG: 325 TABLET, FILM COATED ORAL at 00:13

## 2023-10-13 RX ADMIN — GABAPENTIN 300 MG: 300 CAPSULE ORAL at 18:03

## 2023-10-13 RX ADMIN — TRAMADOL HYDROCHLORIDE 50 MG: 50 TABLET ORAL at 00:14

## 2023-10-13 RX ADMIN — ACETAMINOPHEN 650 MG: 325 TABLET, FILM COATED ORAL at 12:24

## 2023-10-13 RX ADMIN — CYCLOBENZAPRINE 10 MG: 10 TABLET, FILM COATED ORAL at 11:25

## 2023-10-13 RX ADMIN — GABAPENTIN 300 MG: 300 CAPSULE ORAL at 13:42

## 2023-10-13 RX ADMIN — CLOPIDOGREL BISULFATE 75 MG: 75 TABLET ORAL at 04:47

## 2023-10-13 RX ADMIN — ACETAMINOPHEN 650 MG: 325 TABLET, FILM COATED ORAL at 16:36

## 2023-10-13 RX ADMIN — ASPIRIN 81 MG: 81 TABLET, COATED ORAL at 04:47

## 2023-10-13 RX ADMIN — OXYCODONE HYDROCHLORIDE 20 MG: 10 TABLET ORAL at 21:17

## 2023-10-13 ASSESSMENT — ENCOUNTER SYMPTOMS
CARDIOVASCULAR NEGATIVE: 1
NEUROLOGICAL NEGATIVE: 1
GASTROINTESTINAL NEGATIVE: 1
EYES NEGATIVE: 1
RESPIRATORY NEGATIVE: 1
PSYCHIATRIC NEGATIVE: 1

## 2023-10-13 ASSESSMENT — COGNITIVE AND FUNCTIONAL STATUS - GENERAL
SUGGESTED CMS G CODE MODIFIER MOBILITY: CL
STANDING UP FROM CHAIR USING ARMS: A LITTLE
WALKING IN HOSPITAL ROOM: A LOT
MOBILITY SCORE: 12
MOVING TO AND FROM BED TO CHAIR: UNABLE
CLIMB 3 TO 5 STEPS WITH RAILING: TOTAL
MOVING FROM LYING ON BACK TO SITTING ON SIDE OF FLAT BED: UNABLE

## 2023-10-13 ASSESSMENT — PAIN DESCRIPTION - PAIN TYPE
TYPE: ACUTE PAIN;SURGICAL PAIN
TYPE: ACUTE PAIN
TYPE: ACUTE PAIN;SURGICAL PAIN
TYPE: ACUTE PAIN

## 2023-10-13 NOTE — PROGRESS NOTES
Patient received in bed he is alert and oriented x 4 able to make his needs known, no acute distress noted. Refused dressing for left BKA at this time, he stated he will have his dressing done tonight. Will endorse to next shift nurse. Bed kept in lowest position. All needs attended

## 2023-10-13 NOTE — CARE PLAN
Problem: Pain - Standard  Goal: Alleviation of pain or a reduction in pain to the patient’s comfort goal  Outcome: Progressing  Note: Education provided regarding pain management including nonpharmacological measures such as rest and repositioning   The patient is Stable - Low risk of patient condition declining or worsening    Shift Goals  Clinical Goals: pain management  Patient Goals: pain management  Family Goals: ashlie    Progress made toward(s) clinical / shift goals:      Patient is not progressing towards the following goals:

## 2023-10-13 NOTE — DISCHARGE PLANNING
Medical Social Work  Volt to [facility], [facility] to see if they would reconsider accepting patient if patient was discharge to a group home after completing rehab with them.    Volt from Daniel,right now [facility] has lost its credentialing with [employer] due to a technical error with re registering. I do not know how long it will be down But if it is a case of no dc support then a Group home would be reasonable.    Volt to [facility] with the above information

## 2023-10-13 NOTE — PROGRESS NOTES
[facility] Medicine Daily Progress Note    Date of Service  10/13/2023    Chief Complaint  Dillon Centeno is a 58 y.o. male admitted 9/26/2023 with bilateral foot wounds. He has hypertension and tobacco dependence.    Hospital Course  In the ER, patient was noted to have gangrene on his left foot.  Vascular surgery and orthopedic surgery were consulted.  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      Vascular studies showed severe arterial disease.  Patient underwent bilateral common femoral artery endarterectomy and profundoplasty with saphenous vein angioplasty with stent placement in the right external iliac artery on 9/30/2023. Prevena was placed (5-7 days). He was recommended to have Plavix for 1 month and aspirin for life.    Blood cultures from 9/26/2023 grew Morganella morganii and Vagococcus.  Urine culture from 9/26/2023 grew Staph epidermidis. ID were consulted.  Patient was placed on Zosyn with end date of 10/6/2023.  Echocardiogram showed LVEF of about 60% with no reported valvular abnormalities.  Repeat blood cultures from 9/27/2023 have been negative.    Noted to have renal cyst on CT.  MRI shows multiseptated cystic lesions in the upper pole of left kidney measuring 3.8 cm without associated enhancement or nodular component.    Interval Problem Update  No acute overnight events.   Continue ASA, statin and plavix.   On RA  Labs reviewed: Na 132 improving  PT/OT  Multimodal pain managements including po and iv narcotics prn. Monitoring respiratory status and sedation score    Evaluated by PM&R on 10/7/2023, not able to be accepted due to lack of discharge support. Awaiting SNF placement.  However no accepting SNF    I have discussed this patient's plan of care and discharge plan at IDT rounds today with Case Management, Nursing, Nursing leadership, and other members of the IDT team.    Consultants/Specialty  infectious disease, orthopedics, and vascular surgery    Code Status  Full  Code    Disposition  The patient is not medically cleared for discharge to home or a post-acute facility.      I have placed the appropriate orders for post-discharge needs.    Review of Systems  Review of Systems   Constitutional:  Positive for malaise/fatigue.   HENT: Negative.     Eyes: Negative.    Respiratory: Negative.     Cardiovascular: Negative.    Gastrointestinal: Negative.    Genitourinary: Negative.    Musculoskeletal:         Pain in phantom limb   Skin: Negative.    Neurological: Negative.    Endo/Heme/Allergies: Negative.    Psychiatric/Behavioral: Negative.          Physical Exam  Temp:  [36.2 °C (97.1 °F)-36.9 °C (98.5 °F)] 36.2 °C (97.1 °F)  Pulse:  [] 91  Resp:  [16-19] 18  BP: (125-192)/() 153/88  SpO2:  [95 %-98 %] 97 %    Physical Exam  Constitutional:       General: He is not in acute distress.  HENT:      Head: Normocephalic.      Nose: Nose normal.      Mouth/Throat:      Mouth: Mucous membranes are moist.   Eyes:      Pupils: Pupils are equal, round, and reactive to light.   Cardiovascular:      Rate and Rhythm: Normal rate.   Pulmonary:      Effort: Pulmonary effort is normal.   Abdominal:      Palpations: Abdomen is soft.   Musculoskeletal:      Cervical back: Normal range of motion.      Right lower leg: No edema.      Comments: Left BKA   Skin:     General: Skin is warm.      Comments: Third right toe absent due to prior trauma.  Remaining toes with chronic ulcers.   Neurological:      Mental Status: He is alert. Mental status is at baseline.   Psychiatric:         Mood and Affect: Mood normal.         Fluids    Intake/Output Summary (Last 24 hours) at 10/13/2023 1127  Last data filed at 10/13/2023 0844  Gross per 24 hour   Intake 860 ml   Output 600 ml   Net 260 ml         Laboratory  Recent Labs     10/12/23  0552   WBC 7.7   RBC 3.33*   HEMOGLOBIN 9.6*   HEMATOCRIT 30.7*   MCV 92.2   MCH 28.8   MCHC 31.3*   RDW 57.5*   PLATELETCT 587*   MPV 8.3*       Recent Labs      10/12/23  0552   SODIUM 132*   POTASSIUM 4.3   CHLORIDE 98   CO2 26   GLUCOSE 102*   BUN 9   CREATININE 0.52   CALCIUM 9.1                     Imaging  MR-ABDOMEN-WITH & W/O   Final Result      Multi septated cystic lesion at the upper pole LEFT kidney measuring 3.8 cm without associated enhancement or nodular component (Bosniak 2).            DX-PORTABLE FLUORO > 1 HOUR   Final Result      Portable fluoroscopy utilized for 2 minutes 20.4 seconds.         INTERPRETING LOCATION: [address], [place], [address]      CT-CTA AORTA-RO WITH & W/O-POST PROCESS   Final Result         CTA AORTA      1.  Septated lobulated left upper pole renal cystic mass lesion, recommend follow-up MRI of the kidneys for further characterization.   2.  Atherosclerosis and atherosclerotic coronary artery disease.   3.  Fat-containing right inguinal hernia      CTA LOWER EXTREMITIES      1.  Occlusion of the right common iliac artery through the distal superficial femoral artery   2.  Distal right peroneal artery occlusion with 2-vessel runoff the anterior and posterior tibial arteries at the right ankle.   3.  Occlusion of the left superficial femoral artery at the bifurcation extending through its length to the popliteal artery.   4.  Occlusion of the proximal left profunda femoris just distal to the bifurcation which reconstitutes.   5.  Soft tissue gas at the stump of left below-the-knee amputation, within expected limits for recent postop changes.      3D angiographic/MIP images of the vasculature confirm the vascular findings as described above.      These findings were discussed with the patient's clinician, Keith Navarro, on 9/29/2023 8:06 AM.      EC-ECHOCARDIOGRAM COMPLETE W/O CONT   Final Result      US-AORTA/ILIACS DUPLEX COMPLETE   Final Result      US-EXTREMITY ARTERY LOWER UNILAT RIGHT   Final Result      US-INGA SINGLE LEVEL UNILAT RIGHT   Final Result      DX-CHEST-PORTABLE (1 VIEW)   Final Result      1.  LEFT basilar  atelectasis or pneumonia   2.  Trace LEFT pleural effusion or pleural scar             Assessment/Plan  * Gangrene of left foot (HCC)- (present on admission)  Assessment & Plan  Left BKA on 9/27/2023. Vascular studies showed severe arterial disease. Underwent bilateral common femoral artery endarterectomy and profundoplasty with saphenous vein angioplasty with stent placement in the right external iliac artery on 9/30/2023. Prevena was placed (5-7 days). He was recommended to have Plavix for 1 month and aspirin for life. Follow-up with Dr. Navarro. Kayleen to be removed 14 to 21 days postoperatively. Completed course of Zosyn on 10/6/2023. On multimodal pain regimen, adjust as needed.     PAD (peripheral artery disease) (HCC)- (present on admission)  Assessment & Plan  Underwent bilateral common femoral artery endarterectomy and profundoplasty with saphenous vein angioplasty with stent placement in the right external iliac artery on 9/30/2023. Prevena was placed (5-7 days). Recommended to have Plavix for 1 month and aspirin for life.  Follow-up with Dr. Navarro.  Stump protector for left leg was ordered by vascular surgery.  10/13: continue ASA, plavix and lipitor.   Plavix will be continued until 10/30. ASA will be continued indefinitely.     Anemia- (present on admission)  Assessment & Plan  Stable.  Monitor    Bacteremia- (present on admission)  Assessment & Plan  Blood cultures from 9/26/2023 grew Morganella morganii and Vagococcus. ID were consulted. Completed course of On Zosyn on 10/6/2023. Repeat blood cultures from 9/27/2023 have been negative.  Echocardiogram showed no evidence of vegetations.     Hyponatremia  Assessment & Plan  Check Osm serum and urine   Continue monitoring   10/12: IVF discontinued. Na 132, improving    Elevated liver enzymes  Assessment & Plan  Resolved.  Hepatitis panel was normal.  Monitor    Renal cyst  Assessment & Plan  Noted to have renal cyst on CT.  MRI shows multiseptated  cystic lesions in the upper pole of left kidney measuring 3.8 cm without associated enhancement or nodular component.  Recommend outpatient follow-up         VTE prophylaxis: SCDs, Lovenox

## 2023-10-13 NOTE — PROGRESS NOTES
"Notified Dillon that Left knee dressing change be done at some point tonight, he refused verbalizing \"Oh, please no, let me sleep tonight\" this RN told him that I will give him PRN medication for pain prior to dressing change but still refusing. Will re-assess in the morning.  "

## 2023-10-13 NOTE — CARE PLAN
Receive, A&Ox4, with complaints of 8/10 pain controlled by as needed Oxycodone verbalized this morning that his scheduled Tylenol and Tramadol were not relieving his pain. Encouraged again to do L BKA dressing change this morning but still refusing verbalizing he wants it changed after breakfast but he let me changed his upper thigh dressings. Upon bed change this morning noted Left upper gluteal wound noted photo taken and uploaded on [facility], wound consult ordered.      The patient is Stable - Low risk of patient condition declining or worsening    Shift Goals  Clinical Goals: Pain rating will be <4, will maintain knee immobilizer, awaiting medical clearance  Patient Goals: rest and sleep  Family Goals: ashlie    Progress made toward(s) clinical / shift goals:      Problem: Pain - Standard  Goal: Alleviation of pain or a reduction in pain to the patient’s comfort goal  10/13/2023 0624 by Cristina Chowdary R.N.  Outcome: Progressing  10/13/2023 0623 by Cristina Chowdary R.N.  Outcome: Progressing     Problem: Fall Risk  Goal: Patient will remain free from falls  10/13/2023 0624 by Cristina Chowdary, R.N.  Outcome: Progressing  10/13/2023 0623 by Cristina Chowdary R.N.  Outcome: Progressing     Problem: Infection - Standard  Goal: Patient will remain free from infection  10/13/2023 0624 by Cristina Chowdary, R.N.  Outcome: Progressing  10/13/2023 0623 by Cristina Chowdary, R.N.  Outcome: Progressing       Patient is not progressing towards the following goals:      Problem: Skin Integrity  Goal: Skin integrity is maintained or improved  10/13/2023 0624 by Cristina Chowdary, R.N.  Outcome: Not Progressing  10/13/2023 0623 by Cristina Chowdary, R.N.  Outcome: Progressing  Noted Stage II pressure ulcer on the left upper buttocks, took a photo on Epic, cleansed, applied barrier cream, Wound consult done

## 2023-10-14 PROCEDURE — A9270 NON-COVERED ITEM OR SERVICE: HCPCS | Performed by: INTERNAL MEDICINE

## 2023-10-14 PROCEDURE — 700102 HCHG RX REV CODE 250 W/ 637 OVERRIDE(OP): Performed by: INTERNAL MEDICINE

## 2023-10-14 PROCEDURE — 700102 HCHG RX REV CODE 250 W/ 637 OVERRIDE(OP): Performed by: STUDENT IN AN ORGANIZED HEALTH CARE EDUCATION/TRAINING PROGRAM

## 2023-10-14 PROCEDURE — A9270 NON-COVERED ITEM OR SERVICE: HCPCS | Performed by: STUDENT IN AN ORGANIZED HEALTH CARE EDUCATION/TRAINING PROGRAM

## 2023-10-14 PROCEDURE — A9270 NON-COVERED ITEM OR SERVICE: HCPCS | Performed by: SURGERY

## 2023-10-14 PROCEDURE — 99024 POSTOP FOLLOW-UP VISIT: CPT | Performed by: SURGERY

## 2023-10-14 PROCEDURE — 99232 SBSQ HOSP IP/OBS MODERATE 35: CPT | Performed by: STUDENT IN AN ORGANIZED HEALTH CARE EDUCATION/TRAINING PROGRAM

## 2023-10-14 PROCEDURE — 700102 HCHG RX REV CODE 250 W/ 637 OVERRIDE(OP): Performed by: SURGERY

## 2023-10-14 PROCEDURE — 770006 HCHG ROOM/CARE - MED/SURG/GYN SEMI*

## 2023-10-14 PROCEDURE — 97602 WOUND(S) CARE NON-SELECTIVE: CPT

## 2023-10-14 PROCEDURE — 700111 HCHG RX REV CODE 636 W/ 250 OVERRIDE (IP): Performed by: SURGERY

## 2023-10-14 PROCEDURE — 97530 THERAPEUTIC ACTIVITIES: CPT

## 2023-10-14 RX ADMIN — GABAPENTIN 300 MG: 300 CAPSULE ORAL at 06:19

## 2023-10-14 RX ADMIN — ACETAMINOPHEN 650 MG: 325 TABLET, FILM COATED ORAL at 16:30

## 2023-10-14 RX ADMIN — TRAMADOL HYDROCHLORIDE 50 MG: 50 TABLET ORAL at 23:56

## 2023-10-14 RX ADMIN — NICOTINE TRANSDERMAL SYSTEM 21 MG: 21 PATCH, EXTENDED RELEASE TRANSDERMAL at 06:18

## 2023-10-14 RX ADMIN — TRAMADOL HYDROCHLORIDE 50 MG: 50 TABLET ORAL at 06:19

## 2023-10-14 RX ADMIN — TRAMADOL HYDROCHLORIDE 50 MG: 50 TABLET ORAL at 16:31

## 2023-10-14 RX ADMIN — TRAMADOL HYDROCHLORIDE 50 MG: 50 TABLET ORAL at 00:46

## 2023-10-14 RX ADMIN — HEPARIN SODIUM 5000 UNITS: 5000 INJECTION, SOLUTION INTRAVENOUS; SUBCUTANEOUS at 06:18

## 2023-10-14 RX ADMIN — ASPIRIN 81 MG: 81 TABLET, COATED ORAL at 06:19

## 2023-10-14 RX ADMIN — DOCUSATE SODIUM 50 MG AND SENNOSIDES 8.6 MG 2 TABLET: 8.6; 5 TABLET, FILM COATED ORAL at 06:18

## 2023-10-14 RX ADMIN — ACETAMINOPHEN 650 MG: 325 TABLET, FILM COATED ORAL at 00:00

## 2023-10-14 RX ADMIN — OXYCODONE HYDROCHLORIDE 20 MG: 10 TABLET ORAL at 08:05

## 2023-10-14 RX ADMIN — HEPARIN SODIUM 5000 UNITS: 5000 INJECTION, SOLUTION INTRAVENOUS; SUBCUTANEOUS at 20:49

## 2023-10-14 RX ADMIN — GABAPENTIN 300 MG: 300 CAPSULE ORAL at 16:30

## 2023-10-14 RX ADMIN — OXYCODONE HYDROCHLORIDE 20 MG: 10 TABLET ORAL at 20:48

## 2023-10-14 RX ADMIN — CLOPIDOGREL BISULFATE 75 MG: 75 TABLET ORAL at 06:19

## 2023-10-14 RX ADMIN — CYCLOBENZAPRINE 10 MG: 10 TABLET, FILM COATED ORAL at 09:54

## 2023-10-14 RX ADMIN — HEPARIN SODIUM 5000 UNITS: 5000 INJECTION, SOLUTION INTRAVENOUS; SUBCUTANEOUS at 13:31

## 2023-10-14 RX ADMIN — GABAPENTIN 300 MG: 300 CAPSULE ORAL at 13:31

## 2023-10-14 RX ADMIN — ACETAMINOPHEN 650 MG: 325 TABLET, FILM COATED ORAL at 06:35

## 2023-10-14 RX ADMIN — ATORVASTATIN CALCIUM 40 MG: 40 TABLET, FILM COATED ORAL at 16:31

## 2023-10-14 RX ADMIN — TRAMADOL HYDROCHLORIDE 50 MG: 50 TABLET ORAL at 11:36

## 2023-10-14 RX ADMIN — ACETAMINOPHEN 650 MG: 325 TABLET, FILM COATED ORAL at 11:36

## 2023-10-14 ASSESSMENT — COGNITIVE AND FUNCTIONAL STATUS - GENERAL
SUGGESTED CMS G CODE MODIFIER MOBILITY: CL
STANDING UP FROM CHAIR USING ARMS: A LITTLE
MOVING TO AND FROM BED TO CHAIR: UNABLE
MOVING FROM LYING ON BACK TO SITTING ON SIDE OF FLAT BED: UNABLE
WALKING IN HOSPITAL ROOM: A LITTLE
CLIMB 3 TO 5 STEPS WITH RAILING: TOTAL
MOBILITY SCORE: 13

## 2023-10-14 ASSESSMENT — ENCOUNTER SYMPTOMS
GASTROINTESTINAL NEGATIVE: 1
RESPIRATORY NEGATIVE: 1
PSYCHIATRIC NEGATIVE: 1
EYES NEGATIVE: 1
CARDIOVASCULAR NEGATIVE: 1
NEUROLOGICAL NEGATIVE: 1

## 2023-10-14 ASSESSMENT — PAIN DESCRIPTION - PAIN TYPE
TYPE: ACUTE PAIN

## 2023-10-14 ASSESSMENT — GAIT ASSESSMENTS: GAIT LEVEL OF ASSIST: REFUSED

## 2023-10-14 ASSESSMENT — FIBROSIS 4 INDEX: FIB4 SCORE: 0.55

## 2023-10-14 NOTE — DISCHARGE PLANNING
Case Management Discharge Planning    Admission Date: 9/26/2023  GMLOS: 7.1  ALOS: 18    6-Clicks ADL Score: 21  6-Clicks Mobility Score: 12  PT and/or OT Eval ordered: Yes  Post-acute Referrals Ordered: Yes  Post-acute Choice Obtained: Yes  Has referral(s) been sent to post-acute provider:  Yes      Anticipated Discharge Dispo: Discharge Disposition: D/T to home under [facility] care in anticipation of covered skilled care (06)    DME Needed: Yes    DME Ordered: Yes    Action(s) Taken: Updated Provider/Nurse on Discharge Plan  RN CM met with Pt at bedside to discuss discharge plans. Pt is agreeable to HH. Choice form obtained and completed signed by Pt. Faxed to [facility] for processing. HH referral 1) [facility] 2)[facility] . DME choice fro wheelchair is [employer].    Escalations Completed: DME Company    Medically Clear: Yes    Next Steps: Follow HH acceptance and wheelchair delivery.    Barriers to Discharge: DME and Outpatient referrals pending             PHYSICAL THERAPY TREATMENT NOTE - INPATIENT     Room Number: 036/309-N       Presenting Problem: post Covid 19 inflamatory lung exacerbation    Problem List  Principal Problem:    COVID-19  Active Problems:    Elevated troponin    Hypoxemia      PHYSICAL T 0          BALANCE                                                                                                                     Static Sitting: Fair -  Dynamic Sitting: Poor +           Static Standing: Poor  Dynamic Standing: Poor -      O2 WALK minimum assistance      Goal #1   Current Status  Mod A to edge of bed   Goal #2 Patient is able to demonstrate transfers Sit to/from Stand at assistance level: minimum assistance with walker - rolling      Goal #2  Current Status  Mod A with SPT to chair

## 2023-10-14 NOTE — WOUND TEAM
[facility]  Inpatient Services  Initial Wound and Skin Care Evaluation    Admission Date: 9/26/2023     Last order of IP CONSULT TO WOUND CARE was found on 10/13/2023 from Hospital Encounter on 9/26/2023     HPI, PMH, SH: Reviewed    Past Surgical History:   Procedure Laterality Date    FEMORAL ENDARTERECTOMY Bilateral 9/30/2023    Procedure: ENDARTERECTOMY, FEMORAL;  Surgeon: Keith Navarro M.D.;  Location: SURGERY [facility];  Service: Vascular    ANGIOGRAM, WITH ANGIOPLASTY, AND STENT INSERTION IF INDICATED Right 9/30/2023    Procedure: ANGIOGRAM, WITH ILIAC STENT;  Surgeon: Keith Navarro M.D.;  Location: SURGERY [facility];  Service: Vascular    KNEE AMPUTATION BELOW Left 9/27/2023    Procedure: AMPUTATION, BELOW KNEE;  Surgeon: Pradip Altamirano M.D.;  Location: SURGERY [facility];  Service: Orthopedics    IRRIGATION & DEBRIDEMENT ORTHO Left 3/4/2018    Procedure: IRRIGATION & DEBRIDEMENT ORTHO - HUMERUS;  Surgeon: Sergio Watkins M.D.;  Location: [facility];  Service: Orthopedics    ORIF, SHOULDER Left 1/4/2018    Procedure: SHOULDER ORIF;  Surgeon: Sergio Watkins M.D.;  Location: SURGERY [facility];  Service: Orthopedics    CLOSED REDUCTION Left 12/24/2017    Procedure: CLOSED REDUCTION;  Surgeon: Keith Subramanian M.D.;  Location: [facility];  Service: Orthopedics    OTHER      multiple surgeries lower legs from past injuries     Social History     Tobacco Use    Smoking status: Every Day     Types: Cigars    Smokeless tobacco: Never   Substance Use Topics    Alcohol use: Yes     Comment: a few beers a week     Chief Complaint   Patient presents with    Wound Check     Bilateral feet. Pts L foot is black and sloughing. Pt states unable to ambulate due to pain     Diagnosis: Gangrene of left foot (HCC) [I96]    Unit where seen by Wound Team: S602/01     WOUND CONSULT/FOLLOW UP RELATED TO:  L. Hip/buttocks     WOUND HISTORY:  Patient came in for  bilateral gangrenous feet.  Sx and ortho has signed off.  Patient is mostly bed bound. And hx of homelessness.       WOUND ASSESSMENT/LDA      Wound 10/13/23 Partial Thickness Wound Buttocks Upper Left (Active)   Wound Image     10/14/23 1500   Site Assessment Red;Yellow 10/14/23 1500   Periwound Assessment Red 10/14/23 1500   Margins Undefined edges;Attached edges 10/14/23 1500   Closure Adhesive bandage 10/14/23 1500   Drainage Amount Scant 10/14/23 1500   Drainage Description Serosanguineous 10/14/23 1500   Treatments Cleansed;Site care;Offloading 10/14/23 1500   Wound Cleansing Foam Cleanser/Washcloth 10/14/23 1500   Periwound Protectant Skin Protectant Wipes to Periwound 10/14/23 1500   Dressing Status Clean;Dry;Intact 10/14/23 1500   Dressing Changed New 10/14/23 1500   Dressing Cleansing/Solutions Not Applicable 10/14/23 1500   Dressing Options Hydrocolloid Thin;Offloading Dressing - Sacral 10/14/23 1500   Dressing Change/Treatment Frequency Every 72 hrs, and As Needed 10/14/23 1500   NEXT Dressing Change/Treatment Date 10/17/23 10/14/23 1500   NEXT Weekly Photo (Inpatient Only) 10/18/23 10/14/23 1500   Wound Team Following Weekly 10/14/23 1500   Non-staged Wound Description Partial thickness 10/14/23 1500   Number of days: 2        Vascular:    INGA:   INGA Results, Last 30 Days US-EXTREMITY ARTERY LOWER UNILAT RIGHT    Result Date: 9/28/2023  Narrative Lower Extremity  Arterial Duplex Report  Vascular Laboratory  CONCLUSIONS  Findings are consistent with severe arterial insufficiency of the right  lower extremity.  Duplex scanning of the aorto-iliac segment was completed in accordance with  lower extremity arterial evaluation protocol - see separate report.  ELI GREWAL  Exam Date:     09/28/2023 12:06  Room #:     Inpatient  Priority:     Routine  Ht (in):             Wt (lb):  Ordering Physician:        KAITLYNN RANGEL  Referring Physician:       [id number] CARLOS Head  Sonographer:               Blanca Armas RVT   Study Type:                Complete Unilateral  Technical Quality:         Adequate  Age:    58    Gender:     M  MRN:    [id number]  :    1965      BSA:  Indications:     Peripheral vascular disease, unspecified  CPT Codes:       49457  ICD Codes:       I73.9  History:         Peripheral arterial disease. Left BKA 23. No prior                   duplex.  Limitations:                RIGHT  Waveform        Peak Systolic Velocity (cm/s)                  Prox    Prox-Mid  Mid    Mid-Dist  Distal  Absent                            0                        CFA  Monophasic      16                                         PFA  Absent          0                 0                8       SFA  Monophasic      13                                 17      POP  Monophasic      9                                  10      AT  Monophasic      12                                 8       PT  Monophasic      7                                  7       STEFANIE                LEFT  Waveform        Peak Systolic Velocity (cm/s)                  Prox    Prox-Mid  Mid    Mid-Dist  Distal                                                             CFA                                                             PFA                                                             SFA                                                             POP                                                             AT                                                             PT                                                             STEFANIE  FINDINGS  Right.  No Doppler or color flow demonstrated in the common femoral, proximal and  mid superficial femoral arteries.  Severely dampened, monophasic flow demonstrated in the profunda femoral  arery.  Reconstitution of flow is seen at the distal superficial femoral artery  with monophasic flow.  Atherosclerotic plaque seen in the popliteal artery.  Three vessel runoff to the ankle with monophasic flow.   Duplex scanning of the aorto-iliac segment was completed in accordance with  lower extremity arterial evaluation protocol - see separate report.  Amanda KAISER To  (Electronically Signed)  Final Date:      2023                   14:31    INGA Results, Last 30 Days US-INGA SINGLE LEVEL UNILAT RIGHT    Result Date: 2023  Narrative  Vascular Laboratory  Conclusions  Severe arterial insufficiency seen in the right lower extremity.  An arterial duplex of the right leg was performed in accordance with lower  extremity arterial evaluation protocol - see separate report.  ELI GREWAL  Age:    58    Gender:     M  MRN:    [id number]  :    1965      BSA:  Exam Date:     2023 11:11  Room #:     Inpatient  Priority:     Routine  Ht (in):             Wt (lb):  Ordering Physician:        KAITLYNN RANGEL  Referring Physician:       [id number] CARLOS Head  Sonographer:               Blanca Armas RVRUPERT  Study Type:                Complete Unilateral  Technical Quality:         Adequate  Indications:     Atherosclerosis of peripheral arteries  CPT Codes:       19030  ICD Codes:       440.20  History:         Peripheral vascular disease with non-healing wound. Below                   knee amputation of left leg due to dry gangrene (23). No                   prior exam.  Limitations:     IV in right mid bicep.                 RIGHT  Waveform            Systolic BPs (mmHg)                             0             Brachial  Monophasic                               Common Femoral  Monophasic                               Popliteal  Monophasic                 66            Posterior Tibial  Monophasic                               Dorsalis Pedis  Flatlined                                Digit                             0.43          INGA                                           TBI                       LEFT  Waveform        Systolic BPs (mmHg)                             153            Brachial                                           Common Femoral                                           Popliteal                                           Posterior Tibial                                           Dorsalis Pedis                                           Digit                                           INGA                                           TBI  Findings  Right.  There is evidence of severe arterial disease demonstrated (INGA is < .5) on  the right.  Doppler waveforms of the common femoral, popliteal, and posterior tibial  arteries are of low amplitude and monophasic.  Doppler waveforms of the dorsalis pedis artery are of severely low  amplitude that a index is not obtained.  An arterial duplex of the right leg was performed in accordance with lower  extremity arterial evaluation protocol - see separate report.  Amanda Cohen  (Electronically Signed)  Final Date:      28 September 2023                   12:22      Lab Values:    Lab Results   Component Value Date/Time    WBC 7.7 10/12/2023 05:52 AM    RBC 3.33 (L) 10/12/2023 05:52 AM    HEMOGLOBIN 9.6 (L) 10/12/2023 05:52 AM    HEMATOCRIT 30.7 (L) 10/12/2023 05:52 AM        Culture Results show:  No results found for this or any previous visit (from the past 720 hour(s)).    Pain Level/Medicated:  denied pain       INTERVENTIONS BY WOUND TEAM:  Chart and images reviewed. Discussed with bedside RN. All areas of concern (based on picture review, LDA review and discussion with bedside RN) have been thoroughly assessed. Documentation of areas based on significant findings. This RN in to assess patient. Performed standard wound care which includes appropriate positioning, dressing removal and non-selective debridement. Pictures and measurements obtained weekly if/when required.  Preparation for Dressing removal: Dressing soaked with n/a  Cleansed/Non-selectively Debrided with:  foam cleanser and gauze.  Non-Excisional Conservative  Sharp debridement: n/a  Kiara wound: Cleansed with foam cleanser, Prepped with no sting  Primary Dressing: hydrocolloid thin  Secondary (Outer) Dressing: offloading adhesive foam    Advanced Wound Care Discharge Planning  Number of Clinicians necessary to complete wound care: 1  Is patient requiring IV pain medications for dressing changes: no  Length of time for dressing change 25 min. (This does not include chart review, pre-medication time, set up, clean up or time spent charting.)    Interdisciplinary consultation: Patient, Bedside RN , BRYAN ADAMS    EVALUATION / RATIONALE FOR TREATMENT:  Most Recent Date:  10/14/23: Patient has a partial thickness wound to L. Hip/buttocks.  Applied hydrocolloid thin to assist with autolytic debridement and wound healing. Area is blanching.         Goals: Steady decrease in wound area and depth weekly.    WOUND TEAM PLAN OF CARE ([X] for frequency of wound follow up,): x  Nursing to follow dressing orders written for wound care. Contact wound team if area fails to progress, deteriorates or with any questions/concerns if something comes up before next scheduled follow up (See below as to whether wound is following and frequency of wound follow up)  Dressing changes by wound team:                   Dressing changes by wound team:                   Follow up 3 times weekly:                NPWT change 3 times weekly:     Follow up 1-2 times weekly:      Follow up Bi-Monthly:    Follow up Monthly (High Risk):                               Follow up as needed:  x please re-consult if needed   Other (explain):     NURSING PLAN OF CARE ORDERS (X):  Dressing changes: See Dressing Care orders: x  Skin care: See Skin Care orders: x  RN Prevention Protocol: x  Rectal tube care: See Rectal Tube Care orders:   Other orders:    RSKIN:   CURRENTLY IN PLACE (X), APPLIED THIS VISIT (A), ORDERED (O):   Q shift Jae:  X  Q shift pressure point assessments:  X    Surface/Positioning x  Standard  Mattress/Trauma Bed              Low Airloss          ICU Low Airloss     Bariatric SRAVAN     Waffle cushion        Waffle Overlay     x     Reposition q 2 hours    x  TAPs Turning system   x  Z Bertram Pillow     Offloading/Redistribution a  Sacral Offloading Dressing (Silicone dressing)   a  Heel Offloading Dressing (Silicone dressing)         Heel float boots (Prevalon boot)             Float Heels off Bed with Pillows           Respiratory room air  Silicone O2 tubing         Gray Foam Ear protectors     Cannula fixation Device (Tender )          High flow offloading Clip    Elastic head band offloading device      Anchorfast                                                         Trach with Optifoam split foam             Containment/Moisture Prevention continent    Rectal tube or BMS    Purwick/Condom Cath        Holguin Catheter    Barrier wipes           Barrier paste       Antifungal tx      Interdry        Mobilization x1-2 assist      Up to chair        Ambulate      PT/OT      Nutrition po      Dietician        Diabetes Education      PO     TF     TPN     NPO   # days     Other    regular    Anticipated discharge plans: no advance wound care needed for discharge  LTACH:        SNF/Rehab:                  Home Health Care:           Outpatient Wound Center:            Self/Family Care:        Other:                  Vac Discharge Needs: x  Vac Discharge plan is purely a recommendation from wound team and not a requirement for discharge unless otherwise stated by physician.  Not Applicable Pt not on a wound vac:x       Regular Vac while inpatient, alternative dressing at DC:        Regular Vac in use and continued at DC:            Reg. Vac w/ Skin Sub/Biologic in use. Will need to be changed 2x wkly:      Veraflo Vac while inpatient, ok to transition to Regular Vac on Discharge (Bedside RN to Clamp small instillation tubing at time of DC):           Veraflo Vac while inpatient, would benefit from remaining  on Veraflo Vac upon discharge:

## 2023-10-14 NOTE — THERAPY
Physical Therapy   Daily Treatment     Patient Name: Dillon Centeno  Age:  58 y.o., Sex:  male  Medical Record #: [id number]  Today's Date: 10/14/2023     Precautions  Precautions: Fall Risk  Comments: L BKA, PATRICIA    Assessment    Pt with progressing abilities but remains limited by cognition and lack of compliance of mobility and exercises outside of therapy sessions; pt endorses a constant need to pee and has been sitting in his urine and not allowing RN staff to change him per discussion; during session he is agreeable but self limiting with progression of gait mechanics; able to transfer CGA to wheelchair; able to propel w/c without assist household distances; reporting he 'knows how' to do a wheelie to get into his home when discussion support available from his roommate; anticipate pt to continue to drink and smoke as well which will further delay amp healing; he also has skin breakdown from stump protector on lateral aspects and patella; high risk for readmission however given self reported abilities, self limiting behavior and refusal of a supportive home environment, anticipate home with home health will be best option once w/c available to pt     Plan    Treatment Plan Status: Continue Current Treatment Plan  Type of Treatment: Bed Mobility, Neuro Re-Education / Balance, Therapeutic Activities, Therapeutic Exercise  Treatment Frequency: 4 Times per Week  Treatment Duration: Until Therapy Goals Met    DC Equipment Recommendations: wheelchair, standard size with removable arm rests   Discharge Recommendations: ideally SNF or acute rehab for amp management however does not appear to be an option; pt also declining group home placement and prefers directly home; is willing to accept home health at this time as appropriate per wound care/medical plan of care;     May need medical transport home depending on the available of his roommate to help that day when he dc's      Abridged Subjective/Objective      10/14/23 1045   Cognition    Cognition / Consciousness X   Level of Consciousness Alert   New Learning Impaired   Comments cooperative during session; remains uncooperative outside of session; argumentative with any attempts at progression of mobility and need for wound healing etc;   Passive ROM Lower Body   Passive ROM Lower Body X   Comments can achieve about neutral knee exntesion after flexiril and contract relax hold; did not replace protector as causing lateral knee skin breakdown and patellar skin breakdown; stump board provided   Sitting Lower Body Exercises   Long Arc Quad Left;1 set of 10  (with manual hold of knee extension to not allow for tone/hamstring flexion)   Balance   Sitting Balance (Static) Fair +   Sitting Balance (Dynamic) Fair +   Standing Balance (Static) Fair   Standing Balance (Dynamic) Poor +   Weight Shift Sitting Fair   Weight Shift Standing Poor   Skilled Intervention Postural Facilitation;Sequencing;Verbal Cuing;Tactile Cuing   Comments B UE support in sitting/standing; posterior lean, does not like to work on ambulation;   Bed Mobility    Supine to Sit Modified Independent   Sit to Supine   (Nt, in chair post)   Gait Analysis   Gait Level Of Assist Refused   Comments set up for ambulation after requesting then refusing stating 'id better not' multiple attempts at edcuation re: need to practice and ensure safety; denies need for practice for etnry stoop, reporting 'i know how to do a wheelie in a wc, i've been in one before'   Functional Mobility   Sit to Stand Contact Guard Assist  (VC for sequecing; pt reporting signiciant fear of alling)   Bed, Chair, Wheelchair Transfer Contact Guard Assist  (2/2 arm rest, and went toward difficult side (to the left) discussed only to the right;)   Transfer Method Squat Pivot   Wheelchair Assist Modified Independent   Distance Wheelchair (Feet or Distance) 150   Skilled Intervention Sequencing;Tactile Cuing;Verbal Cuing   Comments left sitting in  w/c at RN station [facility] goal of being up most of day;   Short Term Goals    Short Term Goal # 1 Patient will perform supine-sit with HOB flat with supervision in 6 visits   Goal Outcome # 1 Goal met   Short Term Goal # 2 Patient will perform chair transfers with supervision in 6 visits   Goal Outcome # 2 Goal not met   Short Term Goal # 3 Patient will perform sit-stand with FWW with supervision in 6 visits   Goal Outcome # 3 Goal not met   Short Term Goal # 4 Pt to position LLE correctly on pillows to avoid knee flexion contracture w/o cues in 6 visits   Goal Outcome # 4 Goal not met   Short Term Goal # 5 Pt will self propel wheelchair with supervision within 6 visits to ensure independent mobility at home.   Goal Outcome # 5 Goal met

## 2023-10-14 NOTE — DISCHARGE PLANNING
Received choice form at: 1336  Agency/Facility name: [facility]  Referral sent per choice form at:  147    Received choice form at: 1336  Agency/Facility name: [facility]  Referral sent per choice form at:  1350    [facility] requested the lifetime length of need for the DME - was asha and jigna    DPA sent referral to [facility]       Resent the updated F2F that shows the information for [facility] - will need to call back on Monday- no one is in the office on Sunday

## 2023-10-14 NOTE — FACE TO FACE
Face to Face Note  -  Durable Medical Equipment    Porsha Enamorado M.D. - NPI: [id number]  I certify that this patient is under my care and that they had a durable medical equipment(DME)face to face encounter by myself that meets the physician DME face-to-face encounter requirements with this patient on:    Date of encounter:   Patient:                    MRN:                       YOB: 2024  Dillon Centeno  [id number]  1965     The encounter with the patient was in whole, or in part, for the following medical condition, which is the primary reason for durable medical equipment:  Post-Op Surgery    I certify that, based on my findings, the following durable medical equipment is medically necessary:    Wheelchair   Patient needs manual wheelchair for use inside the home based on the above diagnosis. Per guidelines patient meets criteria in the following ways:   A.  Patient has significant impairment in the following Toileting, Feeding, Dressing, Grooming, and Bathing and is is unable to complete these tasks in a reasonable timeframe.   B.  The patient's mobility limitations cannot be sufficiently resolved by use of  fitted cane or walker.   C.  The patient reports his home provides adequate access between rooms,  maneuvering space, and surfaces for use of the manual wheelchair that is  provided.   D.  The use of the manual wheelchair will significantly improve the patient's  ability to participate in MRADLs and the patient will use it on a regular basis in  the home.   E.  The patient has not expressed an unwillingness to use the manual  wheelchair.   F. The patient has limitations of strength, endurance, range of motion, or coordination per OT notes:.    My Clinical findings support the need for the above equipment due to:  Abnormal Gait    Duration: life time

## 2023-10-14 NOTE — PROGRESS NOTES
Hospital Medicine Daily Progress Note    Date of Service  10/14/2023    Chief Complaint  Dillon Centeno is a 58 y.o. male admitted 9/26/2023 with bilateral foot wounds. He has hypertension and tobacco dependence.    Hospital Course  In the ER, patient was noted to have gangrene on his left foot.  Vascular surgery and orthopedic surgery were consulted.  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      Vascular studies showed severe arterial disease.  Patient underwent bilateral common femoral artery endarterectomy and profundoplasty with saphenous vein angioplasty with stent placement in the right external iliac artery on 9/30/2023. Prevena was placed (5-7 days). He was recommended to have Plavix for 1 month and aspirin for life.    Blood cultures from 9/26/2023 grew Morganella morganii and Vagococcus.  Urine culture from 9/26/2023 grew Staph epidermidis. ID were consulted.  Patient was placed on Zosyn with end date of 10/6/2023.  Echocardiogram showed LVEF of about 60% with no reported valvular abnormalities.  Repeat blood cultures from 9/27/2023 have been negative.    Noted to have renal cyst on CT.  MRI shows multiseptated cystic lesions in the upper pole of left kidney measuring 3.8 cm without associated enhancement or nodular component.    Interval Problem Update  No acute overnight events.   Continue ASA, statin and plavix.   On RA  Labs reviewed: Na 132 improving  PT/OT  Multimodal pain managements including po and iv narcotics prn. Monitoring respiratory status and sedation score    Evaluated by PM&R on 10/7/2023, not able to be accepted due to lack of discharge support. Awaiting SNF placement.  However no accepting SNF    Discussed with PT, recommended group home placement.  However patient refused group home and would like to go home with home health care.  I have ordered advanced home health care with 5 days a week of home visit  I have ordered wheelchair    I have discussed this patient's plan of care  and discharge plan at IDT rounds today with Case Management, Nursing, Nursing leadership, and other members of the IDT team.    Consultants/Specialty  infectious disease, orthopedics, and vascular surgery    Code Status  Full Code    Disposition  Medically Cleared  I have placed the appropriate orders for post-discharge needs.    Review of Systems  Review of Systems   Constitutional:  Positive for malaise/fatigue.   HENT: Negative.     Eyes: Negative.    Respiratory: Negative.     Cardiovascular: Negative.    Gastrointestinal: Negative.    Genitourinary: Negative.    Musculoskeletal:         Pain in phantom limb   Skin: Negative.    Neurological: Negative.    Endo/Heme/Allergies: Negative.    Psychiatric/Behavioral: Negative.          Physical Exam  Temp:  [36.3 °C (97.4 °F)-36.9 °C (98.4 °F)] 36.3 °C (97.4 °F)  Pulse:  [88-97] 92  Resp:  [16-18] 18  BP: (127-144)/(64-89) 133/87  SpO2:  [91 %-97 %] 97 %    Physical Exam  Constitutional:       General: He is not in acute distress.  HENT:      Head: Normocephalic.      Nose: Nose normal.      Mouth/Throat:      Mouth: Mucous membranes are moist.   Eyes:      Pupils: Pupils are equal, round, and reactive to light.   Cardiovascular:      Rate and Rhythm: Normal rate.   Pulmonary:      Effort: Pulmonary effort is normal.   Abdominal:      Palpations: Abdomen is soft.   Musculoskeletal:      Cervical back: Normal range of motion.      Right lower leg: No edema.      Comments: Left BKA   Skin:     General: Skin is warm.      Comments: Third right toe absent due to prior trauma.  Remaining toes with chronic ulcers.   Neurological:      Mental Status: He is alert. Mental status is at baseline.   Psychiatric:         Mood and Affect: Mood normal.         Fluids    Intake/Output Summary (Last 24 hours) at 10/14/2023 1410  Last data filed at 10/14/2023 1208  Gross per 24 hour   Intake 1520 ml   Output 600 ml   Net 920 ml         Laboratory  Recent Labs     10/12/23  0552   WBC  7.7   RBC 3.33*   HEMOGLOBIN 9.6*   HEMATOCRIT 30.7*   MCV 92.2   MCH 28.8   MCHC 31.3*   RDW 57.5*   PLATELETCT 587*   MPV 8.3*       Recent Labs     10/12/23  0552   SODIUM 132*   POTASSIUM 4.3   CHLORIDE 98   CO2 26   GLUCOSE 102*   BUN 9   CREATININE 0.52   CALCIUM 9.1                     Imaging  MR-ABDOMEN-WITH & W/O   Final Result      Multi septated cystic lesion at the upper pole LEFT kidney measuring 3.8 cm without associated enhancement or nodular component (Bosniak 2).            DX-PORTABLE FLUORO > 1 HOUR   Final Result      Portable fluoroscopy utilized for 2 minutes 20.4 seconds.         INTERPRETING LOCATION: [address] [facility], [place], [address]      CT-CTA AORTA-RO WITH & W/O-POST PROCESS   Final Result         CTA AORTA      1.  Septated lobulated left upper pole renal cystic mass lesion, recommend follow-up MRI of the kidneys for further characterization.   2.  Atherosclerosis and atherosclerotic coronary artery disease.   3.  Fat-containing right inguinal hernia      CTA LOWER EXTREMITIES      1.  Occlusion of the right common iliac artery through the distal superficial femoral artery   2.  Distal right peroneal artery occlusion with 2-vessel runoff the anterior and posterior tibial arteries at the right ankle.   3.  Occlusion of the left superficial femoral artery at the bifurcation extending through its length to the popliteal artery.   4.  Occlusion of the proximal left profunda femoris just distal to the bifurcation which reconstitutes.   5.  Soft tissue gas at the stump of left below-the-knee amputation, within expected limits for recent postop changes.      3D angiographic/MIP images of the vasculature confirm the vascular findings as described above.      These findings were discussed with the patient's clinician, Keith Navarro, on 9/29/2023 8:06 AM.      EC-ECHOCARDIOGRAM COMPLETE W/O CONT   Final Result      US-AORTA/ILIACS DUPLEX COMPLETE   Final Result      US-EXTREMITY ARTERY LOWER  UNILAT RIGHT   Final Result      US-INGA SINGLE LEVEL UNILAT RIGHT   Final Result      DX-CHEST-PORTABLE (1 VIEW)   Final Result      1.  LEFT basilar atelectasis or pneumonia   2.  Trace LEFT pleural effusion or pleural scar             Assessment/Plan  * Gangrene of left foot (HCC)- (present on admission)  Assessment & Plan  Left BKA on 9/27/2023. Vascular studies showed severe arterial disease. Underwent bilateral common femoral artery endarterectomy and profundoplasty with saphenous vein angioplasty with stent placement in the right external iliac artery on 9/30/2023. Prevena was placed (5-7 days). He was recommended to have Plavix for 1 month and aspirin for life. Follow-up with Dr. Navarro. [place] to be removed 14 to 21 days postoperatively. Completed course of Zosyn on 10/6/2023.   10/14: Patient has completed antibiotics  Continue aspirin and Plavix and statin  Continue wound care  Pending advanced home health care, wheelchair    PAD (peripheral artery disease) (HCC)- (present on admission)  Assessment & Plan  Underwent bilateral common femoral artery endarterectomy and profundoplasty with saphenous vein angioplasty with stent placement in the right external iliac artery on 9/30/2023. Prevena was placed (5-7 days). Recommended to have Plavix for 1 month and aspirin for life.  Follow-up with Dr. Navarro.  Stump protector for left leg was ordered by vascular surgery.    10/14: Continue aspirin, Plavix and Lipitor.  Plavix will be continued until 10/30.  Aspirin will be continued indefinitely      Anemia- (present on admission)  Assessment & Plan  Stable.  Monitor    Bacteremia- (present on admission)  Assessment & Plan  Blood cultures from 9/26/2023 grew Morganella morganii and Vagococcus. ID were consulted. Completed course of On Zosyn on 10/6/2023. Repeat blood cultures from 9/27/2023 have been negative.  Echocardiogram showed no evidence of vegetations.     Hyponatremia  Assessment & Plan  Check Osm serum  and urine   Continue monitoring   10/12: IVF discontinued. Na 132, improving    Elevated liver enzymes  Assessment & Plan  Resolved.  Hepatitis panel was normal.  Monitor    Renal cyst  Assessment & Plan  Noted to have renal cyst on CT.  MRI shows multiseptated cystic lesions in the upper pole of left kidney measuring 3.8 cm without associated enhancement or nodular component.  Recommend outpatient follow-up         VTE prophylaxis: SCDs, Lovenox

## 2023-10-14 NOTE — CARE PLAN
Problem: Pain - Standard  Goal: Alleviation of pain or a reduction in pain to the patient’s comfort goal  Note: Education provided regarding pain management including nonpharmacological measures such as rest and relaxation. Pain medication administered as ordered, verbalized relief   The patient is Stable - Low risk of patient condition declining or worsening    Shift Goals  Clinical Goals: pain management;PT  Patient Goals: pain management  Family Goals: ashlie    Progress made toward(s) clinical / shift goals:      Patient is not progressing towards the following goals:

## 2023-10-14 NOTE — THERAPY
Physical Therapy   Daily Treatment     Patient Name: Dillon Centeno  Age:  58 y.o., Sex:  male  Medical Record #: [id number]  Today's Date: 10/13/2023      Left BKA     Assessment  Pt premedicated with flexiril, no reports of pain with knee extension stretching; reporting good relief, however when manual pressure not applied his hamstring fasciculates and pulls into knee flexion; provided weight for thigh and hot pack for hamstring relaxation and discussed positioning schedule; reinforced need to work with RN staff for out of bed when not in therapy session as declining toilet and w/c with them today; discussed no more bed pan and need to pivot for sheet change as 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 On bed pan upon return attempt; will update dc recs tomorrow after premedication again;       Plan    Treatment Plan Status: Continue Current Treatment Plan  Type of Treatment: Bed Mobility, Neuro Re-Education / Balance, Therapeutic Activities, Therapeutic Exercise  Treatment Frequency: 4 Times per Week  Treatment Duration: Until Therapy Goals Met    DC Equipment Recommendations: Unable to determine at this time  Discharge Recommendations: optimally acute rehab; if not, SNF or group home with home health per /availability;       Abridged Subjective/Objective     10/13/23 1225   Cognition    Cognition / Consciousness X   Level of Consciousness Alert   New Learning Impaired   Comments cooperative; reporting knee extnesion strethc feels good; discussed timelnie and need to capitilize on therapy while here   Passive ROM Lower Body   Passive ROM Lower Body X   Comments with weight on thigh, and manual stretch can achieve ~ 10 deg short of neutral knee extension; hamstrings are having  significant tone and pulling into knee flexion when manual assist not provided; demo'd and discussed positioning for low load stretch and discussed with RN staff   Balance   Sitting Balance (Static) Fair +   Sitting Balance (Dynamic) Fair +   Weight Shift Sitting Fair   Skilled Intervention Postural Facilitation;Sequencing;Tactile Cuing;Verbal Cuing   Comments deferred standing due to focus of ther ex, returned for mobility, pt on bed pan   Bed Mobility    Supine to Sit Supervised   Short Term Goals    Short Term Goal # 1 Patient will perform supine-sit with HOB flat with supervision in 6 visits   Goal Outcome # 1 goal not met   Short Term Goal # 2 Patient will perform chair transfers with supervision in 6 visits   Goal Outcome # 2 Goal not met   Short Term Goal # 3 Patient will perform sit-stand with FWW with supervision in 6 visits   Goal Outcome # 3 Goal not met   Short Term Goal # 4 Pt to position LLE correctly on pillows to avoid knee flexion contracture w/o cues in 6 visits   Goal Outcome # 4 Goal not met   Short Term Goal # 5 Pt will self propel wheelchair with supervision within 6 visits to ensure independent mobility at home.   Goal Outcome # 5 Goal met

## 2023-10-14 NOTE — PROGRESS NOTES
When RN went to put patient on bed pan his condom cath flung back and RN was splashed in face with urine. Eye washed out. Charge rn love aware - paperwork to be filled out accordingly and labs ordered for patient.

## 2023-10-14 NOTE — CARE PLAN
The patient is Stable - Low risk of patient condition declining or worsening    Shift Goals  Clinical Goals: safety  Patient Goals: pain control  Family Goals: ashlie      Problem: Knowledge Deficit - Standard  Goal: Patient and family/care givers will demonstrate understanding of plan of care, disease process/condition, diagnostic tests and medications  Outcome: Progressing

## 2023-10-14 NOTE — PROGRESS NOTES
"      Orthopaedic Progress Note    Interval changes:  Patient doing well    L BKA dressings are CDI  Patient with knee bent in knee immobilizer    Cleared for DC to SNF by ortho pending medicine clearance    ROS - Patient denies any new issues.  Pain well controlled.    BP (!) 144/89   Pulse 97   Temp 36.3 °C (97.4 °F) (Temporal)   Resp 18   Ht 1.803 m (5' 11\")   Wt 66 kg (145 lb 8.1 oz)   SpO2 96%     Patient seen and examined  No acute distress  Breathing non labored  RRR  LLE dressings CDI, knee bent at 90 deg, refusing to straighten leg.     Recent Labs     10/12/23  0552   WBC 7.7   RBC 3.33*   HEMOGLOBIN 9.6*   HEMATOCRIT 30.7*   MCV 92.2   MCH 28.8   MCHC 31.3*   RDW 57.5*   PLATELETCT 587*   MPV 8.3*       Active Hospital Problems    Diagnosis     Renal cyst [N28.1]      Priority: Low    PAD (peripheral artery disease) ([facility]) [I73.9]     Bacteremia [R78.81]     Anemia [D64.9]     Gangrene of left foot ([facility]) [I96]     Elevated liver enzymes [R74.8]     Hyponatremia [E87.1]        Assessment/Plan:  Patient doing well    L BKA dressings are CDI  Patient with knee bent knee in immobilizer     Cleared for DC to SNF by ortho pending medicine clearance  POD#16 S/P Left below-knee amputation  Wt bearing status - NWB LLE  Wound care/Drains - Dressings to be changed every other day by nursing. Or PRN for saturation starting POD#2  Future Procedures - none planned   Lovenox: Start 9/28, Duration-until ambulatory > 150'  Sutures/Staples out-  21 days post operatively. Removal will completed by ortho mid levels only.  PT/OT-initiated  Antibiotics: completed  DVT Prophylaxis- TEDS/SCDs/Foot pumps  [place]-not needed per ortho  Case Coordination for Discharge Planning - Disposition per therapy recs.    "

## 2023-10-14 NOTE — PROGRESS NOTES
"Agree, TC          Orthopaedic Progress Note    Interval changes:  Patient doing well    L BKA dressings changed - no more use of xeroform  Knee immobilizer causing skin breakdown at kneecap  Cleared for DC to SNF by ortho pending medicine clearance    ROS - Patient denies any new issues.  Pain well controlled.    /83   Pulse 88   Temp 36.4 °C (97.5 °F) (Temporal)   Resp 18   Ht 1.803 m (5' 11\")   Wt 66 kg (145 lb 8.1 oz)   SpO2 96%     Patient seen and examined  No acute distress  Breathing non labored  RRR  L BKA dressings changed, knee bent at 90 deg, refusing to straighten leg.     Recent Labs     10/12/23  0552   WBC 7.7   RBC 3.33*   HEMOGLOBIN 9.6*   HEMATOCRIT 30.7*   MCV 92.2   MCH 28.8   MCHC 31.3*   RDW 57.5*   PLATELETCT 587*   MPV 8.3*       Active Hospital Problems    Diagnosis     Renal cyst [N28.1]      Priority: Low    PAD (peripheral artery disease) ([facility]) [I73.9]     Bacteremia [R78.81]     Anemia [D64.9]     Gangrene of left foot ([facility]) [I96]     Elevated liver enzymes [R74.8]     Hyponatremia [E87.1]        Assessment/Plan:  Patient doing well    L BKA dressings changed - no more use of xeroform  Knee immobilizer causing skin breakdown at kneecap  Cleared for DC to SNF by ortho pending medicine clearance  POD#17 S/P Left below-knee amputation  Wt bearing status - NWB LLE  Wound care/Drains - Dressings to be changed every other day by nursing.    Future Procedures - none planned   Lovenox: Start 9/28, Duration-until ambulatory > 150'  Sutures/Staples out-  21 days post operatively. Removal will completed by ortho mid levels only.  PT/OT-initiated  Antibiotics: completed  DVT Prophylaxis- TEDS/SCDs/Foot pumps  [place]-not needed per ortho  Case Coordination for Discharge Planning - Disposition per therapy recs.    "

## 2023-10-15 PROCEDURE — A9270 NON-COVERED ITEM OR SERVICE: HCPCS | Performed by: STUDENT IN AN ORGANIZED HEALTH CARE EDUCATION/TRAINING PROGRAM

## 2023-10-15 PROCEDURE — 700102 HCHG RX REV CODE 250 W/ 637 OVERRIDE(OP): Performed by: INTERNAL MEDICINE

## 2023-10-15 PROCEDURE — 700102 HCHG RX REV CODE 250 W/ 637 OVERRIDE(OP): Performed by: STUDENT IN AN ORGANIZED HEALTH CARE EDUCATION/TRAINING PROGRAM

## 2023-10-15 PROCEDURE — A9270 NON-COVERED ITEM OR SERVICE: HCPCS | Performed by: INTERNAL MEDICINE

## 2023-10-15 PROCEDURE — 770006 HCHG ROOM/CARE - MED/SURG/GYN SEMI*

## 2023-10-15 PROCEDURE — 700102 HCHG RX REV CODE 250 W/ 637 OVERRIDE(OP): Performed by: SURGERY

## 2023-10-15 PROCEDURE — 700111 HCHG RX REV CODE 636 W/ 250 OVERRIDE (IP): Performed by: SURGERY

## 2023-10-15 PROCEDURE — 99232 SBSQ HOSP IP/OBS MODERATE 35: CPT | Performed by: STUDENT IN AN ORGANIZED HEALTH CARE EDUCATION/TRAINING PROGRAM

## 2023-10-15 PROCEDURE — A9270 NON-COVERED ITEM OR SERVICE: HCPCS | Performed by: SURGERY

## 2023-10-15 RX ADMIN — OXYCODONE HYDROCHLORIDE 20 MG: 10 TABLET ORAL at 21:16

## 2023-10-15 RX ADMIN — CYCLOBENZAPRINE 10 MG: 10 TABLET, FILM COATED ORAL at 21:16

## 2023-10-15 RX ADMIN — HEPARIN SODIUM 5000 UNITS: 5000 INJECTION, SOLUTION INTRAVENOUS; SUBCUTANEOUS at 05:42

## 2023-10-15 RX ADMIN — GABAPENTIN 300 MG: 300 CAPSULE ORAL at 17:06

## 2023-10-15 RX ADMIN — TRAMADOL HYDROCHLORIDE 50 MG: 50 TABLET ORAL at 05:42

## 2023-10-15 RX ADMIN — CLOPIDOGREL BISULFATE 75 MG: 75 TABLET ORAL at 05:42

## 2023-10-15 RX ADMIN — ACETAMINOPHEN 650 MG: 325 TABLET, FILM COATED ORAL at 17:08

## 2023-10-15 RX ADMIN — NICOTINE TRANSDERMAL SYSTEM 21 MG: 21 PATCH, EXTENDED RELEASE TRANSDERMAL at 06:00

## 2023-10-15 RX ADMIN — ACETAMINOPHEN 650 MG: 325 TABLET, FILM COATED ORAL at 11:46

## 2023-10-15 RX ADMIN — ACETAMINOPHEN 650 MG: 325 TABLET, FILM COATED ORAL at 05:45

## 2023-10-15 RX ADMIN — GABAPENTIN 300 MG: 300 CAPSULE ORAL at 11:47

## 2023-10-15 RX ADMIN — ATORVASTATIN CALCIUM 40 MG: 40 TABLET, FILM COATED ORAL at 17:09

## 2023-10-15 RX ADMIN — GABAPENTIN 300 MG: 300 CAPSULE ORAL at 05:42

## 2023-10-15 RX ADMIN — HEPARIN SODIUM 5000 UNITS: 5000 INJECTION, SOLUTION INTRAVENOUS; SUBCUTANEOUS at 14:37

## 2023-10-15 RX ADMIN — ACETAMINOPHEN 650 MG: 325 TABLET, FILM COATED ORAL at 00:00

## 2023-10-15 RX ADMIN — TRAMADOL HYDROCHLORIDE 50 MG: 50 TABLET ORAL at 11:47

## 2023-10-15 RX ADMIN — ASPIRIN 81 MG: 81 TABLET, COATED ORAL at 05:42

## 2023-10-15 RX ADMIN — OXYCODONE HYDROCHLORIDE 20 MG: 10 TABLET ORAL at 08:57

## 2023-10-15 RX ADMIN — DOCUSATE SODIUM 50 MG AND SENNOSIDES 8.6 MG 2 TABLET: 8.6; 5 TABLET, FILM COATED ORAL at 05:42

## 2023-10-15 RX ADMIN — TRAMADOL HYDROCHLORIDE 50 MG: 50 TABLET ORAL at 17:09

## 2023-10-15 RX ADMIN — HEPARIN SODIUM 5000 UNITS: 5000 INJECTION, SOLUTION INTRAVENOUS; SUBCUTANEOUS at 22:15

## 2023-10-15 ASSESSMENT — ENCOUNTER SYMPTOMS
NEUROLOGICAL NEGATIVE: 1
GASTROINTESTINAL NEGATIVE: 1
EYES NEGATIVE: 1
PSYCHIATRIC NEGATIVE: 1
CARDIOVASCULAR NEGATIVE: 1
RESPIRATORY NEGATIVE: 1

## 2023-10-15 ASSESSMENT — PAIN DESCRIPTION - PAIN TYPE: TYPE: ACUTE PAIN

## 2023-10-15 NOTE — PROGRESS NOTES
[facility] Medicine Daily Progress Note    Date of Service  10/15/2023    Chief Complaint  Dillon Centeno is a 58 y.o. male admitted 9/26/2023 with bilateral foot wounds. He has hypertension and tobacco dependence.    Hospital Course  In the ER, patient was noted to have gangrene on his left foot.  Vascular surgery and orthopedic surgery were consulted.  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      Vascular studies showed severe arterial disease.  Patient underwent bilateral common femoral artery endarterectomy and profundoplasty with saphenous vein angioplasty with stent placement in the right external iliac artery on 9/30/2023. Prevena was placed (5-7 days). He was recommended to have Plavix for 1 month and aspirin for life.    Blood cultures from 9/26/2023 grew Morganella morganii and Vagococcus.  Urine culture from 9/26/2023 grew Staph epidermidis. ID were consulted.  Patient was placed on Zosyn with end date of 10/6/2023.  Echocardiogram showed LVEF of about 60% with no reported valvular abnormalities.  Repeat blood cultures from 9/27/2023 have been negative.    Noted to have renal cyst on CT.  MRI shows multiseptated cystic lesions in the upper pole of left kidney measuring 3.8 cm without associated enhancement or nodular component.    Interval Problem Update  No acute overnight events.   Continue ASA, statin and plavix.   On RA  PT/OT  Multimodal pain managements including po and iv narcotics prn. Monitoring respiratory status and sedation score  Spoke to roommate today at bedside  Pending wheelchair    Evaluated by PM&R on 10/7/2023, not able to be accepted due to lack of discharge support. Awaiting SNF placement.  However no accepting SNF    Discussed with PT, recommended group home placement.  However patient refused group home and would like to go home with home health care.  I have ordered advanced home health care with 5 days a week of home visit    I have discussed this patient's plan of care and  discharge plan at IDT rounds today with Case Management, Nursing, Nursing leadership, and other members of the IDT team.    Consultants/Specialty  infectious disease, orthopedics, and vascular surgery    Code Status  Full Code    Disposition  The patient is medically cleared for discharge to home or a post-acute facility.      I have placed the appropriate orders for post-discharge needs.    Review of Systems  Review of Systems   Constitutional:  Positive for malaise/fatigue.   HENT: Negative.     Eyes: Negative.    Respiratory: Negative.     Cardiovascular: Negative.    Gastrointestinal: Negative.    Genitourinary: Negative.    Musculoskeletal:         Pain in phantom limb   Skin: Negative.    Neurological: Negative.    Endo/Heme/Allergies: Negative.    Psychiatric/Behavioral: Negative.          Physical Exam  Temp:  [36.1 °C (96.9 °F)-36.7 °C (98 °F)] 36.1 °C (96.9 °F)  Pulse:  [82-97] 83  Resp:  [18] 18  BP: (118-152)/(71-86) 118/71  SpO2:  [96 %-99 %] 96 %    Physical Exam  Constitutional:       General: He is not in acute distress.  HENT:      Head: Normocephalic.      Nose: Nose normal.      Mouth/Throat:      Mouth: Mucous membranes are moist.   Eyes:      Pupils: Pupils are equal, round, and reactive to light.   Cardiovascular:      Rate and Rhythm: Normal rate.   Pulmonary:      Effort: Pulmonary effort is normal.   Abdominal:      Palpations: Abdomen is soft.   Musculoskeletal:      Cervical back: Normal range of motion.      Right lower leg: No edema.      Comments: Left BKA   Skin:     General: Skin is warm.      Comments: Third right toe absent due to prior trauma.  Remaining toes with chronic ulcers.   Neurological:      Mental Status: He is alert. Mental status is at baseline.   Psychiatric:         Mood and Affect: Mood normal.         Fluids    Intake/Output Summary (Last 24 hours) at 10/15/2023 1148  Last data filed at 10/15/2023 1110  Gross per 24 hour   Intake 1140 ml   Output 325 ml   Net 815 ml          Laboratory                            Imaging  MR-ABDOMEN-WITH & W/O   Final Result      Multi septated cystic lesion at the upper pole LEFT kidney measuring 3.8 cm without associated enhancement or nodular component (Bosniak 2).            DX-PORTABLE FLUORO > 1 HOUR   Final Result      Portable fluoroscopy utilized for 2 minutes 20.4 seconds.         INTERPRETING LOCATION: [address] [place] [address], [place], [id number]      CT-CTA AORTA-RO WITH & W/O-POST PROCESS   Final Result         CTA AORTA      1.  Septated lobulated left upper pole renal cystic mass lesion, recommend follow-up MRI of the kidneys for further characterization.   2.  Atherosclerosis and atherosclerotic coronary artery disease.   3.  Fat-containing right inguinal hernia      CTA LOWER EXTREMITIES      1.  Occlusion of the right common iliac artery through the distal superficial femoral artery   2.  Distal right peroneal artery occlusion with 2-vessel runoff the anterior and posterior tibial arteries at the right ankle.   3.  Occlusion of the left superficial femoral artery at the bifurcation extending through its length to the popliteal artery.   4.  Occlusion of the proximal left profunda femoris just distal to the bifurcation which reconstitutes.   5.  Soft tissue gas at the stump of left below-the-knee amputation, within expected limits for recent postop changes.      3D angiographic/MIP images of the vasculature confirm the vascular findings as described above.      These findings were discussed with the patient's clinician, Keith Navarro, on 9/29/2023 8:06 AM.      EC-ECHOCARDIOGRAM COMPLETE W/O CONT   Final Result      US-AORTA/ILIACS DUPLEX COMPLETE   Final Result      US-EXTREMITY ARTERY LOWER UNILAT RIGHT   Final Result      US-INGA SINGLE LEVEL UNILAT RIGHT   Final Result      DX-CHEST-PORTABLE (1 VIEW)   Final Result      1.  LEFT basilar atelectasis or pneumonia   2.  Trace LEFT pleural effusion or pleural scar              Assessment/Plan  * Gangrene of left foot (HCC)- (present on admission)  Assessment & Plan  Left BKA on 9/27/2023. Vascular studies showed severe arterial disease. Underwent bilateral common femoral artery endarterectomy and profundoplasty with saphenous vein angioplasty with stent placement in the right external iliac artery on 9/30/2023. Prevena was placed (5-7 days). He was recommended to have Plavix for 1 month and aspirin for life. Follow-up with Dr. Navarro. Kayleen to be removed 14 to 21 days postoperatively. Completed course of Zosyn on 10/6/2023.   10/14: Patient has completed antibiotics  Continue aspirin and Plavix and statin  Continue wound care  Pending advanced home health care, wheelchair    PAD (peripheral artery disease) (HCC)- (present on admission)  Assessment & Plan  Underwent bilateral common femoral artery endarterectomy and profundoplasty with saphenous vein angioplasty with stent placement in the right external iliac artery on 9/30/2023. Prevena was placed (5-7 days). Recommended to have Plavix for 1 month and aspirin for life.  Follow-up with Dr. Navarro.  Stump protector for left leg was ordered by vascular surgery.    10/15: Continue aspirin, Plavix and Lipitor.  Plavix which will be continued until 10/30 for 1 month.  Aspirin will be continued indefinitely  Pending which she under home health care      Anemia- (present on admission)  Assessment & Plan  Stable.  Monitor    Bacteremia- (present on admission)  Assessment & Plan  Blood cultures from 9/26/2023 grew Morganella morganii and Vagococcus. ID were consulted. Completed course of On Zosyn on 10/6/2023. Repeat blood cultures from 9/27/2023 have been negative.  Echocardiogram showed no evidence of vegetations.     Hyponatremia  Assessment & Plan  Check Osm serum and urine   Continue monitoring   10/12: IVF discontinued. Na 132, improving    Elevated liver enzymes  Assessment & Plan  Resolved.  Hepatitis panel was normal.   Monitor    Renal cyst  Assessment & Plan  Noted to have renal cyst on CT.  MRI shows multiseptated cystic lesions in the upper pole of left kidney measuring 3.8 cm without associated enhancement or nodular component.  Recommend outpatient follow-up         VTE prophylaxis: SCDs, Lovenox

## 2023-10-16 PROCEDURE — 700102 HCHG RX REV CODE 250 W/ 637 OVERRIDE(OP): Performed by: STUDENT IN AN ORGANIZED HEALTH CARE EDUCATION/TRAINING PROGRAM

## 2023-10-16 PROCEDURE — A9270 NON-COVERED ITEM OR SERVICE: HCPCS | Performed by: SURGERY

## 2023-10-16 PROCEDURE — 99232 SBSQ HOSP IP/OBS MODERATE 35: CPT | Performed by: STUDENT IN AN ORGANIZED HEALTH CARE EDUCATION/TRAINING PROGRAM

## 2023-10-16 PROCEDURE — A9270 NON-COVERED ITEM OR SERVICE: HCPCS

## 2023-10-16 PROCEDURE — A9270 NON-COVERED ITEM OR SERVICE: HCPCS | Performed by: INTERNAL MEDICINE

## 2023-10-16 PROCEDURE — 700102 HCHG RX REV CODE 250 W/ 637 OVERRIDE(OP)

## 2023-10-16 PROCEDURE — 700102 HCHG RX REV CODE 250 W/ 637 OVERRIDE(OP): Performed by: INTERNAL MEDICINE

## 2023-10-16 PROCEDURE — A9270 NON-COVERED ITEM OR SERVICE: HCPCS | Performed by: STUDENT IN AN ORGANIZED HEALTH CARE EDUCATION/TRAINING PROGRAM

## 2023-10-16 PROCEDURE — 700111 HCHG RX REV CODE 636 W/ 250 OVERRIDE (IP): Performed by: SURGERY

## 2023-10-16 PROCEDURE — 770006 HCHG ROOM/CARE - MED/SURG/GYN SEMI*

## 2023-10-16 PROCEDURE — 700102 HCHG RX REV CODE 250 W/ 637 OVERRIDE(OP): Performed by: SURGERY

## 2023-10-16 RX ORDER — DIPHENHYDRAMINE HCL 25 MG
25 TABLET ORAL ONCE
Status: COMPLETED | OUTPATIENT
Start: 2023-10-16 | End: 2023-10-16

## 2023-10-16 RX ADMIN — TRAMADOL HYDROCHLORIDE 50 MG: 50 TABLET ORAL at 11:47

## 2023-10-16 RX ADMIN — HEPARIN SODIUM 5000 UNITS: 5000 INJECTION, SOLUTION INTRAVENOUS; SUBCUTANEOUS at 13:55

## 2023-10-16 RX ADMIN — HEPARIN SODIUM 5000 UNITS: 5000 INJECTION, SOLUTION INTRAVENOUS; SUBCUTANEOUS at 23:16

## 2023-10-16 RX ADMIN — TRAMADOL HYDROCHLORIDE 50 MG: 50 TABLET ORAL at 23:16

## 2023-10-16 RX ADMIN — TRAMADOL HYDROCHLORIDE 50 MG: 50 TABLET ORAL at 06:16

## 2023-10-16 RX ADMIN — ACETAMINOPHEN 650 MG: 325 TABLET, FILM COATED ORAL at 23:16

## 2023-10-16 RX ADMIN — ACETAMINOPHEN 650 MG: 325 TABLET, FILM COATED ORAL at 11:48

## 2023-10-16 RX ADMIN — NICOTINE TRANSDERMAL SYSTEM 21 MG: 21 PATCH, EXTENDED RELEASE TRANSDERMAL at 06:18

## 2023-10-16 RX ADMIN — ASPIRIN 81 MG: 81 TABLET, COATED ORAL at 06:15

## 2023-10-16 RX ADMIN — TRAMADOL HYDROCHLORIDE 50 MG: 50 TABLET ORAL at 00:09

## 2023-10-16 RX ADMIN — GABAPENTIN 300 MG: 300 CAPSULE ORAL at 17:47

## 2023-10-16 RX ADMIN — GABAPENTIN 300 MG: 300 CAPSULE ORAL at 06:18

## 2023-10-16 RX ADMIN — ATORVASTATIN CALCIUM 40 MG: 40 TABLET, FILM COATED ORAL at 17:47

## 2023-10-16 RX ADMIN — ACETAMINOPHEN 650 MG: 325 TABLET, FILM COATED ORAL at 17:48

## 2023-10-16 RX ADMIN — ACETAMINOPHEN 650 MG: 325 TABLET, FILM COATED ORAL at 00:09

## 2023-10-16 RX ADMIN — CLOPIDOGREL BISULFATE 75 MG: 75 TABLET ORAL at 06:15

## 2023-10-16 RX ADMIN — HEPARIN SODIUM 5000 UNITS: 5000 INJECTION, SOLUTION INTRAVENOUS; SUBCUTANEOUS at 06:18

## 2023-10-16 RX ADMIN — ACETAMINOPHEN 650 MG: 325 TABLET, FILM COATED ORAL at 06:16

## 2023-10-16 RX ADMIN — GABAPENTIN 300 MG: 300 CAPSULE ORAL at 11:47

## 2023-10-16 RX ADMIN — DIPHENHYDRAMINE HYDROCHLORIDE 25 MG: 25 TABLET ORAL at 23:35

## 2023-10-16 RX ADMIN — TRAMADOL HYDROCHLORIDE 50 MG: 50 TABLET ORAL at 17:48

## 2023-10-16 ASSESSMENT — ENCOUNTER SYMPTOMS
NEUROLOGICAL NEGATIVE: 1
RESPIRATORY NEGATIVE: 1
GASTROINTESTINAL NEGATIVE: 1
CARDIOVASCULAR NEGATIVE: 1
PSYCHIATRIC NEGATIVE: 1
EYES NEGATIVE: 1

## 2023-10-16 ASSESSMENT — PAIN DESCRIPTION - PAIN TYPE: TYPE: ACUTE PAIN

## 2023-10-16 NOTE — PROGRESS NOTES
"      Orthopaedic Progress Note    Interval changes:  Patient doing well    L BKA dressings CDI- no more use of xeroform  Cleared for DC to SNF by ortho pending medicine clearance    ROS - Patient denies any new issues.  Pain well controlled.    /77   Pulse 89   Temp 36.2 °C (97.2 °F) (Temporal)   Resp 18   Ht 1.803 m (5' 11\")   Wt 71.3 kg (157 lb 3 oz)   SpO2 98%     Patient seen and examined  No acute distress  Breathing non labored  RRR  L BKA dressings CDI, knee bent at 90 deg, refusing to straighten leg.         Active Hospital Problems    Diagnosis     Renal cyst [N28.1]      Priority: Low    PAD (peripheral artery disease) ([facility]) [I73.9]     Bacteremia [R78.81]     Anemia [D64.9]     Gangrene of left foot ([facility]) [I96]     Elevated liver enzymes [R74.8]     Hyponatremia [E87.1]        Assessment/Plan:  Patient doing well    L BKA dressings CDI  Cleared for DC to SNF by ortho pending medicine clearance  POD#18 S/P Left below-knee amputation  Wt bearing status - NWB LLE  Wound care/Drains - Dressings to be changed every other day by nursing.    Future Procedures - none planned   Lovenox: Start 9/28, Duration-until ambulatory > 150'  Sutures/Staples out-  21 days post operatively. Removal will completed by ortho mid levels only.  PT/OT-initiated  Antibiotics: completed  DVT Prophylaxis- TEDS/SCDs/Foot pumps  Holguin-not needed per ortho  Case Coordination for Discharge Planning - Disposition per therapy recs.    "

## 2023-10-16 NOTE — CARE PLAN
The patient is Stable - Low risk of patient condition declining or worsening    Shift Goals  Clinical Goals: safety; pain control  Patient Goals:  Family Goals:    Progress made toward(s) clinical / shift goals: pain assessments; pain medications    Patient is not progressing towards the following goals:

## 2023-10-16 NOTE — PROGRESS NOTES
[facility] Medicine Daily Progress Note    Date of Service  10/16/2023    Chief Complaint  Dillon Centeno is a 58 y.o. male admitted 9/26/2023 with bilateral foot wounds. He has hypertension and tobacco dependence.    Hospital Course  In the ER, patient was noted to have gangrene on his left foot.  Vascular surgery and orthopedic surgery were consulted.  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      Vascular studies showed severe arterial disease.  Patient underwent bilateral common femoral artery endarterectomy and profundoplasty with saphenous vein angioplasty with stent placement in the right external iliac artery on 9/30/2023. Prevena was placed (5-7 days). He was recommended to have Plavix for 1 month and aspirin for life.    Blood cultures from 9/26/2023 grew Morganella morganii and Vagococcus.  Urine culture from 9/26/2023 grew Staph epidermidis. ID were consulted.  Patient was placed on Zosyn with end date of 10/6/2023.  Echocardiogram showed LVEF of about 60% with no reported valvular abnormalities.  Repeat blood cultures from 9/27/2023 have been negative.    Noted to have renal cyst on CT.  MRI shows multiseptated cystic lesions in the upper pole of left kidney measuring 3.8 cm without associated enhancement or nodular component.    Interval Problem Update  No acute overnight events.   Continue ASA, statin and plavix.   On RA  PT/OT  Multimodal pain managements including po and iv narcotics prn. Monitoring respiratory status and sedation score  Pending wheelchair  Pending SNF vs. Advanced home health care.   Per CM, no SNF accepting. No [facility] accepting either.   Patient refused going to group home    I have discussed this patient's plan of care and discharge plan at IDT rounds today with Case Management, Nursing, Nursing leadership, and other members of the IDT team.    Consultants/Specialty  infectious disease, orthopedics, and vascular surgery    Code Status  Full Code    Disposition  The patient is  medically cleared for discharge to home or a post-acute facility.  Anticipate discharge to: skilled nursing facility    I have placed the appropriate orders for post-discharge needs.    Review of Systems  Review of Systems   Constitutional:  Positive for malaise/fatigue.   HENT: Negative.     Eyes: Negative.    Respiratory: Negative.     Cardiovascular: Negative.    Gastrointestinal: Negative.    Genitourinary: Negative.    Musculoskeletal:         Pain in phantom limb   Skin: Negative.    Neurological: Negative.    Endo/Heme/Allergies: Negative.    Psychiatric/Behavioral: Negative.          Physical Exam  Temp:  [36.2 °C (97.2 °F)-37.4 °C (99.4 °F)] 36.3 °C (97.3 °F)  Pulse:  [69-92] 69  Resp:  [18] 18  BP: (119-145)/(77-84) 123/78  SpO2:  [95 %-98 %] 96 %    Physical Exam  Constitutional:       General: He is not in acute distress.  HENT:      Head: Normocephalic.      Nose: Nose normal.      Mouth/Throat:      Mouth: Mucous membranes are moist.   Eyes:      Pupils: Pupils are equal, round, and reactive to light.   Cardiovascular:      Rate and Rhythm: Normal rate.   Pulmonary:      Effort: Pulmonary effort is normal.   Abdominal:      Palpations: Abdomen is soft.   Musculoskeletal:      Cervical back: Normal range of motion.      Right lower leg: No edema.      Comments: Left BKA   Skin:     General: Skin is warm.      Comments: Third right toe absent due to prior trauma.  Remaining toes with chronic ulcers.   Neurological:      Mental Status: He is alert. Mental status is at baseline.   Psychiatric:         Mood and Affect: Mood normal.         Fluids    Intake/Output Summary (Last 24 hours) at 10/16/2023 1649  Last data filed at 10/16/2023 0256  Gross per 24 hour   Intake 480 ml   Output 300 ml   Net 180 ml         Laboratory                            Imaging  MR-ABDOMEN-WITH & W/O   Final Result      Multi septated cystic lesion at the upper pole LEFT kidney measuring 3.8 cm without associated enhancement or  nodular component (Bosniak 2).            DX-PORTABLE FLUORO > 1 HOUR   Final Result      Portable fluoroscopy utilized for 2 minutes 20.4 seconds.         INTERPRETING LOCATION: [address], [place], [address]      CT-CTA AORTA-RO WITH & W/O-POST PROCESS   Final Result         CTA AORTA      1.  Septated lobulated left upper pole renal cystic mass lesion, recommend follow-up MRI of the kidneys for further characterization.   2.  Atherosclerosis and atherosclerotic coronary artery disease.   3.  Fat-containing right inguinal hernia      CTA LOWER EXTREMITIES      1.  Occlusion of the right common iliac artery through the distal superficial femoral artery   2.  Distal right peroneal artery occlusion with 2-vessel runoff the anterior and posterior tibial arteries at the right ankle.   3.  Occlusion of the left superficial femoral artery at the bifurcation extending through its length to the popliteal artery.   4.  Occlusion of the proximal left profunda femoris just distal to the bifurcation which reconstitutes.   5.  Soft tissue gas at the stump of left below-the-knee amputation, within expected limits for recent postop changes.      3D angiographic/MIP images of the vasculature confirm the vascular findings as described above.      These findings were discussed with the patient's clinician, Keith Navarro, on 9/29/2023 8:06 AM.      EC-ECHOCARDIOGRAM COMPLETE W/O CONT   Final Result      US-AORTA/ILIACS DUPLEX COMPLETE   Final Result      US-EXTREMITY ARTERY LOWER UNILAT RIGHT   Final Result      US-INGA SINGLE LEVEL UNILAT RIGHT   Final Result      DX-CHEST-PORTABLE (1 VIEW)   Final Result      1.  LEFT basilar atelectasis or pneumonia   2.  Trace LEFT pleural effusion or pleural scar             Assessment/Plan  * Gangrene of left foot (HCC)- (present on admission)  Assessment & Plan  Left BKA on 9/27/2023. Vascular studies showed severe arterial disease. Underwent bilateral common femoral artery endarterectomy  and profundoplasty with saphenous vein angioplasty with stent placement in the right external iliac artery on 9/30/2023. Prevena was placed (5-7 days). He was recommended to have Plavix for 1 month and aspirin for life. Follow-up with Dr. Navarro. Kayleen to be removed 14 to 21 days postoperatively. Completed course of Zosyn on 10/6/2023.   10/14: Patient has completed antibiotics  Continue aspirin and Plavix and statin  Continue wound care  Pending advanced home health care, wheelchair    PAD (peripheral artery disease) (HCC)- (present on admission)  Assessment & Plan  Underwent bilateral common femoral artery endarterectomy and profundoplasty with saphenous vein angioplasty with stent placement in the right external iliac artery on 9/30/2023. Prevena was placed (5-7 days). Recommended to have Plavix for 1 month and aspirin for life.  Follow-up with Dr. Navarro.  Stump protector for left leg was ordered by vascular surgery.    10/16: continue ASA, plavix and llipitor. Plavix will be continued until 10/30 for 1 months.  Aspirin will be continued indefinitely  Pending SNF vs. [facility] home health care.   Per CM, no SNF accepting. No [facility] accepting either.   Patient refused going to group home      Anemia- (present on admission)  Assessment & Plan  Stable.  Monitor    Bacteremia- (present on admission)  Assessment & Plan  Blood cultures from 9/26/2023 grew Morganella morganii and Vagococcus. ID were consulted. Completed course of On Zosyn on 10/6/2023. Repeat blood cultures from 9/27/2023 have been negative.  Echocardiogram showed no evidence of vegetations.     Hyponatremia  Assessment & Plan  Check Osm serum and urine   Continue monitoring   10/12: IVF discontinued. Na 132, improving    Elevated liver enzymes  Assessment & Plan  Resolved.  Hepatitis panel was normal.  Monitor    Renal cyst  Assessment & Plan  Noted to have renal cyst on CT.  MRI shows multiseptated cystic lesions in the upper pole of left kidney  measuring 3.8 cm without associated enhancement or nodular component.  Recommend outpatient follow-up         VTE prophylaxis: SCDs, Lovenox

## 2023-10-16 NOTE — PROGRESS NOTES
"      Orthopaedic Progress Note    Interval changes:  Patient doing well    L BKA dressings CDI- no more use of xeroform  Cleared for DC to SNF by ortho pending medicine clearance    ROS - Patient denies any new issues.  Pain well controlled.    /89   Pulse 89   Temp 36.3 °C (97.3 °F) (Temporal)   Resp 18   Ht 1.803 m (5' 11\")   Wt 71.3 kg (157 lb 3 oz)   SpO2 97%     Patient seen and examined  No acute distress  Breathing non labored  RRR  L BKA dressings CDI, knee bent at 90 deg, refusing to straighten leg.         Active Hospital Problems    Diagnosis     Renal cyst [N28.1]      Priority: Low    PAD (peripheral artery disease) ([facility]) [I73.9]     Bacteremia [R78.81]     Anemia [D64.9]     Gangrene of left foot ([facility]) [I96]     Elevated liver enzymes [R74.8]     Hyponatremia [E87.1]        Assessment/Plan:  Patient doing well    L BKA dressings CDI  Cleared for DC to SNF by ortho pending medicine clearance  POD#17 S/P Left below-knee amputation  Wt bearing status - NWB LLE  Wound care/Drains - Dressings to be changed every other day by nursing.    Future Procedures - none planned   Lovenox: Start 9/28, Duration-until ambulatory > 150'  Sutures/Staples out-  21 days post operatively. Removal will completed by ortho mid levels only.  PT/OT-initiated  Antibiotics: completed  DVT Prophylaxis- TEDS/SCDs/Foot pumps  Holguin-not needed per ortho  Case Coordination for Discharge Planning - Disposition per therapy recs.    "

## 2023-10-16 NOTE — DISCHARGE PLANNING
[facility] HH:  Medicaid census is full.    Received Choice form at   Agency/Facility Name: [facility]  Referral sent per Choice form @ 1236     @1501  Agency/Facility Name: [facility]  Spoke To: Sofia  Outcome: Referral accepted and wheelchair will be delivered bedside.    @1509  Received Choice form at   Agency/Facility Name: [facility]  Referral sent per Choice form @ 1073

## 2023-10-16 NOTE — DISCHARGE PLANNING
Case Management Discharge Planning    Admission Date: 9/26/2023  GMLOS: 7.1  ALOS: 20    6-Clicks ADL Score: 21  6-Clicks Mobility Score: 13  PT and/or OT Eval ordered: Yes  Post-acute Referrals Ordered: Yes  Post-acute Choice Obtained: Yes  Has referral(s) been sent to post-acute provider:  Yes      Anticipated Discharge Dispo: Discharge Disposition: D/T to home under [facility] care in anticipation of covered skilled care (06)    DME Needed: Yes    DME Ordered: Yes    Action(s) Taken: LMSW identified that pt was denied by both HH agencies. LMSW sent referral to [facility].     Escalations Completed: None    Medically Clear: Yes    Next Steps: LMSW to follow up for HH.    Barriers to Discharge: Outpatient referrals pending    Is the patient up for discharge tomorrow: No

## 2023-10-16 NOTE — CARE PLAN
The patient is Stable - Low risk of patient condition declining or worsening    Shift Goals  Clinical Goals: pain control  Patient Goals: rest  Family Goals: ashlie    Progress made toward(s) clinical / shift goals:    Problem: Knowledge Deficit - Standard  Goal: Patient and family/care givers will demonstrate understanding of plan of care, disease process/condition, diagnostic tests and medications  Outcome: Progressing       Patient aaox4, no issues at this time. Pain controlled with scheduled and prn pain medications

## 2023-10-17 PROCEDURE — A9270 NON-COVERED ITEM OR SERVICE: HCPCS | Performed by: STUDENT IN AN ORGANIZED HEALTH CARE EDUCATION/TRAINING PROGRAM

## 2023-10-17 PROCEDURE — 770006 HCHG ROOM/CARE - MED/SURG/GYN SEMI*

## 2023-10-17 PROCEDURE — A9270 NON-COVERED ITEM OR SERVICE: HCPCS | Performed by: INTERNAL MEDICINE

## 2023-10-17 PROCEDURE — 700102 HCHG RX REV CODE 250 W/ 637 OVERRIDE(OP): Performed by: STUDENT IN AN ORGANIZED HEALTH CARE EDUCATION/TRAINING PROGRAM

## 2023-10-17 PROCEDURE — 700102 HCHG RX REV CODE 250 W/ 637 OVERRIDE(OP): Performed by: INTERNAL MEDICINE

## 2023-10-17 PROCEDURE — 99233 SBSQ HOSP IP/OBS HIGH 50: CPT | Performed by: INTERNAL MEDICINE

## 2023-10-17 PROCEDURE — A9270 NON-COVERED ITEM OR SERVICE: HCPCS | Performed by: SURGERY

## 2023-10-17 PROCEDURE — 700102 HCHG RX REV CODE 250 W/ 637 OVERRIDE(OP): Performed by: SURGERY

## 2023-10-17 PROCEDURE — 700111 HCHG RX REV CODE 636 W/ 250 OVERRIDE (IP): Performed by: SURGERY

## 2023-10-17 RX ADMIN — NICOTINE TRANSDERMAL SYSTEM 21 MG: 21 PATCH, EXTENDED RELEASE TRANSDERMAL at 04:28

## 2023-10-17 RX ADMIN — ACETAMINOPHEN 650 MG: 325 TABLET, FILM COATED ORAL at 17:19

## 2023-10-17 RX ADMIN — ASPIRIN 81 MG: 81 TABLET, COATED ORAL at 04:28

## 2023-10-17 RX ADMIN — ACETAMINOPHEN 650 MG: 325 TABLET, FILM COATED ORAL at 04:27

## 2023-10-17 RX ADMIN — TRAMADOL HYDROCHLORIDE 50 MG: 50 TABLET ORAL at 11:11

## 2023-10-17 RX ADMIN — TRAMADOL HYDROCHLORIDE 50 MG: 50 TABLET ORAL at 04:27

## 2023-10-17 RX ADMIN — GABAPENTIN 300 MG: 300 CAPSULE ORAL at 11:11

## 2023-10-17 RX ADMIN — GABAPENTIN 300 MG: 300 CAPSULE ORAL at 04:27

## 2023-10-17 RX ADMIN — ACETAMINOPHEN 650 MG: 325 TABLET, FILM COATED ORAL at 22:48

## 2023-10-17 RX ADMIN — HEPARIN SODIUM 5000 UNITS: 5000 INJECTION, SOLUTION INTRAVENOUS; SUBCUTANEOUS at 22:48

## 2023-10-17 RX ADMIN — OXYCODONE HYDROCHLORIDE 20 MG: 10 TABLET ORAL at 19:27

## 2023-10-17 RX ADMIN — CLOPIDOGREL BISULFATE 75 MG: 75 TABLET ORAL at 04:27

## 2023-10-17 RX ADMIN — TRAMADOL HYDROCHLORIDE 50 MG: 50 TABLET ORAL at 17:18

## 2023-10-17 RX ADMIN — TRAMADOL HYDROCHLORIDE 50 MG: 50 TABLET ORAL at 22:48

## 2023-10-17 RX ADMIN — ATORVASTATIN CALCIUM 40 MG: 40 TABLET, FILM COATED ORAL at 17:18

## 2023-10-17 RX ADMIN — HEPARIN SODIUM 5000 UNITS: 5000 INJECTION, SOLUTION INTRAVENOUS; SUBCUTANEOUS at 04:28

## 2023-10-17 RX ADMIN — HEPARIN SODIUM 5000 UNITS: 5000 INJECTION, SOLUTION INTRAVENOUS; SUBCUTANEOUS at 14:30

## 2023-10-17 RX ADMIN — ACETAMINOPHEN 650 MG: 325 TABLET, FILM COATED ORAL at 11:11

## 2023-10-17 RX ADMIN — GABAPENTIN 300 MG: 300 CAPSULE ORAL at 17:18

## 2023-10-17 ASSESSMENT — PAIN DESCRIPTION - PAIN TYPE: TYPE: ACUTE PAIN

## 2023-10-17 NOTE — CARE PLAN
The patient is Stable - Low risk of patient condition declining or worsening    Shift Goals  Clinical Goals: rest  Patient Goals: pain control  Family Goals: ashlie    Progress made toward(s) clinical / shift goals:  pain control and rest    Patient is not progressing towards the following goals:

## 2023-10-17 NOTE — PROGRESS NOTES
"      Orthopaedic Progress Note    Interval changes:  Patient doing well    L BKA dressings taken down to examine incision- wound has deteriorated posterior since last check couple days ago  DC on hold  Return to OR tomorrow for L BKA revision and L knee manipulation  NPO after midnight    ROS - Patient denies any new issues.  Pain well controlled.    BP (!) 152/80   Pulse 91   Temp 36.3 °C (97.3 °F) (Oral)   Resp 18   Ht 1.803 m (5' 11\")   Wt 71.3 kg (157 lb 3 oz)   SpO2 98%     Patient seen and examined  No acute distress  Breathing non labored  RRR  L BKA dressings changed, posterior incision with necrosis, knee bent at 90 deg unable to straighten.        Active Hospital Problems    Diagnosis     Renal cyst [N28.1]      Priority: Low    PAD (peripheral artery disease) (HCC) [I73.9]     Bacteremia [R78.81]     Anemia [D64.9]     Gangrene of left foot (HCC) [I96]     Leukocytosis [D72.829]     Elevated liver enzymes [R74.8]     Hyponatremia [E87.1]        Assessment/Plan:  Patient doing well    L BKA dressings taken down to examine incision- wound has deteriorated posterior since last check couple days ago  DC on hold  Return to OR tomorrow for L BKA revision and L knee manipulation  NPO after midnight  POD#19 S/P Left below-knee amputation  Wt bearing status - NWB LLE  Wound care/Drains - Dressings to be changed every other day by nursing.    Future Procedures - none planned   Lovenox: Start 9/28, Duration-until ambulatory > 150'  Sutures/Staples out-  21 days post operatively. Removal will completed by ortho mid levels only.  PT/OT-initiated  Antibiotics: completed  DVT Prophylaxis- TEDS/SCDs/Foot pumps  Holguin-not needed per ortho  Case Coordination for Discharge Planning - Disposition per therapy recs.    "

## 2023-10-17 NOTE — DISCHARGE PLANNING
Case Management Discharge Planning    Admission Date: 9/26/2023  GMLOS: 7.1  ALOS: 21    6-Clicks ADL Score: 21  6-Clicks Mobility Score: 13  PT and/or OT Eval ordered: Yes  Post-acute Referrals Ordered: Yes  Post-acute Choice Obtained: Yes  Has referral(s) been sent to post-acute provider:  Yes      Anticipated Discharge Dispo: Discharge Disposition: D/T to home under [facility] care in anticipation of covered skilled care (06)    DME Needed: Yes    DME Ordered: Yes    Action(s) Taken: LMSW discussed pt in IDT rounds. Pt is not being accepted by HH or SNF's due to insurance barriers. Pt does have to go back to the OR to clear out his wounds again, but the MD can DC the pt once medically cleared with PT/OT scripts for outpatient services and a referral to the wound clinic. Pt has wheelchair at bedside. Pt is a medicaid recipient so he can use [employer] transportation.     Escalations Completed: None    Medically Clear: No    Next Steps: LMSW to follow as needed.     Barriers to Discharge: Medical clearance and Outpatient referrals pending    Is the patient up for discharge tomorrow: No

## 2023-10-17 NOTE — PROGRESS NOTES
Hospital Medicine Daily Progress Note    Date of Service  10/17/2023    Chief Complaint  Bilateral feet wounds    Hospital Course  Dillon Centeno is a 58 y.o. male with hypertension and tobacco dependence, admitted 9/26/2023 with bilateral feet wounds.  He was noted to have ischemic gangrene of the left lower extremity with maggots and superimposed bacterial infection.  Vascular surgery and orthopedic surgery were consulted.  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  Vascular studies showed severe arterial disease.  Patient underwent bilateral common femoral artery endarterectomy and profundoplasty with saphenous vein angioplasty with stent placement in the right external iliac artery on 9/30/2023. Prevena was placed (5-7 days). He was recommended to have Plavix for 1 month and aspirin for life. Blood cultures from 9/26/2023 grew Morganella morganii and Vagococcus.  Urine culture from 9/26/2023 grew Staph epidermidis. ID were consulted.   Echocardiogram showed LVEF of about 60% with no reported valvular abnormalities.  Repeat blood cultures from 9/27/2023 have been negative.  ID recommended IV Zosyn which she completed on 10/6/2023.     Additionally, he was noted to have renal cyst on CT.  MRI shows multiseptated cystic lesions in the upper pole of left kidney measuring 3.8 cm without associated enhancement or nodular component.    Discharge planning was pursued, but unfortunately had no accepting SNF's and no home health services accepting either.  Patient refused to go to a group home.  DMEs were being arranged.    Interval Problem Update  10/17/2023 - I reviewed the patient's chart. There were no significant overnight events. Remains hemodynamically stable and afebrile. Stable on RA.  No new labs.    > I have personally seen and examined the patient today.  He states his current pain is rated 6 out of 10 in severity, better with as needed analgesics.  Denies any other complaints.  Not short of breath.  Not  in distress.  Noted breakdown in incision site on the left BKA.  Discussed personally with orthopedics, plan for surgery tomorrow.    I personally reviewed all lab results mentioned above. Prior medical records from this institution and outside facilities were independently reviewed as noted. I also personally reviewed all ER physician and consultant recommendations and plans as documented above. History was independently obtained by myself. I have discussed this patient's plan of care and discharge plan at IDT rounds today with Case Management, Nursing, Nursing leadership, and other members of the IDT team.    Consultants/Specialty  infectious disease, orthopedics, and vascular surgery    Code Status  Full Code    Disposition  The patient is not medically cleared for discharge to home or a post-acute facility.      Anticipate discharge to home once cleared by orthopedics and wheelchair is arranged.  I have placed the appropriate orders for post-discharge needs.    Review of Systems  ROS     Pertinent positives/negatives as mentioned above.     A complete review of systems was personally done by me. All other systems were negative.       Physical Exam  Temp:  [36 °C (96.8 °F)-36.3 °C (97.3 °F)] 36.3 °C (97.3 °F)  Pulse:  [69-91] 91  Resp:  [18] 18  BP: (123-152)/(78-87) 152/80  SpO2:  [96 %-98 %] 98 %    Physical Exam  Vitals reviewed.   Constitutional:       General: He is not in acute distress.     Appearance: Normal appearance. He is not toxic-appearing or diaphoretic.   HENT:      Head: Normocephalic and atraumatic.      Nose: Nose normal.      Mouth/Throat:      Mouth: Mucous membranes are moist.      Pharynx: No oropharyngeal exudate.   Eyes:      General: No scleral icterus.     Extraocular Movements: Extraocular movements intact.      Conjunctiva/sclera: Conjunctivae normal.      Pupils: Pupils are equal, round, and reactive to light.   Cardiovascular:      Rate and Rhythm: Normal rate.      Heart sounds:  Normal heart sounds. No murmur heard.     No gallop.   Pulmonary:      Effort: Pulmonary effort is normal. No respiratory distress.      Breath sounds: Normal breath sounds. No stridor. No wheezing, rhonchi or rales.   Chest:      Chest wall: No tenderness.   Abdominal:      General: Bowel sounds are normal. There is no distension.      Palpations: Abdomen is soft. There is no mass.      Tenderness: There is no abdominal tenderness. There is no guarding or rebound.   Musculoskeletal:         General: No swelling. Normal range of motion.      Cervical back: Normal range of motion and neck supple.      Right lower leg: No edema.      Comments: Left BKA, with breakdown in incision site   Lymphadenopathy:      Cervical: No cervical adenopathy.   Skin:     General: Skin is warm and dry.      Coloration: Skin is not jaundiced.      Findings: No rash.      Comments: Third right toe absent due to prior trauma.  Remaining toes with chronic ulcers.   Neurological:      General: No focal deficit present.      Mental Status: He is alert and oriented to person, place, and time. Mental status is at baseline.      Cranial Nerves: No cranial nerve deficit.   Psychiatric:         Mood and Affect: Mood normal.         Behavior: Behavior normal.         Thought Content: Thought content normal.         Judgment: Judgment normal.             Fluids    Intake/Output Summary (Last 24 hours) at 10/17/2023 1131  Last data filed at 10/17/2023 0800  Gross per 24 hour   Intake 1760 ml   Output 2450 ml   Net -690 ml       Laboratory                            Imaging  MR-ABDOMEN-WITH & W/O   Final Result      Multi septated cystic lesion at the upper pole LEFT kidney measuring 3.8 cm without associated enhancement or nodular component (Bosniak 2).            DX-PORTABLE FLUORO > 1 HOUR   Final Result      Portable fluoroscopy utilized for 2 minutes 20.4 seconds.         INTERPRETING LOCATION: [address] [place] [address], [address]      CT-CTA AORTA-RO  WITH & W/O-POST PROCESS   Final Result         CTA AORTA      1.  Septated lobulated left upper pole renal cystic mass lesion, recommend follow-up MRI of the kidneys for further characterization.   2.  Atherosclerosis and atherosclerotic coronary artery disease.   3.  Fat-containing right inguinal hernia      CTA LOWER EXTREMITIES      1.  Occlusion of the right common iliac artery through the distal superficial femoral artery   2.  Distal right peroneal artery occlusion with 2-vessel runoff the anterior and posterior tibial arteries at the right ankle.   3.  Occlusion of the left superficial femoral artery at the bifurcation extending through its length to the popliteal artery.   4.  Occlusion of the proximal left profunda femoris just distal to the bifurcation which reconstitutes.   5.  Soft tissue gas at the stump of left below-the-knee amputation, within expected limits for recent postop changes.      3D angiographic/MIP images of the vasculature confirm the vascular findings as described above.      These findings were discussed with the patient's clinician, Keith Navarro, on 9/29/2023 8:06 AM.      EC-ECHOCARDIOGRAM COMPLETE W/O CONT   Final Result      US-AORTA/ILIACS DUPLEX COMPLETE   Final Result      US-EXTREMITY ARTERY LOWER UNILAT RIGHT   Final Result      US-INGA SINGLE LEVEL UNILAT RIGHT   Final Result      DX-CHEST-PORTABLE (1 VIEW)   Final Result      1.  LEFT basilar atelectasis or pneumonia   2.  Trace LEFT pleural effusion or pleural scar             Assessment/Plan  * Gangrene of left foot (HCC)- (present on admission)  Assessment & Plan  - s/p Left BKA on 9/27/2023.   - Vascular studies showed severe arterial disease. S/p bilateral common femoral artery endarterectomy and profundoplasty with saphenous vein angioplasty with stent placement in the right external iliac artery on 9/30/2023. Prevena was placed (5-7 days).  Continue Plavix for 1 month and aspirin for life. Follow-up with   Melanie (vascular surgery). Staples to be removed 14 to 21 days postoperatively.   - Completed course of IV Zosyn on 10/6/2023.   -Noted breakdown of incision site on the left BKA today.  Discussed with orthopedics, plan for revision of the left BKA.  -Continue pain control with oral oxycodone.    Bacteremia- (present on admission)  Assessment & Plan  - Blood cultures from 9/26/2023 grew Morganella morganii and Vagococcus. Echocardiogram showed no evidence of vegetations.   - ID consulted, completed course of IV Zosyn on 10/6/2023. Repeat blood cultures from 9/27/2023 have been negative.      PAD (peripheral artery disease) (HCC)- (present on admission)  Assessment & Plan  - s/p bilateral common femoral artery endarterectomy and profundoplasty with saphenous vein angioplasty with stent placement in the right external iliac artery on 9/30/2023. Prevena was placed (5-7 days).   -Continue Plavix for 1 month and aspirin for life.  Follow-up with vascular surgery.  Stump protector for left leg was ordered by vascular surgery.    Hyponatremia- (present on admission)  Assessment & Plan  - Appears euvolemic.  Stable.  -Continue to monitor.    Elevated liver enzymes- (present on admission)  Assessment & Plan  - Resolved.  Hepatitis panel was normal.  Continue to monitor.    Leukocytosis- (present on admission)  Assessment & Plan  - Resolved.  Completed antibiotics.    Anemia- (present on admission)  Assessment & Plan  -Stable.  Monitor.  CBC in the morning.  Restrictive transfusion strategy.    Renal cyst- (present on admission)  Assessment & Plan  - Noted to have renal cyst on CT.  MRI shows multiseptated cystic lesions in the upper pole of left kidney measuring 3.8 cm without associated enhancement or nodular component.    -Recommend outpatient follow-up         VTE prophylaxis: SCDs, Heparin SQ    My total time spent caring for the patient on the day of the encounter was 55 minutes. This does not include time spent on  separately billable procedures/tests.

## 2023-10-17 NOTE — DISCHARGE PLANNING
Agency/Facility Name: [facility]  Spoke To: Hilaria  Outcome: Not accepting any Medicaid FFS at this time.

## 2023-10-18 ENCOUNTER — APPOINTMENT (OUTPATIENT)
Dept: RADIOLOGY | Facility: MEDICAL CENTER | Age: 58
DRG: 239 | End: 2023-10-18
Attending: INTERNAL MEDICINE
Payer: MEDICAID

## 2023-10-18 PROBLEM — N17.9 AKI (ACUTE KIDNEY INJURY) (HCC): Status: ACTIVE | Noted: 2023-10-18

## 2023-10-18 PROBLEM — E87.5 HYPERKALEMIA: Status: ACTIVE | Noted: 2023-10-18

## 2023-10-18 LAB
ALBUMIN SERPL BCP-MCNC: 3.5 G/DL (ref 3.2–4.9)
ALP SERPL-CCNC: 80 U/L (ref 30–99)
ALT SERPL-CCNC: 7 U/L (ref 2–50)
ANION GAP SERPL CALC-SCNC: 14 MMOL/L (ref 7–16)
ANION GAP SERPL CALC-SCNC: 14 MMOL/L (ref 7–16)
ANION GAP SERPL CALC-SCNC: 15 MMOL/L (ref 7–16)
ANION GAP SERPL CALC-SCNC: 16 MMOL/L (ref 7–16)
APPEARANCE UR: CLEAR
AST SERPL-CCNC: 12 U/L (ref 12–45)
BACTERIA #/AREA URNS HPF: ABNORMAL /HPF
BILIRUB CONJ SERPL-MCNC: <0.2 MG/DL (ref 0.1–0.5)
BILIRUB INDIRECT SERPL-MCNC: NORMAL MG/DL (ref 0–1)
BILIRUB SERPL-MCNC: 0.2 MG/DL (ref 0.1–1.5)
BILIRUB UR QL STRIP.AUTO: NEGATIVE
BUN SERPL-MCNC: 46 MG/DL (ref 8–22)
BUN SERPL-MCNC: 49 MG/DL (ref 8–22)
BUN SERPL-MCNC: 50 MG/DL (ref 8–22)
BUN SERPL-MCNC: 51 MG/DL (ref 8–22)
CALCIUM SERPL-MCNC: 9 MG/DL (ref 8.5–10.5)
CALCIUM SERPL-MCNC: 9.4 MG/DL (ref 8.5–10.5)
CALCIUM SERPL-MCNC: 9.5 MG/DL (ref 8.5–10.5)
CALCIUM SERPL-MCNC: 9.7 MG/DL (ref 8.5–10.5)
CHLORIDE SERPL-SCNC: 88 MMOL/L (ref 96–112)
CHLORIDE SERPL-SCNC: 88 MMOL/L (ref 96–112)
CHLORIDE SERPL-SCNC: 89 MMOL/L (ref 96–112)
CHLORIDE SERPL-SCNC: 90 MMOL/L (ref 96–112)
CK SERPL-CCNC: 64 U/L (ref 0–154)
CO2 SERPL-SCNC: 20 MMOL/L (ref 20–33)
CO2 SERPL-SCNC: 22 MMOL/L (ref 20–33)
COLOR UR: YELLOW
CREAT SERPL-MCNC: 3.42 MG/DL (ref 0.5–1.4)
CREAT SERPL-MCNC: 3.68 MG/DL (ref 0.5–1.4)
CREAT SERPL-MCNC: 3.76 MG/DL (ref 0.5–1.4)
CREAT SERPL-MCNC: 3.91 MG/DL (ref 0.5–1.4)
EKG IMPRESSION: NORMAL
EPI CELLS #/AREA URNS HPF: ABNORMAL /HPF
ERYTHROCYTE [DISTWIDTH] IN BLOOD BY AUTOMATED COUNT: 58.4 FL (ref 35.9–50)
GFR SERPLBLD CREATININE-BSD FMLA CKD-EPI: 17 ML/MIN/1.73 M 2
GFR SERPLBLD CREATININE-BSD FMLA CKD-EPI: 18 ML/MIN/1.73 M 2
GFR SERPLBLD CREATININE-BSD FMLA CKD-EPI: 18 ML/MIN/1.73 M 2
GFR SERPLBLD CREATININE-BSD FMLA CKD-EPI: 20 ML/MIN/1.73 M 2
GLUCOSE BLD STRIP.AUTO-MCNC: 100 MG/DL (ref 65–99)
GLUCOSE BLD STRIP.AUTO-MCNC: 56 MG/DL (ref 65–99)
GLUCOSE BLD STRIP.AUTO-MCNC: 72 MG/DL (ref 65–99)
GLUCOSE SERPL-MCNC: 67 MG/DL (ref 65–99)
GLUCOSE SERPL-MCNC: 69 MG/DL (ref 65–99)
GLUCOSE SERPL-MCNC: 76 MG/DL (ref 65–99)
GLUCOSE SERPL-MCNC: 82 MG/DL (ref 65–99)
GLUCOSE UR STRIP.AUTO-MCNC: NEGATIVE MG/DL
HCT VFR BLD AUTO: 30.3 % (ref 42–52)
HGB BLD-MCNC: 9.8 G/DL (ref 14–18)
HYALINE CASTS #/AREA URNS LPF: ABNORMAL /LPF
KETONES UR STRIP.AUTO-MCNC: NEGATIVE MG/DL
LEUKOCYTE ESTERASE UR QL STRIP.AUTO: ABNORMAL
MCH RBC QN AUTO: 29.6 PG (ref 27–33)
MCHC RBC AUTO-ENTMCNC: 32.3 G/DL (ref 32.3–36.5)
MCV RBC AUTO: 91.5 FL (ref 81.4–97.8)
MICRO URNS: ABNORMAL
NITRITE UR QL STRIP.AUTO: NEGATIVE
PH UR STRIP.AUTO: 6 [PH] (ref 5–8)
PLATELET # BLD AUTO: 522 K/UL (ref 164–446)
PMV BLD AUTO: 8.4 FL (ref 9–12.9)
POTASSIUM SERPL-SCNC: 5.7 MMOL/L (ref 3.6–5.5)
POTASSIUM SERPL-SCNC: 5.9 MMOL/L (ref 3.6–5.5)
POTASSIUM SERPL-SCNC: 6.1 MMOL/L (ref 3.6–5.5)
POTASSIUM SERPL-SCNC: 6.3 MMOL/L (ref 3.6–5.5)
PROT SERPL-MCNC: 7.1 G/DL (ref 6–8.2)
PROT UR QL STRIP: NEGATIVE MG/DL
RBC # BLD AUTO: 3.31 M/UL (ref 4.7–6.1)
RBC # URNS HPF: ABNORMAL /HPF
RBC UR QL AUTO: ABNORMAL
RENAL EPI CELLS #/AREA URNS HPF: ABNORMAL /HPF
SODIUM SERPL-SCNC: 124 MMOL/L (ref 135–145)
SODIUM SERPL-SCNC: 124 MMOL/L (ref 135–145)
SODIUM SERPL-SCNC: 126 MMOL/L (ref 135–145)
SODIUM SERPL-SCNC: 126 MMOL/L (ref 135–145)
SP GR UR STRIP.AUTO: 1.01
UROBILINOGEN UR STRIP.AUTO-MCNC: 0.2 MG/DL
WBC # BLD AUTO: 9.5 K/UL (ref 4.8–10.8)
WBC #/AREA URNS HPF: ABNORMAL /HPF

## 2023-10-18 PROCEDURE — 93005 ELECTROCARDIOGRAM TRACING: CPT | Performed by: INTERNAL MEDICINE

## 2023-10-18 PROCEDURE — 80048 BASIC METABOLIC PNL TOTAL CA: CPT

## 2023-10-18 PROCEDURE — A9270 NON-COVERED ITEM OR SERVICE: HCPCS | Performed by: INTERNAL MEDICINE

## 2023-10-18 PROCEDURE — 770006 HCHG ROOM/CARE - MED/SURG/GYN SEMI*

## 2023-10-18 PROCEDURE — C9399 UNCLASSIFIED DRUGS OR BIOLOG: HCPCS

## 2023-10-18 PROCEDURE — 85027 COMPLETE CBC AUTOMATED: CPT

## 2023-10-18 PROCEDURE — 700105 HCHG RX REV CODE 258: Performed by: INTERNAL MEDICINE

## 2023-10-18 PROCEDURE — 82550 ASSAY OF CK (CPK): CPT

## 2023-10-18 PROCEDURE — 82962 GLUCOSE BLOOD TEST: CPT | Mod: 91

## 2023-10-18 PROCEDURE — 700102 HCHG RX REV CODE 250 W/ 637 OVERRIDE(OP): Performed by: INTERNAL MEDICINE

## 2023-10-18 PROCEDURE — 700111 HCHG RX REV CODE 636 W/ 250 OVERRIDE (IP): Performed by: SURGERY

## 2023-10-18 PROCEDURE — 76775 US EXAM ABDO BACK WALL LIM: CPT

## 2023-10-18 PROCEDURE — 700101 HCHG RX REV CODE 250

## 2023-10-18 PROCEDURE — 80076 HEPATIC FUNCTION PANEL: CPT

## 2023-10-18 PROCEDURE — 36415 COLL VENOUS BLD VENIPUNCTURE: CPT

## 2023-10-18 PROCEDURE — 700102 HCHG RX REV CODE 250 W/ 637 OVERRIDE(OP): Performed by: STUDENT IN AN ORGANIZED HEALTH CARE EDUCATION/TRAINING PROGRAM

## 2023-10-18 PROCEDURE — 99233 SBSQ HOSP IP/OBS HIGH 50: CPT | Performed by: INTERNAL MEDICINE

## 2023-10-18 PROCEDURE — 700102 HCHG RX REV CODE 250 W/ 637 OVERRIDE(OP)

## 2023-10-18 PROCEDURE — A9270 NON-COVERED ITEM OR SERVICE: HCPCS | Performed by: STUDENT IN AN ORGANIZED HEALTH CARE EDUCATION/TRAINING PROGRAM

## 2023-10-18 PROCEDURE — 700111 HCHG RX REV CODE 636 W/ 250 OVERRIDE (IP)

## 2023-10-18 PROCEDURE — 81001 URINALYSIS AUTO W/SCOPE: CPT

## 2023-10-18 PROCEDURE — 93010 ELECTROCARDIOGRAM REPORT: CPT | Performed by: INTERNAL MEDICINE

## 2023-10-18 RX ORDER — DEXTROSE MONOHYDRATE 25 G/50ML
25 INJECTION, SOLUTION INTRAVENOUS
Status: DISCONTINUED | OUTPATIENT
Start: 2023-10-18 | End: 2023-10-18

## 2023-10-18 RX ORDER — DEXTROSE MONOHYDRATE 25 G/50ML
25 INJECTION, SOLUTION INTRAVENOUS ONCE
Status: COMPLETED | OUTPATIENT
Start: 2023-10-18 | End: 2023-10-18

## 2023-10-18 RX ORDER — GABAPENTIN 300 MG/1
300 CAPSULE ORAL EVERY EVENING
Status: DISCONTINUED | OUTPATIENT
Start: 2023-10-19 | End: 2023-10-21

## 2023-10-18 RX ORDER — DEXTROSE MONOHYDRATE 25 G/50ML
50 INJECTION, SOLUTION INTRAVENOUS ONCE
Status: DISCONTINUED | OUTPATIENT
Start: 2023-10-18 | End: 2023-10-18

## 2023-10-18 RX ORDER — SODIUM CHLORIDE, SODIUM LACTATE, POTASSIUM CHLORIDE, CALCIUM CHLORIDE 600; 310; 30; 20 MG/100ML; MG/100ML; MG/100ML; MG/100ML
INJECTION, SOLUTION INTRAVENOUS CONTINUOUS
Status: DISCONTINUED | OUTPATIENT
Start: 2023-10-18 | End: 2023-10-19

## 2023-10-18 RX ADMIN — SODIUM CHLORIDE, POTASSIUM CHLORIDE, SODIUM LACTATE AND CALCIUM CHLORIDE: 600; 310; 30; 20 INJECTION, SOLUTION INTRAVENOUS at 07:15

## 2023-10-18 RX ADMIN — ACETAMINOPHEN 650 MG: 325 TABLET, FILM COATED ORAL at 23:29

## 2023-10-18 RX ADMIN — TRAMADOL HYDROCHLORIDE 50 MG: 50 TABLET ORAL at 12:10

## 2023-10-18 RX ADMIN — SODIUM CHLORIDE, POTASSIUM CHLORIDE, SODIUM LACTATE AND CALCIUM CHLORIDE: 600; 310; 30; 20 INJECTION, SOLUTION INTRAVENOUS at 17:26

## 2023-10-18 RX ADMIN — ACETAMINOPHEN 650 MG: 325 TABLET, FILM COATED ORAL at 12:10

## 2023-10-18 RX ADMIN — HEPARIN SODIUM 5000 UNITS: 5000 INJECTION, SOLUTION INTRAVENOUS; SUBCUTANEOUS at 23:30

## 2023-10-18 RX ADMIN — OXYCODONE HYDROCHLORIDE 20 MG: 10 TABLET ORAL at 19:35

## 2023-10-18 RX ADMIN — ATORVASTATIN CALCIUM 40 MG: 40 TABLET, FILM COATED ORAL at 17:24

## 2023-10-18 RX ADMIN — GABAPENTIN 300 MG: 300 CAPSULE ORAL at 12:10

## 2023-10-18 RX ADMIN — GABAPENTIN 300 MG: 300 CAPSULE ORAL at 05:03

## 2023-10-18 RX ADMIN — DEXTROSE MONOHYDRATE 25 G: 25 INJECTION, SOLUTION INTRAVENOUS at 08:27

## 2023-10-18 RX ADMIN — INSULIN HUMAN 3.5 UNITS: 100 INJECTION, SOLUTION PARENTERAL at 08:27

## 2023-10-18 RX ADMIN — SODIUM CHLORIDE, POTASSIUM CHLORIDE, SODIUM LACTATE AND CALCIUM CHLORIDE: 600; 310; 30; 20 INJECTION, SOLUTION INTRAVENOUS at 23:33

## 2023-10-18 RX ADMIN — SODIUM ZIRCONIUM CYCLOSILICATE 10 G: 10 POWDER, FOR SUSPENSION ORAL at 23:30

## 2023-10-18 RX ADMIN — TRAMADOL HYDROCHLORIDE 50 MG: 50 TABLET ORAL at 23:29

## 2023-10-18 RX ADMIN — DEXTROSE MONOHYDRATE 250 ML: 100 INJECTION, SOLUTION INTRAVENOUS at 12:47

## 2023-10-18 RX ADMIN — ACETAMINOPHEN 650 MG: 325 TABLET, FILM COATED ORAL at 17:24

## 2023-10-18 RX ADMIN — ACETAMINOPHEN 650 MG: 325 TABLET, FILM COATED ORAL at 05:03

## 2023-10-18 RX ADMIN — TRAMADOL HYDROCHLORIDE 50 MG: 50 TABLET ORAL at 05:02

## 2023-10-18 RX ADMIN — TRAMADOL HYDROCHLORIDE 50 MG: 50 TABLET ORAL at 17:24

## 2023-10-18 RX ADMIN — NICOTINE TRANSDERMAL SYSTEM 21 MG: 21 PATCH, EXTENDED RELEASE TRANSDERMAL at 05:03

## 2023-10-18 ASSESSMENT — PAIN DESCRIPTION - PAIN TYPE
TYPE: ACUTE PAIN
TYPE: ACUTE PAIN

## 2023-10-18 NOTE — CARE PLAN
The patient is Stable - Low risk of patient condition declining or worsening    Shift Goals  Clinical Goals: rest  Patient Goals: pain control  Family Goals: ashlie    Progress made toward(s) clinical / shift goals:  patient calls appropriately. Resting in bed. Pain controlled.    Patient is not progressing towards the following goals:

## 2023-10-18 NOTE — THERAPY
Occupational Therapy Contact Note    Patient Name: Dillon Centeno  Age:  58 y.o., Sex:  male  Medical Record #: [id number]  Today's Date: 10/18/2023    Discussed missed therapy with Tamiko       10/18/23 1328   Interdisciplinary Plan of Care Collaboration   Collaboration Comments OT tx attempted,  pt going to OR for L BKA revision and L knee manipulation

## 2023-10-18 NOTE — PROGRESS NOTES
NOC CROSSCOVER    RN reported K 5.9  I have ordered D5/insulin- hyperkalemia protocol.    JEANNE Welch

## 2023-10-18 NOTE — THERAPY
Physical Therapy Contact Note    Patient Name: Dillon Centeno  Age:  58 y.o., Sex:  male  Medical Record #: [id number]  Today's Date: 10/18/2023    Pt pending amp revision today; will follow up post when appropriate for continuation of PT plan of care;    Tania AGOSTO, PT,  [phone]

## 2023-10-18 NOTE — PROGRESS NOTES
Hospital Medicine Daily Progress Note    Date of Service  10/18/2023    Chief Complaint  Bilateral feet wounds    Hospital Course  Dillon Centeno is a 58 y.o. male with hypertension and tobacco dependence, admitted 9/26/2023 with bilateral feet wounds.  He was noted to have ischemic gangrene of the left lower extremity with maggots and superimposed bacterial infection.  Vascular surgery and orthopedic surgery were consulted.  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  Vascular studies showed severe arterial disease.  Patient underwent bilateral common femoral artery endarterectomy and profundoplasty with saphenous vein angioplasty with stent placement in the right external iliac artery on 9/30/2023. Prevena was placed (5-7 days). He was recommended to have Plavix for 1 month and aspirin for life. Blood cultures from 9/26/2023 grew Morganella morganii and Vagococcus.  Urine culture from 9/26/2023 grew Staph epidermidis. ID were consulted.   Echocardiogram showed LVEF of about 60% with no reported valvular abnormalities.  Repeat blood cultures from 9/27/2023 have been negative.  ID recommended IV Zosyn which he completed on 10/6/2023.  Subsequently, he had breakdown of the incision site on the left BKA.  Orthopedics planning for revision.    Additionally, he was noted to have renal cyst on CT.  MRI shows multiseptated cystic lesions in the upper pole of left kidney measuring 3.8 cm without associated enhancement or nodular component.    Discharge planning was pursued, but unfortunately had no accepting SNF's and no home health services accepting either.  Patient refused to go to a group home.  DMEs were being arranged.    Interval Problem Update  10/18/2023 - I reviewed the patient's chart today. Uneventful night. VSS. Afebrile. Saturating well on RA.  He had no labs since 10/12.  Morning labs today showed potassium 3.9.  He was given D5/insulin.  Creatinine also increased to 3.42.  Sodium 124.  CO2 is normal.   LFTs are normal.  I repeated a BMP which still showed GELY, creatinine of 3.76.  BUN also significant elevated at 50, with sodium of 124 and potassium of 5.7.  Plan for left BKA revision.    > I have personally seen and examined the patient today.  He is comfortable.  Denies any pain.  He is able to urinate.  No nausea, vomiting, chest pain, shortness of breath.  No diarrhea.      I personally reviewed all lab results mentioned above. Prior medical records from this institution and outside facilities were independently reviewed as noted. I also personally reviewed all ER physician and consultant recommendations and plans as documented above. History was independently obtained by myself. I have discussed this patient's plan of care and discharge plan at IDT rounds today with Case Management, Nursing, Nursing leadership, and other members of the IDT team.    Consultants/Specialty  infectious disease, orthopedics, and vascular surgery    Code Status  Full Code    Disposition  The patient is not medically cleared for discharge to home or a post-acute facility.      Anticipate discharge to home once cleared by orthopedics and wheelchair is arranged.  I have placed the appropriate orders for post-discharge needs.    Review of Systems  ROS     Pertinent positives/negatives as mentioned above.     A complete review of systems was personally done by me. All other systems were negative.       Physical Exam  Temp:  [36.1 °C (96.9 °F)-36.9 °C (98.4 °F)] 36.3 °C (97.3 °F)  Pulse:  [71-98] 71  Resp:  [18] 18  BP: (133-151)/(68-87) 133/68  SpO2:  [95 %-100 %] 98 %    Physical Exam  Vitals reviewed.   Constitutional:       General: He is not in acute distress.     Appearance: Normal appearance. He is not toxic-appearing or diaphoretic.   HENT:      Head: Normocephalic and atraumatic.      Nose: Nose normal.      Mouth/Throat:      Mouth: Mucous membranes are dry.      Pharynx: No oropharyngeal exudate.   Eyes:      General: No  scleral icterus.     Extraocular Movements: Extraocular movements intact.      Conjunctiva/sclera: Conjunctivae normal.      Pupils: Pupils are equal, round, and reactive to light.   Cardiovascular:      Rate and Rhythm: Normal rate.      Heart sounds: Normal heart sounds. No murmur heard.     No gallop.   Pulmonary:      Effort: Pulmonary effort is normal. No respiratory distress.      Breath sounds: Normal breath sounds. No stridor. No wheezing, rhonchi or rales.   Chest:      Chest wall: No tenderness.   Abdominal:      General: Bowel sounds are normal. There is no distension.      Palpations: Abdomen is soft. There is no mass.      Tenderness: There is no abdominal tenderness. There is no guarding or rebound.   Musculoskeletal:         General: No swelling. Normal range of motion.      Cervical back: Normal range of motion and neck supple.      Right lower leg: No edema.      Comments: Left BKA, with breakdown in incision site   Lymphadenopathy:      Cervical: No cervical adenopathy.   Skin:     General: Skin is warm and dry.      Coloration: Skin is not jaundiced.      Findings: No rash.      Comments: Third right toe absent due to prior trauma.  Remaining toes with chronic ulcers.   Neurological:      General: No focal deficit present.      Mental Status: He is alert and oriented to person, place, and time. Mental status is at baseline.      Cranial Nerves: No cranial nerve deficit.   Psychiatric:         Mood and Affect: Mood normal.         Behavior: Behavior normal.         Thought Content: Thought content normal.         Judgment: Judgment normal.       I have performed the physical examination today 10/18/2023.  In review of yesterday's note, there are no new changes except as documented above.        Fluids    Intake/Output Summary (Last 24 hours) at 10/18/2023 1028  Last data filed at 10/18/2023 0446  Gross per 24 hour   Intake 1170 ml   Output 750 ml   Net 420 ml       Laboratory  Recent Labs      10/18/23  0401   WBC 9.5   RBC 3.31*   HEMOGLOBIN 9.8*   HEMATOCRIT 30.3*   MCV 91.5   MCH 29.6   MCHC 32.3   RDW 58.4*   PLATELETCT 522*   MPV 8.4*       Recent Labs     10/18/23  0401 10/18/23  0704   SODIUM 124* 124*   POTASSIUM 5.9* 5.7*   CHLORIDE 88* 88*   CO2 22 22   GLUCOSE 82 76   BUN 46* 50*   CREATININE 3.42* 3.76*   CALCIUM 9.4 9.0                     Imaging  MR-ABDOMEN-WITH & W/O   Final Result      Multi septated cystic lesion at the upper pole LEFT kidney measuring 3.8 cm without associated enhancement or nodular component (Bosniak 2).            DX-PORTABLE FLUORO > 1 HOUR   Final Result      Portable fluoroscopy utilized for 2 minutes 20.4 seconds.         INTERPRETING LOCATION: [address] [place] [address], [place], [id number]      CT-CTA AORTA-RO WITH & W/O-POST PROCESS   Final Result         CTA AORTA      1.  Septated lobulated left upper pole renal cystic mass lesion, recommend follow-up MRI of the kidneys for further characterization.   2.  Atherosclerosis and atherosclerotic coronary artery disease.   3.  Fat-containing right inguinal hernia      CTA LOWER EXTREMITIES      1.  Occlusion of the right common iliac artery through the distal superficial femoral artery   2.  Distal right peroneal artery occlusion with 2-vessel runoff the anterior and posterior tibial arteries at the right ankle.   3.  Occlusion of the left superficial femoral artery at the bifurcation extending through its length to the popliteal artery.   4.  Occlusion of the proximal left profunda femoris just distal to the bifurcation which reconstitutes.   5.  Soft tissue gas at the stump of left below-the-knee amputation, within expected limits for recent postop changes.      3D angiographic/MIP images of the vasculature confirm the vascular findings as described above.      These findings were discussed with the patient's clinician, Keith Navarro, on 9/29/2023 8:06 AM.      EC-ECHOCARDIOGRAM COMPLETE W/O CONT   Final Result       US-AORTA/ILIACS DUPLEX COMPLETE   Final Result      US-EXTREMITY ARTERY LOWER UNILAT RIGHT   Final Result      US-INGA SINGLE LEVEL UNILAT RIGHT   Final Result      DX-CHEST-PORTABLE (1 VIEW)   Final Result      1.  LEFT basilar atelectasis or pneumonia   2.  Trace LEFT pleural effusion or pleural scar      US-RENAL    (Results Pending)          Assessment/Plan  * Gangrene of left foot (HCC)- (present on admission)  Assessment & Plan  - s/p Left BKA on 9/27/2023.   - Vascular studies showed severe arterial disease. S/p bilateral common femoral artery endarterectomy and profundoplasty with saphenous vein angioplasty with stent placement in the right external iliac artery on 9/30/2023. Prevena was placed (5-7 days).  Continue Plavix for 1 month and aspirin for life. Follow-up with Dr. Navarro (vascular surgery). Staples to be removed 14 to 21 days postoperatively.   - Completed course of IV Zosyn on 10/6/2023.   -Noted breakdown of incision site on the left BKA.  Orthopedics planning for left BKA revision.   -Continue pain control with oral oxycodone.    GELY (acute kidney injury) (HCC)  Assessment & Plan  - Creatinine significantly jumped to 3.76.  No labs have been drawn between 10/12 and 10/18.  Suspect prerenal given significant elevation in BUN as well.  -Will hydrate aggressively, start IV LR at 125 cc/h.  -Check renal ultrasound to rule out obstructive uropathy.  For completion, send for urinalysis and CPK levels.  -Monitor for improvement in renal function closely.  Monitor electrolytes particularly potassium.  Avoid nephrotoxins, and continue to renally dose all medications.    Bacteremia- (present on admission)  Assessment & Plan  - Blood cultures from 9/26/2023 grew Morganella morganii and Vagococcus. Echocardiogram showed no evidence of vegetations.   - ID consulted, completed course of IV Zosyn on 10/6/2023. Repeat blood cultures from 9/27/2023 have been negative.      Hyperkalemia  Assessment & Plan  -  Due to acute renal failure.  Mild.  -Given dextrose/insulin.  Repeat BMP at noon.  -Check EKG.  -Monitor closely.    PAD (peripheral artery disease) (HCC)- (present on admission)  Assessment & Plan  - s/p bilateral common femoral artery endarterectomy and profundoplasty with saphenous vein angioplasty with stent placement in the right external iliac artery on 9/30/2023. Prevena was placed (5-7 days).   -Continue Plavix for 1 month and aspirin for life.  Follow-up with vascular surgery.  Stump protector for left leg was ordered by vascular surgery.    Hyponatremia- (present on admission)  Assessment & Plan  - Appears euvolemic.  Stable.  -Continue to monitor.    Elevated liver enzymes- (present on admission)  Assessment & Plan  - Resolved.  Hepatitis panel was normal.  Continue to monitor.    Leukocytosis- (present on admission)  Assessment & Plan  - Resolved.  Completed antibiotics.    Anemia- (present on admission)  Assessment & Plan  -Stable.  Monitor.  CBC in the morning.  Restrictive transfusion strategy.    Renal cyst- (present on admission)  Assessment & Plan  - Noted to have renal cyst on CT.  MRI shows multiseptated cystic lesions in the upper pole of left kidney measuring 3.8 cm without associated enhancement or nodular component.    -Recommend outpatient follow-up         VTE prophylaxis: SCDs, Heparin SQ    My total time spent caring for the patient on the day of the encounter was 54 minutes. This does not include time spent on separately billable procedures/tests.

## 2023-10-18 NOTE — PROGRESS NOTES
Pt leaves the urinal in groin area and falls asleep causing urine to spill everywhere.    This is a repeated occurrence even after request to stop leaving the urinal down there.

## 2023-10-18 NOTE — ASSESSMENT & PLAN NOTE
- Creatinine significantly jumped to 3.76.  Renal ultrasound also showed moderate right and mild left hydronephrosis with distended urinary bladder.  Suspect has post renal failure, with component of prerenal given significant elevation in BUN as well.  CPK is normal.  Urinalysis bland.  -Resolved. Now that has Holguin catheter.  Off IVF.  Potassium level has normalized, off Lokelma..  -Maintain Holguin catheter to relieve obstruction.  Continue Flomax and finasteride.  Suspect that he will need to go home with Holguin catheter, and follow-up as outpatient with urology for definitive treatment of BPH.  resolved

## 2023-10-19 LAB
ANION GAP SERPL CALC-SCNC: 12 MMOL/L (ref 7–16)
ANION GAP SERPL CALC-SCNC: 12 MMOL/L (ref 7–16)
BUN SERPL-MCNC: 45 MG/DL (ref 8–22)
BUN SERPL-MCNC: 48 MG/DL (ref 8–22)
CALCIUM SERPL-MCNC: 8.9 MG/DL (ref 8.5–10.5)
CALCIUM SERPL-MCNC: 9.4 MG/DL (ref 8.5–10.5)
CHLORIDE SERPL-SCNC: 92 MMOL/L (ref 96–112)
CHLORIDE SERPL-SCNC: 92 MMOL/L (ref 96–112)
CO2 SERPL-SCNC: 22 MMOL/L (ref 20–33)
CO2 SERPL-SCNC: 22 MMOL/L (ref 20–33)
CREAT SERPL-MCNC: 2.99 MG/DL (ref 0.5–1.4)
CREAT SERPL-MCNC: 3.58 MG/DL (ref 0.5–1.4)
CREAT UR-MCNC: 18.18 MG/DL
ERYTHROCYTE [DISTWIDTH] IN BLOOD BY AUTOMATED COUNT: 54.7 FL (ref 35.9–50)
GFR SERPLBLD CREATININE-BSD FMLA CKD-EPI: 19 ML/MIN/1.73 M 2
GFR SERPLBLD CREATININE-BSD FMLA CKD-EPI: 23 ML/MIN/1.73 M 2
GLUCOSE BLD STRIP.AUTO-MCNC: 125 MG/DL (ref 65–99)
GLUCOSE BLD STRIP.AUTO-MCNC: 168 MG/DL (ref 65–99)
GLUCOSE BLD STRIP.AUTO-MCNC: 39 MG/DL (ref 65–99)
GLUCOSE BLD STRIP.AUTO-MCNC: 50 MG/DL (ref 65–99)
GLUCOSE BLD STRIP.AUTO-MCNC: 91 MG/DL (ref 65–99)
GLUCOSE SERPL-MCNC: 116 MG/DL (ref 65–99)
GLUCOSE SERPL-MCNC: 93 MG/DL (ref 65–99)
HCT VFR BLD AUTO: 28.8 % (ref 42–52)
HGB BLD-MCNC: 9.2 G/DL (ref 14–18)
MCH RBC QN AUTO: 28.7 PG (ref 27–33)
MCHC RBC AUTO-ENTMCNC: 31.9 G/DL (ref 32.3–36.5)
MCV RBC AUTO: 89.7 FL (ref 81.4–97.8)
PLATELET # BLD AUTO: 488 K/UL (ref 164–446)
PMV BLD AUTO: 8.4 FL (ref 9–12.9)
POTASSIUM SERPL-SCNC: 5.2 MMOL/L (ref 3.6–5.5)
POTASSIUM SERPL-SCNC: 5.8 MMOL/L (ref 3.6–5.5)
PROT UR-MCNC: 36 MG/DL (ref 0–15)
PROT/CREAT UR: 1980 MG/G (ref 15–68)
RBC # BLD AUTO: 3.21 M/UL (ref 4.7–6.1)
SODIUM SERPL-SCNC: 126 MMOL/L (ref 135–145)
SODIUM SERPL-SCNC: 126 MMOL/L (ref 135–145)
WBC # BLD AUTO: 8.2 K/UL (ref 4.8–10.8)

## 2023-10-19 PROCEDURE — 700102 HCHG RX REV CODE 250 W/ 637 OVERRIDE(OP): Performed by: INTERNAL MEDICINE

## 2023-10-19 PROCEDURE — 82570 ASSAY OF URINE CREATININE: CPT

## 2023-10-19 PROCEDURE — A9270 NON-COVERED ITEM OR SERVICE: HCPCS | Performed by: INTERNAL MEDICINE

## 2023-10-19 PROCEDURE — 85027 COMPLETE CBC AUTOMATED: CPT

## 2023-10-19 PROCEDURE — C9399 UNCLASSIFIED DRUGS OR BIOLOG: HCPCS

## 2023-10-19 PROCEDURE — 80048 BASIC METABOLIC PNL TOTAL CA: CPT

## 2023-10-19 PROCEDURE — 700102 HCHG RX REV CODE 250 W/ 637 OVERRIDE(OP): Performed by: STUDENT IN AN ORGANIZED HEALTH CARE EDUCATION/TRAINING PROGRAM

## 2023-10-19 PROCEDURE — 84156 ASSAY OF PROTEIN URINE: CPT

## 2023-10-19 PROCEDURE — 82962 GLUCOSE BLOOD TEST: CPT | Mod: 91

## 2023-10-19 PROCEDURE — A9270 NON-COVERED ITEM OR SERVICE: HCPCS | Performed by: STUDENT IN AN ORGANIZED HEALTH CARE EDUCATION/TRAINING PROGRAM

## 2023-10-19 PROCEDURE — 700111 HCHG RX REV CODE 636 W/ 250 OVERRIDE (IP)

## 2023-10-19 PROCEDURE — 700111 HCHG RX REV CODE 636 W/ 250 OVERRIDE (IP): Mod: JZ | Performed by: INTERNAL MEDICINE

## 2023-10-19 PROCEDURE — 700102 HCHG RX REV CODE 250 W/ 637 OVERRIDE(OP): Performed by: SURGERY

## 2023-10-19 PROCEDURE — 700105 HCHG RX REV CODE 258: Performed by: INTERNAL MEDICINE

## 2023-10-19 PROCEDURE — 770006 HCHG ROOM/CARE - MED/SURG/GYN SEMI*

## 2023-10-19 PROCEDURE — A9270 NON-COVERED ITEM OR SERVICE: HCPCS | Performed by: SURGERY

## 2023-10-19 PROCEDURE — 99254 IP/OBS CNSLTJ NEW/EST MOD 60: CPT | Performed by: INTERNAL MEDICINE

## 2023-10-19 PROCEDURE — 700111 HCHG RX REV CODE 636 W/ 250 OVERRIDE (IP): Performed by: SURGERY

## 2023-10-19 PROCEDURE — 36415 COLL VENOUS BLD VENIPUNCTURE: CPT

## 2023-10-19 PROCEDURE — 99233 SBSQ HOSP IP/OBS HIGH 50: CPT | Performed by: INTERNAL MEDICINE

## 2023-10-19 RX ORDER — SODIUM CHLORIDE 9 MG/ML
INJECTION, SOLUTION INTRAVENOUS CONTINUOUS
Status: DISCONTINUED | OUTPATIENT
Start: 2023-10-19 | End: 2023-10-21

## 2023-10-19 RX ORDER — DEXTROSE MONOHYDRATE 25 G/50ML
25 INJECTION, SOLUTION INTRAVENOUS ONCE
Status: DISCONTINUED | OUTPATIENT
Start: 2023-10-19 | End: 2023-10-19

## 2023-10-19 RX ORDER — DEXTROSE MONOHYDRATE 25 G/50ML
50 INJECTION, SOLUTION INTRAVENOUS ONCE
Status: DISCONTINUED | OUTPATIENT
Start: 2023-10-19 | End: 2023-10-19

## 2023-10-19 RX ORDER — TAMSULOSIN HYDROCHLORIDE 0.4 MG/1
0.4 CAPSULE ORAL
Status: DISCONTINUED | OUTPATIENT
Start: 2023-10-19 | End: 2023-11-03 | Stop reason: HOSPADM

## 2023-10-19 RX ORDER — FINASTERIDE 5 MG/1
5 TABLET, FILM COATED ORAL DAILY
Status: DISCONTINUED | OUTPATIENT
Start: 2023-10-19 | End: 2023-11-03 | Stop reason: HOSPADM

## 2023-10-19 RX ADMIN — HEPARIN SODIUM 5000 UNITS: 5000 INJECTION, SOLUTION INTRAVENOUS; SUBCUTANEOUS at 22:17

## 2023-10-19 RX ADMIN — SODIUM ZIRCONIUM CYCLOSILICATE 10 G: 10 POWDER, FOR SUSPENSION ORAL at 20:16

## 2023-10-19 RX ADMIN — DOCUSATE SODIUM 50 MG AND SENNOSIDES 8.6 MG 2 TABLET: 8.6; 5 TABLET, FILM COATED ORAL at 18:17

## 2023-10-19 RX ADMIN — CALCIUM CHLORIDE 1000 MG: 100 INJECTION, SOLUTION INTRAVENOUS at 08:18

## 2023-10-19 RX ADMIN — NICOTINE TRANSDERMAL SYSTEM 21 MG: 21 PATCH, EXTENDED RELEASE TRANSDERMAL at 05:19

## 2023-10-19 RX ADMIN — ATORVASTATIN CALCIUM 40 MG: 40 TABLET, FILM COATED ORAL at 18:17

## 2023-10-19 RX ADMIN — SODIUM CHLORIDE, POTASSIUM CHLORIDE, SODIUM LACTATE AND CALCIUM CHLORIDE: 600; 310; 30; 20 INJECTION, SOLUTION INTRAVENOUS at 05:23

## 2023-10-19 RX ADMIN — OXYCODONE HYDROCHLORIDE 20 MG: 10 TABLET ORAL at 01:46

## 2023-10-19 RX ADMIN — TRAMADOL HYDROCHLORIDE 50 MG: 50 TABLET ORAL at 12:20

## 2023-10-19 RX ADMIN — HEPARIN SODIUM 5000 UNITS: 5000 INJECTION, SOLUTION INTRAVENOUS; SUBCUTANEOUS at 05:20

## 2023-10-19 RX ADMIN — TRAMADOL HYDROCHLORIDE 50 MG: 50 TABLET ORAL at 23:45

## 2023-10-19 RX ADMIN — SODIUM ZIRCONIUM CYCLOSILICATE 10 G: 10 POWDER, FOR SUSPENSION ORAL at 08:19

## 2023-10-19 RX ADMIN — ACETAMINOPHEN 650 MG: 325 TABLET, FILM COATED ORAL at 05:19

## 2023-10-19 RX ADMIN — SODIUM ZIRCONIUM CYCLOSILICATE 10 G: 10 POWDER, FOR SUSPENSION ORAL at 16:07

## 2023-10-19 RX ADMIN — CYCLOBENZAPRINE 10 MG: 10 TABLET, FILM COATED ORAL at 22:17

## 2023-10-19 RX ADMIN — HEPARIN SODIUM 5000 UNITS: 5000 INJECTION, SOLUTION INTRAVENOUS; SUBCUTANEOUS at 16:08

## 2023-10-19 RX ADMIN — FINASTERIDE 5 MG: 5 TABLET, FILM COATED ORAL at 15:47

## 2023-10-19 RX ADMIN — CLOPIDOGREL BISULFATE 75 MG: 75 TABLET ORAL at 05:19

## 2023-10-19 RX ADMIN — ASPIRIN 81 MG: 81 TABLET, COATED ORAL at 05:19

## 2023-10-19 RX ADMIN — SODIUM CHLORIDE: 9 INJECTION, SOLUTION INTRAVENOUS at 12:50

## 2023-10-19 RX ADMIN — ACETAMINOPHEN 650 MG: 325 TABLET, FILM COATED ORAL at 18:16

## 2023-10-19 RX ADMIN — SODIUM CHLORIDE: 9 INJECTION, SOLUTION INTRAVENOUS at 13:59

## 2023-10-19 RX ADMIN — ACETAMINOPHEN 650 MG: 325 TABLET, FILM COATED ORAL at 12:20

## 2023-10-19 RX ADMIN — TRAMADOL HYDROCHLORIDE 50 MG: 50 TABLET ORAL at 05:18

## 2023-10-19 RX ADMIN — DEXTROSE MONOHYDRATE 250 ML: 100 INJECTION, SOLUTION INTRAVENOUS at 11:14

## 2023-10-19 RX ADMIN — GABAPENTIN 300 MG: 300 CAPSULE ORAL at 18:17

## 2023-10-19 RX ADMIN — TRAMADOL HYDROCHLORIDE 50 MG: 50 TABLET ORAL at 18:16

## 2023-10-19 RX ADMIN — DEXTROSE MONOHYDRATE 250 ML: 100 INJECTION, SOLUTION INTRAVENOUS at 13:36

## 2023-10-19 RX ADMIN — TAMSULOSIN HYDROCHLORIDE 0.4 MG: 0.4 CAPSULE ORAL at 15:47

## 2023-10-19 ASSESSMENT — PAIN DESCRIPTION - PAIN TYPE
TYPE: ACUTE PAIN

## 2023-10-19 NOTE — CONSULTS
Nephrology Consultation    Date of Service  10/19/2023    Referring Physician  Darrel Chahal M.D.    Consulting Physician  Maria Guadalupe Ramos M.D.    Reason for Consultation  Acute kidney injury.    History of Presenting Illness  58 y.o. male who presented 9/26/2023 with hypertension who presents with lower extremity wounds.      Attributes wound on left foot to wearing small sized shoe.  He started developing pain and when his symptoms did not improve with Tylenol patient presented to hospital for further evaluation.  Ultimately patient admitted for gangrene of his left foot.  Ocular surgery consulted for which patient underwent left BKA on 9/27/2023.  Course complicated by Morganella Anastacio 9 [place] coccus bacteremia as well as Staph epidermidis bacteriuria.  Patient completed course of IV Zosyn on 10/6/2023.    Total course complicated by acute kidney injury for which nephrology is consulted.  Baseline renal function normal.  Peak creatinine 3.91, potassium 6.3's, sodium 126, CO2 20.  Review of Systems  ROS    Past Medical History  Hypertension  Surgical History  Past Surgical History:   Procedure Laterality Date    FEMORAL ENDARTERECTOMY Bilateral 9/30/2023    Procedure: ENDARTERECTOMY, FEMORAL;  Surgeon: Keith Navarro M.D.;  Location: [facility];  Service: Vascular    ANGIOGRAM, WITH ANGIOPLASTY, AND STENT INSERTION IF INDICATED Right 9/30/2023    Procedure: ANGIOGRAM, WITH ILIAC STENT;  Surgeon: Keith Navarro M.D.;  Location: [facility];  Service: Vascular    KNEE AMPUTATION BELOW Left 9/27/2023    Procedure: AMPUTATION, BELOW KNEE;  Surgeon: Pradip Altamirano M.D.;  Location: [facility];  Service: Orthopedics    IRRIGATION & DEBRIDEMENT ORTHO Left 3/4/2018    Procedure: IRRIGATION & DEBRIDEMENT ORTHO - HUMERUS;  Surgeon: Sergio Watkins M.D.;  Location: [facility];  Service: Orthopedics    ORIF, SHOULDER Left 1/4/2018    Procedure: SHOULDER ORIF;  Surgeon:  Sergio Watkins M.D.;  Location: [facility];  Service: Orthopedics    CLOSED REDUCTION Left 12/24/2017    Procedure: CLOSED REDUCTION;  Surgeon: Keith Subramanian M.D.;  Location: SUR[facility]ERY [facility];  Service: Orthopedics    OTHER      multiple surgeries lower legs from past injuries       Family History  History reviewed. No pertinent family history.    Social History  Current smoker.  A pack per day since teenage years.  Denies alcohol denies illicit drug use currently over marijuana in the past.      Medications  Current Facility-Administered Medications   Medication Dose Route Frequency Provider Last Rate Last Admin    dextrose 50% (D50W) injection 25 g  25 g Intravenous Once Darrel Chahal M.D.        And    insulin regular (HumuLIN R,NovoLIN R) injection  10 Units Intravenous Once Darrel Chahal M.D.        lactated ringers infusion   Intravenous Continuous Darrel Chahal M.D. 125 mL/hr at 10/19/23 0523 New Bag at 10/19/23 0523    dextrose 10 % BOLUS 250 mL  250 mL Intravenous Q15 MIN PRN Darrel Chahal M.D.        gabapentin (Neurontin) capsule 300 mg  300 mg Oral Q EVENING Darrel Chahal M.D.        sodium zirconium cyclosilicate (Lokelma) packet 10 g  10 g Oral TID Maribel Ortiz, A.P.R.N.   10 g at 10/19/23 0819    Followed by    [START ON 10/21/2023] sodium zirconium cyclosilicate (Lokelma) packet 10 g  10 g Oral DAILY AT NOON Maribel Ortiz, A.P.R.N.        atorvastatin (Lipitor) tablet 40 mg  40 mg Oral Q EVENING Porsha Enamorado M.D.   40 mg at 10/18/23 1724    acetaminophen (Tylenol) tablet 650 mg  650 mg Oral Q6HRS Yaquelin Sidhu M.D.   650 mg at 10/19/23 0519    traMADol (Ultram) 50 MG tablet 50 mg  50 mg Oral Q6HRS Yaquelin Sidhu M.D.   50 mg at 10/19/23 0518    oxycodone (Oxy-IR) immediate release tablet 15 mg  15 mg Oral Q3HRS PRN Yaquelin Sidhu M.D.        Or    oxyCODONE immediate release (Roxicodone) tablet 20 mg  20 mg Oral Q3HRS PRN Yaquelin Sidhu M.D.   20 mg at  10/19/23 0146    Pharmacy Consult Request ...Pain Management Review 1 Each  1 Each Other PHARMACY TO DOSE Keith Navarro M.D.        ondansetron (Zofran) syringe/vial injection 4 mg  4 mg Intravenous Q4HRS PRN Keith Navarro M.D.        haloperidol lactate (Haldol) injection 1 mg  1 mg Intravenous Q6HRS PRN Keith Navarro M.D.        scopolamine (Transderm-Scop) patch 1 Patch  1 Patch Transdermal Q72HRS PRN Keith Navarro M.D.        heparin injection 5,000 Units  5,000 Units Subcutaneous Q8HRS Keith Navarro M.D.   5,000 Units at 10/19/23 0520    cloNIDine (Catapres) tablet 0.2 mg  0.2 mg Oral Once PRN Keith Navarro M.D.        And    cloNIDine (Catapres) tablet 0.1 mg  0.1 mg Oral Q HOUR PRN Keith Navarro M.D.        labetalol (Normodyne/Trandate) injection 10 mg  10 mg Intravenous Q4HRS PRN Keith Navarro M.D.        hydrALAZINE (Apresoline) injection 10 mg  10 mg Intravenous Q4HRS PRN Keith Navarro M.D.        aspirin EC tablet 81 mg  81 mg Oral DAILY Keith Navarro M.D.   81 mg at 10/19/23 0519    clopidogrel (Plavix) tablet 75 mg  75 mg Oral DAILY Porsha Enamorado M.D.   75 mg at 10/19/23 0519    ALPRAZolam (Xanax) tablet 0.5 mg  0.5 mg Oral Once PRN Trinity Slaughter DJongOJong        cyclobenzaprine (Flexeril) tablet 10 mg  10 mg Oral TID PRN Trinity Slaughter DJongOJong   10 mg at 10/15/23 2116    senna-docusate (Pericolace Or Senokot S) 8.6-50 MG per tablet 2 Tablet  2 Tablet Oral BID Luz Mejia M.D.   2 Tablet at 10/15/23 0542    And    polyethylene glycol/lytes (Miralax) PACKET 1 Packet  1 Packet Oral QDAY PRN Luz Mejia M.D.   1 Packet at 09/29/23 1605    And    magnesium hydroxide (Milk Of Magnesia) suspension 30 mL  30 mL Oral QDAY PRN Luz Mejia M.D.        And    bisacodyl (Dulcolax) suppository 10 mg  10 mg Rectal QDAY PRN Luz Mejia M.D.        ondansetron (Zofran ODT) dispertab 4 mg  4 mg Oral Q4HRS PRN Luz  Yvan Mejia M.D.        promethazine (Phenergan) tablet 12.5-25 mg  12.5-25 mg Oral Q4HRS PRN Luz Mejia M.D.        promethazine (Phenergan) suppository 12.5-25 mg  12.5-25 mg Rectal Q4HRS PRN Luz Mejia M.D.        nicotine (Nicoderm) 21 MG/24HR 21 mg  21 mg Transdermal Daily-0600 Luz Mejia M.D.   21 mg at 10/19/23 0519    And    nicotine polacrilex (Nicorette) 2 MG piece 2 mg  2 mg Oral Q HOUR PRN Luz Mejia M.D.           Allergies  Allergies   Allergen Reactions    Sulfa Drugs Hives and Shortness of Breath       Physical Exam  Temp:  [36.1 °C (96.9 °F)-36.7 °C (98.1 °F)] 36.3 °C (97.4 °F)  Pulse:  [] 97  Resp:  [18] 18  BP: (123-152)/(73-86) 123/73  SpO2:  [94 %-97 %] 97 %    Physical Exam  GEN: alert and oriented in no acute distress.  HEENT: moist oropharygneal mucous membranes  CV:RRR, normal s1 s2  PULM: clear to auscultation bilaterally  ABD: soft non tender non distended  EXT: warm well perfused, no lower extremity edema   Fluids  Date 10/19/23 0700 - 10/20/23 0659   Shift 2667-7331 4994-5549 9756-0382 24 Hour Total   INTAKE   Shift Total       OUTPUT   Urine 2300   2300   Shift Total 2300   2300   Weight (kg) 71.3 71.3 71.3 71.3       Laboratory  Labs reviewed, pertinent labs below.  Recent Labs     10/18/23  0401 10/19/23  0447   WBC 9.5 8.2   RBC 3.31* 3.21*   HEMOGLOBIN 9.8* 9.2*   HEMATOCRIT 30.3* 28.8*   MCV 91.5 89.7   MCH 29.6 28.7   MCHC 32.3 31.9*   RDW 58.4* 54.7*   PLATELETCT 522* 488*   MPV 8.4* 8.4*     Recent Labs     10/18/23  1038 10/18/23  2120 10/19/23  0447   SODIUM 126* 126* 126*   POTASSIUM 6.1* 6.3* 5.8*   CHLORIDE 89* 90* 92*   CO2 22 20 22   GLUCOSE 67 69 93   BUN 51* 49* 48*   CREATININE 3.91* 3.68* 3.58*   CALCIUM 9.5 9.7 8.9                URINALYSIS:  Lab Results   Component Value Date/Time    COLORURINE Yellow 10/18/2023 1717    CLARITY Clear 10/18/2023 1717    SPECGRAVITY 1.006 10/18/2023 1717    PHURINE 6.0 10/18/2023  "1717    KETONES Negative 10/18/2023 1717    PROTEINURIN Negative 10/18/2023 1717    BILIRUBINUR Negative 10/18/2023 1717    UROBILU 0.2 10/18/2023 1717    NITRITE Negative 10/18/2023 1717    LEUKESTERAS Small (A) 10/18/2023 1717    OCCULTBLOOD Small (A) 10/18/2023 1717     UPC  No results found for: \"TOTPROTUR\" No results found for: \"CREATININEU\"    Imaging interpreted by radiologist. Imaging reports reviewed with pertinent findings below  US-RENAL   Final Result      1.  Bilateral medical renal disease.   2.  Moderate right and mild left hydronephrosis, likely secondary to distention of the urinary bladder secondary to prostatic hypertrophy. Post void images of the inner bladder and collecting systems of the kidneys were not acquired.   3.  Hydronephrosis was not present on the prior MRI of the abdomen without and with IV contrast on 10/2/2023.      MR-ABDOMEN-WITH & W/O   Final Result      Multi septated cystic lesion at the upper pole LEFT kidney measuring 3.8 cm without associated enhancement or nodular component (Bosniak 2).            DX-PORTABLE FLUORO > 1 HOUR   Final Result      Portable fluoroscopy utilized for 2 minutes 20.4 seconds.         INTERPRETING LOCATION: [address] [place] [address], [id number]      CT-CTA AORTA-RO WITH & W/O-POST PROCESS   Final Result         CTA AORTA      1.  Septated lobulated left upper pole renal cystic mass lesion, recommend follow-up MRI of the kidneys for further characterization.   2.  Atherosclerosis and atherosclerotic coronary artery disease.   3.  Fat-containing right inguinal hernia      CTA LOWER EXTREMITIES      1.  Occlusion of the right common iliac artery through the distal superficial femoral artery   2.  Distal right peroneal artery occlusion with 2-vessel runoff the anterior and posterior tibial arteries at the right ankle.   3.  Occlusion of the left superficial femoral artery at the bifurcation extending through its length to the popliteal artery.   4.  Occlusion " of the proximal left profunda femoris just distal to the bifurcation which reconstitutes.   5.  Soft tissue gas at the stump of left below-the-knee amputation, within expected limits for recent postop changes.      3D angiographic/MIP images of the vasculature confirm the vascular findings as described above.      These findings were discussed with the patient's clinician, Keith Navarro, on 9/29/2023 8:06 AM.      EC-ECHOCARDIOGRAM COMPLETE W/O CONT   Final Result      US-AORTA/ILIACS DUPLEX COMPLETE   Final Result      US-EXTREMITY ARTERY LOWER UNILAT RIGHT   Final Result      US-INGA SINGLE LEVEL UNILAT RIGHT   Final Result      DX-CHEST-PORTABLE (1 VIEW)   Final Result      1.  LEFT basilar atelectasis or pneumonia   2.  Trace LEFT pleural effusion or pleural scar            Assessment/Plan    Nonoliguric acute kidney injury -[place] due to ATN in the setting of infection, growing creatinine.       -Urinalysis demonstrates pyuria, hematuria bacteriuria oe for protein negative for nitrite negative for leukocyte Estrace.  - O(btqain renal US  - Dose all medications for patient level of renal function  - Avoid nephrotoxic agents including contrast and NSAIDs  - Encourage adequate hydration.         2.  Hyperkalemia  - treatment of acidosis as above for transcellular shifting of potassium.  Recommend total body potassium removal with lokelma.  Continue to monitor.      3.  Anion gap metabolic acidosis -infectious work-up.  Likely due to renal dysfunction.  Recommend sodium bicarbonate.  Continue to monitor    4.  Left foot gangrene status post BKA on 9/27/2023.  Aspirin.  Appreciate orthopedic surgery, vascular surgery recommendations.  Antibiotics completed.  Pain control avoid NSAIDs.  Avoid morphine.    5.  Peripheral arterial disease.  Underwent bilateral common femoral artery endarterectomy with saphenous vein angioplasty and stent placement in right external iliac on 9/30/2023.  Aspirin.  Plavix for 1 month.   Appreciate vascular surgery recommendations.    6.  Renal cyst. -MRI demonstrated multiseptated cystic lesions without enhancement.  Recommend follow up imaging.

## 2023-10-19 NOTE — CARE PLAN
The patient is Stable - Low risk of patient condition declining or worsening    Shift Goals  Clinical Goals: rest  Patient Goals: pain control  Family Goals: ashlie    Progress made toward(s) clinical / shift goals:  patient resting in bed; pain controlled    Patient is not progressing towards the following goals:

## 2023-10-19 NOTE — PROGRESS NOTES
Hospital Medicine Daily Progress Note    Date of Service  10/19/2023    Chief Complaint  Bilateral feet wounds    Hospital Course  Dillon Centeno is a 58 y.o. male with hypertension and tobacco dependence, admitted 9/26/2023 with bilateral feet wounds.  He was noted to have ischemic gangrene of the left lower extremity with maggots and superimposed bacterial infection.  Vascular surgery and orthopedic surgery were consulted.  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  Vascular studies showed severe arterial disease.  Patient underwent bilateral common femoral artery endarterectomy and profundoplasty with saphenous vein angioplasty with stent placement in the right external iliac artery on 9/30/2023. Prevena was placed (5-7 days). He was recommended to have Plavix for 1 month and aspirin for life. Blood cultures from 9/26/2023 grew Morganella morganii and Vagococcus.  Urine culture from 9/26/2023 grew Staph epidermidis. ID were consulted.   Echocardiogram showed LVEF of about 60% with no reported valvular abnormalities.  Repeat blood cultures from 9/27/2023 have been negative.  ID recommended IV Zosyn which he completed on 10/6/2023.  Subsequently, he had breakdown of the incision site on the left BKA.  Orthopedics planning for revision.  However he developed acute kidney injury which was felt to be prerenal, with associated mild hyperkalemia.  He was given potassium lowering medications, along with IV fluids.    Additionally, he was noted to have renal cyst on CT.  MRI shows multiseptated cystic lesions in the upper pole of left kidney measuring 3.8 cm without associated enhancement or nodular component.    Discharge planning was pursued, but unfortunately had no accepting SNF's and no home health services accepting either.  Patient refused to go to a group home.  DMEs were being arranged.    Interval Problem Update  10/19/2023 - I reviewed the patient's chart. There were no significant overnight events. Remains  hemodynamically stable and afebrile. Stable on RA.  Sodium 126, potassium 5.8, creatinine 3.58.  CO2 and anion gap are normal.  Urinalysis was bland with negative protein, 0-2 hyaline casts, 5-10 RBC.  EKG showed increased T wave amplitude on lateral leads (personally reviewed).  Left BKA revision has been postponed given GELY.  Renal ultrasound showed moderate right and mild left hydronephrosis with distended urinary bladder.    > I have personally seen and examined the patient today.  He is comfortable today.  No complaints.  Denies any pain.  No chest pain, shortness of breath, nausea, vomiting, abdominal pain.  I ordered Holguin catheter placement, which was done, draining clear daina urine with no hematuria.  I reached out and discussed with urology (Dr. Bearden), along with nephrology (Dr. Ramos) for consults.      I personally reviewed all lab results mentioned above. Prior medical records from this institution and outside facilities were independently reviewed as noted. I also personally reviewed all ER physician and consultant recommendations and plans as documented above. History was independently obtained by myself. I have discussed this patient's plan of care and discharge plan at IDT rounds today with Case Management, Nursing, Nursing leadership, and other members of the IDT team.    Consultants/Specialty  infectious disease, nephrology, orthopedics, urology, and vascular surgery    Code Status  Full Code    Disposition  The patient is not medically cleared for discharge to home or a post-acute facility.      Anticipate discharge to home once cleared by orthopedics and wheelchair is arranged.  I have placed the appropriate orders for post-discharge needs.    Review of Systems  ROS     Pertinent positives/negatives as mentioned above.     A complete review of systems was personally done by me. All other systems were negative.       Physical Exam  Temp:  [36.1 °C (96.9 °F)-36.7 °C (98.1 °F)] 36.3 °C (97.4  °F)  Pulse:  [] 97  Resp:  [18] 18  BP: (123-152)/(73-86) 123/73  SpO2:  [94 %-97 %] 97 %    Physical Exam  Vitals reviewed.   Constitutional:       General: He is not in acute distress.     Appearance: Normal appearance. He is not toxic-appearing or diaphoretic.   HENT:      Head: Normocephalic and atraumatic.      Nose: Nose normal.      Mouth/Throat:      Mouth: Mucous membranes are dry.      Pharynx: No oropharyngeal exudate.   Eyes:      General: No scleral icterus.     Extraocular Movements: Extraocular movements intact.      Conjunctiva/sclera: Conjunctivae normal.      Pupils: Pupils are equal, round, and reactive to light.   Cardiovascular:      Rate and Rhythm: Normal rate.      Heart sounds: Normal heart sounds. No murmur heard.     No gallop.   Pulmonary:      Effort: Pulmonary effort is normal. No respiratory distress.      Breath sounds: Normal breath sounds. No stridor. No wheezing, rhonchi or rales.   Chest:      Chest wall: No tenderness.   Abdominal:      General: Bowel sounds are normal. There is no distension.      Palpations: Abdomen is soft. There is no mass.      Tenderness: There is no abdominal tenderness. There is no guarding or rebound.   Genitourinary:     Penis: Normal.       Comments: Holguin catheter in place, draining clear daina urine.  Musculoskeletal:         General: No swelling. Normal range of motion.      Cervical back: Normal range of motion and neck supple.      Right lower leg: No edema.      Comments: Left BKA, with breakdown in incision site   Lymphadenopathy:      Cervical: No cervical adenopathy.   Skin:     General: Skin is warm and dry.      Coloration: Skin is not jaundiced.      Findings: No rash.      Comments: Third right toe absent due to prior trauma.  Remaining toes with chronic ulcers.   Neurological:      General: No focal deficit present.      Mental Status: He is alert and oriented to person, place, and time. Mental status is at baseline.      Cranial  Nerves: No cranial nerve deficit.   Psychiatric:         Mood and Affect: Mood normal.         Behavior: Behavior normal.         Thought Content: Thought content normal.         Judgment: Judgment normal.           Fluids    Intake/Output Summary (Last 24 hours) at 10/19/2023 1031  Last data filed at 10/19/2023 0849  Gross per 24 hour   Intake 2677.25 ml   Output 5325 ml   Net -2647.75 ml       Laboratory  Recent Labs     10/18/23  0401 10/19/23  0447   WBC 9.5 8.2   RBC 3.31* 3.21*   HEMOGLOBIN 9.8* 9.2*   HEMATOCRIT 30.3* 28.8*   MCV 91.5 89.7   MCH 29.6 28.7   MCHC 32.3 31.9*   RDW 58.4* 54.7*   PLATELETCT 522* 488*   MPV 8.4* 8.4*       Recent Labs     10/18/23  1038 10/18/23  2120 10/19/23  0447   SODIUM 126* 126* 126*   POTASSIUM 6.1* 6.3* 5.8*   CHLORIDE 89* 90* 92*   CO2 22 20 22   GLUCOSE 67 69 93   BUN 51* 49* 48*   CREATININE 3.91* 3.68* 3.58*   CALCIUM 9.5 9.7 8.9                     Imaging  US-RENAL   Final Result      1.  Bilateral medical renal disease.   2.  Moderate right and mild left hydronephrosis, likely secondary to distention of the urinary bladder secondary to prostatic hypertrophy. Post void images of the inner bladder and collecting systems of the kidneys were not acquired.   3.  Hydronephrosis was not present on the prior MRI of the abdomen without and with IV contrast on 10/2/2023.      MR-ABDOMEN-WITH & W/O   Final Result      Multi septated cystic lesion at the upper pole LEFT kidney measuring 3.8 cm without associated enhancement or nodular component (Bosniak 2).            DX-PORTABLE FLUORO > 1 HOUR   Final Result      Portable fluoroscopy utilized for 2 minutes 20.4 seconds.         INTERPRETING LOCATION: [address] [place] [address] [place], [id number]      CT-CTA AORTA-RO WITH & W/O-POST PROCESS   Final Result         CTA AORTA      1.  Septated lobulated left upper pole renal cystic mass lesion, recommend follow-up MRI of the kidneys for further characterization.   2.  Atherosclerosis and  atherosclerotic coronary artery disease.   3.  Fat-containing right inguinal hernia      CTA LOWER EXTREMITIES      1.  Occlusion of the right common iliac artery through the distal superficial femoral artery   2.  Distal right peroneal artery occlusion with 2-vessel runoff the anterior and posterior tibial arteries at the right ankle.   3.  Occlusion of the left superficial femoral artery at the bifurcation extending through its length to the popliteal artery.   4.  Occlusion of the proximal left profunda femoris just distal to the bifurcation which reconstitutes.   5.  Soft tissue gas at the stump of left below-the-knee amputation, within expected limits for recent postop changes.      3D angiographic/MIP images of the vasculature confirm the vascular findings as described above.      These findings were discussed with the patient's clinician, Keith Navarro, on 9/29/2023 8:06 AM.      EC-ECHOCARDIOGRAM COMPLETE W/O CONT   Final Result      US-AORTA/ILIACS DUPLEX COMPLETE   Final Result      US-EXTREMITY ARTERY LOWER UNILAT RIGHT   Final Result      US-INGA SINGLE LEVEL UNILAT RIGHT   Final Result      DX-CHEST-PORTABLE (1 VIEW)   Final Result      1.  LEFT basilar atelectasis or pneumonia   2.  Trace LEFT pleural effusion or pleural scar             Assessment/Plan  * Gangrene of left foot (HCC)- (present on admission)  Assessment & Plan  - s/p Left BKA on 9/27/2023.   - Vascular studies showed severe arterial disease. S/p bilateral common femoral artery endarterectomy and profundoplasty with saphenous vein angioplasty with stent placement in the right external iliac artery on 9/30/2023. Prevena was placed (5-7 days).  Continue Plavix for 1 month and aspirin for life. Follow-up with Dr. Navarro (vascular surgery). Staples to be removed 14 to 21 days postoperatively.   - Completed course of IV Zosyn on 10/6/2023.   -Noted breakdown of incision site on the left BKA.  Orthopedics planning for left BKA revision.    -Continue pain control with oral oxycodone.    GELY (acute kidney injury) ([facility])  Assessment & Plan  - Creatinine significantly jumped to 3.76, remains elevated at 3.48 today.  No labs have been drawn between 10/12 and 10/18.  Renal ultrasound also showed moderate right and mild left hydronephrosis with distended urinary bladder.  Suspect has post renal failure, with component of prerenal given significant elevation in BUN as well.  CPK is normal.  Urinalysis bland.  -Given mild hyperkalemia, will change IV fluids to NS at 150 cc/h.  -Place Holguin catheter to relieve obstruction.  Urology consulted, await further recommendations.  -Nephrology consulted.  -Monitor for improvement in renal function closely.  Monitor electrolytes particularly potassium.  Avoid nephrotoxins, and continue to renally dose all medications.    Bacteremia- (present on admission)  Assessment & Plan  - Blood cultures from 9/26/2023 grew Morganella morganii and Vagococcus. Echocardiogram showed no evidence of vegetations.   - ID consulted, completed course of IV Zosyn on 10/6/2023. Repeat blood cultures from 9/27/2023 have been negative.      Hyperkalemia  Assessment & Plan  - Due to acute renal failure.  Mild.  Has minimal T wave changes on lateral leads on EKG.  -We will give a dose of calcium chloride IV.  Give another dose of D50/regular insulin IV.  Repeat BMP at noon.  -Management of GELY as above.  -Monitor closely.    PAD (peripheral artery disease) ([facility])- (present on admission)  Assessment & Plan  - s/p bilateral common femoral artery endarterectomy and profundoplasty with saphenous vein angioplasty with stent placement in the right external iliac artery on 9/30/2023. Prevena was placed (5-7 days).   -Continue Plavix for 1 month and aspirin for life.  Follow-up with vascular surgery.  Stump protector for left leg was ordered by vascular surgery.    Hyponatremia- (present on admission)  Assessment & Plan  - Appears euvolemic.   Stable.  -Continue to monitor.    Elevated liver enzymes- (present on admission)  Assessment & Plan  - Resolved.  Hepatitis panel was normal.  Continue to monitor.    Leukocytosis- (present on admission)  Assessment & Plan  - Resolved.  Completed antibiotics.    Anemia- (present on admission)  Assessment & Plan  -Stable.  Monitor.  CBC in the morning.  Restrictive transfusion strategy.    Renal cyst- (present on admission)  Assessment & Plan  - Noted to have renal cyst on CT.  MRI shows multiseptated cystic lesions in the upper pole of left kidney measuring 3.8 cm without associated enhancement or nodular component.    -Recommend outpatient follow-up         VTE prophylaxis: SCDs, Heparin SQ    My total time spent caring for the patient on the day of the encounter was 57 minutes. This does not include time spent on separately billable procedures/tests.

## 2023-10-19 NOTE — CARE PLAN
The patient is Stable - Low risk of patient condition declining or worsening    Shift Goals  Clinical Goals: Decrease K+ levels  Patient Goals: comfort  Family Goals: ashlie      Patient is not progressing towards the following goals:      Problem: Knowledge Deficit - Standard  Goal: Patient and family/care givers will demonstrate understanding of plan of care, disease process/condition, diagnostic tests and medications  Description: Target End Date:  1-3 days or as soon as patient condition allows    Document in Patient Education    1.  Patient and family/caregiver oriented to unit, equipment, visitation policy and means for communicating concern  2.  Complete/review Learning Assessment  3.  Assess knowledge level of disease process/condition, treatment plan, diagnostic tests and medications  4.  Explain disease process/condition, treatment plan, diagnostic tests and medications  Outcome: Not Progressing

## 2023-10-19 NOTE — CONSULTS
UROLOGY Consult Note:    Stacey Townsend P.A.-C.  Date & Time note created:    10/19/2023   12:22 PM     Referring MD:  Dr. Chahal    Patient ID:   Name:             Dillon Centeno   YOB: 1965  Age:                 58 y.o.  male   MRN:               [id number]                                                             Reason for Consult:      Urinary retention and GELY     History of Present Illness:    59 yo M with HTN and smoking history, most recently here for ischemic gangrene and BKA now with worsening kidney function. Pt reports years of gradually worsening urinary symptoms of hesitancy, slow stream and post void dribbling. He has never been on medications for his prostate.     Review of Systems:      Constitutional: Denies fevers   Eyes: Denies changes in vision   Ears/Nose/Throat/Mouth: Denies nasal congestion   Cardiovascular: no chest pain   Respiratory: no shortness of breath   Gastrointestinal/Hepatic: Denies abdominal pain   Skin: Denies rash  Neurological: Denies headache   Psychiatric: denies mood disorder   Endocrine: Nadya thyroid problems   All other systems were reviewed and are negative (AMA/CMS criteria)                Past Medical History:   Past Medical History:   Diagnosis Date    Hypertension      Active Hospital Problems    Diagnosis     GELY (acute kidney injury) (HCC) [N17.9]     Hyperkalemia [E87.5]     PAD (peripheral artery disease) (HCC) [I73.9]     Bacteremia [R78.81]     Anemia [D64.9]     Gangrene of left foot (HCC) [I96]     Leukocytosis [D72.829]     Elevated liver enzymes [R74.8]     Hyponatremia [E87.1]     Renal cyst [N28.1]        Past Surgical History:  Past Surgical History:   Procedure Laterality Date    FEMORAL ENDARTERECTOMY Bilateral 9/30/2023    Procedure: ENDARTERECTOMY, FEMORAL;  Surgeon: Keith Navarro M.D.;  Location: SURGERY [facility];  Service: Vascular    ANGIOGRAM, WITH ANGIOPLASTY, AND STENT INSERTION IF INDICATED Right 9/30/2023     Procedure: ANGIOGRAM, WITH ILIAC STENT;  Surgeon: Keith Navarro M.D.;  Location: SURGERY [facility];  Service: Vascular    KNEE AMPUTATION BELOW Left 2023    Procedure: AMPUTATION, BELOW KNEE;  Surgeon: Pradip Altamirano M.D.;  Location: SURGERY [facility];  Service: Orthopedics    IRRIGATION & DEBRIDEMENT ORTHO Left 3/4/2018    Procedure: IRRIGATION & DEBRIDEMENT ORTHO - HUMERUS;  Surgeon: Sergio Watkins M.D.;  Location: SURGERY [facility];  Service: Orthopedics    ORIF, SHOULDER Left 2018    Procedure: SHOULDER ORIF;  Surgeon: Sergio Watkins M.D.;  Location: SURGERY [facility];  Service: Orthopedics    CLOSED REDUCTION Left 2017    Procedure: CLOSED REDUCTION;  Surgeon: Keith Subramanian M.D.;  Location: SURGERY [facility];  Service: Orthopedics    OTHER      multiple surgeries lower legs from past injuries       Hospital Medications:    Current Facility-Administered Medications:     NS infusion, , Intravenous, Continuous, Darrel Chahal M.D.    dextrose 10 % BOLUS 250 mL, 250 mL, Intravenous, Once, Darrel Chahal M.D.    dextrose 10 % BOLUS 250 mL, 250 mL, Intravenous, Q15 MIN PRN, Darrel Chahal M.D., Last Rate: 999 mL/hr at 10/19/23 1114, 250 mL at 10/19/23 1114    gabapentin (Neurontin) capsule 300 mg, 300 mg, Oral, Q EVENING, Darrel Chahal M.D.    sodium zirconium cyclosilicate (Lokelma) packet 10 g, 10 g, Oral, TID, 10 g at 10/19/23 0819 **FOLLOWED BY** [START ON 10/21/2023] sodium zirconium cyclosilicate (Lokelma) packet 10 g, 10 g, Oral, DAILY AT NOON, CHARLES Sellers    atorvastatin (Lipitor) tablet 40 mg, 40 mg, Oral, Q EVENING, Porsha Enamorado M.D., 40 mg at 10/18/23 1724    [] acetaminophen (Tylenol) tablet 650 mg, 650 mg, Oral, Q6HRS, 650 mg at 10/05/23 1650 **FOLLOWED BY** acetaminophen (Tylenol) tablet 650 mg, 650 mg, Oral, Q6HRS, Yaquelin Sidhu M.D., 650 mg at 10/19/23 1220    traMADol (Ultram) 50 MG tablet 50 mg, 50 mg, Oral, Q6HRS,  Yaquelin Sidhu M.D., 50 mg at 10/19/23 1220    oxycodone (Oxy-IR) immediate release tablet 15 mg, 15 mg, Oral, Q3HRS PRN **OR** oxyCODONE immediate release (Roxicodone) tablet 20 mg, 20 mg, Oral, Q3HRS PRN, 20 mg at 10/19/23 0146 **OR** [DISCONTINUED] HYDROmorphone (Dilaudid) injection 1.5 mg, 1.5 mg, Intravenous, Q2HRS PRN, Yaquelin Sidhu M.D., 1.5 mg at 10/11/23 0944    Pharmacy Consult Request ...Pain Management Review 1 Each, 1 Each, Other, PHARMACY TO DOSE, Keith Navarro M.D.    ondansetron (Zofran) syringe/vial injection 4 mg, 4 mg, Intravenous, Q4HRS PRN, Keith Navarro M.D.    haloperidol lactate (Haldol) injection 1 mg, 1 mg, Intravenous, Q6HRS PRN, Keith Navarro M.D.    scopolamine (Transderm-Scop) patch 1 Patch, 1 Patch, Transdermal, Q72HRS PRN, Keith Navarro M.D.    heparin injection 5,000 Units, 5,000 Units, Subcutaneous, Q8HRS, Keith Navarro M.D., 5,000 Units at 10/19/23 0520    cloNIDine (Catapres) tablet 0.2 mg, 0.2 mg, Oral, Once PRN **AND** cloNIDine (Catapres) tablet 0.1 mg, 0.1 mg, Oral, Q HOUR PRN, Keith Navarro M.D.    labetalol (Normodyne/Trandate) injection 10 mg, 10 mg, Intravenous, Q4HRS PRN, Keith Navarro M.D.    hydrALAZINE (Apresoline) injection 10 mg, 10 mg, Intravenous, Q4HRS PRN, Keith Navarro M.D.    aspirin EC tablet 81 mg, 81 mg, Oral, DAILY, Keith Navarro M.D., 81 mg at 10/19/23 0519    clopidogrel (Plavix) tablet 75 mg, 75 mg, Oral, DAILY, Porsha Enamorado M.D., 75 mg at 10/19/23 0519    ALPRAZolam (Xanax) tablet 0.5 mg, 0.5 mg, Oral, Once PRN, Trinity Slaughter D.O.    cyclobenzaprine (Flexeril) tablet 10 mg, 10 mg, Oral, TID PRN, ROME MatiasO., 10 mg at 10/15/23 2116    senna-docusate (Pericolace Or Senokot S) 8.6-50 MG per tablet 2 Tablet, 2 Tablet, Oral, BID, 2 Tablet at 10/15/23 0542 **AND** polyethylene glycol/lytes (Miralax) PACKET 1 Packet, 1 Packet, Oral, QDAY PRN, 1 Packet at 09/29/23 1605 **AND** magnesium  hydroxide (Milk Of Magnesia) suspension 30 mL, 30 mL, Oral, QDAY PRN **AND** bisacodyl (Dulcolax) suppository 10 mg, 10 mg, Rectal, QDAY PRN, Luz Mejia M.D.    ondansetron (Zofran ODT) dispertab 4 mg, 4 mg, Oral, Q4HRS PRN, Luz Mejia M.D.    promethazine (Phenergan) tablet 12.5-25 mg, 12.5-25 mg, Oral, Q4HRS PRN, Luz Mejia M.D.    promethazine (Phenergan) suppository 12.5-25 mg, 12.5-25 mg, Rectal, Q4HRS PRN, Luz Mejia M.D.    nicotine (Nicoderm) 21 MG/24HR 21 mg, 21 mg, Transdermal, Daily-0600, 21 mg at 10/19/23 0519 **AND** Nicotine Replacement Patient Education Materials, , , Once **AND** nicotine polacrilex (Nicorette) 2 MG piece 2 mg, 2 mg, Oral, Q HOUR PRN, Luz Mejia M.D.    Current Outpatient Medications:  No medications prior to admission.       Medication Allergy:  Allergies   Allergen Reactions    Sulfa Drugs Hives and Shortness of Breath       Family History:  History reviewed. No pertinent family history.    Social History:  Social History     Socioeconomic History    Marital status: Single     Spouse name: Not on file    Number of children: Not on file    Years of education: Not on file    Highest education level: Not on file   Occupational History    Not on file   Tobacco Use    Smoking status: Every Day     Types: Cigars    Smokeless tobacco: Never   Substance and Sexual Activity    Alcohol use: Yes     Comment: a few beers a week    Drug use: No    Sexual activity: Not on file   Other Topics Concern    Not on file   Social History Narrative    Not on file     Social Determinants of Health     Financial Resource Strain: Not on file   Food Insecurity: Not on file   Transportation Needs: Not on file   Physical Activity: Not on file   Stress: Not on file   Social Connections: Not on file   Intimate Partner Violence: Not on file   Housing Stability: Not on file         Physical Exam:  Vitals/ General Appearance:   Weight/BMI: Body mass index  "is 21.92 kg/m².  /73   Pulse 97   Temp 36.3 °C (97.4 °F) (Temporal)   Resp 18   Ht 1.803 m (5' 11\")   Wt 71.3 kg (157 lb 3 oz)   SpO2 97%   Vitals:    10/18/23 1521 10/18/23 1910 10/19/23 0406 10/19/23 0714   BP: (!) 151/81 129/83 (!) 152/86 123/73   Pulse: 75 91 (!) 103 97   Resp: 18 18 18 18   Temp: 36.1 °C (96.9 °F) 36.3 °C (97.4 °F) 36.7 °C (98.1 °F) 36.3 °C (97.4 °F)   TempSrc: Temporal Temporal Temporal Temporal   SpO2: 95% 94% 97% 97%   Weight:       Height:         Oxygen Therapy:  Pulse Oximetry: 97 %, O2 Delivery Device: None - Room Air    Constitutional:   Well developed, Well nourished, No acute distress  HENMT:  Normocephalic, Atraumatic, Oropharynx moist mucous membranes, No oral exudates, Nose normal.  No thyromegaly.  Eyes:  EOMI, Conjunctiva normal, No discharge.  Neck:  Normal range of motion, No cervical tenderness.  Lungs:  Normal resp effort   Abdomen: soft, Nontender  : -CVAT  Skin: Warm, Dry,   Neurologic: Alert & oriented x 3,   Psychiatric: Affect normal, Judgment normal, Mood normal.      MDM (Data Review):     Records reviewed and summarized in current documentation    Lab Data Review:  Recent Results (from the past 24 hour(s))   POCT glucose device results    Collection Time: 10/18/23  1:37 PM   Result Value Ref Range    POC Glucose, Blood 100 (H) 65 - 99 mg/dL   URINALYSIS    Collection Time: 10/18/23  5:17 PM    Specimen: Urine, Clean Catch   Result Value Ref Range    Color Yellow     Character Clear     Specific Gravity 1.006 <1.035    Ph 6.0 5.0 - 8.0    Glucose Negative Negative mg/dL    Ketones Negative Negative mg/dL    Protein Negative Negative mg/dL    Bilirubin Negative Negative    Urobilinogen, Urine 0.2 Negative    Nitrite Negative Negative    Leukocyte Esterase Small (A) Negative    Occult Blood Small (A) Negative    Micro Urine Req Microscopic    URINE MICROSCOPIC (W/UA)    Collection Time: 10/18/23  5:17 PM   Result Value Ref Range    WBC 10-20 (A) /hpf    RBC " 5-10 (A) /hpf    Bacteria Many (A) None /hpf    Epithelial Cells Few /hpf    Epithelial Cells Renal Rare /hpf    Hyaline Cast 0-2 /lpf   Basic Metabolic Panel    Collection Time: 10/18/23  9:20 PM   Result Value Ref Range    Sodium 126 (L) 135 - 145 mmol/L    Potassium 6.3 (H) 3.6 - 5.5 mmol/L    Chloride 90 (L) 96 - 112 mmol/L    Co2 20 20 - 33 mmol/L    Glucose 69 65 - 99 mg/dL    Bun 49 (H) 8 - 22 mg/dL    Creatinine 3.68 (H) 0.50 - 1.40 mg/dL    Calcium 9.7 8.5 - 10.5 mg/dL    Anion Gap 16.0 7.0 - 16.0   ESTIMATED GFR    Collection Time: 10/18/23  9:20 PM   Result Value Ref Range    GFR (CKD-EPI) 18 (A) >60 mL/min/1.73 m 2   CBC WITHOUT DIFFERENTIAL    Collection Time: 10/19/23  4:47 AM   Result Value Ref Range    WBC 8.2 4.8 - 10.8 K/uL    RBC 3.21 (L) 4.70 - 6.10 M/uL    Hemoglobin 9.2 (L) 14.0 - 18.0 g/dL    Hematocrit 28.8 (L) 42.0 - 52.0 %    MCV 89.7 81.4 - 97.8 fL    MCH 28.7 27.0 - 33.0 pg    MCHC 31.9 (L) 32.3 - 36.5 g/dL    RDW 54.7 (H) 35.9 - 50.0 fL    Platelet Count 488 (H) 164 - 446 K/uL    MPV 8.4 (L) 9.0 - 12.9 fL   Basic Metabolic Panel    Collection Time: 10/19/23  4:47 AM   Result Value Ref Range    Sodium 126 (L) 135 - 145 mmol/L    Potassium 5.8 (H) 3.6 - 5.5 mmol/L    Chloride 92 (L) 96 - 112 mmol/L    Co2 22 20 - 33 mmol/L    Glucose 93 65 - 99 mg/dL    Bun 48 (H) 8 - 22 mg/dL    Creatinine 3.58 (H) 0.50 - 1.40 mg/dL    Calcium 8.9 8.5 - 10.5 mg/dL    Anion Gap 12.0 7.0 - 16.0   ESTIMATED GFR    Collection Time: 10/19/23  4:47 AM   Result Value Ref Range    GFR (CKD-EPI) 19 (A) >60 mL/min/1.73 m 2   Basic Metabolic Panel    Collection Time: 10/19/23 11:40 AM   Result Value Ref Range    Sodium 126 (L) 135 - 145 mmol/L    Potassium 5.2 3.6 - 5.5 mmol/L    Chloride 92 (L) 96 - 112 mmol/L    Co2 22 20 - 33 mmol/L    Glucose 116 (H) 65 - 99 mg/dL    Bun 45 (H) 8 - 22 mg/dL    Creatinine 2.99 (H) 0.50 - 1.40 mg/dL    Calcium 9.4 8.5 - 10.5 mg/dL    Anion Gap 12.0 7.0 - 16.0   ESTIMATED GFR     Collection Time: 10/19/23 11:40 AM   Result Value Ref Range    GFR (CKD-EPI) 23 (A) >60 mL/min/1.73 m 2       Imaging/Procedures Review:    Reviewed    MDM (Assessment and Plan):      57 yo M with multiple comorbidities now with acute on chronic kidney failure. CT with  Moderate right and mild left hydronephrosis, likely secondary to distention of the urinary bladder secondary to prostatic hypertrophy. Given pts history, and findings most consistent with urinary retention from BPH. Cr already improved with gonzales placement.     Plan:   Gonzales to remain until kidney function normalizes.   Start Flomax or similar alpha blocker, and finasteride   Can consider TOV prior to discharge, if unable to urinate with PVR less than ~300cc, keep gonzales in place until follow up in our office   Pt will need eventual ALFARO procedure.   Urology signing off, will arrange out pt follow up      Dr. Bearden is aware of this consult and has directed this plan of care .  Stacey Townsend PA-C

## 2023-10-20 ENCOUNTER — ANESTHESIA EVENT (OUTPATIENT)
Dept: SURGERY | Facility: MEDICAL CENTER | Age: 58
DRG: 239 | End: 2023-10-20
Payer: MEDICAID

## 2023-10-20 ENCOUNTER — ANESTHESIA (OUTPATIENT)
Dept: SURGERY | Facility: MEDICAL CENTER | Age: 58
DRG: 239 | End: 2023-10-20
Payer: MEDICAID

## 2023-10-20 LAB
ANION GAP SERPL CALC-SCNC: 13 MMOL/L (ref 7–16)
APPEARANCE UR: CLEAR
BACTERIA #/AREA URNS HPF: NEGATIVE /HPF
BILIRUB UR QL STRIP.AUTO: NEGATIVE
BUN SERPL-MCNC: 34 MG/DL (ref 8–22)
CALCIUM SERPL-MCNC: 8.6 MG/DL (ref 8.5–10.5)
CHLORIDE SERPL-SCNC: 100 MMOL/L (ref 96–112)
CO2 SERPL-SCNC: 22 MMOL/L (ref 20–33)
COLOR UR: YELLOW
CREAT SERPL-MCNC: 1.7 MG/DL (ref 0.5–1.4)
EPI CELLS #/AREA URNS HPF: NEGATIVE /HPF
ERYTHROCYTE [DISTWIDTH] IN BLOOD BY AUTOMATED COUNT: 54.8 FL (ref 35.9–50)
GFR SERPLBLD CREATININE-BSD FMLA CKD-EPI: 46 ML/MIN/1.73 M 2
GLUCOSE SERPL-MCNC: 96 MG/DL (ref 65–99)
GLUCOSE UR STRIP.AUTO-MCNC: NEGATIVE MG/DL
HCT VFR BLD AUTO: 25.2 % (ref 42–52)
HGB BLD-MCNC: 8.2 G/DL (ref 14–18)
HYALINE CASTS #/AREA URNS LPF: ABNORMAL /LPF
KETONES UR STRIP.AUTO-MCNC: NEGATIVE MG/DL
LEUKOCYTE ESTERASE UR QL STRIP.AUTO: ABNORMAL
MCH RBC QN AUTO: 29.2 PG (ref 27–33)
MCHC RBC AUTO-ENTMCNC: 32.5 G/DL (ref 32.3–36.5)
MCV RBC AUTO: 89.7 FL (ref 81.4–97.8)
MICRO URNS: ABNORMAL
NITRITE UR QL STRIP.AUTO: NEGATIVE
PH UR STRIP.AUTO: 7 [PH] (ref 5–8)
PLATELET # BLD AUTO: 505 K/UL (ref 164–446)
PMV BLD AUTO: 8.8 FL (ref 9–12.9)
POTASSIUM SERPL-SCNC: 4.3 MMOL/L (ref 3.6–5.5)
PROT UR QL STRIP: NEGATIVE MG/DL
RBC # BLD AUTO: 2.81 M/UL (ref 4.7–6.1)
RBC # URNS HPF: ABNORMAL /HPF
RBC UR QL AUTO: ABNORMAL
SODIUM SERPL-SCNC: 135 MMOL/L (ref 135–145)
SP GR UR STRIP.AUTO: 1.01
UROBILINOGEN UR STRIP.AUTO-MCNC: 1 MG/DL
WBC # BLD AUTO: 7.8 K/UL (ref 4.8–10.8)
WBC #/AREA URNS HPF: ABNORMAL /HPF

## 2023-10-20 PROCEDURE — A9270 NON-COVERED ITEM OR SERVICE: HCPCS | Performed by: INTERNAL MEDICINE

## 2023-10-20 PROCEDURE — 99024 POSTOP FOLLOW-UP VISIT: CPT | Performed by: ORTHOPAEDIC SURGERY

## 2023-10-20 PROCEDURE — 160002 HCHG RECOVERY MINUTES (STAT): Performed by: ORTHOPAEDIC SURGERY

## 2023-10-20 PROCEDURE — 36415 COLL VENOUS BLD VENIPUNCTURE: CPT

## 2023-10-20 PROCEDURE — 81001 URINALYSIS AUTO W/SCOPE: CPT

## 2023-10-20 PROCEDURE — 160035 HCHG PACU - 1ST 60 MINS PHASE I: Performed by: ORTHOPAEDIC SURGERY

## 2023-10-20 PROCEDURE — 700111 HCHG RX REV CODE 636 W/ 250 OVERRIDE (IP): Performed by: SURGERY

## 2023-10-20 PROCEDURE — 700102 HCHG RX REV CODE 250 W/ 637 OVERRIDE(OP): Performed by: INTERNAL MEDICINE

## 2023-10-20 PROCEDURE — 85027 COMPLETE CBC AUTOMATED: CPT

## 2023-10-20 PROCEDURE — 700105 HCHG RX REV CODE 258: Performed by: INTERNAL MEDICINE

## 2023-10-20 PROCEDURE — 700101 HCHG RX REV CODE 250: Performed by: ANESTHESIOLOGY

## 2023-10-20 PROCEDURE — 0KBT0ZZ EXCISION OF LEFT LOWER LEG MUSCLE, OPEN APPROACH: ICD-10-PCS | Performed by: ORTHOPAEDIC SURGERY

## 2023-10-20 PROCEDURE — A9270 NON-COVERED ITEM OR SERVICE: HCPCS | Performed by: STUDENT IN AN ORGANIZED HEALTH CARE EDUCATION/TRAINING PROGRAM

## 2023-10-20 PROCEDURE — 13160 SEC CLSR SURG WND/DEHSN XTN: CPT | Mod: 78,LT | Performed by: ORTHOPAEDIC SURGERY

## 2023-10-20 PROCEDURE — 8968 PR NO CHARGE - PROCEDURE: Mod: ASROC,78,LT | Performed by: PHYSICIAN ASSISTANT

## 2023-10-20 PROCEDURE — 160048 HCHG OR STATISTICAL LEVEL 1-5: Performed by: ORTHOPAEDIC SURGERY

## 2023-10-20 PROCEDURE — 99233 SBSQ HOSP IP/OBS HIGH 50: CPT | Performed by: INTERNAL MEDICINE

## 2023-10-20 PROCEDURE — 700102 HCHG RX REV CODE 250 W/ 637 OVERRIDE(OP): Performed by: STUDENT IN AN ORGANIZED HEALTH CARE EDUCATION/TRAINING PROGRAM

## 2023-10-20 PROCEDURE — 700111 HCHG RX REV CODE 636 W/ 250 OVERRIDE (IP): Performed by: ANESTHESIOLOGY

## 2023-10-20 PROCEDURE — 160039 HCHG SURGERY MINUTES - EA ADDL 1 MIN LEVEL 3: Performed by: ORTHOPAEDIC SURGERY

## 2023-10-20 PROCEDURE — 160028 HCHG SURGERY MINUTES - 1ST 30 MINS LEVEL 3: Performed by: ORTHOPAEDIC SURGERY

## 2023-10-20 PROCEDURE — 700105 HCHG RX REV CODE 258: Performed by: ANESTHESIOLOGY

## 2023-10-20 PROCEDURE — 160009 HCHG ANES TIME/MIN: Performed by: ORTHOPAEDIC SURGERY

## 2023-10-20 PROCEDURE — 80048 BASIC METABOLIC PNL TOTAL CA: CPT

## 2023-10-20 PROCEDURE — 99232 SBSQ HOSP IP/OBS MODERATE 35: CPT | Performed by: INTERNAL MEDICINE

## 2023-10-20 PROCEDURE — 770006 HCHG ROOM/CARE - MED/SURG/GYN SEMI*

## 2023-10-20 PROCEDURE — 110371 HCHG SHELL REV 272: Performed by: ORTHOPAEDIC SURGERY

## 2023-10-20 PROCEDURE — 700111 HCHG RX REV CODE 636 W/ 250 OVERRIDE (IP): Performed by: INTERNAL MEDICINE

## 2023-10-20 RX ORDER — SODIUM CHLORIDE, SODIUM LACTATE, POTASSIUM CHLORIDE, CALCIUM CHLORIDE 600; 310; 30; 20 MG/100ML; MG/100ML; MG/100ML; MG/100ML
INJECTION, SOLUTION INTRAVENOUS
Status: DISCONTINUED | OUTPATIENT
Start: 2023-10-20 | End: 2023-10-20 | Stop reason: SURG

## 2023-10-20 RX ORDER — ONDANSETRON 2 MG/ML
INJECTION INTRAMUSCULAR; INTRAVENOUS PRN
Status: DISCONTINUED | OUTPATIENT
Start: 2023-10-20 | End: 2023-10-20 | Stop reason: SURG

## 2023-10-20 RX ORDER — HYDROMORPHONE HYDROCHLORIDE 1 MG/ML
0.2 INJECTION, SOLUTION INTRAMUSCULAR; INTRAVENOUS; SUBCUTANEOUS
Status: DISCONTINUED | OUTPATIENT
Start: 2023-10-20 | End: 2023-10-20 | Stop reason: HOSPADM

## 2023-10-20 RX ORDER — OXYCODONE HCL 5 MG/5 ML
5 SOLUTION, ORAL ORAL
Status: DISCONTINUED | OUTPATIENT
Start: 2023-10-20 | End: 2023-10-20 | Stop reason: HOSPADM

## 2023-10-20 RX ORDER — OXYCODONE HCL 5 MG/5 ML
10 SOLUTION, ORAL ORAL
Status: DISCONTINUED | OUTPATIENT
Start: 2023-10-20 | End: 2023-10-20 | Stop reason: HOSPADM

## 2023-10-20 RX ORDER — ONDANSETRON 2 MG/ML
4 INJECTION INTRAMUSCULAR; INTRAVENOUS
Status: DISCONTINUED | OUTPATIENT
Start: 2023-10-20 | End: 2023-10-20 | Stop reason: HOSPADM

## 2023-10-20 RX ORDER — SODIUM CHLORIDE, SODIUM LACTATE, POTASSIUM CHLORIDE, CALCIUM CHLORIDE 600; 310; 30; 20 MG/100ML; MG/100ML; MG/100ML; MG/100ML
INJECTION, SOLUTION INTRAVENOUS CONTINUOUS
Status: DISCONTINUED | OUTPATIENT
Start: 2023-10-20 | End: 2023-10-20 | Stop reason: HOSPADM

## 2023-10-20 RX ORDER — HYDROMORPHONE HYDROCHLORIDE 1 MG/ML
0.5 INJECTION, SOLUTION INTRAMUSCULAR; INTRAVENOUS; SUBCUTANEOUS
Status: DISCONTINUED | OUTPATIENT
Start: 2023-10-20 | End: 2023-10-25

## 2023-10-20 RX ORDER — LABETALOL HYDROCHLORIDE 5 MG/ML
5 INJECTION, SOLUTION INTRAVENOUS
Status: DISCONTINUED | OUTPATIENT
Start: 2023-10-20 | End: 2023-10-20 | Stop reason: HOSPADM

## 2023-10-20 RX ORDER — DEXAMETHASONE SODIUM PHOSPHATE 4 MG/ML
INJECTION, SOLUTION INTRA-ARTICULAR; INTRALESIONAL; INTRAMUSCULAR; INTRAVENOUS; SOFT TISSUE PRN
Status: DISCONTINUED | OUTPATIENT
Start: 2023-10-20 | End: 2023-10-20 | Stop reason: SURG

## 2023-10-20 RX ORDER — MIDAZOLAM HYDROCHLORIDE 1 MG/ML
INJECTION INTRAMUSCULAR; INTRAVENOUS PRN
Status: DISCONTINUED | OUTPATIENT
Start: 2023-10-20 | End: 2023-10-20 | Stop reason: SURG

## 2023-10-20 RX ORDER — LIDOCAINE HYDROCHLORIDE 20 MG/ML
INJECTION, SOLUTION EPIDURAL; INFILTRATION; INTRACAUDAL; PERINEURAL PRN
Status: DISCONTINUED | OUTPATIENT
Start: 2023-10-20 | End: 2023-10-20 | Stop reason: SURG

## 2023-10-20 RX ORDER — HYDROMORPHONE HYDROCHLORIDE 1 MG/ML
0.1 INJECTION, SOLUTION INTRAMUSCULAR; INTRAVENOUS; SUBCUTANEOUS
Status: DISCONTINUED | OUTPATIENT
Start: 2023-10-20 | End: 2023-10-20 | Stop reason: HOSPADM

## 2023-10-20 RX ORDER — PHENYLEPHRINE HCL IN 0.9% NACL 0.5 MG/5ML
SYRINGE (ML) INTRAVENOUS PRN
Status: DISCONTINUED | OUTPATIENT
Start: 2023-10-20 | End: 2023-10-20 | Stop reason: SURG

## 2023-10-20 RX ORDER — CEFAZOLIN SODIUM 1 G/3ML
INJECTION, POWDER, FOR SOLUTION INTRAMUSCULAR; INTRAVENOUS PRN
Status: DISCONTINUED | OUTPATIENT
Start: 2023-10-20 | End: 2023-10-20 | Stop reason: SURG

## 2023-10-20 RX ORDER — HYDRALAZINE HYDROCHLORIDE 20 MG/ML
5 INJECTION INTRAMUSCULAR; INTRAVENOUS
Status: DISCONTINUED | OUTPATIENT
Start: 2023-10-20 | End: 2023-10-20 | Stop reason: HOSPADM

## 2023-10-20 RX ORDER — HYDROMORPHONE HYDROCHLORIDE 1 MG/ML
0.4 INJECTION, SOLUTION INTRAMUSCULAR; INTRAVENOUS; SUBCUTANEOUS
Status: DISCONTINUED | OUTPATIENT
Start: 2023-10-20 | End: 2023-10-20 | Stop reason: HOSPADM

## 2023-10-20 RX ORDER — HALOPERIDOL 5 MG/ML
1 INJECTION INTRAMUSCULAR
Status: DISCONTINUED | OUTPATIENT
Start: 2023-10-20 | End: 2023-10-20 | Stop reason: HOSPADM

## 2023-10-20 RX ADMIN — ATORVASTATIN CALCIUM 40 MG: 40 TABLET, FILM COATED ORAL at 16:47

## 2023-10-20 RX ADMIN — ACETAMINOPHEN 650 MG: 325 TABLET, FILM COATED ORAL at 00:00

## 2023-10-20 RX ADMIN — DEXAMETHASONE SODIUM PHOSPHATE 4 MG: 4 INJECTION INTRA-ARTICULAR; INTRALESIONAL; INTRAMUSCULAR; INTRAVENOUS; SOFT TISSUE at 09:02

## 2023-10-20 RX ADMIN — Medication 200 MCG: at 09:18

## 2023-10-20 RX ADMIN — SODIUM CHLORIDE: 9 INJECTION, SOLUTION INTRAVENOUS at 11:49

## 2023-10-20 RX ADMIN — OXYCODONE HYDROCHLORIDE 20 MG: 10 TABLET ORAL at 16:52

## 2023-10-20 RX ADMIN — DOCUSATE SODIUM 50 MG AND SENNOSIDES 8.6 MG 2 TABLET: 8.6; 5 TABLET, FILM COATED ORAL at 05:42

## 2023-10-20 RX ADMIN — HEPARIN SODIUM 5000 UNITS: 5000 INJECTION, SOLUTION INTRAVENOUS; SUBCUTANEOUS at 22:22

## 2023-10-20 RX ADMIN — Medication 100 MCG: at 09:08

## 2023-10-20 RX ADMIN — MIDAZOLAM 2 MG: 1 INJECTION, SOLUTION INTRAMUSCULAR; INTRAVENOUS at 08:54

## 2023-10-20 RX ADMIN — HYDROMORPHONE HYDROCHLORIDE 0.5 MG: 1 INJECTION, SOLUTION INTRAMUSCULAR; INTRAVENOUS; SUBCUTANEOUS at 15:58

## 2023-10-20 RX ADMIN — SODIUM CHLORIDE: 9 INJECTION, SOLUTION INTRAVENOUS at 16:53

## 2023-10-20 RX ADMIN — FINASTERIDE 5 MG: 5 TABLET, FILM COATED ORAL at 05:44

## 2023-10-20 RX ADMIN — FENTANYL CITRATE 50 MCG: 50 INJECTION, SOLUTION INTRAMUSCULAR; INTRAVENOUS at 08:56

## 2023-10-20 RX ADMIN — ACETAMINOPHEN 650 MG: 325 TABLET, FILM COATED ORAL at 16:46

## 2023-10-20 RX ADMIN — SODIUM CHLORIDE, POTASSIUM CHLORIDE, SODIUM LACTATE AND CALCIUM CHLORIDE: 600; 310; 30; 20 INJECTION, SOLUTION INTRAVENOUS at 08:53

## 2023-10-20 RX ADMIN — NICOTINE TRANSDERMAL SYSTEM 21 MG: 21 PATCH, EXTENDED RELEASE TRANSDERMAL at 05:45

## 2023-10-20 RX ADMIN — ACETAMINOPHEN 650 MG: 325 TABLET, FILM COATED ORAL at 05:42

## 2023-10-20 RX ADMIN — Medication 100 MCG: at 09:23

## 2023-10-20 RX ADMIN — LIDOCAINE HYDROCHLORIDE 50 MG: 20 INJECTION, SOLUTION EPIDURAL; INFILTRATION; INTRACAUDAL at 08:57

## 2023-10-20 RX ADMIN — ACETAMINOPHEN 650 MG: 325 TABLET, FILM COATED ORAL at 13:53

## 2023-10-20 RX ADMIN — ONDANSETRON 4 MG: 2 INJECTION INTRAMUSCULAR; INTRAVENOUS at 09:13

## 2023-10-20 RX ADMIN — OXYCODONE HYDROCHLORIDE 15 MG: 15 TABLET ORAL at 22:22

## 2023-10-20 RX ADMIN — HEPARIN SODIUM 5000 UNITS: 5000 INJECTION, SOLUTION INTRAVENOUS; SUBCUTANEOUS at 13:52

## 2023-10-20 RX ADMIN — TRAMADOL HYDROCHLORIDE 50 MG: 50 TABLET ORAL at 05:44

## 2023-10-20 RX ADMIN — OXYCODONE HYDROCHLORIDE 15 MG: 15 TABLET ORAL at 13:52

## 2023-10-20 RX ADMIN — DOCUSATE SODIUM 50 MG AND SENNOSIDES 8.6 MG 2 TABLET: 8.6; 5 TABLET, FILM COATED ORAL at 16:46

## 2023-10-20 RX ADMIN — PROPOFOL 150 MG: 10 INJECTION, EMULSION INTRAVENOUS at 08:57

## 2023-10-20 RX ADMIN — GABAPENTIN 300 MG: 300 CAPSULE ORAL at 16:46

## 2023-10-20 RX ADMIN — CEFAZOLIN 2 G: 1 INJECTION, POWDER, FOR SOLUTION INTRAMUSCULAR; INTRAVENOUS at 08:59

## 2023-10-20 RX ADMIN — Medication 100 MCG: at 09:03

## 2023-10-20 ASSESSMENT — PAIN DESCRIPTION - PAIN TYPE
TYPE: ACUTE PAIN
TYPE: ACUTE PAIN
TYPE: ACUTE PAIN;SURGICAL PAIN

## 2023-10-20 NOTE — CARE PLAN
The patient is Stable - Low risk of patient condition declining or worsening    Shift Goals  Clinical Goals: prep for surgery  Patient Goals: sleep  Family Goals: ashlie    Patient is not progressing towards the following goals:      Problem: Pain - Standard  Goal: Alleviation of pain or a reduction in pain to the patient’s comfort goal  Description: Target End Date:  Prior to discharge or change in level of care    Document on Vitals flowsheet    1.  Document pain using the appropriate pain scale per order or unit policy  2.  Educate and implement non-pharmacologic comfort measures (i.e. relaxation, distraction, massage, cold/heat therapy, etc.)  3.  Pain management medications as ordered  4.  Reassess pain after pain med administration per policy  5.  If opiods administered assess patient's response to pain medication is appropriate per POSS sedation scale  6.  Follow pain management plan developed in collaboration with patient and interdisciplinary team (including palliative care or pain specialists if applicable)  Outcome: Not Progressing

## 2023-10-20 NOTE — PROGRESS NOTES
Previous left below-knee amputation for ischemic foot and ankle.  Partial wound dehiscence and incomplete healing of previous incision    Plan:  Debridement and revision wound closure of left below-knee amputation

## 2023-10-20 NOTE — DISCHARGE PLANNING
Case Management Discharge Planning    Admission Date: 9/26/2023  GMLOS: 11.1  ALOS: 24    6-Clicks ADL Score: 21  6-Clicks Mobility Score: 13  PT and/or OT Eval ordered: Yes  Post-acute Referrals Ordered: Yes  Post-acute Choice Obtained: Yes  Has referral(s) been sent to post-acute provider:  Yes      Anticipated Discharge Dispo: Discharge Disposition: D/T to home under A care in anticipation of covered skilled care (06)    DME Needed: yes wheelchair ordered      Action(s) Taken: DME Face to Face     Escalations Completed: Pending Discharge Destination    Medically Clear: Yes    Next Steps: No facilities have accepted pt [facility]. [facility] declined    Barriers to Discharge: Pending Placement and DME    Is the patient up for discharge tomorrow: No      RNCM will continue to reach out to facilities for admission.  RNCM faxed over swing bed form to [facility] with [facility] called and said no medicaid beds

## 2023-10-20 NOTE — THERAPY
Physical Therapy Contact Note    Patient Name: Dillon Centeno  Age:  58 y.o., Sex:  male  Medical Record #: [id number]  Today's Date: 10/20/2023    Pt to OR this AM; will follow up when appropriate for continuation of PT plan of care     Tania AGOSTO, PT , [phone]

## 2023-10-20 NOTE — PROGRESS NOTES
Notified attending [place] at 2042, change in urine color to pinkish red.  No new orders. Monitoring.

## 2023-10-20 NOTE — PROGRESS NOTES
"NEPHROLOGY HISTORY AND PHYSICAL CONSULT NOTE     Subjective:   10.7L  urine output via Holguin catheter.     Past Medical History:   Diagnosis Date    Hypertension        [unfilled]     Allergies   Allergen Reactions    Sulfa Drugs Hives and Shortness of Breath       ROS    /60   Pulse (!) 103   Temp 36.5 °C (97.7 °F) (Temporal)   Resp 17   Ht 1.803 m (5' 11\")   Wt 71.3 kg (157 lb 3 oz)   SpO2 91%   BMI 21.92 kg/m²     Physical Exam  GEN: alert and oriented. In no acute distress.   HEENT: moist oropharyngeal mucous membranes  CV:RRR  PULM: clear to auscultation bilaterally  ABD: soft non tender non distended  EXT: warm well perfused, no lower extremity edema.    Labs reviewed.  Recent Labs     09/26/23  0938 09/27/23  0503 09/28/23  0621 10/18/23  0401 10/18/23  0704 10/19/23  0447 10/19/23  1140 10/20/23  0159   ALBUMIN 3.0* 2.4*  --  3.5  --   --   --   --    SODIUM 125* 126*   < > 124*   < > 126* 126* 135   POTASSIUM 4.6 4.5   < > 5.9*   < > 5.8* 5.2 4.3   CHLORIDE 91* 96   < > 88*   < > 92* 92* 100   CO2 23 21   < > 22   < > 22 22 22   BUN 14 12   < > 46*   < > 48* 45* 34*   CREATININE 0.47* 0.55   < > 3.42*   < > 3.58* 2.99* 1.70*    < > = values in this interval not displayed.       Lab Results   Component Value Date/Time    WBC 7.8 10/20/2023 01:59 AM    RBC 2.81 (L) 10/20/2023 01:59 AM    HEMOGLOBIN 8.2 (L) 10/20/2023 01:59 AM    HEMATOCRIT 25.2 (L) 10/20/2023 01:59 AM    MCV 89.7 10/20/2023 01:59 AM    MCH 29.2 10/20/2023 01:59 AM    MCHC 32.5 10/20/2023 01:59 AM    MPV 8.8 (L) 10/20/2023 01:59 AM      Recent Labs     10/18/23  0401 10/19/23  0447 10/20/23  0159   WBC 9.5 8.2 7.8   RBC 3.31* 3.21* 2.81*   HEMOGLOBIN 9.8* 9.2* 8.2*   HEMATOCRIT 30.3* 28.8* 25.2*   MCV 91.5 89.7 89.7   MCH 29.6 28.7 29.2   MCHC 32.3 31.9* 32.5   RDW 58.4* 54.7* 54.8*   PLATELETCT 522* 488* 505*   MPV 8.4* 8.4* 8.8*     Recent Labs     10/19/23  0447 10/19/23  1140 10/20/23  0159   SODIUM 126* 126* 135   POTASSIUM " 5.8* 5.2 4.3   CHLORIDE 92* 92* 100   CO2 22 22 22   GLUCOSE 93 116* 96   BUN 48* 45* 34*   CREATININE 3.58* 2.99* 1.70*   CALCIUM 8.9 9.4 8.6     URINALYSIS:  Lab Results   Component Value Date/Time    COLORURINE Yellow 10/18/2023 1717    CLARITY Clear 10/18/2023 1717    SPECGRAVITY 1.006 10/18/2023 1717    PHURINE 6.0 10/18/2023 1717    KETONES Negative 10/18/2023 1717    PROTEINURIN Negative 10/18/2023 1717    BILIRUBINUR Negative 10/18/2023 1717    UROBILU 0.2 10/18/2023 1717    NITRITE Negative 10/18/2023 1717    LEUKESTERAS Small (A) 10/18/2023 1717    OCCULTBLOOD Small (A) 10/18/2023 1717     UPC  Lab Results   Component Value Date/Time    TOTPROTUR 36.0 (H) 10/19/2023 2025      Lab Results   Component Value Date/Time    CREATININEU 18.18 10/19/2023 2025       Imaging report(s) reviewed  US-RENAL   Final Result      1.  Bilateral medical renal disease.   2.  Moderate right and mild left hydronephrosis, likely secondary to distention of the urinary bladder secondary to prostatic hypertrophy. Post void images of the inner bladder and collecting systems of the kidneys were not acquired.   3.  Hydronephrosis was not present on the prior MRI of the abdomen without and with IV contrast on 10/2/2023.      MR-ABDOMEN-WITH & W/O   Final Result      Multi septated cystic lesion at the upper pole LEFT kidney measuring 3.8 cm without associated enhancement or nodular component (Bosniak 2).            DX-PORTABLE FLUORO > 1 HOUR   Final Result      Portable fluoroscopy utilized for 2 minutes 20.4 seconds.         INTERPRETING LOCATION: [address] [place] [address], [id number]      CT-CTA AORTA-RO WITH & W/O-POST PROCESS   Final Result         CTA AORTA      1.  Septated lobulated left upper pole renal cystic mass lesion, recommend follow-up MRI of the kidneys for further characterization.   2.  Atherosclerosis and atherosclerotic coronary artery disease.   3.  Fat-containing right inguinal hernia      CTA LOWER EXTREMITIES      1.   Occlusion of the right common iliac artery through the distal superficial femoral artery   2.  Distal right peroneal artery occlusion with 2-vessel runoff the anterior and posterior tibial arteries at the right ankle.   3.  Occlusion of the left superficial femoral artery at the bifurcation extending through its length to the popliteal artery.   4.  Occlusion of the proximal left profunda femoris just distal to the bifurcation which reconstitutes.   5.  Soft tissue gas at the stump of left below-the-knee amputation, within expected limits for recent postop changes.      3D angiographic/MIP images of the vasculature confirm the vascular findings as described above.      These findings were discussed with the patient's clinician, Keiht Navarro, on 9/29/2023 8:06 AM.      EC-ECHOCARDIOGRAM COMPLETE W/O CONT   Final Result      US-AORTA/ILIACS DUPLEX COMPLETE   Final Result      US-EXTREMITY ARTERY LOWER UNILAT RIGHT   Final Result      US-INGA SINGLE LEVEL UNILAT RIGHT   Final Result      DX-CHEST-PORTABLE (1 VIEW)   Final Result      1.  LEFT basilar atelectasis or pneumonia   2.  Trace LEFT pleural effusion or pleural scar            Assessment:     Nonoliguric acute kidney injury -improvement due to GELY due to  obstructive uropathy, ATN in the setting of infection, improved.   Baseline renal function 0.52.   -Urinalysis demonstrates pyuria, hematuria bacteriuria negative for protein negative for nitrite negative for leukocyte Estrace.  - Renal US demonstrated bilateral hydronephrosis, improved with decompression.   - Holguin catheter placed.   -UCPR 1.9 g.  - Dose all medications for patient level of renal function  - Avoid nephrotoxic agents including contrast and NSAIDs  - Encourage adequate hydration.           2.  Hyperkalemia  - treatment of acidosis as above for transcellular shifting of potassium.  Recommend total body potassium removal with lokelma.  Continue to monitor.        3.  Anion gap metabolic  acidosis -infectious work-up.  Likely due to renal dysfunction.  Recommend sodium bicarbonate.  Continue to monitor     4.  Left foot gangrene status post BKA on 9/27/2023.  Aspirin. Plan for debridement of wound dehiscence of LBKA.  Appreciate orthopedic surgery, vascular surgery recommendations.  Antibiotics completed.  Pain control avoid NSAIDs.  Avoid morphine.     5.  Peripheral arterial disease.  Underwent bilateral common femoral artery endarterectomy with saphenous vein angioplasty and stent placement in right external iliac on 9/30/2023.  Aspirin.  Plavix for 1 month.  Appreciate vascular surgery recommendations.     6.  Renal cyst. -MRI demonstrated multiseptated cystic lesions without enhancement.  Recommend follow up imaging.     7. BPH - Recommend flomax and finasteride. Holguin catheter to remain in place.       Recommend follow up with Nephrology in 2  weeks from discharge with labs, BMP, urinalysis, Urine protein/creatine.     Will sign off given continuous improvement in renal function with need for urologic follow for further management of decompression.     Maria Guadalupe Ramos  Nephrology  [facility]

## 2023-10-20 NOTE — THERAPY
Occupational Therapy Contact Note    Patient Name: Dillon Centeno  Age:  58 y.o., Sex:  male  Medical Record #: [id number]  Today's Date: 10/20/2023    Pt to OR today for BKA revision and wound closure. Will hold OT tx session and follow up post-op as able/appropriate.      Vickey Jett OTD, OTR/L

## 2023-10-20 NOTE — ANESTHESIA TIME REPORT
Anesthesia Start and Stop Event Times     Date Time Event    10/20/2023 0848 Ready for Procedure     0853 Anesthesia Start     0931 Anesthesia Stop        Responsible Staff  10/20/23    Name Role Begin End    Aspen Whitehead M.D. Anesth 0853 0931        Overtime Reason:  no overtime (within assigned shift)    Comments:

## 2023-10-20 NOTE — OR NURSING
0930: Pt arrived via bed with anesthesia and RN. VSS. Dressing CDI. Prevena in place. Orders reviewed and initiated.   1025: Report called to [facility]. All questions answered. VSS. Dressing CDI. Prevena in place. No patient distress. Alert and oriented x4. Pt transported to room in stable condition on 2L NC with no personal belongings.

## 2023-10-20 NOTE — ANESTHESIA PROCEDURE NOTES
Airway    Date/Time: 10/20/2023 8:58 AM    Performed by: Aspen Whitehead M.D.  Authorized by: Aspen Whitehead M.D.    Location:  OR  Urgency:  Elective  Difficult Airway: No    Indications for Airway Management:  Anesthesia      Spontaneous Ventilation: absent    Sedation Level:  Deep  Preoxygenated: Yes    Mask Difficulty Assessment:  0 - not attempted  Final Airway Type:  Supraglottic airway  Final Supraglottic Airway:  Standard LMA    SGA Size:  5  Number of Attempts at Approach:  1

## 2023-10-20 NOTE — DISCHARGE PLANNING
Referral to CN&R sent  Resent referrals to [facility], [facility] and [facility] Yarbrough [facility]

## 2023-10-20 NOTE — ANESTHESIA PREPROCEDURE EVALUATION
Case: [id number] Date/Time: 10/20/23 0851    Procedure: AMPUTATION, BELOW KNEE WOUND REVISION (Left: Leg Lower)    Anesthesia type: General    Pre-op diagnosis: wound dehiscense    [facility]    Surgeons: Pradip Altamirano M.D.        57 yo M with left foot gangrene s/p left BKA with wound dehiscence here for wound revision.  PAD, bacteremia and admitted with GELY (improving, latest Cr 1.7). Denies problems with anesthesia in the past.  No current CP/SOB/N/V symptoms.     NPO  Relevant Problems   CARDIAC   (positive) PAD (peripheral artery disease) (HCC)         (positive) GELY (acute kidney injury) ([facility])   (positive) Renal cyst      Other   (positive) Anemia   (positive) Bacteremia   (positive) Gangrene of left foot (HCC)       Physical Exam    Airway   Mallampati: III  TM distance: >3 FB  Neck ROM: full       Cardiovascular - normal exam  Rhythm: regular  Rate: normal  (-) murmur     Dental - normal exam  Comments: Many decayed and missing teeth    Very poor dentition  Facial Hair    Pulmonary - normal exam  Breath sounds clear to auscultation     Abdominal    Neurological - normal exam               Anesthesia Plan    ASA 3   ASA physical status 3 criteria: hypertension - poorly controlled    Plan - general       Airway plan will be LMA          Induction: intravenous    Postoperative Plan: Postoperative administration of opioids is intended.    Pertinent diagnostic labs and testing reviewed    Informed Consent:    Anesthetic plan and risks discussed with patient.    Use of blood products discussed with: patient whom consented to blood products.

## 2023-10-20 NOTE — ANESTHESIA POSTPROCEDURE EVALUATION
Patient: Dillon Centeno    Procedure Summary     Date: 10/20/23 Room / Location: [place] [address] / SURGERY [facility]    Anesthesia Start: 0853 Anesthesia Stop:     Procedure: AMPUTATION, BELOW KNEE WOUND REVISION (Left: Leg Lower) Diagnosis: (wound dehiscense)    Surgeons: Pradip Altamirano M.D. Responsible Provider: Aspen Whitehead M.D.    Anesthesia Type: general ASA Status: 3          Final Anesthesia Type: general  Last vitals  BP   Blood Pressure: 126/70    Temp   36.3 °C (97.3 °F)    Pulse   86   Resp   12    SpO2   96 %      Anesthesia Post Evaluation    Patient location during evaluation: PACU  Patient participation: complete - patient participated  Level of consciousness: awake and alert    Airway patency: patent  Anesthetic complications: no  Cardiovascular status: hemodynamically stable  Respiratory status: acceptable  Hydration status: euvolemic    PONV: none          No notable events documented.     Nurse Pain Score: 0 (NPRS)

## 2023-10-20 NOTE — CARE PLAN
The patient is Stable - Low risk of patient condition declining or worsening    Shift Goals  Clinical Goals: Monitor VS, Pain Management  Patient Goals: Bowel Movement, Pain Management  Family Goals: ashlie        Problem: Wound/ / Incision Healing  Goal: Patient's wound/surgical incision will decrease in size and heals properly  Outcome: Not Progressing     Problem: Knowledge Deficit - Standard  Goal: Patient and family/care givers will demonstrate understanding of plan of care, disease process/condition, diagnostic tests and medications  Outcome: Progressing     Problem: Pain - Standard  Goal: Alleviation of pain or a reduction in pain to the patient’s comfort goal  Outcome: Progressing     Problem: Skin Integrity  Goal: Skin integrity is maintained or improved  Outcome: Progressing     Problem: Fall Risk  Goal: Patient will remain free from falls  Outcome: Progressing     Problem: Infection - Standard  Goal: Patient will remain free from infection  Outcome: Progressing       Problem: Wound/ / Incision Healing  Goal: Patient's wound/surgical incision will decrease in size and heals properly  Outcome: Not Progressing

## 2023-10-21 LAB
ANION GAP SERPL CALC-SCNC: 8 MMOL/L (ref 7–16)
BUN SERPL-MCNC: 15 MG/DL (ref 8–22)
CALCIUM SERPL-MCNC: 8.7 MG/DL (ref 8.5–10.5)
CHLORIDE SERPL-SCNC: 101 MMOL/L (ref 96–112)
CO2 SERPL-SCNC: 25 MMOL/L (ref 20–33)
CREAT SERPL-MCNC: 0.83 MG/DL (ref 0.5–1.4)
ERYTHROCYTE [DISTWIDTH] IN BLOOD BY AUTOMATED COUNT: 56.7 FL (ref 35.9–50)
GFR SERPLBLD CREATININE-BSD FMLA CKD-EPI: 100 ML/MIN/1.73 M 2
GLUCOSE SERPL-MCNC: 147 MG/DL (ref 65–99)
HCT VFR BLD AUTO: 28.3 % (ref 42–52)
HGB BLD-MCNC: 8.9 G/DL (ref 14–18)
MCH RBC QN AUTO: 28.7 PG (ref 27–33)
MCHC RBC AUTO-ENTMCNC: 31.4 G/DL (ref 32.3–36.5)
MCV RBC AUTO: 91.3 FL (ref 81.4–97.8)
PLATELET # BLD AUTO: 523 K/UL (ref 164–446)
PMV BLD AUTO: 8.7 FL (ref 9–12.9)
POTASSIUM SERPL-SCNC: 3.6 MMOL/L (ref 3.6–5.5)
RBC # BLD AUTO: 3.1 M/UL (ref 4.7–6.1)
SODIUM SERPL-SCNC: 134 MMOL/L (ref 135–145)
WBC # BLD AUTO: 13.6 K/UL (ref 4.8–10.8)

## 2023-10-21 PROCEDURE — A9270 NON-COVERED ITEM OR SERVICE: HCPCS | Performed by: INTERNAL MEDICINE

## 2023-10-21 PROCEDURE — 700102 HCHG RX REV CODE 250 W/ 637 OVERRIDE(OP): Performed by: INTERNAL MEDICINE

## 2023-10-21 PROCEDURE — 99233 SBSQ HOSP IP/OBS HIGH 50: CPT | Performed by: INTERNAL MEDICINE

## 2023-10-21 PROCEDURE — 36415 COLL VENOUS BLD VENIPUNCTURE: CPT

## 2023-10-21 PROCEDURE — A9270 NON-COVERED ITEM OR SERVICE: HCPCS | Performed by: STUDENT IN AN ORGANIZED HEALTH CARE EDUCATION/TRAINING PROGRAM

## 2023-10-21 PROCEDURE — 770006 HCHG ROOM/CARE - MED/SURG/GYN SEMI*

## 2023-10-21 PROCEDURE — 700111 HCHG RX REV CODE 636 W/ 250 OVERRIDE (IP): Performed by: INTERNAL MEDICINE

## 2023-10-21 PROCEDURE — 85027 COMPLETE CBC AUTOMATED: CPT

## 2023-10-21 PROCEDURE — A9270 NON-COVERED ITEM OR SERVICE: HCPCS | Performed by: SURGERY

## 2023-10-21 PROCEDURE — 700102 HCHG RX REV CODE 250 W/ 637 OVERRIDE(OP): Performed by: STUDENT IN AN ORGANIZED HEALTH CARE EDUCATION/TRAINING PROGRAM

## 2023-10-21 PROCEDURE — 700102 HCHG RX REV CODE 250 W/ 637 OVERRIDE(OP): Performed by: SURGERY

## 2023-10-21 PROCEDURE — 700111 HCHG RX REV CODE 636 W/ 250 OVERRIDE (IP): Performed by: SURGERY

## 2023-10-21 PROCEDURE — 80048 BASIC METABOLIC PNL TOTAL CA: CPT

## 2023-10-21 RX ORDER — GABAPENTIN 300 MG/1
300 CAPSULE ORAL 3 TIMES DAILY
Status: DISCONTINUED | OUTPATIENT
Start: 2023-10-21 | End: 2023-11-03 | Stop reason: HOSPADM

## 2023-10-21 RX ORDER — CEPHALEXIN 500 MG/1
500 CAPSULE ORAL EVERY 6 HOURS
Status: DISPENSED | OUTPATIENT
Start: 2023-10-21 | End: 2023-10-26

## 2023-10-21 RX ADMIN — CLOPIDOGREL BISULFATE 75 MG: 75 TABLET ORAL at 06:00

## 2023-10-21 RX ADMIN — TRAMADOL HYDROCHLORIDE 50 MG: 50 TABLET ORAL at 17:33

## 2023-10-21 RX ADMIN — ASPIRIN 81 MG: 81 TABLET, COATED ORAL at 06:00

## 2023-10-21 RX ADMIN — ACETAMINOPHEN 650 MG: 325 TABLET, FILM COATED ORAL at 00:38

## 2023-10-21 RX ADMIN — TRAMADOL HYDROCHLORIDE 50 MG: 50 TABLET ORAL at 05:56

## 2023-10-21 RX ADMIN — TRAMADOL HYDROCHLORIDE 50 MG: 50 TABLET ORAL at 23:41

## 2023-10-21 RX ADMIN — ACETAMINOPHEN 650 MG: 325 TABLET, FILM COATED ORAL at 17:33

## 2023-10-21 RX ADMIN — ACETAMINOPHEN 650 MG: 325 TABLET, FILM COATED ORAL at 10:44

## 2023-10-21 RX ADMIN — HEPARIN SODIUM 5000 UNITS: 5000 INJECTION, SOLUTION INTRAVENOUS; SUBCUTANEOUS at 05:56

## 2023-10-21 RX ADMIN — NICOTINE TRANSDERMAL SYSTEM 21 MG: 21 PATCH, EXTENDED RELEASE TRANSDERMAL at 05:55

## 2023-10-21 RX ADMIN — TRAMADOL HYDROCHLORIDE 50 MG: 50 TABLET ORAL at 10:44

## 2023-10-21 RX ADMIN — HYDROMORPHONE HYDROCHLORIDE 0.5 MG: 1 INJECTION, SOLUTION INTRAMUSCULAR; INTRAVENOUS; SUBCUTANEOUS at 22:36

## 2023-10-21 RX ADMIN — FINASTERIDE 5 MG: 5 TABLET, FILM COATED ORAL at 05:56

## 2023-10-21 RX ADMIN — CEPHALEXIN 500 MG: 500 CAPSULE ORAL at 23:41

## 2023-10-21 RX ADMIN — ACETAMINOPHEN 650 MG: 325 TABLET, FILM COATED ORAL at 23:41

## 2023-10-21 RX ADMIN — GABAPENTIN 300 MG: 300 CAPSULE ORAL at 17:33

## 2023-10-21 RX ADMIN — TAMSULOSIN HYDROCHLORIDE 0.4 MG: 0.4 CAPSULE ORAL at 10:44

## 2023-10-21 RX ADMIN — OXYCODONE HYDROCHLORIDE 20 MG: 10 TABLET ORAL at 21:10

## 2023-10-21 RX ADMIN — OXYCODONE HYDROCHLORIDE 20 MG: 10 TABLET ORAL at 15:06

## 2023-10-21 RX ADMIN — TRAMADOL HYDROCHLORIDE 50 MG: 50 TABLET ORAL at 00:38

## 2023-10-21 RX ADMIN — DOCUSATE SODIUM 50 MG AND SENNOSIDES 8.6 MG 2 TABLET: 8.6; 5 TABLET, FILM COATED ORAL at 05:55

## 2023-10-21 RX ADMIN — HEPARIN SODIUM 5000 UNITS: 5000 INJECTION, SOLUTION INTRAVENOUS; SUBCUTANEOUS at 21:55

## 2023-10-21 RX ADMIN — HEPARIN SODIUM 5000 UNITS: 5000 INJECTION, SOLUTION INTRAVENOUS; SUBCUTANEOUS at 15:06

## 2023-10-21 RX ADMIN — ATORVASTATIN CALCIUM 40 MG: 40 TABLET, FILM COATED ORAL at 17:33

## 2023-10-21 RX ADMIN — CEPHALEXIN 500 MG: 500 CAPSULE ORAL at 17:33

## 2023-10-21 RX ADMIN — ACETAMINOPHEN 650 MG: 325 TABLET, FILM COATED ORAL at 05:55

## 2023-10-21 ASSESSMENT — FIBROSIS 4 INDEX
FIB4 SCORE: 0.51
FIB4 SCORE: 0.51

## 2023-10-21 ASSESSMENT — PAIN DESCRIPTION - PAIN TYPE
TYPE: ACUTE PAIN;SURGICAL PAIN
TYPE: ACUTE PAIN
TYPE: ACUTE PAIN;SURGICAL PAIN
TYPE: ACUTE PAIN;SURGICAL PAIN
TYPE: ACUTE PAIN

## 2023-10-21 NOTE — CARE PLAN
The patient is Stable - Low risk of patient condition declining or worsening    Shift Goals  Clinical Goals: UA sample,  Patient Goals: pain control  Family Goals: ashlie    Progress made toward(s) clinical / shift goals:    Problem: Pain - Standard  Goal: Alleviation of pain or a reduction in pain to the patient’s comfort goal  Description: Target End Date:  Prior to discharge or change in level of care    Document on Vitals flowsheet    1.  Document pain using the appropriate pain scale per order or unit policy  2.  Educate and implement non-pharmacologic comfort measures (i.e. relaxation, distraction, massage, cold/heat therapy, etc.)  3.  Pain management medications as ordered  4.  Reassess pain after pain med administration per policy  5.  If opiods administered assess patient's response to pain medication is appropriate per POSS sedation scale  6.  Follow pain management plan developed in collaboration with patient and interdisciplinary team (including palliative care or pain specialists if applicable)  Outcome: Progressing       Patient is not progressing towards the following goals:

## 2023-10-21 NOTE — CARE PLAN
The patient is Stable - Low risk of patient condition declining or worsening    Shift Goals  Clinical Goals: Pain Management / Comfort Rest  Patient Goals: Pain Management / Sleep  Family Goals: ashlie      Problem: Knowledge Deficit - Standard  Goal: Patient and family/care givers will demonstrate understanding of plan of care, disease process/condition, diagnostic tests and medications  Outcome: Progressing     Problem: Pain - Standard  Goal: Alleviation of pain or a reduction in pain to the patient’s comfort goal  Outcome: Progressing     Problem: Skin Integrity  Goal: Skin integrity is maintained or improved  Outcome: Progressing     Problem: Fall Risk  Goal: Patient will remain free from falls  Outcome: Progressing     Problem: Infection - Standard  Goal: Patient will remain free from infection  Outcome: Progressing     Problem: Wound/ / Incision Healing  Goal: Patient's wound/surgical incision will decrease in size and heals properly  Outcome: Progressing

## 2023-10-21 NOTE — PROGRESS NOTES
Hospital Medicine Daily Progress Note    Date of Service  10/21/2023    Chief Complaint  Bilateral feet wounds    Hospital Course  Dillon Centeno is a 58 y.o. male with hypertension and tobacco dependence, admitted 9/26/2023 with bilateral feet wounds.  He was noted to have ischemic gangrene of the left lower extremity with maggots and superimposed bacterial infection.  Vascular surgery and orthopedic surgery were consulted.  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  Vascular studies showed severe arterial disease.  Patient underwent bilateral common femoral artery endarterectomy and profundoplasty with saphenous vein angioplasty with stent placement in the right external iliac artery on 9/30/2023. Prevena was placed (5-7 days). He was recommended to have Plavix for 1 month and aspirin for life. Blood cultures from 9/26/2023 grew Morganella morganii and Vagococcus.  Urine culture from 9/26/2023 grew Staph epidermidis. ID were consulted.   Echocardiogram showed LVEF of about 60% with no reported valvular abnormalities.  Repeat blood cultures from 9/27/2023 have been negative.  ID recommended IV Zosyn which he completed on 10/6/2023.  Subsequently, he had breakdown of the incision site on the left BKA.  Orthopedics planning for revision.  However he developed acute kidney injury which was felt to be post renal with noted obstructive changes on renal ultrasound (moderate right and mild left hydronephrosis with distended urinary bladder), with component of prerenal, with associated mild hyperkalemia.  Urinalysis was bland with negative protein, casts.  He was given potassium lowering medications, along with IV fluids.  Holguin catheter was placed.  Nephrology and urology were consulted.  Renal function improved.  Urology recommended Flomax and finasteride.  He was able to go to the OR and underwent debridement and secondary closure of dehisced surgical wound.  Orthopedics recommended nonweightbearing on the left  lower extremity.    Additionally, he was noted to have renal cyst on CT.  MRI shows multiseptated cystic lesions in the upper pole of left kidney measuring 3.8 cm without associated enhancement or nodular component.    Discharge planning was pursued, but unfortunately had no accepting SNF's and no home health services accepting either.  Patient refused to go to a group home.  DMEs were being arranged.    Interval Problem Update  10/21/2023 - I reviewed the patient's chart. There were no significant overnight events. Remains hemodynamically stable and afebrile. Stable on RA.  Creatinine has normalized.  WBC count 13,600.  Sodium 134.  CO2 and anion gap are normal.  .  Reviewed nephrology recommendations to follow-up in the office in 2 weeks with discharge labs (BMP, urinalysis, urine protein/creatinine).    > I have personally seen and examined the patient today.  He feels sore on the left BKA site, rated 5 out of 10.  No nausea, vomiting.  Tolerating oral diet.  No shortness of breath.  Holguin catheter draining clear daina urine.      I personally reviewed all lab results mentioned above. Prior medical records from this institution and outside facilities were independently reviewed as noted. I also personally reviewed all ER physician and consultant recommendations and plans as documented above. History was independently obtained by myself. I have discussed this patient's plan of care and discharge plan at IDT rounds today with Case Management, Nursing, Nursing leadership, and other members of the IDT team.    Consultants/Specialty  infectious disease, nephrology, orthopedics, urology, and vascular surgery    Code Status  Full Code    Disposition  The patient is not medically cleared for discharge to home or a post-acute facility.      Anticipate discharge to home once cleared by orthopedics and wheelchair is arranged.  I have placed the appropriate orders for post-discharge needs.    Review of Systems  ROS      Pertinent positives/negatives as mentioned above.     A complete review of systems was personally done by me. All other systems were negative.       Physical Exam  Temp:  [36.1 °C (97 °F)-36.5 °C (97.7 °F)] 36.2 °C (97.1 °F)  Pulse:  [] 82  Resp:  [12-18] 17  BP: (112-150)/(60-73) 150/67  SpO2:  [91 %-100 %] 95 %    Physical Exam  Vitals reviewed.   Constitutional:       General: He is not in acute distress.     Appearance: Normal appearance. He is not toxic-appearing or diaphoretic.   HENT:      Head: Normocephalic and atraumatic.      Nose: Nose normal.      Mouth/Throat:      Mouth: Mucous membranes are dry.      Pharynx: No oropharyngeal exudate.   Eyes:      General: No scleral icterus.     Extraocular Movements: Extraocular movements intact.      Conjunctiva/sclera: Conjunctivae normal.      Pupils: Pupils are equal, round, and reactive to light.   Cardiovascular:      Rate and Rhythm: Normal rate.      Heart sounds: Normal heart sounds. No murmur heard.     No gallop.   Pulmonary:      Effort: Pulmonary effort is normal. No respiratory distress.      Breath sounds: Normal breath sounds. No stridor. No wheezing, rhonchi or rales.   Chest:      Chest wall: No tenderness.   Abdominal:      General: Bowel sounds are normal. There is no distension.      Palpations: Abdomen is soft. There is no mass.      Tenderness: There is no abdominal tenderness. There is no guarding or rebound.   Genitourinary:     Penis: Normal.       Comments: Holguin catheter in place, draining clear daina urine.  Musculoskeletal:         General: No swelling. Normal range of motion.      Cervical back: Normal range of motion and neck supple.      Right lower leg: No edema.      Comments: Left BKA site, CDI, drain in place   Lymphadenopathy:      Cervical: No cervical adenopathy.   Skin:     General: Skin is warm and dry.      Coloration: Skin is not jaundiced.      Findings: No rash.      Comments: Third right toe absent due to  prior trauma.  Remaining toes with chronic ulcers.   Neurological:      General: No focal deficit present.      Mental Status: He is alert and oriented to person, place, and time. Mental status is at baseline.      Cranial Nerves: No cranial nerve deficit.   Psychiatric:         Mood and Affect: Mood normal.         Behavior: Behavior normal.         Thought Content: Thought content normal.         Judgment: Judgment normal.       I have performed the physical examination today 10/21/2023.  In review of yesterday's note, there are no new changes except as documented above.      Fluids    Intake/Output Summary (Last 24 hours) at 10/21/2023 0925  Last data filed at 10/21/2023 0426  Gross per 24 hour   Intake 2540 ml   Output 4100 ml   Net -1560 ml       Laboratory  Recent Labs     10/19/23  0447 10/20/23  0159 10/21/23  0336   WBC 8.2 7.8 13.6*   RBC 3.21* 2.81* 3.10*   HEMOGLOBIN 9.2* 8.2* 8.9*   HEMATOCRIT 28.8* 25.2* 28.3*   MCV 89.7 89.7 91.3   MCH 28.7 29.2 28.7   MCHC 31.9* 32.5 31.4*   RDW 54.7* 54.8* 56.7*   PLATELETCT 488* 505* 523*   MPV 8.4* 8.8* 8.7*       Recent Labs     10/19/23  1140 10/20/23  0159 10/21/23  0336   SODIUM 126* 135 134*   POTASSIUM 5.2 4.3 3.6   CHLORIDE 92* 100 101   CO2 22 22 25   GLUCOSE 116* 96 147*   BUN 45* 34* 15   CREATININE 2.99* 1.70* 0.83   CALCIUM 9.4 8.6 8.7                     Imaging  US-RENAL   Final Result      1.  Bilateral medical renal disease.   2.  Moderate right and mild left hydronephrosis, likely secondary to distention of the urinary bladder secondary to prostatic hypertrophy. Post void images of the inner bladder and collecting systems of the kidneys were not acquired.   3.  Hydronephrosis was not present on the prior MRI of the abdomen without and with IV contrast on 10/2/2023.      MR-ABDOMEN-WITH & W/O   Final Result      Multi septated cystic lesion at the upper pole LEFT kidney measuring 3.8 cm without associated enhancement or nodular component ([place]usniak  2).            DX-PORTABLE FLUORO > 1 HOUR   Final Result      Portable fluoroscopy utilized for 2 minutes 20.4 seconds.         INTERPRETING LOCATION: [address] [facility], [place], [address]      CT-CTA AORTA-RO WITH & W/O-POST PROCESS   Final Result         CTA AORTA      1.  Septated lobulated left upper pole renal cystic mass lesion, recommend follow-up MRI of the kidneys for further characterization.   2.  Atherosclerosis and atherosclerotic coronary artery disease.   3.  Fat-containing right inguinal hernia      CTA LOWER EXTREMITIES      1.  Occlusion of the right common iliac artery through the distal superficial femoral artery   2.  Distal right peroneal artery occlusion with 2-vessel runoff the anterior and posterior tibial arteries at the right ankle.   3.  Occlusion of the left superficial femoral artery at the bifurcation extending through its length to the popliteal artery.   4.  Occlusion of the proximal left profunda femoris just distal to the bifurcation which reconstitutes.   5.  Soft tissue gas at the stump of left below-the-knee amputation, within expected limits for recent postop changes.      3D angiographic/MIP images of the vasculature confirm the vascular findings as described above.      These findings were discussed with the patient's clinician, Keith Navarro, on 9/29/2023 8:06 AM.      EC-ECHOCARDIOGRAM COMPLETE W/O CONT   Final Result      US-AORTA/ILIACS DUPLEX COMPLETE   Final Result      US-EXTREMITY ARTERY LOWER UNILAT RIGHT   Final Result      US-INGA SINGLE LEVEL UNILAT RIGHT   Final Result      DX-CHEST-PORTABLE (1 VIEW)   Final Result      1.  LEFT basilar atelectasis or pneumonia   2.  Trace LEFT pleural effusion or pleural scar             Assessment/Plan  * Gangrene of left foot (HCC)- (present on admission)  Assessment & Plan  - s/p Left BKA on 9/27/2023.   - Vascular studies showed severe arterial disease. S/p bilateral common femoral artery endarterectomy and profundoplasty with  saphenous vein angioplasty with stent placement in the right external iliac artery on 9/30/2023. Prevena was placed (5-7 days).  Continue Plavix for 1 month and aspirin for life. Follow-up with Dr. Navarro (vascular surgery). Staples to be removed 14 to 21 days postoperatively.   - Completed course of IV Zosyn on 10/6/2023.   - Noted breakdown of incision site on the left BKA.  s/p debridement and secondary closure of dehisced surgical wound 10/20. Maintain NWB LLE.  -Continue pain control with oral oxycodone.    GELY (acute kidney injury) ([facility])  Assessment & Plan  - Creatinine significantly jumped to 3.76.  Renal ultrasound also showed moderate right and mild left hydronephrosis with distended urinary bladder.  Suspect has post renal failure, with component of prerenal given significant elevation in BUN as well.  CPK is normal.  Urinalysis bland.  -Resolved. Now that has Holguin catheter.  Stop IVF.  Potassium level has normalized, stop Lokelma..  -Maintain Holguin catheter to relieve obstruction.  Continue Flomax and finasteride.  Suspect that he will need to go home with Holguin catheter, and follow-up as outpatient with urology for definitive treatment of BPH.  -Monitor for improvement in renal function closely.  Monitor electrolytes particularly potassium.  Avoid nephrotoxins, and continue to renally dose all medications.    Bacteremia- (present on admission)  Assessment & Plan  - Blood cultures from 9/26/2023 grew Morganella morganii and Vagococcus. Echocardiogram showed no evidence of vegetations.   - ID consulted, completed course of IV Zosyn on 10/6/2023. Repeat blood cultures from 9/27/2023 have been negative.      Hyperkalemia  Assessment & Plan  - Due to acute renal failure.    - Resolved.   - stop Lokelma.   -Monitor closely.  Repeat BMP in the morning.    PAD (peripheral artery disease) ([facility])- (present on admission)  Assessment & Plan  - s/p bilateral common femoral artery endarterectomy and profundoplasty  with saphenous vein angioplasty with stent placement in the right external iliac artery on 9/30/2023. Prevena was placed (5-7 days).   -Continue Plavix for 1 month and aspirin for life.  Follow-up with vascular surgery.  Stump protector for left leg was ordered by vascular surgery.    Hyponatremia- (present on admission)  Assessment & Plan  - Appears euvolemic.  Stable. Stop IVF.  -Continue to monitor.    Elevated liver enzymes- (present on admission)  Assessment & Plan  - Resolved.  Hepatitis panel was normal.  Continue to monitor.    Leukocytosis- (present on admission)  Assessment & Plan  - Resolved.  Completed antibiotics.    Anemia- (present on admission)  Assessment & Plan  -Stable.  Monitor.  CBC in the morning.  Restrictive transfusion strategy.    Renal cyst- (present on admission)  Assessment & Plan  - Noted to have renal cyst on CT.  MRI shows multiseptated cystic lesions in the upper pole of left kidney measuring 3.8 cm without associated enhancement or nodular component.    -Outpatient follow-up with urology.         VTE prophylaxis: SCDs, Heparin SQ    My total time spent caring for the patient on the day of the encounter was 51 minutes. This does not include time spent on separately billable procedures/tests.

## 2023-10-21 NOTE — PROGRESS NOTES
"Pt:  Dillon Centeno   Room:  602-1    A&Ox: 3 (person, place, situation)    Safety: Call light in reach, bed in low position & wheels locked, educated on \"call don't fall\", antiskid socks on, bed alarm enabled     Respiratory: WNL     Cardiac: WNL        GI:   Last BM 10/19, passing gas   :  Holguin catheter in place, patent draining light yellow urine - no longer pinkish red    Skin wounds:  pt reminded to continue turning in bed, declined Q2 turning assistance / femoral procedure incision dressings (bilaterally) cleansed and changed  (left incision appears red w/ yellow sloughing, and serous drainage, wound at proximal end of incision appear to be  slightly - picture taken), BKA revision dressing CDI - observed dressing only.      "

## 2023-10-22 LAB
ANION GAP SERPL CALC-SCNC: 11 MMOL/L (ref 7–16)
BUN SERPL-MCNC: 13 MG/DL (ref 8–22)
CALCIUM SERPL-MCNC: 8.3 MG/DL (ref 8.5–10.5)
CHLORIDE SERPL-SCNC: 99 MMOL/L (ref 96–112)
CO2 SERPL-SCNC: 24 MMOL/L (ref 20–33)
CREAT SERPL-MCNC: 0.75 MG/DL (ref 0.5–1.4)
ERYTHROCYTE [DISTWIDTH] IN BLOOD BY AUTOMATED COUNT: 54.5 FL (ref 35.9–50)
GFR SERPLBLD CREATININE-BSD FMLA CKD-EPI: 104 ML/MIN/1.73 M 2
GLUCOSE SERPL-MCNC: 92 MG/DL (ref 65–99)
HCT VFR BLD AUTO: 28.8 % (ref 42–52)
HGB BLD-MCNC: 9.2 G/DL (ref 14–18)
MCH RBC QN AUTO: 28.9 PG (ref 27–33)
MCHC RBC AUTO-ENTMCNC: 31.9 G/DL (ref 32.3–36.5)
MCV RBC AUTO: 90.6 FL (ref 81.4–97.8)
PLATELET # BLD AUTO: 520 K/UL (ref 164–446)
PMV BLD AUTO: 8.5 FL (ref 9–12.9)
POTASSIUM SERPL-SCNC: 3.8 MMOL/L (ref 3.6–5.5)
RBC # BLD AUTO: 3.18 M/UL (ref 4.7–6.1)
SODIUM SERPL-SCNC: 134 MMOL/L (ref 135–145)
WBC # BLD AUTO: 8.2 K/UL (ref 4.8–10.8)

## 2023-10-22 PROCEDURE — A9270 NON-COVERED ITEM OR SERVICE: HCPCS | Performed by: INTERNAL MEDICINE

## 2023-10-22 PROCEDURE — 700102 HCHG RX REV CODE 250 W/ 637 OVERRIDE(OP): Performed by: INTERNAL MEDICINE

## 2023-10-22 PROCEDURE — 700111 HCHG RX REV CODE 636 W/ 250 OVERRIDE (IP): Performed by: SURGERY

## 2023-10-22 PROCEDURE — A9270 NON-COVERED ITEM OR SERVICE: HCPCS | Performed by: STUDENT IN AN ORGANIZED HEALTH CARE EDUCATION/TRAINING PROGRAM

## 2023-10-22 PROCEDURE — 700102 HCHG RX REV CODE 250 W/ 637 OVERRIDE(OP): Performed by: STUDENT IN AN ORGANIZED HEALTH CARE EDUCATION/TRAINING PROGRAM

## 2023-10-22 PROCEDURE — 85027 COMPLETE CBC AUTOMATED: CPT

## 2023-10-22 PROCEDURE — 770006 HCHG ROOM/CARE - MED/SURG/GYN SEMI*

## 2023-10-22 PROCEDURE — 99233 SBSQ HOSP IP/OBS HIGH 50: CPT | Performed by: INTERNAL MEDICINE

## 2023-10-22 PROCEDURE — 80048 BASIC METABOLIC PNL TOTAL CA: CPT

## 2023-10-22 PROCEDURE — 36415 COLL VENOUS BLD VENIPUNCTURE: CPT

## 2023-10-22 RX ADMIN — CEPHALEXIN 500 MG: 500 CAPSULE ORAL at 17:24

## 2023-10-22 RX ADMIN — TRAMADOL HYDROCHLORIDE 50 MG: 50 TABLET ORAL at 23:38

## 2023-10-22 RX ADMIN — ATORVASTATIN CALCIUM 40 MG: 40 TABLET, FILM COATED ORAL at 17:24

## 2023-10-22 RX ADMIN — FINASTERIDE 5 MG: 5 TABLET, FILM COATED ORAL at 05:36

## 2023-10-22 RX ADMIN — HEPARIN SODIUM 5000 UNITS: 5000 INJECTION, SOLUTION INTRAVENOUS; SUBCUTANEOUS at 05:38

## 2023-10-22 RX ADMIN — CEPHALEXIN 500 MG: 500 CAPSULE ORAL at 06:38

## 2023-10-22 RX ADMIN — ACETAMINOPHEN 650 MG: 325 TABLET, FILM COATED ORAL at 23:39

## 2023-10-22 RX ADMIN — CYCLOBENZAPRINE 10 MG: 10 TABLET, FILM COATED ORAL at 15:17

## 2023-10-22 RX ADMIN — OXYCODONE HYDROCHLORIDE 20 MG: 10 TABLET ORAL at 08:37

## 2023-10-22 RX ADMIN — TRAMADOL HYDROCHLORIDE 50 MG: 50 TABLET ORAL at 17:25

## 2023-10-22 RX ADMIN — NICOTINE TRANSDERMAL SYSTEM 21 MG: 21 PATCH, EXTENDED RELEASE TRANSDERMAL at 05:37

## 2023-10-22 RX ADMIN — GABAPENTIN 300 MG: 300 CAPSULE ORAL at 05:38

## 2023-10-22 RX ADMIN — ACETAMINOPHEN 650 MG: 325 TABLET, FILM COATED ORAL at 17:24

## 2023-10-22 RX ADMIN — TRAMADOL HYDROCHLORIDE 50 MG: 50 TABLET ORAL at 05:36

## 2023-10-22 RX ADMIN — OXYCODONE HYDROCHLORIDE 20 MG: 10 TABLET ORAL at 15:17

## 2023-10-22 RX ADMIN — CYCLOBENZAPRINE 10 MG: 10 TABLET, FILM COATED ORAL at 22:13

## 2023-10-22 RX ADMIN — ACETAMINOPHEN 650 MG: 325 TABLET, FILM COATED ORAL at 05:36

## 2023-10-22 RX ADMIN — CEPHALEXIN 500 MG: 500 CAPSULE ORAL at 23:38

## 2023-10-22 RX ADMIN — CLOPIDOGREL BISULFATE 75 MG: 75 TABLET ORAL at 05:36

## 2023-10-22 RX ADMIN — OXYCODONE HYDROCHLORIDE 20 MG: 10 TABLET ORAL at 22:12

## 2023-10-22 RX ADMIN — GABAPENTIN 300 MG: 300 CAPSULE ORAL at 17:24

## 2023-10-22 RX ADMIN — OXYCODONE HYDROCHLORIDE 20 MG: 10 TABLET ORAL at 01:57

## 2023-10-22 ASSESSMENT — PAIN DESCRIPTION - PAIN TYPE
TYPE: ACUTE PAIN

## 2023-10-22 NOTE — WOUND TEAM
Wound team consulted as BL groin incisions concerning. Asked bedside RN to escalate to surgeon. Please re-consult wound team as needed.

## 2023-10-22 NOTE — CARE PLAN
The patient is Stable - Low risk of patient condition declining or worsening    Shift Goals  Clinical Goals: pain management; wound vac management; gonzales management  Patient Goals: pain control  Family Goals: ashlie    Progress made toward(s) clinical / shift goals:  Patient verbalizes understanding of plan of care; patient calls appropriately      Problem: Knowledge Deficit - Standard  Goal: Patient and family/care givers will demonstrate understanding of plan of care, disease process/condition, diagnostic tests and medications  Outcome: Progressing     Problem: Fall Risk  Goal: Patient will remain free from falls  Outcome: Progressing       Patient is not progressing towards the following goals:

## 2023-10-22 NOTE — PROGRESS NOTES
Hospital Medicine Daily Progress Note    Date of Service  10/22/2023    Chief Complaint  Bilateral feet wounds    Hospital Course  Dillon Centeno is a 58 y.o. male with hypertension and tobacco dependence, admitted 9/26/2023 with bilateral feet wounds.  He was noted to have ischemic gangrene of the left lower extremity with maggots and superimposed bacterial infection.  Vascular surgery and orthopedic surgery were consulted.  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  Vascular studies showed severe arterial disease.  Patient underwent bilateral common femoral artery endarterectomy and profundoplasty with saphenous vein angioplasty with stent placement in the right external iliac artery on 9/30/2023. Prevena was placed (5-7 days). He was recommended to have Plavix for 1 month and aspirin for life. Blood cultures from 9/26/2023 grew Morganella morganii and Vagococcus.  Urine culture from 9/26/2023 grew Staph epidermidis. ID were consulted.   Echocardiogram showed LVEF of about 60% with no reported valvular abnormalities.  Repeat blood cultures from 9/27/2023 have been negative.  ID recommended IV Zosyn which he completed on 10/6/2023.  Subsequently, he had breakdown of the incision site on the left BKA.  Orthopedics planning for revision.  However he developed acute kidney injury which was felt to be post renal with noted obstructive changes on renal ultrasound (moderate right and mild left hydronephrosis with distended urinary bladder), with component of prerenal, with associated mild hyperkalemia.  Urinalysis was bland with negative protein, casts.  He was given potassium lowering medications, along with IV fluids.  Holguin catheter was placed.  Nephrology and urology were consulted.  Renal function improved.  Urology recommended Flomax and finasteride.  He was able to go to the OR and underwent debridement and secondary closure of dehisced surgical wound.  Orthopedics recommended nonweightbearing on the left  lower extremity.    Additionally, he was noted to have renal cyst on CT.  MRI shows multiseptated cystic lesions in the upper pole of left kidney measuring 3.8 cm without associated enhancement or nodular component.    Discharge planning was pursued, but unfortunately had no accepting SNF's and no home health services accepting either.  Patient refused to go to a group home.  DMEs were being arranged.    Interval Problem Update  10/22/2023 - I reviewed the patient's chart today. Uneventful night. VSS. Afebrile. Saturating well on RA.  No leukocytosis.  Hemoglobin stable.  Platelet count is stable.  Sodium is stable at 134.  Renal function stable and normal.  Nursing has noted redness and some exudate on the femoral endarterectomy incision site.  I have started him on Keflex.  I also reached out to vascular surgery (Dr. Navarro) to let him know of the update regarding the surgical sites who gave the opinion that he may need debridement in the wound VAC. Placed pt NPO.     > I have personally seen and examined the patient today.  He has pain on the left leg, rated 8 out of 10.  Otherwise no nausea, vomiting, chest pain or shortness of breath.  Last ate at dinner last night.      I personally reviewed all lab results mentioned above. Prior medical records from this institution and outside facilities were independently reviewed as noted. I also personally reviewed all ER physician and consultant recommendations and plans as documented above. History was independently obtained by myself. I have discussed this patient's plan of care and discharge plan at IDT rounds today with Case Management, Nursing, Nursing leadership, and other members of the IDT team.    Consultants/Specialty  infectious disease, nephrology, orthopedics, urology, and vascular surgery    Code Status  Full Code    Disposition  The patient is not medically cleared for discharge to home or a post-acute facility.      Anticipate discharge to home once  cleared by orthopedics and wheelchair is arranged.  I have placed the appropriate orders for post-discharge needs.    Review of Systems  ROS     Pertinent positives/negatives as mentioned above.     A complete review of systems was personally done by me. All other systems were negative.       Physical Exam  Temp:  [36.2 °C (97.2 °F)-36.4 °C (97.6 °F)] 36.4 °C (97.5 °F)  Pulse:  [77-95] 77  Resp:  [17-18] 18  BP: (138-147)/(77-90) 138/86  SpO2:  [94 %-99 %] 99 %    Physical Exam  Vitals reviewed.   Constitutional:       General: He is not in acute distress.     Appearance: Normal appearance. He is not toxic-appearing or diaphoretic.   HENT:      Head: Normocephalic and atraumatic.      Nose: Nose normal.      Mouth/Throat:      Mouth: Mucous membranes are dry.      Pharynx: No oropharyngeal exudate.   Eyes:      General: No scleral icterus.     Extraocular Movements: Extraocular movements intact.      Conjunctiva/sclera: Conjunctivae normal.      Pupils: Pupils are equal, round, and reactive to light.   Cardiovascular:      Rate and Rhythm: Normal rate.      Heart sounds: Normal heart sounds. No murmur heard.     No gallop.   Pulmonary:      Effort: Pulmonary effort is normal. No respiratory distress.      Breath sounds: Normal breath sounds. No stridor. No wheezing, rhonchi or rales.   Chest:      Chest wall: No tenderness.   Abdominal:      General: Bowel sounds are normal. There is no distension.      Palpations: Abdomen is soft. There is no mass.      Tenderness: There is no abdominal tenderness. There is no guarding or rebound.   Genitourinary:     Penis: Normal.       Comments: Holguin catheter in place, draining clear daina urine.  Musculoskeletal:         General: No swelling. Normal range of motion.      Cervical back: Normal range of motion and neck supple.      Right lower leg: No edema.      Comments: Left BKA site, CDI, drain in place  Left thigh incision site with staples, surrounding redness and  exudates, dehiscence noted   Lymphadenopathy:      Cervical: No cervical adenopathy.   Skin:     General: Skin is warm and dry.      Coloration: Skin is not jaundiced.      Findings: No rash.      Comments: Third right toe absent due to prior trauma.  Remaining toes with chronic ulcers.   Neurological:      General: No focal deficit present.      Mental Status: He is alert and oriented to person, place, and time. Mental status is at baseline.      Cranial Nerves: No cranial nerve deficit.   Psychiatric:         Mood and Affect: Mood normal.         Behavior: Behavior normal.         Thought Content: Thought content normal.         Judgment: Judgment normal.               Fluids    Intake/Output Summary (Last 24 hours) at 10/22/2023 0911  Last data filed at 10/22/2023 0414  Gross per 24 hour   Intake 830 ml   Output 4000 ml   Net -3170 ml       Laboratory  Recent Labs     10/20/23  0159 10/21/23  0336 10/22/23  0315   WBC 7.8 13.6* 8.2   RBC 2.81* 3.10* 3.18*   HEMOGLOBIN 8.2* 8.9* 9.2*   HEMATOCRIT 25.2* 28.3* 28.8*   MCV 89.7 91.3 90.6   MCH 29.2 28.7 28.9   MCHC 32.5 31.4* 31.9*   RDW 54.8* 56.7* 54.5*   PLATELETCT 505* 523* 520*   MPV 8.8* 8.7* 8.5*       Recent Labs     10/20/23  0159 10/21/23  0336 10/22/23  0315   SODIUM 135 134* 134*   POTASSIUM 4.3 3.6 3.8   CHLORIDE 100 101 99   CO2 22 25 24   GLUCOSE 96 147* 92   BUN 34* 15 13   CREATININE 1.70* 0.83 0.75   CALCIUM 8.6 8.7 8.3*                     Imaging  US-RENAL   Final Result      1.  Bilateral medical renal disease.   2.  Moderate right and mild left hydronephrosis, likely secondary to distention of the urinary bladder secondary to prostatic hypertrophy. Post void images of the inner bladder and collecting systems of the kidneys were not acquired.   3.  Hydronephrosis was not present on the prior MRI of the abdomen without and with IV contrast on 10/2/2023.      MR-ABDOMEN-WITH & W/O   Final Result      Multi septated cystic lesion at the upper pole  LEFT kidney measuring 3.8 cm without associated enhancement or nodular component (Bosniak 2).            DX-PORTABLE FLUORO > 1 HOUR   Final Result      Portable fluoroscopy utilized for 2 minutes 20.4 seconds.         INTERPRETING LOCATION: [address] [facility], [place], [id number]      CT-CTA AORTA-RO WITH & W/O-POST PROCESS   Final Result         CTA AORTA      1.  Septated lobulated left upper pole renal cystic mass lesion, recommend follow-up MRI of the kidneys for further characterization.   2.  Atherosclerosis and atherosclerotic coronary artery disease.   3.  Fat-containing right inguinal hernia      CTA LOWER EXTREMITIES      1.  Occlusion of the right common iliac artery through the distal superficial femoral artery   2.  Distal right peroneal artery occlusion with 2-vessel runoff the anterior and posterior tibial arteries at the right ankle.   3.  Occlusion of the left superficial femoral artery at the bifurcation extending through its length to the popliteal artery.   4.  Occlusion of the proximal left profunda femoris just distal to the bifurcation which reconstitutes.   5.  Soft tissue gas at the stump of left below-the-knee amputation, within expected limits for recent postop changes.      3D angiographic/MIP images of the vasculature confirm the vascular findings as described above.      These findings were discussed with the patient's clinician, Keith Navarro, on 9/29/2023 8:06 AM.      EC-ECHOCARDIOGRAM COMPLETE W/O CONT   Final Result      US-AORTA/ILIACS DUPLEX COMPLETE   Final Result      US-EXTREMITY ARTERY LOWER UNILAT RIGHT   Final Result      US-INGA SINGLE LEVEL UNILAT RIGHT   Final Result      DX-CHEST-PORTABLE (1 VIEW)   Final Result      1.  LEFT basilar atelectasis or pneumonia   2.  Trace LEFT pleural effusion or pleural scar             Assessment/Plan  * Gangrene of left foot (HCC)- (present on admission)  Assessment & Plan  - s/p Left BKA on 9/27/2023.   - Vascular studies showed severe  arterial disease. S/p bilateral common femoral artery endarterectomy and profundoplasty with saphenous vein angioplasty with stent placement in the right external iliac artery on 9/30/2023. Prevena was placed (5-7 days).  Continue Plavix for 1 month and aspirin for life. Follow-up with Dr. Navarro (vascular surgery). Staples to be removed 14 to 21 days postoperatively.   - Completed course of IV Zosyn on 10/6/2023.   - Noted breakdown of incision site on the left BKA.  s/p debridement and secondary closure of dehisced surgical wound 10/20. Maintain NWB LLE.  -Now also having wound dehiscence on the left upper thigh surgical site.  Discussed with vascular surgery, plan for debridement and possibly wound VAC placement.  Will start him on oral Keflex for now.  -Continue pain control with oral oxycodone.    GELY (acute kidney injury) (HCC)  Assessment & Plan  - Creatinine significantly jumped to 3.76.  Renal ultrasound also showed moderate right and mild left hydronephrosis with distended urinary bladder.  Suspect has post renal failure, with component of prerenal given significant elevation in BUN as well.  CPK is normal.  Urinalysis bland.  -Resolved. Now that has Holguin catheter.  Stop IVF.  Potassium level has normalized, stop Lokelma..  -Maintain Holguin catheter to relieve obstruction.  Continue Flomax and finasteride.  Suspect that he will need to go home with Holguin catheter, and follow-up as outpatient with urology for definitive treatment of BPH.  -Monitor for improvement in renal function closely.  Monitor electrolytes particularly potassium.  Avoid nephrotoxins, and continue to renally dose all medications.    Bacteremia- (present on admission)  Assessment & Plan  - Blood cultures from 9/26/2023 grew Morganella morganii and Vagococcus. Echocardiogram showed no evidence of vegetations.   - ID consulted, completed course of IV Zosyn on 10/6/2023. Repeat blood cultures from 9/27/2023 have been negative.       Hyperkalemia  Assessment & Plan  - Due to acute renal failure.    - Resolved.   -Monitor closely.  Repeat BMP in the morning.    PAD (peripheral artery disease) (HCC)- (present on admission)  Assessment & Plan  - s/p bilateral common femoral artery endarterectomy and profundoplasty with saphenous vein angioplasty with stent placement in the right external iliac artery on 9/30/2023. Prevena was placed (5-7 days).   -Continue Plavix for 1 month and aspirin for life.  Follow-up with vascular surgery.  Stump protector for left leg was ordered by vascular surgery.  -Now also having wound dehiscence on the left upper thigh surgical site.  Discussed with vascular surgery, plan for debridement and possibly wound VAC placement.  Will start him on oral Keflex for now.    Hyponatremia- (present on admission)  Assessment & Plan  - Appears euvolemic.  Stable. Stop IVF.  -Continue to monitor.    Elevated liver enzymes- (present on admission)  Assessment & Plan  - Resolved.  Hepatitis panel was normal.  Continue to monitor.    Leukocytosis- (present on admission)  Assessment & Plan  - Resolved.  Completed antibiotics.    Anemia- (present on admission)  Assessment & Plan  -Stable.  Monitor.  CBC in the morning.  Restrictive transfusion strategy.    Renal cyst- (present on admission)  Assessment & Plan  - Noted to have renal cyst on CT.  MRI shows multiseptated cystic lesions in the upper pole of left kidney measuring 3.8 cm without associated enhancement or nodular component.    -Outpatient follow-up with urology.         VTE prophylaxis: SCDs, Heparin SQ    My total time spent caring for the patient on the day of the encounter was 53 minutes. This does not include time spent on separately billable procedures/tests.

## 2023-10-22 NOTE — PROGRESS NOTES
VASCULAR SURGERY SERVICE                        Progress Note  _________________________________    Patient underwent a left BKA for a mummified gangrenous left foot on 9/27/23 followed by revascularization on 9/30/23 consisting of bilateral common femoral artery endarterectomy and profundoplasty with saphenous vein patch angioplasty and stenting of the right external iliac artery    The left groin incision is showing dehiscence and drainage and will need debridement and vac therapy.  The patient may also warrant revision to a left AKA during this operation as there are wounds forming around the knee that are unlikely to heal due to his severe vascular disease.    Attempted to get surgery done today but availability is limited.  Patient can be back on a regular diet today, but NPO except for medications at midnight.    Keith Navarro MD  [facility]   [facility] preferred or call my office [phone]  __________________________________________________  Patient:Dillon Centeno   MRN:[id number]

## 2023-10-23 ENCOUNTER — ANESTHESIA EVENT (OUTPATIENT)
Dept: SURGERY | Facility: MEDICAL CENTER | Age: 58
DRG: 239 | End: 2023-10-23
Payer: MEDICAID

## 2023-10-23 ENCOUNTER — ANESTHESIA (OUTPATIENT)
Dept: SURGERY | Facility: MEDICAL CENTER | Age: 58
DRG: 239 | End: 2023-10-23
Payer: MEDICAID

## 2023-10-23 LAB
ANION GAP SERPL CALC-SCNC: 11 MMOL/L (ref 7–16)
BUN SERPL-MCNC: 12 MG/DL (ref 8–22)
CALCIUM SERPL-MCNC: 9.1 MG/DL (ref 8.5–10.5)
CHLORIDE SERPL-SCNC: 95 MMOL/L (ref 96–112)
CO2 SERPL-SCNC: 23 MMOL/L (ref 20–33)
CREAT SERPL-MCNC: 0.63 MG/DL (ref 0.5–1.4)
ERYTHROCYTE [DISTWIDTH] IN BLOOD BY AUTOMATED COUNT: 54.4 FL (ref 35.9–50)
GFR SERPLBLD CREATININE-BSD FMLA CKD-EPI: 109 ML/MIN/1.73 M 2
GLUCOSE SERPL-MCNC: 98 MG/DL (ref 65–99)
HCT VFR BLD AUTO: 31.5 % (ref 42–52)
HGB BLD-MCNC: 10.2 G/DL (ref 14–18)
MCH RBC QN AUTO: 28.7 PG (ref 27–33)
MCHC RBC AUTO-ENTMCNC: 32.4 G/DL (ref 32.3–36.5)
MCV RBC AUTO: 88.7 FL (ref 81.4–97.8)
PLATELET # BLD AUTO: 533 K/UL (ref 164–446)
PMV BLD AUTO: 8.1 FL (ref 9–12.9)
POTASSIUM SERPL-SCNC: 3.9 MMOL/L (ref 3.6–5.5)
RBC # BLD AUTO: 3.55 M/UL (ref 4.7–6.1)
SODIUM SERPL-SCNC: 129 MMOL/L (ref 135–145)
WBC # BLD AUTO: 8.8 K/UL (ref 4.8–10.8)

## 2023-10-23 PROCEDURE — 99233 SBSQ HOSP IP/OBS HIGH 50: CPT | Performed by: INTERNAL MEDICINE

## 2023-10-23 PROCEDURE — 700101 HCHG RX REV CODE 250: Performed by: ANESTHESIOLOGY

## 2023-10-23 PROCEDURE — 160048 HCHG OR STATISTICAL LEVEL 1-5: Performed by: SURGERY

## 2023-10-23 PROCEDURE — 700102 HCHG RX REV CODE 250 W/ 637 OVERRIDE(OP): Performed by: STUDENT IN AN ORGANIZED HEALTH CARE EDUCATION/TRAINING PROGRAM

## 2023-10-23 PROCEDURE — 0JBC0ZZ EXCISION OF PELVIC REGION SUBCUTANEOUS TISSUE AND FASCIA, OPEN APPROACH: ICD-10-PCS | Performed by: SURGERY

## 2023-10-23 PROCEDURE — 27590 AMPUTATE LEG AT THIGH: CPT | Mod: LT | Performed by: SURGERY

## 2023-10-23 PROCEDURE — 3E0T3BZ INTRODUCTION OF ANESTHETIC AGENT INTO PERIPHERAL NERVES AND PLEXI, PERCUTANEOUS APPROACH: ICD-10-PCS | Performed by: ANESTHESIOLOGY

## 2023-10-23 PROCEDURE — 0Y6D0Z3 DETACHMENT AT LEFT UPPER LEG, LOW, OPEN APPROACH: ICD-10-PCS | Performed by: SURGERY

## 2023-10-23 PROCEDURE — 97605 NEG PRS WND THER DME<=50SQCM: CPT | Mod: 59 | Performed by: SURGERY

## 2023-10-23 PROCEDURE — 700102 HCHG RX REV CODE 250 W/ 637 OVERRIDE(OP): Performed by: INTERNAL MEDICINE

## 2023-10-23 PROCEDURE — 160002 HCHG RECOVERY MINUTES (STAT): Performed by: SURGERY

## 2023-10-23 PROCEDURE — 700111 HCHG RX REV CODE 636 W/ 250 OVERRIDE (IP): Performed by: ANESTHESIOLOGY

## 2023-10-23 PROCEDURE — 11042 DBRDMT SUBQ TIS 1ST 20SQCM/<: CPT | Mod: 51,59 | Performed by: SURGERY

## 2023-10-23 PROCEDURE — A9270 NON-COVERED ITEM OR SERVICE: HCPCS | Performed by: STUDENT IN AN ORGANIZED HEALTH CARE EDUCATION/TRAINING PROGRAM

## 2023-10-23 PROCEDURE — 700102 HCHG RX REV CODE 250 W/ 637 OVERRIDE(OP): Performed by: ANESTHESIOLOGY

## 2023-10-23 PROCEDURE — 85027 COMPLETE CBC AUTOMATED: CPT

## 2023-10-23 PROCEDURE — 160028 HCHG SURGERY MINUTES - 1ST 30 MINS LEVEL 3: Performed by: SURGERY

## 2023-10-23 PROCEDURE — A9270 NON-COVERED ITEM OR SERVICE: HCPCS | Performed by: INTERNAL MEDICINE

## 2023-10-23 PROCEDURE — 160009 HCHG ANES TIME/MIN: Performed by: SURGERY

## 2023-10-23 PROCEDURE — 700111 HCHG RX REV CODE 636 W/ 250 OVERRIDE (IP): Mod: JZ | Performed by: ANESTHESIOLOGY

## 2023-10-23 PROCEDURE — 88307 TISSUE EXAM BY PATHOLOGIST: CPT

## 2023-10-23 PROCEDURE — 160036 HCHG PACU - EA ADDL 30 MINS PHASE I: Performed by: SURGERY

## 2023-10-23 PROCEDURE — 88311 DECALCIFY TISSUE: CPT

## 2023-10-23 PROCEDURE — A9270 NON-COVERED ITEM OR SERVICE: HCPCS | Performed by: ANESTHESIOLOGY

## 2023-10-23 PROCEDURE — 700111 HCHG RX REV CODE 636 W/ 250 OVERRIDE (IP): Performed by: SURGERY

## 2023-10-23 PROCEDURE — 700105 HCHG RX REV CODE 258

## 2023-10-23 PROCEDURE — 160039 HCHG SURGERY MINUTES - EA ADDL 1 MIN LEVEL 3: Performed by: SURGERY

## 2023-10-23 PROCEDURE — 160035 HCHG PACU - 1ST 60 MINS PHASE I: Performed by: SURGERY

## 2023-10-23 PROCEDURE — 700111 HCHG RX REV CODE 636 W/ 250 OVERRIDE (IP)

## 2023-10-23 PROCEDURE — 700105 HCHG RX REV CODE 258: Performed by: ANESTHESIOLOGY

## 2023-10-23 PROCEDURE — 36415 COLL VENOUS BLD VENIPUNCTURE: CPT

## 2023-10-23 PROCEDURE — 80048 BASIC METABOLIC PNL TOTAL CA: CPT

## 2023-10-23 PROCEDURE — 770006 HCHG ROOM/CARE - MED/SURG/GYN SEMI*

## 2023-10-23 RX ORDER — DEXAMETHASONE SODIUM PHOSPHATE 4 MG/ML
INJECTION, SOLUTION INTRA-ARTICULAR; INTRALESIONAL; INTRAMUSCULAR; INTRAVENOUS; SOFT TISSUE PRN
Status: DISCONTINUED | OUTPATIENT
Start: 2023-10-23 | End: 2023-10-23 | Stop reason: SURG

## 2023-10-23 RX ORDER — HYDROMORPHONE HYDROCHLORIDE 1 MG/ML
0.4 INJECTION, SOLUTION INTRAMUSCULAR; INTRAVENOUS; SUBCUTANEOUS
Status: DISCONTINUED | OUTPATIENT
Start: 2023-10-23 | End: 2023-10-23 | Stop reason: HOSPADM

## 2023-10-23 RX ORDER — LIDOCAINE HYDROCHLORIDE 20 MG/ML
INJECTION, SOLUTION EPIDURAL; INFILTRATION; INTRACAUDAL; PERINEURAL PRN
Status: DISCONTINUED | OUTPATIENT
Start: 2023-10-23 | End: 2023-10-23 | Stop reason: SURG

## 2023-10-23 RX ORDER — OXYCODONE HCL 5 MG/5 ML
5 SOLUTION, ORAL ORAL
Status: COMPLETED | OUTPATIENT
Start: 2023-10-23 | End: 2023-10-23

## 2023-10-23 RX ORDER — HYDROMORPHONE HYDROCHLORIDE 1 MG/ML
0.2 INJECTION, SOLUTION INTRAMUSCULAR; INTRAVENOUS; SUBCUTANEOUS
Status: DISCONTINUED | OUTPATIENT
Start: 2023-10-23 | End: 2023-10-23 | Stop reason: HOSPADM

## 2023-10-23 RX ORDER — ONDANSETRON 2 MG/ML
INJECTION INTRAMUSCULAR; INTRAVENOUS PRN
Status: DISCONTINUED | OUTPATIENT
Start: 2023-10-23 | End: 2023-10-23 | Stop reason: SURG

## 2023-10-23 RX ORDER — SODIUM CHLORIDE, SODIUM LACTATE, POTASSIUM CHLORIDE, CALCIUM CHLORIDE 600; 310; 30; 20 MG/100ML; MG/100ML; MG/100ML; MG/100ML
INJECTION, SOLUTION INTRAVENOUS CONTINUOUS
Status: DISCONTINUED | OUTPATIENT
Start: 2023-10-23 | End: 2023-10-23 | Stop reason: HOSPADM

## 2023-10-23 RX ORDER — HYDROMORPHONE HYDROCHLORIDE 2 MG/ML
INJECTION, SOLUTION INTRAMUSCULAR; INTRAVENOUS; SUBCUTANEOUS PRN
Status: DISCONTINUED | OUTPATIENT
Start: 2023-10-23 | End: 2023-10-23 | Stop reason: SURG

## 2023-10-23 RX ORDER — HYDROMORPHONE HYDROCHLORIDE 1 MG/ML
0.1 INJECTION, SOLUTION INTRAMUSCULAR; INTRAVENOUS; SUBCUTANEOUS
Status: DISCONTINUED | OUTPATIENT
Start: 2023-10-23 | End: 2023-10-23 | Stop reason: HOSPADM

## 2023-10-23 RX ORDER — OXYCODONE HCL 5 MG/5 ML
10 SOLUTION, ORAL ORAL
Status: COMPLETED | OUTPATIENT
Start: 2023-10-23 | End: 2023-10-23

## 2023-10-23 RX ORDER — SODIUM CHLORIDE, SODIUM LACTATE, POTASSIUM CHLORIDE, CALCIUM CHLORIDE 600; 310; 30; 20 MG/100ML; MG/100ML; MG/100ML; MG/100ML
INJECTION, SOLUTION INTRAVENOUS
Status: DISCONTINUED | OUTPATIENT
Start: 2023-10-23 | End: 2023-10-23 | Stop reason: HOSPADM

## 2023-10-23 RX ORDER — ONDANSETRON 2 MG/ML
4 INJECTION INTRAMUSCULAR; INTRAVENOUS
Status: DISCONTINUED | OUTPATIENT
Start: 2023-10-23 | End: 2023-10-23 | Stop reason: HOSPADM

## 2023-10-23 RX ORDER — EPHEDRINE SULFATE 50 MG/ML
5 INJECTION, SOLUTION INTRAVENOUS
Status: DISCONTINUED | OUTPATIENT
Start: 2023-10-23 | End: 2023-10-23 | Stop reason: HOSPADM

## 2023-10-23 RX ORDER — HYDRALAZINE HYDROCHLORIDE 20 MG/ML
5 INJECTION INTRAMUSCULAR; INTRAVENOUS
Status: DISCONTINUED | OUTPATIENT
Start: 2023-10-23 | End: 2023-10-23 | Stop reason: HOSPADM

## 2023-10-23 RX ORDER — HALOPERIDOL 5 MG/ML
1 INJECTION INTRAMUSCULAR
Status: DISCONTINUED | OUTPATIENT
Start: 2023-10-23 | End: 2023-10-23 | Stop reason: HOSPADM

## 2023-10-23 RX ORDER — CEFAZOLIN SODIUM 1 G/3ML
INJECTION, POWDER, FOR SOLUTION INTRAMUSCULAR; INTRAVENOUS PRN
Status: DISCONTINUED | OUTPATIENT
Start: 2023-10-23 | End: 2023-10-23 | Stop reason: SURG

## 2023-10-23 RX ORDER — PHENYLEPHRINE HCL IN 0.9% NACL 0.5 MG/5ML
SYRINGE (ML) INTRAVENOUS PRN
Status: DISCONTINUED | OUTPATIENT
Start: 2023-10-23 | End: 2023-10-23 | Stop reason: SURG

## 2023-10-23 RX ADMIN — HEPARIN SODIUM 5000 UNITS: 5000 INJECTION, SOLUTION INTRAVENOUS; SUBCUTANEOUS at 15:39

## 2023-10-23 RX ADMIN — FENTANYL CITRATE 100 MCG: 50 INJECTION, SOLUTION INTRAMUSCULAR; INTRAVENOUS at 12:31

## 2023-10-23 RX ADMIN — ACETAMINOPHEN 650 MG: 325 TABLET, FILM COATED ORAL at 15:36

## 2023-10-23 RX ADMIN — CEPHALEXIN 500 MG: 500 CAPSULE ORAL at 15:37

## 2023-10-23 RX ADMIN — PROPOFOL 150 MG: 10 INJECTION, EMULSION INTRAVENOUS at 12:31

## 2023-10-23 RX ADMIN — GABAPENTIN 300 MG: 300 CAPSULE ORAL at 05:19

## 2023-10-23 RX ADMIN — ATORVASTATIN CALCIUM 40 MG: 40 TABLET, FILM COATED ORAL at 16:40

## 2023-10-23 RX ADMIN — TRAMADOL HYDROCHLORIDE 50 MG: 50 TABLET ORAL at 05:19

## 2023-10-23 RX ADMIN — TRAMADOL HYDROCHLORIDE 50 MG: 50 TABLET ORAL at 16:40

## 2023-10-23 RX ADMIN — CEFAZOLIN 2 G: 1 INJECTION, POWDER, FOR SOLUTION INTRAMUSCULAR; INTRAVENOUS at 12:31

## 2023-10-23 RX ADMIN — HYDROMORPHONE HYDROCHLORIDE 2 MG: 2 INJECTION INTRAMUSCULAR; INTRAVENOUS; SUBCUTANEOUS at 12:48

## 2023-10-23 RX ADMIN — NICOTINE TRANSDERMAL SYSTEM 21 MG: 21 PATCH, EXTENDED RELEASE TRANSDERMAL at 05:21

## 2023-10-23 RX ADMIN — ACETAMINOPHEN 650 MG: 325 TABLET, FILM COATED ORAL at 05:20

## 2023-10-23 RX ADMIN — OXYCODONE HYDROCHLORIDE 10 MG: 5 SOLUTION ORAL at 14:19

## 2023-10-23 RX ADMIN — LIDOCAINE HYDROCHLORIDE 80 MG: 20 INJECTION, SOLUTION EPIDURAL; INFILTRATION; INTRACAUDAL at 12:31

## 2023-10-23 RX ADMIN — Medication 100 MCG: at 12:41

## 2023-10-23 RX ADMIN — CEPHALEXIN 500 MG: 500 CAPSULE ORAL at 21:04

## 2023-10-23 RX ADMIN — OXYCODONE HYDROCHLORIDE 20 MG: 10 TABLET ORAL at 18:43

## 2023-10-23 RX ADMIN — GABAPENTIN 300 MG: 300 CAPSULE ORAL at 16:41

## 2023-10-23 RX ADMIN — FINASTERIDE 5 MG: 5 TABLET, FILM COATED ORAL at 05:19

## 2023-10-23 RX ADMIN — CYCLOBENZAPRINE 10 MG: 10 TABLET, FILM COATED ORAL at 21:05

## 2023-10-23 RX ADMIN — OXYCODONE HYDROCHLORIDE 20 MG: 10 TABLET ORAL at 21:58

## 2023-10-23 RX ADMIN — SODIUM CHLORIDE, POTASSIUM CHLORIDE, SODIUM LACTATE AND CALCIUM CHLORIDE: 600; 310; 30; 20 INJECTION, SOLUTION INTRAVENOUS at 12:26

## 2023-10-23 RX ADMIN — TRAMADOL HYDROCHLORIDE 50 MG: 50 TABLET ORAL at 15:38

## 2023-10-23 RX ADMIN — TAMSULOSIN HYDROCHLORIDE 0.4 MG: 0.4 CAPSULE ORAL at 07:41

## 2023-10-23 RX ADMIN — OXYCODONE HYDROCHLORIDE 20 MG: 10 TABLET ORAL at 10:44

## 2023-10-23 RX ADMIN — DEXAMETHASONE SODIUM PHOSPHATE 4 MG: 4 INJECTION INTRA-ARTICULAR; INTRALESIONAL; INTRAMUSCULAR; INTRAVENOUS; SOFT TISSUE at 12:31

## 2023-10-23 RX ADMIN — FENTANYL CITRATE 50 MCG: 50 INJECTION, SOLUTION INTRAMUSCULAR; INTRAVENOUS at 14:26

## 2023-10-23 RX ADMIN — GABAPENTIN 300 MG: 300 CAPSULE ORAL at 15:36

## 2023-10-23 RX ADMIN — Medication 100 MCG: at 12:48

## 2023-10-23 RX ADMIN — SODIUM CHLORIDE 0.3 MCG/KG/MIN: 900 INJECTION INTRAVENOUS at 12:59

## 2023-10-23 RX ADMIN — ONDANSETRON 4 MG: 2 INJECTION INTRAMUSCULAR; INTRAVENOUS at 13:36

## 2023-10-23 RX ADMIN — HEPARIN SODIUM 5000 UNITS: 5000 INJECTION, SOLUTION INTRAVENOUS; SUBCUTANEOUS at 21:58

## 2023-10-23 RX ADMIN — CEPHALEXIN 500 MG: 500 CAPSULE ORAL at 05:19

## 2023-10-23 ASSESSMENT — PAIN DESCRIPTION - PAIN TYPE
TYPE: SURGICAL PAIN
TYPE: ACUTE PAIN
TYPE: SURGICAL PAIN
TYPE: ACUTE PAIN

## 2023-10-23 ASSESSMENT — PAIN SCALES - GENERAL: PAIN_LEVEL: 3

## 2023-10-23 NOTE — THERAPY
Occupational Therapy Contact Note    Patient Name: Dillon Centeno  Age:  58 y.o., Sex:  male  Medical Record #: [id number]  Today's Date: 10/23/2023    Discussed missed therapy with Tamiko       10/23/23 1345   Interdisciplinary Plan of Care Collaboration   Collaboration Comments OT tx attempted, pt in sx for left AKA.

## 2023-10-23 NOTE — PROGRESS NOTES
"      Orthopaedic Progress Note    Interval changes:  Patient doing well LLE   LLE knee immobilizer placed, dressings are CDI- immobilizer to stay on at all times   Cleared for DC to SNF by ortho pending medicine clearance    ROS - Patient denies any new issues.  Pain well controlled.    /82   Pulse (!) 101   Temp 36.7 °C (98 °F) (Temporal)   Resp 15   Ht 1.803 m (5' 11\")   Wt 64.4 kg (141 lb 15.6 oz)   SpO2 98%     Patient seen and examined  No acute distress  Breathing non labored  RRR  LLE placed into knee immobilizer brace, lacks aprox 30 deg of flexion.    Recent Labs     10/21/23  0336 10/22/23  0315 10/23/23  0448   WBC 13.6* 8.2 8.8   RBC 3.10* 3.18* 3.55*   HEMOGLOBIN 8.9* 9.2* 10.2*   HEMATOCRIT 28.3* 28.8* 31.5*   MCV 91.3 90.6 88.7   MCH 28.7 28.9 28.7   MCHC 31.4* 31.9* 32.4   RDW 56.7* 54.5* 54.4*   PLATELETCT 523* 520* 533*   MPV 8.7* 8.5* 8.1*       Active Hospital Problems    Diagnosis     GELY (acute kidney injury) ([facility]) [N17.9]      Priority: High    Hyperkalemia [E87.5]      Priority: Medium    Renal cyst [N28.1]      Priority: Low    PAD (peripheral artery disease) ([facility]) [I73.9]     Bacteremia [R78.81]     Anemia [D64.9]     Gangrene of left foot ([facility]) [I96]     Leukocytosis [D72.829]     Elevated liver enzymes [R74.8]     Hyponatremia [E87.1]        Assessment/Plan:  Patient doing well LLE   LLE knee immobilizer placed, dressings are CDI- immobilizer to stay on at all times   Cleared for DC to SNF by ortho pending medicine clearance  POD#3 S/PDebridement and secondary closure of dehisced surgical wound left below-knee amputation  Wt bearing status - NWB LLE   Wound care/Drains - Dressings to be changed every other day by nursing. Or PRN for saturation starting POD#2  Future Procedures - none planned   Sutures/Staples out- 14-21 days post operatively. Removal will completed by ortho mid levels only.  PT/OT-initiated  Antibiotics: keflex 500mg PO q6  DVT Prophylaxis- TEDS/SCDs/Foot " pumps  Holguin-not needed per ortho  Case Coordination for Discharge Planning - Disposition per therapy recs.

## 2023-10-23 NOTE — CARE PLAN
The patient is Stable - Low risk of patient condition declining or worsening    Shift Goals  Clinical Goals: pain management throughout shift  Patient Goals: pain mgt  Family Goals: ashlie    Progress made toward(s) clinical / shift goals:  multiple pain medications given for LLE pain.    Patient is not progressing towards the following goals:      Problem: Pain - Standard  Goal: Alleviation of pain or a reduction in pain to the patient’s comfort goal  Outcome: Not Met     Problem: Skin Integrity  Goal: Skin integrity is maintained or improved  Outcome: Not Met     Pt scheduled for I&D of dehisced left groin with wound vac placement.

## 2023-10-23 NOTE — OR NURSING
Patient recovered well post op. A&Ox4. VSS, 2L nc. Surgical sites CDI. Surgical pain managed. Patient able to drink fluids without Nausea and vomiting. PT belongings in his room. Spouse updated and discussed plan of care. Report called to Derrek LÓPEZ.

## 2023-10-23 NOTE — PROGRESS NOTES
Hospital Medicine Daily Progress Note    Date of Service  10/23/2023    Chief Complaint  Bilateral feet wounds    Hospital Course  Dillon Centeno is a 58 y.o. male with hypertension and tobacco dependence, admitted 9/26/2023 with bilateral feet wounds.  He was noted to have ischemic gangrene of the left lower extremity with maggots and superimposed bacterial infection.  Vascular surgery and orthopedic surgery were consulted.  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  Vascular studies showed severe arterial disease.  Patient underwent bilateral common femoral artery endarterectomy and profundoplasty with saphenous vein angioplasty with stent placement in the right external iliac artery on 9/30/2023. Prevena was placed (5-7 days). He was recommended to have Plavix for 1 month and aspirin for life. Blood cultures from 9/26/2023 grew Morganella morganii and Vagococcus.  Urine culture from 9/26/2023 grew Staph epidermidis. ID were consulted.   Echocardiogram showed LVEF of about 60% with no reported valvular abnormalities.  Repeat blood cultures from 9/27/2023 have been negative.  ID recommended IV Zosyn which he completed on 10/6/2023.  Subsequently, he had breakdown of the incision site on the left BKA.  Orthopedics planning for revision.  However he developed acute kidney injury which was felt to be post renal with noted obstructive changes on renal ultrasound (moderate right and mild left hydronephrosis with distended urinary bladder), with component of prerenal, with associated mild hyperkalemia.  Urinalysis was bland with negative protein, casts.  He was given potassium lowering medications, along with IV fluids.  Holguin catheter was placed.  Nephrology and urology were consulted.  Renal function improved.  Urology recommended Flomax and finasteride.  He was able to go to the OR and underwent debridement and secondary closure of dehisced surgical wound.  Orthopedics recommended nonweightbearing on the left  lower extremity. He was then noted to have redness and exudate with dehiscence on the femoral endarterectomy incision site.  He was started on Keflex.  Vascular surgery was again contacted who recommended debridement and possibly wound VAC placement.     Additionally, he was noted to have renal cyst on CT.  MRI shows multiseptated cystic lesions in the upper pole of left kidney measuring 3.8 cm without associated enhancement or nodular component.    Discharge planning was pursued, but unfortunately had no accepting SNF's and no home health services accepting either.  Patient refused to go to a group home.  DMEs were being arranged.    Interval Problem Update  10/23/2023 - I reviewed the patient's chart. There were no significant overnight events. Remains hemodynamically stable and afebrile. Stable on RA.  No leukocytosis.  Hemoglobin stable.  Platelet count 533,000.  Sodium 129.  Creatinine is normal.  He is planned for irrigation and debridement of the groin wound today.    > I have personally seen and examined the patient today.  He rates his pain today at 8 out of 10.  No other complaints.  Not short of breath.  No nausea or vomiting.  Not in distress.      I personally reviewed all lab results mentioned above. Prior medical records from this institution and outside facilities were independently reviewed as noted. I also personally reviewed all ER physician and consultant recommendations and plans as documented above. History was independently obtained by myself. I have discussed this patient's plan of care and discharge plan at IDT rounds today with Case Management, Nursing, Nursing leadership, and other members of the IDT team.    Consultants/Specialty  infectious disease, nephrology, orthopedics, urology, and vascular surgery    Code Status  Full Code    Disposition  The patient is not medically cleared for discharge to home or a post-acute facility.      Anticipate discharge to home once cleared by  orthopedics and vascular surgery, and wheelchair is arranged.  I have placed the appropriate orders for post-discharge needs.    Review of Systems  ROS     Pertinent positives/negatives as mentioned above.     A complete review of systems was personally done by me. All other systems were negative.       Physical Exam  Temp:  [36.2 °C (97.2 °F)-36.9 °C (98.4 °F)] 36.2 °C (97.2 °F)  Pulse:  [82-99] 82  Resp:  [16-20] 18  BP: (131-153)/(80-83) 142/82  SpO2:  [94 %-100 %] 94 %    Physical Exam  Vitals reviewed.   Constitutional:       General: He is not in acute distress.     Appearance: Normal appearance. He is not toxic-appearing or diaphoretic.   HENT:      Head: Normocephalic and atraumatic.      Nose: Nose normal.      Mouth/Throat:      Mouth: Mucous membranes are dry.      Pharynx: No oropharyngeal exudate.   Eyes:      General: No scleral icterus.     Extraocular Movements: Extraocular movements intact.      Conjunctiva/sclera: Conjunctivae normal.      Pupils: Pupils are equal, round, and reactive to light.   Cardiovascular:      Rate and Rhythm: Normal rate.      Heart sounds: Normal heart sounds. No murmur heard.     No gallop.   Pulmonary:      Effort: Pulmonary effort is normal. No respiratory distress.      Breath sounds: Normal breath sounds. No stridor. No wheezing, rhonchi or rales.   Chest:      Chest wall: No tenderness.   Abdominal:      General: Bowel sounds are normal. There is no distension.      Palpations: Abdomen is soft. There is no mass.      Tenderness: There is no abdominal tenderness. There is no guarding or rebound.   Genitourinary:     Penis: Normal.       Comments: Holguin catheter in place, draining clear daina urine.  Musculoskeletal:         General: No swelling. Normal range of motion.      Cervical back: Normal range of motion and neck supple.      Right lower leg: No edema.      Comments: Left BKA site, CDI, drain in place  Left thigh incision site with staples, surrounding redness  and exudates, dehiscence noted   Lymphadenopathy:      Cervical: No cervical adenopathy.   Skin:     General: Skin is warm and dry.      Coloration: Skin is not jaundiced.      Findings: No rash.      Comments: Third right toe absent due to prior trauma.  Remaining toes with chronic ulcers.   Neurological:      General: No focal deficit present.      Mental Status: He is alert and oriented to person, place, and time. Mental status is at baseline.      Cranial Nerves: No cranial nerve deficit.   Psychiatric:         Mood and Affect: Mood normal.         Behavior: Behavior normal.         Thought Content: Thought content normal.         Judgment: Judgment normal.           I have performed the physical examination today 10/23/2023.  In review of yesterday's note, there are no new changes except as documented above.     Fluids    Intake/Output Summary (Last 24 hours) at 10/23/2023 0843  Last data filed at 10/23/2023 0600  Gross per 24 hour   Intake 360 ml   Output 4400 ml   Net -4040 ml       Laboratory  Recent Labs     10/21/23  0336 10/22/23  0315 10/23/23  0448   WBC 13.6* 8.2 8.8   RBC 3.10* 3.18* 3.55*   HEMOGLOBIN 8.9* 9.2* 10.2*   HEMATOCRIT 28.3* 28.8* 31.5*   MCV 91.3 90.6 88.7   MCH 28.7 28.9 28.7   MCHC 31.4* 31.9* 32.4   RDW 56.7* 54.5* 54.4*   PLATELETCT 523* 520* 533*   MPV 8.7* 8.5* 8.1*       Recent Labs     10/21/23  0336 10/22/23  0315 10/23/23  0448   SODIUM 134* 134* 129*   POTASSIUM 3.6 3.8 3.9   CHLORIDE 101 99 95*   CO2 25 24 23   GLUCOSE 147* 92 98   BUN 15 13 12   CREATININE 0.83 0.75 0.63   CALCIUM 8.7 8.3* 9.1                     Imaging  US-RENAL   Final Result      1.  Bilateral medical renal disease.   2.  Moderate right and mild left hydronephrosis, likely secondary to distention of the urinary bladder secondary to prostatic hypertrophy. Post void images of the inner bladder and collecting systems of the kidneys were not acquired.   3.  Hydronephrosis was not present on the prior MRI of  the abdomen without and with IV contrast on 10/2/2023.      MR-ABDOMEN-WITH & W/O   Final Result      Multi septated cystic lesion at the upper pole LEFT kidney measuring 3.8 cm without associated enhancement or nodular component (Bosniak 2).            DX-PORTABLE FLUORO > 1 HOUR   Final Result      Portable fluoroscopy utilized for 2 minutes 20.4 seconds.         INTERPRETING LOCATION: [address], [place], [address]      CT-CTA AORTA-RO WITH & W/O-POST PROCESS   Final Result         CTA AORTA      1.  Septated lobulated left upper pole renal cystic mass lesion, recommend follow-up MRI of the kidneys for further characterization.   2.  Atherosclerosis and atherosclerotic coronary artery disease.   3.  Fat-containing right inguinal hernia      CTA LOWER EXTREMITIES      1.  Occlusion of the right common iliac artery through the distal superficial femoral artery   2.  Distal right peroneal artery occlusion with 2-vessel runoff the anterior and posterior tibial arteries at the right ankle.   3.  Occlusion of the left superficial femoral artery at the bifurcation extending through its length to the popliteal artery.   4.  Occlusion of the proximal left profunda femoris just distal to the bifurcation which reconstitutes.   5.  Soft tissue gas at the stump of left below-the-knee amputation, within expected limits for recent postop changes.      3D angiographic/MIP images of the vasculature confirm the vascular findings as described above.      These findings were discussed with the patient's clinician, Keith Navarro, on 9/29/2023 8:06 AM.      EC-ECHOCARDIOGRAM COMPLETE W/O CONT   Final Result      US-AORTA/ILIACS DUPLEX COMPLETE   Final Result      US-EXTREMITY ARTERY LOWER UNILAT RIGHT   Final Result      US-INGA SINGLE LEVEL UNILAT RIGHT   Final Result      DX-CHEST-PORTABLE (1 VIEW)   Final Result      1.  LEFT basilar atelectasis or pneumonia   2.  Trace LEFT pleural effusion or pleural scar              Assessment/Plan  * Gangrene of left foot (HCC)- (present on admission)  Assessment & Plan  - s/p Left BKA on 9/27/2023.   - Vascular studies showed severe arterial disease. S/p bilateral common femoral artery endarterectomy and profundoplasty with saphenous vein angioplasty with stent placement in the right external iliac artery on 9/30/2023. Prevena was placed (5-7 days).  Continue Plavix for 1 month and aspirin for life. Follow-up with Dr. Navarro (vascular surgery). Staples to be removed 14 to 21 days postoperatively.   - Completed course of IV Zosyn on 10/6/2023.   - Noted dehiscence of incision site on the left BKA.  s/p debridement and secondary closure of dehisced surgical wound 10/20. Maintain NWB LLE.  -Now also having wound dehiscence on the left upper thigh surgical site.  Vascular surgery planning for debridement and possibly wound VAC placement today.  Continue oral Keflex for now.  -Continue pain control with oral oxycodone and IV Dilaudid.    GELY (acute kidney injury) ([facility])  Assessment & Plan  - Creatinine significantly jumped to 3.76.  Renal ultrasound also showed moderate right and mild left hydronephrosis with distended urinary bladder.  Suspect has post renal failure, with component of prerenal given significant elevation in BUN as well.  CPK is normal.  Urinalysis bland.  -Resolved. Now that has Holguin catheter.  Off IVF.  Potassium level has normalized, off Lokelma..  -Maintain Holguin catheter to relieve obstruction.  Continue Flomax and finasteride.  Suspect that he will need to go home with Holguin catheter, and follow-up as outpatient with urology for definitive treatment of BPH.  -Monitor for improvement in renal function closely.  Monitor electrolytes particularly potassium.  Avoid nephrotoxins, and continue to renally dose all medications.    Bacteremia- (present on admission)  Assessment & Plan  - Blood cultures from 9/26/2023 grew Morganella morganii and Vagococcus. Echocardiogram showed  no evidence of vegetations.   - ID consulted, completed course of IV Zosyn on 10/6/2023. Repeat blood cultures from 9/27/2023 have been negative.      Hyperkalemia  Assessment & Plan  - Due to acute renal failure.    - Resolved.   -Monitor closely.  Repeat BMP in the morning.    PAD (peripheral artery disease) (HCC)- (present on admission)  Assessment & Plan  - s/p bilateral common femoral artery endarterectomy and profundoplasty with saphenous vein angioplasty with stent placement in the right external iliac artery on 9/30/2023. Prevena was placed (5-7 days).   -Continue Plavix for 1 month and aspirin for life.  Follow-up with vascular surgery.  Stump protector for left leg was ordered by vascular surgery.  -Now also having wound dehiscence on the left upper thigh surgical site.  Vascular surgery planning for debridement and possibly wound VAC placement.  Continue on oral Keflex for now.    Hyponatremia- (present on admission)  Assessment & Plan  - Appears euvolemic.  Stable. Off IVF.  -Continue to monitor.    Elevated liver enzymes- (present on admission)  Assessment & Plan  - Resolved.  Hepatitis panel was normal.  Continue to monitor.    Leukocytosis- (present on admission)  Assessment & Plan  - Resolved.  Continue to trend.  Continue antibiotics.     Anemia- (present on admission)  Assessment & Plan  -Stable.  Monitor.  CBC in the morning.  Restrictive transfusion strategy.    Renal cyst- (present on admission)  Assessment & Plan  - Noted to have renal cyst on CT.  MRI shows multiseptated cystic lesions in the upper pole of left kidney measuring 3.8 cm without associated enhancement or nodular component.    -Outpatient follow-up with urology.         VTE prophylaxis: SCDs, Heparin SQ    My total time spent caring for the patient on the day of the encounter was 51 minutes. This does not include time spent on separately billable procedures/tests.

## 2023-10-23 NOTE — CARE PLAN
The patient is Stable - Low risk of patient condition declining or worsening    Shift Goals  Clinical Goals: pain mgmt  Patient Goals: pain mgmt  Family Goals: LUTHER    Progress made toward(s) clinical / shift goals:  pt will be free of injuries this shift, pt will get I&D done this shift      Problem: Knowledge Deficit - Standard  Goal: Patient and family/care givers will demonstrate understanding of plan of care, disease process/condition, diagnostic tests and medications  Outcome: Progressing     Problem: Pain - Standard  Goal: Alleviation of pain or a reduction in pain to the patient’s comfort goal  Outcome: Progressing     Problem: Skin Integrity  Goal: Skin integrity is maintained or improved  Outcome: Progressing     Problem: Fall Risk  Goal: Patient will remain free from falls  Outcome: Progressing     Problem: Infection - Standard  Goal: Patient will remain free from infection  Outcome: Progressing     Problem: Wound/ / Incision Healing  Goal: Patient's wound/surgical incision will decrease in size and heals properly  Outcome: Progressing       Patient is not progressing towards the following goals:

## 2023-10-23 NOTE — ANESTHESIA POSTPROCEDURE EVALUATION
Patient: Dillon Centeno    Procedure Summary     Date: 10/23/23 Room / Location: [place] [address] / SURGERY [facility]    Anesthesia Start: 1226 Anesthesia Stop: 1348    Procedures:       INCISION AND DRAINAGE (Left: Groin)      APPLICATION OR REPLACEMENT, WOUND VAC (Leg Upper)      AMPUTATION, ABOVE KNEE (Left: Leg Upper) Diagnosis: (Left lower leg ischemia, left groin dehiscence)    Surgeons: Keith Navarro M.D. Responsible Provider: Carlos Manuel Nix M.D.    Anesthesia Type: general, peripheral nerve block ASA Status: 3          Final Anesthesia Type: general, peripheral nerve block  Last vitals  BP   Blood Pressure: 132/82    Temp   36.7 °C (98 °F)    Pulse   (!) 101   Resp   15    SpO2   98 %      Anesthesia Post Evaluation    Patient location during evaluation: PACU  Patient participation: complete - patient participated  Level of consciousness: awake  Pain score: 3    Airway patency: patent  Anesthetic complications: no  Cardiovascular status: adequate and tachycardic  Respiratory status: acceptable  Hydration status: stable    PONV: controlled          No notable events documented.     Nurse Pain Score: 7 (NPRS)         lacerations

## 2023-10-23 NOTE — OP REPORT
VASCULAR SURGERY SERVICE                       Operative Note  _____________________________________________________    Date: 10/23/2023    Patient: Dillon Centeno  : 1965  MRN: [id number]  _____________________________________________________      Preoperative Diagnosis:  - peripheral arterial occlusive disease with ischemia of the left lower extremity and breakdown of left BKA stump  -Dehiscence of left groin incision    Postoperative Diagnosis:  - peripheral arterial occlusive disease with ischemia of the left lower extremity and breakdown of left BKA stump  -Dehiscence of left groin incision    Procedure:  - Left above knee amputation through distal femur (38551)  -Sharp excisional debridement of the left groin wound including skin and subcutaneous tissue, approximately 2 cm³ of tissue was removed (34769)  -Application of negative pressure dressing less than 50 cm² to the left groin (47451)    -------------------------------------------------------------------------------------------------    Surgeon:   Keith Navarro MD    Assistant:   none    Anesthesia:                            General endotracheal anesthesia    EBL:                                       less than 200cc    Blood Products:                      none    Specimen:   Left knee to pathology    Complications:                        none     Disposition:                             Extubated and to recovery in stable condition    -----------------------------------------------------------------------------------------------------      Procedure Summary:  The patient was brought to the operating suite, placed supine on the OR table, and GETA was administered by the anesthesia service.  The left leg was prepped and draped in the usual sterile fashion.  Timeout was called to identify the correct patient procedure and equipment.  Patient received preoperative antibiotics.    The skin was incised sharply in a fish-mouth configuration  and then a combination of sharp dissection and electrocautery was used to dissect the soft tissues circumferentially around the femur.  Larger vessels were suture ligated as needed.  The femur was transected with the oscillating saw.  The anterior edge of the femur was beveled. The posterior flap was created freeing the specimen and allowing it to be passed off the field.  The flap was inspected for hemostasis, copiously irrigated, excess muscle was debrided as needed.  The stump was irrigated.  The anterior and posterior flap were attached to one another using interrupted 0 Vicryl sutures.  The soft tissue and fascia were approximated with multiple layers of absorbable 0 and 2-0 Vicryl sutures.  The skin was reapproximated with staples and the incision was protected with a prevena bandage.    Attention was turned to the left groin wound.  Sharp excisional debridement was carried out of the skin edges and the subcutaneous tissue.  All necrotic tissue was removed using scissors and curettes.  Any Vicryl suture we encountered was removed as well.  The wound was surprisingly not very deep, only a few millimeters below the skin surface and only involving subcutaneous tissue.  The wound did not get anywhere near the femoral sheath and did not involve the femoral artery or patch.  A black foam negative pressure dressing was applied and it was connected to suction and found to have good seal.    All sponge, instrument, and needle counts were correct at the conclusion of the case.  Patient was extubated and sent to PACU in stable condition.      Keith Navarro MD  [facility]

## 2023-10-23 NOTE — DOCUMENTATION QUERY
[facility]                                                                       Query Response Note      PATIENT:               ELI GREWAL  ACCT #:                  [id number]  MRN:                     [id number]  :                      1965  ADMIT DATE:       2023 8:30 AM  DISCH DATE:          RESPONDING  PROVIDER #:        [id number]           QUERY TEXT:    Patient admitted  with gangrenous left foot, s/p left BKA. GELY noted on 10/18 labs.    -ATN in the setting of infection is documented in Nephrology notes.  -Suspect has post renal failure, with component of prerenal is documented Hospital Medicine notes.      Based on treatment, clinical findings and risk factors, can this documentation be further clarified?      The patient's Clinical Indicators include:  59 yo M admitted with gangrenous left foot, s/p left BKA      Clinical Indicators:   -Creatinine Trend: (No labs have been drawn between 10/12 and 10/18)  10/12: 0.52  10/18: 3.42  10/19 3.58  10/20: 1.70  10/21: 0.83  10/22: 0.75  10/23: 0.63    -Urinalysis was bland with negative protein, casts.      -10/19 Neph:  Non oliguric GELY- likely due to ATN; Baseline renal function normal  -10/20 HM: GELY felt to be post renal with noted obstructive changes, , with component of prerenal,      Risk Factors: Bilateral hydronephrosis, distended urinary bladder,  renal cyst, gangrene of left foot s/p LAINA with dehiscence of surgical wound    Treatments: IV fluids with NS at 125 cc/hr, Holguin catheter placed, serial labs, Nephrology consult, avoid nephrotoxins, renally dose all medications  Options provided:   -- GELY without ATN exists   -- GELY with ATN exists   -- Other explanation, (please specify other explanation)      Query created by: Pamela Benitez on 10/23/2023 12:56 PM    RESPONSE TEXT:    GELY with ATN exists          Electronically signed by:  JOHN HOLLEY MD 10/23/2023  1:01 PM

## 2023-10-23 NOTE — DISCHARGE PLANNING
Case Management Discharge Planning    Admission Date: 9/26/2023  GMLOS: 11.1  ALOS: 27    6-Clicks ADL Score: 21  6-Clicks Mobility Score: 13  PT and/or OT Eval ordered: Yes  Post-acute Referrals Ordered: Yes  Post-acute Choice Obtained: Yes  Has referral(s) been sent to post-acute provider:  Yes      Anticipated Discharge Dispo: Discharge Disposition: D/T to home under [facility] care in anticipation of covered skilled care (06)    DME Needed: Yes    DME Ordered: Yes    Action(s) Taken: Discussed pt in IDT rounds. Pt needs revision of his amputation today. Pt is now not medically cleared to DC for the next two days or more.     Escalations Completed: None    Medically Clear: No    Next Steps: LMSW to follow as needed.     Barriers to Discharge: Medical clearance    Is the patient up for discharge tomorrow: No

## 2023-10-23 NOTE — PROGRESS NOTES
VASCULAR SURGERY SERVICE                        Progress Note  _________________________________    OR today for left AKA and debridement/vac of left groin wound    Time TBD, tentatively around 1300.    NPO except meds    Keith Navarro MD  [facility]   Voalte preferred or call my office [phone]  __________________________________________________  Patient:Dillon Horowitz Taryn   MRN:[id number]

## 2023-10-23 NOTE — CARE PLAN
The patient is Stable - Low risk of patient condition declining or worsening    Shift Goals  Clinical Goals: Pain management, wound vac mgt, gonzales mgt  Patient Goals: pain mgt  Family Goals: ashlie    Progress made toward(s) clinical / shift goals:      Patient is not progressing towards the following goals:      Problem: Knowledge Deficit - Standard  Goal: Patient and family/care givers will demonstrate understanding of plan of care, disease process/condition, diagnostic tests and medications  Description: Target End Date:  1-3 days or as soon as patient condition allows    Document in Patient Education    1.  Patient and family/caregiver oriented to unit, equipment, visitation policy and means for communicating concern  2.  Complete/review Learning Assessment  Outcome: Progressing       Problem: Skin Integrity  Goal: Skin integrity is maintained or improved  Description: Target End Date:  Prior to discharge or change in level of care    Document interventions on Skin Risk/Jae flowsheet groups and corresponding LDA    1.  Assess and monitor skin integrity, appearance and/or temperature  2.  Assess risk factors for impaired skin integrity and/or pressures ulcers  3.  Implement precautions to protect skin integrity in collaboration with interdisciplinary team  4.  Implement pressure ulcer prevention protocol if at risk for skin breakdown  5.  Confirm wound care consult if at risk for skin breakdown  6.  Ensure patient use of pressure relieving devices  (Low air loss bed, waffle overlay, heel protectors, ROHO cushion, etc)  Outcome: Progressing       Problem: Infection - Standard  Goal: Patient will remain free from infection  Description: Target End Date:  Prior to discharge or change in level of care    1.  Utilize Standard Precautions at all times to reduce the risk of transmission of microorganisms from both recognized and  unrecognized sources of infection  2.  Infection prevention handouts provided  (general/device/diagnosis specific) and documented in Patient Education  3.  Educate patient and family/caregiver on isolation precautions if applicable  Outcome: Progressing

## 2023-10-23 NOTE — ANESTHESIA TIME REPORT
Anesthesia Start and Stop Event Times     Date Time Event    10/23/2023 1206 Ready for Procedure     1226 Anesthesia Start     1348 Anesthesia Stop        Responsible Staff  10/23/23    Name Role Begin End    Carlos Manuel Nix M.D. Anesth 1226 1348        Overtime Reason:  no overtime (within assigned shift)    Comments:

## 2023-10-23 NOTE — PROGRESS NOTES
Pt alert/oriented x4, medicated for LLE pain. Bilateral groin and LLE dressing CDI. Holguin catheter patent. NPO at midnight for procedure in AM. Pt resting in bed watching television. Call light within reach, personal belongings available, bed in lowest position, treaded sock on, and bed alarm on. Hourly rounding in place.

## 2023-10-23 NOTE — ANESTHESIA PROCEDURE NOTES
Airway    Date/Time: 10/23/2023 12:31 PM    Performed by: Carlos Manuel Nix M.D.  Authorized by: Carlos Manuel Nix M.D.    Location:  OR  Urgency:  Elective  Indications for Airway Management:  Anesthesia      Spontaneous Ventilation: absent    Sedation Level:  Deep  Preoxygenated: Yes    Mask Difficulty Assessment:  0 - not attempted  Final Airway Type:  Supraglottic airway  Final Supraglottic Airway:  Standard LMA    SGA Size:  5  Number of Attempts at Approach:  1

## 2023-10-23 NOTE — ANESTHESIA PREPROCEDURE EVALUATION
Case: [id number] Date/Time: 10/23/23 1148    Procedures:       INCISION AND DRAINAGE (Left: Groin)      APPLICATION OR REPLACEMENT, WOUND VAC    Anesthesia type: General    [facility]: [facility]  / SURGERY [facility]    Surgeons: Keith Navarro M.D.          Relevant Problems   CARDIAC   (positive) PAD (peripheral artery disease) (HCC)         (positive) GELY (acute kidney injury) (HCC)   (positive) Renal cyst      Other   (positive) Abnormal EKG   (positive) Bacteremia   (positive) Elevated liver enzymes   (positive) Gangrene of left foot (HCC)       Physical Exam    Airway   Mallampati: III  TM distance: >3 FB  Neck ROM: limited       Cardiovascular   Rhythm: regular  Rate: normal  (+) weak pulses     Dental - normal exam           Pulmonary   (+) decreased breath sounds     Abdominal   (-) obese     Neurological - normal exam                 Anesthesia Plan    ASA 3   ASA physical status 3 criteria: CAD/stents (> 3 months)    Plan - general and peripheral nerve block     Peripheral nerve block will be post-op pain control  Airway plan will be LMA          Induction: intravenous    Postoperative Plan: Postoperative administration of opioids is intended.    Pertinent diagnostic labs and testing reviewed    Informed Consent:    Anesthetic plan and risks discussed with patient.    Use of blood products discussed with: patient whom consented to blood products.

## 2023-10-23 NOTE — THERAPY
Physical Therapy Contact Note    Patient Name: Dillon Centeno  Age:  58 y.o., Sex:  male  Medical Record #: [id number]  Today's Date: 10/23/2023       10/23/23 1130   Treatment Variance   Reason For Missed Therapy Medical - Patient  in Procedure   Total Time Spent   PT Total Time   (5 min)   Interdisciplinary Plan of Care Collaboration   Collaboration Comments PT treatment attempted. Patient out of room for L AKA, I&D, wound vac. Will re attempt as able and appropriate.   Session Information   Date / Session Number  10/14-7 (0/4, 10/25) attempted 10/20, 10/23         Karishma Briceño, PT, DPT  [facility] [phone]

## 2023-10-24 LAB
ANION GAP SERPL CALC-SCNC: 12 MMOL/L (ref 7–16)
BUN SERPL-MCNC: 12 MG/DL (ref 8–22)
CALCIUM SERPL-MCNC: 9.3 MG/DL (ref 8.5–10.5)
CHLORIDE SERPL-SCNC: 93 MMOL/L (ref 96–112)
CO2 SERPL-SCNC: 26 MMOL/L (ref 20–33)
CREAT SERPL-MCNC: 0.76 MG/DL (ref 0.5–1.4)
ERYTHROCYTE [DISTWIDTH] IN BLOOD BY AUTOMATED COUNT: 53.8 FL (ref 35.9–50)
GFR SERPLBLD CREATININE-BSD FMLA CKD-EPI: 103 ML/MIN/1.73 M 2
GLUCOSE SERPL-MCNC: 102 MG/DL (ref 65–99)
HCT VFR BLD AUTO: 27.6 % (ref 42–52)
HGB BLD-MCNC: 9 G/DL (ref 14–18)
MCH RBC QN AUTO: 29.1 PG (ref 27–33)
MCHC RBC AUTO-ENTMCNC: 32.6 G/DL (ref 32.3–36.5)
MCV RBC AUTO: 89.3 FL (ref 81.4–97.8)
PATHOLOGY CONSULT NOTE: NORMAL
PLATELET # BLD AUTO: 537 K/UL (ref 164–446)
PMV BLD AUTO: 8.2 FL (ref 9–12.9)
POTASSIUM SERPL-SCNC: 4.1 MMOL/L (ref 3.6–5.5)
RBC # BLD AUTO: 3.09 M/UL (ref 4.7–6.1)
SODIUM SERPL-SCNC: 131 MMOL/L (ref 135–145)
WBC # BLD AUTO: 10.4 K/UL (ref 4.8–10.8)

## 2023-10-24 PROCEDURE — A9270 NON-COVERED ITEM OR SERVICE: HCPCS | Performed by: STUDENT IN AN ORGANIZED HEALTH CARE EDUCATION/TRAINING PROGRAM

## 2023-10-24 PROCEDURE — 700111 HCHG RX REV CODE 636 W/ 250 OVERRIDE (IP): Performed by: INTERNAL MEDICINE

## 2023-10-24 PROCEDURE — 700102 HCHG RX REV CODE 250 W/ 637 OVERRIDE(OP): Performed by: INTERNAL MEDICINE

## 2023-10-24 PROCEDURE — 700102 HCHG RX REV CODE 250 W/ 637 OVERRIDE(OP): Performed by: STUDENT IN AN ORGANIZED HEALTH CARE EDUCATION/TRAINING PROGRAM

## 2023-10-24 PROCEDURE — A9270 NON-COVERED ITEM OR SERVICE: HCPCS | Performed by: SURGERY

## 2023-10-24 PROCEDURE — 85027 COMPLETE CBC AUTOMATED: CPT

## 2023-10-24 PROCEDURE — 99232 SBSQ HOSP IP/OBS MODERATE 35: CPT | Performed by: STUDENT IN AN ORGANIZED HEALTH CARE EDUCATION/TRAINING PROGRAM

## 2023-10-24 PROCEDURE — A9270 NON-COVERED ITEM OR SERVICE: HCPCS | Performed by: INTERNAL MEDICINE

## 2023-10-24 PROCEDURE — 97164 PT RE-EVAL EST PLAN CARE: CPT

## 2023-10-24 PROCEDURE — 36415 COLL VENOUS BLD VENIPUNCTURE: CPT

## 2023-10-24 PROCEDURE — 700102 HCHG RX REV CODE 250 W/ 637 OVERRIDE(OP): Performed by: SURGERY

## 2023-10-24 PROCEDURE — 80048 BASIC METABOLIC PNL TOTAL CA: CPT

## 2023-10-24 PROCEDURE — 97535 SELF CARE MNGMENT TRAINING: CPT

## 2023-10-24 PROCEDURE — 770006 HCHG ROOM/CARE - MED/SURG/GYN SEMI*

## 2023-10-24 PROCEDURE — 700111 HCHG RX REV CODE 636 W/ 250 OVERRIDE (IP): Performed by: SURGERY

## 2023-10-24 RX ORDER — METHOCARBAMOL 500 MG/1
500 TABLET, FILM COATED ORAL 4 TIMES DAILY
Status: DISCONTINUED | OUTPATIENT
Start: 2023-10-24 | End: 2023-11-03 | Stop reason: HOSPADM

## 2023-10-24 RX ADMIN — OXYCODONE HYDROCHLORIDE 20 MG: 10 TABLET ORAL at 11:53

## 2023-10-24 RX ADMIN — CEPHALEXIN 500 MG: 500 CAPSULE ORAL at 08:35

## 2023-10-24 RX ADMIN — ACETAMINOPHEN 650 MG: 325 TABLET, FILM COATED ORAL at 16:30

## 2023-10-24 RX ADMIN — ASPIRIN 81 MG: 81 TABLET, COATED ORAL at 04:43

## 2023-10-24 RX ADMIN — CLOPIDOGREL BISULFATE 75 MG: 75 TABLET ORAL at 04:43

## 2023-10-24 RX ADMIN — HEPARIN SODIUM 5000 UNITS: 5000 INJECTION, SOLUTION INTRAVENOUS; SUBCUTANEOUS at 21:37

## 2023-10-24 RX ADMIN — OXYCODONE HYDROCHLORIDE 20 MG: 10 TABLET ORAL at 04:42

## 2023-10-24 RX ADMIN — NICOTINE TRANSDERMAL SYSTEM 21 MG: 21 PATCH, EXTENDED RELEASE TRANSDERMAL at 04:44

## 2023-10-24 RX ADMIN — FINASTERIDE 5 MG: 5 TABLET, FILM COATED ORAL at 04:44

## 2023-10-24 RX ADMIN — METHOCARBAMOL 500 MG: 500 TABLET ORAL at 16:30

## 2023-10-24 RX ADMIN — ACETAMINOPHEN 650 MG: 325 TABLET, FILM COATED ORAL at 05:50

## 2023-10-24 RX ADMIN — CYCLOBENZAPRINE 10 MG: 10 TABLET, FILM COATED ORAL at 11:52

## 2023-10-24 RX ADMIN — CEPHALEXIN 500 MG: 500 CAPSULE ORAL at 14:11

## 2023-10-24 RX ADMIN — TRAMADOL HYDROCHLORIDE 50 MG: 50 TABLET ORAL at 05:50

## 2023-10-24 RX ADMIN — ACETAMINOPHEN 650 MG: 325 TABLET, FILM COATED ORAL at 11:53

## 2023-10-24 RX ADMIN — OXYCODONE HYDROCHLORIDE 20 MG: 10 TABLET ORAL at 20:20

## 2023-10-24 RX ADMIN — METHOCARBAMOL 500 MG: 500 TABLET ORAL at 14:11

## 2023-10-24 RX ADMIN — GABAPENTIN 300 MG: 300 CAPSULE ORAL at 04:43

## 2023-10-24 RX ADMIN — CEPHALEXIN 500 MG: 500 CAPSULE ORAL at 03:30

## 2023-10-24 RX ADMIN — GABAPENTIN 300 MG: 300 CAPSULE ORAL at 16:30

## 2023-10-24 RX ADMIN — CYCLOBENZAPRINE 10 MG: 10 TABLET, FILM COATED ORAL at 23:46

## 2023-10-24 RX ADMIN — HYDROMORPHONE HYDROCHLORIDE 0.5 MG: 1 INJECTION, SOLUTION INTRAMUSCULAR; INTRAVENOUS; SUBCUTANEOUS at 14:11

## 2023-10-24 RX ADMIN — GABAPENTIN 300 MG: 300 CAPSULE ORAL at 11:53

## 2023-10-24 RX ADMIN — OXYCODONE HYDROCHLORIDE 20 MG: 10 TABLET ORAL at 08:35

## 2023-10-24 RX ADMIN — HEPARIN SODIUM 5000 UNITS: 5000 INJECTION, SOLUTION INTRAVENOUS; SUBCUTANEOUS at 14:11

## 2023-10-24 RX ADMIN — CEPHALEXIN 500 MG: 500 CAPSULE ORAL at 20:06

## 2023-10-24 RX ADMIN — TRAMADOL HYDROCHLORIDE 50 MG: 50 TABLET ORAL at 23:46

## 2023-10-24 RX ADMIN — HYDROMORPHONE HYDROCHLORIDE 0.5 MG: 1 INJECTION, SOLUTION INTRAMUSCULAR; INTRAVENOUS; SUBCUTANEOUS at 16:30

## 2023-10-24 RX ADMIN — HYDROMORPHONE HYDROCHLORIDE 0.5 MG: 1 INJECTION, SOLUTION INTRAMUSCULAR; INTRAVENOUS; SUBCUTANEOUS at 22:20

## 2023-10-24 RX ADMIN — METHOCARBAMOL 500 MG: 500 TABLET ORAL at 20:06

## 2023-10-24 RX ADMIN — TRAMADOL HYDROCHLORIDE 50 MG: 50 TABLET ORAL at 11:53

## 2023-10-24 RX ADMIN — TAMSULOSIN HYDROCHLORIDE 0.4 MG: 0.4 CAPSULE ORAL at 08:35

## 2023-10-24 RX ADMIN — ACETAMINOPHEN 650 MG: 325 TABLET, FILM COATED ORAL at 23:45

## 2023-10-24 RX ADMIN — TRAMADOL HYDROCHLORIDE 50 MG: 50 TABLET ORAL at 16:30

## 2023-10-24 RX ADMIN — HEPARIN SODIUM 5000 UNITS: 5000 INJECTION, SOLUTION INTRAVENOUS; SUBCUTANEOUS at 04:43

## 2023-10-24 RX ADMIN — ACETAMINOPHEN 650 MG: 325 TABLET, FILM COATED ORAL at 00:18

## 2023-10-24 RX ADMIN — TRAMADOL HYDROCHLORIDE 50 MG: 50 TABLET ORAL at 00:18

## 2023-10-24 RX ADMIN — ATORVASTATIN CALCIUM 40 MG: 40 TABLET, FILM COATED ORAL at 16:30

## 2023-10-24 RX ADMIN — OXYCODONE HYDROCHLORIDE 20 MG: 10 TABLET ORAL at 15:35

## 2023-10-24 ASSESSMENT — PAIN DESCRIPTION - PAIN TYPE
TYPE: SURGICAL PAIN

## 2023-10-24 ASSESSMENT — GAIT ASSESSMENTS: GAIT LEVEL OF ASSIST: UNABLE TO PARTICIPATE

## 2023-10-24 ASSESSMENT — COGNITIVE AND FUNCTIONAL STATUS - GENERAL
DAILY ACTIVITIY SCORE: 16
STANDING UP FROM CHAIR USING ARMS: A LOT
DRESSING REGULAR UPPER BODY CLOTHING: A LITTLE
CLIMB 3 TO 5 STEPS WITH RAILING: TOTAL
DRESSING REGULAR LOWER BODY CLOTHING: A LOT
HELP NEEDED FOR BATHING: A LOT
MOVING FROM LYING ON BACK TO SITTING ON SIDE OF FLAT BED: UNABLE
SUGGESTED CMS G CODE MODIFIER MOBILITY: CL
MOBILITY SCORE: 10
MOVING TO AND FROM BED TO CHAIR: A LOT
WALKING IN HOSPITAL ROOM: TOTAL
SUGGESTED CMS G CODE MODIFIER DAILY ACTIVITY: CK
TURNING FROM BACK TO SIDE WHILE IN FLAT BAD: A LITTLE
PERSONAL GROOMING: A LITTLE
TOILETING: A LOT

## 2023-10-24 NOTE — PROGRESS NOTES
"  VASCULAR SURGERY SERVICE  Progress Note  ___________________________________    10/23/23: Patient underwent left above-knee amputation and debridement of left groin wound with wound VAC placement    10/24/23: No significant events overnight, patient alert and conversant, pain controlled      Vitals  /83   Pulse 92   Temp 36.1 °C (96.9 °F) (Temporal)   Resp 18   Ht 1.803 m (5' 11\")   Wt 64.4 kg (141 lb 15.6 oz)   SpO2 95%   BMI 19.80 kg/m²     Exam  General: alert, conversant, NAD  Extremities: Left groin wound VAC clean dry and intact.  No concerning output.  Left AKA Prevena intact with small amount of serosanguineous output    Labs  WBC normal  Hgb stable around 9-10  Creatinine normal    A/P)  Doing well, no specific concerns at this time    Wound care consulted for VAC changes  Prevena bandage on the left AKA stump will likely be changed on postoperative day 3-5 depending on output    Appreciate Hospitalist services support  Following along      Keith Navarro MD  [facility]  [facility] preferred or call my office [phone]  __________________________________________________________________  Patient:Dillon Centeno   MRN:[id number]   CSN:[id number]      "

## 2023-10-24 NOTE — CARE PLAN
The patient is Stable - Low risk of patient condition declining or worsening    Shift Goals  Clinical Goals: pain mgmt, wound vac mgmt  Patient Goals: pain mgmt  Family Goals: LUTHER    Progress made toward(s) clinical / shift goals:  pt will remain free of injuries this shift    Patient is not progressing towards the following goals: pt educated in importance of repositioning - pt verbalized understanding    Problem: Pain - Standard  Goal: Alleviation of pain or a reduction in pain to the patient’s comfort goal  Outcome: Not Met     Problem: Wound/ / Incision Healing  Goal: Patient's wound/surgical incision will decrease in size and heals properly  Outcome: Not Met

## 2023-10-24 NOTE — WOUND TEAM
This RN attempted to set VF settings, but vac alarming stating installation tubing blockage.  After 45 minutes of problem shooting change vac to regular wound vac.  Wound team to follow up tomorrow.

## 2023-10-24 NOTE — PROGRESS NOTES
Pt alert/oriented x4, medicated for left BLE pain per MAR. Holguin catheter draining without difficulty. Waound vacs x2 in place. Call light within reach, personal belongings available, bed in lowest position, treaded socks on, and bed alarm on. Hourly rounding in place.

## 2023-10-24 NOTE — CARE PLAN
The patient is Stable - Low risk of patient condition declining or worsening    Shift Goals  Clinical Goals: manage pain throughout shift  Patient Goals: pain mgmt  Family Goals: LUTHER    Progress made toward(s) clinical / shift goals:      Patient is not progressing towards the following goals:      Problem: Pain - Standard  Goal: Alleviation of pain or a reduction in pain to the patient’s comfort goal  Outcome: Not Progressing     Problem: Skin Integrity  Goal: Skin integrity is maintained or improved  Outcome: Not Progressing     Multiple pain meds given for LLE pain. Wound vacs X2 in place.

## 2023-10-24 NOTE — PROGRESS NOTES
Hospital Medicine Daily Progress Note    Date of Service  10/24/2023    Chief Complaint  Bilateral feet wounds    Hospital Course  Dillon Centeno is a 58 y.o. male with hypertension and tobacco dependence, admitted 9/26/2023 with bilateral feet wounds.  He was noted to have ischemic gangrene of the left lower extremity with maggots and superimposed bacterial infection.  Vascular surgery and orthopedic surgery were consulted.  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  Vascular studies showed severe arterial disease.  Patient underwent bilateral common femoral artery endarterectomy and profundoplasty with saphenous vein angioplasty with stent placement in the right external iliac artery on 9/30/2023. Prevena was placed (5-7 days). He was recommended to have Plavix for 1 month and aspirin for life. Blood cultures from 9/26/2023 grew Morganella morganii and Vagococcus.  Urine culture from 9/26/2023 grew Staph epidermidis. ID were consulted.   Echocardiogram showed LVEF of about 60% with no reported valvular abnormalities.  Repeat blood cultures from 9/27/2023 have been negative.  ID recommended IV Zosyn which he completed on 10/6/2023.  Subsequently, he had breakdown of the incision site on the left BKA.  Orthopedics planning for revision.  However he developed acute kidney injury which was felt to be post renal with noted obstructive changes on renal ultrasound (moderate right and mild left hydronephrosis with distended urinary bladder), with component of prerenal, with associated mild hyperkalemia.  Urinalysis was bland with negative protein, casts.  He was given potassium lowering medications, along with IV fluids.  Holguin catheter was placed.  Nephrology and urology were consulted.  Renal function improved.  Urology recommended Flomax and finasteride.  He was able to go to the OR and underwent debridement and secondary closure of dehisced surgical wound.  Orthopedics recommended nonweightbearing on the left  lower extremity. He was then noted to have redness and exudate with dehiscence on the femoral endarterectomy incision site.  He was started on Keflex.  Vascular surgery was again contacted who recommended debridement and possibly wound VAC placement.     Additionally, he was noted to have renal cyst on CT.  MRI shows multiseptated cystic lesions in the upper pole of left kidney measuring 3.8 cm without associated enhancement or nodular component.    Discharge planning was pursued, but unfortunately had no accepting SNF's and no home health services accepting either.  Patient refused to go to a group home.  DMEs were being arranged.    Interval Problem Update  No acute events overnight.  S/p L AKA yesterday by vascular surgery.  Pain regimen adjusted.  Patient with wound vac, appreciate their recommendations.  On oral keflex.  On aspirin and plavix.  Patient declined by SNF and home health due to insurance, plan to discharge to home when medically cleared.    Consultants/Specialty  infectious disease, nephrology, orthopedics, urology, and vascular surgery    Code Status  Full Code    Disposition  The patient is not medically cleared for discharge to home or a post-acute facility.  Anticipate discharge to: home with close outpatient follow-up    Anticipate discharge to home once cleared by orthopedics and vascular surgery, and wheelchair is arranged.  I have placed the appropriate orders for post-discharge needs.    Review of Systems  ROS     Pertinent positives/negatives as mentioned above.     A complete review of systems was personally done by me. All other systems were negative.       Physical Exam  Temp:  [36.1 °C (96.9 °F)-36.8 °C (98.2 °F)] 36.6 °C (97.8 °F)  Pulse:  [] 91  Resp:  [16-18] 18  BP: (121-132)/(68-93) 132/89  SpO2:  [95 %-99 %] 95 %    Physical Exam  Vitals reviewed.   Constitutional:       General: He is not in acute distress.     Appearance: Normal appearance. He is not toxic-appearing or  diaphoretic.   HENT:      Head: Normocephalic and atraumatic.      Nose: Nose normal.      Mouth/Throat:      Mouth: Mucous membranes are dry.      Pharynx: No oropharyngeal exudate.   Eyes:      General: No scleral icterus.     Extraocular Movements: Extraocular movements intact.      Conjunctiva/sclera: Conjunctivae normal.      Pupils: Pupils are equal, round, and reactive to light.   Cardiovascular:      Rate and Rhythm: Normal rate.      Heart sounds: Normal heart sounds. No murmur heard.     No gallop.   Pulmonary:      Effort: Pulmonary effort is normal. No respiratory distress.      Breath sounds: Normal breath sounds. No stridor. No wheezing, rhonchi or rales.   Chest:      Chest wall: No tenderness.   Abdominal:      General: Bowel sounds are normal. There is no distension.      Palpations: Abdomen is soft. There is no mass.      Tenderness: There is no abdominal tenderness. There is no guarding or rebound.   Genitourinary:     Penis: Normal.       Comments: Holguin catheter in place, draining clear daina urine.  Musculoskeletal:         General: No swelling. Normal range of motion.      Cervical back: Normal range of motion and neck supple.      Right lower leg: No edema.      Comments: Left BKA site, CDI, drain in place  Left thigh incision site with staples, surrounding redness and exudates, dehiscence noted   Lymphadenopathy:      Cervical: No cervical adenopathy.   Skin:     General: Skin is warm and dry.      Coloration: Skin is not jaundiced.      Findings: No rash.      Comments: Third right toe absent due to prior trauma.  Remaining toes with chronic ulcers.   Neurological:      General: No focal deficit present.      Mental Status: He is alert and oriented to person, place, and time. Mental status is at baseline.      Cranial Nerves: No cranial nerve deficit.   Psychiatric:         Mood and Affect: Mood normal.         Behavior: Behavior normal.         Thought Content: Thought content normal.          Judgment: Judgment normal.           I have performed the physical examination today 10/23/2023.  In review of yesterday's note, there are no new changes except as documented above.     Fluids    Intake/Output Summary (Last 24 hours) at 10/24/2023 1548  Last data filed at 10/24/2023 1400  Gross per 24 hour   Intake 1040 ml   Output 3600 ml   Net -2560 ml       Laboratory  Recent Labs     10/22/23  0315 10/23/23  0448 10/24/23  0525   WBC 8.2 8.8 10.4   RBC 3.18* 3.55* 3.09*   HEMOGLOBIN 9.2* 10.2* 9.0*   HEMATOCRIT 28.8* 31.5* 27.6*   MCV 90.6 88.7 89.3   MCH 28.9 28.7 29.1   MCHC 31.9* 32.4 32.6   RDW 54.5* 54.4* 53.8*   PLATELETCT 520* 533* 537*   MPV 8.5* 8.1* 8.2*       Recent Labs     10/22/23  0315 10/23/23  0448 10/24/23  0525   SODIUM 134* 129* 131*   POTASSIUM 3.8 3.9 4.1   CHLORIDE 99 95* 93*   CO2 24 23 26   GLUCOSE 92 98 102*   BUN 13 12 12   CREATININE 0.75 0.63 0.76   CALCIUM 8.3* 9.1 9.3                     Imaging  US-RENAL   Final Result      1.  Bilateral medical renal disease.   2.  Moderate right and mild left hydronephrosis, likely secondary to distention of the urinary bladder secondary to prostatic hypertrophy. Post void images of the inner bladder and collecting systems of the kidneys were not acquired.   3.  Hydronephrosis was not present on the prior MRI of the abdomen without and with IV contrast on 10/2/2023.      MR-ABDOMEN-WITH & W/O   Final Result      Multi septated cystic lesion at the upper pole LEFT kidney measuring 3.8 cm without associated enhancement or nodular component (Bosniak 2).            DX-PORTABLE FLUORO > 1 HOUR   Final Result      Portable fluoroscopy utilized for 2 minutes 20.4 seconds.         INTERPRETING LOCATION: [address] [facility], [place], [address]      CT-CTA AORTA-RO WITH & W/O-POST PROCESS   Final Result         CTA AORTA      1.  Septated lobulated left upper pole renal cystic mass lesion, recommend follow-up MRI of the kidneys for further characterization.    2.  Atherosclerosis and atherosclerotic coronary artery disease.   3.  Fat-containing right inguinal hernia      CTA LOWER EXTREMITIES      1.  Occlusion of the right common iliac artery through the distal superficial femoral artery   2.  Distal right peroneal artery occlusion with 2-vessel runoff the anterior and posterior tibial arteries at the right ankle.   3.  Occlusion of the left superficial femoral artery at the bifurcation extending through its length to the popliteal artery.   4.  Occlusion of the proximal left profunda femoris just distal to the bifurcation which reconstitutes.   5.  Soft tissue gas at the stump of left below-the-knee amputation, within expected limits for recent postop changes.      3D angiographic/MIP images of the vasculature confirm the vascular findings as described above.      These findings were discussed with the patient's clinician, Keith Navarro, on 9/29/2023 8:06 AM.      EC-ECHOCARDIOGRAM COMPLETE W/O CONT   Final Result      US-AORTA/ILIACS DUPLEX COMPLETE   Final Result      US-EXTREMITY ARTERY LOWER UNILAT RIGHT   Final Result      US-INGA SINGLE LEVEL UNILAT RIGHT   Final Result      DX-CHEST-PORTABLE (1 VIEW)   Final Result      1.  LEFT basilar atelectasis or pneumonia   2.  Trace LEFT pleural effusion or pleural scar             Assessment/Plan  * Gangrene of left foot (HCC)- (present on admission)  Assessment & Plan  - s/p Left BKA on 9/27/2023.   - Vascular studies showed severe arterial disease. S/p bilateral common femoral artery endarterectomy and profundoplasty with saphenous vein angioplasty with stent placement in the right external iliac artery on 9/30/2023. Prevena was placed (5-7 days).  Continue Plavix for 1 month and aspirin for life. Follow-up with Dr. Navarro (vascular surgery). Staples to be removed 14 to 21 days postoperatively.   - Completed course of IV Zosyn on 10/6/2023.   - Noted dehiscence of incision site on the left BKA.  s/p debridement  and secondary closure of dehisced surgical wound 10/20. Maintain NWB LLE.  -Now also having wound dehiscence on the left upper thigh surgical site.    S/p L AKA 10/23 by vascular surgery, wound vac on  Continue oral Keflex for now.  -Continue pain control with oral oxycodone and IV Dilaudid.    Hyperkalemia  Assessment & Plan  - Due to acute renal failure.    - Resolved.   -Monitor closely.  Repeat BMP in the morning.    GELY (acute kidney injury) ([facility])  Assessment & Plan  - Creatinine significantly jumped to 3.76.  Renal ultrasound also showed moderate right and mild left hydronephrosis with distended urinary bladder.  Suspect has post renal failure, with component of prerenal given significant elevation in BUN as well.  CPK is normal.  Urinalysis bland.  -Resolved. Now that has Holguin catheter.  Off IVF.  Potassium level has normalized, off Lokelma..  -Maintain Holguin catheter to relieve obstruction.  Continue Flomax and finasteride.  Suspect that he will need to go home with Holguin catheter, and follow-up as outpatient with urology for definitive treatment of BPH.  -Monitor for improvement in renal function closely.  Monitor electrolytes particularly potassium.  Avoid nephrotoxins, and continue to renally dose all medications.    PAD (peripheral artery disease) ([facility])- (present on admission)  Assessment & Plan  - s/p bilateral common femoral artery endarterectomy and profundoplasty with saphenous vein angioplasty with stent placement in the right external iliac artery on 9/30/2023. Prevena was placed (5-7 days).   -Continue Plavix for 1 month and aspirin for life.  Follow-up with vascular surgery.  Stump protector for left leg was ordered by vascular surgery.  -Now also having wound dehiscence on the left upper thigh surgical site.  Vascular surgery planning for debridement and possibly wound VAC placement.  Continue on oral Keflex for now.    Anemia- (present on admission)  Assessment & Plan  -Stable.  Monitor.  CBC  in the morning.  Restrictive transfusion strategy.    Bacteremia- (present on admission)  Assessment & Plan  - Blood cultures from 9/26/2023 grew Morganella morganii and Vagococcus. Echocardiogram showed no evidence of vegetations.   - ID consulted, completed course of IV Zosyn on 10/6/2023. Repeat blood cultures from 9/27/2023 have been negative.      Hyponatremia- (present on admission)  Assessment & Plan  - Appears euvolemic.  Stable. Off IVF.  -Continue to monitor.    Elevated liver enzymes- (present on admission)  Assessment & Plan  - Resolved.  Hepatitis panel was normal.  Continue to monitor.    Leukocytosis- (present on admission)  Assessment & Plan  - Resolved.  Continue to trend.  Continue antibiotics.     Renal cyst- (present on admission)  Assessment & Plan  - Noted to have renal cyst on CT.  MRI shows multiseptated cystic lesions in the upper pole of left kidney measuring 3.8 cm without associated enhancement or nodular component.    -Outpatient follow-up with urology.         VTE prophylaxis: SCDs, Heparin SQ

## 2023-10-24 NOTE — PROGRESS NOTES
"      Orthopaedic Progress Note    Interval changes:  Patient doing well LLE   LLE knee AKA vac dressing CDI  Cleared for DC to SNF by ortho pending medicine clearance    ROS - Patient denies any new issues.  Pain well controlled.    /89   Pulse 91   Temp 36.6 °C (97.8 °F) (Temporal)   Resp 18   Ht 1.803 m (5' 11\")   Wt 64.4 kg (141 lb 15.6 oz)   SpO2 95%     Patient seen and examined  No acute distress  Breathing non labored  RRR  LLE AK vac dressing CDI.    Recent Labs     10/22/23  0315 10/23/23  0448 10/24/23  0525   WBC 8.2 8.8 10.4   RBC 3.18* 3.55* 3.09*   HEMOGLOBIN 9.2* 10.2* 9.0*   HEMATOCRIT 28.8* 31.5* 27.6*   MCV 90.6 88.7 89.3   MCH 28.9 28.7 29.1   MCHC 31.9* 32.4 32.6   RDW 54.5* 54.4* 53.8*   PLATELETCT 520* 533* 537*   MPV 8.5* 8.1* 8.2*       Active Hospital Problems    Diagnosis     GELY (acute kidney injury) ([facility]) [N17.9]      Priority: High    Hyperkalemia [E87.5]      Priority: Medium    Renal cyst [N28.1]      Priority: Low    PAD (peripheral artery disease) ([facility]) [I73.9]     Bacteremia [R78.81]     Anemia [D64.9]     Gangrene of left foot ([facility]) [I96]     Leukocytosis [D72.829]     Elevated liver enzymes [R74.8]     Hyponatremia [E87.1]        Assessment/Plan:  Patient doing well LLE   LLE knee AKA vac dressing CDI  Cleared for DC to SNF by ortho pending medicine clearance  POD#1 AKA by vascular team  POD#5 S/P Debridement and secondary closure of dehisced surgical wound left below-knee amputation  Wt bearing status - NWB LLE   Wound care/Drains - vac dressing left in place  Future Procedures - none planned   Sutures/Staples out- 14-21 days post operatively. Removal will completed by ortho mid levels only.  PT/OT-initiated  Antibiotics: keflex 500mg PO q6  DVT Prophylaxis- TEDS/SCDs/Foot pumps  Holguin-not needed per ortho  Case Coordination for Discharge Planning - Disposition per therapy recs.    "

## 2023-10-24 NOTE — THERAPY
Physical Therapy   Daily Treatment     Patient Name: Dillon Centeno  Age:  58 y.o., Sex:  male  Medical Record #: [id number]  Today's Date: 10/24/2023     Precautions  Precautions: Fall Risk;Non Weight Bearing Left Lower Extremity  Comments: now s/p L AKA with wound vac to groin and amputation site    Assessment    Patient now s/p L BKA, I&D of L groin wound. He presented to PT with pain (however appeared managed during session), decreased insight and safety awareness, and decreased activity tolerance. He mobilized as detailed below, he declined activity beyond sitting EOB due to pain. 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 Will follow.    Plan    Treatment Plan Status: Continue Current Treatment Plan  Type of Treatment: Bed Mobility, Equipment, Manual Therapy, Neuro Re-Education / Balance, Self Care / Home Evaluation, Therapeutic Exercise, Therapeutic Activities  Treatment Frequency: 4 Times per Week  Treatment Duration: Until Therapy Goals Met    DC Equipment Recommendations: Unable to determine at this time  Discharge Recommendations: Recommend post-acute placement for additional physical therapy services prior to discharge home      Subjective    RN cleared patient for therapy, received in bed, agreeable.     Objective       10/24/23 1510   Charge Group   Charges  Yes   PT Re-evaluation PT Re-evaluation   Total Time Spent   PT Total Time Yes   PT Re-evaluation Time Spent (Mins) 20   PT Total Time Spent (Calculated) 20   Precautions   Precautions Fall Risk;Non Weight Bearing Left Lower Extremity   Comments now s/p L AKA with wound vac to groin and amputation site   Vitals   O2 (LPM) 0   O2 Delivery Device None - Room Air   Pain 0 - 10 Group   Location Leg   Location Orientation Left   Therapist Pain Assessment During Activity;Nurse Notified   Cognition    Cognition / Consciousness X   Level of Consciousness Alert   Safety Awareness Impaired   New  Learning Impaired   Attention Impaired   Comments cooperative, eager to mobilize. poor attention and insight. did not internalize any education from therapist   Active ROM Lower Body    Comments L AKA but others WFL for bed mobility and sitting EOB   Strength Lower Body   Comments not tested, patient appeared to use RLE along with BUE for scooting up EOB   Balance   Sitting Balance (Static) Fair   Sitting Balance (Dynamic) Fair   Weight Shift Sitting Fair   Skilled Intervention Verbal Cuing;Compensatory Strategies;Sequencing   Comments used BUE support in sitting. no overt LOB. able to scoot up EOB but declined standing due to pain   Bed Mobility    Supine to Sit Standby Assist   Sit to Supine   (NT, left sitting EOB)   Scooting Supervised  (seated)   Skilled Intervention Verbal Cuing;Compensatory Strategies;Sequencing   Gait Analysis   Gait Level Of Assist Unable to Participate   Weight Bearing Status NWB LLE   Functional Mobility   Sit to Stand Unable to Participate   Bed, Chair, Wheelchair Transfer Unable to Participate   How much difficulty does the patient currently have...   Turning over in bed (including adjusting bedclothes, sheets and blankets)? 3   Sitting down on and standing up from a chair with arms (e.g., wheelchair, bedside commode, etc.) 1   Moving from lying on back to sitting on the side of the bed? 2   How much help from another person does the patient currently need...   Moving to and from a bed to a chair (including a wheelchair)? 2   Need to walk in a hospital room? 1   Climbing 3-5 steps with a railing? 1   6 clicks Mobility Score 10   Short Term Goals    Short Term Goal # 1 Patient will perform supine-sit with HOB flat with supervision in 6 visits   Short Term Goal # 2 Patient will perform chair transfers with supervision in 6 visits   Short Term Goal # 3 Patient will perform sit-stand with FWW with supervision in 6 visits   Short Term Goal # 4 Pt will self propel wheelchair with supervision  within 6 visits to ensure independent mobility at home.   Short Term Goal # 5 Patient will verbalize post amputation education including residual limb positioning, desensitization exercises within 6tx in order to demonstrate understanding   Education Group   Education Provided Role of Physical Therapist   Role of Physical Therapist Patient Response Patient;Caregiver;Acceptance;Explanation;Verbal Demonstration   Additional Comments education regarding post amputation care of residual limb   Physical Therapy Treatment Plan   Physical Therapy Treatment Plan Continue Current Treatment Plan   Treatment Plan  Bed Mobility;Equipment;Manual Therapy;Neuro Re-Education / Balance;Self Care / Home Evaluation;Therapeutic Exercise;Therapeutic Activities   Treatment Frequency 4 Times per Week   Duration Until Therapy Goals Met   Anticipated Discharge Equipment and Recommendations   DC Equipment Recommendations Unable to determine at this time   Discharge Recommendations Recommend post-acute placement for additional physical therapy services prior to discharge home   Interdisciplinary Plan of Care Collaboration   IDT Collaboration with  Nursing;Occupational Therapist;Family / Caregiver   Patient Position at End of Therapy Seated;Edge of Bed;Call Light within Reach;Tray Table within Reach;Family / Friend in Room   Collaboration Comments RN aware of visit, response   Session Information   Date / Session Number  10/24-8 (1/4, 11/1)

## 2023-10-25 LAB
ERYTHROCYTE [DISTWIDTH] IN BLOOD BY AUTOMATED COUNT: 55.2 FL (ref 35.9–50)
HCT VFR BLD AUTO: 27.6 % (ref 42–52)
HGB BLD-MCNC: 8.7 G/DL (ref 14–18)
MCH RBC QN AUTO: 28.9 PG (ref 27–33)
MCHC RBC AUTO-ENTMCNC: 31.5 G/DL (ref 32.3–36.5)
MCV RBC AUTO: 91.7 FL (ref 81.4–97.8)
PLATELET # BLD AUTO: 570 K/UL (ref 164–446)
PMV BLD AUTO: 8.6 FL (ref 9–12.9)
RBC # BLD AUTO: 3.01 M/UL (ref 4.7–6.1)
WBC # BLD AUTO: 9.5 K/UL (ref 4.8–10.8)

## 2023-10-25 PROCEDURE — A9270 NON-COVERED ITEM OR SERVICE: HCPCS | Performed by: STUDENT IN AN ORGANIZED HEALTH CARE EDUCATION/TRAINING PROGRAM

## 2023-10-25 PROCEDURE — A9270 NON-COVERED ITEM OR SERVICE: HCPCS | Performed by: INTERNAL MEDICINE

## 2023-10-25 PROCEDURE — 700102 HCHG RX REV CODE 250 W/ 637 OVERRIDE(OP): Performed by: SURGERY

## 2023-10-25 PROCEDURE — 700111 HCHG RX REV CODE 636 W/ 250 OVERRIDE (IP): Performed by: INTERNAL MEDICINE

## 2023-10-25 PROCEDURE — 302098 PASTE RING (FLAT): Performed by: STUDENT IN AN ORGANIZED HEALTH CARE EDUCATION/TRAINING PROGRAM

## 2023-10-25 PROCEDURE — 700111 HCHG RX REV CODE 636 W/ 250 OVERRIDE (IP): Performed by: STUDENT IN AN ORGANIZED HEALTH CARE EDUCATION/TRAINING PROGRAM

## 2023-10-25 PROCEDURE — 700111 HCHG RX REV CODE 636 W/ 250 OVERRIDE (IP): Performed by: SURGERY

## 2023-10-25 PROCEDURE — 700102 HCHG RX REV CODE 250 W/ 637 OVERRIDE(OP): Performed by: INTERNAL MEDICINE

## 2023-10-25 PROCEDURE — A9270 NON-COVERED ITEM OR SERVICE: HCPCS | Performed by: SURGERY

## 2023-10-25 PROCEDURE — 36415 COLL VENOUS BLD VENIPUNCTURE: CPT

## 2023-10-25 PROCEDURE — 99232 SBSQ HOSP IP/OBS MODERATE 35: CPT | Performed by: STUDENT IN AN ORGANIZED HEALTH CARE EDUCATION/TRAINING PROGRAM

## 2023-10-25 PROCEDURE — 770006 HCHG ROOM/CARE - MED/SURG/GYN SEMI*

## 2023-10-25 PROCEDURE — 700102 HCHG RX REV CODE 250 W/ 637 OVERRIDE(OP): Performed by: STUDENT IN AN ORGANIZED HEALTH CARE EDUCATION/TRAINING PROGRAM

## 2023-10-25 PROCEDURE — 97605 NEG PRS WND THER DME<=50SQCM: CPT

## 2023-10-25 PROCEDURE — 85027 COMPLETE CBC AUTOMATED: CPT

## 2023-10-25 RX ORDER — HYDROMORPHONE HYDROCHLORIDE 1 MG/ML
1 INJECTION, SOLUTION INTRAMUSCULAR; INTRAVENOUS; SUBCUTANEOUS
Status: DISCONTINUED | OUTPATIENT
Start: 2023-10-25 | End: 2023-11-03 | Stop reason: HOSPADM

## 2023-10-25 RX ADMIN — METHOCARBAMOL 500 MG: 500 TABLET ORAL at 08:07

## 2023-10-25 RX ADMIN — OXYCODONE HYDROCHLORIDE 20 MG: 10 TABLET ORAL at 16:08

## 2023-10-25 RX ADMIN — GABAPENTIN 300 MG: 300 CAPSULE ORAL at 16:08

## 2023-10-25 RX ADMIN — ACETAMINOPHEN 650 MG: 325 TABLET, FILM COATED ORAL at 23:45

## 2023-10-25 RX ADMIN — HYDROMORPHONE HYDROCHLORIDE 1 MG: 1 INJECTION, SOLUTION INTRAMUSCULAR; INTRAVENOUS; SUBCUTANEOUS at 17:10

## 2023-10-25 RX ADMIN — HYDROMORPHONE HYDROCHLORIDE 1 MG: 1 INJECTION, SOLUTION INTRAMUSCULAR; INTRAVENOUS; SUBCUTANEOUS at 13:36

## 2023-10-25 RX ADMIN — HEPARIN SODIUM 5000 UNITS: 5000 INJECTION, SOLUTION INTRAVENOUS; SUBCUTANEOUS at 21:54

## 2023-10-25 RX ADMIN — HEPARIN SODIUM 5000 UNITS: 5000 INJECTION, SOLUTION INTRAVENOUS; SUBCUTANEOUS at 14:17

## 2023-10-25 RX ADMIN — TRAMADOL HYDROCHLORIDE 50 MG: 50 TABLET ORAL at 05:23

## 2023-10-25 RX ADMIN — CLOPIDOGREL BISULFATE 75 MG: 75 TABLET ORAL at 05:22

## 2023-10-25 RX ADMIN — FINASTERIDE 5 MG: 5 TABLET, FILM COATED ORAL at 05:21

## 2023-10-25 RX ADMIN — OXYCODONE HYDROCHLORIDE 20 MG: 10 TABLET ORAL at 12:29

## 2023-10-25 RX ADMIN — CEPHALEXIN 500 MG: 500 CAPSULE ORAL at 08:07

## 2023-10-25 RX ADMIN — ASPIRIN 81 MG: 81 TABLET, COATED ORAL at 05:22

## 2023-10-25 RX ADMIN — METHOCARBAMOL 500 MG: 500 TABLET ORAL at 12:28

## 2023-10-25 RX ADMIN — TAMSULOSIN HYDROCHLORIDE 0.4 MG: 0.4 CAPSULE ORAL at 08:07

## 2023-10-25 RX ADMIN — OXYCODONE HYDROCHLORIDE 20 MG: 10 TABLET ORAL at 01:04

## 2023-10-25 RX ADMIN — ACETAMINOPHEN 650 MG: 325 TABLET, FILM COATED ORAL at 16:09

## 2023-10-25 RX ADMIN — GABAPENTIN 300 MG: 300 CAPSULE ORAL at 05:22

## 2023-10-25 RX ADMIN — CEPHALEXIN 500 MG: 500 CAPSULE ORAL at 14:17

## 2023-10-25 RX ADMIN — METHOCARBAMOL 500 MG: 500 TABLET ORAL at 16:07

## 2023-10-25 RX ADMIN — ATORVASTATIN CALCIUM 40 MG: 40 TABLET, FILM COATED ORAL at 16:08

## 2023-10-25 RX ADMIN — TRAMADOL HYDROCHLORIDE 50 MG: 50 TABLET ORAL at 16:09

## 2023-10-25 RX ADMIN — OXYCODONE HYDROCHLORIDE 20 MG: 10 TABLET ORAL at 19:38

## 2023-10-25 RX ADMIN — OXYCODONE HYDROCHLORIDE 20 MG: 10 TABLET ORAL at 22:52

## 2023-10-25 RX ADMIN — TRAMADOL HYDROCHLORIDE 50 MG: 50 TABLET ORAL at 23:44

## 2023-10-25 RX ADMIN — GABAPENTIN 300 MG: 300 CAPSULE ORAL at 12:28

## 2023-10-25 RX ADMIN — TRAMADOL HYDROCHLORIDE 50 MG: 50 TABLET ORAL at 12:29

## 2023-10-25 RX ADMIN — METHOCARBAMOL 500 MG: 500 TABLET ORAL at 21:54

## 2023-10-25 RX ADMIN — ACETAMINOPHEN 650 MG: 325 TABLET, FILM COATED ORAL at 12:29

## 2023-10-25 RX ADMIN — OXYCODONE HYDROCHLORIDE 20 MG: 10 TABLET ORAL at 08:07

## 2023-10-25 RX ADMIN — CEPHALEXIN 500 MG: 500 CAPSULE ORAL at 03:33

## 2023-10-25 RX ADMIN — HYDROMORPHONE HYDROCHLORIDE 0.5 MG: 1 INJECTION, SOLUTION INTRAMUSCULAR; INTRAVENOUS; SUBCUTANEOUS at 09:13

## 2023-10-25 RX ADMIN — NICOTINE TRANSDERMAL SYSTEM 21 MG: 21 PATCH, EXTENDED RELEASE TRANSDERMAL at 05:24

## 2023-10-25 RX ADMIN — CEPHALEXIN 500 MG: 500 CAPSULE ORAL at 22:51

## 2023-10-25 RX ADMIN — HEPARIN SODIUM 5000 UNITS: 5000 INJECTION, SOLUTION INTRAVENOUS; SUBCUTANEOUS at 05:24

## 2023-10-25 RX ADMIN — ACETAMINOPHEN 650 MG: 325 TABLET, FILM COATED ORAL at 05:22

## 2023-10-25 ASSESSMENT — PAIN DESCRIPTION - PAIN TYPE
TYPE: ACUTE PAIN
TYPE: SURGICAL PAIN
TYPE: ACUTE PAIN

## 2023-10-25 NOTE — PROGRESS NOTES
Hospital Medicine Daily Progress Note    Date of Service  10/25/2023    Chief Complaint  Bilateral feet wounds    Hospital Course  Dillon Centeno is a 58 y.o. male with hypertension and tobacco dependence, admitted 9/26/2023 with bilateral feet wounds.  He was noted to have ischemic gangrene of the left lower extremity with maggots and superimposed bacterial infection.  Vascular surgery and orthopedic surgery were consulted.  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  Vascular studies showed severe arterial disease.  Patient underwent bilateral common femoral artery endarterectomy and profundoplasty with saphenous vein angioplasty with stent placement in the right external iliac artery on 9/30/2023. Prevena was placed (5-7 days). He was recommended to have Plavix for 1 month and aspirin for life. Blood cultures from 9/26/2023 grew Morganella morganii and Vagococcus.  Urine culture from 9/26/2023 grew Staph epidermidis. ID were consulted.   Echocardiogram showed LVEF of about 60% with no reported valvular abnormalities.  Repeat blood cultures from 9/27/2023 have been negative.  ID recommended IV Zosyn which he completed on 10/6/2023.  Subsequently, he had breakdown of the incision site on the left BKA.  Orthopedics planning for revision.  However he developed acute kidney injury which was felt to be post renal with noted obstructive changes on renal ultrasound (moderate right and mild left hydronephrosis with distended urinary bladder), with component of prerenal, with associated mild hyperkalemia.  Urinalysis was bland with negative protein, casts.  He was given potassium lowering medications, along with IV fluids.  Holguin catheter was placed.  Nephrology and urology were consulted.  Renal function improved.  Urology recommended Flomax and finasteride.  He was able to go to the OR and underwent debridement and secondary closure of dehisced surgical wound.  Orthopedics recommended nonweightbearing on the left  lower extremity. He was then noted to have redness and exudate with dehiscence on the femoral endarterectomy incision site.  He was started on Keflex.  Vascular surgery was again contacted who recommended debridement and possibly wound VAC placement.     Additionally, he was noted to have renal cyst on CT.  MRI shows multiseptated cystic lesions in the upper pole of left kidney measuring 3.8 cm without associated enhancement or nodular component.    Discharge planning was pursued, but unfortunately had no accepting SNF's and no home health services accepting either.  Patient refused to go to a group home.  DMEs were being arranged.    Interval Problem Update  No acute events overnight.  Pain regimen adjusted.  Wound vac on, likely can come off 4-5 days post op.  Plan to discharge patient home when medically cleared as SNF/HH declined due to patient's insurance. Possible discharge home this weekend.  Encouraged patient to mobilize to work towards goal of discharging home.  On oral keflex.  On aspirin and plavix.    Consultants/Specialty  infectious disease, nephrology, orthopedics, urology, and vascular surgery    Code Status  Full Code    Disposition  The patient is not medically cleared for discharge to home or a post-acute facility.  Anticipate discharge to: home with close outpatient follow-up    Anticipate discharge to home once cleared by orthopedics and vascular surgery, and wheelchair is arranged.  I have placed the appropriate orders for post-discharge needs.    Review of Systems  ROS     Pertinent positives/negatives as mentioned above.     A complete review of systems was personally done by me. All other systems were negative.       Physical Exam  Temp:  [36.7 °C (98 °F)-37 °C (98.6 °F)] 36.7 °C (98 °F)  Pulse:  [] 95  Resp:  [16-18] 16  BP: (114-129)/(73-88) 114/75  SpO2:  [94 %-98 %] 95 %    Physical Exam  Vitals reviewed.   Constitutional:       General: He is not in acute distress.      Appearance: Normal appearance. He is not toxic-appearing or diaphoretic.   HENT:      Head: Normocephalic and atraumatic.      Nose: Nose normal.      Mouth/Throat:      Mouth: Mucous membranes are dry.      Pharynx: No oropharyngeal exudate.   Eyes:      General: No scleral icterus.     Extraocular Movements: Extraocular movements intact.      Conjunctiva/sclera: Conjunctivae normal.      Pupils: Pupils are equal, round, and reactive to light.   Cardiovascular:      Rate and Rhythm: Normal rate.      Heart sounds: Normal heart sounds. No murmur heard.     No gallop.   Pulmonary:      Effort: Pulmonary effort is normal. No respiratory distress.      Breath sounds: Normal breath sounds. No stridor. No wheezing, rhonchi or rales.   Chest:      Chest wall: No tenderness.   Abdominal:      General: Bowel sounds are normal. There is no distension.      Palpations: Abdomen is soft. There is no mass.      Tenderness: There is no abdominal tenderness. There is no guarding or rebound.   Genitourinary:     Penis: Normal.       Comments: Holguin catheter in place, draining clear daina urine.  Musculoskeletal:         General: No swelling. Normal range of motion.      Cervical back: Normal range of motion and neck supple.      Right lower leg: No edema.      Comments: Left BKA site, CDI, drain in place  Left thigh incision site with staples, surrounding redness and exudates, dehiscence noted   Lymphadenopathy:      Cervical: No cervical adenopathy.   Skin:     General: Skin is warm and dry.      Coloration: Skin is not jaundiced.      Findings: No rash.      Comments: Third right toe absent due to prior trauma.  Remaining toes with chronic ulcers.   Neurological:      General: No focal deficit present.      Mental Status: He is alert and oriented to person, place, and time. Mental status is at baseline.      Cranial Nerves: No cranial nerve deficit.   Psychiatric:         Mood and Affect: Mood normal.         Behavior: Behavior  normal.         Thought Content: Thought content normal.         Judgment: Judgment normal.           I have performed the physical examination today 10/23/2023.  In review of yesterday's note, there are no new changes except as documented above.     Fluids    Intake/Output Summary (Last 24 hours) at 10/25/2023 1612  Last data filed at 10/25/2023 0542  Gross per 24 hour   Intake 720 ml   Output 3500 ml   Net -2780 ml         Laboratory  Recent Labs     10/23/23  0448 10/24/23  0525 10/25/23  0502   WBC 8.8 10.4 9.5   RBC 3.55* 3.09* 3.01*   HEMOGLOBIN 10.2* 9.0* 8.7*   HEMATOCRIT 31.5* 27.6* 27.6*   MCV 88.7 89.3 91.7   MCH 28.7 29.1 28.9   MCHC 32.4 32.6 31.5*   RDW 54.4* 53.8* 55.2*   PLATELETCT 533* 537* 570*   MPV 8.1* 8.2* 8.6*         Recent Labs     10/23/23  0448 10/24/23  0525   SODIUM 129* 131*   POTASSIUM 3.9 4.1   CHLORIDE 95* 93*   CO2 23 26   GLUCOSE 98 102*   BUN 12 12   CREATININE 0.63 0.76   CALCIUM 9.1 9.3                       Imaging  US-RENAL   Final Result      1.  Bilateral medical renal disease.   2.  Moderate right and mild left hydronephrosis, likely secondary to distention of the urinary bladder secondary to prostatic hypertrophy. Post void images of the inner bladder and collecting systems of the kidneys were not acquired.   3.  Hydronephrosis was not present on the prior MRI of the abdomen without and with IV contrast on 10/2/2023.      MR-ABDOMEN-WITH & W/O   Final Result      Multi septated cystic lesion at the upper pole LEFT kidney measuring 3.8 cm without associated enhancement or nodular component (Bosniak 2).            DX-PORTABLE FLUORO > 1 HOUR   Final Result      Portable fluoroscopy utilized for 2 minutes 20.4 seconds.         INTERPRETING LOCATION: [address] [place] [address] [place], [id number]      CT-CTA AORTA-RO WITH & W/O-POST PROCESS   Final Result         CTA AORTA      1.  Septated lobulated left upper pole renal cystic mass lesion, recommend follow-up MRI of the kidneys for further  characterization.   2.  Atherosclerosis and atherosclerotic coronary artery disease.   3.  Fat-containing right inguinal hernia      CTA LOWER EXTREMITIES      1.  Occlusion of the right common iliac artery through the distal superficial femoral artery   2.  Distal right peroneal artery occlusion with 2-vessel runoff the anterior and posterior tibial arteries at the right ankle.   3.  Occlusion of the left superficial femoral artery at the bifurcation extending through its length to the popliteal artery.   4.  Occlusion of the proximal left profunda femoris just distal to the bifurcation which reconstitutes.   5.  Soft tissue gas at the stump of left below-the-knee amputation, within expected limits for recent postop changes.      3D angiographic/MIP images of the vasculature confirm the vascular findings as described above.      These findings were discussed with the patient's clinician, Keith Navarro, on 9/29/2023 8:06 AM.      EC-ECHOCARDIOGRAM COMPLETE W/O CONT   Final Result      US-AORTA/ILIACS DUPLEX COMPLETE   Final Result      US-EXTREMITY ARTERY LOWER UNILAT RIGHT   Final Result      US-INGA SINGLE LEVEL UNILAT RIGHT   Final Result      DX-CHEST-PORTABLE (1 VIEW)   Final Result      1.  LEFT basilar atelectasis or pneumonia   2.  Trace LEFT pleural effusion or pleural scar             Assessment/Plan  * Gangrene of left foot (HCC)- (present on admission)  Assessment & Plan  - s/p Left BKA on 9/27/2023.   - Vascular studies showed severe arterial disease. S/p bilateral common femoral artery endarterectomy and profundoplasty with saphenous vein angioplasty with stent placement in the right external iliac artery on 9/30/2023. Prevena was placed (5-7 days).  Continue Plavix for 1 month and aspirin for life. Follow-up with Dr. Navarro (vascular surgery). Staples to be removed 14 to 21 days postoperatively.   - Completed course of IV Zosyn on 10/6/2023.   - Noted dehiscence of incision site on the left BKA.   s/p debridement and secondary closure of dehisced surgical wound 10/20. Maintain NWB LLE.  -Now also having wound dehiscence on the left upper thigh surgical site.    S/p L AKA 10/23 by vascular surgery, wound vac on  Continue oral Keflex for now.  -Continue pain control with oral oxycodone and IV Dilaudid.    Hyperkalemia  Assessment & Plan  - Due to acute renal failure.    - Resolved.   -Monitor closely.  Repeat BMP in the morning.    GELY (acute kidney injury) ([facility])  Assessment & Plan  - Creatinine significantly jumped to 3.76.  Renal ultrasound also showed moderate right and mild left hydronephrosis with distended urinary bladder.  Suspect has post renal failure, with component of prerenal given significant elevation in BUN as well.  CPK is normal.  Urinalysis bland.  -Resolved. Now that has Holguin catheter.  Off IVF.  Potassium level has normalized, off Lokelma..  -Maintain Holguin catheter to relieve obstruction.  Continue Flomax and finasteride.  Suspect that he will need to go home with Holguin catheter, and follow-up as outpatient with urology for definitive treatment of BPH.  -Monitor for improvement in renal function closely.  Monitor electrolytes particularly potassium.  Avoid nephrotoxins, and continue to renally dose all medications.    PAD (peripheral artery disease) ([facility])- (present on admission)  Assessment & Plan  - s/p bilateral common femoral artery endarterectomy and profundoplasty with saphenous vein angioplasty with stent placement in the right external iliac artery on 9/30/2023. Prevena was placed (5-7 days).   -Continue Plavix for 1 month and aspirin for life.  Follow-up with vascular surgery.  Stump protector for left leg was ordered by vascular surgery.  -Now also having wound dehiscence on the left upper thigh surgical site.  Vascular surgery planning for debridement and possibly wound VAC placement.  Continue on oral Keflex for now.    Anemia- (present on admission)  Assessment & Plan  -Stable.   Monitor.  CBC in the morning.  Restrictive transfusion strategy.    Bacteremia- (present on admission)  Assessment & Plan  - Blood cultures from 9/26/2023 grew Morganella morganii and Vagococcus. Echocardiogram showed no evidence of vegetations.   - ID consulted, completed course of IV Zosyn on 10/6/2023. Repeat blood cultures from 9/27/2023 have been negative.      Hyponatremia- (present on admission)  Assessment & Plan  - Appears euvolemic.  Stable. Off IVF.  -Continue to monitor.    Elevated liver enzymes- (present on admission)  Assessment & Plan  - Resolved.  Hepatitis panel was normal.  Continue to monitor.    Leukocytosis- (present on admission)  Assessment & Plan  - Resolved.  Continue to trend.  Continue antibiotics.     Renal cyst- (present on admission)  Assessment & Plan  - Noted to have renal cyst on CT.  MRI shows multiseptated cystic lesions in the upper pole of left kidney measuring 3.8 cm without associated enhancement or nodular component.    -Outpatient follow-up with urology.         VTE prophylaxis: SCDs, Heparin SQ

## 2023-10-25 NOTE — WOUND TEAM
[facility]  Inpatient Services  Initial Wound and Skin Care Evaluation    Admission Date: 9/26/2023     Last order of IP CONSULT TO WOUND CARE was found on 10/21/2023 from Hospital Encounter on 9/26/2023     HPI, PMH, SH: Reviewed    Past Surgical History:   Procedure Laterality Date    INCISION AND DRAINAGE GENERAL Left 10/23/2023    Procedure: INCISION AND DRAINAGE;  Surgeon: Keith Navarro M.D.;  Location: [facility];  Service: Vascular    APPLICATION OR REPLACEMENT, WOUND VAC  10/23/2023    Procedure: APPLICATION OR REPLACEMENT, WOUND VAC;  Surgeon: Keith Navarro M.D.;  Location: [facility];  Service: Vascular    KNEE AMPUTATION ABOVE Left 10/23/2023    Procedure: AMPUTATION, ABOVE KNEE;  Surgeon: Keith Navarro M.D.;  Location: [facility];  Service: Vascular    KNEE AMPUTATION BELOW Left 10/20/2023    Procedure: AMPUTATION, BELOW KNEE WOUND REVISION;  Surgeon: Pradip Altamirano M.D.;  Location: [facility];  Service: Orthopedics    FEMORAL ENDARTERECTOMY Bilateral 9/30/2023    Procedure: ENDARTERECTOMY, FEMORAL;  Surgeon: Keith Navarro M.D.;  Location: [facility];  Service: Vascular    ANGIOGRAM, WITH ANGIOPLASTY, AND STENT INSERTION IF INDICATED Right 9/30/2023    Procedure: ANGIOGRAM, WITH ILIAC STENT;  Surgeon: Keith Navarro M.D.;  Location: [facility];  Service: Vascular    KNEE AMPUTATION BELOW Left 9/27/2023    Procedure: AMPUTATION, BELOW KNEE;  Surgeon: Pradip Altamirano M.D.;  Location: [facility];  Service: Orthopedics    IRRIGATION & DEBRIDEMENT ORTHO Left 3/4/2018    Procedure: IRRIGATION & DEBRIDEMENT ORTHO - HUMERUS;  Surgeon: Sergio Watkins M.D.;  Location: [facility];  Service: Orthopedics    ORIF, SHOULDER Left 1/4/2018    Procedure: SHOULDER ORIF;  Surgeon: Sergio Watkins M.D.;  Location: [facility];  Service: Orthopedics    CLOSED REDUCTION Left 12/24/2017     Procedure: CLOSED REDUCTION;  Surgeon: Keith Surbamanian M.D.;  Location: [facility];  Service: Orthopedics    OTHER      multiple surgeries lower legs from past injuries     Social History     Tobacco Use    Smoking status: Every Day     Types: Cigars    Smokeless tobacco: Never   Substance Use Topics    Alcohol use: Yes     Comment: a few beers a week     Chief Complaint   Patient presents with    Wound Check     Bilateral feet. Pts L foot is black and sloughing. Pt states unable to ambulate due to pain     Diagnosis: Gangrene of left foot (HCC) [I96]    Unit where seen by Wound Team: S602/01     WOUND CONSULT RELATED TO:  L groin, L BKA stump    WOUND TEAM PLAN OF CARE - Frequency of Follow-up:   Nursing to follow dressing orders written for wound care. Contact wound team if area fails to progress, deteriorates or with any questions/concerns if something comes up before next scheduled follow up (See below as to whether wound is following and frequency of wound follow up)   NPWT change 3 times weekly - L groin, L BKA    WOUND HISTORY:   10/23/23: Patient underwent left above-knee amputation and debridement of left groin wound with wound VAC placement       WOUND ASSESSMENT/LDA  Wound 10/23/23 Incision Groin Left 20CM Prevena (Active)   Date First Assessed/Time First Assessed: 10/23/23 1313   Primary Wound Type: Incision  Location: Groin  Laterality: Left  Wound Description (Comments): 20CM Prevena      Assessments 10/25/2023  4:00 PM   Wound Image      Site Assessment Red;Yellow   Periwound Assessment Scar tissue;Induration   Margins Attached edges;Defined edges   Closure Secondary intention   Drainage Amount Small   Drainage Description Serosanguineous   Treatments Cleansed;Site care   Wound Cleansing Normal Saline Irrigation   Periwound Protectant No-sting Skin Prep;Paste Ring;Drape   Dressing Status Clean;Dry;Intact   Dressing Changed Changed   Dressing Cleansing/Solutions Normal Saline   Dressing  Options Wound Vac;Offloading Dressing - Sacral   Dressing Change/Treatment Frequency Monday, Wednesday, Friday, and As Needed   NEXT Dressing Change/Treatment Date 10/27/23   NEXT Weekly Photo (Inpatient Only) 11/01/23   Wound Team Following 3x Weekly   Non-staged Wound Description Full thickness   Wound Length (cm) 11.5 cm   Wound Width (cm) 2.7 cm   Wound Depth (cm) 0.6 cm   Wound Surface Area (cm^2) 31.05 cm^2   Wound Volume (cm^3) 18.63 cm^3   Shape Irregular linear   Wound Odor None   WOUND NURSE ONLY - Time Spent with Patient (mins) 60       Wound 10/25/23 Incision Leg Left BKA incision (Active)   Date First Assessed/Time First Assessed: 10/25/23 1600   Hand Hygiene Completed: Yes  Primary Wound Type: Incision  Location: Leg  Laterality: Left  Wound Description (Comments): BKA incision      Assessments 10/25/2023  4:00 PM   Wound Image     Site Assessment Intact;Drainage   Periwound Assessment Clean;Intact;Dry   Margins Attached edges   Closure Staples   Drainage Amount Small   Drainage Description Serosanguineous   Treatments Cleansed;Site care   Wound Cleansing Normal Saline Irrigation   Periwound Protectant No-sting Skin Prep;Mepitel;Drape   Dressing Status Clean;Dry;Intact   Dressing Changed Changed   Dressing Cleansing/Solutions Not Applicable   Dressing Options Wound Vac   Dressing Change/Treatment Frequency Monday, Wednesday, Friday, and As Needed   NEXT Dressing Change/Treatment Date 10/27/23   NEXT Weekly Photo (Inpatient Only) 11/01/23   Wound Team Following 3x Weekly   Shape Horseshoe   Wound Odor None           Negative Pressure Wound Therapy 10/25/23 Surgical Groin;Leg Left (Active)   Wound Image   10/25/23 1600   NPWT Pump Mode / Pressure Setting Ulta;Intermittent;125 mmHg 10/25/23 1600   Dressing Type Medium;Black Foam (Veraflo) 10/25/23 1600   Number of Foam Pieces Used 4 10/25/23 1600   Canister Changed No 10/25/23 1600   NEXT Dressing Change/Treatment Date 10/27/23 10/25/23 1600   VAC VeraFlo  Irrigant Normal Saline 10/25/23 1600   VAC VeraFlo Soak Time (mins) 6 10/25/23 1600   VAC VeraFlo Instill Volume (ml) 12 10/25/23 1600   VAC VeraFlo - Therapy Time (hrs) 2 10/25/23 1600        Vascular:    INGA:   INGA Results, Last 30 Days US-EXTREMITY ARTERY LOWER UNILAT RIGHT    Result Date: 2023  Narrative Lower Extremity  Arterial Duplex Report  Vascular Laboratory  CONCLUSIONS  Findings are consistent with severe arterial insufficiency of the right  lower extremity.  Duplex scanning of the aorto-iliac segment was completed in accordance with  lower extremity arterial evaluation protocol - see separate report.  ELI GREWAL  Exam Date:     2023 12:06  Room #:     Inpatient  Priority:     Routine  Ht (in):             Wt (lb):  Ordering Physician:        KAITLYNN RANGEL  Referring Physician:       [id number] CARLOS Head  Sonographer:               Blanca Armas RVRUPERT  Study Type:                Complete Unilateral  Technical Quality:         Adequate  Age:    58    Gender:     M  MRN:    [id number]  :    1965      BSA:  Indications:     Peripheral vascular disease, unspecified  CPT Codes:       52119  ICD Codes:       I73.9  History:         Peripheral arterial disease. Left BKA 23. No prior                   duplex.  Limitations:                RIGHT  Waveform        Peak Systolic Velocity (cm/s)                  Prox    Prox-Mid  Mid    Mid-Dist  Distal  Absent                            0                        CFA  Monophasic      16                                         PFA  Absent          0                 0                8       SFA  Monophasic      13                                 17      POP  Monophasic      9                                  10      AT  Monophasic      12                                 8       PT  Monophasic      7                                  7       STEFANIE                LEFT  Waveform        Peak Systolic Velocity (cm/s)                  Prox    Prox-Mid  Mid     Mid-Dist  Distal                                                             CFA                                                             PFA                                                             SFA                                                             POP                                                             AT                                                             PT                                                             STEFANIE  FINDINGS  Right.  No Doppler or color flow demonstrated in the common femoral, proximal and  mid superficial femoral arteries.  Severely dampened, monophasic flow demonstrated in the profunda femoral  arery.  Reconstitution of flow is seen at the distal superficial femoral artery  with monophasic flow.  Atherosclerotic plaque seen in the popliteal artery.  Three vessel runoff to the ankle with monophasic flow.  Duplex scanning of the aorto-iliac segment was completed in accordance with  lower extremity arterial evaluation protocol - see separate report.  Amanda [facility] To  (Electronically Signed)  Final Date:      2023                   14:31    INGA Results, Last 30 Days US-INGA SINGLE LEVEL UNILAT RIGHT    Result Date: 2023  Narrative  Vascular Laboratory  Conclusions  Severe arterial insufficiency seen in the right lower extremity.  An arterial duplex of the right leg was performed in accordance with lower  extremity arterial evaluation protocol - see separate report.  ELI GREWAL  Age:    58    Gender:     M  MRN:    [id number]  :    1965      BSA:  Exam Date:     2023 11:11  Room #:     Inpatient  Priority:     Routine  Ht (in):             Wt (lb):  Ordering Physician:        KAITLYNN RANGEL  Referring Physician:       [id number] CARLOS Head  Sonographer:               Blanca Armas RVT  Study Type:                Complete Unilateral  Technical Quality:         Adequate  Indications:      Atherosclerosis of peripheral arteries  CPT Codes:       68375  ICD Codes:       440.20  History:         Peripheral vascular disease with non-healing wound. Below                   knee amputation of left leg due to dry gangrene (9/27/23). No                   prior exam.  Limitations:     IV in right mid bicep.                 RIGHT  Waveform            Systolic BPs (mmHg)                             0             Brachial  Monophasic                               Common Femoral  Monophasic                               Popliteal  Monophasic                 66            Posterior Tibial  Monophasic                               Dorsalis Pedis  Flatlined                                Digit                             0.43          INGA                                           TBI                       LEFT  Waveform        Systolic BPs (mmHg)                             153           Brachial                                           Common Femoral                                           Popliteal                                           Posterior Tibial                                           Dorsalis Pedis                                           Digit                                           INGA                                           TBI  Findings  Right.  There is evidence of severe arterial disease demonstrated (INGA is < .5) on  the right.  Doppler waveforms of the common femoral, popliteal, and posterior tibial  arteries are of low amplitude and monophasic.  Doppler waveforms of the dorsalis pedis artery are of severely low  amplitude that a index is not obtained.  An arterial duplex of the right leg was performed in accordance with lower  extremity arterial evaluation protocol - see separate report.  Amanda [facility] To  (Electronically Signed)  Final Date:      28 September 2023                   12:22      Lab Values:    Lab Results   Component Value Date/Time    WBC 9.5 10/25/2023 05:02 AM     RBC 3.01 (L) 10/25/2023 05:02 AM    HEMOGLOBIN 8.7 (L) 10/25/2023 05:02 AM    HEMATOCRIT 27.6 (L) 10/25/2023 05:02 AM         Culture Results show:  No results found for this or any previous visit (from the past 720 hour(s)).    Pain Level/Medicated:  PO pain medications administered by bedside RN   prior and IV pain medications administered by bedside RN   prior (Pt educated that IV medication will not be available on outpatient basis)       INTERVENTIONS BY WOUND TEAM:  Chart and images reviewed. Discussed with bedside RN. All areas of concern (based on picture review, LDA review and discussion with bedside RN) have been thoroughly assessed. Documentation of areas based on significant findings. This RN in to assess patient. Performed standard wound care which includes appropriate positioning, dressing removal and non-selective debridement. Pictures and measurements obtained weekly if/when required.    Wound:  Left Groin  Preparation for Dressing removal: Removed without difficulty  Cleansed/Non-selectively Debrided with:  Normal Saline and Gauze  Kiara wound: Cleansed with Normal Saline and Gauze, Prepped with No Sting, Paste Rings, and Drape  Primary Dressin piece of narrow spiral VF black foam onto wound bed. 2nd piece of VF black foam used as mini bridge and button laterally  Secondary (Outer) Dressing: Foam secured with VF tracpad and drape. VF initiated using fill assist. See settings in LDA. Offloading foam underneath tubing. Y sited with L BKA incision         Wound:  Left BKA Incision  Preparation for Dressing removal: Removed without difficulty  Cleansed/Non-selectively Debrided with:  Normal Saline and Gauze  Kiara wound: Cleansed with Normal Saline and Gauze, Prepped with No Sting, Drape, and Mepitel One  Primary Dressing:  mepitel one over incision, 1 strip of half thickness black foam over entire incision. 2nd piece of circular black foam as button.    Secondary (Outer) Dressing: Foam secured with  regular tracpad and drape. 125mmHg suctioned obtained. Y connected with L groin VF NPWT.    Advanced Wound Care Discharge Planning  Number of Clinicians necessary to complete wound care: 1  Is patient requiring IV pain medications for dressing changes:  Yes  Length of time for dressing change 45 min. (This does not include chart review, pre-medication time, set up, clean up or time spent charting.)    Interdisciplinary consultation: Patient, Bedside RN (Derrek), Svetlana LAU (Wound RN).  Pressure injury and staging reviewed with  MD Navarro .    EVALUATION / RATIONALE FOR TREATMENT:     Date:  10/25/23  Wound Status:  Initial evaluation    S/P L BKA and debridement of L groin wound. Left groin with small area of induration to lateral periwound. VF initiated to mechanically debride remaining slough to wound bed, increase oxygenation and granulation tissue production to the area, and assist in wound closure by secondary intention.     L BKA incision still with drainage after removal of prevena. Incisional VAC applied to manage drainage. Dr. Navarro updated.         Goals: Steady decrease in wound area and depth weekly.    NURSING PLAN OF CARE ORDERS:  Dressing changes: See Dressing Care orders    NUTRITION RECOMMENDATIONS   Wound Team Recommendations:  N/A    DIET ORDERS (From admission to next 24h)       Start     Ordered    10/23/23 1839  Diet Order Diet: Consistent CHO (Diabetic)  ALL MEALS        Question:  Diet:  Answer:  Consistent CHO (Diabetic)    10/23/23 1839                    PREVENTATIVE INTERVENTIONS:    Q shift Jae - performed per nursing policy  Q shift pressure point assessments - performed per nursing policy    Surface/Positioning  Reposition q 2 hours - Currently in Place  Waffle overlay  - Currently in Place    Offloading/Redistribution  Sacral offloading dressing (Silicone dressing) - Currently in Place      Containment/Moisture Prevention    Holguin Catheter - Currently in Place    Anticipated  discharge plans:  TBD        Vac Discharge Needs:  Vac Discharge plan is purely a recommendation from wound team and not a requirement for discharge unless otherwise stated by physician.  Veraflo Vac while inpatient, ok to transition to Regular Vac on discharge

## 2023-10-25 NOTE — CARE PLAN
The patient is Stable - Low risk of patient condition declining or worsening    Shift Goals  Clinical Goals: Pain control at comfort level 4-5/10 within 12 hour shift  Patient Goals: Pain management  Family Goals: LUTHER    Progress made toward(s) clinical / shift goals:  Patient reported pain level 5-9/10 on L AKA site. Medicated as per MAR with minimal relief verbalized, seen asleep in between administrations, easily arousable. Kept monitored for signs of respiratory depression, none noted. Non-pharmacologic comfort measures reiterated.     Patient is not progressing towards the following goals:    Problem: Pain - Standard  Goal: Alleviation of pain or a reduction in pain to the patient’s comfort goal  Description: Target End Date:  Prior to discharge or change in level of care    Document on Vitals flowsheet    1.  Document pain using the appropriate pain scale per order or unit policy  2.  Educate and implement non-pharmacologic comfort measures (i.e. relaxation, distraction, massage, cold/heat therapy, etc.)  3.  Pain management medications as ordered  4.  Reassess pain after pain med administration per policy  5.  If opiods administered assess patient's response to pain medication is appropriate per POSS sedation scale  6.  Follow pain management plan developed in collaboration with patient and interdisciplinary team (including palliative care or pain specialists if applicable)  Outcome: Not Progressing     Problem: Skin Integrity  Goal: Skin integrity is maintained or improved  Description: Target End Date:  Prior to discharge or change in level of care    Document interventions on Skin Risk/Jae flowsheet groups and corresponding LDA    1.  Assess and monitor skin integrity, appearance and/or temperature  2.  Assess risk factors for impaired skin integrity and/or pressures ulcers  3.  Implement precautions to protect skin integrity in collaboration with interdisciplinary team  4.  Implement pressure ulcer  prevention protocol if at risk for skin breakdown  5.  Confirm wound care consult if at risk for skin breakdown  6.  Ensure patient use of pressure relieving devices  (Low air loss bed, waffle overlay, heel protectors, ROHO cushion, etc)  Outcome: Not Progressing    Note: Maintained on wound vac therapy and NWB on LLE. Wound dressing intact. Repositioning and turning encouraged.

## 2023-10-25 NOTE — PROGRESS NOTES
"  VASCULAR SURGERY SERVICE  Progress Note  ___________________________________    10/23/23: Patient underwent left above-knee amputation and debridement of left groin wound with wound VAC placement    10/24/23: No significant events overnight, patient alert and conversant, pain controlled    10/25/23: No significant events overnight, patient alert and conversant, no apparent distress but says he would like stronger pain medication    Vitals  /75   Pulse 95   Temp 36.7 °C (98 °F) (Temporal)   Resp 16   Ht 1.803 m (5' 11\")   Wt 64.4 kg (141 lb 15.6 oz)   SpO2 95%   BMI 19.80 kg/m²     Exam  General: alert, conversant, NAD  Extremities: Left groin wound VAC clean dry and intact.  No concerning output.  Left AKA Prevena intact with small amount of serosanguineous output    Labs  WBC normal  Hgb stable around 9  Creatinine normal    A/P)  Doing well, no specific concerns at this time    Wound care consulted for VAC changes  Prevena bandage on the left AKA stump will likely be changed on postoperative day 4-5 depending on output    Appreciate Hospitalist services support  Following along      Keith Navarro MD  [facility]  Voalte preferred or call my office [phone]  __________________________________________________________________  Patient:Dillon Cneteno   MRN:[id number]   CSN:[id number]      "

## 2023-10-25 NOTE — CARE PLAN
The patient is Stable - Low risk of patient condition declining or worsening    Shift Goals  Clinical Goals: pain mgmt < 4 this shift  Patient Goals: pain mgmt  Family Goals: LUTHER    Progress made toward(s) clinical / shift goals:  pt will be free of injuries this shift      Problem: Knowledge Deficit - Standard  Goal: Patient and family/care givers will demonstrate understanding of plan of care, disease process/condition, diagnostic tests and medications  Outcome: Progressing     Problem: Pain - Standard  Goal: Alleviation of pain or a reduction in pain to the patient’s comfort goal  Outcome: Progressing     Problem: Skin Integrity  Goal: Skin integrity is maintained or improved  Outcome: Progressing     Problem: Fall Risk  Goal: Patient will remain free from falls  Outcome: Progressing     Problem: Infection - Standard  Goal: Patient will remain free from infection  Outcome: Progressing     Problem: Wound/ / Incision Healing  Goal: Patient's wound/surgical incision will decrease in size and heals properly  Outcome: Progressing       Patient is not progressing towards the following goals:

## 2023-10-25 NOTE — THERAPY
"Occupational Therapy  Daily Treatment     Patient Name: Dillon Centeno  Age:  58 y.o., Sex:  male  Medical Record #: [id number]  Today's Date: 10/24/2023     Precautions: Fall Risk  Comments: s/p AKA    Assessment    Pt seen for OT tx with his \"best friend in the whole world, Otis\" at bedside. Pt tolerated sitting EOB but declined grooming ADLs or standing attempt 2/2 pain at incision site with wound vac on L AKA residual limb. Pt's friend and roommate at bedside is trained as a CNA and reports plans to be as helpful as possible, but he does work 4 days per week. Anticipate need for post-acute placement once medically cleared in order to progress functional independence in ADLs and transfers prior to DC home with supportive friend.     Plan    Treatment Plan Status: Continue Current Treatment Plan  Type of Treatment: Self Care / Activities of Daily Living, Adaptive Equipment, Neuro Re-Education / Balance, Therapeutic Exercises, Therapeutic Activity  Treatment Frequency: 3 Times per Week  Treatment Duration: Until Therapy Goals Met    DC Equipment Recommendations: Unable to determine at this time  Discharge Recommendations: Recommend post-acute placement for additional occupational therapy services prior to discharge home    Subjective    \"I know that. That's common sense.\" (discussing recommendation to position residual limb in extension while at rest)    Objective       10/24/23 1525   Precautions   Precautions Fall Risk   Comments s/p AKA   Pain 0 - 10 Group   Therapist Pain Assessment During Activity  (not rated, L inscision pain)   Cognition    Cognition / Consciousness X   Level of Consciousness Alert   Safety Awareness Impaired   New Learning Impaired   Attention Impaired   Comments poor attention, slightly irritable and lacks receptivity to recommendations   Active ROM Upper Body   Active ROM Upper Body  WDL   Strength Upper Body   Upper Body Strength  WDL   Balance   Sitting Balance (Static) Fair "   Sitting Balance (Dynamic) Fair   Weight Shift Sitting Fair   Skilled Intervention Verbal Cuing;Tactile Cuing;Compensatory Strategies   Comments refused to stand 2/2 LLE pain   Bed Mobility    Supine to Sit Minimal Assist   Scooting Contact Guard Assist   Skilled Intervention Verbal Cuing;Tactile Cuing;Sequencing   Activities of Daily Living   Eating Modified Independent   Grooming Supervision;Seated  (declined oral care)   Upper Body Dressing Supervision   Lower Body Dressing   (declined)   Toileting   (declined)   Skilled Intervention Verbal Cuing;Tactile Cuing;Sequencing   How much help from another person does the patient currently need...   Putting on and taking off regular lower body clothing? 2   Bathing (including washing, rinsing, and drying)? 2   Toileting, which includes using a toilet, bedpan, or urinal? 2   Putting on and taking off regular upper body clothing? 3   Taking care of personal grooming such as brushing teeth? 3   Eating meals? 4   6 Clicks Daily Activity Score 16   Functional Mobility   Sit to Stand Refused   Bed, Chair, Wheelchair Transfer Refused   Mobility sit EOB only   Activity Tolerance   Sitting Edge of Bed 10min   Patient / Family Goals   Patient / Family Goal #1 to be independent again   Goal #1 Outcome Progressing slower than expected   Short Term Goals   Short Term Goal # 1 pt will demo ADL txfs with supv   Goal Outcome # 1 Progressing as expected   Short Term Goal # 2 pt will dress LB with supv   Goal Outcome # 2 Progressing as expected   Short Term Goal # 3 pt will demo toileting with supv   Goal Outcome # 3 Progressing as expected   Education Group   Education Provided Role of Occupational Therapist;Activities of Daily Living;Home Safety;Joint Protection   Role of Occupational Therapist Patient Response Other;Acceptance;Explanation;Verbal Demonstration  (best friend Otis at bedside)   Joint Protection Patient Response Other;Acceptance;Explanation;Verbal Demonstration    Home Safety Patient Response Other;Acceptance;Explanation;Verbal Demonstration   ADL Patient Response Other;Acceptance;Explanation;Verbal Demonstration   Additional Comments bed friend Otis at bedside, pt minimally receptive to education   Occupational Therapy Treatment Plan    O.T. Treatment Plan Continue Current Treatment Plan   Anticipated Discharge Equipment and Recommendations   DC Equipment Recommendations Unable to determine at this time   Discharge Recommendations Recommend post-acute placement for additional occupational therapy services prior to discharge home

## 2023-10-26 PROCEDURE — 700102 HCHG RX REV CODE 250 W/ 637 OVERRIDE(OP): Performed by: STUDENT IN AN ORGANIZED HEALTH CARE EDUCATION/TRAINING PROGRAM

## 2023-10-26 PROCEDURE — A9270 NON-COVERED ITEM OR SERVICE: HCPCS | Performed by: STUDENT IN AN ORGANIZED HEALTH CARE EDUCATION/TRAINING PROGRAM

## 2023-10-26 PROCEDURE — 700102 HCHG RX REV CODE 250 W/ 637 OVERRIDE(OP): Performed by: SURGERY

## 2023-10-26 PROCEDURE — 700102 HCHG RX REV CODE 250 W/ 637 OVERRIDE(OP): Performed by: INTERNAL MEDICINE

## 2023-10-26 PROCEDURE — 700111 HCHG RX REV CODE 636 W/ 250 OVERRIDE (IP): Performed by: STUDENT IN AN ORGANIZED HEALTH CARE EDUCATION/TRAINING PROGRAM

## 2023-10-26 PROCEDURE — 700111 HCHG RX REV CODE 636 W/ 250 OVERRIDE (IP): Performed by: SURGERY

## 2023-10-26 PROCEDURE — A9270 NON-COVERED ITEM OR SERVICE: HCPCS | Performed by: INTERNAL MEDICINE

## 2023-10-26 PROCEDURE — 770006 HCHG ROOM/CARE - MED/SURG/GYN SEMI*

## 2023-10-26 PROCEDURE — 99232 SBSQ HOSP IP/OBS MODERATE 35: CPT | Performed by: STUDENT IN AN ORGANIZED HEALTH CARE EDUCATION/TRAINING PROGRAM

## 2023-10-26 PROCEDURE — A9270 NON-COVERED ITEM OR SERVICE: HCPCS | Performed by: SURGERY

## 2023-10-26 RX ORDER — NICOTINE 21 MG/24HR
21 PATCH, TRANSDERMAL 24 HOURS TRANSDERMAL
Status: DISCONTINUED | OUTPATIENT
Start: 2023-10-27 | End: 2023-11-03 | Stop reason: HOSPADM

## 2023-10-26 RX ORDER — ACETAMINOPHEN 325 MG/1
650 TABLET ORAL EVERY 6 HOURS PRN
Status: DISCONTINUED | OUTPATIENT
Start: 2023-10-26 | End: 2023-11-03 | Stop reason: HOSPADM

## 2023-10-26 RX ADMIN — OXYCODONE HYDROCHLORIDE 20 MG: 10 TABLET ORAL at 15:29

## 2023-10-26 RX ADMIN — TRAMADOL HYDROCHLORIDE 50 MG: 50 TABLET ORAL at 13:35

## 2023-10-26 RX ADMIN — GABAPENTIN 300 MG: 300 CAPSULE ORAL at 05:29

## 2023-10-26 RX ADMIN — HEPARIN SODIUM 5000 UNITS: 5000 INJECTION, SOLUTION INTRAVENOUS; SUBCUTANEOUS at 13:37

## 2023-10-26 RX ADMIN — HEPARIN SODIUM 5000 UNITS: 5000 INJECTION, SOLUTION INTRAVENOUS; SUBCUTANEOUS at 05:29

## 2023-10-26 RX ADMIN — ASPIRIN 81 MG: 81 TABLET, COATED ORAL at 05:28

## 2023-10-26 RX ADMIN — METHOCARBAMOL 500 MG: 500 TABLET ORAL at 10:43

## 2023-10-26 RX ADMIN — OXYCODONE HYDROCHLORIDE 20 MG: 10 TABLET ORAL at 10:44

## 2023-10-26 RX ADMIN — TRAMADOL HYDROCHLORIDE 50 MG: 50 TABLET ORAL at 17:45

## 2023-10-26 RX ADMIN — ATORVASTATIN CALCIUM 40 MG: 40 TABLET, FILM COATED ORAL at 17:45

## 2023-10-26 RX ADMIN — TRAMADOL HYDROCHLORIDE 50 MG: 50 TABLET ORAL at 05:28

## 2023-10-26 RX ADMIN — CEPHALEXIN 500 MG: 500 CAPSULE ORAL at 03:24

## 2023-10-26 RX ADMIN — NICOTINE TRANSDERMAL SYSTEM 21 MG: 21 PATCH, EXTENDED RELEASE TRANSDERMAL at 05:27

## 2023-10-26 RX ADMIN — HEPARIN SODIUM 5000 UNITS: 5000 INJECTION, SOLUTION INTRAVENOUS; SUBCUTANEOUS at 20:34

## 2023-10-26 RX ADMIN — TAMSULOSIN HYDROCHLORIDE 0.4 MG: 0.4 CAPSULE ORAL at 10:43

## 2023-10-26 RX ADMIN — METHOCARBAMOL 500 MG: 500 TABLET ORAL at 20:34

## 2023-10-26 RX ADMIN — CLOPIDOGREL BISULFATE 75 MG: 75 TABLET ORAL at 05:29

## 2023-10-26 RX ADMIN — CEPHALEXIN 500 MG: 500 CAPSULE ORAL at 10:43

## 2023-10-26 RX ADMIN — METHOCARBAMOL 500 MG: 500 TABLET ORAL at 13:34

## 2023-10-26 RX ADMIN — TRAMADOL HYDROCHLORIDE 50 MG: 50 TABLET ORAL at 23:46

## 2023-10-26 RX ADMIN — ACETAMINOPHEN 650 MG: 325 TABLET, FILM COATED ORAL at 05:28

## 2023-10-26 RX ADMIN — GABAPENTIN 300 MG: 300 CAPSULE ORAL at 17:45

## 2023-10-26 RX ADMIN — FINASTERIDE 5 MG: 5 TABLET, FILM COATED ORAL at 05:29

## 2023-10-26 RX ADMIN — METHOCARBAMOL 500 MG: 500 TABLET ORAL at 17:45

## 2023-10-26 RX ADMIN — CEPHALEXIN 500 MG: 500 CAPSULE ORAL at 15:29

## 2023-10-26 RX ADMIN — GABAPENTIN 300 MG: 300 CAPSULE ORAL at 13:34

## 2023-10-26 RX ADMIN — DOCUSATE SODIUM 50 MG AND SENNOSIDES 8.6 MG 2 TABLET: 8.6; 5 TABLET, FILM COATED ORAL at 17:44

## 2023-10-26 RX ADMIN — OXYCODONE HYDROCHLORIDE 20 MG: 10 TABLET ORAL at 06:42

## 2023-10-26 RX ADMIN — ACETAMINOPHEN 650 MG: 325 TABLET, FILM COATED ORAL at 13:37

## 2023-10-26 RX ADMIN — OXYCODONE HYDROCHLORIDE 20 MG: 10 TABLET ORAL at 20:34

## 2023-10-26 NOTE — DISCHARGE PLANNING
Reviewed patient chart for consideration of admission to home health. PT and OT recommend post-acute placement. The patient will be alone four days per week and is noted to be impulsive. He requires assistance of a person to be safe with transfers, which he will not have most of the time. For these reasons he is not a safe home health discharge at this time, so we cannot accept the referral.

## 2023-10-26 NOTE — PROGRESS NOTES
Hospital Medicine Daily Progress Note    Date of Service  10/26/2023    Chief Complaint  Bilateral feet wounds    Hospital Course  Dillon Centeno is a 58 y.o. male with hypertension and tobacco dependence, admitted 9/26/2023 with bilateral feet wounds.  He was noted to have ischemic gangrene of the left lower extremity with maggots and superimposed bacterial infection.  Vascular surgery and orthopedic surgery were consulted.  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  Vascular studies showed severe arterial disease.  Patient underwent bilateral common femoral artery endarterectomy and profundoplasty with saphenous vein angioplasty with stent placement in the right external iliac artery on 9/30/2023. Prevena was placed (5-7 days). He was recommended to have Plavix for 1 month and aspirin for life. Blood cultures from 9/26/2023 grew Morganella morganii and Vagococcus.  Urine culture from 9/26/2023 grew Staph epidermidis. ID were consulted.   Echocardiogram showed LVEF of about 60% with no reported valvular abnormalities.  Repeat blood cultures from 9/27/2023 have been negative.  ID recommended IV Zosyn which he completed on 10/6/2023.  Subsequently, he had breakdown of the incision site on the left BKA.  Orthopedics planning for revision.  However he developed acute kidney injury which was felt to be post renal with noted obstructive changes on renal ultrasound (moderate right and mild left hydronephrosis with distended urinary bladder), with component of prerenal, with associated mild hyperkalemia.  Urinalysis was bland with negative protein, casts.  He was given potassium lowering medications, along with IV fluids.  Holguin catheter was placed.  Nephrology and urology were consulted.  Renal function improved.  Urology recommended Flomax and finasteride.  He was able to go to the OR and underwent debridement and secondary closure of dehisced surgical wound.  Orthopedics recommended nonweightbearing on the left  lower extremity. He was then noted to have redness and exudate with dehiscence on the femoral endarterectomy incision site.  He was started on Keflex.  Vascular surgery was again contacted who recommended debridement and possibly wound VAC placement.     Additionally, he was noted to have renal cyst on CT.  MRI shows multiseptated cystic lesions in the upper pole of left kidney measuring 3.8 cm without associated enhancement or nodular component.    Discharge planning was pursued, but unfortunately had no accepting SNF's and no home health services accepting either.  Patient refused to go to a group home.  DMEs were being arranged.    Interval Problem Update  No acute events overnight.  Patient has no complaints.  Wound vac on, likely can come off 4-5 days post op.  Plan to discharge patient home when medically cleared as SNF/HH declined due to patient's insurance. Possible discharge home this weekend.  Encouraged patient to mobilize to work towards goal of discharging home.  On aspirin and plavix.  Appreciate vascular surgery recommendations regarding wound vac and post surgical care.    Consultants/Specialty  infectious disease, nephrology, orthopedics, urology, and vascular surgery    Code Status  Full Code    Disposition  The patient is not medically cleared for discharge to home or a post-acute facility.  Anticipate discharge to: home with close outpatient follow-up    Anticipate discharge to home once cleared by orthopedics and vascular surgery, and wheelchair is arranged.  I have placed the appropriate orders for post-discharge needs.    Review of Systems  ROS     Pertinent positives/negatives as mentioned above.     A complete review of systems was personally done by me. All other systems were negative.       Physical Exam  Temp:  [36.3 °C (97.4 °F)-36.7 °C (98.1 °F)] 36.3 °C (97.4 °F)  Pulse:  [] 87  Resp:  [16-18] 16  BP: (112-139)/(8-78) 139/78  SpO2:  [95 %-97 %] 97 %    Physical Exam  Vitals  reviewed.   Constitutional:       General: He is not in acute distress.     Appearance: Normal appearance. He is not toxic-appearing or diaphoretic.   HENT:      Head: Normocephalic and atraumatic.      Nose: Nose normal.      Mouth/Throat:      Mouth: Mucous membranes are dry.      Pharynx: No oropharyngeal exudate.   Eyes:      General: No scleral icterus.     Extraocular Movements: Extraocular movements intact.      Conjunctiva/sclera: Conjunctivae normal.      Pupils: Pupils are equal, round, and reactive to light.   Cardiovascular:      Rate and Rhythm: Normal rate.      Heart sounds: Normal heart sounds. No murmur heard.     No gallop.   Pulmonary:      Effort: Pulmonary effort is normal. No respiratory distress.      Breath sounds: Normal breath sounds. No stridor. No wheezing, rhonchi or rales.   Chest:      Chest wall: No tenderness.   Abdominal:      General: Bowel sounds are normal. There is no distension.      Palpations: Abdomen is soft. There is no mass.      Tenderness: There is no abdominal tenderness. There is no guarding or rebound.   Genitourinary:     Penis: Normal.       Comments: Holguin catheter in place, draining clear daina urine.  Musculoskeletal:         General: No swelling. Normal range of motion.      Cervical back: Normal range of motion and neck supple.      Right lower leg: No edema.      Comments: Left BKA site, CDI, drain in place  Left thigh incision site with staples, surrounding redness and exudates, dehiscence noted   Lymphadenopathy:      Cervical: No cervical adenopathy.   Skin:     General: Skin is warm and dry.      Coloration: Skin is not jaundiced.      Findings: No rash.      Comments: Third right toe absent due to prior trauma.  Remaining toes with chronic ulcers.   Neurological:      General: No focal deficit present.      Mental Status: He is alert and oriented to person, place, and time. Mental status is at baseline.      Cranial Nerves: No cranial nerve deficit.    Psychiatric:         Mood and Affect: Mood normal.         Behavior: Behavior normal.         Thought Content: Thought content normal.         Judgment: Judgment normal.           I have performed the physical examination today 10/23/2023.  In review of yesterday's note, there are no new changes except as documented above.     Fluids    Intake/Output Summary (Last 24 hours) at 10/26/2023 1006  Last data filed at 10/26/2023 0500  Gross per 24 hour   Intake 480 ml   Output 2100 ml   Net -1620 ml         Laboratory  Recent Labs     10/24/23  0525 10/25/23  0502   WBC 10.4 9.5   RBC 3.09* 3.01*   HEMOGLOBIN 9.0* 8.7*   HEMATOCRIT 27.6* 27.6*   MCV 89.3 91.7   MCH 29.1 28.9   MCHC 32.6 31.5*   RDW 53.8* 55.2*   PLATELETCT 537* 570*   MPV 8.2* 8.6*         Recent Labs     10/24/23  0525   SODIUM 131*   POTASSIUM 4.1   CHLORIDE 93*   CO2 26   GLUCOSE 102*   BUN 12   CREATININE 0.76   CALCIUM 9.3                       Imaging  US-RENAL   Final Result      1.  Bilateral medical renal disease.   2.  Moderate right and mild left hydronephrosis, likely secondary to distention of the urinary bladder secondary to prostatic hypertrophy. Post void images of the inner bladder and collecting systems of the kidneys were not acquired.   3.  Hydronephrosis was not present on the prior MRI of the abdomen without and with IV contrast on 10/2/2023.      MR-ABDOMEN-WITH & W/O   Final Result      Multi septated cystic lesion at the upper pole LEFT kidney measuring 3.8 cm without associated enhancement or nodular component (Bosniak 2).            DX-PORTABLE FLUORO > 1 HOUR   Final Result      Portable fluoroscopy utilized for 2 minutes 20.4 seconds.         INTERPRETING LOCATION: [address] [place] [address], [id number]      CT-CTA AORTA-RO WITH & W/O-POST PROCESS   Final Result         CTA AORTA      1.  Septated lobulated left upper pole renal cystic mass lesion, recommend follow-up MRI of the kidneys for further characterization.   2.   Atherosclerosis and atherosclerotic coronary artery disease.   3.  Fat-containing right inguinal hernia      CTA LOWER EXTREMITIES      1.  Occlusion of the right common iliac artery through the distal superficial femoral artery   2.  Distal right peroneal artery occlusion with 2-vessel runoff the anterior and posterior tibial arteries at the right ankle.   3.  Occlusion of the left superficial femoral artery at the bifurcation extending through its length to the popliteal artery.   4.  Occlusion of the proximal left profunda femoris just distal to the bifurcation which reconstitutes.   5.  Soft tissue gas at the stump of left below-the-knee amputation, within expected limits for recent postop changes.      3D angiographic/MIP images of the vasculature confirm the vascular findings as described above.      These findings were discussed with the patient's clinician, Keith Navarro, on 9/29/2023 8:06 AM.      EC-ECHOCARDIOGRAM COMPLETE W/O CONT   Final Result      US-AORTA/ILIACS DUPLEX COMPLETE   Final Result      US-EXTREMITY ARTERY LOWER UNILAT RIGHT   Final Result      US-INGA SINGLE LEVEL UNILAT RIGHT   Final Result      DX-CHEST-PORTABLE (1 VIEW)   Final Result      1.  LEFT basilar atelectasis or pneumonia   2.  Trace LEFT pleural effusion or pleural scar             Assessment/Plan  * Gangrene of left foot (HCC)- (present on admission)  Assessment & Plan  - s/p Left BKA on 9/27/2023.   - Vascular studies showed severe arterial disease. S/p bilateral common femoral artery endarterectomy and profundoplasty with saphenous vein angioplasty with stent placement in the right external iliac artery on 9/30/2023. Prevena was placed (5-7 days).  Continue Plavix for 1 month and aspirin for life. Follow-up with Dr. Navarro (vascular surgery). Staples to be removed 14 to 21 days postoperatively.   - Completed course of IV Zosyn on 10/6/2023.   - Noted dehiscence of incision site on the left BKA.  s/p debridement and  secondary closure of dehisced surgical wound 10/20. Maintain NWB LLE.  -Now also having wound dehiscence on the left upper thigh surgical site.    S/p L AKA 10/23 by vascular surgery, wound vac on  Continue oral Keflex for now.  -Continue pain control with oral oxycodone and IV Dilaudid.    Hyperkalemia  Assessment & Plan  - Due to acute renal failure.    - Resolved.   -Monitor closely.  Repeat BMP in the morning.    GELY (acute kidney injury) ([facility])  Assessment & Plan  - Creatinine significantly jumped to 3.76.  Renal ultrasound also showed moderate right and mild left hydronephrosis with distended urinary bladder.  Suspect has post renal failure, with component of prerenal given significant elevation in BUN as well.  CPK is normal.  Urinalysis bland.  -Resolved. Now that has Holguin catheter.  Off IVF.  Potassium level has normalized, off Lokelma..  -Maintain Holguin catheter to relieve obstruction.  Continue Flomax and finasteride.  Suspect that he will need to go home with Holguin catheter, and follow-up as outpatient with urology for definitive treatment of BPH.  -Monitor for improvement in renal function closely.  Monitor electrolytes particularly potassium.  Avoid nephrotoxins, and continue to renally dose all medications.    PAD (peripheral artery disease) ([facility])- (present on admission)  Assessment & Plan  - s/p bilateral common femoral artery endarterectomy and profundoplasty with saphenous vein angioplasty with stent placement in the right external iliac artery on 9/30/2023. Prevena was placed (5-7 days).   -Continue Plavix for 1 month and aspirin for life.  Follow-up with vascular surgery.  Stump protector for left leg was ordered by vascular surgery.  -Now also having wound dehiscence on the left upper thigh surgical site.  Vascular surgery planning for debridement and possibly wound VAC placement.  Continue on oral Keflex for now.    Anemia- (present on admission)  Assessment & Plan  -Stable.  Monitor.  CBC in  the morning.  Restrictive transfusion strategy.    Bacteremia- (present on admission)  Assessment & Plan  - Blood cultures from 9/26/2023 grew Morganella morganii and Vagococcus. Echocardiogram showed no evidence of vegetations.   - ID consulted, completed course of IV Zosyn on 10/6/2023. Repeat blood cultures from 9/27/2023 have been negative.      Hyponatremia- (present on admission)  Assessment & Plan  - Appears euvolemic.  Stable. Off IVF.  -Continue to monitor.    Elevated liver enzymes- (present on admission)  Assessment & Plan  - Resolved.  Hepatitis panel was normal.  Continue to monitor.    Leukocytosis- (present on admission)  Assessment & Plan  - Resolved.  Continue to trend.  Continue antibiotics.     Renal cyst- (present on admission)  Assessment & Plan  - Noted to have renal cyst on CT.  MRI shows multiseptated cystic lesions in the upper pole of left kidney measuring 3.8 cm without associated enhancement or nodular component.    -Outpatient follow-up with urology.         VTE prophylaxis: SCDs, Heparin SQ

## 2023-10-26 NOTE — WOUND TEAM
Assisted Jemma MEELNDEZ (Wound RN), with wound care, non-selective debridement performed using wound cleanser/NS and gauze. Please see Jemma MELENDEZ (Wound RN) wound note for further wound care details.

## 2023-10-26 NOTE — PROGRESS NOTES
VASCULAR SURGERY PROGRESS NOTE       Awake, complains of some incisional pain.  AF, VSS.    General:: Pleasant male in none pain distress.  Left lower extremity: Prevena in place at AKA stump without surrounding erythema, drainage, or fluctuation.  Left groin wound VAC is in place with no surrounding erythema.    Labs: Reviewed.    Assessment: Status post left groin debridement and wound VAC placement and left above-knee amputation.    Plan:  Doing well.  Disposition.    Discussed with patient.  All questions were answered.

## 2023-10-26 NOTE — PROGRESS NOTES
Received report and assumed care of patient (pt). Pt complains of pain 9/10. Medication given per MAR. Bed is in lowest, locked position, call light and belongings are within reach. Pt calls for assistance.

## 2023-10-26 NOTE — DISCHARGE PLANNING
Case Management Discharge Planning    Admission Date: 9/26/2023  GMLOS: 11.1  ALOS: 30    6-Clicks ADL Score: 16  6-Clicks Mobility Score: 10  PT and/or OT Eval ordered: Yes  Post-acute Referrals Ordered: Yes  Post-acute Choice Obtained: Yes  Has referral(s) been sent to post-acute provider:  Yes      Anticipated Discharge Dispo: Discharge Disposition: D/T to home under [facility] care in anticipation of covered skilled care (06)    DME Needed: Yes    DME Ordered: Yes    Action(s) Taken: Discussed pt in IDT rounds. Pt is not medically cleared as MD is waiting for Vascular Recommendations. Pt has wheelchair at bedside to DC home with.     Escalations Completed: None    Medically Clear: No    Next Steps: LMSW to follow as needed.     Barriers to Discharge: Medical clearance    Is the patient up for discharge tomorrow: No

## 2023-10-26 NOTE — CARE PLAN
The patient is Stable - Low risk of patient condition declining or worsening    Shift Goals  Clinical Goals: pts pain will remain below a 7 during this shift  Patient Goals: pain management  Family Goals: LUTHER    Progress made toward(s) clinical / shift goals:  Pt complained of 9/10 pain during the shift. Pain medication given during the shift. Scheduled Tylenol, tramadol, and robaxin given. Patient able top sleep after scheduled Tylenol and  tramadol.    Patient is not progressing towards the following goals:

## 2023-10-27 PROCEDURE — 700102 HCHG RX REV CODE 250 W/ 637 OVERRIDE(OP): Performed by: STUDENT IN AN ORGANIZED HEALTH CARE EDUCATION/TRAINING PROGRAM

## 2023-10-27 PROCEDURE — A9270 NON-COVERED ITEM OR SERVICE: HCPCS | Performed by: STUDENT IN AN ORGANIZED HEALTH CARE EDUCATION/TRAINING PROGRAM

## 2023-10-27 PROCEDURE — 770006 HCHG ROOM/CARE - MED/SURG/GYN SEMI*

## 2023-10-27 PROCEDURE — 302098 PASTE RING (FLAT): Performed by: STUDENT IN AN ORGANIZED HEALTH CARE EDUCATION/TRAINING PROGRAM

## 2023-10-27 PROCEDURE — 97535 SELF CARE MNGMENT TRAINING: CPT

## 2023-10-27 PROCEDURE — 97597 DBRDMT OPN WND 1ST 20 CM/<: CPT

## 2023-10-27 PROCEDURE — 97605 NEG PRS WND THER DME<=50SQCM: CPT

## 2023-10-27 PROCEDURE — 700111 HCHG RX REV CODE 636 W/ 250 OVERRIDE (IP): Performed by: STUDENT IN AN ORGANIZED HEALTH CARE EDUCATION/TRAINING PROGRAM

## 2023-10-27 PROCEDURE — 99231 SBSQ HOSP IP/OBS SF/LOW 25: CPT | Performed by: STUDENT IN AN ORGANIZED HEALTH CARE EDUCATION/TRAINING PROGRAM

## 2023-10-27 RX ADMIN — OXYCODONE HYDROCHLORIDE 20 MG: 10 TABLET ORAL at 03:27

## 2023-10-27 RX ADMIN — OXYCODONE HYDROCHLORIDE 20 MG: 10 TABLET ORAL at 22:02

## 2023-10-27 RX ADMIN — OXYCODONE HYDROCHLORIDE 20 MG: 10 TABLET ORAL at 16:22

## 2023-10-27 RX ADMIN — OXYCODONE HYDROCHLORIDE 20 MG: 10 TABLET ORAL at 10:27

## 2023-10-27 RX ADMIN — METHOCARBAMOL 500 MG: 500 TABLET ORAL at 13:19

## 2023-10-27 RX ADMIN — CYCLOBENZAPRINE 10 MG: 10 TABLET, FILM COATED ORAL at 15:25

## 2023-10-27 RX ADMIN — ACETAMINOPHEN 650 MG: 325 TABLET, FILM COATED ORAL at 14:58

## 2023-10-27 RX ADMIN — NICOTINE TRANSDERMAL SYSTEM 21 MG: 21 PATCH, EXTENDED RELEASE TRANSDERMAL at 05:21

## 2023-10-27 RX ADMIN — ATORVASTATIN CALCIUM 40 MG: 40 TABLET, FILM COATED ORAL at 18:50

## 2023-10-27 RX ADMIN — CLOPIDOGREL BISULFATE 75 MG: 75 TABLET ORAL at 05:21

## 2023-10-27 RX ADMIN — METHOCARBAMOL 500 MG: 500 TABLET ORAL at 10:28

## 2023-10-27 RX ADMIN — TRAMADOL HYDROCHLORIDE 50 MG: 50 TABLET ORAL at 12:12

## 2023-10-27 RX ADMIN — TAMSULOSIN HYDROCHLORIDE 0.4 MG: 0.4 CAPSULE ORAL at 10:26

## 2023-10-27 RX ADMIN — OXYCODONE HYDROCHLORIDE 20 MG: 10 TABLET ORAL at 13:18

## 2023-10-27 RX ADMIN — FINASTERIDE 5 MG: 5 TABLET, FILM COATED ORAL at 05:21

## 2023-10-27 RX ADMIN — TRAMADOL HYDROCHLORIDE 50 MG: 50 TABLET ORAL at 05:21

## 2023-10-27 RX ADMIN — HEPARIN SODIUM 5000 UNITS: 5000 INJECTION, SOLUTION INTRAVENOUS; SUBCUTANEOUS at 05:21

## 2023-10-27 RX ADMIN — METHOCARBAMOL 500 MG: 500 TABLET ORAL at 16:22

## 2023-10-27 RX ADMIN — HEPARIN SODIUM 5000 UNITS: 5000 INJECTION, SOLUTION INTRAVENOUS; SUBCUTANEOUS at 20:34

## 2023-10-27 RX ADMIN — GABAPENTIN 300 MG: 300 CAPSULE ORAL at 12:12

## 2023-10-27 RX ADMIN — HEPARIN SODIUM 5000 UNITS: 5000 INJECTION, SOLUTION INTRAVENOUS; SUBCUTANEOUS at 13:19

## 2023-10-27 RX ADMIN — ASPIRIN 81 MG: 81 TABLET, COATED ORAL at 05:20

## 2023-10-27 RX ADMIN — TRAMADOL HYDROCHLORIDE 50 MG: 50 TABLET ORAL at 18:50

## 2023-10-27 RX ADMIN — GABAPENTIN 300 MG: 300 CAPSULE ORAL at 05:21

## 2023-10-27 RX ADMIN — GABAPENTIN 300 MG: 300 CAPSULE ORAL at 18:50

## 2023-10-27 RX ADMIN — METHOCARBAMOL 500 MG: 500 TABLET ORAL at 20:34

## 2023-10-27 ASSESSMENT — PAIN DESCRIPTION - PAIN TYPE
TYPE: ACUTE PAIN
TYPE: ACUTE PAIN
TYPE: ACUTE PAIN;SURGICAL PAIN
TYPE: ACUTE PAIN;SURGICAL PAIN

## 2023-10-27 NOTE — PROGRESS NOTES
Hospital Medicine Daily Progress Note    Date of Service  10/27/2023    Chief Complaint  Bilateral feet wounds    Hospital Course  Dillon Centeno is a 58 y.o. male with hypertension and tobacco dependence, admitted 9/26/2023 with bilateral feet wounds.  He was noted to have ischemic gangrene of the left lower extremity with maggots and superimposed bacterial infection.  Vascular surgery and orthopedic surgery were consulted.  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  Vascular studies showed severe arterial disease.  Patient underwent bilateral common femoral artery endarterectomy and profundoplasty with saphenous vein angioplasty with stent placement in the right external iliac artery on 9/30/2023. Prevena was placed (5-7 days). He was recommended to have Plavix for 1 month and aspirin for life. Blood cultures from 9/26/2023 grew Morganella morganii and Vagococcus.  Urine culture from 9/26/2023 grew Staph epidermidis. ID were consulted.   Echocardiogram showed LVEF of about 60% with no reported valvular abnormalities.  Repeat blood cultures from 9/27/2023 have been negative.  ID recommended IV Zosyn which he completed on 10/6/2023.  Subsequently, he had breakdown of the incision site on the left BKA.  Orthopedics planning for revision.  However he developed acute kidney injury which was felt to be post renal with noted obstructive changes on renal ultrasound (moderate right and mild left hydronephrosis with distended urinary bladder), with component of prerenal, with associated mild hyperkalemia.  Urinalysis was bland with negative protein, casts.  He was given potassium lowering medications, along with IV fluids.  Holguin catheter was placed.  Nephrology and urology were consulted.  Renal function improved.  Urology recommended Flomax and finasteride.  He was able to go to the OR and underwent debridement and secondary closure of dehisced surgical wound.  Orthopedics recommended nonweightbearing on the left  lower extremity. He was then noted to have redness and exudate with dehiscence on the femoral endarterectomy incision site.  He was started on Keflex.  Vascular surgery was again contacted who recommended debridement and possibly wound VAC placement.     Additionally, he was noted to have renal cyst on CT.  MRI shows multiseptated cystic lesions in the upper pole of left kidney measuring 3.8 cm without associated enhancement or nodular component.    Discharge planning was pursued, but unfortunately had no accepting SNF's and no home health services accepting either.  Patient refused to go to a group home.  DMEs were being arranged.    Interval Problem Update  No acute events overnight.  Wound vac on, likely can come off 4-5 days post op.  Wound team to assess today.  Plan to discharge patient home when medically cleared as SNF/HH declined due to patient's insurance. Possible discharge home this weekend.  Encouraged patient to mobilize to work towards goal of discharging home.  On aspirin and plavix.  Appreciate vascular surgery recommendations regarding wound vac and post surgical care.    Consultants/Specialty  infectious disease, nephrology, orthopedics, urology, and vascular surgery    Code Status  Full Code    Disposition  The patient is not medically cleared for discharge to home or a post-acute facility.  Anticipate discharge to: home with close outpatient follow-up    Anticipate discharge to home once cleared by orthopedics and vascular surgery, and wheelchair is arranged.  I have placed the appropriate orders for post-discharge needs.    Review of Systems  ROS     Pertinent positives/negatives as mentioned above.     A complete review of systems was personally done by me. All other systems were negative.       Physical Exam  Temp:  [36.1 °C (96.9 °F)-37.4 °C (99.4 °F)] 36.9 °C (98.4 °F)  Pulse:  [90-98] 94  Resp:  [16] 16  BP: (116-137)/(66-78) 121/66  SpO2:  [94 %-98 %] 94 %    Physical Exam  Vitals  reviewed.   Constitutional:       General: He is not in acute distress.     Appearance: Normal appearance. He is not toxic-appearing or diaphoretic.   HENT:      Head: Normocephalic and atraumatic.      Nose: Nose normal.      Mouth/Throat:      Mouth: Mucous membranes are dry.      Pharynx: No oropharyngeal exudate.   Eyes:      General: No scleral icterus.     Extraocular Movements: Extraocular movements intact.      Conjunctiva/sclera: Conjunctivae normal.      Pupils: Pupils are equal, round, and reactive to light.   Cardiovascular:      Rate and Rhythm: Normal rate.      Heart sounds: Normal heart sounds. No murmur heard.     No gallop.   Pulmonary:      Effort: Pulmonary effort is normal. No respiratory distress.      Breath sounds: Normal breath sounds. No stridor. No wheezing, rhonchi or rales.   Chest:      Chest wall: No tenderness.   Abdominal:      General: Bowel sounds are normal. There is no distension.      Palpations: Abdomen is soft. There is no mass.      Tenderness: There is no abdominal tenderness. There is no guarding or rebound.   Genitourinary:     Penis: Normal.       Comments: Holguin catheter in place, draining clear daina urine.  Musculoskeletal:         General: No swelling. Normal range of motion.      Cervical back: Normal range of motion and neck supple.      Right lower leg: No edema.      Comments: Left BKA site, CDI, drain in place  Left thigh incision site with staples, surrounding redness and exudates, dehiscence noted   Lymphadenopathy:      Cervical: No cervical adenopathy.   Skin:     General: Skin is warm and dry.      Coloration: Skin is not jaundiced.      Findings: No rash.      Comments: Third right toe absent due to prior trauma.  Remaining toes with chronic ulcers.   Neurological:      General: No focal deficit present.      Mental Status: He is alert and oriented to person, place, and time. Mental status is at baseline.      Cranial Nerves: No cranial nerve deficit.    Psychiatric:         Mood and Affect: Mood normal.         Behavior: Behavior normal.         Thought Content: Thought content normal.         Judgment: Judgment normal.           I have performed the physical examination today 10/23/2023.  In review of yesterday's note, there are no new changes except as documented above.     Fluids    Intake/Output Summary (Last 24 hours) at 10/27/2023 1315  Last data filed at 10/27/2023 1100  Gross per 24 hour   Intake 1400 ml   Output 4450 ml   Net -3050 ml         Laboratory  Recent Labs     10/25/23  0502   WBC 9.5   RBC 3.01*   HEMOGLOBIN 8.7*   HEMATOCRIT 27.6*   MCV 91.7   MCH 28.9   MCHC 31.5*   RDW 55.2*   PLATELETCT 570*   MPV 8.6*                               Imaging  US-RENAL   Final Result      1.  Bilateral medical renal disease.   2.  Moderate right and mild left hydronephrosis, likely secondary to distention of the urinary bladder secondary to prostatic hypertrophy. Post void images of the inner bladder and collecting systems of the kidneys were not acquired.   3.  Hydronephrosis was not present on the prior MRI of the abdomen without and with IV contrast on 10/2/2023.      MR-ABDOMEN-WITH & W/O   Final Result      Multi septated cystic lesion at the upper pole LEFT kidney measuring 3.8 cm without associated enhancement or nodular component (Bosniak 2).            DX-PORTABLE FLUORO > 1 HOUR   Final Result      Portable fluoroscopy utilized for 2 minutes 20.4 seconds.         INTERPRETING LOCATION: [address] [place] [address], [id number]      CT-CTA AORTA-RO WITH & W/O-POST PROCESS   Final Result         CTA AORTA      1.  Septated lobulated left upper pole renal cystic mass lesion, recommend follow-up MRI of the kidneys for further characterization.   2.  Atherosclerosis and atherosclerotic coronary artery disease.   3.  Fat-containing right inguinal hernia      CTA LOWER EXTREMITIES      1.  Occlusion of the right common iliac artery through the distal superficial  femoral artery   2.  Distal right peroneal artery occlusion with 2-vessel runoff the anterior and posterior tibial arteries at the right ankle.   3.  Occlusion of the left superficial femoral artery at the bifurcation extending through its length to the popliteal artery.   4.  Occlusion of the proximal left profunda femoris just distal to the bifurcation which reconstitutes.   5.  Soft tissue gas at the stump of left below-the-knee amputation, within expected limits for recent postop changes.      3D angiographic/MIP images of the vasculature confirm the vascular findings as described above.      These findings were discussed with the patient's clinician, Keith Navarro, on 9/29/2023 8:06 AM.      EC-ECHOCARDIOGRAM COMPLETE W/O CONT   Final Result      US-AORTA/ILIACS DUPLEX COMPLETE   Final Result      US-EXTREMITY ARTERY LOWER UNILAT RIGHT   Final Result      US-INGA SINGLE LEVEL UNILAT RIGHT   Final Result      DX-CHEST-PORTABLE (1 VIEW)   Final Result      1.  LEFT basilar atelectasis or pneumonia   2.  Trace LEFT pleural effusion or pleural scar             Assessment/Plan  * Gangrene of left foot (HCC)- (present on admission)  Assessment & Plan  - s/p Left BKA on 9/27/2023.   - Vascular studies showed severe arterial disease. S/p bilateral common femoral artery endarterectomy and profundoplasty with saphenous vein angioplasty with stent placement in the right external iliac artery on 9/30/2023. Prevena was placed (5-7 days).  Continue Plavix for 1 month and aspirin for life. Follow-up with Dr. Navarro (vascular surgery). Staples to be removed 14 to 21 days postoperatively.   - Completed course of IV Zosyn on 10/6/2023.   - Noted dehiscence of incision site on the left BKA.  s/p debridement and secondary closure of dehisced surgical wound 10/20. Maintain NWB LLE.  -Now also having wound dehiscence on the left upper thigh surgical site.    S/p L AKA 10/23 by vascular surgery, wound vac on  Continue oral  Keflex for now.  -Continue pain control with oral oxycodone and IV Dilaudid.    Hyperkalemia  Assessment & Plan  - Due to acute renal failure.    - Resolved.   -Monitor closely.  Repeat BMP in the morning.    GELY (acute kidney injury) ([facility])  Assessment & Plan  - Creatinine significantly jumped to 3.76.  Renal ultrasound also showed moderate right and mild left hydronephrosis with distended urinary bladder.  Suspect has post renal failure, with component of prerenal given significant elevation in BUN as well.  CPK is normal.  Urinalysis bland.  -Resolved. Now that has Holguin catheter.  Off IVF.  Potassium level has normalized, off Lokelma..  -Maintain Holguin catheter to relieve obstruction.  Continue Flomax and finasteride.  Suspect that he will need to go home with Holguin catheter, and follow-up as outpatient with urology for definitive treatment of BPH.  -Monitor for improvement in renal function closely.  Monitor electrolytes particularly potassium.  Avoid nephrotoxins, and continue to renally dose all medications.    PAD (peripheral artery disease) ([facility])- (present on admission)  Assessment & Plan  - s/p bilateral common femoral artery endarterectomy and profundoplasty with saphenous vein angioplasty with stent placement in the right external iliac artery on 9/30/2023. Prevena was placed (5-7 days).   -Continue Plavix for 1 month and aspirin for life.  Follow-up with vascular surgery.  Stump protector for left leg was ordered by vascular surgery.  -Now also having wound dehiscence on the left upper thigh surgical site.  Vascular surgery planning for debridement and possibly wound VAC placement.  Continue on oral Keflex for now.    Anemia- (present on admission)  Assessment & Plan  -Stable.  Monitor.  CBC in the morning.  Restrictive transfusion strategy.    Bacteremia- (present on admission)  Assessment & Plan  - Blood cultures from 9/26/2023 grew Morganella morganii and Vagococcus. Echocardiogram showed no evidence  of vegetations.   - ID consulted, completed course of IV Zosyn on 10/6/2023. Repeat blood cultures from 9/27/2023 have been negative.      Hyponatremia- (present on admission)  Assessment & Plan  - Appears euvolemic.  Stable. Off IVF.  -Continue to monitor.    Elevated liver enzymes- (present on admission)  Assessment & Plan  - Resolved.  Hepatitis panel was normal.  Continue to monitor.    Leukocytosis- (present on admission)  Assessment & Plan  - Resolved.  Continue to trend.  Continue antibiotics.     Renal cyst- (present on admission)  Assessment & Plan  - Noted to have renal cyst on CT.  MRI shows multiseptated cystic lesions in the upper pole of left kidney measuring 3.8 cm without associated enhancement or nodular component.    -Outpatient follow-up with urology.         VTE prophylaxis: SCDs, Heparin SQ

## 2023-10-27 NOTE — THERAPY
Physical Therapy Contact Note    Patient Name: Dillon Centeno  Age:  58 y.o., Sex:  male  Medical Record #: [id number]  Today's Date: 10/27/2023    PT tx session attempted.  Pt refused adamantly d/t pain despite extensive edu regarding benefits of mobility for pain-management and risks of prolonged bed rest.  Will re-attempt as able.    Markel Pantoja, PT, DPT [id number]

## 2023-10-27 NOTE — PROGRESS NOTES
Assumed care from NOC shift RN. Patient was in bed at change of shift and cooperative with care. Patient continues on wound vac and pain management care. All needs have been met and PRN pain meds given.

## 2023-10-27 NOTE — THERAPY
Occupational Therapy Contact Note    Patient Name: Dillon Centeno  Age:  58 y.o., Sex:  male  Medical Record #: [id number]  Today's Date: 10/27/2023    Spoke with PT who attempted to see patient, patient adamantly refused due to pain not willing to participate at this time. Will hold OT tx session and follow up as able/appropriate.      Vickey Jett OTD, OTR/L

## 2023-10-27 NOTE — PROGRESS NOTES
Received report and assumed care of patient (pt). Pt complains of pain 8/10. Medication given per MAR. Bed is in lowest, locked position, call light and belongings are within reach. Pt calls for assistance.

## 2023-10-27 NOTE — CARE PLAN
The patient is Stable - Low risk of patient condition declining or worsening    Shift Goals  Clinical Goals: Patien'ts pain will be below a 8 during the shift  Patient Goals: pain management  Family Goals: LUTHER    Progress made toward(s) clinical / shift goals:  patient complained of pain during the shift. PRN medication given. Patient able to sleep at moments throughout night but then wakes up and complains of pain.Able to rest for a bit after medication is given but constantly needs PRNs throughout the shift.     Patient is not progressing towards the following goals:      Problem: Pain - Standard  Goal: Alleviation of pain or a reduction in pain to the patient’s comfort goal  Outcome: Not Progressing   patient complained of pain during the shift. PRN medication given. Patient able to sleep at moments throughout night but then wakes up and complains of pain.Able to rest for a bit after medication is given but constantly needs PRNs throughout the shift.

## 2023-10-27 NOTE — CARE PLAN
The patient is Stable - Low risk of patient condition declining or worsening    Shift Goals  Clinical Goals: Wound management, Pain management  Patient Goals: pain management  Family Goals: LUTHER    Progress made toward(s) clinical / shift goals:    Problem: Knowledge Deficit - Standard  Goal: Patient and family/care givers will demonstrate understanding of plan of care, disease process/condition, diagnostic tests and medications  Outcome: Progressing     Problem: Pain - Standard  Goal: Alleviation of pain or a reduction in pain to the patient’s comfort goal  Outcome: Progressing     Problem: Skin Integrity  Goal: Skin integrity is maintained or improved  Outcome: Progressing     Problem: Fall Risk  Goal: Patient will remain free from falls  Outcome: Progressing  Note: Patient uses call light to ask for assistance to bathroom.      Problem: Infection - Standard  Goal: Patient will remain free from infection  Outcome: Progressing     Patient is not progressing with pain management. He continues to state an 8/10 pain even when he is smiling and laughing with staff. Skin integrity is unable to be determined this shift. Wound is covered with a wound vac.

## 2023-10-28 PROCEDURE — 700102 HCHG RX REV CODE 250 W/ 637 OVERRIDE(OP): Performed by: STUDENT IN AN ORGANIZED HEALTH CARE EDUCATION/TRAINING PROGRAM

## 2023-10-28 PROCEDURE — A9270 NON-COVERED ITEM OR SERVICE: HCPCS | Performed by: STUDENT IN AN ORGANIZED HEALTH CARE EDUCATION/TRAINING PROGRAM

## 2023-10-28 PROCEDURE — 97530 THERAPEUTIC ACTIVITIES: CPT | Mod: CQ

## 2023-10-28 PROCEDURE — 700111 HCHG RX REV CODE 636 W/ 250 OVERRIDE (IP): Performed by: STUDENT IN AN ORGANIZED HEALTH CARE EDUCATION/TRAINING PROGRAM

## 2023-10-28 PROCEDURE — 99232 SBSQ HOSP IP/OBS MODERATE 35: CPT | Performed by: STUDENT IN AN ORGANIZED HEALTH CARE EDUCATION/TRAINING PROGRAM

## 2023-10-28 PROCEDURE — 97535 SELF CARE MNGMENT TRAINING: CPT | Mod: CQ

## 2023-10-28 PROCEDURE — 700101 HCHG RX REV CODE 250: Performed by: STUDENT IN AN ORGANIZED HEALTH CARE EDUCATION/TRAINING PROGRAM

## 2023-10-28 PROCEDURE — 770006 HCHG ROOM/CARE - MED/SURG/GYN SEMI*

## 2023-10-28 RX ORDER — SODIUM HYPOCHLORITE 1.25 MG/ML
SOLUTION TOPICAL 2 TIMES DAILY
Status: DISCONTINUED | OUTPATIENT
Start: 2023-10-28 | End: 2023-11-03 | Stop reason: HOSPADM

## 2023-10-28 RX ADMIN — GABAPENTIN 300 MG: 300 CAPSULE ORAL at 18:39

## 2023-10-28 RX ADMIN — METHOCARBAMOL 500 MG: 500 TABLET ORAL at 20:04

## 2023-10-28 RX ADMIN — OXYCODONE HYDROCHLORIDE 20 MG: 10 TABLET ORAL at 12:28

## 2023-10-28 RX ADMIN — HEPARIN SODIUM 5000 UNITS: 5000 INJECTION, SOLUTION INTRAVENOUS; SUBCUTANEOUS at 04:28

## 2023-10-28 RX ADMIN — GABAPENTIN 300 MG: 300 CAPSULE ORAL at 12:28

## 2023-10-28 RX ADMIN — TAMSULOSIN HYDROCHLORIDE 0.4 MG: 0.4 CAPSULE ORAL at 08:13

## 2023-10-28 RX ADMIN — TRAMADOL HYDROCHLORIDE 50 MG: 50 TABLET ORAL at 00:43

## 2023-10-28 RX ADMIN — HYDROMORPHONE HYDROCHLORIDE 1 MG: 1 INJECTION, SOLUTION INTRAMUSCULAR; INTRAVENOUS; SUBCUTANEOUS at 23:38

## 2023-10-28 RX ADMIN — TRAMADOL HYDROCHLORIDE 50 MG: 50 TABLET ORAL at 18:39

## 2023-10-28 RX ADMIN — FINASTERIDE 5 MG: 5 TABLET, FILM COATED ORAL at 04:28

## 2023-10-28 RX ADMIN — SODIUM HYPOCHLORITE 2 ML: 1.25 SOLUTION TOPICAL at 13:03

## 2023-10-28 RX ADMIN — OXYCODONE HYDROCHLORIDE 20 MG: 10 TABLET ORAL at 22:35

## 2023-10-28 RX ADMIN — METHOCARBAMOL 500 MG: 500 TABLET ORAL at 16:18

## 2023-10-28 RX ADMIN — METHOCARBAMOL 500 MG: 500 TABLET ORAL at 08:13

## 2023-10-28 RX ADMIN — HEPARIN SODIUM 5000 UNITS: 5000 INJECTION, SOLUTION INTRAVENOUS; SUBCUTANEOUS at 15:03

## 2023-10-28 RX ADMIN — TRAMADOL HYDROCHLORIDE 50 MG: 50 TABLET ORAL at 12:28

## 2023-10-28 RX ADMIN — DOCUSATE SODIUM 50 MG AND SENNOSIDES 8.6 MG 2 TABLET: 8.6; 5 TABLET, FILM COATED ORAL at 18:39

## 2023-10-28 RX ADMIN — HYDROMORPHONE HYDROCHLORIDE 1 MG: 1 INJECTION, SOLUTION INTRAMUSCULAR; INTRAVENOUS; SUBCUTANEOUS at 10:02

## 2023-10-28 RX ADMIN — CLOPIDOGREL BISULFATE 75 MG: 75 TABLET ORAL at 04:28

## 2023-10-28 RX ADMIN — CYCLOBENZAPRINE 10 MG: 10 TABLET, FILM COATED ORAL at 15:03

## 2023-10-28 RX ADMIN — GABAPENTIN 300 MG: 300 CAPSULE ORAL at 04:28

## 2023-10-28 RX ADMIN — HEPARIN SODIUM 5000 UNITS: 5000 INJECTION, SOLUTION INTRAVENOUS; SUBCUTANEOUS at 20:03

## 2023-10-28 RX ADMIN — OXYCODONE HYDROCHLORIDE 20 MG: 10 TABLET ORAL at 08:12

## 2023-10-28 RX ADMIN — OXYCODONE HYDROCHLORIDE 20 MG: 10 TABLET ORAL at 16:18

## 2023-10-28 RX ADMIN — ATORVASTATIN CALCIUM 40 MG: 40 TABLET, FILM COATED ORAL at 18:39

## 2023-10-28 RX ADMIN — ACETAMINOPHEN 650 MG: 325 TABLET, FILM COATED ORAL at 18:39

## 2023-10-28 RX ADMIN — ACETAMINOPHEN 650 MG: 325 TABLET, FILM COATED ORAL at 12:28

## 2023-10-28 RX ADMIN — TRAMADOL HYDROCHLORIDE 50 MG: 50 TABLET ORAL at 23:38

## 2023-10-28 RX ADMIN — TRAMADOL HYDROCHLORIDE 50 MG: 50 TABLET ORAL at 04:28

## 2023-10-28 RX ADMIN — NICOTINE TRANSDERMAL SYSTEM 21 MG: 21 PATCH, EXTENDED RELEASE TRANSDERMAL at 04:31

## 2023-10-28 RX ADMIN — HYDROMORPHONE HYDROCHLORIDE 1 MG: 1 INJECTION, SOLUTION INTRAMUSCULAR; INTRAVENOUS; SUBCUTANEOUS at 20:03

## 2023-10-28 RX ADMIN — METHOCARBAMOL 500 MG: 500 TABLET ORAL at 12:29

## 2023-10-28 RX ADMIN — ASPIRIN 81 MG: 81 TABLET, COATED ORAL at 08:12

## 2023-10-28 ASSESSMENT — PAIN DESCRIPTION - PAIN TYPE
TYPE: ACUTE PAIN

## 2023-10-28 ASSESSMENT — COGNITIVE AND FUNCTIONAL STATUS - GENERAL
WALKING IN HOSPITAL ROOM: TOTAL
MOVING TO AND FROM BED TO CHAIR: A LOT
STANDING UP FROM CHAIR USING ARMS: A LOT
MOVING FROM LYING ON BACK TO SITTING ON SIDE OF FLAT BED: UNABLE
MOBILITY SCORE: 11
SUGGESTED CMS G CODE MODIFIER MOBILITY: CL
CLIMB 3 TO 5 STEPS WITH RAILING: TOTAL

## 2023-10-28 ASSESSMENT — FIBROSIS 4 INDEX: FIB4 SCORE: 0.47

## 2023-10-28 ASSESSMENT — GAIT ASSESSMENTS: GAIT LEVEL OF ASSIST: UNABLE TO PARTICIPATE

## 2023-10-28 NOTE — CONSULTS
DATE OF CONSULTATION:  10/28/2023     REFERRING PHYSICIAN:   Carlos Manuel Sterling M.D.,      CONSULTING PHYSICIAN:  Cole Bowden M.D.     REASON FOR CONSULTATION:  I have been asked by Dr. Carlos Manuel Sterling M.D.to see the patient in surgical consultation for evaluation of sacral pressure wound.    HISTORY OF PRESENT ILLNESS: Dillon Centeno-year-old male receiving care in the medicine service ischemia lower limb requiring vascular and orthopedic surgical care.  We will care noted sacral decubiti yesterday pictures in the chart General surgery was consulted.  Dillon Centeno reports no pain at the site.  He states he was unaware of the injury until noted yesterday.  He reports has been trying to ambulate more.     PAST MEDICAL HISTORY:  has a past medical history of Hypertension.    PAST SURGICAL HISTORY:  has a past surgical history that includes closed reduction (Left, 12/24/2017); other; orif, shoulder (Left, 1/4/2018); irrigation & debridement ortho (Left, 3/4/2018); knee amputation below (Left, 9/27/2023); femoral endarterectomy (Bilateral, 9/30/2023); angiogram, with angioplasty, and stent insertion if indicated (Right, 9/30/2023); knee amputation below (Left, 10/20/2023); incision and drainage general (Left, 10/23/2023); application or replacement, wound vac (10/23/2023); and knee amputation above (Left, 10/23/2023).    ALLERGIES:   Allergies   Allergen Reactions    Sulfa Drugs Hives and Shortness of Breath     Tolerated Flomax (10/2023)       CURRENT MEDICATIONS:    Home Medications       Reviewed by Rain Zurita R.N. (Registered Nurse) on 09/30/23 at 1550  Med List Status: Complete     Medication Last Dose Status   acetaminophen (Tylenol) tablet 650 mg  Active   ALPRAZolam (Xanax) tablet 0.5 mg  Active   bisacodyl (Dulcolax) suppository 10 mg  Active   cyclobenzaprine (Flexeril) tablet 10 mg  Active   enoxaparin (Lovenox) inj 40 mg  Active   gabapentin (Neurontin) capsule 100 mg  Active  "  hydrALAZINE (Apresoline) injection 10 mg  Active   HYDROmorphone (Dilaudid) injection 1 mg  Active   magnesium hydroxide (Milk Of Magnesia) suspension 30 mL  Active   nicotine (Nicoderm) 21 MG/24HR 21 mg  Active   nicotine polacrilex (Nicorette) 2 MG piece 2 mg  Active   ondansetron (Zofran ODT) dispertab 4 mg  Active   ondansetron (Zofran) syringe/vial injection 4 mg  Active   oxycodone (Oxy-IR) immediate release tablet 15 mg  Active   oxyCODONE immediate release (Roxicodone) tablet 10 mg  Active   Pharmacy Consult Request ...Pain Management Review 1 Each  Active   piperacillin-tazobactam (Zosyn) 4.5 g in  mL IVPB  Active   polyethylene glycol/lytes (Miralax) PACKET 1 Packet  Active   prochlorperazine (Compazine) injection 5-10 mg  Active   promethazine (Phenergan) suppository 12.5-25 mg  Active   promethazine (Phenergan) tablet 12.5-25 mg  Active   senna-docusate (Pericolace Or Senokot S) 8.6-50 MG per tablet 2 Tablet  Active                    FAMILY HISTORY: family history is not on file.    SOCIAL HISTORY:  reports that he has been smoking cigars. He has never used smokeless tobacco. He reports current alcohol use. He reports that he does not use drugs.      PHYSICAL EXAMINATION:    Physical Exam  /77   Pulse 92   Temp 36.1 °C (97 °F) (Temporal)   Resp 19   Ht 1.803 m (5' 11\")   Wt 62.9 kg (138 lb 10.7 oz)   SpO2 96%   BMI 19.34 kg/m²    Wound was evaluated with assistance of bedside nurse   Patient has awake alert appropriate breathing with ease no distress  Friendly cooperative  Dressing taking down packing in place no tenderness  No discharge, erythema  LABORATORY VALUES:                      IMAGING:   US-RENAL   Final Result      1.  Bilateral medical renal disease.   2.  Moderate right and mild left hydronephrosis, likely secondary to distention of the urinary bladder secondary to prostatic hypertrophy. Post void images of the inner bladder and collecting systems of the kidneys were " not acquired.   3.  Hydronephrosis was not present on the prior MRI of the abdomen without and with IV contrast on 10/2/2023.      MR-ABDOMEN-WITH & W/O   Final Result      Multi septated cystic lesion at the upper pole LEFT kidney measuring 3.8 cm without associated enhancement or nodular component (Bosniak 2).            DX-PORTABLE FLUORO > 1 HOUR   Final Result      Portable fluoroscopy utilized for 2 minutes 20.4 seconds.         INTERPRETING LOCATION: [address], [place], [address]      CT-CTA AORTA-RO WITH & W/O-POST PROCESS   Final Result         CTA AORTA      1.  Septated lobulated left upper pole renal cystic mass lesion, recommend follow-up MRI of the kidneys for further characterization.   2.  Atherosclerosis and atherosclerotic coronary artery disease.   3.  Fat-containing right inguinal hernia      CTA LOWER EXTREMITIES      1.  Occlusion of the right common iliac artery through the distal superficial femoral artery   2.  Distal right peroneal artery occlusion with 2-vessel runoff the anterior and posterior tibial arteries at the right ankle.   3.  Occlusion of the left superficial femoral artery at the bifurcation extending through its length to the popliteal artery.   4.  Occlusion of the proximal left profunda femoris just distal to the bifurcation which reconstitutes.   5.  Soft tissue gas at the stump of left below-the-knee amputation, within expected limits for recent postop changes.      3D angiographic/MIP images of the vasculature confirm the vascular findings as described above.      These findings were discussed with the patient's clinician, Keith Navarro, on 9/29/2023 8:06 AM.      EC-ECHOCARDIOGRAM COMPLETE W/O CONT   Final Result      US-AORTA/ILIACS DUPLEX COMPLETE   Final Result      US-EXTREMITY ARTERY LOWER UNILAT RIGHT   Final Result      US-INGA SINGLE LEVEL UNILAT RIGHT   Final Result      DX-CHEST-PORTABLE (1 VIEW)   Final Result      1.  LEFT basilar atelectasis or pneumonia    2.  Trace LEFT pleural effusion or pleural scar          ASSESSMENT AND PLAN:     Sacral wound   receiving care Wound team  Continue pressure reduction maneuvers  Ongoing local wound care  Follow-up exam with wound team next dressing change monitor healing    Discussed findings and plan with patient, nurse present         ____________________________________     Cole Bowden M.D.    DD: 10/28/2023  12:53 PM    AAST Grading System for EGS Conditions  ACS NSQIP Surgical Risk Calculator

## 2023-10-28 NOTE — PROGRESS NOTES
"Received report and assumed care of patient (pt). Pt complains of pain 10/10. Medication given per MAR. Bed is in lowest, locked position, call light and belongings are within reach. Pt calls for assistance.      Patient refuses to get changed to air loss bed. Patient initially agreed. Patient states \" I am in pain, I don't want to move. After giving him oxy patient stated that he wanted to wait 15 minutes. Checked in with patient and patient stated \" I am hurting I don't want to be moved, I will do it tomorrow\" Told patient I would check in with him later tonight/ throughout the shift and he mentioned, \" I don't want to today, tomorrow, I am hurting and getting ready to sleep\"     Patient asked if we could transfer him to new bed. Patient refused, Stated \" not right now later, after breakfast\"   "

## 2023-10-28 NOTE — PROGRESS NOTES
Hospital Medicine Daily Progress Note    Date of Service  10/28/2023    Chief Complaint  Bilateral feet wounds    Hospital Course  Dillon Centeno is a 58 y.o. male with hypertension and tobacco dependence, admitted 9/26/2023 with bilateral feet wounds.  He was noted to have ischemic gangrene of the left lower extremity with maggots and superimposed bacterial infection.  Vascular surgery and orthopedic surgery were consulted.  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  Vascular studies showed severe arterial disease.  Patient underwent bilateral common femoral artery endarterectomy and profundoplasty with saphenous vein angioplasty with stent placement in the right external iliac artery on 9/30/2023. Prevena was placed (5-7 days). He was recommended to have Plavix for 1 month and aspirin for life. Blood cultures from 9/26/2023 grew Morganella morganii and Vagococcus.  Urine culture from 9/26/2023 grew Staph epidermidis. ID were consulted.   Echocardiogram showed LVEF of about 60% with no reported valvular abnormalities.  Repeat blood cultures from 9/27/2023 have been negative.  ID recommended IV Zosyn which he completed on 10/6/2023.  Subsequently, he had breakdown of the incision site on the left BKA.  Orthopedics planning for revision.  However he developed acute kidney injury which was felt to be post renal with noted obstructive changes on renal ultrasound (moderate right and mild left hydronephrosis with distended urinary bladder), with component of prerenal, with associated mild hyperkalemia.  Urinalysis was bland with negative protein, casts.  He was given potassium lowering medications, along with IV fluids.  Holguin catheter was placed.  Nephrology and urology were consulted.  Renal function improved.  Urology recommended Flomax and finasteride.  He was able to go to the OR and underwent debridement and secondary closure of dehisced surgical wound.  Orthopedics recommended nonweightbearing on the left  lower extremity. He was then noted to have redness and exudate with dehiscence on the femoral endarterectomy incision site.  He was started on Keflex.  Vascular surgery was again contacted who recommended debridement and possibly wound VAC placement.     Additionally, he was noted to have renal cyst on CT.  MRI shows multiseptated cystic lesions in the upper pole of left kidney measuring 3.8 cm without associated enhancement or nodular component.    Discharge planning was pursued, but unfortunately had no accepting SNF's and no home health services accepting either.  Patient refused to go to a group home.  DMEs were being arranged.    Interval Problem Update  No acute events overnight.  Wound care following L AKA and groin surgical sites. Appreciate their recommendations for wound care including wound vac needs.  Wound team noted sacral pressure ulcer, recommend surgical consult.  General surgery consulted, appreciate their recommendations.  Patient on aspirin and plavix.  Plan to discharge patient home when medically cleared as SNF/HH declined due to patient's insurance.  Encouraged patient to mobilize to work towards goal of discharging home.    Consultants/Specialty  infectious disease, nephrology, orthopedics, urology, and vascular surgery    Code Status  Full Code    Disposition  The patient is not medically cleared for discharge to home or a post-acute facility.  Anticipate discharge to: home with close outpatient follow-up    Anticipate discharge to home once cleared by orthopedics and vascular surgery, and wheelchair is arranged.  I have placed the appropriate orders for post-discharge needs.    Review of Systems  ROS     Pertinent positives/negatives as mentioned above.     A complete review of systems was personally done by me. All other systems were negative.       Physical Exam  Temp:  [36.1 °C (97 °F)-36.8 °C (98.2 °F)] 36.1 °C (97 °F)  Pulse:  [] 92  Resp:  [16-20] 19  BP: (119-135)/(70-79)  119/77  SpO2:  [95 %-96 %] 96 %    Physical Exam  Vitals reviewed.   Constitutional:       General: He is not in acute distress.     Appearance: Normal appearance. He is not toxic-appearing or diaphoretic.   HENT:      Head: Normocephalic and atraumatic.      Nose: Nose normal.      Mouth/Throat:      Mouth: Mucous membranes are dry.      Pharynx: No oropharyngeal exudate.   Eyes:      General: No scleral icterus.     Extraocular Movements: Extraocular movements intact.      Conjunctiva/sclera: Conjunctivae normal.      Pupils: Pupils are equal, round, and reactive to light.   Cardiovascular:      Rate and Rhythm: Normal rate.      Heart sounds: Normal heart sounds. No murmur heard.     No gallop.   Pulmonary:      Effort: Pulmonary effort is normal. No respiratory distress.      Breath sounds: Normal breath sounds. No stridor. No wheezing, rhonchi or rales.   Chest:      Chest wall: No tenderness.   Abdominal:      General: Bowel sounds are normal. There is no distension.      Palpations: Abdomen is soft. There is no mass.      Tenderness: There is no abdominal tenderness. There is no guarding or rebound.   Genitourinary:     Penis: Normal.       Comments: Holguin catheter in place, draining clear daina urine.  Musculoskeletal:         General: No swelling. Normal range of motion.      Cervical back: Normal range of motion and neck supple.      Right lower leg: No edema.      Comments: Left BKA site, CDI, drain in place  Left thigh incision site with staples, surrounding redness and exudates, dehiscence noted   Lymphadenopathy:      Cervical: No cervical adenopathy.   Skin:     General: Skin is warm and dry.      Coloration: Skin is not jaundiced.      Findings: No rash.      Comments: Third right toe absent due to prior trauma.  Remaining toes with chronic ulcers.   Neurological:      General: No focal deficit present.      Mental Status: He is alert and oriented to person, place, and time. Mental status is at  baseline.      Cranial Nerves: No cranial nerve deficit.   Psychiatric:         Mood and Affect: Mood normal.         Behavior: Behavior normal.         Thought Content: Thought content normal.         Judgment: Judgment normal.           I have performed the physical examination today 10/23/2023.  In review of yesterday's note, there are no new changes except as documented above.     Fluids    Intake/Output Summary (Last 24 hours) at 10/28/2023 1229  Last data filed at 10/28/2023 0500  Gross per 24 hour   Intake 360 ml   Output 2250 ml   Net -1890 ml         Laboratory                                Imaging  US-RENAL   Final Result      1.  Bilateral medical renal disease.   2.  Moderate right and mild left hydronephrosis, likely secondary to distention of the urinary bladder secondary to prostatic hypertrophy. Post void images of the inner bladder and collecting systems of the kidneys were not acquired.   3.  Hydronephrosis was not present on the prior MRI of the abdomen without and with IV contrast on 10/2/2023.      MR-ABDOMEN-WITH & W/O   Final Result      Multi septated cystic lesion at the upper pole LEFT kidney measuring 3.8 cm without associated enhancement or nodular component (Bosniak 2).            DX-PORTABLE FLUORO > 1 HOUR   Final Result      Portable fluoroscopy utilized for 2 minutes 20.4 seconds.         INTERPRETING LOCATION: [address] [place] [address], [id number]      CT-CTA AORTA-RO WITH & W/O-POST PROCESS   Final Result         CTA AORTA      1.  Septated lobulated left upper pole renal cystic mass lesion, recommend follow-up MRI of the kidneys for further characterization.   2.  Atherosclerosis and atherosclerotic coronary artery disease.   3.  Fat-containing right inguinal hernia      CTA LOWER EXTREMITIES      1.  Occlusion of the right common iliac artery through the distal superficial femoral artery   2.  Distal right peroneal artery occlusion with 2-vessel runoff the anterior and posterior tibial  arteries at the right ankle.   3.  Occlusion of the left superficial femoral artery at the bifurcation extending through its length to the popliteal artery.   4.  Occlusion of the proximal left profunda femoris just distal to the bifurcation which reconstitutes.   5.  Soft tissue gas at the stump of left below-the-knee amputation, within expected limits for recent postop changes.      3D angiographic/MIP images of the vasculature confirm the vascular findings as described above.      These findings were discussed with the patient's clinician, Keith Navarro, on 9/29/2023 8:06 AM.      EC-ECHOCARDIOGRAM COMPLETE W/O CONT   Final Result      US-AORTA/ILIACS DUPLEX COMPLETE   Final Result      US-EXTREMITY ARTERY LOWER UNILAT RIGHT   Final Result      US-INGA SINGLE LEVEL UNILAT RIGHT   Final Result      DX-CHEST-PORTABLE (1 VIEW)   Final Result      1.  LEFT basilar atelectasis or pneumonia   2.  Trace LEFT pleural effusion or pleural scar             Assessment/Plan  * Gangrene of left foot (HCC)- (present on admission)  Assessment & Plan  - s/p Left BKA on 9/27/2023.   - Vascular studies showed severe arterial disease. S/p bilateral common femoral artery endarterectomy and profundoplasty with saphenous vein angioplasty with stent placement in the right external iliac artery on 9/30/2023. Prevena was placed (5-7 days).  Continue Plavix for 1 month and aspirin for life. Follow-up with Dr. Navarro (vascular surgery). Staples to be removed 14 to 21 days postoperatively.   - Completed course of IV Zosyn on 10/6/2023.   - Noted dehiscence of incision site on the left BKA.  s/p debridement and secondary closure of dehisced surgical wound 10/20. Maintain NWB LLE.  -Now also having wound dehiscence on the left upper thigh surgical site.    S/p L AKA 10/23 by vascular surgery, wound vac on  Continue oral Keflex for now.  -Continue pain control with oral oxycodone and IV Dilaudid.    Hyperkalemia  Assessment & Plan  - Due  to acute renal failure.    - Resolved.   -Monitor closely.  Repeat BMP in the morning.    GELY (acute kidney injury) ([facility])  Assessment & Plan  - Creatinine significantly jumped to 3.76.  Renal ultrasound also showed moderate right and mild left hydronephrosis with distended urinary bladder.  Suspect has post renal failure, with component of prerenal given significant elevation in BUN as well.  CPK is normal.  Urinalysis bland.  -Resolved. Now that has Holguin catheter.  Off IVF.  Potassium level has normalized, off Lokelma..  -Maintain Holguin catheter to relieve obstruction.  Continue Flomax and finasteride.  Suspect that he will need to go home with Holguin catheter, and follow-up as outpatient with urology for definitive treatment of BPH.  -Monitor for improvement in renal function closely.  Monitor electrolytes particularly potassium.  Avoid nephrotoxins, and continue to renally dose all medications.    PAD (peripheral artery disease) ([facility])- (present on admission)  Assessment & Plan  - s/p bilateral common femoral artery endarterectomy and profundoplasty with saphenous vein angioplasty with stent placement in the right external iliac artery on 9/30/2023. Prevena was placed (5-7 days).   -Continue Plavix for 1 month and aspirin for life.  Follow-up with vascular surgery.  Stump protector for left leg was ordered by vascular surgery.  -Now also having wound dehiscence on the left upper thigh surgical site.  Vascular surgery planning for debridement and possibly wound VAC placement.  Continue on oral Keflex for now.    Anemia- (present on admission)  Assessment & Plan  -Stable.  Monitor.  CBC in the morning.  Restrictive transfusion strategy.    Bacteremia- (present on admission)  Assessment & Plan  - Blood cultures from 9/26/2023 grew Morganella morganii and Vagococcus. Echocardiogram showed no evidence of vegetations.   - ID consulted, completed course of IV Zosyn on 10/6/2023. Repeat blood cultures from 9/27/2023 have  been negative.      Hyponatremia- (present on admission)  Assessment & Plan  - Appears euvolemic.  Stable. Off IVF.  -Continue to monitor.    Elevated liver enzymes- (present on admission)  Assessment & Plan  - Resolved.  Hepatitis panel was normal.  Continue to monitor.    Leukocytosis- (present on admission)  Assessment & Plan  - Resolved.  Continue to trend.  Continue antibiotics.     Renal cyst- (present on admission)  Assessment & Plan  - Noted to have renal cyst on CT.  MRI shows multiseptated cystic lesions in the upper pole of left kidney measuring 3.8 cm without associated enhancement or nodular component.    -Outpatient follow-up with urology.         VTE prophylaxis: SCDs, Heparin SQ

## 2023-10-28 NOTE — DISCHARGE PLANNING
Case Management Discharge Planning    Admission Date: 9/26/2023  GMLOS: 11.1  ALOS: 32    6-Clicks ADL Score: 16  6-Clicks Mobility Score: 10  PT and/or OT Eval ordered: Yes  Post-acute Referrals Ordered: Yes  Post-acute Choice Obtained: Yes  Has referral(s) been sent to post-acute provider:  Yes      Anticipated Discharge Dispo: Discharge Disposition: D/T to home under [facility] care in anticipation of covered skilled care (06)    DME Needed: Yes    DME Ordered: Yes, WC at bedside per  notes    Action(s) Taken: Chart review completed. Patient discussed in IDT rounds. Patient is not medically clear at this time; surgical consult to be ordered. No accepting SNFs at this time.     Escalations Completed: None    Medically Clear: No    Next Steps: CM to follow up with IDT regarding discharge planning needs/barriers.     Barriers to Discharge: Medical clearance and Pending Placement    Is the patient up for discharge tomorrow: No

## 2023-10-28 NOTE — CARE PLAN
The patient is Stable - Low risk of patient condition declining or worsening    Shift Goals  Clinical Goals: Patien'ts pain will be below a 8 during the shift  Patient Goals: pain management  Family Goals: LUTHER    Progress made toward(s) clinical / shift goals:  AOX4. PRN oxy IR and tylenol for pain. Encourage patient to get OOB, but patient refused due to pain. Encourage to turn Q 2hrs but refused.  Problem: Pain - Standard  Goal: Alleviation of pain or a reduction in pain to the patient’s comfort goal  Outcome: Progressing     Problem: Skin Integrity  Goal: Skin integrity is maintained or improved  Outcome: Progressing

## 2023-10-28 NOTE — DIETARY
Nutrition services: Day 32 of admit.  Dillon Centeno is a 58 y.o. male with admitting DX of gangrene of left foot.     Consult received for new wound. Pt with new PI to sacrum noted 10/28. Pt followed by wound team, seen and assessed today 10/28. Pt currently on a diabetic diet with some variable intake noted however intake mostly >50%. Pt currently receiving 1 boost supplement with breakfast tray. RD to add Boost glucose control with all meals to bolster nutrition while in acute care and aid in wound healing.  RD encourages intake of all meals and supplements.     RD to monitor per department policy

## 2023-10-28 NOTE — WOUND TEAM
[facility]  Inpatient Services  Wound and Skin Care Follow-up    Admission Date: 9/26/2023     Last order of IP CONSULT TO WOUND CARE was found on 10/21/2023 from Hospital Encounter on 9/26/2023     HPI, PMH, SH: Reviewed    Past Surgical History:   Procedure Laterality Date    INCISION AND DRAINAGE GENERAL Left 10/23/2023    Procedure: INCISION AND DRAINAGE;  Surgeon: Keith Navarro M.D.;  Location: [facility];  Service: Vascular    APPLICATION OR REPLACEMENT, WOUND VAC  10/23/2023    Procedure: APPLICATION OR REPLACEMENT, WOUND VAC;  Surgeon: Keith Navarro M.D.;  Location: [facility];  Service: Vascular    KNEE AMPUTATION ABOVE Left 10/23/2023    Procedure: AMPUTATION, ABOVE KNEE;  Surgeon: Keith Navarro M.D.;  Location: [facility];  Service: Vascular    KNEE AMPUTATION BELOW Left 10/20/2023    Procedure: AMPUTATION, BELOW KNEE WOUND REVISION;  Surgeon: Pradip Altamirano M.D.;  Location: [facility];  Service: Orthopedics    FEMORAL ENDARTERECTOMY Bilateral 9/30/2023    Procedure: ENDARTERECTOMY, FEMORAL;  Surgeon: Keith Navarro M.D.;  Location: [facility];  Service: Vascular    ANGIOGRAM, WITH ANGIOPLASTY, AND STENT INSERTION IF INDICATED Right 9/30/2023    Procedure: ANGIOGRAM, WITH ILIAC STENT;  Surgeon: Keith Navarro M.D.;  Location: [facility];  Service: Vascular    KNEE AMPUTATION BELOW Left 9/27/2023    Procedure: AMPUTATION, BELOW KNEE;  Surgeon: Pradip Altamirano M.D.;  Location: [facility];  Service: Orthopedics    IRRIGATION & DEBRIDEMENT ORTHO Left 3/4/2018    Procedure: IRRIGATION & DEBRIDEMENT ORTHO - HUMERUS;  Surgeon: Sergio Watkins M.D.;  Location: [facility];  Service: Orthopedics    ORIF, SHOULDER Left 1/4/2018    Procedure: SHOULDER ORIF;  Surgeon: Sergio Watkins M.D.;  Location: [facility];  Service: Orthopedics    CLOSED REDUCTION Left 12/24/2017    Procedure:  CLOSED REDUCTION;  Surgeon: Keith Subramanian M.D.;  [facility]: [facility];  Service: Orthopedics    OTHER      multiple surgeries lower legs from past injuries     Social History     Tobacco Use    Smoking status: Every Day     Types: Cigars    Smokeless tobacco: Never   Substance Use Topics    Alcohol use: Yes     Comment: a few beers a week     Chief Complaint   Patient presents with    Wound Check     Bilateral feet. Pts L foot is black and sloughing. Pt states unable to ambulate due to pain     Diagnosis: Gangrene of left foot (HCC) [I96]    Unit where seen by Wound Team: S602/01     WOUND FOLLOW UP RELATED TO:  L groin/AKA vac change       WOUND TEAM PLAN OF CARE - Frequency of Follow-up:   Nursing to follow dressing orders written for wound care. Contact wound team if area fails to progress, deteriorates or with any questions/concerns if something comes up before next scheduled follow up (See below as to whether wound is following and frequency of wound follow up)  Dressing changes by wound team:                   NPWT change 3 times weekly -      WOUND HISTORY:    Pt is a 59 yo male who presented to the ED for wound check B feet.   Found to have severe PAD     9/26/23 ortho consult Dr Dumont, rec BKA  9/26/23 vascular consult Dr Seth - rec ortho for BKA L, vascular studies for R LE  9/27/23 L BKA Dr Altamirano  9/28/23 ID consult  9/30/23 B femoral endarterectomy, R LE angiogram and placement of R iliac stent  10/1/23 Dietary involved  10/7/23 RH physiatrist - pt not a candidate for RH  10/20/23 I&D L SUSANNEA Dr Altamirano  10/23/23: Patient underwent left above-knee amputation and debridement of left groin wound with wound VAC placement       WOUND ASSESSMENT/LDA     Negative Pressure Wound Therapy 10/25/23 Surgical Groin;Leg Left (Active)   Wound Image   10/25/23 1600   NPWT Pump Mode / Pressure Setting Ulta;Intermittent;125 mmHg 10/27/23 0900   Dressing Type Medium;Black Foam (Veraflo) 10/27/23 0900    Number of Foam Pieces Used 4 10/25/23 1600   Canister Changed No 10/25/23 1600   NEXT Dressing Change/Treatment Date 10/27/23 10/27/23 0900   VAC VeraFlo Irrigant Normal Saline 10/27/23 1700   VAC VeraFlo Soak Time (mins) 6 10/27/23 1700   VAC VeraFlo Instill Volume (ml) 12 10/27/23 1700   VAC VeraFlo - Therapy Time (hrs) 2 10/27/23 1700   VAC VeraFlo Pressure (mm/Hg) 125 mmHg 10/27/23 1700              Wound 10/13/23 Pressure Injury Sacrum Left unstag 10/28/23 (Active)   Wound Image    10/27/23 1700   Site Assessment  10/27/23 2202   Periwound Assessment  10/27/23 2202   Margins Attached edges 10/24/23 2015   Closure Adhesive bandage 10/24/23 2015   Drainage Amount Moderate 10/27/23 1700   Drainage Description Tan;Cloudy/turbid;Foul purulent;Gelatinous 10/27/23 1700   Treatments Offloading;Site care;Cleansed 10/24/23 2015   Wound Cleansing Foam Cleanser/Washcloth 10/27/23 2202   Periwound Protectant Skin Protectant Wipes to Periwound 10/24/23 2015   Dressing Status Clean;Dry;Intact 10/26/23 2240   Dressing Changed Changed 10/27/23 1700   Dressing Cleansing/Solutions 1/4 Strength Dakin's Solution 10/27/23 1700   Dressing Options Plain Strip Packing 10/27/23 1700   Dressing Change/Treatment Frequency Every 72 hrs, and As Needed 10/27/23 2202   NEXT Dressing Change/Treatment Date 10/28/23 10/27/23 1700   NEXT Weekly Photo (Inpatient Only) 11/03/23 10/27/23 1700   Wound Team Following Other (comment) 10/27/23 1700   Non-staged Wound Description Partial thickness 10/14/23 1500   Wound Length (cm) 2 cm 10/27/23 1700   Wound Width (cm) 1 cm 10/27/23 1700   Wound Surface Area (cm^2) 2 cm^2 10/27/23 1700   Wound Odor Foul 10/27/23 1700       Wound 10/17/23 Knee Left (Active)   Site Assessment Clean;Dry;Intact 10/27/23 2202   Periwound Assessment Clean;Dry;Intact 10/25/23 1927   Margins LUTHER 10/27/23 2202   Treatments Offloading 10/25/23 1927   Wound Cleansing Normal Saline Irrigation 10/24/23 2015   Dressing Status  Clean;Dry;Intact 10/26/23 2240   Dressing Changed Observed 10/26/23 2240   Dressing Options Wound Vac 10/26/23 2240   Dressing Change/Treatment Frequency Every 72 hrs, and As Needed 10/27/23 2202   NEXT Dressing Change/Treatment Date 10/25/23 10/22/23 2212       Wound 10/23/23 Groin Left (Active)   Wound Image    10/25/23 1600   Site Assessment LUTHER 10/27/23 0900   Periwound Assessment LUTHER 10/27/23 0900   Margins LUTHER 10/27/23 2202   Closure Secondary intention 10/27/23 0900   Drainage Amount Small 10/27/23 1700   Drainage Description Serosanguineous 10/27/23 1700   Treatments Cleansed;Site care 10/25/23 1600   Wound Cleansing Normal Saline Irrigation 10/27/23 0900   Periwound Protectant No-sting Skin Prep;Paste Ring;Drape 10/25/23 1600   Dressing Status Clean;Dry;Intact 10/27/23 0900   Dressing Changed Changed 10/27/23 1700   Dressing Cleansing/Solutions Normal Saline 10/25/23 1600   Dressing Options Wound Vac 10/27/23 1700   Dressing Change/Treatment Frequency Monday, Wednesday, Friday, and As Needed 10/27/23 1700   NEXT Dressing Change/Treatment Date 10/30/23 10/27/23 1700   NEXT Weekly Photo (Inpatient Only) 11/01/23 10/27/23 1700   Wound Team Following Other (comment) 10/27/23 1700   Non-staged Wound Description Full thickness 10/25/23 1600   Wound Length (cm) 11.5 cm 10/25/23 1600   Wound Width (cm) 2.7 cm 10/25/23 1600   Wound Depth (cm) 0.6 cm 10/25/23 1600   Wound Surface Area (cm^2) 31.05 cm^2 10/25/23 1600   Wound Volume (cm^3) 18.63 cm^3 10/25/23 1600   Wound Bed Granulation (%) 90 % 10/27/23 1700   Wound Bed Slough (%) 10 % 10/27/23 1700   Shape Irregular linear 10/25/23 1600   Wound Odor None 10/27/23 1700   WOUND NURSE ONLY - Time Spent with Patient (mins) 75 10/27/23 1700       Wound 10/25/23 Incision Leg Left BKA incision (Active)   Wound Image   10/25/23 1600   Site Assessment LUTHER 10/27/23 0900   Periwound Assessment Intact 10/27/23 1700   Margins Attached edges 10/27/23 2202   Closure Staples  10/25/23 1600   Drainage Amount Small 10/25/23 1600   Drainage Description Serosanguineous 10/25/23 1600   Treatments Cleansed;Site care 10/25/23 1600   Wound Cleansing Normal Saline Irrigation 10/27/23 0900   Periwound Protectant No-sting Skin Prep;Mepitel;Drape 10/25/23 1600   Dressing Status Clean;Dry;Intact 10/27/23 0900   Dressing Changed Changed 10/27/23 1700   Dressing Cleansing/Solutions Not Applicable 10/25/23 1600   Dressing Options Wound Vac 10/27/23 1700   Dressing Change/Treatment Frequency Monday, Wednesday, Friday, and As Needed 10/27/23 1700   NEXT Dressing Change/Treatment Date 10/30/23 10/27/23 1700   NEXT Weekly Photo (Inpatient Only) 11/01/23 10/27/23 1700   Wound Team Following 3x Weekly 10/25/23 1600   Shape Horseshoe 10/25/23 1600   Wound Odor None 10/27/23 1700         Vascular:    INGA:   INGA Results, Last 30 Days US-EXTREMITY ARTERY LOWER UNILAT RIGHT    Result Date: 9/28/2023  Narrative Lower Extremity  Arterial Duplex Report  Vascular Laboratory  CONCLUSIONS  Findings are consistent with severe arterial insufficiency of the right  lower extremity.  Duplex scanning of the aorto-iliac segment was completed in accordance with  lower extremity arterial evaluation protocol - see separate report.              STEFANIE  FINDINGS  Right.  No Doppler or color flow demonstrated in the common femoral, proximal and  mid superficial femoral arteries.  Severely dampened, monophasic flow demonstrated in the profunda femoral  arery.  Reconstitution of flow is seen at the distal superficial femoral artery  with monophasic flow.  Atherosclerotic plaque seen in the popliteal artery.  Three vessel runoff to the ankle with monophasic flow.  Duplex scanning of the aorto-iliac segment was completed in accordance with  lower extremity arterial evaluation protocol - see separate report.  Amanda Cohen  (Electronically Signed)  Final Date:      28 September 2023                   14:31    Result Date:  9/28/2023  Narrative  Vascular Laboratory  Conclusions  Severe arterial insufficiency seen in the right lower extremity.  An arterial duplex of the right leg was performed in accordance with lower  extremity arterial evaluation protocol - see separate report.    Right.  There is evidence of severe arterial disease demonstrated (INGA is < .5) on  the right.  Doppler waveforms of the common femoral, popliteal, and posterior tibial  arteries are of low amplitude and monophasic.  Doppler waveforms of the dorsalis pedis artery are of severely low  amplitude that a index is not obtained.  An arterial duplex of the right leg was performed in accordance with lower  extremity arterial evaluation protocol - see separate report.  Amanda AWILDA To  (Electronically Signed)  Final Date:      28 September 2023                   12:22      Lab Values:    Lab Results   Component Value Date/Time    WBC 9.5 10/25/2023 05:02 AM    RBC 3.01 (L) 10/25/2023 05:02 AM    HEMOGLOBIN 8.7 (L) 10/25/2023 05:02 AM    HEMATOCRIT 27.6 (L) 10/25/2023 05:02 AM         Culture Results show:  No results found for this or any previous visit (from the past 720 hour(s)).    Pain Level/Medicated:  PO pain medications administered by bedside RN 60 prior       INTERVENTIONS BY WOUND TEAM:  Performed standard wound care which includes appropriate positioning, dressing removal and non-selective debridement. Pictures and measurements obtained weekly if/when required.    Wound:  L groin  Preparation for Dressing removal: Removed without difficulty  Cleansed/Non-selectively Debrided with:  Wound cleanser and Gauze  Kiara wound: Cleansed with Wound cleanser and Gauze, Prepped with No Sting, Paste Rings, and Drape  Primary Dressing:  black VF, drape trac pad Y connected     Wound:  L AKA  Preparation for Dressing removal: Removed without difficulty  Kiara wound: Cleansed with Wound cleanser and Gauze, Prepped with No Sting, Drape, and Mepitel One  Primary  Dressing:  black regular foam, drape, trac pad Y connected     Wound:  sacrum  Preparation for Dressing removal: Removed without difficulty  Non-Excisional Conservative Sharp debridement: Slough debrided away using scalpel, scissors, and forceps < 20cm2 debrided down to slough.  No Bleeding noted.  Kiara wound: Cleansed with Wound cleanser and Gauze, Prepped with No Sting  Primary Dressing:  plain strip packing, ordered dakins  Secondary (Outer) Dressing: sacral off loading    Advanced Wound Care Discharge Planning  Number of Clinicians necessary to complete wound care: 1  Is patient requiring IV pain medications for dressing changes:  No   Length of time for dressing change 75 min. (This does not include chart review, pre-medication time, set up, clean up or time spent charting.)    Interdisciplinary consultation: Patient, Bedside RN (), .    EVALUATION / RATIONALE FOR TREATMENT:     Date:  10/28/23  Wound Status:      Sacral wound - pt had some prior breakdown around same area putting pt at greater risk of skin failure.  Unfortunately the tissue has deteriorated into a pressure injury.  Contributing factors include immobility, amputations, hip flexion contracture.  Therapies are involved but pt does not always fully participate.  Off loading measures in place/set up.  Pt may benefit from surgical consult to expedite achieving a clean wound bed that could then be vaced.  Will update Dr Sterling.     L groin and L AKA looks good       Date:  10/25/23  Wound Status:  Initial evaluation    S/P L BKA and debridement of L groin wound. Left groin with small area of induration to lateral periwound. VF initiated to mechanically debride remaining slough to wound bed, increase oxygenation and granulation tissue production to the area, and assist in wound closure by secondary intention.     L BKA incision still with drainage after removal of prevena. Incisional VAC applied to manage drainage. Dr. Navarro updated.         Goals:  Steady decrease in wound area and depth weekly.    NURSING PLAN OF CARE ORDERS:  Dressing changes: See Dressing Care orders    NUTRITION RECOMMENDATIONS   Wound Team Recommendations:  Dietary consult     DIET ORDERS (From admission to next 24h)       Start     Ordered    10/23/23 1839  Diet Order Diet: Consistent CHO (Diabetic)  ALL MEALS        Question:  Diet:  Answer:  Consistent CHO (Diabetic)    10/23/23 1839                    PREVENTATIVE INTERVENTIONS:   Q shift Jae - performed per nursing policy  Q shift pressure point assessments - performed per nursing policy    Surface/Positioning  Low Airloss - Ordered  TAPs Turning system - Ordered    Offloading/Redistribution  Heel offloading dressing (Silicone dressing) - Ordered    Anticipated discharge plans:  Skilled Nursing        Vac Discharge Needs:  Vac Discharge plan is purely a recommendation from wound team and not a requirement for discharge unless otherwise stated by physician.  Pt likely to require a vac for sacral wound - will update as POC evolves

## 2023-10-29 PROCEDURE — A9270 NON-COVERED ITEM OR SERVICE: HCPCS | Performed by: STUDENT IN AN ORGANIZED HEALTH CARE EDUCATION/TRAINING PROGRAM

## 2023-10-29 PROCEDURE — 700102 HCHG RX REV CODE 250 W/ 637 OVERRIDE(OP): Performed by: STUDENT IN AN ORGANIZED HEALTH CARE EDUCATION/TRAINING PROGRAM

## 2023-10-29 PROCEDURE — 770006 HCHG ROOM/CARE - MED/SURG/GYN SEMI*

## 2023-10-29 PROCEDURE — 99231 SBSQ HOSP IP/OBS SF/LOW 25: CPT | Performed by: STUDENT IN AN ORGANIZED HEALTH CARE EDUCATION/TRAINING PROGRAM

## 2023-10-29 PROCEDURE — 700111 HCHG RX REV CODE 636 W/ 250 OVERRIDE (IP): Performed by: STUDENT IN AN ORGANIZED HEALTH CARE EDUCATION/TRAINING PROGRAM

## 2023-10-29 PROCEDURE — 99231 SBSQ HOSP IP/OBS SF/LOW 25: CPT

## 2023-10-29 PROCEDURE — 700101 HCHG RX REV CODE 250: Performed by: STUDENT IN AN ORGANIZED HEALTH CARE EDUCATION/TRAINING PROGRAM

## 2023-10-29 RX ORDER — CHOLECALCIFEROL (VITAMIN D3) 125 MCG
5 CAPSULE ORAL NIGHTLY
Status: DISCONTINUED | OUTPATIENT
Start: 2023-10-29 | End: 2023-11-03 | Stop reason: HOSPADM

## 2023-10-29 RX ADMIN — ASPIRIN 81 MG: 81 TABLET, COATED ORAL at 05:11

## 2023-10-29 RX ADMIN — FINASTERIDE 5 MG: 5 TABLET, FILM COATED ORAL at 05:11

## 2023-10-29 RX ADMIN — GABAPENTIN 300 MG: 300 CAPSULE ORAL at 12:11

## 2023-10-29 RX ADMIN — GABAPENTIN 300 MG: 300 CAPSULE ORAL at 05:11

## 2023-10-29 RX ADMIN — HEPARIN SODIUM 5000 UNITS: 5000 INJECTION, SOLUTION INTRAVENOUS; SUBCUTANEOUS at 21:03

## 2023-10-29 RX ADMIN — CLOPIDOGREL BISULFATE 75 MG: 75 TABLET ORAL at 05:11

## 2023-10-29 RX ADMIN — METHOCARBAMOL 500 MG: 500 TABLET ORAL at 08:18

## 2023-10-29 RX ADMIN — METHOCARBAMOL 500 MG: 500 TABLET ORAL at 19:59

## 2023-10-29 RX ADMIN — SODIUM HYPOCHLORITE 15 ML: 1.25 SOLUTION TOPICAL at 17:42

## 2023-10-29 RX ADMIN — HYDROMORPHONE HYDROCHLORIDE 1 MG: 1 INJECTION, SOLUTION INTRAMUSCULAR; INTRAVENOUS; SUBCUTANEOUS at 13:51

## 2023-10-29 RX ADMIN — ACETAMINOPHEN 650 MG: 325 TABLET, FILM COATED ORAL at 09:31

## 2023-10-29 RX ADMIN — TRAMADOL HYDROCHLORIDE 50 MG: 50 TABLET ORAL at 09:31

## 2023-10-29 RX ADMIN — CYCLOBENZAPRINE 10 MG: 10 TABLET, FILM COATED ORAL at 16:23

## 2023-10-29 RX ADMIN — HYDROMORPHONE HYDROCHLORIDE 1 MG: 1 INJECTION, SOLUTION INTRAMUSCULAR; INTRAVENOUS; SUBCUTANEOUS at 20:00

## 2023-10-29 RX ADMIN — OXYCODONE HYDROCHLORIDE 20 MG: 10 TABLET ORAL at 21:02

## 2023-10-29 RX ADMIN — ATORVASTATIN CALCIUM 40 MG: 40 TABLET, FILM COATED ORAL at 17:38

## 2023-10-29 RX ADMIN — OXYCODONE HYDROCHLORIDE 20 MG: 10 TABLET ORAL at 05:11

## 2023-10-29 RX ADMIN — OXYCODONE HYDROCHLORIDE 20 MG: 10 TABLET ORAL at 17:37

## 2023-10-29 RX ADMIN — TAMSULOSIN HYDROCHLORIDE 0.4 MG: 0.4 CAPSULE ORAL at 08:18

## 2023-10-29 RX ADMIN — OXYCODONE HYDROCHLORIDE 20 MG: 10 TABLET ORAL at 12:11

## 2023-10-29 RX ADMIN — TRAMADOL HYDROCHLORIDE 50 MG: 50 TABLET ORAL at 22:08

## 2023-10-29 RX ADMIN — HYDROMORPHONE HYDROCHLORIDE 1 MG: 1 INJECTION, SOLUTION INTRAMUSCULAR; INTRAVENOUS; SUBCUTANEOUS at 23:59

## 2023-10-29 RX ADMIN — Medication 5 MG: at 21:02

## 2023-10-29 RX ADMIN — METHOCARBAMOL 500 MG: 500 TABLET ORAL at 12:11

## 2023-10-29 RX ADMIN — NICOTINE TRANSDERMAL SYSTEM 21 MG: 21 PATCH, EXTENDED RELEASE TRANSDERMAL at 05:11

## 2023-10-29 RX ADMIN — HYDROMORPHONE HYDROCHLORIDE 1 MG: 1 INJECTION, SOLUTION INTRAMUSCULAR; INTRAVENOUS; SUBCUTANEOUS at 08:18

## 2023-10-29 RX ADMIN — OXYCODONE HYDROCHLORIDE 20 MG: 10 TABLET ORAL at 01:41

## 2023-10-29 RX ADMIN — SODIUM HYPOCHLORITE 1 ML: 1.25 SOLUTION TOPICAL at 05:12

## 2023-10-29 RX ADMIN — HEPARIN SODIUM 5000 UNITS: 5000 INJECTION, SOLUTION INTRAVENOUS; SUBCUTANEOUS at 05:11

## 2023-10-29 RX ADMIN — SODIUM HYPOCHLORITE 1 ML: 1.25 SOLUTION TOPICAL at 20:00

## 2023-10-29 RX ADMIN — HEPARIN SODIUM 5000 UNITS: 5000 INJECTION, SOLUTION INTRAVENOUS; SUBCUTANEOUS at 13:50

## 2023-10-29 RX ADMIN — GABAPENTIN 300 MG: 300 CAPSULE ORAL at 17:38

## 2023-10-29 RX ADMIN — METHOCARBAMOL 500 MG: 500 TABLET ORAL at 16:24

## 2023-10-29 RX ADMIN — HYDROMORPHONE HYDROCHLORIDE 1 MG: 1 INJECTION, SOLUTION INTRAMUSCULAR; INTRAVENOUS; SUBCUTANEOUS at 02:37

## 2023-10-29 RX ADMIN — TRAMADOL HYDROCHLORIDE 50 MG: 50 TABLET ORAL at 16:23

## 2023-10-29 ASSESSMENT — PAIN DESCRIPTION - PAIN TYPE
TYPE: CHRONIC PAIN;PHANTOM PAIN
TYPE: CHRONIC PAIN;PHANTOM PAIN
TYPE: ACUTE PAIN;CHRONIC PAIN;PHANTOM PAIN
TYPE: ACUTE PAIN;CHRONIC PAIN
TYPE: CHRONIC PAIN;PHANTOM PAIN
TYPE: ACUTE PAIN;CHRONIC PAIN

## 2023-10-29 NOTE — PROGRESS NOTES
Hospital Medicine Daily Progress Note    Date of Service  10/29/2023    Chief Complaint  Bilateral feet wounds    Hospital Course  Dillon Centeno is a 58 y.o. male with hypertension and tobacco dependence, admitted 9/26/2023 with bilateral feet wounds.  He was noted to have ischemic gangrene of the left lower extremity with maggots and superimposed bacterial infection.  Vascular surgery and orthopedic surgery were consulted.  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  Vascular studies showed severe arterial disease.  Patient underwent bilateral common femoral artery endarterectomy and profundoplasty with saphenous vein angioplasty with stent placement in the right external iliac artery on 9/30/2023. Prevena was placed (5-7 days). He was recommended to have Plavix for 1 month and aspirin for life. Blood cultures from 9/26/2023 grew Morganella morganii and Vagococcus.  Urine culture from 9/26/2023 grew Staph epidermidis. ID were consulted.   Echocardiogram showed LVEF of about 60% with no reported valvular abnormalities.  Repeat blood cultures from 9/27/2023 have been negative.  ID recommended IV Zosyn which he completed on 10/6/2023.  Subsequently, he had breakdown of the incision site on the left BKA.  Orthopedics planning for revision.  However he developed acute kidney injury which was felt to be post renal with noted obstructive changes on renal ultrasound (moderate right and mild left hydronephrosis with distended urinary bladder), with component of prerenal, with associated mild hyperkalemia.  Urinalysis was bland with negative protein, casts.  He was given potassium lowering medications, along with IV fluids.  Holguin catheter was placed.  Nephrology and urology were consulted.  Renal function improved.  Urology recommended Flomax and finasteride.  He was able to go to the OR and underwent debridement and secondary closure of dehisced surgical wound.  Orthopedics recommended nonweightbearing on the left  lower extremity. He was then noted to have redness and exudate with dehiscence on the femoral endarterectomy incision site.  He was started on Keflex.  Vascular surgery was again contacted who recommended debridement and possibly wound VAC placement.     Additionally, he was noted to have renal cyst on CT.  MRI shows multiseptated cystic lesions in the upper pole of left kidney measuring 3.8 cm without associated enhancement or nodular component.    Discharge planning was pursued, but unfortunately had no accepting SNF's and no home health services accepting either.  Patient refused to go to a group home.  DMEs were being arranged.    Interval Problem Update  No acute events overnight.  Surgery will continue to assess sacral wound.  Appreciate wound care following.  Wound care also following L AKA and groin surgical sites. Appreciate their recommendations for wound care including wound vac needs.  Patient on aspirin and plavix.  Plan to discharge patient home when medically cleared as SNF/HH declined due to patient's insurance.  Encouraged patient to mobilize to work towards goal of discharging home.    Consultants/Specialty  infectious disease, nephrology, orthopedics, urology, and vascular surgery    Code Status  Full Code    Disposition  The patient is not medically cleared for discharge to home or a post-acute facility.  Anticipate discharge to: home with close outpatient follow-up    Anticipate discharge to home once cleared by orthopedics and vascular surgery, and wheelchair is arranged.  I have placed the appropriate orders for post-discharge needs.    Review of Systems  ROS     Pertinent positives/negatives as mentioned above.     A complete review of systems was personally done by me. All other systems were negative.       Physical Exam  Temp:  [35.9 °C (96.7 °F)-36.7 °C (98.1 °F)] 36.4 °C (97.5 °F)  Pulse:  [] 93  Resp:  [16-18] 18  BP: (115-132)/(73-86) 131/86  SpO2:  [94 %-99 %] 94 %    Physical  Exam  Vitals reviewed.   Constitutional:       General: He is not in acute distress.     Appearance: Normal appearance. He is not toxic-appearing or diaphoretic.   HENT:      Head: Normocephalic and atraumatic.      Nose: Nose normal.      Mouth/Throat:      Mouth: Mucous membranes are dry.      Pharynx: No oropharyngeal exudate.   Eyes:      General: No scleral icterus.     Extraocular Movements: Extraocular movements intact.      Conjunctiva/sclera: Conjunctivae normal.      Pupils: Pupils are equal, round, and reactive to light.   Cardiovascular:      Rate and Rhythm: Normal rate.      Heart sounds: Normal heart sounds. No murmur heard.     No gallop.   Pulmonary:      Effort: Pulmonary effort is normal. No respiratory distress.      Breath sounds: Normal breath sounds. No stridor. No wheezing, rhonchi or rales.   Chest:      Chest wall: No tenderness.   Abdominal:      General: Bowel sounds are normal. There is no distension.      Palpations: Abdomen is soft. There is no mass.      Tenderness: There is no abdominal tenderness. There is no guarding or rebound.   Genitourinary:     Penis: Normal.       Comments: Holguin catheter in place, draining clear daina urine.  Musculoskeletal:         General: No swelling. Normal range of motion.      Cervical back: Normal range of motion and neck supple.      Right lower leg: No edema.      Comments: Left BKA site, CDI, drain in place  Left thigh incision site with staples, surrounding redness and exudates, dehiscence noted   Lymphadenopathy:      Cervical: No cervical adenopathy.   Skin:     General: Skin is warm and dry.      Coloration: Skin is not jaundiced.      Findings: No rash.      Comments: Third right toe absent due to prior trauma.  Remaining toes with chronic ulcers.   Neurological:      General: No focal deficit present.      Mental Status: He is alert and oriented to person, place, and time. Mental status is at baseline.      Cranial Nerves: No cranial nerve  deficit.   Psychiatric:         Mood and Affect: Mood normal.         Behavior: Behavior normal.         Thought Content: Thought content normal.         Judgment: Judgment normal.           I have performed the physical examination today 10/23/2023.  In review of yesterday's note, there are no new changes except as documented above.     Fluids    Intake/Output Summary (Last 24 hours) at 10/29/2023 1217  Last data filed at 10/29/2023 0900  Gross per 24 hour   Intake 652 ml   Output 3200 ml   Net -2548 ml         Laboratory                                Imaging  US-RENAL   Final Result      1.  Bilateral medical renal disease.   2.  Moderate right and mild left hydronephrosis, likely secondary to distention of the urinary bladder secondary to prostatic hypertrophy. Post void images of the inner bladder and collecting systems of the kidneys were not acquired.   3.  Hydronephrosis was not present on the prior MRI of the abdomen without and with IV contrast on 10/2/2023.      MR-ABDOMEN-WITH & W/O   Final Result      Multi septated cystic lesion at the upper pole LEFT kidney measuring 3.8 cm without associated enhancement or nodular component (Bosniak 2).            DX-PORTABLE FLUORO > 1 HOUR   Final Result      Portable fluoroscopy utilized for 2 minutes 20.4 seconds.         INTERPRETING LOCATION: [address] [place] [address], [facility]      CT-CTA AORTA-RO WITH & W/O-POST PROCESS   Final Result         CTA AORTA      1.  Septated lobulated left upper pole renal cystic mass lesion, recommend follow-up MRI of the kidneys for further characterization.   2.  Atherosclerosis and atherosclerotic coronary artery disease.   3.  Fat-containing right inguinal hernia      CTA LOWER EXTREMITIES      1.  Occlusion of the right common iliac artery through the distal superficial femoral artery   2.  Distal right peroneal artery occlusion with 2-vessel runoff the anterior and posterior tibial arteries at the right ankle.   3.  Occlusion of  the left superficial femoral artery at the bifurcation extending through its length to the popliteal artery.   4.  Occlusion of the proximal left profunda femoris just distal to the bifurcation which reconstitutes.   5.  Soft tissue gas at the stump of left below-the-knee amputation, within expected limits for recent postop changes.      3D angiographic/MIP images of the vasculature confirm the vascular findings as described above.      These findings were discussed with the patient's clinician, Keith Navarro, on 9/29/2023 8:06 AM.      EC-ECHOCARDIOGRAM COMPLETE W/O CONT   Final Result      US-AORTA/ILIACS DUPLEX COMPLETE   Final Result      US-EXTREMITY ARTERY LOWER UNILAT RIGHT   Final Result      US-INGA SINGLE LEVEL UNILAT RIGHT   Final Result      DX-CHEST-PORTABLE (1 VIEW)   Final Result      1.  LEFT basilar atelectasis or pneumonia   2.  Trace LEFT pleural effusion or pleural scar             Assessment/Plan  * Gangrene of left foot (HCC)- (present on admission)  Assessment & Plan  - s/p Left BKA on 9/27/2023.   - Vascular studies showed severe arterial disease. S/p bilateral common femoral artery endarterectomy and profundoplasty with saphenous vein angioplasty with stent placement in the right external iliac artery on 9/30/2023. Prevena was placed (5-7 days).  Continue Plavix for 1 month and aspirin for life. Follow-up with Dr. Navarro (vascular surgery). Staples to be removed 14 to 21 days postoperatively.   - Completed course of IV Zosyn on 10/6/2023.   - Noted dehiscence of incision site on the left BKA.  s/p debridement and secondary closure of dehisced surgical wound 10/20. Maintain NWB LLE.  -Now also having wound dehiscence on the left upper thigh surgical site.    S/p L AKA 10/23 by vascular surgery, wound vac on  Continue oral Keflex for now.  -Continue pain control with oral oxycodone and IV Dilaudid.    Hyperkalemia  Assessment & Plan  - Due to acute renal failure.    - Resolved.   -Monitor  closely.  Repeat BMP in the morning.    GELY (acute kidney injury) (Formerly [facility])  Assessment & Plan  - Creatinine significantly jumped to 3.76.  Renal ultrasound also showed moderate right and mild left hydronephrosis with distended urinary bladder.  Suspect has post renal failure, with component of prerenal given significant elevation in BUN as well.  CPK is normal.  Urinalysis bland.  -Resolved. Now that has Holguin catheter.  Off IVF.  Potassium level has normalized, off Lokelma..  -Maintain Holguin catheter to relieve obstruction.  Continue Flomax and finasteride.  Suspect that he will need to go home with Holguin catheter, and follow-up as outpatient with urology for definitive treatment of BPH.  -Monitor for improvement in renal function closely.  Monitor electrolytes particularly potassium.  Avoid nephrotoxins, and continue to renally dose all medications.    PAD (peripheral artery disease) (Former[facility])- (present on admission)  Assessment & Plan  - s/p bilateral common femoral artery endarterectomy and profundoplasty with saphenous vein angioplasty with stent placement in the right external iliac artery on 9/30/2023. Prevena was placed (5-7 days).   -Continue Plavix for 1 month and aspirin for life.  Follow-up with vascular surgery.  Stump protector for left leg was ordered by vascular surgery.  -Now also having wound dehiscence on the left upper thigh surgical site.  Vascular surgery planning for debridement and possibly wound VAC placement.  Continue on oral Keflex for now.    Anemia- (present on admission)  Assessment & Plan  -Stable.  Monitor.  CBC in the morning.  Restrictive transfusion strategy.    Bacteremia- (present on admission)  Assessment & Plan  - Blood cultures from 9/26/2023 grew Morganella morganii and Vagococcus. Echocardiogram showed no evidence of vegetations.   - ID consulted, completed course of IV Zosyn on 10/6/2023. Repeat blood cultures from 9/27/2023 have been negative.      Hyponatremia- (present on  admission)  Assessment & Plan  - Appears euvolemic.  Stable. Off IVF.  -Continue to monitor.    Elevated liver enzymes- (present on admission)  Assessment & Plan  - Resolved.  Hepatitis panel was normal.  Continue to monitor.    Leukocytosis- (present on admission)  Assessment & Plan  - Resolved.  Continue to trend.  Continue antibiotics.     Renal cyst- (present on admission)  Assessment & Plan  - Noted to have renal cyst on CT.  MRI shows multiseptated cystic lesions in the upper pole of left kidney measuring 3.8 cm without associated enhancement or nodular component.    -Outpatient follow-up with urology.         VTE prophylaxis: SCDs, Heparin SQ

## 2023-10-29 NOTE — CARE PLAN
Problem: Knowledge Deficit - Standard  Goal: Patient and family/care givers will demonstrate understanding of plan of care, disease process/condition, diagnostic tests and medications  Outcome: Progressing     Problem: Pain - Standard  Goal: Alleviation of pain or a reduction in pain to the patient’s comfort goal  Outcome: Progressing     Problem: Skin Integrity  Goal: Skin integrity is maintained or improved  Outcome: Progressing     Problem: Fall Risk  Goal: Patient will remain free from falls  Outcome: Progressing     Problem: Infection - Standard  Goal: Patient will remain free from infection  Outcome: Progressing     Problem: Wound/ / Incision Healing  Goal: Patient's wound/surgical incision will decrease in size and heals properly  Outcome: Progressing   The patient is Stable - Low risk of patient condition declining or worsening    Shift Goals  Clinical Goals: pain control  Patient Goals: pain control  Family Goals: LUTHER    Progress made toward(s) clinical / shift goals:  pt progressing toward goals    Patient is not progressing towards the following goals:

## 2023-10-29 NOTE — CARE PLAN
The patient is Stable - Low risk of patient condition declining or worsening    Shift Goals  Clinical Goals: pain control  Patient Goals: pain control  Family Goals: LUTHER    Progress made toward(s) clinical / shift goals:  PRN Oxy IR and dilaudid for pain. Encouraged patient to turn Q2 hrs. Patient was switched to a low airloss  bed.   Problem: Pain - Standard  Goal: Alleviation of pain or a reduction in pain to the patient’s comfort goal  Outcome: Progressing     Problem: Skin Integrity  Goal: Skin integrity is maintained or improved  Outcome: Progressing

## 2023-10-29 NOTE — PROGRESS NOTES
"    DATE: 10/29/2023    Surgical consult for evaluation of sacral pressure wound.    INTERVAL EVENTS:  Doing well.  Wound VAC to left sacrum, working well    PHYSICAL EXAMINATION:  Vital Signs: /86   Pulse 93   Temp 36.4 °C (97.5 °F) (Temporal)   Resp 18   Ht 1.803 m (5' 11\")   Wt 62.9 kg (138 lb 10.7 oz)   SpO2 94%     Awake and alert.  Wound VAC in place            DVT Prophylaxis: Heparin              ASSESSMENT AND PLAN:   Please call [facility] service with next wound VAC change.  So we can assess the wound.   Continue wound VAC for now      Discussed patient condition with RN, Patient, and general surgery, Dr. Bowden.    "

## 2023-10-30 PROBLEM — S31.000A SACRAL WOUND, INITIAL ENCOUNTER: Status: ACTIVE | Noted: 2023-10-30

## 2023-10-30 PROCEDURE — 700102 HCHG RX REV CODE 250 W/ 637 OVERRIDE(OP): Performed by: STUDENT IN AN ORGANIZED HEALTH CARE EDUCATION/TRAINING PROGRAM

## 2023-10-30 PROCEDURE — 97607 NEG PRS WND THR NDME<=50SQCM: CPT

## 2023-10-30 PROCEDURE — 700111 HCHG RX REV CODE 636 W/ 250 OVERRIDE (IP): Performed by: STUDENT IN AN ORGANIZED HEALTH CARE EDUCATION/TRAINING PROGRAM

## 2023-10-30 PROCEDURE — A9270 NON-COVERED ITEM OR SERVICE: HCPCS | Performed by: STUDENT IN AN ORGANIZED HEALTH CARE EDUCATION/TRAINING PROGRAM

## 2023-10-30 PROCEDURE — 770006 HCHG ROOM/CARE - MED/SURG/GYN SEMI*

## 2023-10-30 PROCEDURE — 99231 SBSQ HOSP IP/OBS SF/LOW 25: CPT | Performed by: STUDENT IN AN ORGANIZED HEALTH CARE EDUCATION/TRAINING PROGRAM

## 2023-10-30 PROCEDURE — 99231 SBSQ HOSP IP/OBS SF/LOW 25: CPT | Performed by: NURSE PRACTITIONER

## 2023-10-30 PROCEDURE — 97535 SELF CARE MNGMENT TRAINING: CPT

## 2023-10-30 PROCEDURE — 302098 PASTE RING (FLAT): Performed by: STUDENT IN AN ORGANIZED HEALTH CARE EDUCATION/TRAINING PROGRAM

## 2023-10-30 PROCEDURE — 97602 WOUND(S) CARE NON-SELECTIVE: CPT

## 2023-10-30 PROCEDURE — 700101 HCHG RX REV CODE 250: Performed by: STUDENT IN AN ORGANIZED HEALTH CARE EDUCATION/TRAINING PROGRAM

## 2023-10-30 RX ADMIN — HYDROMORPHONE HYDROCHLORIDE 1 MG: 1 INJECTION, SOLUTION INTRAMUSCULAR; INTRAVENOUS; SUBCUTANEOUS at 10:38

## 2023-10-30 RX ADMIN — METHOCARBAMOL 500 MG: 500 TABLET ORAL at 17:15

## 2023-10-30 RX ADMIN — GABAPENTIN 300 MG: 300 CAPSULE ORAL at 05:34

## 2023-10-30 RX ADMIN — OXYCODONE HYDROCHLORIDE 15 MG: 15 TABLET ORAL at 17:14

## 2023-10-30 RX ADMIN — METHOCARBAMOL 500 MG: 500 TABLET ORAL at 08:52

## 2023-10-30 RX ADMIN — SODIUM HYPOCHLORITE 15 ML: 1.25 SOLUTION TOPICAL at 17:18

## 2023-10-30 RX ADMIN — OXYCODONE HYDROCHLORIDE 20 MG: 10 TABLET ORAL at 12:30

## 2023-10-30 RX ADMIN — HEPARIN SODIUM 5000 UNITS: 5000 INJECTION, SOLUTION INTRAVENOUS; SUBCUTANEOUS at 15:13

## 2023-10-30 RX ADMIN — TRAMADOL HYDROCHLORIDE 50 MG: 50 TABLET ORAL at 12:33

## 2023-10-30 RX ADMIN — FINASTERIDE 5 MG: 5 TABLET, FILM COATED ORAL at 05:34

## 2023-10-30 RX ADMIN — ATORVASTATIN CALCIUM 40 MG: 40 TABLET, FILM COATED ORAL at 17:15

## 2023-10-30 RX ADMIN — NICOTINE TRANSDERMAL SYSTEM 21 MG: 21 PATCH, EXTENDED RELEASE TRANSDERMAL at 05:34

## 2023-10-30 RX ADMIN — TRAMADOL HYDROCHLORIDE 50 MG: 50 TABLET ORAL at 17:15

## 2023-10-30 RX ADMIN — GABAPENTIN 300 MG: 300 CAPSULE ORAL at 12:30

## 2023-10-30 RX ADMIN — CYCLOBENZAPRINE 10 MG: 10 TABLET, FILM COATED ORAL at 17:15

## 2023-10-30 RX ADMIN — CLOPIDOGREL BISULFATE 75 MG: 75 TABLET ORAL at 05:34

## 2023-10-30 RX ADMIN — HEPARIN SODIUM 5000 UNITS: 5000 INJECTION, SOLUTION INTRAVENOUS; SUBCUTANEOUS at 22:00

## 2023-10-30 RX ADMIN — OXYCODONE HYDROCHLORIDE 20 MG: 10 TABLET ORAL at 20:45

## 2023-10-30 RX ADMIN — OXYCODONE HYDROCHLORIDE 20 MG: 10 TABLET ORAL at 08:52

## 2023-10-30 RX ADMIN — ASPIRIN 81 MG: 81 TABLET, COATED ORAL at 05:34

## 2023-10-30 RX ADMIN — ACETAMINOPHEN 650 MG: 325 TABLET, FILM COATED ORAL at 12:31

## 2023-10-30 RX ADMIN — HEPARIN SODIUM 5000 UNITS: 5000 INJECTION, SOLUTION INTRAVENOUS; SUBCUTANEOUS at 05:33

## 2023-10-30 RX ADMIN — METHOCARBAMOL 500 MG: 500 TABLET ORAL at 12:30

## 2023-10-30 RX ADMIN — TRAMADOL HYDROCHLORIDE 50 MG: 50 TABLET ORAL at 05:34

## 2023-10-30 RX ADMIN — HYDROMORPHONE HYDROCHLORIDE 1 MG: 1 INJECTION, SOLUTION INTRAMUSCULAR; INTRAVENOUS; SUBCUTANEOUS at 15:13

## 2023-10-30 RX ADMIN — GABAPENTIN 300 MG: 300 CAPSULE ORAL at 17:15

## 2023-10-30 RX ADMIN — CYCLOBENZAPRINE 10 MG: 10 TABLET, FILM COATED ORAL at 08:51

## 2023-10-30 RX ADMIN — Medication 5 MG: at 20:45

## 2023-10-30 RX ADMIN — TAMSULOSIN HYDROCHLORIDE 0.4 MG: 0.4 CAPSULE ORAL at 08:52

## 2023-10-30 RX ADMIN — METHOCARBAMOL 500 MG: 500 TABLET ORAL at 20:45

## 2023-10-30 ASSESSMENT — PAIN DESCRIPTION - PAIN TYPE
TYPE: CHRONIC PAIN;NEUROPATHIC PAIN;PHANTOM PAIN
TYPE: CHRONIC PAIN;PHANTOM PAIN
TYPE: CHRONIC PAIN;PHANTOM PAIN

## 2023-10-30 ASSESSMENT — COGNITIVE AND FUNCTIONAL STATUS - GENERAL
DAILY ACTIVITIY SCORE: 19
SUGGESTED CMS G CODE MODIFIER DAILY ACTIVITY: CK
DRESSING REGULAR LOWER BODY CLOTHING: A LITTLE
HELP NEEDED FOR BATHING: A LOT
TOILETING: A LOT

## 2023-10-30 ASSESSMENT — ENCOUNTER SYMPTOMS
FEVER: 0
CHILLS: 0
ABDOMINAL PAIN: 0
NAUSEA: 0
VOMITING: 0
SHORTNESS OF BREATH: 0
CONSTIPATION: 0
SPEECH CHANGE: 0

## 2023-10-30 NOTE — CARE PLAN
The patient is Stable - Low risk of patient condition declining or worsening    Shift Goals  Clinical Goals: Pain to remain < 10 L knee  Patient Goals: Patient will sit on the side of bed fro all meals  Family Goals: n/a      Patient is not progressing towards the following goals:    Pt refusing turns, feels most comfortable laying down. Requiring RTC pain meds, flexiril tried today for muscle spams       Problem: Pain - Standard  Goal: Alleviation of pain or a reduction in pain to the patient’s comfort goal  Outcome: Not Met

## 2023-10-30 NOTE — WOUND TEAM
[facility]  Inpatient Services  Wound and Skin Care Follow-up    Admission Date: 9/26/2023     Last order of IP CONSULT TO WOUND CARE was found on 10/29/2023 from Hospital Encounter on 9/26/2023     HPI, PMH, SH: Reviewed    Past Surgical History:   Procedure Laterality Date    INCISION AND DRAINAGE GENERAL Left 10/23/2023    Procedure: INCISION AND DRAINAGE;  Surgeon: Keith Navarro M.D.;  Location: [facility];  Service: Vascular    APPLICATION OR REPLACEMENT, WOUND VAC  10/23/2023    Procedure: APPLICATION OR REPLACEMENT, WOUND VAC;  Surgeon: Keith Navarro M.D.;  Location: [facility];  Service: Vascular    KNEE AMPUTATION ABOVE Left 10/23/2023    Procedure: AMPUTATION, ABOVE KNEE;  Surgeon: Keith Navarro M.D.;  Location: [facility];  Service: Vascular    KNEE AMPUTATION BELOW Left 10/20/2023    Procedure: AMPUTATION, BELOW KNEE WOUND REVISION;  Surgeon: Pradip Altamirano M.D.;  Location: [facility];  Service: Orthopedics    FEMORAL ENDARTERECTOMY Bilateral 9/30/2023    Procedure: ENDARTERECTOMY, FEMORAL;  Surgeon: Keith Navarro M.D.;  Location: [facility];  Service: Vascular    ANGIOGRAM, WITH ANGIOPLASTY, AND STENT INSERTION IF INDICATED Right 9/30/2023    Procedure: ANGIOGRAM, WITH ILIAC STENT;  Surgeon: Keith Navarro M.D.;  Location: [facility];  Service: Vascular    KNEE AMPUTATION BELOW Left 9/27/2023    Procedure: AMPUTATION, BELOW KNEE;  Surgeon: Pradip Altamirano M.D.;  Location: [facility];  Service: Orthopedics    IRRIGATION & DEBRIDEMENT ORTHO Left 3/4/2018    Procedure: IRRIGATION & DEBRIDEMENT ORTHO - HUMERUS;  Surgeon: Sergio Watkins M.D.;  Location: [facility];  Service: Orthopedics    ORIF, SHOULDER Left 1/4/2018    Procedure: SHOULDER ORIF;  Surgeon: Sergio Wtakins M.D.;  Location: [facility];  Service: Orthopedics    CLOSED REDUCTION Left 12/24/2017    Procedure:  CLOSED REDUCTION;  Surgeon: Keith Subramanian M.D.;  Location: SUR[facility]ERY [facility];  Service: Orthopedics    OTHER      multiple surgeries lower legs from past injuries     Social History     Tobacco Use    Smoking status: Every Day     Types: Cigars    Smokeless tobacco: Never   Substance Use Topics    Alcohol use: Yes     Comment: a few beers a week     Chief Complaint   Patient presents with    Wound Check     Bilateral feet. Pts L foot is black and sloughing. Pt states unable to ambulate due to pain     Diagnosis: Gangrene of left foot (HCC) [I96]    Unit where seen by Wound Team: S602/[id number]     WOUND FOLLOW UP RELATED TO:  left groin, left AKA, and sacral wound       WOUND TEAM PLAN OF CARE - Frequency of Follow-up:   Nursing to follow dressing orders written for wound care. Contact wound team if area fails to progress, deteriorates or with any questions/concerns if something comes up before next scheduled follow up (See below as to whether wound is following and frequency of wound follow up)  Dressing changes by wound team:                   3 times weekly - Left groin and left AKA  Weekly - sacral     WOUND HISTORY:       9/26/23 ortho consult Dr Dumont, rec BKA  9/26/23 vascular consult Dr Seth - rec ortho for BKA L, vascular studies for R LE  9/27/23 L BKA Dr Altamirano  9/28/23 ID consult  9/30/23 B femoral endarterectomy, R LE angiogram and placement of R iliac stent  10/1/23 Dietary involved  10/7/23  physiatrist - pt not a candidate for   10/20/23 I&D L BKA Dr Altamirano  10/23/23: Patient underwent left above-knee amputation and debridement of left groin wound with wound VAC placement       WOUND ASSESSMENT/LDA  Wound 10/13/23 Pressure Injury Sacrum Left unstag 10/28/23 (Active)   Date First Assessed/Time First Assessed: 10/13/23 0528   Present on Original Admission: No  Hand Hygiene Completed: Yes  Primary Wound Type: Pressure Injury  Location: Sacrum  Laterality: Left  Wound Description  (Comments): unstag 10/28/23      Assessments 10/30/2023 12:00 PM   Wound Image      Site Assessment Ng;Yellow   Periwound Assessment Denuded;Red   Margins Unattached edges;Undefined edges   Closure Secondary intention   Drainage Amount Small   Drainage Description Sanguineous;Purulent   Treatments Cleansed;Irrigation;Nonselective debridement;Site care   Wound Cleansing Dakin's Solution   Periwound Protectant No-sting Skin Prep   Dressing Status Clean;Dry;Intact   Dressing Changed Changed   Dressing Cleansing/Solutions 1/4 Strength Dakin's Solution   Dressing Options Plain Strip Packing;Offloading Dressing - Sacral   Dressing Change/Treatment Frequency Every Shift, and As Needed   NEXT Dressing Change/Treatment Date 10/31/23   NEXT Weekly Photo (Inpatient Only) 11/08/23   Wound Team Following Weekly   Pressure Injury Stage Unstageable   Wound Odor Foul   WOUND NURSE ONLY - Time Spent with Patient (mins) 20       Wound 10/17/23 Knee Left (Active)   Date First Assessed/Time First Assessed: 10/17/23 (c) 3622   Location: Knee  Laterality: Left      Assessments 10/30/2023 12:00 PM   Wound Image     Site Assessment Clean;Dry;Intact   Periwound Assessment Clean;Dry;Intact   Margins Attached edges   Closure Approximated;Staples   Drainage Amount Scant   Drainage Description Serosanguineous   Treatments Cleansed;Irrigation;Nonselective debridement;Site care   Wound Cleansing Normal Saline Irrigation   Periwound Protectant Skin Protectant Wipes to Periwound;Mepitel;Drape   Dressing Status Clean;Dry;Intact   Dressing Changed Changed   Dressing Options Wound Vac   Dressing Change/Treatment Frequency Monday, Wednesday, Friday, and As Needed   NEXT Dressing Change/Treatment Date 11/01/23   NEXT Weekly Photo (Inpatient Only) 11/01/23   Wound Team Following 3x Weekly   Shape approximated with mary lou   Wound Odor None   WOUND NURSE ONLY - Time Spent with Patient (mins) 15       Wound 10/23/23 Groin Left (Active)   Date First  Assessed/Time First Assessed: 10/23/23 1313   Location: Groin  Laterality: Left      Assessments 10/30/2023 12:00 PM   Site Assessment Red;Yellow   Periwound Assessment Clean;Dry;Intact;Scar tissue   Margins Defined edges;Attached edges   Closure Secondary intention   Drainage Amount Moderate   Drainage Description Serosanguineous;Sanguineous   Treatments Cleansed;Irrigation;Nonselective debridement;Site care   Wound Cleansing Normal Saline Irrigation   Periwound Protectant Skin Protectant Wipes to Periwound;Paste Ring;Drape   Dressing Status Clean;Dry;Intact   Dressing Changed Changed   Dressing Cleansing/Solutions Normal Saline   Dressing Options Wound Vac   Dressing Change/Treatment Frequency Monday, Wednesday, Friday, and As Needed   NEXT Dressing Change/Treatment Date 11/01/23   Shape irregular linear   Wound Odor None       Negative Pressure Wound Therapy 10/25/23 Surgical Groin;Leg Left (Active)   Placement Date/Time: 10/25/23 1600   Present on Original Admission: No  Hand Hygiene Completed: Yes  Wound Type: Surgical  Location: Groin;Leg  Laterality: Left      Assessments 10/30/2023 12:00 PM   NPWT Pump Mode / Pressure Setting Ulta;Intermittent;125 mmHg   Dressing Type Medium;Black Foam (Veraflo)   Number of Foam Pieces Used 3   NEXT Dressing Change/Treatment Date 11/01/23   VAC VeraFlo Irrigant Normal Saline   VAC VeraFlo Soak Time (mins) 6   VAC VeraFlo Instill Volume (ml) 12   VAC VeraFlo - Therapy Time (hrs) 2   VAC VeraFlo Pressure (mm/Hg) 125 mmHg   WOUND NURSE ONLY - Time Spent with Patient (mins) 45        Vascular:    INGA:   No results found.    Lab Values:    Lab Results   Component Value Date/Time    WBC 9.5 10/25/2023 05:02 AM    RBC 3.01 (L) 10/25/2023 05:02 AM    HEMOGLOBIN 8.7 (L) 10/25/2023 05:02 AM    HEMATOCRIT 27.6 (L) 10/25/2023 05:02 AM         Culture Results show:  No results found for this or any previous visit (from the past 720 hour(s)).    Pain Level/Medicated:  PO pain medications  administered by bedside RN 1 hr prior       INTERVENTIONS BY WOUND TEAM:  Chart and images reviewed. Discussed with bedside RN. All areas of concern (based on picture review, LDA review and discussion with bedside RN) have been thoroughly assessed. Documentation of areas based on significant findings. This RN in to assess patient. Performed standard wound care which includes appropriate positioning, dressing removal and non-selective debridement. Pictures and measurements obtained weekly if/when required.    Wound:  Left groin and Left AKA  Preparation for Dressing removal: Removed without difficulty  Cleansed/Non-selectively Debrided with:  Wound cleanser and Gauze  Kiara wound: Cleansed with Wound cleanser and Gauze, Prepped with No Sting, Paste Rings, and Drape  Primary Dressing:  Total 3 VF black foam used for left groin (instillation) and left AKA(dry), y-connected together.    Secondary (Outer) Dressing: secured with drape and TRAC pads.  VF NPWT resumed at 125mmHg, no leaks noted. Heel offloading foam applied under tubing and pressure areas to the limb.      Wound:  Sacrum   Preparation for Dressing removal: Removed without difficulty  Cleansed/Non-selectively Debrided with:  Wound cleanser and Gauze  Kiara wound: Cleansed with Wound cleanser and Gauze, Prepped with No Sting  Primary Dressing:  dakin's moisten plain packing strip into wound bed and undermining.   Secondary (Outer) Dressing: sacral offloading foam.     Advanced Wound Care Discharge Planning  Number of Clinicians necessary to complete wound care: 2  Is patient requiring IV pain medications for dressing changes:  No   Length of time for dressing change 30 min. (This does not include chart review, pre-medication time, set up, clean up or time spent charting.)    Interdisciplinary consultation: Patient, Bedside RN (Jennifer), Helen AMANDA (Wound RN).    EVALUATION / RATIONALE FOR TREATMENT:     Date:  10/30/23  Wound Status:  Wound progressing as  expected    Left groin improving with moist red tissue growth and slough is cleaning up nicely with VF NWPT.  Likely to transition to regular with collagen at next change if all is well.  Left AKA, minimal drainage and the staples still are approximated.  Surgery was 10/23/23 with Melanie -will need a timeline to take out staples.  At this time continue VAC changes. Sacral wound still  unstageable, surgery consulted, no plans for surgical intervention at this time.  Will continue with dakin's BID dressing changes as the wound progresses.       Date:  10/28/23  Wound Status:      Sacral wound - pt had some prior breakdown around same area putting pt at greater risk of skin failure.  Unfortunately the tissue has deteriorated into a pressure injury.  Contributing factors include immobility, amputations, hip flexion contracture.  Therapies are involved but pt does not always fully participate.  Off loading measures in place/set up.  Pt may benefit from surgical consult to expedite achieving a clean wound bed that could then be vaced.  Will update Dr Sterling.     L groin and L AKA looks good       Date:  10/25/23  Wound Status:  Initial evaluation    S/P L BKA and debridement of L groin wound. Left groin with small area of induration to lateral periwound. VF initiated to mechanically debride remaining slough to wound bed, increase oxygenation and granulation tissue production to the area, and assist in wound closure by secondary intention.     L BKA incision still with drainage after removal of prevena. Incisional VAC applied to manage drainage. Dr. Navarro updated.         Goals: Steady decrease in wound area and depth weekly.    NURSING PLAN OF CARE ORDERS:  No new orders this visit    NUTRITION RECOMMENDATIONS   Wound Team Recommendations:  Protein supplements  Arginine powder     DIET ORDERS (From admission to next 24h)       Start     Ordered    10/29/23 1107  Supplements  ALL MEALS        Question:  Which Supplement   Answer:  BOOST GLUCOSE CONTROL    10/29/23 1108    10/23/23 1839  Diet Order Diet: Consistent CHO (Diabetic)  ALL MEALS        Question:  Diet:  Answer:  Consistent CHO (Diabetic)    10/23/23 1839                    PREVENTATIVE INTERVENTIONS:   Q shift Jae - performed per nursing policy  Q shift pressure point assessments - performed per nursing policy    Surface/Positioning  Low Airloss - Currently in Place  Reposition q 2 hours - Currently in Place  TAPs Turning system - Currently in Place    Offloading/Redistribution  Sacral offloading dressing (Silicone dressing) - Currently in Place  Heel offloading dressing (Silicone dressing) - Currently in Place      Respiratory  N/A    Containment/Moisture Prevention    Dri-timi pad - Currently in Place  Holguin Catheter - Currently in Place    Mobilization      Unable to assess     Anticipated discharge plans:  Skilled Nursing        Vac Discharge Needs:  Vac Discharge plan is purely a recommendation from wound team and not a requirement for discharge unless otherwise stated by physician.  Veraflo Vac while inpatient, ok to transition to Regular Vac on discharge

## 2023-10-30 NOTE — PROGRESS NOTES
Hospital Medicine Daily Progress Note    Date of Service  10/30/2023    Chief Complaint  Bilateral feet wounds    Hospital Course  Dillon Centeno is a 58 y.o. male with hypertension and tobacco dependence, admitted 9/26/2023 with bilateral feet wounds.  He was noted to have ischemic gangrene of the left lower extremity with maggots and superimposed bacterial infection.  Vascular surgery and orthopedic surgery were consulted.  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  Vascular studies showed severe arterial disease.  Patient underwent bilateral common femoral artery endarterectomy and profundoplasty with saphenous vein angioplasty with stent placement in the right external iliac artery on 9/30/2023.  He was recommended to have Plavix for 1 month and aspirin for life. Blood cultures from 9/26/2023 grew Morganella morganii and Vagococcus.  Urine culture from 9/26/2023 grew Staph epidermidis. ID were consulted.   Echocardiogram showed LVEF of about 60% with no reported valvular abnormalities.  Repeat blood cultures from 9/27/2023 have been negative.  ID recommended IV Zosyn which he completed on 10/6/2023.  Subsequently, he had breakdown of the incision site on the left BKA.  He was able to go to the OR and underwent debridement and secondary closure of dehisced surgical wound.  He was then noted to have redness and exudate with dehiscence on the femoral endarterectomy incision site. He underwent left above knee amputation with debridement of left groin wound and wound vac placement.  He developed acute kidney injury which was felt to be post renal with noted obstructive changes on renal ultrasound. Holguin catheter was placed.  Nephrology and urology were consulted.  Renal function improved.  Urology recommended Flomax and finasteride.   Patient ultimately also developed sacral wound, surgery consulted who do not recommend operative intervention. Wound vac placed on sacral wound.  Discharge planning was pursued, but  unfortunately had no accepting SNF's and no home health services accepting either.  Patient refused to go to a group home.  Plan to discharge patient to home with outpatient wound clinic follow up once home wound vacs are approved.    Interval Problem Update  No acute events overnight.  No plans for surgery for sacral wound. Wound vac applied.  Patient with wound vac attached to sacral wound, groin wound, L AKA stump.  Appears patient will need wound vac for prolonged time, will therefore plan to discharge patient home with wound vac.  Patient is medically cleared to discharge to home.  CM working on home wound vac.  Wound clinic referral placed.  Continue aspirin and plavix, 1 month plavix ends tomorrow 10/31.  Encouraged patient to mobilize to work towards goal of discharging home in the next couple of days when home wound vac is set up.    Consultants/Specialty  infectious disease, nephrology, orthopedics, urology, and vascular surgery    Code Status  Full Code    Disposition  The patient is medically cleared for discharge to home or a post-acute facility.  Anticipate discharge to: home with close outpatient follow-up    I have placed the appropriate orders for post-discharge needs.    Review of Systems  ROS     Pertinent positives/negatives as mentioned above.     A complete review of systems was personally done by me. All other systems were negative.       Physical Exam  Temp:  [36.2 °C (97.2 °F)-37.8 °C (100 °F)] 36.4 °C (97.6 °F)  Pulse:  [93-99] 99  Resp:  [16-19] 16  BP: (125-137)/(78-85) 137/85  SpO2:  [97 %-99 %] 97 %    Physical Exam  Vitals reviewed.   Constitutional:       General: He is not in acute distress.     Appearance: Normal appearance. He is not toxic-appearing or diaphoretic.   HENT:      Head: Normocephalic and atraumatic.      Nose: Nose normal.      Mouth/Throat:      Mouth: Mucous membranes are dry.      Pharynx: No oropharyngeal exudate.   Eyes:      General: No scleral icterus.      Extraocular Movements: Extraocular movements intact.      Conjunctiva/sclera: Conjunctivae normal.      Pupils: Pupils are equal, round, and reactive to light.   Cardiovascular:      Rate and Rhythm: Normal rate.      Heart sounds: Normal heart sounds. No murmur heard.     No gallop.   Pulmonary:      Effort: Pulmonary effort is normal. No respiratory distress.      Breath sounds: Normal breath sounds. No stridor. No wheezing, rhonchi or rales.   Chest:      Chest wall: No tenderness.   Abdominal:      General: Bowel sounds are normal. There is no distension.      Palpations: Abdomen is soft. There is no mass.      Tenderness: There is no abdominal tenderness. There is no guarding or rebound.   Genitourinary:     Penis: Normal.    Musculoskeletal:         General: No swelling. Normal range of motion.      Cervical back: Normal range of motion and neck supple.      Right lower leg: No edema.      Comments: L AKA site with wound vac, vac also attached to left upper groin site and sacrum   Lymphadenopathy:      Cervical: No cervical adenopathy.   Skin:     General: Skin is warm and dry.      Coloration: Skin is not jaundiced.      Findings: No rash.   Neurological:      General: No focal deficit present.      Mental Status: He is alert and oriented to person, place, and time. Mental status is at baseline.      Cranial Nerves: No cranial nerve deficit.   Psychiatric:         Mood and Affect: Mood normal.         Behavior: Behavior normal.         Thought Content: Thought content normal.         Judgment: Judgment normal.             Fluids    Intake/Output Summary (Last 24 hours) at 10/30/2023 1355  Last data filed at 10/30/2023 1038  Gross per 24 hour   Intake 1560 ml   Output 4600 ml   Net -3040 ml       Laboratory                                Imaging  US-RENAL   Final Result      1.  Bilateral medical renal disease.   2.  Moderate right and mild left hydronephrosis, likely secondary to distention of the urinary  bladder secondary to prostatic hypertrophy. Post void images of the inner bladder and collecting systems of the kidneys were not acquired.   3.  Hydronephrosis was not present on the prior MRI of the abdomen without and with IV contrast on 10/2/2023.      MR-ABDOMEN-WITH & W/O   Final Result      Multi septated cystic lesion at the upper pole LEFT kidney measuring 3.8 cm without associated enhancement or nodular component (Bosniak 2).            DX-PORTABLE FLUORO > 1 HOUR   Final Result      Portable fluoroscopy utilized for 2 minutes 20.4 seconds.         INTERPRETING LOCATION: [address] [place] [address], [place], [id number]      CT-CTA AORTA-RO WITH & W/O-POST PROCESS   Final Result         CTA AORTA      1.  Septated lobulated left upper pole renal cystic mass lesion, recommend follow-up MRI of the kidneys for further characterization.   2.  Atherosclerosis and atherosclerotic coronary artery disease.   3.  Fat-containing right inguinal hernia      CTA LOWER EXTREMITIES      1.  Occlusion of the right common iliac artery through the distal superficial femoral artery   2.  Distal right peroneal artery occlusion with 2-vessel runoff the anterior and posterior tibial arteries at the right ankle.   3.  Occlusion of the left superficial femoral artery at the bifurcation extending through its length to the popliteal artery.   4.  Occlusion of the proximal left profunda femoris just distal to the bifurcation which reconstitutes.   5.  Soft tissue gas at the stump of left below-the-knee amputation, within expected limits for recent postop changes.      3D angiographic/MIP images of the vasculature confirm the vascular findings as described above.      These findings were discussed with the patient's clinician, Keith Navarro, on 9/29/2023 8:06 AM.      EC-ECHOCARDIOGRAM COMPLETE W/O CONT   Final Result      US-AORTA/ILIACS DUPLEX COMPLETE   Final Result      US-EXTREMITY ARTERY LOWER UNILAT RIGHT   Final Result      US-INGA SINGLE  LEVEL UNILAT RIGHT   Final Result      DX-CHEST-PORTABLE (1 VIEW)   Final Result      1.  LEFT basilar atelectasis or pneumonia   2.  Trace LEFT pleural effusion or pleural scar             Assessment/Plan  * Gangrene of left foot (HCC)- (present on admission)  Assessment & Plan  - s/p Left BKA on 9/27/2023.   - Vascular studies showed severe arterial disease. S/p bilateral common femoral artery endarterectomy and profundoplasty with saphenous vein angioplasty with stent placement in the right external iliac artery on 9/30/2023. Prevena was placed (5-7 days).  Continue Plavix for 1 month (ends 10/31) and aspirin for life. Follow-up with Dr. Navarro (vascular surgery). Staples to be removed 14 to 21 days postoperatively.   - Completed course of IV Zosyn on 10/6/2023.   - Noted dehiscence of incision site on the left BKA.  s/p debridement and secondary closure of dehisced surgical wound 10/20. Maintain NWB LLE.  -Now also having wound dehiscence on the left upper thigh surgical site.    S/p L AKA 10/23 by vascular surgery, wound vac on  Completed perioperative antibiotics  -Continue pain control with oral oxycodone and IV Dilaudid.    Sacral wound, initial encounter- (present on admission)  Assessment & Plan  New since 10/28  gen surg consulted, no surgery  Wound vac applied  Pending home wound vac  Follow up with wound clinic    Hyperkalemia  Assessment & Plan  - Due to acute renal failure.    - Resolved.     GELY (acute kidney injury) ([facility])  Assessment & Plan  - Creatinine significantly jumped to 3.76.  Renal ultrasound also showed moderate right and mild left hydronephrosis with distended urinary bladder.  Suspect has post renal failure, with component of prerenal given significant elevation in BUN as well.  CPK is normal.  Urinalysis bland.  -Resolved. Now that has Holguin catheter.  Off IVF.  Potassium level has normalized, off Lokelma..  -Maintain Holguin catheter to relieve obstruction.  Continue Flomax and  finasteride.  Suspect that he will need to go home with Holguin catheter, and follow-up as outpatient with urology for definitive treatment of BPH.  resolved    PAD (peripheral artery disease) (HCC)- (present on admission)  Assessment & Plan  - s/p bilateral common femoral artery endarterectomy and profundoplasty with saphenous vein angioplasty with stent placement in the right external iliac artery on 9/30/2023. Prevena was placed (5-7 days).   -Continue Plavix for 1 month and aspirin for life.  Follow-up with vascular surgery.  Stump protector for left leg was ordered by vascular surgery.  See gangrene problem    Anemia- (present on admission)  Assessment & Plan  -Stable.  Monitor.    Bacteremia- (present on admission)  Assessment & Plan  - Blood cultures from 9/26/2023 grew Morganella morganii and Vagococcus. Echocardiogram showed no evidence of vegetations.   - ID consulted, completed course of IV Zosyn on 10/6/2023. Repeat blood cultures from 9/27/2023 have been negative.      Hyponatremia- (present on admission)  Assessment & Plan  - Appears euvolemic.  Stable. Off IVF.    Elevated liver enzymes- (present on admission)  Assessment & Plan  - Resolved.  Hepatitis panel was normal.    Leukocytosis- (present on admission)  Assessment & Plan  - Resolved.    Renal cyst- (present on admission)  Assessment & Plan  - Noted to have renal cyst on CT.  MRI shows multiseptated cystic lesions in the upper pole of left kidney measuring 3.8 cm without associated enhancement or nodular component.    -Outpatient follow-up with urology.         VTE prophylaxis: SCDs, Heparin SQ

## 2023-10-30 NOTE — ASSESSMENT & PLAN NOTE
New since 10/28  gen surg consulted, no surgery  Wound vac applied  Pending home wound vac  Follow up with wound clinic

## 2023-10-30 NOTE — ASSESSMENT & PLAN NOTE
No plans for surgical intervention.  Wound team following.  [facility].

## 2023-10-30 NOTE — CARE PLAN
The patient is Stable - Low risk of patient condition declining or worsening    Shift Goals  Clinical Goals: Pt's pain will be <6 throughout the shift  Patient Goals: Sleep and rest  Family Goals: n/a    Progress made toward(s) clinical / shift goals:      Pt is alert and oriented x4, able to communicate needs. Pt's pain is controlled with current medication. Pt able to get some sleep and rest. Pt's woundvac was leaking, vac tubing replaced and changed to regular vac therapy setting until wound team see patient today. Pt's sacral wound dressing and packing change done; Pt tolerated care well. Pt's bed alarm is on, call light within reach.

## 2023-10-30 NOTE — WOUND TEAM
Assisted Latesha CANDELARIA (Wound RN), with wound care, non-selective debridement performed using wound cleanser/NS and gauze. Please see Latesha CANDELARIA (Wound RN) wound note for further wound care details.

## 2023-10-30 NOTE — PROGRESS NOTES
"  DATE: 10/30/2023    Hospital day 34 gangrene of left foot.    INTERVAL EVENTS:  No issues or events overnight.    REVIEW OF SYSTEMS:  Review of Systems   Constitutional:  Negative for chills, fever and malaise/fatigue.   Respiratory:  Negative for shortness of breath.    Cardiovascular:  Negative for chest pain.   Gastrointestinal:  Negative for abdominal pain, constipation, nausea and vomiting.   Genitourinary:  Negative for dysuria.   Skin:  Negative for rash.   Neurological:  Negative for speech change.       PHYSICAL EXAMINATION:  Vital Signs: /85   Pulse 99   Temp 36.4 °C (97.6 °F) (Temporal)   Resp 17   Ht 1.803 m (5' 11\")   Wt 62.9 kg (138 lb 10.7 oz)   SpO2 97%   Physical Exam  Vitals and nursing note reviewed.   Constitutional:       General: He is awake. He is not in acute distress.     Appearance: He is well-developed.   Pulmonary:      Effort: Pulmonary effort is normal. No respiratory distress.   Genitourinary:     Comments: Holguin catheter in place with clear yellow urine.  Sacral wound erythematous and indurated, packing and dressing with purulent drainage.  Musculoskeletal:      Comments: Bilateral BKA.   Skin:     General: Skin is warm and dry.   Neurological:      Mental Status: He is alert. Mental status is at baseline.   Psychiatric:         Mood and Affect: Mood normal.         Behavior: Behavior normal. Behavior is cooperative.         ASSESSMENT AND PLAN:  Sacral wound, initial encounter- (present on admission)  Assessment & Plan  Wound team following.  [facility].    No contraindication to deep vein thrombosis (DVT) prophylaxis- (present on admission)  Assessment & Plan  10/2 Prophylactic dose heparin 5000 u q 8 hr initiated.     Continue sacral wound management per wound team.  No plans for surgical intervention at this time.  Will follow peripherally.       ____________________________________     CHARLES Tinajero    DD: 10/30/2023  9:22 AM    "

## 2023-10-30 NOTE — THERAPY
"Occupational Therapy  Daily Treatment     Patient Name: Dillon Centeno  Age:  58 y.o., Sex:  male  Medical Record #: [id number]  Today's Date: 10/30/2023       Precautions: Fall Risk, Other (See Comments) (wound vac)  Comments: left AKA    Assessment    Pt seen for OT tx.  Pt in a much improved mood & motivated to regain his independence.  Pt was premedicated prior to tx.  Pt was supervised for supine to sit EOB.  Pt educated on importance of relaxing left hip into ext while standing as he tends to keep left hip in flex position most of the time.  Pt stood with Min A & FWW & had poor balance reactions in standing. Pt transferred into his W/C with FWW & Mod A.  Pt provided with a waffle cushion in his W/C to reduce pressure sore. Pt encouraged to be OOB daily & propel his W/C around Nsg unit.  Continue to recommend Post Acute services prior to D/C.    Plan    Treatment Plan Status: Continue Current Treatment Plan  Type of Treatment: Self Care / Activities of Daily Living, Adaptive Equipment, Neuro Re-Education / Balance, Therapeutic Exercises, Therapeutic Activity  Treatment Frequency: 3 Times per Week  Treatment Duration: Until Therapy Goals Met    DC Equipment Recommendations: Unable to determine at this time  Discharge Recommendations: Recommend post-acute placement for additional occupational therapy services prior to discharge home    Subjective    \"I finally had a good night sleep\"     Objective      Precautions   Precautions Fall Risk;Other (See Comments)  (wound vac)   Comments left AKA   Vitals   O2 Delivery Device None - Room Air   Pain   Pain Scales 0 to 10 Scale    Intervention Ambulation / Increased Activity   Pain 0 - 10 Group   Location Leg   Location Orientation Left   Description Sharp;Sore;Aching   Therapist Pain Assessment During Activity;Nurse Notified;5  (Nsg premedicated prior to tx)   Cognition    Level of Consciousness Alert   Comments Pt much improved today & very motivated to engage in OOB " ADL's   Neuro-Muscular Treatments   Neuro-Muscular Treatments Anterior weight shift;Compensatory Strategies;Postural Facilitation;Sequencing;Tactile Cuing;Verbal Cuing;Weight Shift Right;Weight Shift Left   Comments Pt tends to have post lean in standing & difficulty wt shifting in standing   Other Treatments   Other Treatments Provided Discussed phantom pain with pt, importance of trying to relax Left hip into ext while standing.  Provided pt with a waffle cushion for his w/c to minimize pressure sore.  Pt encouraged to push his W/C around Nsg unit   Balance   Sitting Balance (Static) Fair +   Sitting Balance (Dynamic) Fair   Standing Balance (Static) Poor +   Standing Balance (Dynamic) Trace +   Weight Shift Sitting Fair   Weight Shift Standing Poor   Skilled Intervention Compensatory Strategies;Postural Facilitation;Sequencing;Tactile Cuing;Verbal Cuing   Comments Pt needs tactile cues in standing with FWW, tends to lean post & has poor balance reactions   Bed Mobility    Supine to Sit Standby Assist   Sit to Supine   (pt left up in W/C after tx)   Scooting Standby Assist   Rolling Supervised   Skilled Intervention Compensatory Strategies;Sequencing;Verbal Cuing   Activities of Daily Living   Eating Modified Independent   Grooming Supervision;Seated   Upper Body Dressing Supervision   Lower Body Dressing Minimal Assist   Skilled Intervention Compensatory Strategies;Postural Facilitation;Sequencing;Tactile Cuing;Verbal Cuing   Functional Mobility   Sit to Stand Minimal Assist   Bed, Chair, Wheelchair Transfer Moderate Assist   Transfer Method Stand Pivot   Wheelchair Assist Modified Independent   Skilled Intervention Compensatory Strategies;Postural Facilitation;Sequencing;Tactile Cuing;Verbal Cuing   Activity Tolerance   Sitting in Chair 40   Sitting Edge of Bed 15   Standing 1   Patient / Family Goals   Patient / Family Goal #1 to be independent again   Goal #1 Outcome Progressing as expected   Short Term Goals    Short Term Goal # 1 pt will demo ADL txfs with supv   Goal Outcome # 1 Progressing as expected   Short Term Goal # 2 pt will dress LB with supv   Goal Outcome # 2 Progressing as expected   Short Term Goal # 3 pt will demo toileting with supv   Goal Outcome # 3 Progressing as expected   Education Group   Role of Occupational Therapist Patient Response Patient;Acceptance;Explanation;Verbal Demonstration   ADL Patient Response Patient;Acceptance;Explanation;Demonstration;Action Demonstration;Reinforcement Needed   Occupational Therapy Treatment Plan    O.T. Treatment Plan Continue Current Treatment Plan   Anticipated Discharge Equipment and Recommendations   DC Equipment Recommendations Unable to determine at this time   Discharge Recommendations Recommend post-acute placement for additional occupational therapy services prior to discharge home   Interdisciplinary Plan of Care Collaboration   IDT Collaboration with  Nursing   Patient Position at End of Therapy Seated;Call Light within Reach;Tray Table within Reach;Phone within Reach   Collaboration Comments Nsg notified of OT findings   Session Information   Date / Session Number  10/30 #6 (1/3, 11/1)

## 2023-10-30 NOTE — DISCHARGE PLANNING
Case Management Discharge Planning   I spoke with Radha ([facility]) on the phone regarding wound vac. Faxed order and clinicals. I called [facility] at [facility] to verify receipt of referral.    Admission Date: 9/26/2023  GMLOS: 11.1  ALOS: 34    6-Clicks ADL Score: 16  6-Clicks Mobility Score: 11  PT and/or OT Eval ordered: Yes  Post-acute Referrals Ordered: Yes  Post-acute Choice Obtained: Yes  Has referral(s) been sent to post-acute provider:  Yes      Anticipated Discharge Dispo: Discharge Disposition: D/T to home under [facility] care in anticipation of covered skilled care (06)    DME Needed: Yes    DME Ordered: Yes    Action(s) Taken: Updated Provider/Nurse on Discharge Plan and Referral(s) sent    Escalations Completed: Provider, Insurance , and DME Company    Medically Clear: No    Next Steps:     Barriers to Discharge: Medical clearance, DME, and Outpatient referrals pending    Is the patient up for discharge tomorrow: No

## 2023-10-31 PROBLEM — D75.839 THROMBOCYTOSIS: Status: ACTIVE | Noted: 2023-10-31

## 2023-10-31 PROCEDURE — 700102 HCHG RX REV CODE 250 W/ 637 OVERRIDE(OP): Performed by: STUDENT IN AN ORGANIZED HEALTH CARE EDUCATION/TRAINING PROGRAM

## 2023-10-31 PROCEDURE — 700101 HCHG RX REV CODE 250: Performed by: STUDENT IN AN ORGANIZED HEALTH CARE EDUCATION/TRAINING PROGRAM

## 2023-10-31 PROCEDURE — 700111 HCHG RX REV CODE 636 W/ 250 OVERRIDE (IP): Performed by: STUDENT IN AN ORGANIZED HEALTH CARE EDUCATION/TRAINING PROGRAM

## 2023-10-31 PROCEDURE — RXMED WILLOW AMBULATORY MEDICATION CHARGE: Performed by: INTERNAL MEDICINE

## 2023-10-31 PROCEDURE — A9270 NON-COVERED ITEM OR SERVICE: HCPCS | Performed by: STUDENT IN AN ORGANIZED HEALTH CARE EDUCATION/TRAINING PROGRAM

## 2023-10-31 PROCEDURE — 99232 SBSQ HOSP IP/OBS MODERATE 35: CPT | Performed by: INTERNAL MEDICINE

## 2023-10-31 PROCEDURE — 770006 HCHG ROOM/CARE - MED/SURG/GYN SEMI*

## 2023-10-31 RX ORDER — TAMSULOSIN HYDROCHLORIDE 0.4 MG/1
0.4 CAPSULE ORAL
Qty: 30 CAPSULE | Refills: 1 | Status: SHIPPED | OUTPATIENT
Start: 2023-11-01

## 2023-10-31 RX ORDER — OXYCODONE HYDROCHLORIDE 5 MG/1
10 TABLET ORAL EVERY 6 HOURS PRN
Qty: 12 TABLET | Refills: 0 | Status: SHIPPED | OUTPATIENT
Start: 2023-10-31 | End: 2023-11-06

## 2023-10-31 RX ORDER — ATORVASTATIN CALCIUM 40 MG/1
40 TABLET, FILM COATED ORAL EVERY EVENING
Qty: 30 TABLET | Refills: 1 | Status: ON HOLD | OUTPATIENT
Start: 2023-10-31 | End: 2023-11-29

## 2023-10-31 RX ORDER — ASPIRIN 81 MG/1
81 TABLET ORAL DAILY
Qty: 100 TABLET | Refills: 1 | Status: ON HOLD | OUTPATIENT
Start: 2023-11-01 | End: 2023-11-29

## 2023-10-31 RX ORDER — FINASTERIDE 5 MG/1
5 TABLET, FILM COATED ORAL DAILY
Qty: 30 TABLET | Refills: 1 | Status: SHIPPED | OUTPATIENT
Start: 2023-11-01

## 2023-10-31 RX ORDER — CYCLOBENZAPRINE HCL 10 MG
10 TABLET ORAL 3 TIMES DAILY PRN
Qty: 30 TABLET | Refills: 0 | Status: ON HOLD | OUTPATIENT
Start: 2023-10-31 | End: 2023-11-29

## 2023-10-31 RX ORDER — GABAPENTIN 300 MG/1
300 CAPSULE ORAL 3 TIMES DAILY
Qty: 90 CAPSULE | Refills: 1 | Status: ON HOLD | OUTPATIENT
Start: 2023-10-31 | End: 2023-11-29

## 2023-10-31 RX ADMIN — METHOCARBAMOL 500 MG: 500 TABLET ORAL at 12:34

## 2023-10-31 RX ADMIN — HYDROMORPHONE HYDROCHLORIDE 1 MG: 1 INJECTION, SOLUTION INTRAMUSCULAR; INTRAVENOUS; SUBCUTANEOUS at 10:11

## 2023-10-31 RX ADMIN — TRAMADOL HYDROCHLORIDE 50 MG: 50 TABLET ORAL at 12:34

## 2023-10-31 RX ADMIN — Medication 5 MG: at 22:28

## 2023-10-31 RX ADMIN — ATORVASTATIN CALCIUM 40 MG: 40 TABLET, FILM COATED ORAL at 18:08

## 2023-10-31 RX ADMIN — ASPIRIN 81 MG: 81 TABLET, COATED ORAL at 05:36

## 2023-10-31 RX ADMIN — SODIUM HYPOCHLORITE 1 ML: 1.25 SOLUTION TOPICAL at 05:37

## 2023-10-31 RX ADMIN — CYCLOBENZAPRINE 10 MG: 10 TABLET, FILM COATED ORAL at 09:42

## 2023-10-31 RX ADMIN — HEPARIN SODIUM 5000 UNITS: 5000 INJECTION, SOLUTION INTRAVENOUS; SUBCUTANEOUS at 05:36

## 2023-10-31 RX ADMIN — OXYCODONE HYDROCHLORIDE 20 MG: 10 TABLET ORAL at 05:36

## 2023-10-31 RX ADMIN — TRAMADOL HYDROCHLORIDE 50 MG: 50 TABLET ORAL at 18:08

## 2023-10-31 RX ADMIN — FINASTERIDE 5 MG: 5 TABLET, FILM COATED ORAL at 05:36

## 2023-10-31 RX ADMIN — METHOCARBAMOL 500 MG: 500 TABLET ORAL at 09:42

## 2023-10-31 RX ADMIN — CYCLOBENZAPRINE 10 MG: 10 TABLET, FILM COATED ORAL at 15:40

## 2023-10-31 RX ADMIN — NICOTINE TRANSDERMAL SYSTEM 21 MG: 21 PATCH, EXTENDED RELEASE TRANSDERMAL at 05:36

## 2023-10-31 RX ADMIN — OXYCODONE HYDROCHLORIDE 20 MG: 10 TABLET ORAL at 09:43

## 2023-10-31 RX ADMIN — METHOCARBAMOL 500 MG: 500 TABLET ORAL at 20:04

## 2023-10-31 RX ADMIN — OXYCODONE HYDROCHLORIDE 20 MG: 10 TABLET ORAL at 15:39

## 2023-10-31 RX ADMIN — GABAPENTIN 300 MG: 300 CAPSULE ORAL at 12:34

## 2023-10-31 RX ADMIN — OXYCODONE HYDROCHLORIDE 20 MG: 10 TABLET ORAL at 20:03

## 2023-10-31 RX ADMIN — METHOCARBAMOL 500 MG: 500 TABLET ORAL at 18:08

## 2023-10-31 RX ADMIN — ACETAMINOPHEN 650 MG: 325 TABLET, FILM COATED ORAL at 12:34

## 2023-10-31 RX ADMIN — GABAPENTIN 300 MG: 300 CAPSULE ORAL at 05:36

## 2023-10-31 RX ADMIN — GABAPENTIN 300 MG: 300 CAPSULE ORAL at 18:08

## 2023-10-31 RX ADMIN — HYDROMORPHONE HYDROCHLORIDE 1 MG: 1 INJECTION, SOLUTION INTRAMUSCULAR; INTRAVENOUS; SUBCUTANEOUS at 14:13

## 2023-10-31 RX ADMIN — HEPARIN SODIUM 5000 UNITS: 5000 INJECTION, SOLUTION INTRAVENOUS; SUBCUTANEOUS at 14:13

## 2023-10-31 RX ADMIN — HEPARIN SODIUM 5000 UNITS: 5000 INJECTION, SOLUTION INTRAVENOUS; SUBCUTANEOUS at 22:28

## 2023-10-31 RX ADMIN — TRAMADOL HYDROCHLORIDE 50 MG: 50 TABLET ORAL at 23:48

## 2023-10-31 RX ADMIN — TAMSULOSIN HYDROCHLORIDE 0.4 MG: 0.4 CAPSULE ORAL at 09:43

## 2023-10-31 ASSESSMENT — ENCOUNTER SYMPTOMS
SEIZURES: 0
CHILLS: 0
DOUBLE VISION: 0
HALLUCINATIONS: 0
BLURRED VISION: 0
HEADACHES: 0
ABDOMINAL PAIN: 0
BACK PAIN: 0
NAUSEA: 0
SORE THROAT: 0
VOMITING: 0
DEPRESSION: 0
PALPITATIONS: 0
FALLS: 0
SHORTNESS OF BREATH: 0
COUGH: 0
FEVER: 0

## 2023-10-31 ASSESSMENT — PAIN DESCRIPTION - PAIN TYPE
TYPE: CHRONIC PAIN;PHANTOM PAIN
TYPE: CHRONIC PAIN;NEUROPATHIC PAIN;PHANTOM PAIN
TYPE: CHRONIC PAIN;PHANTOM PAIN
TYPE: CHRONIC PAIN;PHANTOM PAIN
TYPE: CHRONIC PAIN
TYPE: NEUROPATHIC PAIN;PHANTOM PAIN
TYPE: NEUROPATHIC PAIN;PHANTOM PAIN
TYPE: CHRONIC PAIN;NEUROPATHIC PAIN;PHANTOM PAIN

## 2023-10-31 NOTE — CARE PLAN
Problem: Knowledge Deficit - Standard  Goal: Patient and family/care givers will demonstrate understanding of plan of care, disease process/condition, diagnostic tests and medications  Outcome: Progressing     Problem: Pain - Standard  Goal: Alleviation of pain or a reduction in pain to the patient’s comfort goal  Outcome: Progressing     Problem: Skin Integrity  Goal: Skin integrity is maintained or improved  Outcome: Progressing     Problem: Fall Risk  Goal: Patient will remain free from falls  Outcome: Progressing     Problem: Infection - Standard  Goal: Patient will remain free from infection  Outcome: Progressing     Problem: Wound/ / Incision Healing  Goal: Patient's wound/surgical incision will decrease in size and heals properly  Outcome: Progressing   The patient is Stable - Low risk of patient condition declining or worsening    Shift Goals  Clinical Goals: L knee pain < 8  Patient Goals: PT/OT session today  Family Goals: n/a    Progress made toward(s) clinical / shift goals:  pt progressing toward goals    Patient is not progressing towards the following goals:

## 2023-10-31 NOTE — PROGRESS NOTES
Hospital Medicine Daily Progress Note    Date of Service  10/31/2023    Chief Complaint  Bilateral feet wounds    Hospital Course  Dillon Centeno is a 58 y.o. male with hypertension and tobacco dependence, admitted 9/26/2023 with bilateral feet wounds.  He was noted to have ischemic gangrene of the left lower extremity with maggots and superimposed bacterial infection.  Vascular surgery and orthopedic surgery were consulted.  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  Vascular studies showed severe arterial disease.  Patient underwent bilateral common femoral artery endarterectomy and profundoplasty with saphenous vein angioplasty with stent placement in the right external iliac artery on 9/30/2023.  He was recommended to have Plavix for 1 month and aspirin for life. Blood cultures from 9/26/2023 grew Morganella morganii and Vagococcus.  Urine culture from 9/26/2023 grew Staph epidermidis. ID were consulted.   Echocardiogram showed LVEF of about 60% with no reported valvular abnormalities.  Repeat blood cultures from 9/27/2023 have been negative.  ID recommended IV Zosyn which he completed on 10/6/2023.  Subsequently, he had breakdown of the incision site on the left BKA.  He was able to go to the OR and underwent debridement and secondary closure of dehisced surgical wound.  He was then noted to have redness and exudate with dehiscence on the femoral endarterectomy incision site. He underwent left above knee amputation with debridement of left groin wound and wound vac placement.  He developed acute kidney injury which was felt to be post renal with noted obstructive changes on renal ultrasound. Holguin catheter was placed.  Nephrology and urology were consulted.  Renal function improved.  Urology recommended Flomax and finasteride.   Patient ultimately also developed sacral wound, surgery consulted who do not recommend operative intervention. Wound vac placed on sacral wound.  Discharge planning was pursued, but  unfortunately had no accepting SNF's and no home health services accepting either.  Patient refused to go to a group home.  Plan to discharge patient to home with outpatient wound clinic follow up once home wound vacs are approved.    Interval Problem Update  Patient was seen and examined at bedside.  No acute events overnight. Patient is resting comfortably in bed and in no acute distress.     Wound vac in place  Anticipate discharge home in AM with home wound vac    Consultants/Specialty  infectious disease, nephrology, orthopedics, urology, and vascular surgery    Code Status  Full Code    Disposition  The patient is medically cleared for discharge to home or a post-acute facility.      I have placed the appropriate orders for post-discharge needs.    Review of Systems  Review of Systems   Constitutional:  Negative for chills and fever.   HENT:  Negative for congestion and sore throat.    Eyes:  Negative for blurred vision and double vision.   Respiratory:  Negative for cough and shortness of breath.    Cardiovascular:  Negative for chest pain and palpitations.   Gastrointestinal:  Negative for abdominal pain, nausea and vomiting.   Genitourinary:  Negative for dysuria and frequency.   Musculoskeletal:  Negative for back pain and falls.   Skin:  Negative for rash.   Neurological:  Negative for seizures and headaches.   Psychiatric/Behavioral:  Negative for depression and hallucinations.         Pertinent positives/negatives as mentioned above.     A complete review of systems was personally done by me. All other systems were negative.       Physical Exam  Temp:  [36.4 °C (97.5 °F)-36.9 °C (98.4 °F)] 36.7 °C (98.1 °F)  Pulse:  [73-99] 89  Resp:  [16-18] 17  BP: (120-141)/(70-85) 133/84  SpO2:  [92 %-98 %] 94 %    Physical Exam  Vitals reviewed.   Constitutional:       General: He is not in acute distress.     Appearance: Normal appearance.      Comments: Pleasant, conversational   HENT:      Head: Normocephalic  and atraumatic.      Right Ear: External ear normal.      Left Ear: External ear normal.      Nose: Nose normal. No congestion.      Mouth/Throat:      Mouth: Mucous membranes are dry.      Pharynx: No oropharyngeal exudate.   Eyes:      General: No scleral icterus.     Extraocular Movements: Extraocular movements intact.      Conjunctiva/sclera: Conjunctivae normal.      Pupils: Pupils are equal, round, and reactive to light.   Cardiovascular:      Rate and Rhythm: Normal rate.      Heart sounds: Normal heart sounds. No murmur heard.     No gallop.   Pulmonary:      Effort: Pulmonary effort is normal.      Breath sounds: Normal breath sounds. No wheezing.   Abdominal:      General: Bowel sounds are normal.      Palpations: Abdomen is soft.      Tenderness: There is no abdominal tenderness. There is no guarding or rebound.   Genitourinary:     Penis: Normal.    Musculoskeletal:         General: No deformity. Normal range of motion.      Cervical back: Normal range of motion and neck supple.      Right lower leg: No edema.      Comments: L AKA site with wound vac, vac also attached to left upper groin site and sacrum   Lymphadenopathy:      Cervical: No cervical adenopathy.   Skin:     General: Skin is warm and dry.      Coloration: Skin is not jaundiced.      Findings: No bruising.   Neurological:      General: No focal deficit present.      Mental Status: He is alert and oriented to person, place, and time.      Cranial Nerves: No cranial nerve deficit.   Psychiatric:         Mood and Affect: Mood normal.         Behavior: Behavior normal.             Fluids    Intake/Output Summary (Last 24 hours) at 10/31/2023 1547  Last data filed at 10/31/2023 1011  Gross per 24 hour   Intake 1308 ml   Output 2400 ml   Net -1092 ml       Laboratory                                Imaging  US-RENAL   Final Result      1.  Bilateral medical renal disease.   2.  Moderate right and mild left hydronephrosis, likely secondary to  distention of the urinary bladder secondary to prostatic hypertrophy. Post void images of the inner bladder and collecting systems of the kidneys were not acquired.   3.  Hydronephrosis was not present on the prior MRI of the abdomen without and with IV contrast on 10/2/2023.      MR-ABDOMEN-WITH & W/O   Final Result      Multi septated cystic lesion at the upper pole LEFT kidney measuring 3.8 cm without associated enhancement or nodular component (Bosniak 2).            DX-PORTABLE FLUORO > 1 HOUR   Final Result      Portable fluoroscopy utilized for 2 minutes 20.4 seconds.         INTERPRETING LOCATION: [facility], [place], [address]      CT-CTA AORTA-RO WITH & W/O-POST PROCESS   Final Result         CTA AORTA      1.  Septated lobulated left upper pole renal cystic mass lesion, recommend follow-up MRI of the kidneys for further characterization.   2.  Atherosclerosis and atherosclerotic coronary artery disease.   3.  Fat-containing right inguinal hernia      CTA LOWER EXTREMITIES      1.  Occlusion of the right common iliac artery through the distal superficial femoral artery   2.  Distal right peroneal artery occlusion with 2-vessel runoff the anterior and posterior tibial arteries at the right ankle.   3.  Occlusion of the left superficial femoral artery at the bifurcation extending through its length to the popliteal artery.   4.  Occlusion of the proximal left profunda femoris just distal to the bifurcation which reconstitutes.   5.  Soft tissue gas at the stump of left below-the-knee amputation, within expected limits for recent postop changes.      3D angiographic/MIP images of the vasculature confirm the vascular findings as described above.      These findings were discussed with the patient's clinician, Keith Navarro, on 9/29/2023 8:06 AM.      EC-ECHOCARDIOGRAM COMPLETE W/O CONT   Final Result      US-AORTA/ILIACS DUPLEX COMPLETE   Final Result      US-EXTREMITY ARTERY LOWER UNILAT RIGHT   Final  Result      US-INGA SINGLE LEVEL UNILAT RIGHT   Final Result      DX-CHEST-PORTABLE (1 VIEW)   Final Result      1.  LEFT basilar atelectasis or pneumonia   2.  Trace LEFT pleural effusion or pleural scar             Assessment/Plan  * Gangrene of left foot (HCC)- (present on admission)  Assessment & Plan  - s/p Left BKA on 9/27/2023.   - Vascular studies showed severe arterial disease. S/p bilateral common femoral artery endarterectomy and profundoplasty with saphenous vein angioplasty with stent placement in the right external iliac artery on 9/30/2023. Prevena was placed (5-7 days).  Continue Plavix for 1 month (ends 10/31) and aspirin for life. Follow-up with Dr. Navarro (vascular surgery). Staples to be removed 14 to 21 days postoperatively.   - Completed course of IV Zosyn on 10/6/2023.   - Noted dehiscence of incision site on the left BKA.  s/p debridement and secondary closure of dehisced surgical wound 10/20. Maintain NWB LLE.  -Now also having wound dehiscence on the left upper thigh surgical site.    S/p L AKA 10/23 by vascular surgery, wound vac on  Completed perioperative antibiotics  -Continue pain control with oral oxycodone and IV Dilaudid.    Thrombocytosis  Assessment & Plan  Plt 570    Sacral wound, initial encounter- (present on admission)  Assessment & Plan  New since 10/28  gen surg consulted, no surgery  Wound vac applied  Pending home wound vac  Follow up with wound clinic    Hyperkalemia  Assessment & Plan  - Due to acute renal failure.    - Resolved.     GELY (acute kidney injury) ([facility])  Assessment & Plan  - Creatinine significantly jumped to 3.76.  Renal ultrasound also showed moderate right and mild left hydronephrosis with distended urinary bladder.  Suspect has post renal failure, with component of prerenal given significant elevation in BUN as well.  CPK is normal.  Urinalysis bland.  -Resolved. Now that has Holguin catheter.  Off IVF.  Potassium level has normalized, off  Lokelma..  -Maintain Holguin catheter to relieve obstruction.  Continue Flomax and finasteride.  Suspect that he will need to go home with Holguin catheter, and follow-up as outpatient with urology for definitive treatment of BPH.  resolved    PAD (peripheral artery disease) (HCC)- (present on admission)  Assessment & Plan  - s/p bilateral common femoral artery endarterectomy and profundoplasty with saphenous vein angioplasty with stent placement in the right external iliac artery on 9/30/2023. Prevena was placed (5-7 days).   -Continue Plavix for 1 month and aspirin for life.  Follow-up with vascular surgery.  Stump protector for left leg was ordered by vascular surgery.  See gangrene problem    Anemia- (present on admission)  Assessment & Plan  -Stable.  Monitor.    Bacteremia- (present on admission)  Assessment & Plan  - Blood cultures from 9/26/2023 grew Morganella morganii and Vagococcus. Echocardiogram showed no evidence of vegetations.   - ID consulted, completed course of IV Zosyn on 10/6/2023. Repeat blood cultures from 9/27/2023 have been negative.      Hyponatremia- (present on admission)  Assessment & Plan  - Appears euvolemic.  Stable. Off IVF.    Elevated liver enzymes- (present on admission)  Assessment & Plan  - Resolved.  Hepatitis panel was normal.    Leukocytosis- (present on admission)  Assessment & Plan  - Resolved.    Renal cyst- (present on admission)  Assessment & Plan  - Noted to have renal cyst on CT.  MRI shows multiseptated cystic lesions in the upper pole of left kidney measuring 3.8 cm without associated enhancement or nodular component.    -Outpatient follow-up with urology.         VTE prophylaxis: SCDs, Heparin SQ

## 2023-10-31 NOTE — DISCHARGE PLANNING
Case Management Discharge Planning    Admission Date: 9/26/2023  GMLOS: 11.1  ALOS: 35    6-Clicks ADL Score: 19  6-Clicks Mobility Score: 11  PT and/or OT Eval ordered: Yes  Post-acute Referrals Ordered: Yes  Post-acute Choice Obtained: Yes  Has referral(s) been sent to post-acute provider:  Yes      Anticipated Discharge Dispo: Discharge Disposition: Discharged to home/self care (01)    DME Needed: Yes    DME Ordered: Yes    Action(s) Taken: LMSW spoke with Radha from [facility]. Per Radha, since pt has Medicaid it can take 3-5 days to get auth. Radha did report that if the pt did get the wound changed to a Prevena from wound care, then [facility] can follow that until they get auth from insurance.     Escalations Completed: None    Medically Clear: Yes    Next Steps: LMSW to follow as needed.     Barriers to Discharge: DME    Is the patient up for discharge tomorrow: No

## 2023-11-01 PROCEDURE — A9270 NON-COVERED ITEM OR SERVICE: HCPCS | Performed by: STUDENT IN AN ORGANIZED HEALTH CARE EDUCATION/TRAINING PROGRAM

## 2023-11-01 PROCEDURE — 770006 HCHG ROOM/CARE - MED/SURG/GYN SEMI*

## 2023-11-01 PROCEDURE — 700102 HCHG RX REV CODE 250 W/ 637 OVERRIDE(OP): Performed by: STUDENT IN AN ORGANIZED HEALTH CARE EDUCATION/TRAINING PROGRAM

## 2023-11-01 PROCEDURE — 700111 HCHG RX REV CODE 636 W/ 250 OVERRIDE (IP): Performed by: STUDENT IN AN ORGANIZED HEALTH CARE EDUCATION/TRAINING PROGRAM

## 2023-11-01 PROCEDURE — 99232 SBSQ HOSP IP/OBS MODERATE 35: CPT | Performed by: NURSE PRACTITIONER

## 2023-11-01 PROCEDURE — 99232 SBSQ HOSP IP/OBS MODERATE 35: CPT | Performed by: INTERNAL MEDICINE

## 2023-11-01 PROCEDURE — 302098 PASTE RING (FLAT): Performed by: INTERNAL MEDICINE

## 2023-11-01 RX ADMIN — METHOCARBAMOL 500 MG: 500 TABLET ORAL at 19:50

## 2023-11-01 RX ADMIN — GABAPENTIN 300 MG: 300 CAPSULE ORAL at 05:10

## 2023-11-01 RX ADMIN — TAMSULOSIN HYDROCHLORIDE 0.4 MG: 0.4 CAPSULE ORAL at 10:16

## 2023-11-01 RX ADMIN — FINASTERIDE 5 MG: 5 TABLET, FILM COATED ORAL at 05:11

## 2023-11-01 RX ADMIN — METHOCARBAMOL 500 MG: 500 TABLET ORAL at 17:25

## 2023-11-01 RX ADMIN — ATORVASTATIN CALCIUM 40 MG: 40 TABLET, FILM COATED ORAL at 17:25

## 2023-11-01 RX ADMIN — TRAMADOL HYDROCHLORIDE 50 MG: 50 TABLET ORAL at 05:10

## 2023-11-01 RX ADMIN — NICOTINE TRANSDERMAL SYSTEM 21 MG: 21 PATCH, EXTENDED RELEASE TRANSDERMAL at 05:11

## 2023-11-01 RX ADMIN — GABAPENTIN 300 MG: 300 CAPSULE ORAL at 17:25

## 2023-11-01 RX ADMIN — TRAMADOL HYDROCHLORIDE 50 MG: 50 TABLET ORAL at 13:25

## 2023-11-01 RX ADMIN — HEPARIN SODIUM 5000 UNITS: 5000 INJECTION, SOLUTION INTRAVENOUS; SUBCUTANEOUS at 13:26

## 2023-11-01 RX ADMIN — Medication 5 MG: at 19:50

## 2023-11-01 RX ADMIN — HEPARIN SODIUM 5000 UNITS: 5000 INJECTION, SOLUTION INTRAVENOUS; SUBCUTANEOUS at 05:10

## 2023-11-01 RX ADMIN — ASPIRIN 81 MG: 81 TABLET, COATED ORAL at 05:11

## 2023-11-01 RX ADMIN — TRAMADOL HYDROCHLORIDE 50 MG: 50 TABLET ORAL at 17:25

## 2023-11-01 RX ADMIN — METHOCARBAMOL 500 MG: 500 TABLET ORAL at 08:03

## 2023-11-01 RX ADMIN — ACETAMINOPHEN 650 MG: 325 TABLET, FILM COATED ORAL at 05:16

## 2023-11-01 RX ADMIN — OXYCODONE HYDROCHLORIDE 20 MG: 10 TABLET ORAL at 08:03

## 2023-11-01 RX ADMIN — METHOCARBAMOL 500 MG: 500 TABLET ORAL at 13:26

## 2023-11-01 RX ADMIN — HEPARIN SODIUM 5000 UNITS: 5000 INJECTION, SOLUTION INTRAVENOUS; SUBCUTANEOUS at 20:38

## 2023-11-01 RX ADMIN — OXYCODONE HYDROCHLORIDE 15 MG: 15 TABLET ORAL at 19:50

## 2023-11-01 RX ADMIN — GABAPENTIN 300 MG: 300 CAPSULE ORAL at 13:26

## 2023-11-01 RX ADMIN — ACETAMINOPHEN 650 MG: 325 TABLET, FILM COATED ORAL at 13:29

## 2023-11-01 ASSESSMENT — ENCOUNTER SYMPTOMS
ABDOMINAL PAIN: 0
DOUBLE VISION: 0
MYALGIAS: 1
PALPITATIONS: 0
CONSTIPATION: 0
SORE THROAT: 0
COUGH: 0
FALLS: 0
DEPRESSION: 0
BLURRED VISION: 0
NAUSEA: 0
SHORTNESS OF BREATH: 0
BACK PAIN: 0
SEIZURES: 0
HEADACHES: 0
CHILLS: 0
FEVER: 0
VOMITING: 0
HALLUCINATIONS: 0
SPEECH CHANGE: 0

## 2023-11-01 ASSESSMENT — PAIN DESCRIPTION - PAIN TYPE
TYPE: CHRONIC PAIN
TYPE: ACUTE PAIN
TYPE: CHRONIC PAIN
TYPE: CHRONIC PAIN
TYPE: ACUTE PAIN
TYPE: CHRONIC PAIN

## 2023-11-01 NOTE — PROGRESS NOTES
"  DATE: 11/1/2023    Hospital day 36 gangrene of left foot.    INTERVAL EVENTS:  Doing ok, adequate pain control. No pain from sacral wound. Concerns regarding discharge.  No change to sacral wound appearance in 48 hrs.    REVIEW OF SYSTEMS:  Review of Systems   Constitutional:  Negative for chills, fever and malaise/fatigue.   Respiratory:  Negative for shortness of breath.    Cardiovascular:  Negative for chest pain.   Gastrointestinal:  Negative for abdominal pain, constipation, nausea and vomiting.   Genitourinary:  Negative for dysuria.   Musculoskeletal:  Positive for myalgias (left knee).   Skin:  Negative for rash.   Neurological:  Negative for speech change.       PHYSICAL EXAMINATION:  Vital Signs: /80   Pulse 97   Temp 36.5 °C (97.7 °F) (Temporal)   Resp 17   Ht 1.803 m (5' 11\")   Wt 62.9 kg (138 lb 10.7 oz)   SpO2 96%   Physical Exam  Vitals and nursing note reviewed.   Constitutional:       General: He is awake. He is not in acute distress.     Appearance: He is well-developed.   Pulmonary:      Effort: Pulmonary effort is normal. No respiratory distress.   Genitourinary:     Comments: Holguin catheter in place with clear yellow urine.  Wound VAC to left groin in place and functional.  Sacral wound erythematous and indurated, packing and dressing with purulent drainage. No significant changes from 48 hrs ago.  Musculoskeletal:      Comments: Left BKA.   Skin:     General: Skin is warm and dry.   Neurological:      Mental Status: He is alert. Mental status is at baseline.   Psychiatric:         Mood and Affect: Mood normal.         Behavior: Behavior normal. Behavior is cooperative.         ASSESSMENT AND PLAN:    Sacral wound, initial encounter- (present on admission)  Assessment & Plan  No plans for surgical intervention.  Wound team following.  [facility].    No contraindication to deep vein thrombosis (DVT) prophylaxis- (present on admission)  Assessment & Plan  10/2 " Prophylactic dose heparin 5000 u q 8 hr initiated.       No plans for surgical debridement of sacral wound.  Needs good daily wound cleansing and dressing changes.  Acute Care Surgery will sign off, please re-consult if needed.       ____________________________________     CHARLES Tinajeor    DD: 11/1/2023  9:17 AM

## 2023-11-01 NOTE — DISCHARGE INSTR - WOUND CARE
LEFT GROIN: Remove dressing. Gently cleanse wound with wound cleanser and gauze. Pat dry. Apply no sting to area immediately around wound. Cut a piece of hydrocolloid thin to fit over wound bed and apply over wound. Secure in place with adhesive foam. Change every 3 days and as needed related to dislodgement.    LEFT STUMP: Remove old  dressing. Cleanse wound with wound cleanser and gauze. Pat dry. Apply no sting skin barrier to area immediately around wound. Then cut a piece of aquacel ag (hydrofiber silver) to fit wound bed. Secure in place with an adhesive foam or heel offloading dressing. Change every 3 days and as needed related to dislodgement and or drainage saturation.

## 2023-11-01 NOTE — CARE PLAN
The patient is Stable - Low risk of patient condition declining or worsening    Shift Goals  Clinical Goals: Pt's pain will be <5 throughout the shift  Patient Goals: Sleep and rest  Family Goals: n/a    Progress made toward(s) clinical / shift goals:     Pt is alert and oriented x4; able to verbalize needs. Discharge plan to home with woundvac. Pt's pain is controlled with current medication. Pt's bed alarm is on; call light within reach.

## 2023-11-01 NOTE — WOUND TEAM
[facility]  Inpatient Services  Wound and Skin Care Follow-up    Admission Date: 9/26/2023     Last order of IP CONSULT TO WOUND CARE was found on 10/29/2023 from Hospital Encounter on 9/26/2023     HPI, PMH, SH: Reviewed    Past Surgical History:   Procedure Laterality Date    INCISION AND DRAINAGE GENERAL Left 10/23/2023    Procedure: INCISION AND DRAINAGE;  Surgeon: Keith Navarro M.D.;  Location: [facility];  Service: Vascular    APPLICATION OR REPLACEMENT, WOUND VAC  10/23/2023    Procedure: APPLICATION OR REPLACEMENT, WOUND VAC;  Surgeon: Keith Navarro M.D.;  Location: [facility];  Service: Vascular    KNEE AMPUTATION ABOVE Left 10/23/2023    Procedure: AMPUTATION, ABOVE KNEE;  Surgeon: Keith Navarro M.D.;  Location: [facility];  Service: Vascular    KNEE AMPUTATION BELOW Left 10/20/2023    Procedure: AMPUTATION, BELOW KNEE WOUND REVISION;  Surgeon: Pradip Altamirano M.D.;  Location: [facility];  Service: Orthopedics    FEMORAL ENDARTERECTOMY Bilateral 9/30/2023    Procedure: ENDARTERECTOMY, FEMORAL;  Surgeon: Keith Navarro M.D.;  Location: [facility];  Service: Vascular    ANGIOGRAM, WITH ANGIOPLASTY, AND STENT INSERTION IF INDICATED Right 9/30/2023    Procedure: ANGIOGRAM, WITH ILIAC STENT;  Surgeon: Keith Navarro M.D.;  Location: [facility];  Service: Vascular    KNEE AMPUTATION BELOW Left 9/27/2023    Procedure: AMPUTATION, BELOW KNEE;  Surgeon: Pradip Altamirano M.D.;  Location: [facility];  Service: Orthopedics    IRRIGATION & DEBRIDEMENT ORTHO Left 3/4/2018    Procedure: IRRIGATION & DEBRIDEMENT ORTHO - HUMERUS;  Surgeon: Sergio Watkins M.D.;  Location: [facility];  Service: Orthopedics    ORIF, SHOULDER Left 1/4/2018    Procedure: SHOULDER ORIF;  Surgeon: Sergio Watkins M.D.;  Location: [facility];  Service: Orthopedics    CLOSED REDUCTION Left 12/24/2017    Procedure:  CLOSED REDUCTION;  Surgeon: Keith Subramanian M.D.;  Location: [facility];  Service: Orthopedics    OTHER      multiple surgeries lower legs from past injuries     Social History     Tobacco Use    Smoking status: Every Day     Types: Cigars    Smokeless tobacco: Never   Substance Use Topics    Alcohol use: Yes     Comment: a few beers a week     Chief Complaint   Patient presents with    Wound Check     Bilateral feet. Pts L foot is black and sloughing. Pt states unable to ambulate due to pain     Diagnosis: Gangrene of left foot (HCC) [I96]    Unit where seen by Wound Team: S602/01     WOUND FOLLOW UP RELATED TO:  L groin, L AKA       WOUND TEAM PLAN OF CARE - Frequency of Follow-up:   Nursing to follow dressing orders written for wound care. Contact wound team if area fails to progress, deteriorates or with any questions/concerns if something comes up before next scheduled follow up (See below as to whether wound is following and frequency of wound follow up)  Dressing changes by wound team:                   NPWT change 3 times weekly - L groin, L AKA  Weekly - sacrum    WOUND HISTORY:       9/26/23 ortho consult Dr Dumont, rec BKA  9/26/23 vascular consult Dr Seth - rec ortho for BKA L, vascular studies for R LE  9/27/23 L BKA Dr Altamirano  9/28/23 ID consult  9/30/23 B femoral endarterectomy, R LE angiogram and placement of R iliac stent  10/1/23 Dietary involved  10/7/23 [facility] physiatrist - pt not a candidate for   10/20/23 I&D L BKA Dr Altamirano  10/23/23: Patient underwent left above-knee amputation and debridement of left groin wound with wound VAC placement       WOUND ASSESSMENT/LDA       Negative Pressure Wound Therapy 10/25/23 Surgical Groin;Leg Left (Active)   Wound Image   10/25/23 1600   NPWT Pump Mode / Pressure Setting Ulta;Intermittent;125 mmHg 11/01/23 1000   Dressing Type Medium;Black Foam (Veraflo) 11/01/23 1000   Number of Foam Pieces Used 5 11/01/23 1000   Canister Changed No  11/01/23 1000   Output (mL) 2000 mL 10/30/23 0422   NEXT Dressing Change/Treatment Date 11/03/23 11/01/23 1000   VAC VeraFlo Irrigant Normal Saline 11/01/23 1000   VAC VeraFlo Soak Time (mins) 6 11/01/23 1000   VAC VeraFlo Instill Volume (ml) 12 11/01/23 1000   VAC VeraFlo - Therapy Time (hrs) 2 11/01/23 1000   VAC VeraFlo Pressure (mm/Hg) Intermittent;125 mmHg 11/01/23 1000   WOUND NURSE ONLY - Time Spent with Patient (mins) 45 10/30/23 1200     Wound 10/17/23 Knee Left (Active)   Wound Image   11/01/23 1000   Site Assessment Intact 11/01/23 1000   Periwound Assessment Pink;Red 11/01/23 1000   Margins Attached edges 11/01/23 1000   Closure Staples 11/01/23 1000   Drainage Amount Scant 11/01/23 1000   Drainage Description Serosanguineous 11/01/23 1000   Treatments Cleansed;Site care 11/01/23 1000   Offloading/DME Other (comment) 10/31/23 0800   Wound Cleansing Normal Saline Irrigation 11/01/23 1000   Periwound Protectant No-sting Skin Prep;Mepitel;Drape 11/01/23 1000   Dressing Status Clean;Dry;Intact 11/01/23 1000   Dressing Changed Changed 11/01/23 1000   Dressing Options Wound Vac;Offloading Dressing - Sacral 11/01/23 1000   Dressing Change/Treatment Frequency Monday, Wednesday, Friday, and As Needed 11/01/23 1000   NEXT Dressing Change/Treatment Date 11/03/23 11/01/23 1000   NEXT Weekly Photo (Inpatient Only) 11/08/23 11/01/23 1000   Wound Team Following 3x Weekly 11/01/23 1000   Shape approximated with staples 10/30/23 1200   Wound Odor None 11/01/23 1000            Wound 10/23/23 Groin Left (Active)   Wound Image   11/01/23 1000   Site Assessment Red;Yellow 11/01/23 1000   Periwound Assessment Clean;Dry;Intact 11/01/23 1000   Margins Defined edges;Attached edges 11/01/23 1000   Closure Secondary intention 11/01/23 1000   Drainage Amount Small 11/01/23 1000   Drainage Description Serosanguineous 11/01/23 1000   Treatments Cleansed;Site care 11/01/23 1000   Wound Cleansing Normal Saline Irrigation 11/01/23 1000    Periwound Protectant No-sting Skin Prep;Paste Ring;Drape 11/01/23 1000   Dressing Status Clean;Dry;Intact 11/01/23 1000   Dressing Changed Changed 11/01/23 1000   Dressing Cleansing/Solutions Normal Saline 11/01/23 1000   Dressing Options Wound Vac;Offloading Dressing - Sacral 11/01/23 1000   Dressing Change/Treatment Frequency Monday, Wednesday, Friday, and As Needed 11/01/23 1000   NEXT Dressing Change/Treatment Date 11/03/23 11/01/23 1000   NEXT Weekly Photo (Inpatient Only) 11/08/23 11/01/23 1000   Wound Team Following Other (comment) 10/27/23 1700   Non-staged Wound Description Full thickness 10/25/23 1600   Wound Length (cm) 9 cm 11/01/23 1000   Wound Width (cm) 2.5 cm 11/01/23 1000   Wound Depth (cm) 0.6 cm 10/25/23 1600   Wound Surface Area (cm^2) 22.5 cm^2 11/01/23 1000   Wound Volume (cm^3) 18.63 cm^3 10/25/23 1600   Wound Bed Granulation (%) 90 % 10/27/23 1700   Wound Bed Slough (%) 10 % 10/27/23 1700   Shape Irregular linear 11/01/23 1000   Wound Odor None 11/01/23 1000   WOUND NURSE ONLY - Time Spent with Patient (mins) 60 11/01/23 1000           Vascular:    INGA:   No results found.    Lab Values:    Lab Results   Component Value Date/Time    WBC 9.5 10/25/2023 05:02 AM    RBC 3.01 (L) 10/25/2023 05:02 AM    HEMOGLOBIN 8.7 (L) 10/25/2023 05:02 AM    HEMATOCRIT 27.6 (L) 10/25/2023 05:02 AM         Culture Results show:  No results found for this or any previous visit (from the past 720 hour(s)).    Pain Level/Medicated:  PO pain medications administered by bedside RN 1 hr prior       INTERVENTIONS BY WOUND TEAM:  Chart and images reviewed. Discussed with bedside RN. All areas of concern (based on picture review, LDA review and discussion with bedside RN) have been thoroughly assessed. Documentation of areas based on significant findings. This RN in to assess patient. Performed standard wound care which includes appropriate positioning, dressing removal and non-selective debridement. Pictures and  measurements obtained weekly if/when required.    Wound:  Left Groin and Left AKA  Preparation for Dressing removal: Removed without difficulty  Cleansed/Non-selectively Debrided with:  NS and Gauze  Kiara wound: Cleansed with NS and Gauze, Prepped with No Sting, Paste Rings, and Drape. Mepitel one over LKA incision  Primary Dressing:  Total 5 VF black foam used for left groin (instillation) and left AKA(dry), y-connected together.    Secondary (Outer) Dressing: secured with drape and TRAC pads.  VF NPWT resumed at 125mmHg, no leaks noted. Heel offloading foam applied under tubing and pressure areas to the limb.     Advanced Wound Care Discharge Planning  Number of Clinicians necessary to complete wound care: 1  Is patient requiring IV pain medications for dressing changes:  No   Length of time for dressing change 45 min. (This does not include chart review, pre-medication time, set up, clean up or time spent charting.)    Interdisciplinary consultation: Patient, Bedside RN (Elisabeth), Svetlana LAU (Wound RN).      EVALUATION / RATIONALE FOR TREATMENT:     Date:  11/01/23  Wound Status:  Wound progressing as expected    Left groin still with slough. Continue with VF NPWT while inpatient to assist with hydromechanical debridement. Mild erythema surrounding L AKA incision. Will continue to monitor. Continue incisional vac at this time while inpatient. Patient to transition to alternative dressings for L Groin and L AKA at time of discharge. Supplies ordered for patient and dressing care instructions added to AVS. Nursing to provide patient education on how to perform dressing changes. Bedside RN updated. Patient will still require daily dressing changes to sacrum.    11/2/23 Update -   Sacral Wound Measurements from 10/30/23: 2.5cm x 2cm x 1.2cm  1.7cm Circumferential Undermining   Plan to place NPWT to sacrum on 11/3/23    Date:  10/30/23  Wound Status:  Wound progressing as expected    Left groin improving with moist red  tissue growth and slough is cleaning up nicely with VF NWPT.  Likely to transition to regular with collagen at next change if all is well.  Left AKA, minimal drainage and the staples still are approximated.  Surgery was 10/23/23 with Melanie -will need a timeline to take out staples.  At this time continue VAC changes. Sacral wound still  unstageable, surgery consulted, no plans for surgical intervention at this time.  Will continue with dakin's BID dressing changes as the wound progresses.       Date:  10/28/23  Wound Status:      Sacral wound - pt had some prior breakdown around same area putting pt at greater risk of skin failure.  Unfortunately the tissue has deteriorated into a pressure injury.  Contributing factors include immobility, amputations, hip flexion contracture.  Therapies are involved but pt does not always fully participate.  Off loading measures in place/set up.  Pt may benefit from surgical consult to expedite achieving a clean wound bed that could then be vaced.  Will update Dr Sterling.     L groin and L AKA looks good       Date:  10/25/23  Wound Status:  Initial evaluation    S/P L BKA and debridement of L groin wound. Left groin with small area of induration to lateral periwound. VF initiated to mechanically debride remaining slough to wound bed, increase oxygenation and granulation tissue production to the area, and assist in wound closure by secondary intention.     L BKA incision still with drainage after removal of prevena. Incisional VAC applied to manage drainage. Dr. Navarro updated.         Goals: Steady decrease in wound area and depth weekly.    NURSING PLAN OF CARE ORDERS:  Dressing changes: See Dressing Care orders    NUTRITION RECOMMENDATIONS   Wound Team Recommendations:  N/A     DIET ORDERS (From admission to next 24h)       Start     Ordered    10/29/23 1107  Supplements  ALL MEALS        Question:  Which Supplement  Answer:  BOOST GLUCOSE CONTROL    10/29/23 1108    10/23/23 9950   Diet Order Diet: Consistent CHO (Diabetic)  ALL MEALS        Question:  Diet:  Answer:  Consistent CHO (Diabetic)    10/23/23 4283                    PREVENTATIVE INTERVENTIONS:   Q shift Jae - performed per nursing policy  Q shift pressure point assessments - performed per nursing policy    Surface/Positioning  Low Airloss - Currently in Place  Reposition q 2 hours - Currently in Place  TAPs Turning system - Currently in Place     Offloading/Redistribution  Sacral offloading dressing (Silicone dressing) - Currently in Place  Heel offloading dressing (Silicone dressing) - Currently in Place        Respiratory  N/A     Containment/Moisture Prevention    Dri-timi pad - Currently in Place  Holguin Catheter - Currently in Place     Mobilization      Unable to assess     Anticipated discharge plans:  Self/Family Care and Outpatient Wound Center        Vac Discharge Needs:  Vac Discharge plan is purely a recommendation from wound team and not a requirement for discharge unless otherwise stated by physician.  Veraflo Vac while inpatient, OK transition to alternative dressing on discharge

## 2023-11-02 LAB
ALBUMIN SERPL BCP-MCNC: 3.1 G/DL (ref 3.2–4.9)
BASOPHILS # BLD AUTO: 0.4 % (ref 0–1.8)
BASOPHILS # BLD: 0.05 K/UL (ref 0–0.12)
BUN SERPL-MCNC: 9 MG/DL (ref 8–22)
CALCIUM ALBUM COR SERPL-MCNC: 9.5 MG/DL (ref 8.5–10.5)
CALCIUM SERPL-MCNC: 8.8 MG/DL (ref 8.5–10.5)
CHLORIDE SERPL-SCNC: 96 MMOL/L (ref 96–112)
CO2 SERPL-SCNC: 21 MMOL/L (ref 20–33)
CREAT SERPL-MCNC: 0.49 MG/DL (ref 0.5–1.4)
EOSINOPHIL # BLD AUTO: 0.42 K/UL (ref 0–0.51)
EOSINOPHIL NFR BLD: 3.6 % (ref 0–6.9)
ERYTHROCYTE [DISTWIDTH] IN BLOOD BY AUTOMATED COUNT: 52.9 FL (ref 35.9–50)
GFR SERPLBLD CREATININE-BSD FMLA CKD-EPI: 118 ML/MIN/1.73 M 2
GLUCOSE SERPL-MCNC: 133 MG/DL (ref 65–99)
HCT VFR BLD AUTO: 27.4 % (ref 42–52)
HGB BLD-MCNC: 8.7 G/DL (ref 14–18)
IMM GRANULOCYTES # BLD AUTO: 0.1 K/UL (ref 0–0.11)
IMM GRANULOCYTES NFR BLD AUTO: 0.9 % (ref 0–0.9)
LYMPHOCYTES # BLD AUTO: 1.45 K/UL (ref 1–4.8)
LYMPHOCYTES NFR BLD: 12.5 % (ref 22–41)
MAGNESIUM SERPL-MCNC: 1.7 MG/DL (ref 1.5–2.5)
MCH RBC QN AUTO: 28.4 PG (ref 27–33)
MCHC RBC AUTO-ENTMCNC: 31.8 G/DL (ref 32.3–36.5)
MCV RBC AUTO: 89.5 FL (ref 81.4–97.8)
MONOCYTES # BLD AUTO: 0.89 K/UL (ref 0–0.85)
MONOCYTES NFR BLD AUTO: 7.7 % (ref 0–13.4)
NEUTROPHILS # BLD AUTO: 8.71 K/UL (ref 1.82–7.42)
NEUTROPHILS NFR BLD: 74.9 % (ref 44–72)
NRBC # BLD AUTO: 0 K/UL
NRBC BLD-RTO: 0 /100 WBC (ref 0–0.2)
PHOSPHATE SERPL-MCNC: 3.4 MG/DL (ref 2.5–4.5)
PLATELET # BLD AUTO: 700 K/UL (ref 164–446)
PMV BLD AUTO: 8.3 FL (ref 9–12.9)
POTASSIUM SERPL-SCNC: 3.8 MMOL/L (ref 3.6–5.5)
RBC # BLD AUTO: 3.06 M/UL (ref 4.7–6.1)
SODIUM SERPL-SCNC: 129 MMOL/L (ref 135–145)
WBC # BLD AUTO: 11.6 K/UL (ref 4.8–10.8)

## 2023-11-02 PROCEDURE — 700111 HCHG RX REV CODE 636 W/ 250 OVERRIDE (IP): Performed by: STUDENT IN AN ORGANIZED HEALTH CARE EDUCATION/TRAINING PROGRAM

## 2023-11-02 PROCEDURE — 97530 THERAPEUTIC ACTIVITIES: CPT | Mod: CQ

## 2023-11-02 PROCEDURE — 700102 HCHG RX REV CODE 250 W/ 637 OVERRIDE(OP): Performed by: STUDENT IN AN ORGANIZED HEALTH CARE EDUCATION/TRAINING PROGRAM

## 2023-11-02 PROCEDURE — A9270 NON-COVERED ITEM OR SERVICE: HCPCS | Performed by: STUDENT IN AN ORGANIZED HEALTH CARE EDUCATION/TRAINING PROGRAM

## 2023-11-02 PROCEDURE — 97530 THERAPEUTIC ACTIVITIES: CPT

## 2023-11-02 PROCEDURE — 83735 ASSAY OF MAGNESIUM: CPT

## 2023-11-02 PROCEDURE — 97535 SELF CARE MNGMENT TRAINING: CPT | Mod: CQ

## 2023-11-02 PROCEDURE — 700101 HCHG RX REV CODE 250: Performed by: STUDENT IN AN ORGANIZED HEALTH CARE EDUCATION/TRAINING PROGRAM

## 2023-11-02 PROCEDURE — 85025 COMPLETE CBC W/AUTO DIFF WBC: CPT

## 2023-11-02 PROCEDURE — 80069 RENAL FUNCTION PANEL: CPT

## 2023-11-02 PROCEDURE — A9270 NON-COVERED ITEM OR SERVICE: HCPCS | Performed by: INTERNAL MEDICINE

## 2023-11-02 PROCEDURE — 97535 SELF CARE MNGMENT TRAINING: CPT

## 2023-11-02 PROCEDURE — 99232 SBSQ HOSP IP/OBS MODERATE 35: CPT | Performed by: INTERNAL MEDICINE

## 2023-11-02 PROCEDURE — 36415 COLL VENOUS BLD VENIPUNCTURE: CPT

## 2023-11-02 PROCEDURE — 770006 HCHG ROOM/CARE - MED/SURG/GYN SEMI*

## 2023-11-02 PROCEDURE — 700102 HCHG RX REV CODE 250 W/ 637 OVERRIDE(OP): Performed by: INTERNAL MEDICINE

## 2023-11-02 RX ORDER — M-VIT,TX,IRON,MINS/CALC/FOLIC 27MG-0.4MG
1 TABLET ORAL DAILY
Status: DISCONTINUED | OUTPATIENT
Start: 2023-11-02 | End: 2023-11-03 | Stop reason: HOSPADM

## 2023-11-02 RX ORDER — ASCORBIC ACID 500 MG
500 TABLET ORAL DAILY
Status: DISCONTINUED | OUTPATIENT
Start: 2023-11-02 | End: 2023-11-03 | Stop reason: HOSPADM

## 2023-11-02 RX ADMIN — MULTIPLE VITAMINS W/ MINERALS TAB 1 TABLET: TAB at 18:14

## 2023-11-02 RX ADMIN — TRAMADOL HYDROCHLORIDE 50 MG: 50 TABLET ORAL at 13:43

## 2023-11-02 RX ADMIN — HYDROMORPHONE HYDROCHLORIDE 1 MG: 1 INJECTION, SOLUTION INTRAMUSCULAR; INTRAVENOUS; SUBCUTANEOUS at 08:13

## 2023-11-02 RX ADMIN — METHOCARBAMOL 500 MG: 500 TABLET ORAL at 13:43

## 2023-11-02 RX ADMIN — OXYCODONE HYDROCHLORIDE 15 MG: 15 TABLET ORAL at 06:01

## 2023-11-02 RX ADMIN — OXYCODONE HYDROCHLORIDE AND ACETAMINOPHEN 500 MG: 500 TABLET ORAL at 18:14

## 2023-11-02 RX ADMIN — GABAPENTIN 300 MG: 300 CAPSULE ORAL at 13:43

## 2023-11-02 RX ADMIN — GABAPENTIN 300 MG: 300 CAPSULE ORAL at 16:25

## 2023-11-02 RX ADMIN — HEPARIN SODIUM 5000 UNITS: 5000 INJECTION, SOLUTION INTRAVENOUS; SUBCUTANEOUS at 13:43

## 2023-11-02 RX ADMIN — FINASTERIDE 5 MG: 5 TABLET, FILM COATED ORAL at 05:55

## 2023-11-02 RX ADMIN — ATORVASTATIN CALCIUM 40 MG: 40 TABLET, FILM COATED ORAL at 16:26

## 2023-11-02 RX ADMIN — METHOCARBAMOL 500 MG: 500 TABLET ORAL at 08:14

## 2023-11-02 RX ADMIN — METHOCARBAMOL 500 MG: 500 TABLET ORAL at 21:24

## 2023-11-02 RX ADMIN — NICOTINE TRANSDERMAL SYSTEM 21 MG: 21 PATCH, EXTENDED RELEASE TRANSDERMAL at 05:55

## 2023-11-02 RX ADMIN — TAMSULOSIN HYDROCHLORIDE 0.4 MG: 0.4 CAPSULE ORAL at 11:18

## 2023-11-02 RX ADMIN — SODIUM HYPOCHLORITE 5 ML: 1.25 SOLUTION TOPICAL at 05:56

## 2023-11-02 RX ADMIN — ASPIRIN 81 MG: 81 TABLET, COATED ORAL at 05:55

## 2023-11-02 RX ADMIN — OXYCODONE HYDROCHLORIDE 20 MG: 10 TABLET ORAL at 11:18

## 2023-11-02 RX ADMIN — Medication 5 MG: at 21:25

## 2023-11-02 RX ADMIN — METHOCARBAMOL 500 MG: 500 TABLET ORAL at 16:25

## 2023-11-02 RX ADMIN — GABAPENTIN 300 MG: 300 CAPSULE ORAL at 05:55

## 2023-11-02 RX ADMIN — HEPARIN SODIUM 5000 UNITS: 5000 INJECTION, SOLUTION INTRAVENOUS; SUBCUTANEOUS at 21:24

## 2023-11-02 RX ADMIN — TRAMADOL HYDROCHLORIDE 50 MG: 50 TABLET ORAL at 08:13

## 2023-11-02 RX ADMIN — TRAMADOL HYDROCHLORIDE 50 MG: 50 TABLET ORAL at 01:10

## 2023-11-02 RX ADMIN — DOCUSATE SODIUM 50 MG AND SENNOSIDES 8.6 MG 2 TABLET: 8.6; 5 TABLET, FILM COATED ORAL at 16:26

## 2023-11-02 RX ADMIN — ACETAMINOPHEN 650 MG: 325 TABLET, FILM COATED ORAL at 01:11

## 2023-11-02 RX ADMIN — OXYCODONE HYDROCHLORIDE 20 MG: 10 TABLET ORAL at 16:25

## 2023-11-02 RX ADMIN — TRAMADOL HYDROCHLORIDE 50 MG: 50 TABLET ORAL at 16:31

## 2023-11-02 RX ADMIN — HEPARIN SODIUM 5000 UNITS: 5000 INJECTION, SOLUTION INTRAVENOUS; SUBCUTANEOUS at 05:55

## 2023-11-02 ASSESSMENT — ENCOUNTER SYMPTOMS
SEIZURES: 0
HALLUCINATIONS: 0
CHILLS: 0
VOMITING: 0
BACK PAIN: 0
DOUBLE VISION: 0
ABDOMINAL PAIN: 0
FEVER: 0
BLURRED VISION: 0
COUGH: 0
NAUSEA: 0
SORE THROAT: 0
SHORTNESS OF BREATH: 0
HEADACHES: 0
FALLS: 0
PALPITATIONS: 0
DEPRESSION: 0

## 2023-11-02 ASSESSMENT — COGNITIVE AND FUNCTIONAL STATUS - GENERAL
SUGGESTED CMS G CODE MODIFIER MOBILITY: CL
DRESSING REGULAR UPPER BODY CLOTHING: A LITTLE
WALKING IN HOSPITAL ROOM: TOTAL
DAILY ACTIVITIY SCORE: 18
HELP NEEDED FOR BATHING: A LOT
MOBILITY SCORE: 14
CLIMB 3 TO 5 STEPS WITH RAILING: TOTAL
DRESSING REGULAR LOWER BODY CLOTHING: A LITTLE
TOILETING: A LOT
STANDING UP FROM CHAIR USING ARMS: A LITTLE
SUGGESTED CMS G CODE MODIFIER DAILY ACTIVITY: CK
MOVING FROM LYING ON BACK TO SITTING ON SIDE OF FLAT BED: UNABLE

## 2023-11-02 ASSESSMENT — PAIN DESCRIPTION - PAIN TYPE
TYPE: ACUTE PAIN

## 2023-11-02 ASSESSMENT — GAIT ASSESSMENTS: GAIT LEVEL OF ASSIST: UNABLE TO PARTICIPATE

## 2023-11-02 NOTE — PROGRESS NOTES
[facility] Medicine Daily Progress Note    Date of Service  11/1/2023    Chief Complaint  Bilateral feet wounds    Hospital Course  Dillon Centeno is a 58 y.o. male with hypertension and tobacco dependence, admitted 9/26/2023 with bilateral feet wounds.  He was noted to have ischemic gangrene of the left lower extremity with maggots and superimposed bacterial infection.  Vascular surgery and orthopedic surgery were consulted.  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  Vascular studies showed severe arterial disease.  Patient underwent bilateral common femoral artery endarterectomy and profundoplasty with saphenous vein angioplasty with stent placement in the right external iliac artery on 9/30/2023.  He was recommended to have Plavix for 1 month and aspirin for life. Blood cultures from 9/26/2023 grew Morganella morganii and Vagococcus.  Urine culture from 9/26/2023 grew Staph epidermidis. ID were consulted.   Echocardiogram showed LVEF of about 60% with no reported valvular abnormalities.  Repeat blood cultures from 9/27/2023 have been negative.  ID recommended IV Zosyn which he completed on 10/6/2023.  Subsequently, he had breakdown of the incision site on the left BKA.  He was able to go to the OR and underwent debridement and secondary closure of dehisced surgical wound.  He was then noted to have redness and exudate with dehiscence on the femoral endarterectomy incision site. He underwent left above knee amputation with debridement of left groin wound and wound vac placement.  He developed acute kidney injury which was felt to be post renal with noted obstructive changes on renal ultrasound. Holguin catheter was placed.  Nephrology and urology were consulted.  Renal function improved.  Urology recommended Flomax and finasteride.   Patient ultimately also developed sacral wound, surgery consulted who do not recommend operative intervention. Wound vac placed on sacral wound.  Discharge planning was pursued, but  unfortunately had no accepting SNF's and no home health services accepting either.  Patient refused to go to a group home.  Plan to discharge patient to home with outpatient wound clinic follow up once home wound vacs are approved.    Interval Problem Update  Patient was seen and examined at bedside.  No acute events overnight. Patient is resting comfortably in bed and in no acute distress.     Wound vac in place  Anticipate discharge home in AM with home wound vac  May need daily dressing changes now    Consultants/Specialty  infectious disease, nephrology, orthopedics, urology, and vascular surgery    Code Status  Full Code    Disposition  The patient is medically cleared for discharge to home or a post-acute facility.      I have placed the appropriate orders for post-discharge needs.    Review of Systems  Review of Systems   Constitutional:  Negative for chills and fever.   HENT:  Negative for congestion and sore throat.    Eyes:  Negative for blurred vision and double vision.   Respiratory:  Negative for cough and shortness of breath.    Cardiovascular:  Negative for chest pain and palpitations.   Gastrointestinal:  Negative for abdominal pain, nausea and vomiting.   Genitourinary:  Negative for dysuria and frequency.   Musculoskeletal:  Negative for back pain and falls.   Skin:  Negative for rash.   Neurological:  Negative for seizures and headaches.   Psychiatric/Behavioral:  Negative for depression and hallucinations.       Pertinent positives/negatives as mentioned above.     A complete review of systems was personally done by me. All other systems were negative.       Physical Exam  Temp:  [36.3 °C (97.4 °F)-37 °C (98.6 °F)] 36.5 °C (97.7 °F)  Pulse:  [87-97] 97  Resp:  [17-18] 17  BP: (107-137)/(51-82) 137/80  SpO2:  [95 %-96 %] 96 %    Physical Exam  Vitals reviewed.   Constitutional:       General: He is not in acute distress.     Appearance: Normal appearance.      Comments: Pleasant, conversational    HENT:      Head: Normocephalic and atraumatic.      Right Ear: External ear normal.      Left Ear: External ear normal.      Nose: Nose normal. No congestion.      Mouth/Throat:      Mouth: Mucous membranes are dry.      Pharynx: No oropharyngeal exudate.   Eyes:      General: No scleral icterus.     Extraocular Movements: Extraocular movements intact.      Conjunctiva/sclera: Conjunctivae normal.      Pupils: Pupils are equal, round, and reactive to light.   Cardiovascular:      Rate and Rhythm: Normal rate.      Heart sounds: Normal heart sounds. No murmur heard.     No gallop.   Pulmonary:      Effort: Pulmonary effort is normal.      Breath sounds: Normal breath sounds. No wheezing.   Abdominal:      General: Bowel sounds are normal.      Palpations: Abdomen is soft.      Tenderness: There is no abdominal tenderness. There is no guarding or rebound.   Genitourinary:     Penis: Normal.    Musculoskeletal:         General: No deformity. Normal range of motion.      Cervical back: Normal range of motion and neck supple.      Right lower leg: No edema.      Comments: L AKA site with wound vac, vac also attached to left upper groin site and sacrum   Lymphadenopathy:      Cervical: No cervical adenopathy.   Skin:     General: Skin is warm and dry.      Coloration: Skin is not jaundiced.      Findings: No bruising.   Neurological:      General: No focal deficit present.      Mental Status: He is alert and oriented to person, place, and time.      Cranial Nerves: No cranial nerve deficit.   Psychiatric:         Mood and Affect: Mood normal.         Behavior: Behavior normal.       Patient was seen and examined at bedside. No changes in physical exam from prior evaluation.    Fluids    Intake/Output Summary (Last 24 hours) at 11/1/2023 1736  Last data filed at 11/1/2023 1125  Gross per 24 hour   Intake 360 ml   Output 4225 ml   Net -3865 ml       Laboratory                                Imaging  US-RENAL   Final  Result      1.  Bilateral medical renal disease.   2.  Moderate right and mild left hydronephrosis, likely secondary to distention of the urinary bladder secondary to prostatic hypertrophy. Post void images of the inner bladder and collecting systems of the kidneys were not acquired.   3.  Hydronephrosis was not present on the prior MRI of the abdomen without and with IV contrast on 10/2/2023.      MR-ABDOMEN-WITH & W/O   Final Result      Multi septated cystic lesion at the upper pole LEFT kidney measuring 3.8 cm without associated enhancement or nodular component (Bosniak 2).            DX-PORTABLE FLUORO > 1 HOUR   Final Result      Portable fluoroscopy utilized for 2 minutes 20.4 seconds.         INTERPRETING LOCATION: [address] [facility], [place], [address]      CT-CTA AORTA-RO WITH & W/O-POST PROCESS   Final Result         CTA AORTA      1.  Septated lobulated left upper pole renal cystic mass lesion, recommend follow-up MRI of the kidneys for further characterization.   2.  Atherosclerosis and atherosclerotic coronary artery disease.   3.  Fat-containing right inguinal hernia      CTA LOWER EXTREMITIES      1.  Occlusion of the right common iliac artery through the distal superficial femoral artery   2.  Distal right peroneal artery occlusion with 2-vessel runoff the anterior and posterior tibial arteries at the right ankle.   3.  Occlusion of the left superficial femoral artery at the bifurcation extending through its length to the popliteal artery.   4.  Occlusion of the proximal left profunda femoris just distal to the bifurcation which reconstitutes.   5.  Soft tissue gas at the stump of left below-the-knee amputation, within expected limits for recent postop changes.      3D angiographic/MIP images of the vasculature confirm the vascular findings as described above.      These findings were discussed with the patient's clinician, Keith Navarro, on 9/29/2023 8:06 AM.      EC-ECHOCARDIOGRAM COMPLETE W/O CONT    Final Result      US-AORTA/ILIACS DUPLEX COMPLETE   Final Result      US-EXTREMITY ARTERY LOWER UNILAT RIGHT   Final Result      US-INGA SINGLE LEVEL UNILAT RIGHT   Final Result      DX-CHEST-PORTABLE (1 VIEW)   Final Result      1.  LEFT basilar atelectasis or pneumonia   2.  Trace LEFT pleural effusion or pleural scar             Assessment/Plan  * Gangrene of left foot (HCC)- (present on admission)  Assessment & Plan  - s/p Left BKA on 9/27/2023.   - Vascular studies showed severe arterial disease. S/p bilateral common femoral artery endarterectomy and profundoplasty with saphenous vein angioplasty with stent placement in the right external iliac artery on 9/30/2023. Prevena was placed (5-7 days).  Continue Plavix for 1 month (ends 10/31) and aspirin for life. Follow-up with Dr. Navarro (vascular surgery). Staples to be removed 14 to 21 days postoperatively.   - Completed course of IV Zosyn on 10/6/2023.   - Noted dehiscence of incision site on the left BKA.  s/p debridement and secondary closure of dehisced surgical wound 10/20. Maintain NWB LLE.  -Now also having wound dehiscence on the left upper thigh surgical site.    S/p L AKA 10/23 by vascular surgery, wound vac on  Completed perioperative antibiotics  -Continue pain control with oral oxycodone and IV Dilaudid.  Wound care, wound vac management    Thrombocytosis  Assessment & Plan  Plt 570    Sacral wound, initial encounter- (present on admission)  Assessment & Plan  New since 10/28  gen surg consulted, no surgery  Wound vac applied  Pending home wound vac  Follow up with wound clinic    Hyperkalemia  Assessment & Plan  - Due to acute renal failure.    - Resolved.     GELY (acute kidney injury) (HCC)  Assessment & Plan  - Creatinine significantly jumped to 3.76.  Renal ultrasound also showed moderate right and mild left hydronephrosis with distended urinary bladder.  Suspect has post renal failure, with component of prerenal given significant elevation in  BUN as well.  CPK is normal.  Urinalysis bland.  -Resolved. Now that has Holguin catheter.  Off IVF.  Potassium level has normalized, off Lokelma..  -Maintain Holguin catheter to relieve obstruction.  Continue Flomax and finasteride.  Suspect that he will need to go home with Holguin catheter, and follow-up as outpatient with urology for definitive treatment of BPH.  resolved    PAD (peripheral artery disease) (HCC)- (present on admission)  Assessment & Plan  - s/p bilateral common femoral artery endarterectomy and profundoplasty with saphenous vein angioplasty with stent placement in the right external iliac artery on 9/30/2023. Prevena was placed (5-7 days).   -Continue Plavix for 1 month and aspirin for life.  Follow-up with vascular surgery.  Stump protector for left leg was ordered by vascular surgery.  See gangrene problem    Anemia- (present on admission)  Assessment & Plan  -Stable.  Monitor.    Bacteremia- (present on admission)  Assessment & Plan  - Blood cultures from 9/26/2023 grew Morganella morganii and Vagococcus. Echocardiogram showed no evidence of vegetations.   - ID consulted, completed course of IV Zosyn on 10/6/2023. Repeat blood cultures from 9/27/2023 have been negative.      Hyponatremia- (present on admission)  Assessment & Plan  - Appears euvolemic.  Stable. Off IVF.    Elevated liver enzymes- (present on admission)  Assessment & Plan  - Resolved.  Hepatitis panel was normal.    Leukocytosis- (present on admission)  Assessment & Plan  - Resolved.    Renal cyst- (present on admission)  Assessment & Plan  - Noted to have renal cyst on CT.  MRI shows multiseptated cystic lesions in the upper pole of left kidney measuring 3.8 cm without associated enhancement or nodular component.    -Outpatient follow-up with urology.         VTE prophylaxis: SCDs, Heparin SQ

## 2023-11-02 NOTE — DISCHARGE INSTR - CASE MGT
"Transport Benefits with [facility]. Call [phone] to schedule medical  transportation. Will need your [place] medicaid subscriber ID: [id number]    First Wound appointment is Monday, November 6th at 915 am. Ride back will be on, \"will call\" when ready for ride home call [phone].    Second Wound appointment is Wednesday, November 8th at 2:00pm. Ride back will be on, \"will call\" when ready for ride home call [phone].    Once future appointments are known please call  to schedule rides with [facility], [phone].       "

## 2023-11-02 NOTE — THERAPY
Occupational Therapy  Daily Treatment     Patient Name: Dillon Centeno  Age:  58 y.o., Sex:  male  Medical Record #: [id number]  Today's Date: 11/2/2023     Precautions: Fall Risk, Other (See Comments) (wound vac)  Comments: left AKA    Assessment    Pt seen for OT tx session, with roommate at bedside. Pt agreeable and roommate very encouraging. Discussed DC plan at length, continue to highly recommend placement, however if pt is to DC home recommend BSC and assist with all ADLs/OOB activities. Per roommate they may have BSC at home, and roommate can assist to a point (is a [facility]), but plans to go to [facility] mid November. Will continue to follow for skilled OT services.    Plan    Treatment Plan Status: (P) Continue Current Treatment Plan  Type of Treatment: Self Care / Activities of Daily Living, Adaptive Equipment, Neuro Re-Education / Balance, Therapeutic Exercises, Therapeutic Activity  Treatment Frequency: 3 Times per Week  Treatment Duration: Until Therapy Goals Met    DC Equipment Recommendations: (P) Unable to determine at this time, Bed Side Commode  Discharge Recommendations: (P) Recommend post-acute placement for additional occupational therapy services prior to discharge home (if declining placement, recommend listed DME, HHOT, and assist with all ADLs/OOB activity)       11/02/23 1530   Pain 0 - 10 Group   Therapist Pain Assessment   (pain in buttocks pre/post session)   Cognition    Comments Impaired insight/impulsive, willing to participate, roomate at bedside as well and very encouraging   Other Treatments   Other Treatments Provided Discussed DC as pt is adament about DC home. Provided extensive education to pt regarding safety upon DC including DME recs.   Balance   Sitting Balance (Static) Fair +   Sitting Balance (Dynamic) Fair   Standing Balance (Static) Poor +   Standing Balance (Dynamic) Trace +   Weight Shift Sitting Fair   Weight Shift Standing Poor   Skilled Intervention Verbal  Cuing   Bed Mobility    Supine to Sit Standby Assist   Sit to Supine Standby Assist   Scooting Standby Assist   Activities of Daily Living   Lower Body Dressing Maximal Assist   Skilled Intervention Verbal Cuing   Comments ed on donning LB dressing with wound vac   How much help from another person does the patient currently need...   6 Clicks Daily Activity Score 18   Functional Mobility   Sit to Stand Contact Guard Assist   Bed, Chair, Wheelchair Transfer Contact Guard Assist   Mobility EOB<>WC   Comments elected to return to supine 2/2 discomfort in chair, extensive ed on benefits of OOB activity   Patient / Family Goals   Patient / Family Goal #1 to be independent again   Short Term Goals   Short Term Goal # 1 pt will demo ADL txfs with supv   Goal Outcome # 1 Progressing as expected   Short Term Goal # 2 pt will dress LB with supv   Goal Outcome # 2 Progressing as expected   Short Term Goal # 3 pt will demo toileting with supv   Goal Outcome # 3 Progressing as expected   Education Group   Education Provided Adaptive Equipment   Role of Occupational Therapist Patient Response Patient;Acceptance;Explanation;Verbal Demonstration   Adaptive Equipment Patient Response Patient;Caregiver;Acceptance;Explanation   Additional Comments roomate at bedside, pt and roomate receptive to DME recs   Occupational Therapy Treatment Plan    O.T. Treatment Plan Continue Current Treatment Plan   Anticipated Discharge Equipment and Recommendations   DC Equipment Recommendations Unable to determine at this time;Bed Side Commode   Discharge Recommendations Recommend post-acute placement for additional occupational therapy services prior to discharge home  (if declining placement, recommend listed DME, HHOT, and assist with all ADLs/OOB activity)

## 2023-11-02 NOTE — CARE PLAN
The patient is Stable - Low risk of patient condition declining or worsening    Shift Goals  Clinical Goals: Keep pain at a managable number throughout shift, wound care, discuss discharge plans  Patient Goals: Pain management, go home soon  Family Goals: LUTHER    Progress made toward(s) clinical / shift goals:  Pt had to have PRN Oxycodone on top the the already scheduled tramadol. Pt stated pain was high on the 0-10 pain scale all shift, despite medications. Wound care came and saw patient today. He refused his dressing change of the the sacrum today. Discharge plans were discussed today as patient is medically clear, but has extensive wound care needs outpatient and figuring out how to make those feasible for patient.     Patient is not progressing towards the following goals:      Problem: Pain - Standard  Goal: Alleviation of pain or a reduction in pain to the patient’s comfort goal  Outcome: Not Progressing     Problem: Skin Integrity  Goal: Skin integrity is maintained or improved  Outcome: Not Progressing

## 2023-11-02 NOTE — PROGRESS NOTES
[facility] Medicine Daily Progress Note    Date of Service  11/2/2023    Chief Complaint  Bilateral feet wounds    Hospital Course  Dillon Centeno is a 58 y.o. male with hypertension and tobacco dependence, admitted 9/26/2023 with bilateral feet wounds.  He was noted to have ischemic gangrene of the left lower extremity with maggots and superimposed bacterial infection.  Vascular surgery and orthopedic surgery were consulted.  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  Vascular studies showed severe arterial disease.  Patient underwent bilateral common femoral artery endarterectomy and profundoplasty with saphenous vein angioplasty with stent placement in the right external iliac artery on 9/30/2023.  He was recommended to have Plavix for 1 month and aspirin for life. Blood cultures from 9/26/2023 grew Morganella morganii and Vagococcus.  Urine culture from 9/26/2023 grew Staph epidermidis. ID were consulted.   Echocardiogram showed LVEF of about 60% with no reported valvular abnormalities.  Repeat blood cultures from 9/27/2023 have been negative.  ID recommended IV Zosyn which he completed on 10/6/2023.  Subsequently, he had breakdown of the incision site on the left BKA.  He was able to go to the OR and underwent debridement and secondary closure of dehisced surgical wound.  He was then noted to have redness and exudate with dehiscence on the femoral endarterectomy incision site. He underwent left above knee amputation with debridement of left groin wound and wound vac placement.  He developed acute kidney injury which was felt to be post renal with noted obstructive changes on renal ultrasound. Holguin catheter was placed.  Nephrology and urology were consulted.  Renal function improved.  Urology recommended Flomax and finasteride.   Patient ultimately also developed sacral wound, surgery consulted who do not recommend operative intervention. Wound vac placed on sacral wound.  Discharge planning was pursued, but  unfortunately had no accepting SNF's and no home health services accepting either.  Patient refused to go to a group home.  Plan to discharge patient to home with outpatient wound clinic follow up once home wound vacs are approved.    Interval Problem Update  Patient was seen and examined at bedside.  No acute events overnight. Patient is resting comfortably in bed and in no acute distress.     Wound care per wound team  Anticipate discharge in AM      Consultants/Specialty  infectious disease, nephrology, orthopedics, urology, and vascular surgery    Code Status  Full Code    Disposition  The patient is not medically cleared for discharge to home or a post-acute facility.  Anticipate discharge to: home with close outpatient follow-up    I have placed the appropriate orders for post-discharge needs.    Review of Systems  Review of Systems   Constitutional:  Negative for chills and fever.   HENT:  Negative for congestion and sore throat.    Eyes:  Negative for blurred vision and double vision.   Respiratory:  Negative for cough and shortness of breath.    Cardiovascular:  Negative for chest pain and palpitations.   Gastrointestinal:  Negative for abdominal pain, nausea and vomiting.   Genitourinary:  Negative for dysuria and frequency.   Musculoskeletal:  Negative for back pain and falls.   Skin:  Negative for rash.   Neurological:  Negative for seizures and headaches.   Psychiatric/Behavioral:  Negative for depression and hallucinations.       Pertinent positives/negatives as mentioned above.     A complete review of systems was personally done by me. All other systems were negative.       Physical Exam  Temp:  [36.1 °C (97 °F)-36.6 °C (97.9 °F)] 36.1 °C (97 °F)  Pulse:  [80-93] 90  Resp:  [16-18] 16  BP: (134-142)/(81-91) 141/86  SpO2:  [96 %] 96 %    Physical Exam  Vitals reviewed.   Constitutional:       General: He is not in acute distress.     Appearance: Normal appearance.      Comments: Pleasant,  conversational   HENT:      Head: Normocephalic and atraumatic.      Right Ear: External ear normal.      Left Ear: External ear normal.      Nose: Nose normal. No congestion.      Mouth/Throat:      Mouth: Mucous membranes are dry.      Pharynx: No oropharyngeal exudate.   Eyes:      General: No scleral icterus.     Extraocular Movements: Extraocular movements intact.      Conjunctiva/sclera: Conjunctivae normal.      Pupils: Pupils are equal, round, and reactive to light.   Cardiovascular:      Rate and Rhythm: Normal rate.      Heart sounds: Normal heart sounds. No murmur heard.     No gallop.   Pulmonary:      Effort: Pulmonary effort is normal.      Breath sounds: Normal breath sounds. No wheezing.   Abdominal:      General: Bowel sounds are normal.      Palpations: Abdomen is soft.      Tenderness: There is no abdominal tenderness. There is no guarding or rebound.   Genitourinary:     Penis: Normal.    Musculoskeletal:         General: No deformity. Normal range of motion.      Cervical back: Normal range of motion and neck supple.      Right lower leg: No edema.      Comments: L AKA site with wound vac, vac also attached to left upper groin site and sacrum   Lymphadenopathy:      Cervical: No cervical adenopathy.   Skin:     General: Skin is warm and dry.      Coloration: Skin is not jaundiced.      Findings: No bruising.   Neurological:      General: No focal deficit present.      Mental Status: He is alert and oriented to person, place, and time.      Cranial Nerves: No cranial nerve deficit.   Psychiatric:         Mood and Affect: Mood normal.         Behavior: Behavior normal.       Patient was seen and examined at bedside. No changes in physical exam from prior evaluation.    Fluids    Intake/Output Summary (Last 24 hours) at 11/2/2023 1602  Last data filed at 11/2/2023 0558  Gross per 24 hour   Intake --   Output 2000 ml   Net -2000 ml       Laboratory  Recent Labs     11/02/23  0245   WBC 11.6*   RBC  3.06*   HEMOGLOBIN 8.7*   HEMATOCRIT 27.4*   MCV 89.5   MCH 28.4   MCHC 31.8*   RDW 52.9*   PLATELETCT 700*   MPV 8.3*         Recent Labs     11/02/23  0245   SODIUM 129*   POTASSIUM 3.8   CHLORIDE 96   CO2 21   GLUCOSE 133*   BUN 9   CREATININE 0.49*   CALCIUM 8.8                       Imaging  US-RENAL   Final Result      1.  Bilateral medical renal disease.   2.  Moderate right and mild left hydronephrosis, likely secondary to distention of the urinary bladder secondary to prostatic hypertrophy. Post void images of the inner bladder and collecting systems of the kidneys were not acquired.   3.  Hydronephrosis was not present on the prior MRI of the abdomen without and with IV contrast on 10/2/2023.      MR-ABDOMEN-WITH & W/O   Final Result      Multi septated cystic lesion at the upper pole LEFT kidney measuring 3.8 cm without associated enhancement or nodular component (Bosniak 2).            DX-PORTABLE FLUORO > 1 HOUR   Final Result      Portable fluoroscopy utilized for 2 minutes 20.4 seconds.         INTERPRETING LOCATION: [address] [place] [address], [facility]      CT-CTA AORTA-RO WITH & W/O-POST PROCESS   Final Result         CTA AORTA      1.  Septated lobulated left upper pole renal cystic mass lesion, recommend follow-up MRI of the kidneys for further characterization.   2.  Atherosclerosis and atherosclerotic coronary artery disease.   3.  Fat-containing right inguinal hernia      CTA LOWER EXTREMITIES      1.  Occlusion of the right common iliac artery through the distal superficial femoral artery   2.  Distal right peroneal artery occlusion with 2-vessel runoff the anterior and posterior tibial arteries at the right ankle.   3.  Occlusion of the left superficial femoral artery at the bifurcation extending through its length to the popliteal artery.   4.  Occlusion of the proximal left profunda femoris just distal to the bifurcation which reconstitutes.   5.  Soft tissue gas at the stump of left below-the-knee  amputation, within expected limits for recent postop changes.      3D angiographic/MIP images of the vasculature confirm the vascular findings as described above.      These findings were discussed with the patient's clinician, Keith Navarro, on 9/29/2023 8:06 AM.      EC-ECHOCARDIOGRAM COMPLETE W/O CONT   Final Result      US-AORTA/ILIACS DUPLEX COMPLETE   Final Result      US-EXTREMITY ARTERY LOWER UNILAT RIGHT   Final Result      US-INGA SINGLE LEVEL UNILAT RIGHT   Final Result      DX-CHEST-PORTABLE (1 VIEW)   Final Result      1.  LEFT basilar atelectasis or pneumonia   2.  Trace LEFT pleural effusion or pleural scar             Assessment/Plan  * Gangrene of left foot (HCC)- (present on admission)  Assessment & Plan  - s/p Left BKA on 9/27/2023.   - Vascular studies showed severe arterial disease. S/p bilateral common femoral artery endarterectomy and profundoplasty with saphenous vein angioplasty with stent placement in the right external iliac artery on 9/30/2023. Prevena was placed (5-7 days).  Continue Plavix for 1 month (ends 10/31) and aspirin for life. Follow-up with Dr. Navarro (vascular surgery). Staples to be removed 14 to 21 days postoperatively.   - Completed course of IV Zosyn on 10/6/2023.   - Noted dehiscence of incision site on the left BKA.  s/p debridement and secondary closure of dehisced surgical wound 10/20. Maintain NWB LLE.  -Now also having wound dehiscence on the left upper thigh surgical site.    S/p L AKA 10/23 by vascular surgery, wound vac on  Completed perioperative antibiotics  -Continue pain control with oral oxycodone and IV Dilaudid.  Wound care, wound vac management    Thrombocytosis  Assessment & Plan  Plt 570    Sacral wound, initial encounter- (present on admission)  Assessment & Plan  New since 10/28  gen surg consulted, no surgery  Wound vac applied  Pending home wound vac  Follow up with wound clinic    Hyperkalemia  Assessment & Plan  - Due to acute renal failure.     - Resolved.     GELY (acute kidney injury) ([facility])  Assessment & Plan  - Creatinine significantly jumped to 3.76.  Renal ultrasound also showed moderate right and mild left hydronephrosis with distended urinary bladder.  Suspect has post renal failure, with component of prerenal given significant elevation in BUN as well.  CPK is normal.  Urinalysis bland.  -Resolved. Now that has Holguin catheter.  Off IVF.  Potassium level has normalized, off Lokelma..  -Maintain Holguin catheter to relieve obstruction.  Continue Flomax and finasteride.  Suspect that he will need to go home with Holguin catheter, and follow-up as outpatient with urology for definitive treatment of BPH.  resolved    PAD (peripheral artery disease) ([facility])- (present on admission)  Assessment & Plan  - s/p bilateral common femoral artery endarterectomy and profundoplasty with saphenous vein angioplasty with stent placement in the right external iliac artery on 9/30/2023. Prevena was placed (5-7 days).   -Continue Plavix for 1 month and aspirin for life.  Follow-up with vascular surgery.  Stump protector for left leg was ordered by vascular surgery.  See gangrene problem    Anemia- (present on admission)  Assessment & Plan  -Stable.  Monitor.    Bacteremia- (present on admission)  Assessment & Plan  - Blood cultures from 9/26/2023 grew Morganella morganii and Vagococcus. Echocardiogram showed no evidence of vegetations.   - ID consulted, completed course of IV Zosyn on 10/6/2023. Repeat blood cultures from 9/27/2023 have been negative.      Hyponatremia- (present on admission)  Assessment & Plan  - Appears euvolemic.  Stable. Off IVF.    Elevated liver enzymes- (present on admission)  Assessment & Plan  - Resolved.  Hepatitis panel was normal.    Leukocytosis- (present on admission)  Assessment & Plan  - Resolved.    Renal cyst- (present on admission)  Assessment & Plan  - Noted to have renal cyst on CT.  MRI shows multiseptated cystic lesions in the upper  pole of left kidney measuring 3.8 cm without associated enhancement or nodular component.    -Outpatient follow-up with urology.         VTE prophylaxis: SCDs, Heparin SQ

## 2023-11-02 NOTE — DISCHARGE PLANNING
Case Management Discharge Planning    Admission Date: 9/26/2023  GMLOS: 11.1  ALOS: 37    6-Clicks ADL Score: 19  6-Clicks Mobility Score: 11  PT and/or OT Eval ordered: Yes  Post-acute Referrals Ordered: Yes  Post-acute Choice Obtained: Yes  Has referral(s) been sent to post-acute provider:  Yes      Anticipated Discharge Dispo: Discharge Disposition: Discharged to home/self care (01)    DME Needed: Yes    DME Ordered: Yes    Action(s) Taken: LMSW updated by wound team and LLOS RN CM, pt's wound vac is being removed from his leg. Pt is going to need a wound vac on his Sacrum which is going to be the Pravena Plus tomorrow morning which the pt can DC with. Radha from [facility] [phone] is aware of the change and is working with the wound team on the documentation. María is working with her team at [facility] to identify if the pt needs new insurance auth for the change or if they can edit the auth they have already submitted for the previous placement. LLOS RN CM made wound clinic appt for the pt for 11/6 at 0900. Pt does have Medicaid so he does qualify for MTM transportation.     Escalations Completed: None    Medically Clear: Yes    Next Steps: LMSW to follow for DC as needed.     Barriers to Discharge: Pending Placement    Is the patient up for discharge tomorrow: No

## 2023-11-02 NOTE — CARE PLAN
The patient is Stable - Low risk of patient condition declining or worsening    Shift Goals  Clinical Goals: pain < 7, wound care  Patient Goals: sleep, comfort  Family Goals: ashlie    Patient is not progressing towards the following goals:      Problem: Pain - Standard  Goal: Alleviation of pain or a reduction in pain to the patient’s comfort goal  Description: Target End Date:  Prior to discharge or change in level of care    Document on Vitals flowsheet    1.  Document pain using the appropriate pain scale per order or unit policy  2.  Educate and implement non-pharmacologic comfort measures (i.e. relaxation, distraction, massage, cold/heat therapy, etc.)  3.  Pain management medications as ordered  4.  Reassess pain after pain med administration per policy  5.  If opiods administered assess patient's response to pain medication is appropriate per POSS sedation scale  6.  Follow pain management plan developed in collaboration with patient and interdisciplinary team (including palliative care or pain specialists if applicable)  Outcome: Not Progressing

## 2023-11-02 NOTE — THERAPY
Physical Therapy   Daily Treatment     Patient Name: Dillon Centeno  Age:  58 y.o., Sex:  male  Medical Record #: [id number]  Today's Date: 11/2/2023     Precautions  Precautions: (P) Fall Risk  Comments: (P) VAC w/Lft AKA and sacrum    Assessment    Pt willing to demo transfers, just finished w/OT. Pt required no assistance w/bed mobility w/HOB partially elevated. Once seated, no assist needed for static sit. Pt's transfers from bed<->w/c SBA, ongoing cueing to lock brakes. Educated pt on the importance of getting OOB during the day, he stated he has to go outside to smoke. Pt is anxious to go home, has good support @ home. 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   [facility] notified that the pt still has a gonzales cath.   PT will be available for d/c ?'s if needed. He is cleared for d/c home w/his w/c and would benefit from [facility] if that is an option.     Plan    Treatment Plan Status: (P) Continue Current Treatment Plan  Type of Treatment: Bed Mobility, Equipment, Manual Therapy, Neuro Re-Education / Balance, Self Care / Home Evaluation, Therapeutic Exercise, Therapeutic Activities  Treatment Frequency: 4 Times per Week  Treatment Duration: Until Therapy Goals Met    DC Equipment Recommendations: (P) Tub Transfer Bench  Discharge Recommendations: (P) Recommend home health for continued physical therapy services    Objective       11/02/23 1614   Charge Group   PT Therapeutic Activities (Units) 1   PT Self Care / Home Evaluation (Units) 1   Total Time Spent   PT Therapeutic Activities Time Spent (Mins) 20   PT Self Care/Home Evaluation Time Spent (Mins) 10   Precautions   Precautions Fall Risk   Comments VAC w/Lft AKA and sacrum   Pain 0 - 10 Group   Pain Rating Scale (NPRS) 3   Other Treatments   Other Treatments Provided Met w/pt following OT session, ongoing discussion regarding mobilizing @ home. He  has to go outside to smoke so he will need to get into his w/c. Did bring up other sitting options ie: couch or recliner chair as long as they are not to low. The pt understands he will need a better cushion for his w/c. Talked about using [employer] for DME, would benefit from a bath transfer bench. He stated he may have one. 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   Balance   Sitting Balance (Static) Good   Sitting Balance (Dynamic) Fair +   Standing Balance (Static) Poor +   Standing Balance (Dynamic) Poor -   Weight Shift Sitting Fair   Weight Shift Standing Absent   Bed Mobility    Supine to Sit Supervised   Sit to Supine Supervised   Scooting Supervised   Comments HOB elevated and  no railing.   Gait Analysis   Gait Level Of Assist Unable to Participate   Functional Mobility   Sit to Stand Standby Assist   Bed, Chair, Wheelchair Transfer Standby Assist   Comments No assist needed w/transfers. Stressed the importance of locking his w/c brakes before his transfers.   How much difficulty does the patient currently have...   Turning over in bed (including adjusting bedclothes, sheets and blankets)? 4   Sitting down on and standing up from a chair with arms (e.g., wheelchair, bedside commode, etc.) 1   Moving from lying on back to sitting on the side of the bed? 4   How much help from another person does the patient currently need...   Moving to and from a bed to a chair (including a wheelchair)? 3   Need to walk in a hospital room? 1   Climbing 3-5 steps with a railing? 1   6 clicks Mobility Score 14   Short Term Goals    Short Term Goal # 2 Patient will perform chair transfers with supervision in 6 visits   Goal Outcome # 2 Goal not met   Short Term Goal # 3 Patient will perform sit-stand with FWW with supervision in 6 visits   Goal Outcome # 3 Goal not met   Short Term Goal # 4 Pt will self propel wheelchair with supervision within 6 visits to ensure independent mobility at  home.   Goal Outcome # 4 Goal not met   Education Group   Role of Physical Therapist Patient Response Patient;Acceptance;Explanation;Reinforcement Needed   Physical Therapy Treatment Plan   Physical Therapy Treatment Plan Continue Current Treatment Plan   Anticipated Discharge Equipment and Recommendations   DC Equipment Recommendations Tub Transfer Bench   Discharge Recommendations Recommend home health for continued physical therapy services   Interdisciplinary Plan of Care Collaboration   IDT Collaboration with  Nursing   Patient Position at End of Therapy Seated;Call Light within Reach;Tray Table within Reach;Phone within Reach   Collaboration Comments Nrsg notified of pts tx efforts and that the pt still has a gonzales cath.   Session Information   Date / Session Number  11/2--10 (1/4, 11/6) PTA/2   Supervising Physical Therapist (PTA Treatments Only)   Supervising Physical Therapist Tania Reyna

## 2023-11-02 NOTE — DISCHARGE PLANNING
Discussed pt with floor LSW. Pt does not have authorization for [employer] wound vac yet. HIRAM RN reached out to Wound Care Charge RN and discussed plan to place Prevena plus on sacrum until wound vac has insurance authorization. Wound Care to check with LSW tomorrow prior to changing wound vac incase authorization has been obtained.     Pts plan is to discharge home with OP wound changes. Pts scheduled for Monday, November 6th at 0930 and November 8th. Pt will need OP wound changes 3 times a week. HIRAM RN scheduled pts MTM transport to appointments on November 6th and 8th. Pt is on Will Call with MTM for transport home after appointments. HIRAM RN called [facility] and updated about pt being placed on will call when transporting home and provided MTM number incase pt forgets to bring number to appointment.

## 2023-11-02 NOTE — DIETARY
Nutrition Update:Pt seen for rescreen with noted wounds.     Day 37 of admit.  Dillon Centeno is a 58 y.o. male with admitting DX of Gangrene of left foot. Patient being followed to optimize nutrition.    Current Diet: Consistent CHO, Boost Glucose with all meals.  Per ADL, pt eating % of last five out six documented meals with % of supplement intake for last two meals. Expect adequate intake.   Skin:  L groin with VAC and Left AKA; Sacral wound still unstageable. Wound care following.   Pertinent Labs: Na+ 129. Pt with GELY, resolved per MD notes. No concerning Renal labs this am.   Pertinent Meds: Zofran PRN, Bowel protocol.   Wt per bedscale on 10/28/2023: 62.9 kg/ 138 lbs, stable with first measured wt on 9/29/2023 per bedscale of 61 kg/ 134 lbs. Wt has fluctuated during admit, will continue to monitor trends.     Problem: Nutritional:  Goal: Achieve adequate nutritional intake  Description: Patient will consume >50% of meals  Outcome: Met    Plan/Rec:   Consider Daily Multivitamin with 500 mg Vit C twice daily for wound healing. Communicated to MD. MINNA to screen weekly for adequate intake.

## 2023-11-02 NOTE — CARE PLAN
The patient is Stable - Low risk of patient condition declining or worsening    Shift Goals  Clinical Goals: wound care, pain control  Patient Goals: sleep, pain control  Family Goals: NA    Progress made toward(s) clinical / shift goals:    Problem: Knowledge Deficit - Standard  Goal: Patient and family/care givers will demonstrate understanding of plan of care, disease process/condition, diagnostic tests and medications  Outcome: Progressing     Problem: Pain - Standard  Goal: Alleviation of pain or a reduction in pain to the patient’s comfort goal  Outcome: Progressing   Patient is being monitored and medicated per the MAR.  Problem: Wound/ / Incision Healing  Goal: Patient's wound/surgical incision will decrease in size and heals properly  Outcome: Progressing   Patient agreeable to wound care this AM. Educated about importance of continued compliance.    Patient is not progressing towards the following goals:

## 2023-11-02 NOTE — PROGRESS NOTES
Patient is A&Ox4. Pain 8/10, patient medicated per MAR. Plan of care discussed with the patient, all concerns addressed. Call light is within reach and patient educated on fall precautions, verbalized and demonstrated understanding. Bed in lowest and locked position. Hourly rounding in place.

## 2023-11-03 ENCOUNTER — PHARMACY VISIT (OUTPATIENT)
Dept: PHARMACY | Facility: MEDICAL CENTER | Age: 58
End: 2023-11-03
Payer: COMMERCIAL

## 2023-11-03 VITALS
RESPIRATION RATE: 18 BRPM | HEIGHT: 71 IN | SYSTOLIC BLOOD PRESSURE: 130 MMHG | HEART RATE: 86 BPM | WEIGHT: 138.67 LBS | DIASTOLIC BLOOD PRESSURE: 74 MMHG | BODY MASS INDEX: 19.41 KG/M2 | OXYGEN SATURATION: 95 % | TEMPERATURE: 97.7 F

## 2023-11-03 PROCEDURE — 97605 NEG PRS WND THER DME<=50SQCM: CPT

## 2023-11-03 PROCEDURE — A9270 NON-COVERED ITEM OR SERVICE: HCPCS | Performed by: STUDENT IN AN ORGANIZED HEALTH CARE EDUCATION/TRAINING PROGRAM

## 2023-11-03 PROCEDURE — 302098 PASTE RING (FLAT): Performed by: INTERNAL MEDICINE

## 2023-11-03 PROCEDURE — 700111 HCHG RX REV CODE 636 W/ 250 OVERRIDE (IP): Performed by: STUDENT IN AN ORGANIZED HEALTH CARE EDUCATION/TRAINING PROGRAM

## 2023-11-03 PROCEDURE — 700102 HCHG RX REV CODE 250 W/ 637 OVERRIDE(OP): Performed by: STUDENT IN AN ORGANIZED HEALTH CARE EDUCATION/TRAINING PROGRAM

## 2023-11-03 PROCEDURE — 700102 HCHG RX REV CODE 250 W/ 637 OVERRIDE(OP): Performed by: INTERNAL MEDICINE

## 2023-11-03 PROCEDURE — 97602 WOUND(S) CARE NON-SELECTIVE: CPT

## 2023-11-03 PROCEDURE — 700101 HCHG RX REV CODE 250: Performed by: STUDENT IN AN ORGANIZED HEALTH CARE EDUCATION/TRAINING PROGRAM

## 2023-11-03 PROCEDURE — 97597 DBRDMT OPN WND 1ST 20 CM/<: CPT

## 2023-11-03 PROCEDURE — 99239 HOSP IP/OBS DSCHRG MGMT >30: CPT | Performed by: INTERNAL MEDICINE

## 2023-11-03 PROCEDURE — A9270 NON-COVERED ITEM OR SERVICE: HCPCS | Performed by: INTERNAL MEDICINE

## 2023-11-03 RX ADMIN — HEPARIN SODIUM 5000 UNITS: 5000 INJECTION, SOLUTION INTRAVENOUS; SUBCUTANEOUS at 05:18

## 2023-11-03 RX ADMIN — TRAMADOL HYDROCHLORIDE 50 MG: 50 TABLET ORAL at 11:15

## 2023-11-03 RX ADMIN — HYDROMORPHONE HYDROCHLORIDE 1 MG: 1 INJECTION, SOLUTION INTRAMUSCULAR; INTRAVENOUS; SUBCUTANEOUS at 13:08

## 2023-11-03 RX ADMIN — OXYCODONE HYDROCHLORIDE AND ACETAMINOPHEN 500 MG: 500 TABLET ORAL at 05:18

## 2023-11-03 RX ADMIN — METHOCARBAMOL 500 MG: 500 TABLET ORAL at 13:09

## 2023-11-03 RX ADMIN — FINASTERIDE 5 MG: 5 TABLET, FILM COATED ORAL at 05:18

## 2023-11-03 RX ADMIN — TRAMADOL HYDROCHLORIDE 50 MG: 50 TABLET ORAL at 00:02

## 2023-11-03 RX ADMIN — METHOCARBAMOL 500 MG: 500 TABLET ORAL at 08:50

## 2023-11-03 RX ADMIN — GABAPENTIN 300 MG: 300 CAPSULE ORAL at 11:15

## 2023-11-03 RX ADMIN — GABAPENTIN 300 MG: 300 CAPSULE ORAL at 05:18

## 2023-11-03 RX ADMIN — SODIUM HYPOCHLORITE 441 ML: 1.25 SOLUTION TOPICAL at 05:19

## 2023-11-03 RX ADMIN — MULTIPLE VITAMINS W/ MINERALS TAB 1 TABLET: TAB at 05:19

## 2023-11-03 RX ADMIN — TRAMADOL HYDROCHLORIDE 50 MG: 50 TABLET ORAL at 05:18

## 2023-11-03 RX ADMIN — NICOTINE TRANSDERMAL SYSTEM 21 MG: 21 PATCH, EXTENDED RELEASE TRANSDERMAL at 05:17

## 2023-11-03 RX ADMIN — TAMSULOSIN HYDROCHLORIDE 0.4 MG: 0.4 CAPSULE ORAL at 11:15

## 2023-11-03 RX ADMIN — ASPIRIN 81 MG: 81 TABLET, COATED ORAL at 05:19

## 2023-11-03 RX ADMIN — OXYCODONE HYDROCHLORIDE 20 MG: 10 TABLET ORAL at 12:00

## 2023-11-03 RX ADMIN — OXYCODONE HYDROCHLORIDE 20 MG: 10 TABLET ORAL at 08:50

## 2023-11-03 ASSESSMENT — PAIN DESCRIPTION - PAIN TYPE
TYPE: ACUTE PAIN
TYPE: ACUTE PAIN

## 2023-11-03 NOTE — WOUND TEAM
Assisted Svetlana LAU (Wound RN), with wound care, non-selective debridement performed using wound cleanser/NS and gauze. Please see Svetlana LAU (Wound RN) wound note for further wound care details.

## 2023-11-03 NOTE — DISCHARGE SUMMARY
Discharge Summary    CHIEF COMPLAINT ON ADMISSION  Chief Complaint   Patient presents with    Wound Check     Bilateral feet. Pts L foot is black and sloughing. Pt states unable to ambulate due to pain       Reason for Admission  ems     Admission Date  9/26/2023    CODE STATUS  Full Code    HPI & HOSPITAL COURSE  Dillon Centeno is a 58 y.o. male with hypertension and tobacco dependence, admitted 9/26/2023 with bilateral feet wounds.  He was noted to have ischemic gangrene of the left lower extremity with maggots and superimposed bacterial infection.  Vascular surgery and orthopedic surgery were consulted.  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  Vascular studies showed severe arterial disease.  Patient underwent bilateral common femoral artery endarterectomy and profundoplasty with saphenous vein angioplasty with stent placement in the right external iliac artery on 9/30/2023.  He was recommended to have Plavix for 1 month and aspirin for life. Blood cultures from 9/26/2023 grew Morganella morganii and Vagococcus.  Urine culture from 9/26/2023 grew Staph epidermidis. ID were consulted.   Echocardiogram showed LVEF of about 60% with no reported valvular abnormalities.  Repeat blood cultures from 9/27/2023 have been negative.  ID recommended IV Zosyn which he completed on 10/6/2023.  Subsequently, he had breakdown of the incision site on the left BKA.  He was able to go to the OR and underwent debridement and secondary closure of dehisced surgical wound.  He was then noted to have redness and exudate with dehiscence on the femoral endarterectomy incision site. He underwent left above knee amputation with debridement of left groin wound and wound vac placement.  He developed acute kidney injury which was felt to be post renal with noted obstructive changes on renal ultrasound. Holguin catheter was placed.  Nephrology and urology were consulted.  Renal function improved.  Urology recommended Flomax and  finasteride.   Patient ultimately also developed sacral wound, surgery consulted who do not recommend operative intervention. Wound vac placed on sacral wound.  Discharge planning was pursued, but unfortunately had no accepting SNF's and no home health services accepting either.  Patient refused to go to a group home.  Patient was determined satisfactory for discharge with appropriate follow up.    Therefore, he is discharged in fair and stable condition to home with close outpatient follow-up.    The patient met 2-midnight criteria for an inpatient stay at the time of discharge.    Discharge Date  11/3/2023    FOLLOW UP ITEMS POST DISCHARGE  Please follow up with PCP in 3-5 days for post hospitalization follow up and medication reconciliation.     Follow up with wound care    Follow up with urology    DISCHARGE DIAGNOSES  Principal Problem:    Gangrene of left foot (HCC) (POA: Yes)  Active Problems:    Renal cyst (POA: Yes)    No contraindication to deep vein thrombosis (DVT) prophylaxis (POA: Yes)    Leukocytosis (POA: Yes)    Elevated liver enzymes (POA: Yes)    Hyponatremia (POA: Yes)    Bacteremia (POA: Yes)    Anemia (POA: Yes)    PAD (peripheral artery disease) (HCC) (POA: Yes)    GELY (acute kidney injury) (HCC) (POA: No)    Hyperkalemia (POA: No)    Sacral wound, initial encounter (POA: Yes)    Thrombocytosis (POA: Unknown)  Resolved Problems:    * No resolved hospital problems. *      FOLLOW UP  Future Appointments   Date Time Provider Department Center   11/6/2023  9:30 AM Adam Garcia R.N. ND [address] [place] [address]   11/8/2023  2:00 PM MATTHIAS Garcia [address] [place] [address]   11/13/2023  1:00 PM PAPO Arriaga M.D.  [address] [facility] [address]  [facility] [id number] [phone]    Follow up  Follow-up with vascular surgery in 1 to 2 weeks      MEDICATIONS ON DISCHARGE     Medication List        START taking these medications        Instructions   Aspirin Low Dose 81 MG EC  tablet  Generic drug: aspirin   Take 1 Tablet by mouth every day.  Dose: 81 mg     atorvastatin 40 MG Tabs  Commonly known as: Lipitor   Take 1 Tablet by mouth every evening.  Dose: 40 mg     cyclobenzaprine 10 mg Tabs  Commonly known as: Flexeril   Take 1 Tablet by mouth 3 times a day as needed for Muscle Spasms.  Dose: 10 mg     finasteride 5 MG Tabs  Commonly known as: Proscar   Take 1 Tablet by mouth every day.  Dose: 5 mg     gabapentin 300 MG Caps  Commonly known as: Neurontin   Take 1 Capsule by mouth 3 times a day.  Dose: 300 mg     oxyCODONE immediate-release 5 MG Tabs  Commonly known as: Roxicodone   Take 2 Tablets by mouth every 6 hours as needed for Severe Pain for up to 3 days.  Dose: 10 mg     tamsulosin 0.4 MG capsule  Commonly known as: Flomax   Take 1 Capsule by mouth 1/2 hour after breakfast.  Dose: 0.4 mg              Allergies  Allergies   Allergen Reactions    Sulfa Drugs Hives and Shortness of Breath     Tolerated Flomax (10/2023)       DIET  Orders Placed This Encounter   Procedures    Diet Order Diet: Consistent CHO (Diabetic)     Standing Status:   Standing     Number of Occurrences:   1     Order Specific Question:   Diet:     Answer:   Consistent CHO (Diabetic) [4]       ACTIVITY  As tolerated.  Weight bearing as tolerated    CONSULTATIONS  Vascular surgery  ID  Nephrology  Urology    PROCEDURES  09/27 - Left below-knee amputation  10/20 - Debridement and secondary closure of dehisced surgical wound left below-knee amputation  10/23 - - Left above knee amputation through distal femur, Sharp excisional debridement of the left groin wound including skin and subcutaneous tissue, approximately 2 cm³ of tissue was removed, Application of negative pressure dressing less than 50 cm² to the left groin    LABORATORY  Lab Results   Component Value Date    SODIUM 129 (L) 11/02/2023    POTASSIUM 3.8 11/02/2023    CHLORIDE 96 11/02/2023    CO2 21 11/02/2023    GLUCOSE 133 (H) 11/02/2023    BUN 9  11/02/2023    CREATININE 0.49 (L) 11/02/2023        Lab Results   Component Value Date    WBC 11.6 (H) 11/02/2023    HEMOGLOBIN 8.7 (L) 11/02/2023    HEMATOCRIT 27.4 (L) 11/02/2023    PLATELETCT 700 (H) 11/02/2023        Total time of the discharge process exceeds 38 minutes.

## 2023-11-03 NOTE — DISCHARGE PLANNING
DC Transport Scheduled    Received request at: 11/3/2023 at 1302    Transport Company Scheduled:  [employer] (Wheel[employer])  Spoke with Betzaida at [facility] to schedule transport.  [facility] Trip #: [id number]    Scheduled Date: 11/3/2023  Scheduled Time: 1700    Destination: [place] at [address] [place]     Notified care team of scheduled transport via [employer].     If there are any changes needed to the DC transportation scheduled, please contact [facility] at ext. [phone] between the hours of 9736-1986 Mon-Fri. If outside those hours, contact the ED Case Manager at ext. [phone].

## 2023-11-03 NOTE — CARE PLAN
The patient is Stable - Low risk of patient condition declining or worsening    Shift Goals  Clinical Goals: discharge  Patient Goals: discharge  Family Goals: ashlie      Patient is not progressing towards the following goals:      Problem: Pain - Standard  Goal: Alleviation of pain or a reduction in pain to the patient’s comfort goal  Description: Target End Date:  Prior to discharge or change in level of care    Document on Vitals flowsheet    1.  Document pain using the appropriate pain scale per order or unit policy  2.  Educate and implement non-pharmacologic comfort measures (i.e. relaxation, distraction, massage, cold/heat therapy, etc.)  3.  Pain management medications as ordered  4.  Reassess pain after pain med administration per policy  5.  If opiods administered assess patient's response to pain medication is appropriate per POSS sedation scale  6.  Follow pain management plan developed in collaboration with patient and interdisciplinary team (including palliative care or pain specialists if applicable)  Outcome: Not Progressing

## 2023-11-03 NOTE — WOUND TEAM
[facility]  Inpatient Services  Wound and Skin Care Follow-up    Admission Date: 9/26/2023     Last order of IP CONSULT TO WOUND CARE was found on 10/29/2023 from Hospital Encounter on 9/26/2023     HPI, PMH, SH: Reviewed    Past Surgical History:   Procedure Laterality Date    INCISION AND DRAINAGE GENERAL Left 10/23/2023    Procedure: INCISION AND DRAINAGE;  Surgeon: Keith Navarro M.D.;  Location: [facility];  Service: Vascular    APPLICATION OR REPLACEMENT, WOUND VAC  10/23/2023    Procedure: APPLICATION OR REPLACEMENT, WOUND VAC;  Surgeon: Keith Navarro M.D.;  Location: [facility];  Service: Vascular    KNEE AMPUTATION ABOVE Left 10/23/2023    Procedure: AMPUTATION, ABOVE KNEE;  Surgeon: Keith Navarro M.D.;  Location: [facility];  Service: Vascular    KNEE AMPUTATION BELOW Left 10/20/2023    Procedure: AMPUTATION, BELOW KNEE WOUND REVISION;  Surgeon: Pradip Altamirano M.D.;  Location: [facility];  Service: Orthopedics    FEMORAL ENDARTERECTOMY Bilateral 9/30/2023    Procedure: ENDARTERECTOMY, FEMORAL;  Surgeon: Keith Navarro M.D.;  Location: [facility];  Service: Vascular    ANGIOGRAM, WITH ANGIOPLASTY, AND STENT INSERTION IF INDICATED Right 9/30/2023    Procedure: ANGIOGRAM, WITH ILIAC STENT;  Surgeon: Keith Navarro M.D.;  Location: [facility];  Service: Vascular    KNEE AMPUTATION BELOW Left 9/27/2023    Procedure: AMPUTATION, BELOW KNEE;  Surgeon: Pradip Altamirano M.D.;  Location: [facility];  Service: Orthopedics    IRRIGATION & DEBRIDEMENT ORTHO Left 3/4/2018    Procedure: IRRIGATION & DEBRIDEMENT ORTHO - HUMERUS;  Surgeon: Sergio Watkins M.D.;  Location: [facility];  Service: Orthopedics    ORIF, SHOULDER Left 1/4/2018    Procedure: SHOULDER ORIF;  Surgeon: Sergio Watkins M.D.;  Location: [facility];  Service: Orthopedics    CLOSED REDUCTION Left 12/24/2017    Procedure:  CLOSED REDUCTION;  Surgeon: Keith Subramanian M.D.;  Location: [facility];  Service: Orthopedics    OTHER      multiple surgeries lower legs from past injuries     Social History     Tobacco Use    Smoking status: Every Day     Types: Cigars    Smokeless tobacco: Never   Substance Use Topics    Alcohol use: Yes     Comment: a few beers a week     Chief Complaint   Patient presents with    Wound Check     Bilateral feet. Pts L foot is black and sloughing. Pt states unable to ambulate due to pain     Diagnosis: Gangrene of left foot (HCC) [I96]    Unit where seen by Wound Team: S602/01     WOUND FOLLOW UP RELATED TO:  L groin, L BKA, and L sacrum       WOUND TEAM PLAN OF CARE - Frequency of Follow-up:   Nursing to follow dressing orders written for wound care. Contact wound team if area fails to progress, deteriorates or with any questions/concerns if something comes up before next scheduled follow up (See below as to whether wound is following and frequency of wound follow up)  Dressing changes by wound team:                   NPWT change 3 times weekly - L sacrum  Weekly - L groin, L BKA    WOUND HISTORY:       9/26/23 ortho consult Dr Dumont, rec BKA  9/26/23 vascular consult Dr Seth - rec ortho for BKA L, vascular studies for R LE  9/27/23 L BKA Dr Altamirano  9/28/23 ID consult  9/30/23 B femoral endarterectomy, R LE angiogram and placement of R iliac stent  10/1/23 Dietary involved  10/7/23  physiatrist - pt not a candidate for   10/20/23 I&D L BKA Dr Altamirano  10/23/23: Patient underwent left above-knee amputation and debridement of left groin wound with wound VAC placement       WOUND ASSESSMENT/LDA  Wound 10/13/23 Pressure Injury Sacrum Left unstag 10/28/23 (Active)   Date First Assessed/Time First Assessed: 10/13/23 0528   Present on Original Admission: No  Hand Hygiene Completed: Yes  Primary Wound Type: Pressure Injury  Location: Sacrum  Laterality: Left  Wound Description (Comments):  unstag 10/28/23      Assessments 11/3/2023 10:30 AM   Wound Image       Site Assessment Slough   Periwound Assessment Induration   Margins Defined edges;Unattached edges   Closure Secondary intention   Drainage Amount Small   Drainage Description Serosanguineous   Treatments Cleansed;CSWD - Conservative Sharp Wound Debridement;Offloading   Wound Cleansing Dakin's Solution   Periwound Protectant Skin Protectant Wipes to Periwound;Paste Ring;Drape   Dressing Status Clean;Dry;Intact   Dressing Changed Changed   Dressing Cleansing/Solutions 1/4 Strength Dakin's Solution   Dressing Options Wound Vac   Dressing Change/Treatment Frequency Monday, Wednesday, Friday, and As Needed   NEXT Dressing Change/Treatment Date 11/06/23   NEXT Weekly Photo (Inpatient Only) 11/10/23   Wound Team Following 3x Weekly   Pressure Injury Stage Unstageable   Wound Length (cm) 2.8 cm   Wound Width (cm) 1.8 cm   Wound Surface Area (cm^2) 5.04 cm^2   Undermining (cm) 2.8 cm   Undermining of Wound, 1st Location From 6 o'clock;To 9 o'clock   Shape Round   Wound Odor Mild   Exposed Structures LUTHER   WOUND NURSE ONLY - Time Spent with Patient (mins) 60       Wound 10/17/23 Knee Left BKA incision (Active)   Date First Assessed/Time First Assessed: 10/17/23 (c) 6314   Location: Knee  Laterality: Left  Wound Description (Comments): BKA incision      Assessments 11/3/2023 10:30 AM   Site Assessment Clean;Dry;Intact   Periwound Assessment Clean;Dry;Intact   Margins Defined edges;Attached edges   Closure Secondary intention;Staples   Drainage Amount None   Treatments Cleansed   Wound Cleansing Approved Wound Cleanser   Periwound Protectant Skin Protectant Wipes to Periwound   Dressing Status Clean;Dry;Intact   Dressing Changed Changed   Dressing Cleansing/Solutions Not Applicable   Dressing Options Hydrofiber Silver;Offloading Dressing - Sacral   Dressing Change/Treatment Frequency Monday, Wednesday, Friday, and As Needed   NEXT Dressing Change/Treatment  Date 11/06/23   NEXT Weekly Photo (Inpatient Only) 11/08/23   Wound Team Following Weekly   Shape Linear   Wound Odor None       Wound 10/23/23 Full Thickness Wound Groin Left (Active)   Date First Assessed/Time First Assessed: 10/23/23 1313   Primary Wound Type: Full Thickness Wound  Location: Groin  Laterality: Left      Assessments 11/3/2023 10:30 AM   Site Assessment Pink;Slough   Periwound Assessment Clean;Dry;Intact   Margins Defined edges;Unattached edges   Closure Secondary intention   Drainage Amount Scant   Drainage Description Serosanguineous   Treatments Cleansed   Wound Cleansing Approved Wound Cleanser   Periwound Protectant Skin Protectant Wipes to Periwound   Dressing Status Clean;Dry;Intact   Dressing Changed Changed   Dressing Cleansing/Solutions Not Applicable   Dressing Options Honey Colloid;Offloading Dressing - Sacral   Dressing Change/Treatment Frequency Every 72 hrs, and As Needed   NEXT Dressing Change/Treatment Date 11/06/23   NEXT Weekly Photo (Inpatient Only) 11/08/23   Wound Team Following Weekly   Non-staged Wound Description Full thickness   Shape Irregular, linear   Wound Odor None       Negative Pressure Wound Therapy 10/25/23 Surgical Groin;Leg Left (Active)   Placement Date/Time: 10/25/23 1600   Present on Original Admission: No  Hand Hygiene Completed: Yes  Wound Type: Surgical  Location: Groin;Leg  Laterality: Left      Assessments 11/3/2023 10:30 AM   Wound Image     NPWT Pump Mode / Pressure Setting Ulta;Continuous;125 mmHg   Dressing Type Medium;Black Foam (Veraflo)   Number of Foam Pieces Used 1   Canister Changed No   NEXT Dressing Change/Treatment Date 11/06/23   VAC VeraFlo Irrigant 1/4 Strength Dakins   VAC VeraFlo Soak Time (mins) 8   VAC VeraFlo Instill Volume (ml) 16   VAC VeraFlo - Therapy Time (hrs) 1   VAC VeraFlo Pressure (mm/Hg) Continuous;125 mmHg        Vascular:    INGA:   No results found.    Lab Values:    Lab Results   Component Value Date/Time    WBC 11.6  (H) 11/02/2023 02:45 AM    RBC 3.06 (L) 11/02/2023 02:45 AM    HEMOGLOBIN 8.7 (L) 11/02/2023 02:45 AM    HEMATOCRIT 27.4 (L) 11/02/2023 02:45 AM         Culture Results show:  No results found for this or any previous visit (from the past 720 hour(s)).    Pain Level/Medicated:  PO pain medications administered by bedside RN 60min prior       INTERVENTIONS BY WOUND TEAM:  Chart and images reviewed. Discussed with bedside RN. All areas of concern (based on picture review, LDA review and discussion with bedside RN) have been thoroughly assessed. Documentation of areas based on significant findings. This RN in to assess patient. Performed standard wound care which includes appropriate positioning, dressing removal and non-selective debridement. Pictures and measurements obtained weekly if/when required.    Wound:  L Groin  Preparation for Dressing removal: Removed without difficulty  Cleansed/Non-selectively Debrided with:  Wound cleanser and Gauze  Kiara wound: Cleansed with Wound cleanser and Gauze, Prepped with No Sting  Primary Dressing:  Honey colloid  Secondary (Outer) Dressing: Offloading adhesive foam     Wound:  L BKA  Preparation for Dressing removal: Removed without difficulty  Cleansed/Non-selectively Debrided with:  Wound cleanser and Gauze  Kiara wound: Cleansed with Wound cleanser and Gauze, Prepped with No Sting  Primary Dressing:  Aquacel Ag  Secondary (Outer) Dressing: Island Dressing     Wound:  L Sacrum  Preparation for Dressing removal: Removed without difficulty  Cleansed/Non-selectively Debrided with:  Dakins and Gauze  Non-Excisional Conservative Sharp debridement: Slough debrided away using scissors and forceps < 20cm2 debrided down to slough.  No Bleeding noted.  Kiara wound: Cleansed with Dakins and Gauze, Prepped with No Sting, Paste Rings, and Drape  Primary Dressing:  One piece of spiraled Veraflo black foam into undermining then bridged slightly towards L hip. Secured with drape.  Secondary  (Outer) Dressing: Trac pad applied and suction resumed at 125mmHg, no leaks detected. Veraflo settings set using fill assist function. Then tubing was offloaded using offloading adhesive foams x2.    Advanced Wound Care Discharge Planning  Number of Clinicians necessary to complete wound care: 1  Is patient requiring IV pain medications for dressing changes:  No   Length of time for dressing change 30 min. (This does not include chart review, pre-medication time, set up, clean up or time spent charting.)    Interdisciplinary consultation: Patient, Bedside RN, Jemma MELENDEZ (Wound RN), HIRAM Nieto    EVALUATION / RATIONALE FOR TREATMENT:     Date:  11/03/23  Wound Status:  Wound progressing as expected  L groin still with some slough, but otherwise surface level enough to discontinue Vac here. Honey colloid applied to cleanse and autolytically debride due to its high osmolarity. As well as help lower overall wound pH, while promoting a moisture-balanced environment conducive to wound healing.   L BKA no longer with incisional drainage requiring Vac, transitioned to Aquacel Ag for continued antimicrobial properties and drainage management if that recurs.  L Sacrum still with 100% slough wound bed which has been getting autolytically debrided through Dakins WTD since 10/30, wound beginning to show some undermining. Wound Vac applied here to encourage further autolytic debridement of tissue.  Plan is to discharge patient home today. Patient was connected to [employer] plus machine for discharge and plan is to have patient connect to home machine once it's delivered at his house.  Patient has OP wound appt on Nov 6th and 8th, with transportation scheduled through [facility].    Date:  11/01/23  Wound Status:  Wound progressing as expected    Left groin still with slough. Continue with VF NPWT while inpatient to assist with hydromechanical debridement. Mild erythema surrounding L AKA incision. Will continue to monitor. Continue incisional  vac at this time while inpatient. Patient to transition to alternative dressings for L Groin and L AKA at time of discharge. Supplies ordered for patient and dressing care instructions added to AVS. Nursing to provide patient education on how to perform dressing changes. Bedside RN updated. Patient will still require daily dressing changes to sacrum.    11/2/23 Update -   Sacral Wound Measurements from 10/30/23: 2.5cm x 2cm x 1.2cm  1.7cm Circumferential Undermining   Plan to place NPWT to sacrum on 11/3/23    Date:  10/30/23  Wound Status:  Wound progressing as expected    Left groin improving with moist red tissue growth and slough is cleaning up nicely with VF NWPT.  Likely to transition to regular with collagen at next change if all is well.  Left AKA, minimal drainage and the staples still are approximated.  Surgery was 10/23/23 with Melanie -will need a timeline to take out staples.  At this time continue VAC changes. Sacral wound still  unstageable, surgery consulted, no plans for surgical intervention at this time.  Will continue with dakin's BID dressing changes as the wound progresses.       Date:  10/28/23  Wound Status:      Sacral wound - pt had some prior breakdown around same area putting pt at greater risk of skin failure.  Unfortunately the tissue has deteriorated into a pressure injury.  Contributing factors include immobility, amputations, hip flexion contracture.  Therapies are involved but pt does not always fully participate.  Off loading measures in place/set up.  Pt may benefit from surgical consult to expedite achieving a clean wound bed that could then be vaced.  Will update Dr Sterling.     L groin and L AKA looks good       Date:  10/25/23  Wound Status:  Initial evaluation    S/P L BKA and debridement of L groin wound. Left groin with small area of induration to lateral periwound. VF initiated to mechanically debride remaining slough to wound bed, increase oxygenation and granulation tissue  production to the area, and assist in wound closure by secondary intention.     L BKA incision still with drainage after removal of prevena. Incisional VAC applied to manage drainage. Dr. Navarro updated.         Goals: Steady decrease in wound area and depth weekly.    NURSING PLAN OF CARE ORDERS:  No new orders this visit    NUTRITION RECOMMENDATIONS   Wound Team Recommendations:  N/A     DIET ORDERS (From admission to next 24h)       Start     Ordered    10/29/23 1107  Supplements  ALL MEALS        Question:  Which Supplement  Answer:  BOOST GLUCOSE CONTROL    10/29/23 1108    10/23/23 1839  Diet Order Diet: Consistent CHO (Diabetic)  ALL MEALS        Question:  Diet:  Answer:  Consistent CHO (Diabetic)    10/23/23 1839                    PREVENTATIVE INTERVENTIONS:   Q shift Jae - performed per nursing policy  Q shift pressure point assessments - performed per nursing policy    Surface/Positioning  Low Airloss - Currently in Place  Reposition q 2 hours - Ordered    Offloading/Redistribution  Sacral offloading dressing (Silicone dressing) - Applied this Visit    Anticipated discharge plans:  Outpatient Wound Center        Vac Discharge Needs:  Vac Discharge plan is purely a recommendation from wound team and not a requirement for discharge unless otherwise stated by physician.  Veraflo Vac while inpatient, ok to transition to Regular Vac on discharge

## 2023-11-03 NOTE — DISCHARGE INSTRUCTIONS
Leg Amputation              Leg amputation is a procedure to remove the leg. You may have this procedure if your leg has been affected by a disease, such as peripheral vascular disease or severe circulation disease. You may also need this procedure if you have tumors or a traumatic injury and the leg is causing problems. There are three types of leg amputation:  Above-the-knee amputation (transfemoral amputation). This type is done to remove the leg from above the knee.  Below-the-knee amputation (transtibial amputation). This type is done to remove the leg from below the knee.  Through-knee amputation. This type is done to remove the leg at the knee joint.  Tell a health care provider about:  Any allergies you have.  All medicines you are taking, including vitamins, herbs, eye drops, creams, and over-the-counter medicines.  Any problems you or family members have had with anesthetic medicines.  Any bleeding problems you have.  Any surgeries you have had.  Any medical conditions you have.  Whether you are pregnant or may be pregnant.  What are the risks?  Generally, this is a safe procedure. However, problems may occur, including:  Infection.  Bleeding.  Allergic reactions to medicines.  Stiffening of the hip and knee joint (hip and knee contracture).  Blood clot in a deep vein (deep vein thrombosis, or DVT), usually in the leg.  A blood clot in your lung (pulmonary embolism).  What happens before the procedure?  Staying hydrated  Follow instructions from your health care provider about hydration, which may include:  Up to 2 hours before the procedure - you may continue to drink clear liquids, such as water, clear fruit juice, black coffee, and plain tea.  Eating and drinking restrictions  Follow instructions from your health care provider about eating and drinking, which may include:  8 hours before the procedure - stop eating heavy meals or foods, such as meat, fried foods, or fatty foods.  6 hours before the  procedure - stop eating light meals or foods, such as toast or cereal.  6 hours before the procedure - stop drinking milk or drinks that contain milk.  2 hours before the procedure - stop drinking clear liquids.  Medicines  Ask your health care provider about:  Changing or stopping your regular medicines. This is especially important if you are taking diabetes medicines or blood thinners.  Taking medicines such as aspirin and ibuprofen. These medicines can thin your blood. Do not take these medicines unless your health care provider tells you to take them.  Taking over-the-counter medicines, vitamins, herbs, and supplements.  General instructions  Ask your health care provider:  How your surgery site will be marked.  What steps will be taken to help prevent infection. These steps may include:  Removing hair at the surgery site.  Washing skin with a germ-killing soap.  Taking antibiotic medicine.  Plan to have a responsible adult take you home from the hospital or clinic.  Plan to have a responsible adult care for you for the time you are told after you leave the hospital or clinic. This is important.  What happens during the procedure?  An IV will be inserted into one of your veins.  You will be given one or more of the following:  A medicine to help you relax (sedative).  A medicine to numb the area (local anesthetic).  A medicine to make you fall asleep (general anesthetic).  A medicine that is injected into your spine to numb the area below and slightly above the injection site (spinal anesthetic).  A medicine that is injected into an area of your body to numb everything below the injection site (regional anesthetic).  Damaged or diseased tissue and bone will be removed.  Uneven areas of bone will be smoothed.  Blood vessels and nerves will be closed off.  Muscles will be cut and reshaped so that an artificial leg (prosthesis) can be attached to the stump.  The incision will be closed with stitches (sutures),  staples, or glue.  A bandage (dressing) will be placed over the incision.  The procedure may vary among health care providers and hospitals.  What happens after the procedure?  Your blood pressure, heart rate, breathing rate, and blood oxygen level will be monitored until you leave the hospital or clinic.  You will be given medicine for pain. The medicine may include:  A sedative.  A spinal anesthetic.  A nerve block. This is an injection of numbing medicine near the nerve.  Muscle relaxants. These may relieve pain caused by muscle spasms.  Opioids. These medicines relieve pain and are a type of narcotic pain medicine.  Non-opioid medicines. These may include acetaminophen and NSAIDs, such as ibuprofen.  You may start physical therapy within 24 hours after your surgery.  Summary  Leg amputation is a procedure to remove the leg from above, below, or through the knee.  Follow instructions from your health care provider about eating or drinking restrictions before the procedure.  Before the procedure, ask your health care provider how your surgery site will be marked.  This information is not intended to replace advice given to you by your health care provider. Make sure you discuss any questions you have with your health care provider.  Document Revised: 04/21/2022 Document Reviewed: 04/21/2022  Elsevier Patient Education © 2023 [employer]

## 2023-11-03 NOTE — CARE PLAN
The patient is Stable - Low risk of patient condition declining or worsening    Shift Goals  Clinical Goals: Patient to have pain less than 5 throughout this shift  Patient Goals: Sleep  Family Goals: ashlie    Patient complained of pain and was medicated as per MAR with effect. Dressing change done. Patient safe from falls.    Progress made toward(s) clinical / shift goals:  Ongoing    Problem: Pain - Standard  Goal: Alleviation of pain or a reduction in pain to the patient’s comfort goal  Outcome: Progressing     Problem: Skin Integrity  Goal: Skin integrity is maintained or improved  Outcome: Progressing     Problem: Fall Risk  Goal: Patient will remain free from falls  Outcome: Progressing       Patient is not progressing towards the following goals:

## 2023-11-03 NOTE — DISCHARGE PLANNING
Case Management Discharge Planning    Admission Date: 9/26/2023  GMLOS: 11.1  ALOS: 38    6-Clicks ADL Score: 18  6-Clicks Mobility Score: 14  PT and/or OT Eval ordered: Yes  Post-acute Referrals Ordered: Yes  Post-acute Choice Obtained: Yes  Has referral(s) been sent to post-acute provider:  Yes      Anticipated Discharge Dispo: Discharge Disposition: Discharged to home/self care (01)    DME Needed: Yes    DME Ordered: Yes    Action(s) Taken: LMSW left VM for Radha at [facility] [phone] to inquire about the pt's wound vac.     Addendum @ 2260: LMSW met with pt at bedside. Pt requesting 1700 transport time. LMSW reminded pt about wound appt on Monday. Radha from [facility] reported that LMSW can DC the patient on the Pravena Plus. LMSW faxed ride line request. Ride line confirmed at 1700.    Addendum @ 4245: Pt requested for LMSW to cancel request as his roommate who has the key to the home will not be there after four. LMSW canceled transportation. Pt can afford to CAB home upon DC or his roommate can pick him up if he is willing.     Escalations Completed: None    Medically Clear: Yes    Next Steps: LMSW to follow as needed.     Barriers to Discharge: Outpatient referrals pending    Is the patient up for discharge tomorrow: No

## 2023-11-04 NOTE — HOSPITAL COURSE
Dillon Centeno is a 58 y.o. male with hypertension and tobacco dependence, admitted 9/26/2023 with bilateral feet wounds.  He was noted to have ischemic gangrene of the left lower extremity with maggots and superimposed bacterial infection.  Vascular surgery and orthopedic surgery were consulted.  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  Vascular studies showed severe arterial disease.  Patient underwent bilateral common femoral artery endarterectomy and profundoplasty with saphenous vein angioplasty with stent placement in the right external iliac artery on 9/30/2023.  He was recommended to have Plavix for 1 month and aspirin for life. Blood cultures from 9/26/2023 grew Morganella morganii and Vagococcus.  Urine culture from 9/26/2023 grew Staph epidermidis. ID were consulted.   Echocardiogram showed LVEF of about 60% with no reported valvular abnormalities.  Repeat blood cultures from 9/27/2023 have been negative.  ID recommended IV Zosyn which he completed on 10/6/2023.  Subsequently, he had breakdown of the incision site on the left BKA.  He was able to go to the OR and underwent debridement and secondary closure of dehisced surgical wound.  He was then noted to have redness and exudate with dehiscence on the femoral endarterectomy incision site. He underwent left above knee amputation with debridement of left groin wound and wound vac placement.  He developed acute kidney injury which was felt to be post renal with noted obstructive changes on renal ultrasound. Holguin catheter was placed.  Nephrology and urology were consulted.  Renal function improved.  Urology recommended Flomax and finasteride.   Patient ultimately also developed sacral wound, surgery consulted who do not recommend operative intervention. Wound vac placed on sacral wound.  Discharge planning was pursued, but unfortunately had no accepting SNF's and no home health services accepting either.  Patient refused to go to a group home.   Patient was determined satisfactory for discharge with appropriate follow up.

## 2023-11-06 ENCOUNTER — NON-PROVIDER VISIT (OUTPATIENT)
Dept: WOUND CARE | Facility: MEDICAL CENTER | Age: 58
End: 2023-11-06
Attending: INTERNAL MEDICINE
Payer: MEDICAID

## 2023-11-06 PROCEDURE — 99211 OFF/OP EST MAY X REQ PHY/QHP: CPT

## 2023-11-06 PROCEDURE — 97602 WOUND(S) CARE NON-SELECTIVE: CPT

## 2023-11-06 NOTE — CERTIFICATION
Non Provider Encounter- Full Thickness wound    HISTORY OF PRESENT ILLNESS  Wound History:    START OF CARE IN CLINIC: 11/06/23    REFERRING PROVIDER: Seth Najera D.O.   WOUND- Full Thickness Wound   LOCATION: Left sacrum, left groin, left AKA   HISTORY: The patient is a 58 year old male who arrives to the [facility] clinic with a Prevena Plus 125 vac that was pulled off his sacral wound. He was admitted to [facility] on 9/26/2023 a foot wound. He was determined to have ischemic gangrene of the left lower extremity with maggots and a bacterial infection. Vascular surgery and orthopedic surgery were consulted. Patient underwent left BKA on 9/27/2023 with Dr. Altamirano. Vascular studies showed severe arterial disease, he underwent bilateral common femoral artery endarterectomy and profundoplasty with saphenous vein angioplasty with stent placement in the right external iliac artery on 9/30/2023 with Dr. Navarro. ID was consulted due to blood cultures from 9/26/2023 growing Morganella morganii and Vagococcus, they recommended IV Zosyn which he completed on 10/6/2023. Incision site on the left BKA dehisced and he returned to the OR with   Dr. Altamirano on 10/20/23 and underwent debridement and secondary closure of dehisced surgical wound. He was then noted to have redness and exudate with dehiscence on the femoral endarterectomy incision site. This led to an left above knee amputation with debridement of left groin wound and wound vac placement with Dr. Navarro on 10/23/23. Patient ultimately also developed sacral wound, surgery consult did not recommend operative intervention (wound vac was applied to this wound).     Past Medical History:   Diagnosis Date    Hypertension      Past Surgical History:   Procedure Laterality Date    INCISION AND DRAINAGE GENERAL Left 10/23/2023    Procedure: INCISION AND DRAINAGE;  Surgeon: Keith Navarro M.D.;  Location: SURGERY [facility];  Service: Vascular    APPLICATION OR REPLACEMENT,  WOUND VAC  10/23/2023    Procedure: APPLICATION OR REPLACEMENT, WOUND VAC;  Surgeon: Keith Navarro M.D.;  Location: SURGERY [facility];  Service: Vascular    KNEE AMPUTATION ABOVE Left 10/23/2023    Procedure: AMPUTATION, ABOVE KNEE;  Surgeon: Keith Navarro M.D.;  Location: [facility];  Service: Vascular    KNEE AMPUTATION BELOW Left 10/20/2023    Procedure: AMPUTATION, BELOW KNEE WOUND REVISION;  Surgeon: Pradip Altamirano M.D.;  Location: [facility];  Service: Orthopedics    FEMORAL ENDARTERECTOMY Bilateral 9/30/2023    Procedure: ENDARTERECTOMY, FEMORAL;  Surgeon: Keith Navarro M.D.;  Location: [facility];  Service: Vascular    ANGIOGRAM, WITH ANGIOPLASTY, AND STENT INSERTION IF INDICATED Right 9/30/2023    Procedure: ANGIOGRAM, WITH ILIAC STENT;  Surgeon: Keith Navarro M.D.;  Location: [facility];  Service: Vascular    KNEE AMPUTATION BELOW Left 9/27/2023    Procedure: AMPUTATION, BELOW KNEE;  Surgeon: Pradip Altamirano M.D.;  Location: [facility];  Service: Orthopedics    IRRIGATION & DEBRIDEMENT ORTHO Left 3/4/2018    Procedure: IRRIGATION & DEBRIDEMENT ORTHO - HUMERUS;  Surgeon: Sergio Watkins M.D.;  Location: [facility];  Service: Orthopedics    ORIF, SHOULDER Left 1/4/2018    Procedure: SHOULDER ORIF;  Surgeon: Sergio Watkins M.D.;  Location: [facility];  Service: Orthopedics    CLOSED REDUCTION Left 12/24/2017    Procedure: CLOSED REDUCTION;  Surgeon: Keith Subramanian M.D.;  Location: [facility];  Service: Orthopedics    OTHER      multiple surgeries lower legs from past injuries     Current Outpatient Medications on File Prior to Visit   Medication Sig Dispense Refill    aspirin 81 MG EC tablet Take 1 Tablet by mouth every day. 100 Tablet 1    gabapentin (NEURONTIN) 300 MG Cap Take 1 Capsule by mouth 3 times a day. 90 Capsule 1    tamsulosin (FLOMAX) 0.4 MG capsule Take 1 Capsule by mouth 1/2  "hour after breakfast. 30 Capsule 1    atorvastatin (LIPITOR) 40 MG Tab Take 1 Tablet by mouth every evening. 30 Tablet 1    cyclobenzaprine (FLEXERIL) 10 mg Tab Take 1 Tablet by mouth 3 times a day as needed for Muscle Spasms. 30 Tablet 0    finasteride (PROSCAR) 5 MG Tab Take 1 Tablet by mouth every day. 30 Tablet 1    oxyCODONE immediate-release (ROXICODONE) 5 MG Tab Take 2 Tablets by mouth every 6 hours as needed for Severe Pain for up to 3 days. (Patient not taking: Reported on 11/6/2023) 12 Tablet 0     No current facility-administered medications on file prior to visit.     Social History     Socioeconomic History    Marital status: Single     Spouse name: Not on file    Number of children: Not on file    Years of education: Not on file    Highest education level: Not on file   Occupational History    Not on file   Tobacco Use    Smoking status: Every Day     Types: Cigars    Smokeless tobacco: Never   Substance and Sexual Activity    Alcohol use: Yes     Comment: a few beers a week    Drug use: No    Sexual activity: Not on file   Other Topics Concern    Not on file   Social History Narrative    Not on file     Social Determinants of Health     Financial Resource Strain: Not on file   Food Insecurity: Not on file   Transportation Needs: Not on file   Physical Activity: Not on file   Stress: Not on file   Social Connections: Not on file   Intimate Partner Violence: Not on file   Housing Stability: Not on file     Pertinent Labs and Diagnostics:    Labs:     A1c: No results found for: \"HBA1C\"     IMAGING: None in Epic.    VASCULAR STUDIES: None in [facility].    LAST  WOUND CULTURE:  DATE : None in Epic.           Patient allergies and medications reviewed via Epic.     Wound Assessment:    Wound 10/13/23 Pressure Injury Sacrum Left . (Active)   Wound Image   11/06/23 1100   Site Assessment Yellow;Slough;Painful 11/06/23 1100   Periwound Assessment Dry;Blanchable erythema;Pink 11/06/23 1100   Margins Unattached " edges;Defined edges 11/06/23 1100   Closure Secondary intention 11/06/23 1100   Drainage Amount Large 11/06/23 1100   Drainage Description Serosanguineous;Ng 11/06/23 1100   Treatments Cleansed;Nonselective debridement;Site care 11/06/23 1100   Wound Cleansing Hypochlorus Acid 11/06/23 1100   Periwound Protectant Skin Protectant Wipes to Periwound;Barrier Paste 11/06/23 1100   Dressing Changed New 11/06/23 1100   Dressing Cleansing/Solutions 1/4 Strength Dakin's Solution 11/06/23 1100   Dressing Options Moist Roll Gauze;Dry Gauze;Offloading Dressing - Sacral 11/06/23 1100   Dressing Change/Treatment Frequency Daily, and As Needed 11/06/23 1100   Wound Team Following 3x Weekly 11/06/23 1100   WOUND NURSE ONLY - Pressure Injury Stage U 11/06/23 1100   Non-staged Wound Description Full thickness 11/06/23 1100   Wound Length (cm) 2.8 cm 11/06/23 1100   Wound Width (cm) 2 cm 11/06/23 1100   Wound Depth (cm) 1.3 cm 11/06/23 1100   Wound Surface Area (cm^2) 5.6 cm^2 11/06/23 1100   Wound Volume (cm^3) 7.28 cm^3 11/06/23 1100   Tunneling (cm) 0 cm 11/06/23 1100   Undermining (cm) 3.5 cm 11/06/23 1100   Wound Odor Strong;Foul 11/06/23 1100   Exposed Structures LUTHER 11/06/23 1100       Wound 10/23/23 Full Thickness Wound Groin Left . (Active)   Wound Image    11/06/23 1100   Site Assessment Pink;Yellow;Red 11/06/23 1100   Periwound Assessment Pink;Blanchable erythema 11/06/23 1100   Margins Attached edges;Unattached edges 11/06/23 1100   Closure Secondary intention 11/06/23 1100   Drainage Amount Moderate 11/06/23 1100   Drainage Description Serosanguineous 11/06/23 1100   Treatments Cleansed;Nonselective debridement;Site care 11/06/23 1100   Wound Cleansing Hypochlorus Acid 11/06/23 1100   Periwound Protectant Skin Protectant Wipes to Periwound;Barrier Paste 11/06/23 1100   Dressing Changed New 11/06/23 1100   Dressing Cleansing/Solutions Not Applicable 11/06/23 1100   Dressing Options Honey Gel;Hydrofiber Silver;Offloading  Dressing - Sacral 11/06/23 1100   Dressing Change/Treatment Frequency Every 48 hrs, and As Needed 11/06/23 1100   Wound Team Following 3x Weekly 11/06/23 1100   Non-staged Wound Description Full thickness 11/06/23 1100   Wound Length (cm) 10.7 cm 11/06/23 1100   Wound Width (cm) 4.7 cm 11/06/23 1100   Wound Depth (cm) 1.1 cm 11/06/23 1100   Wound Surface Area (cm^2) 50.29 cm^2 11/06/23 1100   Wound Volume (cm^3) 55.319 cm^3 11/06/23 1100   Wound Healing % -197 11/06/23 1100   Tunneling (cm) 0 cm 11/06/23 1100   Undermining (cm) 0 cm 11/06/23 1100   Wound Odor None 11/06/23 1100       Wound 11/06/23 Thigh Distal;Anterior Left Left AKA (Active)   Wound Image   11/06/23 1100   Site Assessment Dry;Intact 11/06/23 1100   Periwound Assessment Dry;Intact 11/06/23 1100   Margins Attached edges 11/06/23 1100   Drainage Amount None 11/06/23 1100   Treatments Cleansed;Site care 11/06/23 1100   Wound Cleansing Foam Cleanser/Washcloth 11/06/23 1100   Periwound Protectant Skin Protectant Wipes to Periwound 11/06/23 1100   Dressing Changed New 11/06/23 1100   Dressing Cleansing/Solutions Not Applicable 11/06/23 1100   Dressing Options Nonadhesive Foam;Hypafix Tape 11/06/23 1100   Dressing Change/Treatment Frequency Every 48 hrs, and As Needed 11/06/23 1100   Wound Team Following 3x Weekly 11/06/23 1100   Tunneling (cm) 0 cm 11/06/23 1100   Undermining (cm) 0 cm 11/06/23 1100   Wound Odor None 11/06/23 1100     Procedures:    Non-Selective Debridement with foam cleanser, wash cloth, puracyn spray and gauze to debride non-viable tissue from the wound bed and periwound areas. Refer to flow sheet for wound care details. Patient tolerated the procedure well.                    PATIENT EDUCATION  -Advised to go to ER for any increased redness, swelling, drainage or odor, or if patient develops fever, chills, nausea or vomiting.  -Importance of adequate nutrition for wound healing  -Increase protein intake (unless contraindicated by  renal status)

## 2023-11-06 NOTE — PATIENT INSTRUCTIONS
-Keep your wound dressing clean, dry, and intact.    -Change your dressing if it becomes soiled, soaked, or falls off.    -Should you experience any significant changes in your wound(s), such as infection (redness, swelling, localized heat, increased pain, fever > 101 F, chills) or have any questions regarding your home care instructions, please contact the wound center at [phone]. If after hours, contact your primary care physician or go to the hospital emergency room.

## 2023-11-08 ENCOUNTER — NON-PROVIDER VISIT (OUTPATIENT)
Dept: WOUND CARE | Facility: MEDICAL CENTER | Age: 58
End: 2023-11-08
Attending: INTERNAL MEDICINE
Payer: MEDICAID

## 2023-11-08 DIAGNOSIS — S31.000D WOUND OF SACRAL REGION, SUBSEQUENT ENCOUNTER: ICD-10-CM

## 2023-11-08 DIAGNOSIS — S31.104D NON-HEALING LEFT GROIN OPEN WOUND, SUBSEQUENT ENCOUNTER: ICD-10-CM

## 2023-11-08 PROCEDURE — 99211 OFF/OP EST MAY X REQ PHY/QHP: CPT

## 2023-11-08 PROCEDURE — 99212 OFFICE O/P EST SF 10 MIN: CPT

## 2023-11-08 PROCEDURE — 99212 OFFICE O/P EST SF 10 MIN: CPT | Performed by: STUDENT IN AN ORGANIZED HEALTH CARE EDUCATION/TRAINING PROGRAM

## 2023-11-08 RX ORDER — SODIUM HYPOCHLORITE 1.25 MG/ML
SOLUTION TOPICAL
Qty: 473 ML | Refills: 0 | Status: SHIPPED | OUTPATIENT
Start: 2023-11-08

## 2023-11-08 NOTE — PATIENT INSTRUCTIONS
Should you experience any significant changes in your wound(s) such as infection (redness, swelling, localized heat, increased pain, fever >101 F, chills) or have any questions regarding your home care instructions, please contact the wound center [phone]. If after hours, contact your primary care physician or go the hospital emergency room.  Keep dressing clean and dry and cover while bathing. Only change dressing if over saturated, soiled or its falling off or daily as demonstrated during your appointment.    your prescription for Dakins solution at [facility].  Call Dr. Altamirano's office to schedule your follow-up for your AKA staples at [facility].  Supplies were ordered from [employer] & please call them to check on your order status.  Keep pressure off your sacral wound site using repositioning and offloading techniques with pillow support.  Stop smoking or at least cut back on amount of daily cigarettes.

## 2023-11-08 NOTE — PROGRESS NOTES
Brief Wound Care Provider Note    Patient was seen by wound care nurse today. Due to size of wound with undermining, slough, odor, she would like to start wet to dry with Dakins. I agree with her assessment. I placed order for Dakins to [facility]. Patient informed by nurse about pending prescription.    My total time spent caring for the patient on the day of the encounter was 10 minutes, reviewing history, assessment, counseling and education, and coordination of care.  This does not include time spent on separately billable procedures/tests.

## 2023-11-08 NOTE — PROGRESS NOTES
Patient is called Jules. Patient currently smokes 1.5 packs of cigarettes daily; educated on importance of smoking cessation or at least cutting back. Patient is accompanied by caregiver Otis whom he lives with and is a CNA at [employer]; Deion verbalizes he can change the dressings daily with Dakin's wet to dry. Supplies ordered from [employer] & Deion provided phone number to follow-up. Deion plans to call [facility] for follow-up with Dr. Altamirano.   Wound 10/13/23 Pressure Injury Sacrum Left . (Active)   Wound Image   11/06/23 1100   Site Assessment Yellow;Slough;Black 11/08/23 1500   Periwound Assessment Blanchable erythema 11/08/23 1500   Margins Unattached edges 11/08/23 1500   Closure Secondary intention 11/06/23 1100   Drainage Amount Copious 11/08/23 1500   Drainage Description Serosanguineous 11/08/23 1500   Treatments Cleansed 11/08/23 1500   Wound Cleansing Hypochlorus Acid 11/08/23 1500   Periwound Protectant Skin Protectant Wipes to Periwound;Barrier Paste 11/06/23 1100   Dressing Status Old drainage 11/08/23 1500   Dressing Changed Changed 11/08/23 1500   Dressing Cleansing/Solutions 1/4 Strength Dakin's Solution 11/08/23 1500   Dressing Options Moist Roll Gauze;Offloading Dressing - Sacral 11/08/23 1500   Dressing Change/Treatment Frequency Daily, and As Needed 11/08/23 1500   Wound Team Following Weekly 11/08/23 1500   WOUND NURSE ONLY - Pressure Injury Stage U 11/08/23 1500   Non-staged Wound Description Full thickness 11/06/23 1100   Wound Length (cm) 2.8 cm 11/06/23 1100   Wound Width (cm) 2 cm 11/06/23 1100   Wound Depth (cm) 1.3 cm 11/06/23 1100   Wound Surface Area (cm^2) 5.6 cm^2 11/06/23 1100   Wound Volume (cm^3) 7.28 cm^3 11/06/23 1100   Tunneling (cm) 0 cm 11/06/23 1100   Undermining (cm) 3.5 cm 11/06/23 1100   Undermining of Wound, 1st Location From 12 o'clock;To 12 o'clock 11/06/23 1100   Wound Odor Strong;Foul 11/08/23 1500   Exposed Structures LUTHER 11/08/23 1500       Wound 10/23/23 Full Thickness Wound  Groin Left . (Active)   Wound Image    11/06/23 1100   Site Assessment Red;Yellow;Slough 11/08/23 1500   Periwound Assessment Blanchable erythema 11/08/23 1500   Margins Defined edges 11/08/23 1500   Closure Secondary intention 11/08/23 1500   Drainage Amount Moderate 11/08/23 1500   Drainage Description Serosanguineous 11/08/23 1500   Treatments Cleansed 11/08/23 1500   Wound Cleansing Hypochlorus Acid 11/08/23 1500   Periwound Protectant Skin Protectant Wipes to Periwound;Barrier Paste 11/06/23 1100   Dressing Status Old drainage 11/08/23 1500   Dressing Changed Changed 11/08/23 1500   Dressing Cleansing/Solutions 1/4 Strength Dakin's Solution 11/08/23 1500   Dressing Options Moist Roll Gauze;Absorbent Abdominal Pad;Hypafix Tape 11/08/23 1500   Dressing Change/Treatment Frequency Daily, and As Needed 11/08/23 1500   Wound Team Following Weekly 11/08/23 1500   Non-staged Wound Description Full thickness 11/08/23 1500   Wound Length (cm) 10.7 cm 11/06/23 1100   Wound Width (cm) 4.7 cm 11/06/23 1100   Wound Depth (cm) 1.1 cm 11/06/23 1100   Wound Surface Area (cm^2) 50.29 cm^2 11/06/23 1100   Wound Volume (cm^3) 55.319 cm^3 11/06/23 1100   Wound Healing % -197 11/06/23 1100   Tunneling (cm) 0 cm 11/08/23 1500   Undermining (cm) 0 cm 11/08/23 1500   Wound Odor None 11/08/23 1500   Pulses 2+;Left;Bounding;Other (Comments) 11/08/23 1500   Exposed Structures LUTHER 11/08/23 1500       Wound 11/06/23 Thigh Distal;Anterior Left Left AKA (Active)   Wound Image   11/06/23 1100   Site Assessment Dry;Other (Comment) 11/08/23 1500   Periwound Assessment Dry 11/08/23 1500   Margins Other (Comment) 11/08/23 1500   Closure Staples 11/08/23 1500   Drainage Amount None 11/08/23 1500   Treatments Cleansed 11/08/23 1500   Wound Cleansing Normal Saline Irrigation 11/08/23 1500   Periwound Protectant Not Applicable 11/08/23 1500   Dressing Changed Changed 11/08/23 1500   Dressing Cleansing/Solutions Not Applicable 11/08/23 1500   Dressing  Options Petrolatum Gauze (yellow);Silicone Adhesive Foam;Hypafix Tape 11/08/23 1500   Dressing Change/Treatment Frequency Weekly, and As Needed 11/08/23 1500   Wound Team Following Weekly 11/08/23 1500   Tunneling (cm) 0 cm 11/08/23 1500   Undermining (cm) 0 cm 11/08/23 1500   Wound Odor None 11/08/23 1500   Exposed Structures Staples 11/08/23 1500

## 2023-11-10 ENCOUNTER — TELEPHONE (OUTPATIENT)
Dept: WOUND CARE | Facility: MEDICAL CENTER | Age: 58
End: 2023-11-10
Payer: MEDICAID

## 2023-11-10 ENCOUNTER — APPOINTMENT (OUTPATIENT)
Dept: WOUND CARE | Facility: MEDICAL CENTER | Age: 58
End: 2023-11-10
Attending: INTERNAL MEDICINE
Payer: MEDICAID

## 2023-11-11 NOTE — TELEPHONE ENCOUNTER
"Received phone call from patients friend Otis who wanted to inform the clinic that the patient is very non-compliant. Otis stated that Dillon is \"only getting up to smoke or taking a cab to go get a drink\". Otis stated that the patient is incontinent of stool and urine and he will not allow his friend to help get him cleaned up. Otis stated that he is only drinking vodka and coffee and smoking cigarettes, that he will not eat anything telling his friend that he is not hungry. This RN informed Otis that we can not make the patient do anything as he is a competent adult who can make his own choices. Otis stated that he understood, and just wanted us to be aware of the situation.  "

## 2023-11-12 ENCOUNTER — HOSPITAL ENCOUNTER (INPATIENT)
Facility: MEDICAL CENTER | Age: 58
LOS: 18 days | DRG: 853 | End: 2023-11-30
Attending: STUDENT IN AN ORGANIZED HEALTH CARE EDUCATION/TRAINING PROGRAM | Admitting: INTERNAL MEDICINE
Payer: MEDICAID

## 2023-11-12 ENCOUNTER — APPOINTMENT (OUTPATIENT)
Dept: RADIOLOGY | Facility: MEDICAL CENTER | Age: 58
DRG: 853 | End: 2023-11-12
Attending: STUDENT IN AN ORGANIZED HEALTH CARE EDUCATION/TRAINING PROGRAM
Payer: MEDICAID

## 2023-11-12 DIAGNOSIS — L89.43 PRESSURE INJURY OF CONTIGUOUS REGION INVOLVING LEFT BUTTOCK AND HIP, STAGE 3 (HCC): ICD-10-CM

## 2023-11-12 DIAGNOSIS — E87.1 HYPONATREMIA: ICD-10-CM

## 2023-11-12 DIAGNOSIS — N17.9 AKI (ACUTE KIDNEY INJURY) (HCC): ICD-10-CM

## 2023-11-12 DIAGNOSIS — T87.89 NON-HEALING WOUND OF AMPUTATION STUMP (HCC): ICD-10-CM

## 2023-11-12 DIAGNOSIS — R65.20 SEPSIS WITH ACUTE RENAL FAILURE WITHOUT SEPTIC SHOCK, DUE TO UNSPECIFIED ORGANISM, UNSPECIFIED ACUTE RENAL FAILURE TYPE (HCC): ICD-10-CM

## 2023-11-12 DIAGNOSIS — R19.7 DIARRHEA, UNSPECIFIED TYPE: ICD-10-CM

## 2023-11-12 DIAGNOSIS — A41.9 SEPSIS WITH ACUTE RENAL FAILURE WITHOUT SEPTIC SHOCK, DUE TO UNSPECIFIED ORGANISM, UNSPECIFIED ACUTE RENAL FAILURE TYPE (HCC): ICD-10-CM

## 2023-11-12 DIAGNOSIS — S31.000A WOUND OF SACRAL REGION, INITIAL ENCOUNTER: ICD-10-CM

## 2023-11-12 DIAGNOSIS — N17.9 SEPSIS WITH ACUTE RENAL FAILURE WITHOUT SEPTIC SHOCK, DUE TO UNSPECIFIED ORGANISM, UNSPECIFIED ACUTE RENAL FAILURE TYPE (HCC): ICD-10-CM

## 2023-11-12 PROBLEM — S31.109A OPEN WOUND OF INGUINAL REGION: Status: ACTIVE | Noted: 2023-11-12

## 2023-11-12 LAB
ALBUMIN SERPL BCP-MCNC: 3.1 G/DL (ref 3.2–4.9)
ALBUMIN/GLOB SERPL: 0.8 G/DL
ALP SERPL-CCNC: 79 U/L (ref 30–99)
ALT SERPL-CCNC: 11 U/L (ref 2–50)
ANION GAP SERPL CALC-SCNC: 11 MMOL/L (ref 7–16)
ANION GAP SERPL CALC-SCNC: 13 MMOL/L (ref 7–16)
ANION GAP SERPL CALC-SCNC: 15 MMOL/L (ref 7–16)
ANION GAP SERPL CALC-SCNC: 16 MMOL/L (ref 7–16)
ANION GAP SERPL CALC-SCNC: 16 MMOL/L (ref 7–16)
APPEARANCE UR: CLEAR
APTT PPP: 26.8 SEC (ref 24.7–36)
AST SERPL-CCNC: 14 U/L (ref 12–45)
BACTERIA #/AREA URNS HPF: NEGATIVE /HPF
BASOPHILS # BLD AUTO: 0 % (ref 0–1.8)
BASOPHILS # BLD: 0 K/UL (ref 0–0.12)
BILIRUB SERPL-MCNC: 0.2 MG/DL (ref 0.1–1.5)
BILIRUB UR QL STRIP.AUTO: NEGATIVE
BUN SERPL-MCNC: 35 MG/DL (ref 8–22)
BUN SERPL-MCNC: 41 MG/DL (ref 8–22)
BUN SERPL-MCNC: 42 MG/DL (ref 8–22)
BUN SERPL-MCNC: 59 MG/DL (ref 8–22)
BUN SERPL-MCNC: 62 MG/DL (ref 8–22)
CALCIUM ALBUM COR SERPL-MCNC: 9.6 MG/DL (ref 8.5–10.5)
CALCIUM SERPL-MCNC: 8 MG/DL (ref 8.5–10.5)
CALCIUM SERPL-MCNC: 8.1 MG/DL (ref 8.5–10.5)
CALCIUM SERPL-MCNC: 8.2 MG/DL (ref 8.5–10.5)
CALCIUM SERPL-MCNC: 8.5 MG/DL (ref 8.5–10.5)
CALCIUM SERPL-MCNC: 8.9 MG/DL (ref 8.5–10.5)
CHLORIDE SERPL-SCNC: 84 MMOL/L (ref 96–112)
CHLORIDE SERPL-SCNC: 89 MMOL/L (ref 96–112)
CHLORIDE SERPL-SCNC: 92 MMOL/L (ref 96–112)
CHLORIDE SERPL-SCNC: 92 MMOL/L (ref 96–112)
CHLORIDE SERPL-SCNC: 94 MMOL/L (ref 96–112)
CO2 SERPL-SCNC: 16 MMOL/L (ref 20–33)
CO2 SERPL-SCNC: 17 MMOL/L (ref 20–33)
CO2 SERPL-SCNC: 18 MMOL/L (ref 20–33)
CO2 SERPL-SCNC: 19 MMOL/L (ref 20–33)
CO2 SERPL-SCNC: 19 MMOL/L (ref 20–33)
COLOR UR: YELLOW
CORTIS SERPL-MCNC: 29.7 UG/DL (ref 0–23)
CREAT SERPL-MCNC: 0.86 MG/DL (ref 0.5–1.4)
CREAT SERPL-MCNC: 1.1 MG/DL (ref 0.5–1.4)
CREAT SERPL-MCNC: 1.27 MG/DL (ref 0.5–1.4)
CREAT SERPL-MCNC: 1.7 MG/DL (ref 0.5–1.4)
CREAT SERPL-MCNC: 2.27 MG/DL (ref 0.5–1.4)
CRP SERPL HS-MCNC: 27.78 MG/DL (ref 0–0.75)
EKG IMPRESSION: NORMAL
EKG IMPRESSION: NORMAL
EOSINOPHIL # BLD AUTO: 0 K/UL (ref 0–0.51)
EOSINOPHIL NFR BLD: 0 % (ref 0–6.9)
EPI CELLS #/AREA URNS HPF: NEGATIVE /HPF
ERYTHROCYTE [DISTWIDTH] IN BLOOD BY AUTOMATED COUNT: 50.4 FL (ref 35.9–50)
ERYTHROCYTE [SEDIMENTATION RATE] IN BLOOD BY WESTERGREN METHOD: 66 MM/HOUR (ref 0–20)
GFR SERPLBLD CREATININE-BSD FMLA CKD-EPI: 32 ML/MIN/1.73 M 2
GFR SERPLBLD CREATININE-BSD FMLA CKD-EPI: 46 ML/MIN/1.73 M 2
GFR SERPLBLD CREATININE-BSD FMLA CKD-EPI: 65 ML/MIN/1.73 M 2
GFR SERPLBLD CREATININE-BSD FMLA CKD-EPI: 77 ML/MIN/1.73 M 2
GFR SERPLBLD CREATININE-BSD FMLA CKD-EPI: 99 ML/MIN/1.73 M 2
GLOBULIN SER CALC-MCNC: 3.9 G/DL (ref 1.9–3.5)
GLUCOSE BLD STRIP.AUTO-MCNC: 122 MG/DL (ref 65–99)
GLUCOSE SERPL-MCNC: 114 MG/DL (ref 65–99)
GLUCOSE SERPL-MCNC: 119 MG/DL (ref 65–99)
GLUCOSE SERPL-MCNC: 126 MG/DL (ref 65–99)
GLUCOSE SERPL-MCNC: 136 MG/DL (ref 65–99)
GLUCOSE SERPL-MCNC: 141 MG/DL (ref 65–99)
GLUCOSE UR STRIP.AUTO-MCNC: NEGATIVE MG/DL
GRAM STN SPEC: NORMAL
HCT VFR BLD AUTO: 29.9 % (ref 42–52)
HGB BLD-MCNC: 9.9 G/DL (ref 14–18)
HYALINE CASTS #/AREA URNS LPF: ABNORMAL /LPF
INR PPP: 1.21 (ref 0.87–1.13)
INR PPP: 1.25 (ref 0.87–1.13)
KETONES UR STRIP.AUTO-MCNC: ABNORMAL MG/DL
LACTATE SERPL-SCNC: 2.1 MMOL/L (ref 0.5–2)
LACTATE SERPL-SCNC: 2.8 MMOL/L (ref 0.5–2)
LACTATE SERPL-SCNC: 3.9 MMOL/L (ref 0.5–2)
LEUKOCYTE ESTERASE UR QL STRIP.AUTO: ABNORMAL
LIPASE SERPL-CCNC: 48 U/L (ref 11–82)
LYMPHOCYTES # BLD AUTO: 0 K/UL (ref 1–4.8)
LYMPHOCYTES NFR BLD: 0 % (ref 22–41)
MANUAL DIFF BLD: NORMAL
MCH RBC QN AUTO: 28.9 PG (ref 27–33)
MCHC RBC AUTO-ENTMCNC: 33.1 G/DL (ref 32.3–36.5)
MCV RBC AUTO: 87.2 FL (ref 81.4–97.8)
MICRO URNS: ABNORMAL
MONOCYTES # BLD AUTO: 0.48 K/UL (ref 0–0.85)
MONOCYTES NFR BLD AUTO: 1.7 % (ref 0–13.4)
MORPHOLOGY BLD-IMP: NORMAL
NEUTROPHILS # BLD AUTO: 27.52 K/UL (ref 1.82–7.42)
NEUTROPHILS NFR BLD: 98.3 % (ref 44–72)
NITRITE UR QL STRIP.AUTO: NEGATIVE
NRBC # BLD AUTO: 0 K/UL
NRBC BLD-RTO: 0 /100 WBC (ref 0–0.2)
OSMOLALITY SERPL: 275 MOSM/KG H2O (ref 278–298)
OSMOLALITY UR: 324 MOSM/KG H2O (ref 300–900)
PH UR STRIP.AUTO: 5 [PH] (ref 5–8)
PLATELET # BLD AUTO: 615 K/UL (ref 164–446)
PLATELET BLD QL SMEAR: NORMAL
PMV BLD AUTO: 8.4 FL (ref 9–12.9)
POTASSIUM SERPL-SCNC: 3.4 MMOL/L (ref 3.6–5.5)
POTASSIUM SERPL-SCNC: 3.4 MMOL/L (ref 3.6–5.5)
POTASSIUM SERPL-SCNC: 3.6 MMOL/L (ref 3.6–5.5)
POTASSIUM SERPL-SCNC: 4.1 MMOL/L (ref 3.6–5.5)
POTASSIUM SERPL-SCNC: 5.3 MMOL/L (ref 3.6–5.5)
PROT SERPL-MCNC: 7 G/DL (ref 6–8.2)
PROT UR QL STRIP: NEGATIVE MG/DL
PROTHROMBIN TIME: 15.5 SEC (ref 12–14.6)
PROTHROMBIN TIME: 15.9 SEC (ref 12–14.6)
RBC # BLD AUTO: 3.43 M/UL (ref 4.7–6.1)
RBC # URNS HPF: ABNORMAL /HPF
RBC BLD AUTO: NORMAL
RBC UR QL AUTO: NEGATIVE
SCCMEC + MECA PNL NOSE NAA+PROBE: NEGATIVE
SIGNIFICANT IND 70042: NORMAL
SITE SITE: NORMAL
SODIUM SERPL-SCNC: 117 MMOL/L (ref 135–145)
SODIUM SERPL-SCNC: 122 MMOL/L (ref 135–145)
SODIUM SERPL-SCNC: 124 MMOL/L (ref 135–145)
SODIUM UR-SCNC: <20 MMOL/L
SOURCE SOURCE: NORMAL
SP GR UR STRIP.AUTO: 1.01
TROPONIN T SERPL-MCNC: 34 NG/L (ref 6–19)
TROPONIN T SERPL-MCNC: 36 NG/L (ref 6–19)
TROPONIN T SERPL-MCNC: 38 NG/L (ref 6–19)
TSH SERPL DL<=0.005 MIU/L-ACNC: 0.62 UIU/ML (ref 0.38–5.33)
UROBILINOGEN UR STRIP.AUTO-MCNC: 0.2 MG/DL
WBC # BLD AUTO: 28 K/UL (ref 4.8–10.8)
WBC #/AREA URNS HPF: ABNORMAL /HPF

## 2023-11-12 PROCEDURE — 99292 CRITICAL CARE ADDL 30 MIN: CPT | Performed by: INTERNAL MEDICINE

## 2023-11-12 PROCEDURE — 83935 ASSAY OF URINE OSMOLALITY: CPT

## 2023-11-12 PROCEDURE — 700117 HCHG RX CONTRAST REV CODE 255: Mod: UD | Performed by: STUDENT IN AN ORGANIZED HEALTH CARE EDUCATION/TRAINING PROGRAM

## 2023-11-12 PROCEDURE — 71045 X-RAY EXAM CHEST 1 VIEW: CPT

## 2023-11-12 PROCEDURE — 93005 ELECTROCARDIOGRAM TRACING: CPT | Performed by: STUDENT IN AN ORGANIZED HEALTH CARE EDUCATION/TRAINING PROGRAM

## 2023-11-12 PROCEDURE — 83605 ASSAY OF LACTIC ACID: CPT

## 2023-11-12 PROCEDURE — 700105 HCHG RX REV CODE 258: Performed by: SURGERY

## 2023-11-12 PROCEDURE — 303105 HCHG CATHETER EXTRA

## 2023-11-12 PROCEDURE — 93010 ELECTROCARDIOGRAM REPORT: CPT | Performed by: INTERNAL MEDICINE

## 2023-11-12 PROCEDURE — 85730 THROMBOPLASTIN TIME PARTIAL: CPT

## 2023-11-12 PROCEDURE — 99291 CRITICAL CARE FIRST HOUR: CPT

## 2023-11-12 PROCEDURE — 87040 BLOOD CULTURE FOR BACTERIA: CPT

## 2023-11-12 PROCEDURE — 82962 GLUCOSE BLOOD TEST: CPT

## 2023-11-12 PROCEDURE — 700105 HCHG RX REV CODE 258: Mod: UD | Performed by: STUDENT IN AN ORGANIZED HEALTH CARE EDUCATION/TRAINING PROGRAM

## 2023-11-12 PROCEDURE — 84443 ASSAY THYROID STIM HORMONE: CPT

## 2023-11-12 PROCEDURE — 770022 HCHG ROOM/CARE - ICU (200)

## 2023-11-12 PROCEDURE — A9270 NON-COVERED ITEM OR SERVICE: HCPCS | Performed by: INTERNAL MEDICINE

## 2023-11-12 PROCEDURE — 96375 TX/PRO/DX INJ NEW DRUG ADDON: CPT

## 2023-11-12 PROCEDURE — 80048 BASIC METABOLIC PNL TOTAL CA: CPT | Mod: 91

## 2023-11-12 PROCEDURE — 96368 THER/DIAG CONCURRENT INF: CPT

## 2023-11-12 PROCEDURE — 700105 HCHG RX REV CODE 258: Performed by: INTERNAL MEDICINE

## 2023-11-12 PROCEDURE — 99252 IP/OBS CONSLTJ NEW/EST SF 35: CPT | Performed by: SURGERY

## 2023-11-12 PROCEDURE — 96365 THER/PROPH/DIAG IV INF INIT: CPT

## 2023-11-12 PROCEDURE — 99291 CRITICAL CARE FIRST HOUR: CPT | Performed by: INTERNAL MEDICINE

## 2023-11-12 PROCEDURE — 93005 ELECTROCARDIOGRAM TRACING: CPT | Performed by: INTERNAL MEDICINE

## 2023-11-12 PROCEDURE — 96366 THER/PROPH/DIAG IV INF ADDON: CPT

## 2023-11-12 PROCEDURE — 85610 PROTHROMBIN TIME: CPT

## 2023-11-12 PROCEDURE — 87070 CULTURE OTHR SPECIMN AEROBIC: CPT

## 2023-11-12 PROCEDURE — 97597 DBRDMT OPN WND 1ST 20 CM/<: CPT

## 2023-11-12 PROCEDURE — 700111 HCHG RX REV CODE 636 W/ 250 OVERRIDE (IP): Performed by: INTERNAL MEDICINE

## 2023-11-12 PROCEDURE — 700111 HCHG RX REV CODE 636 W/ 250 OVERRIDE (IP): Mod: UD | Performed by: STUDENT IN AN ORGANIZED HEALTH CARE EDUCATION/TRAINING PROGRAM

## 2023-11-12 PROCEDURE — 83690 ASSAY OF LIPASE: CPT

## 2023-11-12 PROCEDURE — 82533 TOTAL CORTISOL: CPT

## 2023-11-12 PROCEDURE — 85652 RBC SED RATE AUTOMATED: CPT

## 2023-11-12 PROCEDURE — 99222 1ST HOSP IP/OBS MODERATE 55: CPT | Mod: 25 | Performed by: SURGERY

## 2023-11-12 PROCEDURE — 87205 SMEAR GRAM STAIN: CPT

## 2023-11-12 PROCEDURE — 87015 SPECIMEN INFECT AGNT CONCNTJ: CPT

## 2023-11-12 PROCEDURE — 36415 COLL VENOUS BLD VENIPUNCTURE: CPT

## 2023-11-12 PROCEDURE — 51702 INSERT TEMP BLADDER CATH: CPT

## 2023-11-12 PROCEDURE — 86140 C-REACTIVE PROTEIN: CPT

## 2023-11-12 PROCEDURE — 81001 URINALYSIS AUTO W/SCOPE: CPT

## 2023-11-12 PROCEDURE — 85007 BL SMEAR W/DIFF WBC COUNT: CPT

## 2023-11-12 PROCEDURE — 302098 PASTE RING (FLAT): Performed by: INTERNAL MEDICINE

## 2023-11-12 PROCEDURE — 84300 ASSAY OF URINE SODIUM: CPT

## 2023-11-12 PROCEDURE — 87181 SC STD AGAR DILUTION PER AGT: CPT

## 2023-11-12 PROCEDURE — 700102 HCHG RX REV CODE 250 W/ 637 OVERRIDE(OP): Performed by: INTERNAL MEDICINE

## 2023-11-12 PROCEDURE — 96367 TX/PROPH/DG ADDL SEQ IV INF: CPT

## 2023-11-12 PROCEDURE — 97605 NEG PRS WND THER DME<=50SQCM: CPT

## 2023-11-12 PROCEDURE — 84484 ASSAY OF TROPONIN QUANT: CPT

## 2023-11-12 PROCEDURE — 80053 COMPREHEN METABOLIC PANEL: CPT

## 2023-11-12 PROCEDURE — 85027 COMPLETE CBC AUTOMATED: CPT

## 2023-11-12 PROCEDURE — 74177 CT ABD & PELVIS W/CONTRAST: CPT

## 2023-11-12 PROCEDURE — 83930 ASSAY OF BLOOD OSMOLALITY: CPT

## 2023-11-12 PROCEDURE — 87641 MR-STAPH DNA AMP PROBE: CPT

## 2023-11-12 PROCEDURE — 73552 X-RAY EXAM OF FEMUR 2/>: CPT | Mod: LT

## 2023-11-12 RX ORDER — SODIUM HYPOCHLORITE 1.25 MG/ML
SOLUTION TOPICAL 2 TIMES DAILY
Status: DISCONTINUED | OUTPATIENT
Start: 2023-11-12 | End: 2023-11-16

## 2023-11-12 RX ORDER — LINEZOLID 2 MG/ML
600 INJECTION, SOLUTION INTRAVENOUS 2 TIMES DAILY
Status: DISCONTINUED | OUTPATIENT
Start: 2023-11-12 | End: 2023-11-14

## 2023-11-12 RX ORDER — HYDROMORPHONE HYDROCHLORIDE 1 MG/ML
0.5 INJECTION, SOLUTION INTRAMUSCULAR; INTRAVENOUS; SUBCUTANEOUS
Status: DISCONTINUED | OUTPATIENT
Start: 2023-11-12 | End: 2023-11-30 | Stop reason: HOSPADM

## 2023-11-12 RX ORDER — SODIUM HYPOCHLORITE 1.25 MG/ML
SOLUTION TOPICAL
Status: DISCONTINUED | OUTPATIENT
Start: 2023-11-12 | End: 2023-11-30 | Stop reason: HOSPADM

## 2023-11-12 RX ORDER — SODIUM CHLORIDE 9 MG/ML
1000 INJECTION, SOLUTION INTRAVENOUS ONCE
Status: COMPLETED | OUTPATIENT
Start: 2023-11-12 | End: 2023-11-12

## 2023-11-12 RX ORDER — BISACODYL 10 MG
10 SUPPOSITORY, RECTAL RECTAL
Status: DISCONTINUED | OUTPATIENT
Start: 2023-11-12 | End: 2023-11-30 | Stop reason: HOSPADM

## 2023-11-12 RX ORDER — DEXTROSE MONOHYDRATE 50 MG/ML
500 INJECTION, SOLUTION INTRAVENOUS ONCE
Status: COMPLETED | OUTPATIENT
Start: 2023-11-12 | End: 2023-11-12

## 2023-11-12 RX ORDER — HEPARIN SODIUM 5000 [USP'U]/ML
5000 INJECTION, SOLUTION INTRAVENOUS; SUBCUTANEOUS EVERY 8 HOURS
Status: DISCONTINUED | OUTPATIENT
Start: 2023-11-12 | End: 2023-11-13

## 2023-11-12 RX ORDER — POLYETHYLENE GLYCOL 3350 17 G/17G
1 POWDER, FOR SOLUTION ORAL
Status: DISCONTINUED | OUTPATIENT
Start: 2023-11-12 | End: 2023-11-30 | Stop reason: HOSPADM

## 2023-11-12 RX ORDER — CLINDAMYCIN PHOSPHATE 600 MG/50ML
600 INJECTION, SOLUTION INTRAVENOUS ONCE
Status: COMPLETED | OUTPATIENT
Start: 2023-11-12 | End: 2023-11-12

## 2023-11-12 RX ORDER — AMOXICILLIN 250 MG
2 CAPSULE ORAL 2 TIMES DAILY
Status: DISCONTINUED | OUTPATIENT
Start: 2023-11-12 | End: 2023-11-30 | Stop reason: HOSPADM

## 2023-11-12 RX ORDER — HALOPERIDOL 5 MG/ML
5 INJECTION INTRAMUSCULAR ONCE
Status: COMPLETED | OUTPATIENT
Start: 2023-11-12 | End: 2023-11-12

## 2023-11-12 RX ORDER — OXYCODONE HYDROCHLORIDE 5 MG/1
2.5-5 TABLET ORAL EVERY 4 HOURS PRN
Status: DISCONTINUED | OUTPATIENT
Start: 2023-11-12 | End: 2023-11-15

## 2023-11-12 RX ORDER — SODIUM CHLORIDE, SODIUM LACTATE, POTASSIUM CHLORIDE, AND CALCIUM CHLORIDE .6; .31; .03; .02 G/100ML; G/100ML; G/100ML; G/100ML
500 INJECTION, SOLUTION INTRAVENOUS
Status: DISCONTINUED | OUTPATIENT
Start: 2023-11-12 | End: 2023-11-12

## 2023-11-12 RX ORDER — MAGNESIUM SULFATE 1 G/100ML
1 INJECTION INTRAVENOUS ONCE
Status: COMPLETED | OUTPATIENT
Start: 2023-11-12 | End: 2023-11-12

## 2023-11-12 RX ORDER — SODIUM CHLORIDE 9 MG/ML
INJECTION, SOLUTION INTRAVENOUS CONTINUOUS
Status: DISCONTINUED | OUTPATIENT
Start: 2023-11-12 | End: 2023-11-12

## 2023-11-12 RX ADMIN — SODIUM CHLORIDE 1000 ML: 9 INJECTION, SOLUTION INTRAVENOUS at 04:04

## 2023-11-12 RX ADMIN — SODIUM CHLORIDE: 9 INJECTION, SOLUTION INTRAVENOUS at 13:37

## 2023-11-12 RX ADMIN — HEPARIN SODIUM 5000 UNITS: 5000 INJECTION, SOLUTION INTRAVENOUS; SUBCUTANEOUS at 21:11

## 2023-11-12 RX ADMIN — DEXTROSE MONOHYDRATE 500 ML: 50 INJECTION, SOLUTION INTRAVENOUS at 16:24

## 2023-11-12 RX ADMIN — HYDROMORPHONE HYDROCHLORIDE 0.5 MG: 1 INJECTION, SOLUTION INTRAMUSCULAR; INTRAVENOUS; SUBCUTANEOUS at 13:54

## 2023-11-12 RX ADMIN — MAGNESIUM SULFATE IN DEXTROSE 1 G: 10 INJECTION, SOLUTION INTRAVENOUS at 03:02

## 2023-11-12 RX ADMIN — PIPERACILLIN AND TAZOBACTAM 4.5 G: 4; .5 INJECTION, POWDER, FOR SOLUTION INTRAVENOUS at 21:14

## 2023-11-12 RX ADMIN — PIPERACILLIN AND TAZOBACTAM 3.38 G: 3; .375 INJECTION, POWDER, FOR SOLUTION INTRAVENOUS at 02:59

## 2023-11-12 RX ADMIN — CLINDAMYCIN PHOSPHATE 600 MG: 600 INJECTION, SOLUTION INTRAVENOUS at 04:08

## 2023-11-12 RX ADMIN — LINEZOLID 600 MG: 600 INJECTION, SOLUTION INTRAVENOUS at 18:02

## 2023-11-12 RX ADMIN — PIPERACILLIN AND TAZOBACTAM 4.5 G: 4; .5 INJECTION, POWDER, FOR SOLUTION INTRAVENOUS at 06:33

## 2023-11-12 RX ADMIN — IOHEXOL 95 ML: 350 INJECTION, SOLUTION INTRAVENOUS at 04:30

## 2023-11-12 RX ADMIN — HYDROMORPHONE HYDROCHLORIDE 0.5 MG: 1 INJECTION, SOLUTION INTRAMUSCULAR; INTRAVENOUS; SUBCUTANEOUS at 09:59

## 2023-11-12 RX ADMIN — HALOPERIDOL LACTATE 5 MG: 5 INJECTION, SOLUTION INTRAMUSCULAR at 02:59

## 2023-11-12 RX ADMIN — VANCOMYCIN HYDROCHLORIDE 1500 MG: 5 INJECTION, POWDER, LYOPHILIZED, FOR SOLUTION INTRAVENOUS at 04:02

## 2023-11-12 RX ADMIN — HYDROMORPHONE HYDROCHLORIDE 0.5 MG: 1 INJECTION, SOLUTION INTRAMUSCULAR; INTRAVENOUS; SUBCUTANEOUS at 18:01

## 2023-11-12 RX ADMIN — PIPERACILLIN AND TAZOBACTAM 4.5 G: 4; .5 INJECTION, POWDER, FOR SOLUTION INTRAVENOUS at 13:36

## 2023-11-12 RX ADMIN — HYDROMORPHONE HYDROCHLORIDE 0.5 MG: 1 INJECTION, SOLUTION INTRAMUSCULAR; INTRAVENOUS; SUBCUTANEOUS at 21:11

## 2023-11-12 RX ADMIN — HEPARIN SODIUM 5000 UNITS: 5000 INJECTION, SOLUTION INTRAVENOUS; SUBCUTANEOUS at 06:31

## 2023-11-12 RX ADMIN — DEXTROSE MONOHYDRATE 500 ML: 50 INJECTION, SOLUTION INTRAVENOUS at 09:59

## 2023-11-12 RX ADMIN — OXYCODONE 5 MG: 5 TABLET ORAL at 09:05

## 2023-11-12 RX ADMIN — SODIUM CHLORIDE 1000 ML: 9 INJECTION, SOLUTION INTRAVENOUS at 02:58

## 2023-11-12 ASSESSMENT — PAIN DESCRIPTION - PAIN TYPE
TYPE: ACUTE PAIN

## 2023-11-12 ASSESSMENT — FIBROSIS 4 INDEX
FIB4 SCORE: 0.38
FIB4 SCORE: 0.4

## 2023-11-12 NOTE — CARE PLAN
Problem: Knowledge Deficit - Standard  Goal: Patient and family/care givers will demonstrate understanding of plan of care, disease process/condition, diagnostic tests and medications  Outcome: Progressing     Problem: Pain - Standard  Goal: Alleviation of pain or a reduction in pain to the patient’s comfort goal  Outcome: Progressing   The patient is Watcher - Medium risk of patient condition declining or worsening    Shift Goals  Clinical Goals: hemodynamic stability, improved labs  Patient Goals: pain control, coffee  Family Goals: unable to assess

## 2023-11-12 NOTE — CONSULTS
DATE OF CONSULTATION:  11/12/2023     REFERRING PHYSICIAN:   Pardeep Boyd M.D.     CONSULTING PHYSICIAN:  Roman Chow M.D.     REASON FOR CONSULTATION:  I have been asked by  to see the patient in surgical consultation for evaluation of an infected sacral decubitus ulcer.    HISTORY OF PRESENT ILLNESS: The patient is a 58 year-old White man who with a history of peripheral vascular disease status-post multiple procedures for bilateral lower extremity ischemia as well as left leg amputations and revisions who presented to the Emergency Department after his brother found him at home not doing well and called EMS. He was admitted to the ICU for significant electrolyte abnormalities, serum sodium of 117. He was noted to have wounds on his left groin and sacrum concerning for infection. On my evaluation of the patient he was somnolent but arousable. He is making an adequate amount of urine and his labs seem to be slowly correcting.    PAST MEDICAL HISTORY:  has a past medical history of Hypertension.    PAST SURGICAL HISTORY:  has a past surgical history that includes closed reduction (Left, 12/24/2017); other; orif, shoulder (Left, 1/4/2018); irrigation & debridement ortho (Left, 3/4/2018); knee amputation below (Left, 9/27/2023); femoral endarterectomy (Bilateral, 9/30/2023); angiogram, with angioplasty, and stent insertion if indicated (Right, 9/30/2023); knee amputation below (Left, 10/20/2023); incision and drainage general (Left, 10/23/2023); application or replacement, wound vac (10/23/2023); and knee amputation above (Left, 10/23/2023).    ALLERGIES:   Allergies   Allergen Reactions    Sulfa Drugs Hives and Shortness of Breath     Tolerated Flomax (10/2023)       CURRENT MEDICATIONS:    Home Medications       Reviewed by Karen Navas (Pharmacy Tech) on 11/12/23 at 0507  Med List Status: Complete     Medication Last Dose Status   aspirin 81 MG EC tablet UNK Active   atorvastatin (LIPITOR)  40 MG Tab UNK Active   cyclobenzaprine (FLEXERIL) 10 mg Tab UNK Active   finasteride (PROSCAR) 5 MG Tab UNK Active   gabapentin (NEURONTIN) 300 MG Cap UNK Active   Sodium Hypochlorite (DAKINS 0.125%, 1/4 STRENGTH,) 0.125 % Solution UNK Active   tamsulosin (FLOMAX) 0.4 MG capsule UNK Active                    FAMILY HISTORY: family history is not on file.    SOCIAL HISTORY:  reports that he has been smoking cigars. He has never used smokeless tobacco. He reports current alcohol use. He reports that he does not use drugs.    REVIEW OF SYSTEMS: Comprehensive review of systems is negative with the exception of the aforementioned HPI, PMH, and PSH bullets in accordance with CMS guidelines.    PHYSICAL EXAMINATION:    Physical Exam  Vitals and nursing note reviewed.   Constitutional:       General: He is sleeping.      Appearance: He is ill-appearing.   HENT:      Head: Normocephalic and atraumatic.      Right Ear: External ear normal.      Left Ear: External ear normal.      Nose: Nose normal.      Mouth/Throat:      Mouth: Mucous membranes are moist.      Pharynx: Oropharynx is clear.   Eyes:      General: No scleral icterus.     Pupils: Pupils are equal, round, and reactive to light.   Cardiovascular:      Rate and Rhythm: Regular rhythm. Tachycardia present.   Pulmonary:      Effort: Pulmonary effort is normal. No respiratory distress.   Abdominal:      General: There is no distension.      Palpations: Abdomen is soft.      Tenderness: There is no abdominal tenderness. There is no guarding or rebound.   Genitourinary:     Comments: Holguin catheter in place with clear yellow urine in bag.  Musculoskeletal:      Cervical back: Normal range of motion and neck supple.      Comments: Status-post left above knee amputation with staples in place, some distal erythema concerning for ischemia. Left groin wound with fibrinopurulent drainage. Right groin incision well-approximated. Right foot with ischemic ulcers.  Left sacral  wound with fibrinopurulent drainage, dusky appearing muscle at base as well as questionably viable skin superiorly.      Left Lower Extremity: Left leg is amputated above knee.   Skin:     General: Skin is warm and dry.      Comments: Violaceous rash on posterior right thigh.   Neurological:      General: No focal deficit present.      Mental Status: He is easily aroused. He is lethargic.         LABORATORY VALUES:   Recent Labs     11/12/23 0222   WBC 28.0*   RBC 3.43*   HEMOGLOBIN 9.9*   HEMATOCRIT 29.9*   MCV 87.2   MCH 28.9   MCHC 33.1   RDW 50.4*   PLATELETCT 615*   MPV 8.4*     Recent Labs     11/12/23  0835 11/12/23  1436 11/12/23  1810   SODIUM 122* 124* 124*   POTASSIUM 4.1 3.6 3.4*   CHLORIDE 89* 92* 92*   CO2 18* 19* 16*   GLUCOSE 136* 119* 126*   BUN 59* 42* 41*   CREATININE 1.70* 1.27 1.10   CALCIUM 8.5 8.2* 8.0*     Recent Labs     11/12/23 0222 11/12/23  0737   ASTSGOT 14  --    ALTSGPT 11  --    TBILIRUBIN 0.2  --    ALKPHOSPHAT 79  --    GLOBULIN 3.9*  --    INR 1.21* 1.25*     Recent Labs     11/12/23 0222 11/12/23  0737   APTT 26.8  --    INR 1.21* 1.25*        IMAGING:   CT-ABDOMEN-PELVIS WITH   Final Result         1. There is a 2 cm ulcerative fraying in the left medial gluteus region. Subjacent iliac bone demonstrates bony ridging, consistent with osteomyelitis. There is small amount of air tracking medially and laterally away from the ulcerative wound along the    left medial gluteal musculature and down to the level of the left paraspinous musculature. Therefore, early necrotizing fasciitis possible. No drainable abscess present. This was discussed with the ordering physician.   2. Severely distended bladder with mild to moderate bilateral associated hydronephrosis. This is likely secondary to prostatic hypertrophy. There is severe median lobe hypertrophy. Consider Holguin catheter placement for decompression. Bladder diverticulum    also noted.   3. Patchy ground glass to aeration lower  lobes, suspicious for pneumonia. However, atypical edema or other pneumonitis possible.   4. Severe atherosclerotic disease.   5. Additional soft tissue wound in the left groin.      DX-FEMUR-2+ LEFT   Final Result      No evidence of infection by plain film.      DX-CHEST-PORTABLE (1 VIEW)   Final Result      No acute process.          ASSESSMENT AND PLAN:     Sacral wound- (present on admission)  Assessment & Plan  Patient with approximately 5 x 4 cm unstageable decubitus ulcer that appears infected and will require debridement.  Given that the patient is not requiring vasopressors and does not have rapidly enlarging lesions recommend correcting significant electrolyte abnormalities prior to proceeding with debridement.  Will closely monitor clinical course and take to OR sooner should clinical status change.        DISPOSITION: Medical evaluation and admission. The patient was admitted to the Medical Service prior to surgical consultation. [facility] Service will follow.     ____________________________________     Roman Chow M.D.    DD: 11/12/2023  12:54 PM    AAST Grading System for EGS Conditions  ACS NSQIP Surgical Risk Calculator

## 2023-11-12 NOTE — ED PROVIDER NOTES
ED Provider Note    CHIEF COMPLAINT  Chief Complaint   Patient presents with    Other     Pt BIB ems due to failure to thrive, pt's neighbors called ems due to pt unable to care for self and refuses to eat    Diarrhea       EXTERNAL RECORDS REVIEWED  Inpatient Notes 10/23 surgical incision and drainage and debridement of groin wound.  Patient had a  femoral endarterectomy previously in September and the entry wound became infected requiring incision and drainage.    HPI/ROS  LIMITATION TO HISTORY   Select: Altered mental status / Confusion  OUTSIDE HISTORIAN(S):  EMS Per EMS the brother called 911.  Patient has been in a state of failure to thrive and unable to care for himself.  He arrives in soiled feces with multiple wounds.    Dillon Centeno is a 58 y.o. male who presents due to failure to thrive after the neighbor called to have patient assessed by paramedics.  He is evidently not being able to eat and has been having diarrhea and soiling himself as he is unable to care for himself.  He did have a recent below the knee amputation in October as well as some wound infections to the femoral endarterectomy site and the recent history.  On arrival patient is not alert and oriented.  He is not complaining of any pain at this time and not having trouble breathing but history is otherwise limited.    PAST MEDICAL HISTORY   has a past medical history of Hypertension.    SURGICAL HISTORY   has a past surgical history that includes closed reduction (Left, 12/24/2017); other; orif, shoulder (Left, 1/4/2018); irrigation & debridement ortho (Left, 3/4/2018); knee amputation below (Left, 9/27/2023); femoral endarterectomy (Bilateral, 9/30/2023); angiogram, with angioplasty, and stent insertion if indicated (Right, 9/30/2023); knee amputation below (Left, 10/20/2023); incision and drainage general (Left, 10/23/2023); application or replacement, wound vac (10/23/2023); and knee amputation above (Left,  "10/23/2023).    FAMILY HISTORY  No family history on file.    SOCIAL HISTORY  Social History     Tobacco Use    Smoking status: Every Day     Types: Cigars    Smokeless tobacco: Never   Substance and Sexual Activity    Alcohol use: Yes     Comment: a few beers a week    Drug use: No    Sexual activity: Not on file       CURRENT MEDICATIONS  Home Medications       Reviewed by Karen Navas (Pharmacy Tech) on 11/12/23 at 0549  Med List Status: Complete     Medication Last Dose Status   aspirin 81 MG EC tablet UNK Active   atorvastatin (LIPITOR) 40 MG Tab UNK Active   cyclobenzaprine (FLEXERIL) 10 mg Tab UNK Active   finasteride (PROSCAR) 5 MG Tab UNK Active   gabapentin (NEURONTIN) 300 MG Cap UNK Active   Sodium Hypochlorite (DAKINS 0.125%, 1/4 STRENGTH,) 0.125 % Solution UNK Active   tamsulosin (FLOMAX) 0.4 MG capsule UNK Active                    ALLERGIES  Allergies   Allergen Reactions    Sulfa Drugs Hives and Shortness of Breath     Tolerated Flomax (10/2023)       PHYSICAL EXAM  VITAL SIGNS: BP (!) 156/95   Pulse (!) 118   Temp 37.1 °C (98.8 °F) (Temporal)   Resp 17   Ht 1.676 m (5' 6\")   Wt 63.5 kg (140 lb)   SpO2 91%   BMI 22.60 kg/m²    Constitutional: Awake and alert. No acute distress.  HEENT: Normal Conjunctiva.  PERRLA.  Neck: Grossly normal range of motion. Airway midline.  Cardiovascular: Tachycardic, Normal rhythm.  Thorax & Lungs: No respiratory distress. Clear to Auscultation Bilaterally.  Abdomen: Normal inspection. Normoactive Bowel sounds. Non tender. Nondistended  Skin: Please see photo documentation of various wounds.  Patient has a stage III decubitus ulcer to the left buttock with exposed muscle.  Patient has a left groin wound infection from recent endarterectomy that has packing that appears to be quite old.  Patient has a left AKA with no dehiscence.  There is erythema and localized wound infection to the stump.  Back: No tenderness, No CVA tenderness.   Musculoskeletal: No " obvious deformity. Moves all extremities Well.  Neurologic: GCS 14.  He is moving both arms equally, right leg is moving well.  No slurred speech.  Psychiatric: Mood is inappropriate as patient is wanting to leave and is not with capacity to do so at this time.      DIAGNOSTIC STUDIES / PROCEDURES  EKG  I have independently interpreted this EKG  Sinus tachycardia rate of 115.  No acute ST or T wave changes.    LABS  Results for orders placed or performed during the hospital encounter of 11/12/23   CBC WITH DIFFERENTIAL   Result Value Ref Range    WBC 28.0 (H) 4.8 - 10.8 K/uL    RBC 3.43 (L) 4.70 - 6.10 M/uL    Hemoglobin 9.9 (L) 14.0 - 18.0 g/dL    Hematocrit 29.9 (L) 42.0 - 52.0 %    MCV 87.2 81.4 - 97.8 fL    MCH 28.9 27.0 - 33.0 pg    MCHC 33.1 32.3 - 36.5 g/dL    RDW 50.4 (H) 35.9 - 50.0 fL    Platelet Count 615 (H) 164 - 446 K/uL    MPV 8.4 (L) 9.0 - 12.9 fL    Neutrophils-Polys 98.30 (H) 44.00 - 72.00 %    Lymphocytes 0.00 (L) 22.00 - 41.00 %    Monocytes 1.70 0.00 - 13.40 %    Eosinophils 0.00 0.00 - 6.90 %    Basophils 0.00 0.00 - 1.80 %    Nucleated RBC 0.00 0.00 - 0.20 /100 WBC    Neutrophils (Absolute) 27.52 (H) 1.82 - 7.42 K/uL    Lymphs (Absolute) 0.00 (L) 1.00 - 4.80 K/uL    Monos (Absolute) 0.48 0.00 - 0.85 K/uL    Eos (Absolute) 0.00 0.00 - 0.51 K/uL    Baso (Absolute) 0.00 0.00 - 0.12 K/uL    NRBC (Absolute) 0.00 K/uL   COMP METABOLIC PANEL   Result Value Ref Range    Sodium 117 (LL) 135 - 145 mmol/L    Potassium 5.3 3.6 - 5.5 mmol/L    Chloride 84 (L) 96 - 112 mmol/L    Co2 17 (L) 20 - 33 mmol/L    Anion Gap 16.0 7.0 - 16.0    Glucose 141 (H) 65 - 99 mg/dL    Bun 62 (H) 8 - 22 mg/dL    Creatinine 2.27 (H) 0.50 - 1.40 mg/dL    Calcium 8.9 8.5 - 10.5 mg/dL    Correct Calcium 9.6 8.5 - 10.5 mg/dL    AST(SGOT) 14 12 - 45 U/L    ALT(SGPT) 11 2 - 50 U/L    Alkaline Phosphatase 79 30 - 99 U/L    Total Bilirubin 0.2 0.1 - 1.5 mg/dL    Albumin 3.1 (L) 3.2 - 4.9 g/dL    Total Protein 7.0 6.0 - 8.2 g/dL     Globulin 3.9 (H) 1.9 - 3.5 g/dL    A-G Ratio 0.8 g/dL   LIPASE   Result Value Ref Range    Lipase 48 11 - 82 U/L   TROPONIN   Result Value Ref Range    Troponin T 38 (H) 6 - 19 ng/L   LACTIC ACID   Result Value Ref Range    Lactic Acid 3.9 (H) 0.5 - 2.0 mmol/L   APTT   Result Value Ref Range    APTT 26.8 24.7 - 36.0 sec   PROTHROMBIN TIME (INR)   Result Value Ref Range    PT 15.5 (H) 12.0 - 14.6 sec    INR 1.21 (H) 0.87 - 1.13   ESTIMATED GFR   Result Value Ref Range    GFR (CKD-EPI) 32 (A) >60 mL/min/1.73 m 2   DIFFERENTIAL MANUAL   Result Value Ref Range    Manual Diff Status PERFORMED    PERIPHERAL SMEAR REVIEW   Result Value Ref Range    Peripheral Smear Review see below    PLATELET ESTIMATE   Result Value Ref Range    Plt Estimation Increased    MORPHOLOGY   Result Value Ref Range    RBC Morphology Normal    Sed Rate   Result Value Ref Range    Sed Rate Westergren 66 (H) 0 - 20 mm/hour   CRP QUANTITIVE (NON-CARDIAC)   Result Value Ref Range    Stat C-Reactive Protein 27.78 (H) 0.00 - 0.75 mg/dL   TROPONIN   Result Value Ref Range    Troponin T 36 (H) 6 - 19 ng/L   LACTIC ACID   Result Value Ref Range    Lactic Acid 2.8 (H) 0.5 - 2.0 mmol/L   EKG   Result Value Ref Range    Report       [facility]    Test Date:  2023  Pt Name:    ELI GREWAL                 Department: ER  MRN:        [id number]                      Room:       [facility]  Gender:     Male                         Technician: [id number]  :        1965                   Requested By:ELMIRA PENN  Order #:    [id number]                    Reading MD:    Measurements  Intervals                                Axis  Rate:       115                          P:          -44  OK:         147                          QRS:        52  QRSD:       88                           T:          85  QT:         313  QTc:        433    Interpretive Statements  Sinus tachycardia  Anterior infarct, old  Compared to ECG  10/18/2023 11:19:22  Myocardial infarct finding now present  Sinus rhythm no longer present  Left-axis deviation no longer present           RADIOLOGY  I have independently interpreted the diagnostic imaging associated with this visit and am waiting the final reading from the radiologist.   My preliminary interpretation is as follows:   Chest x-ray with no opacity or cardiomegaly.  X-ray of the left femur stump without free air.  There is atherosclerotic vessels noted.  CT abdomen pelvis imaging was reviewed which demonstrates air in the left buttock region at the site of a decubitus ulcer.  I reviewed this imaging with radiology who also noted some paraspinal air superior to this infection but fascial emphysema cannot be ruled out.  Radiologist interpretation:   CT-ABDOMEN-PELVIS WITH   Final Result         1. There is a 2 cm ulcerative fraying in the left medial gluteus region. Subjacent iliac bone demonstrates bony ridging, consistent with osteomyelitis. There is small amount of air tracking medially and laterally away from the ulcerative wound along the    left medial gluteal musculature and down to the level of the left paraspinous musculature. Therefore, early necrotizing fasciitis possible. No drainable abscess present. This was discussed with the ordering physician.   2. Severely distended bladder with mild to moderate bilateral associated hydronephrosis. This is likely secondary to prostatic hypertrophy. There is severe median lobe hypertrophy. Consider Holguin catheter placement for decompression. Bladder diverticulum    also noted.   3. Patchy ground glass to aeration lower lobes, suspicious for pneumonia. However, atypical edema or other pneumonitis possible.   4. Severe atherosclerotic disease.   5. Additional soft tissue wound in the left groin.      DX-FEMUR-2+ LEFT   Final Result      No evidence of infection by plain film.      DX-CHEST-PORTABLE (1 VIEW)   Final Result      No acute process.             COURSE & MEDICAL DECISION MAKING    ED Observation Status? No; Patient does not meet criteria for ED Observation.     INITIAL ASSESSMENT, COURSE AND PLAN  Care Narrative:   58-year-old male history of hypertension, endarterectomy in September with wound infection, left AKA in October, left buttock decubitus ulcer stage III with muscle exposed presents after failure to thrive in his own fecal matter.  Afebrile, tachycardic and hypertensive here.  On exam he is not oriented and does not have capacity to make decisions at this time.  There are photos of his various wounds in the chart.  The most concerning 1 is the left buttock decubitus ulcer with exposed muscle.  IV fluids and antibiotics were ordered.  There is suspicion for NSTI and therefore clindamycin is ordered in addition to vancomycin and Zosyn.  Labs come back severely deranged with hyponatremia, GELY, hyperglycemia, white count of 28.  This further adds to concern of NSTI.  X-ray of the femur without emphysema in the soft tissues.  CT abdomen pelvis with severely distended bladder.  There is a small amount of air in the paraspinal muscles that cannot rule out NSTI but is possibly coming from soft tissue decubitus ulcer site.  Dr. Lynn consulted from [facility] surgery/surgical ICU regarding concern for NSTI.  She recommends medical ICU admission for resuscitation prior to wound debridement.  NSTI possibility was discussed in acknowledged by the surgeon.  Medical intensivist consulted for admission and accepted patient for various severe comorbid disease.    HYDRATION: Based on the patient's presentation of Acute Diarrhea, Sepsis, and Tachycardia the patient was given IV fluids. IV Hydration was used because oral hydration was not as rapid as required. Upon recheck following hydration, the patient was Requiring more fluids.    CRITICAL CARE TIME 50 minutes  There was a very real possibility of deterioration of the patient's condition.  This patient required  the highest level of care.  I provided critical care services which included: review of the medical record, treatment orders, ordering and reviewing test results, frequent reevaluation of the patient's condition and response to treatment, as well as discussing the case with appropriate personnel and various consultants. The critical care time associated with the care of this patient is exclusive of any procedures or specific interventions.      ADDITIONAL PROBLEM LIST    Failure to thrive  AKA  Stage III decubitus ulcer  Ulceration and infection of left femoral endarterectomy site  Altered mental status -hyponatremia    DISPOSITION AND DISCUSSIONS  I have discussed management of the patient with the following physicians and KIMBERLI's:    Jules Otoole - radiology  Dr. Baird - [facility]  Intensivist for admission    Discussion of management with other [facility] or appropriate source(s): Pharmacy regarding antibiotics and Radiologist regarding concern for NSTI      Barriers to care at this time, including but not limited to: Patient lacks transportation , Patient had difficult affording medications, and Patient lacks financial resources.     Decision tools and prescription drugs considered including, but not limited to: Antibiotics NSTI coverage and broad spectrum .    FINAL DIAGNOSIS  1. Hyponatremia    2. GELY (acute kidney injury) (HCC)    3. Pressure injury of contiguous region involving left buttock and hip, stage 3 (HCC)    4. Diarrhea, unspecified type    5. Non-healing wound of amputation stump (HCC)    6. Sepsis with acute renal failure without septic shock, due to unspecified organism, unspecified acute renal failure type (HCC)           Electronically signed by: Suly Sauer D.O., 11/12/2023 1:59 AM

## 2023-11-12 NOTE — ED NOTES
Pt transported upstairs by ICU RN, pt placed on portable monitor, on RA, VS stable, NAD, IV vancomycin running

## 2023-11-12 NOTE — WOUND TEAM
[facility]  Inpatient Services  Initial Wound and Skin Care Evaluation    Admission Date: 11/12/2023     Last order of IP CONSULT TO WOUND CARE was found on 11/12/2023 from Hospital Encounter on 11/12/2023     HPI, PMH, SH: Reviewed    Past Surgical History:   Procedure Laterality Date    INCISION AND DRAINAGE GENERAL Left 10/23/2023    Procedure: INCISION AND DRAINAGE;  Surgeon: Keith Navarro M.D.;  Location: [facility];  Service: Vascular    APPLICATION OR REPLACEMENT, WOUND VAC  10/23/2023    Procedure: APPLICATION OR REPLACEMENT, WOUND VAC;  Surgeon: Keith Navarro M.D.;  Location: [facility];  Service: Vascular    KNEE AMPUTATION ABOVE Left 10/23/2023    Procedure: AMPUTATION, ABOVE KNEE;  Surgeon: Keith Navarro M.D.;  Location: [facility];  Service: Vascular    KNEE AMPUTATION BELOW Left 10/20/2023    Procedure: AMPUTATION, BELOW KNEE WOUND REVISION;  Surgeon: Pradip Altamirano M.D.;  Location: [facility];  Service: Orthopedics    FEMORAL ENDARTERECTOMY Bilateral 9/30/2023    Procedure: ENDARTERECTOMY, FEMORAL;  Surgeon: Keith Navarro M.D.;  Location: [facility];  Service: Vascular    ANGIOGRAM, WITH ANGIOPLASTY, AND STENT INSERTION IF INDICATED Right 9/30/2023    Procedure: ANGIOGRAM, WITH ILIAC STENT;  Surgeon: Keith Navarro M.D.;  Location: [facility];  Service: Vascular    KNEE AMPUTATION BELOW Left 9/27/2023    Procedure: AMPUTATION, BELOW KNEE;  Surgeon: Pradip Altamirano M.D.;  Location: [facility];  Service: Orthopedics    IRRIGATION & DEBRIDEMENT ORTHO Left 3/4/2018    Procedure: IRRIGATION & DEBRIDEMENT ORTHO - HUMERUS;  Surgeon: Sergio Watkins M.D.;  Location: [facility];  Service: Orthopedics    ORIF, SHOULDER Left 1/4/2018    Procedure: SHOULDER ORIF;  Surgeon: Sergio Watkins M.D.;  Location: [facility];  Service: Orthopedics    CLOSED REDUCTION Left 12/24/2017     Procedure: CLOSED REDUCTION;  Surgeon: Keith Subramanian M.D.;  Location: [facility];  Service: Orthopedics    OTHER      multiple surgeries lower legs from past injuries     Social History     Tobacco Use    Smoking status: Every Day     Types: Cigars    Smokeless tobacco: Never   Substance Use Topics    Alcohol use: Yes     Comment: a few beers a week     Chief Complaint   Patient presents with    Other     Pt BIB ems due to failure to thrive, pt's neighbors called ems due to pt unable to care for self and refuses to eat    Diarrhea     Diagnosis: Severe sepsis (HCC) [A41.9, R65.20]    Unit where seen by Wound Team: T912/01     WOUND CONSULT RELATED TO:  Left groin, left sacrum, L AKA, R toes    WOUND TEAM PLAN OF CARE - Frequency of Follow-up:   Nursing to follow dressing orders written for wound care. Contact wound team if area fails to progress, deteriorates or with any questions/concerns if something comes up before next scheduled follow up (See below as to whether wound is following and frequency of wound follow up)   NPWT change 3 times weekly - Left groin    WOUND HISTORY:   58 y.o. male with past medical history of peripheral arterial disease, gangrene of the left lower extremity, hypertension who presented 11/12/2023 after his brother called EMS for the patient failing to eat or move.  In the ER the patient was found to be tachycardic with significant leukocytosis concerning for infection.  He was found to have a large sacral pressure injury therefore a CT scan of the abdomen pelvis was obtained which showed soft tissue air tracking superiorly above the ileum, other findings include evidence of osteomyelitis.        10/23/23: Patient underwent left above-knee amputation and debridement of left groin wound with wound VAC placement     09/30/23 with Dr. Navarro   Bilateral common femoral artery endarterectomy and profundoplasty with saphenous vein patch angioplasty (90291-85, 01040-46)  -Right  lower extremity angiogram via direct sheath insertion in the right common femoral artery (02038, 48941)  -Placement of an 8 mm x 60 mm self-expanding uncovered stent in the right external iliac artery and then 7 mm post stent angioplasty ([id number])    WOUND ASSESSMENT/LDA  Wound 11/12/23 Pressure Injury Sacrum unstageable (Active)   Date First Assessed: 11/12/23   Present on Original Admission: Yes  Hand Hygiene Completed: Yes  Primary Wound Type: Pressure Injury  Location: Sacrum  Wound Description (Comments): unstageable      Assessments 11/12/2023  2:00 PM   Wound Image        Site Assessment Yellow;Red   Periwound Assessment Purple;Denuded;Fragile;Painful   Margins Unattached edges   Closure None   Drainage Amount Moderate   Drainage Description Yellow;Foul purulent   Treatments Cleansed;Nonselective debridement;Wound culture;Offloading   Offloading/DME Heel float boot   Wound Cleansing Approved Wound Cleanser   Periwound Protectant Barrier Paste   Dressing Status Clean;Dry;Intact   Dressing Changed New   Dressing Cleansing/Solutions 1/4 Strength Dakin's Solution   Dressing Options Moist Roll Gauze;Offloading Dressing - Sacral   Dressing Change/Treatment Frequency Every Shift, and As Needed   NEXT Dressing Change/Treatment Date 11/12/23   NEXT Weekly Photo (Inpatient Only) 11/19/23   Wound Team Following 3x Weekly   Pressure Injury Stage Stage 4   Wound Length (cm) 5 cm   Wound Width (cm) 3.9 cm   Wound Depth (cm) 3 cm   Wound Surface Area (cm^2) 19.5 cm^2   Wound Volume (cm^3) 58.5 cm^3   Wound Odor Foul       Wound 11/12/23 Full Thickness Wound Groin Left open surgical (Active)   Date First Assessed: 11/12/23   Present on Original Admission: Yes  Hand Hygiene Completed: Yes  Primary Wound Type: Full Thickness Wound  Location: Groin  Laterality: Left  Wound Description (Comments): open surgical      Assessments 11/12/2023  2:00 PM   Wound Image       Site Assessment Pink;Red;White   Periwound Assessment  Pink;Intact   Margins Attached edges   Closure Secondary intention   Drainage Amount Small   Drainage Description Serosanguineous   Treatments Cleansed;Site care   Wound Cleansing Approved Wound Cleanser   Periwound Protectant Paste Ring;No-sting Skin Prep   Dressing Status Clean;Dry;Intact   Dressing Changed New   Dressing Cleansing/Solutions 1/4 Strength Dakin's Solution   Dressing Options Wound Vac   Dressing Change/Treatment Frequency Monday, Wednesday, Friday, and As Needed   NEXT Dressing Change/Treatment Date 11/15/23   NEXT Weekly Photo (Inpatient Only) 11/19/23   Wound Team Following 3x Weekly   Non-staged Wound Description Full thickness   Wound Length (cm) 11 cm   Wound Width (cm) 4.6 cm   Wound Depth (cm) 1 cm   Wound Surface Area (cm^2) 50.6 cm^2   Wound Volume (cm^3) 50.6 cm^3   Undermining (cm) 2.5 cm   Undermining of Wound, 1st Location From 6 o'clock;To 12 o'clock   Pulses Bounding   WOUND NURSE ONLY - Time Spent with Patient (mins) 60       Wound 11/12/23 Arterial Ulcer MTH 1;MTH 2;MTH 3;MTH 4;MTH 5 Right arterial insufficiency (Active)   Date First Assessed: 11/12/23   Hand Hygiene Completed: Yes  Primary Wound Type: Arterial Ulcer  Location: MTH 1;MTH 2;MTH 3;MTH 4;MTH 5  Laterality: Right  Wound Description (Comments): arterial insufficiency      Assessments 11/12/2023  2:00 PM   Wound Image      Site Assessment Pink   Periwound Assessment Intact   Margins Attached edges   Closure None   Drainage Amount None   Treatments Offloading   Offloading/DME Heel float boot   Wound Cleansing Povidone-Iodine   Dressing Status Open to Air   NEXT Weekly Photo (Inpatient Only) 11/19/23   Wound Team Following Not following       Wound 11/12/23 Pressure Injury Coccyx sDTI POA (Active)   Date First Assessed: 11/12/23   Present on Original Admission: Yes  Hand Hygiene Completed: Yes  Primary Wound Type: Pressure Injury  Location: Coccyx  Wound Description (Comments): sDTI POA      Assessments 11/12/2023  2:00 PM    Wound Image     Site Assessment Purple;Red   Periwound Assessment Red;Pink   Margins Attached edges   Closure None   Drainage Amount None   Treatments Cleansed;Offloading   Offloading/DME Heel float boot   Periwound Protectant Barrier Paste   Dressing Status Open to Air   NEXT Weekly Photo (Inpatient Only) 11/19/23   Wound Team Following Not following   Pressure Injury Stage Deep Tissue       Negative Pressure Wound Therapy 11/12/23 Dehisced Groin Left (Active)   Placement Date: 11/12/23   Inserted by: wound team  Hand Hygiene Completed: Yes  Wound Type: Dehisced  Location: Groin  Laterality: Left      Assessments 11/12/2023  2:00 PM   Wound Image      Vacuum Serial Number [id number]   Dressing Type Small;Black Foam (Veraflo)   Number of Foam Pieces Used 2   Canister Changed No   NEXT Dressing Change/Treatment Date 11/15/23   VAC VeraFlo Irrigant 1/4 Strength Dakins   VAC VeraFlo Soak Time (mins) 8   VAC VeraFlo Instill Volume (ml) 44   VAC VeraFlo - Therapy Time (hrs) 2.5   VAC VeraFlo Pressure (mm/Hg) Continuous;125 mmHg      Vascular:    INGA:   No results found.    Lab Values:    Lab Results   Component Value Date/Time    WBC 28.0 (H) 11/12/2023 02:22 AM    RBC 3.43 (L) 11/12/2023 02:22 AM    HEMOGLOBIN 9.9 (L) 11/12/2023 02:22 AM    HEMATOCRIT 29.9 (L) 11/12/2023 02:22 AM    CREACTPROT 27.78 (H) 11/12/2023 02:22 AM    SEDRATEWES 66 (H) 11/12/2023 02:22 AM         Culture Results show:  No results found for this or any previous visit (from the past 720 hour(s)).    Pain Level/Medicated:  IV pain medications administered by bedside RN 5 prior (Pt educated that IV medication will not be available on outpatient basis)       INTERVENTIONS BY WOUND TEAM:  Chart and images reviewed. Discussed with bedside RN. All areas of concern (based on picture review, LDA review and discussion with bedside RN) have been thoroughly assessed. Documentation of areas based on significant findings. This RN in to assess patient.  Performed standard wound care which includes appropriate positioning, dressing removal and non-selective debridement. Pictures and measurements obtained weekly if/when required.    Wound:  Left groin  Cleansed/Non-selectively Debrided with:  Wound cleanser and Gauze  Non-Excisional Conservative Sharp debridement: Slough debrided away using scissors and forceps < 20cm2 debrided down to slough.  No Bleeding noted.  Kiara wound: Cleansed with Wound cleanser and Gauze, Prepped with No Sting and Paste Rings  Primary Dressin piece of spiraled black VF foam, sealed with drape   Secondary (Outer) Dressing: button and track applied, suction obtained     Wound:  Left sacrum   Cleansed/Non-selectively Debrided with:  Wound cleanser and Gauze  Kiara wound: Cleansed with Wound cleanser and Gauze, Prepped with No Sting  Primary Dressing:  moist roll gauze moistened with NS to wound bed  Secondary (Outer) Dressing: sacral offloading dressing     Wound:  Right toes  Primary Dressing:  Betadine, Leave VENKAT     Advanced Wound Care Discharge Planning  Number of Clinicians necessary to complete wound care: 2  Is patient requiring IV pain medications for dressing changes:  Yes  Length of time for dressing change 30 min. (This does not include chart review, pre-medication time, set up, clean up or time spent charting.)    Interdisciplinary consultation: Patient, Bedside RN (Yady), Fabricio VENEGAS (Wound RN) and Provider Dr Navarro and Dr. Chow .  Pressure injury and staging reviewed with Fabricio VENEGAS (Wound RN).    EVALUATION / RATIONALE FOR TREATMENT:     Date:  23  Wound Status:  Initial evaluation    Left groin with minimal slough after cleansing, met with Dr. Navarro at bedside who advised to place Dakin's VF to site.  Left sacral wound to go to surgery with Dr. Chow today or tomorrow for debridement and likely VAC placement.  Patient very frail appearing.  Sacral wound probes to bone and will be very difficult to heal considering  patient overall state and nutrition.            Goals: Steady decrease in wound area and depth weekly.    NURSING PLAN OF CARE ORDERS:  Dressing changes: See Dressing Care orders  Skin care: See Skin Care orders  RN Prevention Protocol    NUTRITION RECOMMENDATIONS   Wound Team Recommendations:   NPO for swallow and sx    DIET ORDERS (From admission to next 24h)       Start     Ordered    11/12/23 0504  Diet NPO Restrict to: Sips with Medications  ALL MEALS        Question:  Diet NPO Restrict to:  Answer:  Sips with Medications    11/12/23 0503                    PREVENTATIVE INTERVENTIONS:    Q shift Jae - performed per nursing policy  Q shift pressure point assessments - performed per nursing policy    Surface/Positioning  ICU Low Airloss - Currently in Place  Reposition q 2 hours - Currently in Place  TAPs Turning system - Currently in Place    Offloading/Redistribution  Sacral offloading dressing (Silicone dressing) - Currently in Place  Heel float boots (Prevalon boot) - Currently in Place      Respiratory  Silicone O2 tubing - Currently in Place  Gray Foam Ear protectors - Currently in Place    Containment/Moisture Prevention    Dri-timi pad - Currently in Place  Holguin Catheter - Currently in Place  Barrier paste - Currently in Place    Mobilization      Not Mobilizing      Anticipated discharge plans:  TBD        Vac Discharge Needs:  Vac Discharge plan is purely a recommendation from wound team and not a requirement for discharge unless otherwise stated by physician.  Veraflo Vac while inpatient, ok to transition to Regular Vac on discharge

## 2023-11-12 NOTE — PROGRESS NOTES
"Critical Care Progress Note    Date of admission  11/12/2023    Chief Complaint  \"58 y.o. male with past medical history of peripheral arterial disease, gangrene of the left lower extremity, hypertension who presented 11/12/2023 after his brother called EMS for the patient failing to eat or move.  In the ER the patient was found to be tachycardic with significant leukocytosis concerning for infection.  He was found to have a large left ischial pressure ulcer therefore a CT scan of the abdomen pelvis was obtained which showed soft tissue air tracking superiorly above the ileum, other findings include evidence of osteomyelitis.  His lab analysis includes a CBC with a WBC of 28, mild anemia with a hemoglobin of 9.9, thrombocytosis with a platelet count of 615; his sodium was decreased at 117 which is worsened from his previous, his CO2 was noted to be 17 without a significant anion gap, he was found to be in GELY with a creatinine of 2.27 and a BUN of 62.  The patient was given crystalloid resuscitation and started on vancomycin, Zosyn and clindamycin for the concern of necrotizing skin and soft tissue infection.  General surgery (Dr. Baird) has been consulted for debridement was requested further medical stabilization prior to operative management.\"    Hospital Course  11/12 admitted to the ICU for severe sepsis/nec fasc     Interval Problem Update  Reviewed last 24 hour events:  Somnolent but easily awakes  ST, normal BP  High UOP, Na corrected by 5 so 500 cc D5W bolus  Empiric linezolid + zosyn  VTE ppx  Na level 117--> 122  Eloisa consulted for surgical management     Review of Systems  Review of Systems   Unable to perform ROS: Critical illness        Vital Signs for last 24 hours   Temp:  [36.4 °C (97.5 °F)-38.1 °C (100.6 °F)] 37.9 °C (100.2 °F)  Pulse:  [110-146] 120  Resp:  [17-39] 24  BP: (100-156)/(58-98) 104/61  SpO2:  [91 %-97 %] 96 %    Hemodynamic parameters for last 24 hours       Respiratory " Information for the last 24 hours       Physical Exam   Physical Exam  Vitals and nursing note reviewed.   Constitutional:       General: He is not in acute distress.     Appearance: He is ill-appearing.   HENT:      Head: Normocephalic and atraumatic.      Right Ear: External ear normal.      Left Ear: External ear normal.      Nose: Nose normal.      Mouth/Throat:      Mouth: Mucous membranes are moist.   Eyes:      Pupils: Pupils are equal, round, and reactive to light.   Cardiovascular:      Rate and Rhythm: Regular rhythm. Tachycardia present.      Pulses: Normal pulses.   Pulmonary:      Effort: Pulmonary effort is normal. No respiratory distress.   Abdominal:      General: Abdomen is flat. There is no distension.      Palpations: Abdomen is soft.      Tenderness: There is no abdominal tenderness. There is no guarding or rebound.   Musculoskeletal:      Comments: Left AKA  Tissue ischemia at the distal tip of his left AKA stump, large wound overlying his left femoral artery access incision with fibrinous tissue and purulence present, large left ischial wound with purulent discharge   Skin:     General: Skin is warm and dry.      Capillary Refill: Capillary refill takes less than 2 seconds.   Neurological:      Comments: Somnolent but awakes easily, follows, appropriate, moves extremities normally         Medications  Current Facility-Administered Medications   Medication Dose Route Frequency Provider Last Rate Last Admin    senna-docusate (Pericolace Or Senokot S) 8.6-50 MG per tablet 2 Tablet  2 Tablet Oral BID Thom Carr Jr., D.O.        And    polyethylene glycol/lytes (Miralax) PACKET 1 Packet  1 Packet Oral QDAY PRN Thom Carr Jr., D.O.        And    magnesium hydroxide (Milk Of Magnesia) suspension 30 mL  30 mL Oral QDAY PRN Thom Carr Jr., D.O.        And    bisacodyl (Dulcolax) suppository 10 mg  10 mg Rectal QDAY PRN Thom Carr Jr., D.O.        heparin injection 5,000 Units  5,000  Units Subcutaneous Q8HRS Thom Carr Jr., D.O.   5,000 Units at 11/12/23 0631    insulin regular (HumuLIN R,NovoLIN R) injection  1-6 Units Subcutaneous Q6HRS Thom Carr Jr., D.O.        And    dextrose 10 % BOLUS 25 g  25 g Intravenous Q15 MIN PRN Thom Carr Jr. D.OJong        piperacillin-tazobactam (Zosyn) 4.5 g in  mL IVPB  4.5 g Intravenous Q8HRS Thom Carr Jr., D.O. 25 mL/hr at 11/12/23 1400 Rate Verify at 11/12/23 1400    Linezolid (Zyvox) premix 600 mg  600 mg Intravenous BID Thom Carr Jr. D.O.        dakins 0.125% (1/4 strength) topical soln   Topical BID Pardeep Boyd M.D.        oxyCODONE immediate-release (Roxicodone) tablet 2.5-5 mg  2.5-5 mg Oral Q4HRS PRN Pardeep Boyd M.D.   5 mg at 11/12/23 0905    HYDROmorphone (Dilaudid) injection 0.5 mg  0.5 mg Intravenous Q2HRS PRN Pardeep Boyd M.D.   0.5 mg at 11/12/23 1354    dakins 0.125% (1/4 strength) topical soln   Topical QDAY PRN Pardeep Boyd M.D.           Fluids    Intake/Output Summary (Last 24 hours) at 11/12/2023 1515  Last data filed at 11/12/2023 1420  Gross per 24 hour   Intake 2368.88 ml   Output 5050 ml   Net -2681.12 ml       Laboratory          Recent Labs     11/12/23 0222 11/12/23  0835 11/12/23  1436   SODIUM 117* 122* 124*   POTASSIUM 5.3 4.1 3.6   CHLORIDE 84* 89* 92*   CO2 17* 18* 19*   BUN 62* 59* 42*   CREATININE 2.27* 1.70* 1.27   CALCIUM 8.9 8.5 8.2*     Recent Labs     11/12/23 0222 11/12/23  0835 11/12/23  1436   ALTSGPT 11  --   --    ASTSGOT 14  --   --    ALKPHOSPHAT 79  --   --    TBILIRUBIN 0.2  --   --    LIPASE 48  --   --    GLUCOSE 141* 136* 119*     Recent Labs     11/12/23 0222   WBC 28.0*   NEUTSPOLYS 98.30*   LYMPHOCYTES 0.00*   MONOCYTES 1.70   EOSINOPHILS 0.00   BASOPHILS 0.00   ASTSGOT 14   ALTSGPT 11   ALKPHOSPHAT 79   TBILIRUBIN 0.2     Recent Labs     11/12/23 0222 11/12/23  0737   RBC 3.43*  --    HEMOGLOBIN 9.9*  --    HEMATOCRIT 29.9*  --    PLATELETCT 615*   --    PROTHROMBTM 15.5* 15.9*   APTT 26.8  --    INR 1.21* 1.25*       Imaging  X-Ray:  I have personally reviewed the images and compared with prior images.    Assessment/Plan  * Severe sepsis (HCC)- (present on admission)  Assessment & Plan  This is Sepsis Present on admission  SIRS criteria identified on my evaluation include: Tachycardia, with heart rate greater than 90 BPM and Leukocytosis, with WBC greater than 12,000  Clinical indicators of end organ dysfunction include Lactic Acid greater than 2 and Acute Renal Failure, with a finding of creatinine greater than 2 (patient does not have ESRD or CKD  Source is wounds  Sepsis protocol initiated  Crystalloid Fluid Administration: Fluid resuscitation ordered per standard protocol - 30 mL/kg per current or ideal body weight  IV antibiotics as appropriate for source of sepsis  Reassessment: I have reassessed the patient's hemodynamic status  Started on Zosyn and Zyvox for likely polymicrobial wound infection and possible necrotizing skin and soft tissue infection.    Open wound of inguinal region- (present on admission)  Assessment & Plan  Recent history of femoral endarterectomy with incision dehiscence  Melanie consulted 11/12/23     Sacral wound- (present on admission)  Assessment & Plan  Large sacral wound with signs of active infection and CT scan concerning for pelvic osteomyelitis and skin and soft tissue infection  Wound care  Mock consulted for debridement  Continue antimicrobials    GELY (acute kidney injury) (HCC)- (present on admission)  Assessment & Plan  Likely prerenal with a component of ATN in the setting of sepsis  IVF  Renal dose meds, avoid nephrotoxins  Strict I/Os  Follow renal function    Hyponatremia- (present on admission)  Assessment & Plan  Following resuscitation very high UOP concerning for low solutes vs beer potomania    D5W x 500 cc bolus  Trend sodium q4H with a goal increase of 6 to 8 mEq in 24 hours  Closely monitor urine  output         VTE:  Heparin  Ulcer: Not Indicated  Lines: Holguin Catheter  Ongoing indication addressed    I have performed a physical exam and reviewed and updated ROS and Plan today (11/12/2023). In review of yesterday's note (11/11/2023), there are no changes except as documented above.     Discussed patient condition and risk of morbidity and/or mortality with RN, RT, Pharmacy, Charge nurse / hot rounds, and Patient  The patient remains critically ill.  Critical care time = 48 minutes in directly providing and coordinating critical care and extensive data review.  No time overlap and excludes procedures.

## 2023-11-12 NOTE — WOUND TEAM
Assisted Svetlana TEMPLE (Wound RN), with wound care, non-selective debridement performed using wound cleanser/NS and gauze. NPWT veraflo was applied to left groin, left back wound will be debrided in OR prior to vac application. Please see Svetlana TEMPLE (Wound RN) wound note for further wound care details.

## 2023-11-12 NOTE — CONSULTS
Vascular Surgery          New Patient Consultation    Patient:Dillon Centeno  MRN:[id number]    Date: 11/12/2023    Referring Provider: Pardeep Boyd M.d.    Consulting Physician: Keith Navarro MD  _____________________________________________________    Reason for consultation:  Left groin wound, right lower extremity ischemia with toe ulcerations    HPI:  This is a 58 y.o. male with known to me from previous vascular procedures.  Patient presented with a gangrenous mummified left foot in September of this year and on 9/27 he underwent left BKA.  Patient was found to have significant vascular disease in the bilateral lower extremities which would impair healing of the BKA and also put his right foot at risk for amputation.  On 9/30/2023 I performed bilateral femoral endarterectomies with vein patch angioplasty as well as stent placement in the right external iliac artery.  Patient had good restoration of perfusion in the right lower extremity and also the left lower extremity via the profunda.  Over the ensuing 3 weeks the patient developed breakdown of his left BKA incision and also he developed some superficial dehiscence of the left groin incision, and so on 10/23/2023 I performed a left above-knee amputation as well as debridement and VAC placement of the left groin.  Patient seemed to be improving and was ultimately discharged home.  Patient was brought back to the hospital overnight by ambulance when the patient's brother found him at home and not doing well.  Patient was found to have evidence of sepsis and also a severe hyponatremia of 117.  He was admitted to the ICU for resuscitation and medical management.  I was asked to reevaluate his surgical sites.  When I saw him this morning he was somnolent but arousable, did not converse much.  His right groin incision is intact and appears to be well-healed however there is surrounding redness which appears to be more like bruising  or a reaction as opposed to infection.  There is no evidence of induration or fluctuance.  There is no drainage from the incision.  On the left side the dehiscence of the left groin wound has actually gotten worse, the degree of separation is wider now about 3 cm and involves the majority of the length of the groin incision.  Regarding the left AKA incision, the staples are still in place, the incision itself is intact and appears to be healing well, however the muscle tissue of the left AKA stump is receding and the bone is protruding prominently on the lateral aspect of the stump.  The incision is still intact and the bone is not exposed however I would recommend leaving the staples in for the time being to allow additional healing of the incision.    Past Medical History:   Diagnosis Date    Hypertension        Past Surgical History:   Procedure Laterality Date    INCISION AND DRAINAGE GENERAL Left 10/23/2023    Procedure: INCISION AND DRAINAGE;  Surgeon: Keith Navarro M.D.;  Location: [facility];  Service: Vascular    APPLICATION OR REPLACEMENT, WOUND VAC  10/23/2023    Procedure: APPLICATION OR REPLACEMENT, WOUND VAC;  Surgeon: Keith Navarro M.D.;  Location: [facility];  Service: Vascular    KNEE AMPUTATION ABOVE Left 10/23/2023    Procedure: AMPUTATION, ABOVE KNEE;  Surgeon: Keith Navarro M.D.;  Location: [facility];  Service: Vascular    KNEE AMPUTATION BELOW Left 10/20/2023    Procedure: AMPUTATION, BELOW KNEE WOUND REVISION;  Surgeon: Pradip Altamirano M.D.;  Location: [facility];  Service: Orthopedics    FEMORAL ENDARTERECTOMY Bilateral 9/30/2023    Procedure: ENDARTERECTOMY, FEMORAL;  Surgeon: Keith Navarro M.D.;  Location: [facility];  Service: Vascular    ANGIOGRAM, WITH ANGIOPLASTY, AND STENT INSERTION IF INDICATED Right 9/30/2023    Procedure: ANGIOGRAM, WITH ILIAC STENT;  Surgeon: Keith Navarro M.D.;  Location: [facility];  Service: Vascular    KNEE AMPUTATION BELOW Left 9/27/2023    Procedure: AMPUTATION, BELOW KNEE;  Surgeon: Pradip Altamirano M.D.;  Location: SUR[facility];  Service: Orthopedics    IRRIGATION & DEBRIDEMENT ORTHO Left 3/4/2018    Procedure: IRRIGATION & DEBRIDEMENT ORTHO - HUMERUS;  Surgeon: Sergio Watkins M.D.;  Location: SUR[facility]ERY [facility];  Service: Orthopedics    ORIF, SHOULDER Left 1/4/2018    Procedure: SHOULDER ORIF;  Surgeon: Sergio Watkins M.D.;  Location: SURGERY [facility];  Service: Orthopedics    CLOSED REDUCTION Left 12/24/2017    Procedure: CLOSED REDUCTION;  Surgeon: Keith Subramanian M.D.;  Location: SURGERY [facility];  Service: Orthopedics    OTHER      multiple surgeries lower legs from past injuries       Current Facility-Administered Medications   Medication Dose Route Frequency Provider Last Rate Last Admin    senna-docusate (Pericolace Or Senokot S) 8.6-50 MG per tablet 2 Tablet  2 Tablet Oral BID ESTIVEN Mejias Jr..O.        And    polyethylene glycol/lytes (Miralax) PACKET 1 Packet  1 Packet Oral QDAY PRN ESTIVEN Mejias Jr..O.        And    magnesium hydroxide (Milk Of Magnesia) suspension 30 mL  30 mL Oral QDAY PRN ESTIVEN Mejias Jr..O.        And    bisacodyl (Dulcolax) suppository 10 mg  10 mg Rectal QDAY PRN ESTIVEN Mejias Jr..O.        heparin injection 5,000 Units  5,000 Units Subcutaneous Q8HRS ESTIVEN Mejias Jr..O.   5,000 Units at 11/12/23 0631    insulin regular (HumuLIN R,NovoLIN R) injection  1-6 Units Subcutaneous Q6HRS Thom Carr Jr., ESTIVEN.O.        And    dextrose 10 % BOLUS 25 g  25 g Intravenous Q15 MIN PRN ESTIVEN Mejias Jr..O.        piperacillin-tazobactam (Zosyn) 4.5 g in  mL IVPB  4.5 g Intravenous Q8HRS ESTIVEN Mejias Jr..O.   Stopped at 11/12/23 1033    Linezolid (Zyvox) premix 600 mg  600 mg Intravenous BID ESTIVEN Mejias Jr..O.        dakins 0.125% (1/4 strength) topical soln   Topical BID  "Pardeep Boyd M.D.        oxyCODONE immediate-release (Roxicodone) tablet 2.5-5 mg  2.5-5 mg Oral Q4HRS PRN Pardeep Boyd M.D.   5 mg at 11/12/23 0905    HYDROmorphone (Dilaudid) injection 0.5 mg  0.5 mg Intravenous Q2HRS PRN Pardeep Boyd M.D.   0.5 mg at 11/12/23 0959    NS infusion   Intravenous Continuous Roman Chow M.D.           Social History     Socioeconomic History    Marital status: Single     Spouse name: Not on file    Number of children: Not on file    Years of education: Not on file    Highest education level: Not on file   Occupational History    Not on file   Tobacco Use    Smoking status: Every Day     Types: Cigars    Smokeless tobacco: Never   Substance and Sexual Activity    Alcohol use: Yes     Comment: a few beers a week    Drug use: No    Sexual activity: Not on file   Other Topics Concern    Not on file   Social History Narrative    Not on file     Social Determinants of Health     Financial Resource Strain: Not on file   Food Insecurity: Not on file   Transportation Needs: Not on file   Physical Activity: Not on file   Stress: Not on file   Social Connections: Not on file   Intimate Partner Violence: Not on file   Housing Stability: Not on file       No family history on file.    Allergies:  Sulfa drugs    Review of Systems:    Review of systems is noncontributory except as per HPI    Physical Exam:  /59   Pulse (!) 121   Temp (!) 38.1 °C (100.6 °F) (Temporal)   Resp (!) 25   Ht 1.803 m (5' 11\")   Wt 60.7 kg (133 lb 13.1 oz)   SpO2 95%   BMI 18.66 kg/m²     Constitutional: Somnolent but arousable, no acute distress  HEENT:  Normocephalic and atraumatic, EOMI  Neck:   Supple, no JVD,   Cardiovascular: Regular rate and rhythm,   Pulmonary:  Good air entry bilaterally,    Abdominal:  Soft, non-tender, non-distended  Musculoskeletal: No tenderness, no deformity except for left AKA stump  Neurological:  CN II-XII grossly intact, no focal deficits  Skin:   Skin is warm " and dry. No rash noted.  Vascular exam:  His right groin incision is intact and appears to be well-healed however there is surrounding redness which appears to be more like bruising or a reaction as opposed to infection.  There is no evidence of induration or fluctuance.  There is no drainage from the incision.  There is small ulcerations on the tips of the right toes and nonpalpable PT/PT pulse on the right.  On the left side the dehiscence of the left groin wound has actually gotten worse, the degree of separation is wider now about 3 cm and involves the majority of the length of the groin incision.  Regarding the left AKA incision, the staples are still in place, the incision itself is intact and appears to be healing well, however the muscle tissue of the left AKA stump is receding and the bone is protruding prominently on the lateral aspect of the stump.  The incision is still intact and the bone is not exposed however I would recommend leaving the staples in for the time being to allow additional healing of the incision.      Recent Labs     11/12/23 0222   WBC 28.0*   RBC 3.43*   HEMOGLOBIN 9.9*   HEMATOCRIT 29.9*   MCV 87.2   MCH 28.9   MCHC 33.1   RDW 50.4*   PLATELETCT 615*   MPV 8.4*     Recent Labs     11/12/23 0222 11/12/23  0835   SODIUM 117* 122*   POTASSIUM 5.3 4.1   CHLORIDE 84* 89*   CO2 17* 18*   GLUCOSE 141* 136*   BUN 62* 59*   CREATININE 2.27* 1.70*   CALCIUM 8.9 8.5     Recent Labs     11/12/23 0222 11/12/23  0737   APTT 26.8  --    INR 1.21* 1.25*     Recent Labs     11/12/23 0222 11/12/23  0737   ASTSGOT 14  --    ALTSGPT 11  --    TBILIRUBIN 0.2  --    ALKPHOSPHAT 79  --    GLOBULIN 3.9*  --    INR 1.21* 1.25*           Assessment/Plan:   -Dehiscence of left groin incision  -PAOD with ulcerations of the toes of the right foot    At this point I would recommend Dakin's vera flow VAC to the left groin wound and if this works well we may be able to avoid operative debridement of the wound.   Fortunately the patch used to reconstruct the common femoral artery is an autologous vein patch and so infection of the patch is unlikely and therefore removing the patch is not necessary at this point in time.    I recommend keeping the staples in the left AKA incision for now.  The muscle is receding and the skin appears to be tightly draped over the end of the bone, therefore removing the staples could potentially close dehiscence of the incision and exposure of the bone.  Staples can likely be removed 1 to 2 weeks from now.    Regarding the toes of the right foot, multiple toes have developed ischemic ulcerations.  Patient did undergo right lower extremity revascularization about 3 or 4 weeks ago consisting of iliac stenting and femoral endarterectomy.  Angiogram showed a long segment occlusion of the SFA however the patient was not subjected to a bypass operation at that time.  It now appears that the additional flow provided to the profunda femoris artery will not to be adequate to heal these ulcerations and the patient will potentially be facing bypass surgery at some point to avoid amputation.  Intraoperative angiogram at my previous operation showed that the right below-knee popliteal artery or the right anterior tibial artery could serve as distal bypass targets.    Vascular surgery service following intermittently but if patient improves medically will discuss bypass of the right lower extremity.      Keith Navarro MD  [facility]   Voalte preferred or call my office [phone]  __________________________________________________________________  Patient:Dillon Centeno   MRN:[id number]   CSN:[id number]

## 2023-11-12 NOTE — ED TRIAGE NOTES
Chief Complaint   Patient presents with    Other     Pt BIB ems due to failure to thrive, pt's neighbors called ems due to pt unable to care for self and refuses to eat    Diarrhea     Pt A+O x 4, answering questions appropriately, no acute distress  
No

## 2023-11-12 NOTE — ED NOTES
Med Rec completed per Pt's home pharmacy records.  Unknown last doses.  Home Pharmacy:  [facility]

## 2023-11-12 NOTE — DISCHARGE PLANNING
"RNCM received Voalte message from bedside RN. Apparently patient is threatening to leave AMA. States that he has bills he needs to pay.  RNCM met with patient bedside. RNCM asked if there was someone I could call for him. He is concerned about his mortgage payment that is due tomorrow. Patient stated \"No, I am self sufficient\" RNCM stated that he had a room mate and a brother, If they could provide the mortgage information. Again \"No\". RNCM stated that if patient changed his mind, RNCM could provide a letter of \"hospitalized patient\" and send to mortgage bee. Patient again refused. RNCM will continue to follow for discharge needs.  "

## 2023-11-12 NOTE — H&P
Critical Care H&P    Date of consult: 11/12/2023    Referring Physician  Clive Sauer D.O.    Reason for Consultation  Severe sepsis, hyponatremia    History of Presenting Illness  58 y.o. male with past medical history of peripheral arterial disease, gangrene of the left lower extremity, hypertension who presented 11/12/2023 after his brother called EMS for the patient failing to eat or move.  In the ER the patient was found to be tachycardic with significant leukocytosis concerning for infection.  He was found to have a large left ischial pressure ulcer therefore a CT scan of the abdomen pelvis was obtained which showed soft tissue air tracking superiorly above the ileum, other findings include evidence of osteomyelitis.  His lab analysis includes a CBC with a WBC of 28, mild anemia with a hemoglobin of 9.9, thrombocytosis with a platelet count of 615; his sodium was decreased at 117 which is worsened from his previous, his CO2 was noted to be 17 without a significant anion gap, he was found to be in GELY with a creatinine of 2.27 and a BUN of 62.  The patient was given crystalloid resuscitation and started on vancomycin, Zosyn and clindamycin for the concern of necrotizing skin and soft tissue infection.  General surgery (Dr. Baird) has been consulted for debridement was requested further medical stabilization prior to operative management.    In review of the patient's chart in September of this year he underwent a bilateral femoral endarterectomy and left AKA with dehiscence of his left femoral access incision.  He was followed by ID during his stay and discharged to rehab on antibiotics.    Code Status  Full Code    Review of Systems  Review of Systems   Unable to perform ROS: Mental acuity       Past Medical History   has a past medical history of Hypertension.    Surgical History   has a past surgical history that includes closed reduction (Left, 12/24/2017); other; orif, shoulder (Left, 1/4/2018);  irrigation & debridement ortho (Left, 3/4/2018); knee amputation below (Left, 9/27/2023); femoral endarterectomy (Bilateral, 9/30/2023); angiogram, with angioplasty, and stent insertion if indicated (Right, 9/30/2023); knee amputation below (Left, 10/20/2023); incision and drainage general (Left, 10/23/2023); application or replacement, wound vac (10/23/2023); and knee amputation above (Left, 10/23/2023).    Family History  family history is not on file.    Social History   reports that he has been smoking cigars. He has never used smokeless tobacco. He reports current alcohol use. He reports that he does not use drugs.    Medications  Home Medications    **Home medications have not yet been reviewed for this encounter**       Current Facility-Administered Medications   Medication Dose Route Frequency Provider Last Rate Last Admin    vancomycin (Vancocin) 1,500 mg in  mL IVPB  25 mg/kg Intravenous Once Glorairistopher ROSALINDA Sauer D.OJong 83.3 mL/hr at 11/12/23 0402 1,500 mg at 11/12/23 0402     Current Outpatient Medications   Medication Sig Dispense Refill    Sodium Hypochlorite (DAKINS 0.125%, 1/4 STRENGTH,) 0.125 % Solution Dampen gauze before applying to wounds. Change damp to dry dressings daily. 473 mL 0    aspirin 81 MG EC tablet Take 1 Tablet by mouth every day. 100 Tablet 1    gabapentin (NEURONTIN) 300 MG Cap Take 1 Capsule by mouth 3 times a day. 90 Capsule 1    tamsulosin (FLOMAX) 0.4 MG capsule Take 1 Capsule by mouth 1/2 hour after breakfast. 30 Capsule 1    atorvastatin (LIPITOR) 40 MG Tab Take 1 Tablet by mouth every evening. 30 Tablet 1    cyclobenzaprine (FLEXERIL) 10 mg Tab Take 1 Tablet by mouth 3 times a day as needed for Muscle Spasms. 30 Tablet 0    finasteride (PROSCAR) 5 MG Tab Take 1 Tablet by mouth every day. 30 Tablet 1       Allergies  Allergies   Allergen Reactions    Sulfa Drugs Hives and Shortness of Breath     Tolerated Flomax (10/2023)       Vital Signs last 24 hours  Temp:  [37.1 °C  (98.8 °F)] 37.1 °C (98.8 °F)  Pulse:  [110-117] 117  Resp:  [17-20] 17  BP: (129-151)/(81-98) 151/81  SpO2:  [93 %-95 %] 93 %    Physical Exam  Physical Exam  Vitals and nursing note reviewed.   Constitutional:       General: He is in acute distress.      Appearance: He is ill-appearing.      Comments: Disheveled, smells of urine and infected pressure wounds.   HENT:      Head: Atraumatic.      Right Ear: External ear normal.      Left Ear: External ear normal.      Nose: Nose normal.      Mouth/Throat:      Mouth: Mucous membranes are dry.      Pharynx: Oropharynx is clear.   Eyes:      Extraocular Movements: Extraocular movements intact.      Conjunctiva/sclera: Conjunctivae normal.   Cardiovascular:      Rate and Rhythm: Regular rhythm. Tachycardia present.      Pulses: Normal pulses.   Pulmonary:      Effort: Pulmonary effort is normal.   Abdominal:      General: Abdomen is flat. Bowel sounds are normal. There is no distension.      Palpations: Abdomen is soft.      Tenderness: There is no abdominal tenderness.   Musculoskeletal:      Cervical back: Neck supple.      Comments: Left AKA   Skin:     Capillary Refill: Capillary refill takes less than 2 seconds.      Comments: Developing tissue ischemia at the distal tip of his left AKA stump, large wound overlying his left femoral artery access incision with fibrinous tissue and purulence present, large left ischial wound with purulent discharge. (See images)   Neurological:      General: No focal deficit present.      Comments: Somnolent and confused   Psychiatric:         Cognition and Memory: Cognition is impaired. Memory is impaired.         Judgment: Judgment is inappropriate.       Left Ischial wound      Left AKA stump wound      Left inguinal wound        Fluids    Intake/Output Summary (Last 24 hours) at 11/12/2023 0502  Last data filed at 11/12/2023 0411  Gross per 24 hour   Intake 1200 ml   Output --   Net 1200 ml       Laboratory  Recent Results (from  the past 48 hour(s))   CBC WITH DIFFERENTIAL    Collection Time: 11/12/23  2:22 AM   Result Value Ref Range    WBC 28.0 (H) 4.8 - 10.8 K/uL    RBC 3.43 (L) 4.70 - 6.10 M/uL    Hemoglobin 9.9 (L) 14.0 - 18.0 g/dL    Hematocrit 29.9 (L) 42.0 - 52.0 %    MCV 87.2 81.4 - 97.8 fL    MCH 28.9 27.0 - 33.0 pg    MCHC 33.1 32.3 - 36.5 g/dL    RDW 50.4 (H) 35.9 - 50.0 fL    Platelet Count 615 (H) 164 - 446 K/uL    MPV 8.4 (L) 9.0 - 12.9 fL    Neutrophils-Polys 98.30 (H) 44.00 - 72.00 %    Lymphocytes 0.00 (L) 22.00 - 41.00 %    Monocytes 1.70 0.00 - 13.40 %    Eosinophils 0.00 0.00 - 6.90 %    Basophils 0.00 0.00 - 1.80 %    Nucleated RBC 0.00 0.00 - 0.20 /100 WBC    Neutrophils (Absolute) 27.52 (H) 1.82 - 7.42 K/uL    Lymphs (Absolute) 0.00 (L) 1.00 - 4.80 K/uL    Monos (Absolute) 0.48 0.00 - 0.85 K/uL    Eos (Absolute) 0.00 0.00 - 0.51 K/uL    Baso (Absolute) 0.00 0.00 - 0.12 K/uL    NRBC (Absolute) 0.00 K/uL   COMP METABOLIC PANEL    Collection Time: 11/12/23  2:22 AM   Result Value Ref Range    Sodium 117 (LL) 135 - 145 mmol/L    Potassium 5.3 3.6 - 5.5 mmol/L    Chloride 84 (L) 96 - 112 mmol/L    Co2 17 (L) 20 - 33 mmol/L    Anion Gap 16.0 7.0 - 16.0    Glucose 141 (H) 65 - 99 mg/dL    Bun 62 (H) 8 - 22 mg/dL    Creatinine 2.27 (H) 0.50 - 1.40 mg/dL    Calcium 8.9 8.5 - 10.5 mg/dL    Correct Calcium 9.6 8.5 - 10.5 mg/dL    AST(SGOT) 14 12 - 45 U/L    ALT(SGPT) 11 2 - 50 U/L    Alkaline Phosphatase 79 30 - 99 U/L    Total Bilirubin 0.2 0.1 - 1.5 mg/dL    Albumin 3.1 (L) 3.2 - 4.9 g/dL    Total Protein 7.0 6.0 - 8.2 g/dL    Globulin 3.9 (H) 1.9 - 3.5 g/dL    A-G Ratio 0.8 g/dL   LIPASE    Collection Time: 11/12/23  2:22 AM   Result Value Ref Range    Lipase 48 11 - 82 U/L   TROPONIN    Collection Time: 11/12/23  2:22 AM   Result Value Ref Range    Troponin T 38 (H) 6 - 19 ng/L   LACTIC ACID    Collection Time: 11/12/23  2:22 AM   Result Value Ref Range    Lactic Acid 3.9 (H) 0.5 - 2.0 mmol/L   APTT    Collection Time:  23  2:22 AM   Result Value Ref Range    APTT 26.8 24.7 - 36.0 sec   PROTHROMBIN TIME (INR)    Collection Time: 23  2:22 AM   Result Value Ref Range    PT 15.5 (H) 12.0 - 14.6 sec    INR 1.21 (H) 0.87 - 1.13   ESTIMATED GFR    Collection Time: 23  2:22 AM   Result Value Ref Range    GFR (CKD-EPI) 32 (A) >60 mL/min/1.73 m 2   DIFFERENTIAL MANUAL    Collection Time: 23  2:22 AM   Result Value Ref Range    Manual Diff Status PERFORMED    PERIPHERAL SMEAR REVIEW    Collection Time: 23  2:22 AM   Result Value Ref Range    Peripheral Smear Review see below    PLATELET ESTIMATE    Collection Time: 23  2:22 AM   Result Value Ref Range    Plt Estimation Increased    MORPHOLOGY    Collection Time: 23  2:22 AM   Result Value Ref Range    RBC Morphology Normal    CRP QUANTITIVE (NON-CARDIAC)    Collection Time: 23  2:22 AM   Result Value Ref Range    Stat C-Reactive Protein 27.78 (H) 0.00 - 0.75 mg/dL   EKG    Collection Time: 23  4:24 AM   Result Value Ref Range    Report       [facility] Emergency Dept.    Test Date:  2023  Pt Name:    ELI GREWAL                 Department: ER  MRN:        [id number]                      Room:       [facility]  Gender:     Male                         Technician: [id number]  :        1965                   Requested By:ELMIRA PENN  Order #:    [id number]                    Reading MD:    Measurements  Intervals                                Axis  Rate:       115                          P:          -44  SD:         147                          QRS:        52  QRSD:       88                           T:          85  QT:         313  QTc:        433    Interpretive Statements  Sinus tachycardia  Anterior infarct, old  Compared to ECG 10/18/2023 11:19:22  Myocardial infarct finding now present  Sinus rhythm no longer present  Left-axis deviation no longer present         Imaging  CT-ABDOMEN-PELVIS WITH   Final  Result         1. There is a 2 cm ulcerative fraying in the left medial gluteus region. Subjacent iliac bone demonstrates bony ridging, consistent with osteomyelitis. There is small amount of air tracking medially and laterally away from the ulcerative wound along the    left medial gluteal musculature and down to the level of the left paraspinous musculature. Therefore, early necrotizing fasciitis possible. No drainable abscess present. This was discussed with the ordering physician.   2. Severely distended bladder with mild to moderate bilateral associated hydronephrosis. This is likely secondary to prostatic hypertrophy. There is severe median lobe hypertrophy. Consider Holguin catheter placement for decompression. Bladder diverticulum    also noted.   3. Patchy ground glass to aeration lower lobes, suspicious for pneumonia. However, atypical edema or other pneumonitis possible.   4. Severe atherosclerotic disease.   5. Additional soft tissue wound in the left groin.      DX-FEMUR-2+ LEFT   Final Result      No evidence of infection by plain film.      DX-CHEST-PORTABLE (1 VIEW)   Final Result      No acute process.          Assessment/Plan  * Severe sepsis (HCC)- (present on admission)  Assessment & Plan  This is Sepsis Present on admission  SIRS criteria identified on my evaluation include: Tachycardia, with heart rate greater than 90 BPM and Leukocytosis, with WBC greater than 12,000  Clinical indicators of end organ dysfunction include Lactic Acid greater than 2 and Acute Renal Failure, with a finding of creatinine greater than 2 (patient does not have ESRD or CKD  Source is wounds  Sepsis protocol initiated  Crystalloid Fluid Administration: Fluid resuscitation ordered per standard protocol - 30 mL/kg per current or ideal body weight  IV antibiotics as appropriate for source of sepsis  Reassessment: I have reassessed the patient's hemodynamic status  Started on Zosyn and Zyvox for likely polymicrobial wound  infection and possible necrotizing skin and soft tissue infection.    Open wound of inguinal region- (present on admission)  Assessment & Plan  Recent history of femoral endarterectomy with incision dehiscence  Will contact vascular surgery for evaluation    Sacral wound- (present on admission)  Assessment & Plan  Large sacral wound with signs of active infection and CT scan concerning for pelvic osteomyelitis and skin and soft tissue infection  Wound care  General surgery consult for debridement  Continue antimicrobials    GELY (acute kidney injury) (HCC)- (present on admission)  Assessment & Plan  Likely prerenal with a component of ATN in the setting of sepsis  IVF  Renal dose meds, avoid nephrotoxins  Strict I/Os  Follow renal function    Hyponatremia- (present on admission)  Assessment & Plan  Severe, acute on chronic  Appears hypovolemic  Likely hypertonic hypovolemic hyponatremia  Received 1 L of NS in the ER, pending repeat sodium to determine appropriate crystalloid volume and type.  No concerning neuro changes  Trend sodium with a goal increase of 6 to 8 mEq in 24 hours  Check urine osm, serum osm, urine sodium, TSH, cortisol, every 4 hour serum sodium  Closely monitor urine output        Discussed patient condition and risk of morbidity and/or mortality with RN, RT, Pharmacy, Code status disscussed, Patient, and ERP .      The patient remains critically ill.  Critical care time = 61 minutes in directly providing and coordinating critical care and extensive data review.  No time overlap and excludes procedures.    Please note that this dictation was created using voice recognition software. The accuracy of the dictation is limited to the abilities of the software. I have made every reasonable attempt to correct obvious errors, but I expect that there are errors of grammar and possibly content that I did not discover before finalizing the note.

## 2023-11-12 NOTE — ASSESSMENT & PLAN NOTE
Following resuscitation very high UOP concerning for low solutes vs beer potomania  Corrected quickly to 133 from 117 in 24 hours  Repeat D5W x 500 cc bolus  Trend sodium q6H with a goal increase to only 127 today  Closely monitor urine output

## 2023-11-12 NOTE — PROGRESS NOTES
Pt expressing his desire to leave against medical advice. This RN explained the various reasons why pt needs to stay in hospital. MD updated and will come to bedside to further educate patient.     At this time, pt refusing this RN to draw serial labs and check blood sugar.

## 2023-11-12 NOTE — ASSESSMENT & PLAN NOTE
Large sacral wound with signs of active infection and CT scan concerning for pelvic osteomyelitis and skin and soft tissue infection  Wound care  Dr. Chow consulted for debridement-->surgery ? On 11/14  Continue antimicrobials

## 2023-11-12 NOTE — PROGRESS NOTES
[facility]  Assumed pt care. Continuous monitoring in place.    HR: 116  BP: 144/72  RR: 18  SpO2: 97  Temp: 97.5F  Weight: 60.7kg    Pt belongings:   Wheelchair  [employer] camo jacket  Lighter  Credit card  [employer] green polo shirt      2 RN skin check completed with Won and Leonora    Current impaired skin areas:  Toes: black spots  R heel red, non-blanching, flaky  R upper thigh abrasions, dermatitis. Redness, non-blanching  Groin: red excoriated, large wound L side of groin  L AKA, skin tears, staples  Hands: scattered bruising, abrasions, scabs, red, slow to dee  Arms: abrasions, bruising, scabs  Nose WDL  Ears WDL  Mouth WDL  Abdomen bruising, old scar  L shoulder: large, old scar  Elbows: red, slow to dee  Back WDL  Sacrum: DTI  Buttocks: excoriation, red, non-blanching, discoloration    The following preventative measures & interventions in place: sacral & heel mepilex, z-flow    Pictures uploaded into Epic (Y/N): Yes  Wound consult placed (Y/N): Yes

## 2023-11-12 NOTE — ASSESSMENT & PLAN NOTE
Likely prerenal with a component of ATN in the setting of sepsis  S/p IVF and resolved   Renal dose meds, avoid nephrotoxins  Strict I/Os  Follow renal function

## 2023-11-13 LAB
ALBUMIN SERPL BCP-MCNC: 2.1 G/DL (ref 3.2–4.9)
ALBUMIN/GLOB SERPL: 0.6 G/DL
ALP SERPL-CCNC: 66 U/L (ref 30–99)
ALT SERPL-CCNC: 12 U/L (ref 2–50)
ANION GAP SERPL CALC-SCNC: 10 MMOL/L (ref 7–16)
ANION GAP SERPL CALC-SCNC: 12 MMOL/L (ref 7–16)
ANION GAP SERPL CALC-SCNC: 12 MMOL/L (ref 7–16)
ANION GAP SERPL CALC-SCNC: 13 MMOL/L (ref 7–16)
AST SERPL-CCNC: 34 U/L (ref 12–45)
BASOPHILS # BLD AUTO: 0.2 % (ref 0–1.8)
BASOPHILS # BLD: 0.05 K/UL (ref 0–0.12)
BILIRUB SERPL-MCNC: 0.2 MG/DL (ref 0.1–1.5)
BUN SERPL-MCNC: 18 MG/DL (ref 8–22)
BUN SERPL-MCNC: 18 MG/DL (ref 8–22)
BUN SERPL-MCNC: 23 MG/DL (ref 8–22)
BUN SERPL-MCNC: 30 MG/DL (ref 8–22)
CALCIUM ALBUM COR SERPL-MCNC: 9.4 MG/DL (ref 8.5–10.5)
CALCIUM SERPL-MCNC: 6.9 MG/DL (ref 8.5–10.5)
CALCIUM SERPL-MCNC: 7.3 MG/DL (ref 8.5–10.5)
CALCIUM SERPL-MCNC: 7.8 MG/DL (ref 8.5–10.5)
CALCIUM SERPL-MCNC: 7.9 MG/DL (ref 8.5–10.5)
CHLORIDE SERPL-SCNC: 101 MMOL/L (ref 96–112)
CHLORIDE SERPL-SCNC: 95 MMOL/L (ref 96–112)
CHLORIDE SERPL-SCNC: 96 MMOL/L (ref 96–112)
CHLORIDE SERPL-SCNC: 97 MMOL/L (ref 96–112)
CO2 SERPL-SCNC: 20 MMOL/L (ref 20–33)
CO2 SERPL-SCNC: 20 MMOL/L (ref 20–33)
CO2 SERPL-SCNC: 21 MMOL/L (ref 20–33)
CO2 SERPL-SCNC: 21 MMOL/L (ref 20–33)
CREAT SERPL-MCNC: 0.54 MG/DL (ref 0.5–1.4)
CREAT SERPL-MCNC: 0.58 MG/DL (ref 0.5–1.4)
CREAT SERPL-MCNC: 0.6 MG/DL (ref 0.5–1.4)
CREAT SERPL-MCNC: 0.72 MG/DL (ref 0.5–1.4)
EKG IMPRESSION: NORMAL
EOSINOPHIL # BLD AUTO: 0.04 K/UL (ref 0–0.51)
EOSINOPHIL NFR BLD: 0.2 % (ref 0–6.9)
ERYTHROCYTE [DISTWIDTH] IN BLOOD BY AUTOMATED COUNT: 51.5 FL (ref 35.9–50)
GFR SERPLBLD CREATININE-BSD FMLA CKD-EPI: 105 ML/MIN/1.73 M 2
GFR SERPLBLD CREATININE-BSD FMLA CKD-EPI: 111 ML/MIN/1.73 M 2
GFR SERPLBLD CREATININE-BSD FMLA CKD-EPI: 112 ML/MIN/1.73 M 2
GFR SERPLBLD CREATININE-BSD FMLA CKD-EPI: 114 ML/MIN/1.73 M 2
GLOBULIN SER CALC-MCNC: 3.3 G/DL (ref 1.9–3.5)
GLUCOSE BLD STRIP.AUTO-MCNC: 126 MG/DL (ref 65–99)
GLUCOSE BLD STRIP.AUTO-MCNC: 161 MG/DL (ref 65–99)
GLUCOSE SERPL-MCNC: 112 MG/DL (ref 65–99)
GLUCOSE SERPL-MCNC: 121 MG/DL (ref 65–99)
GLUCOSE SERPL-MCNC: 134 MG/DL (ref 65–99)
GLUCOSE SERPL-MCNC: 186 MG/DL (ref 65–99)
HCT VFR BLD AUTO: 26.2 % (ref 42–52)
HGB BLD-MCNC: 8.5 G/DL (ref 14–18)
IMM GRANULOCYTES # BLD AUTO: 0.21 K/UL (ref 0–0.11)
IMM GRANULOCYTES NFR BLD AUTO: 1 % (ref 0–0.9)
LYMPHOCYTES # BLD AUTO: 1 K/UL (ref 1–4.8)
LYMPHOCYTES NFR BLD: 4.7 % (ref 22–41)
MCH RBC QN AUTO: 28.5 PG (ref 27–33)
MCHC RBC AUTO-ENTMCNC: 32.4 G/DL (ref 32.3–36.5)
MCV RBC AUTO: 87.9 FL (ref 81.4–97.8)
MONOCYTES # BLD AUTO: 0.65 K/UL (ref 0–0.85)
MONOCYTES NFR BLD AUTO: 3 % (ref 0–13.4)
NEUTROPHILS # BLD AUTO: 19.54 K/UL (ref 1.82–7.42)
NEUTROPHILS NFR BLD: 90.9 % (ref 44–72)
NRBC # BLD AUTO: 0 K/UL
NRBC BLD-RTO: 0 /100 WBC (ref 0–0.2)
PLATELET # BLD AUTO: 481 K/UL (ref 164–446)
PMV BLD AUTO: 8.6 FL (ref 9–12.9)
POTASSIUM SERPL-SCNC: 2.9 MMOL/L (ref 3.6–5.5)
POTASSIUM SERPL-SCNC: 3.2 MMOL/L (ref 3.6–5.5)
POTASSIUM SERPL-SCNC: 3.3 MMOL/L (ref 3.6–5.5)
POTASSIUM SERPL-SCNC: 4.1 MMOL/L (ref 3.6–5.5)
PROT SERPL-MCNC: 5.4 G/DL (ref 6–8.2)
RBC # BLD AUTO: 2.98 M/UL (ref 4.7–6.1)
SODIUM SERPL-SCNC: 128 MMOL/L (ref 135–145)
SODIUM SERPL-SCNC: 128 MMOL/L (ref 135–145)
SODIUM SERPL-SCNC: 129 MMOL/L (ref 135–145)
SODIUM SERPL-SCNC: 133 MMOL/L (ref 135–145)
WBC # BLD AUTO: 21.5 K/UL (ref 4.8–10.8)

## 2023-11-13 PROCEDURE — 93010 ELECTROCARDIOGRAM REPORT: CPT | Mod: 76 | Performed by: INTERNAL MEDICINE

## 2023-11-13 PROCEDURE — 82962 GLUCOSE BLOOD TEST: CPT

## 2023-11-13 PROCEDURE — 770000 HCHG ROOM/CARE - INTERMEDIATE ICU *

## 2023-11-13 PROCEDURE — 99231 SBSQ HOSP IP/OBS SF/LOW 25: CPT | Performed by: STUDENT IN AN ORGANIZED HEALTH CARE EDUCATION/TRAINING PROGRAM

## 2023-11-13 PROCEDURE — 700101 HCHG RX REV CODE 250: Performed by: INTERNAL MEDICINE

## 2023-11-13 PROCEDURE — 92610 EVALUATE SWALLOWING FUNCTION: CPT

## 2023-11-13 PROCEDURE — 700102 HCHG RX REV CODE 250 W/ 637 OVERRIDE(OP): Performed by: HOSPITALIST

## 2023-11-13 PROCEDURE — A9270 NON-COVERED ITEM OR SERVICE: HCPCS | Performed by: INTERNAL MEDICINE

## 2023-11-13 PROCEDURE — 700102 HCHG RX REV CODE 250 W/ 637 OVERRIDE(OP): Performed by: INTERNAL MEDICINE

## 2023-11-13 PROCEDURE — 700111 HCHG RX REV CODE 636 W/ 250 OVERRIDE (IP): Mod: JZ | Performed by: INTERNAL MEDICINE

## 2023-11-13 PROCEDURE — 99024 POSTOP FOLLOW-UP VISIT: CPT | Performed by: SURGERY

## 2023-11-13 PROCEDURE — 99254 IP/OBS CNSLTJ NEW/EST MOD 60: CPT | Performed by: HOSPITALIST

## 2023-11-13 PROCEDURE — 99233 SBSQ HOSP IP/OBS HIGH 50: CPT | Performed by: INTERNAL MEDICINE

## 2023-11-13 PROCEDURE — 92612 ENDOSCOPY SWALLOW (FEES) VID: CPT

## 2023-11-13 PROCEDURE — 80048 BASIC METABOLIC PNL TOTAL CA: CPT

## 2023-11-13 PROCEDURE — 700105 HCHG RX REV CODE 258: Performed by: INTERNAL MEDICINE

## 2023-11-13 PROCEDURE — 85025 COMPLETE CBC W/AUTO DIFF WBC: CPT

## 2023-11-13 PROCEDURE — A9270 NON-COVERED ITEM OR SERVICE: HCPCS | Performed by: HOSPITALIST

## 2023-11-13 PROCEDURE — 700111 HCHG RX REV CODE 636 W/ 250 OVERRIDE (IP): Performed by: INTERNAL MEDICINE

## 2023-11-13 PROCEDURE — 80053 COMPREHEN METABOLIC PANEL: CPT

## 2023-11-13 RX ORDER — ZINC SULFATE 50(220)MG
220 CAPSULE ORAL DAILY
Status: DISCONTINUED | OUTPATIENT
Start: 2023-11-13 | End: 2023-11-30 | Stop reason: HOSPADM

## 2023-11-13 RX ORDER — POTASSIUM CHLORIDE 7.45 MG/ML
10 INJECTION INTRAVENOUS
Status: COMPLETED | OUTPATIENT
Start: 2023-11-13 | End: 2023-11-13

## 2023-11-13 RX ORDER — ASCORBIC ACID 500 MG
500 TABLET ORAL 2 TIMES DAILY
Status: DISCONTINUED | OUTPATIENT
Start: 2023-11-13 | End: 2023-11-30 | Stop reason: HOSPADM

## 2023-11-13 RX ORDER — ENOXAPARIN SODIUM 100 MG/ML
40 INJECTION SUBCUTANEOUS DAILY
Status: DISCONTINUED | OUTPATIENT
Start: 2023-11-13 | End: 2023-11-30 | Stop reason: HOSPADM

## 2023-11-13 RX ORDER — DEXTROSE MONOHYDRATE 50 MG/ML
500 INJECTION, SOLUTION INTRAVENOUS ONCE
Status: COMPLETED | OUTPATIENT
Start: 2023-11-13 | End: 2023-11-13

## 2023-11-13 RX ADMIN — POTASSIUM CHLORIDE 10 MEQ: 7.46 INJECTION, SOLUTION INTRAVENOUS at 12:36

## 2023-11-13 RX ADMIN — POTASSIUM CHLORIDE 10 MEQ: 7.46 INJECTION, SOLUTION INTRAVENOUS at 08:51

## 2023-11-13 RX ADMIN — HEPARIN SODIUM 5000 UNITS: 5000 INJECTION, SOLUTION INTRAVENOUS; SUBCUTANEOUS at 05:18

## 2023-11-13 RX ADMIN — POTASSIUM CHLORIDE 10 MEQ: 7.46 INJECTION, SOLUTION INTRAVENOUS at 09:58

## 2023-11-13 RX ADMIN — SODIUM HYPOCHLORITE 1000 ML: 1.25 SOLUTION TOPICAL at 17:49

## 2023-11-13 RX ADMIN — ENOXAPARIN SODIUM 40 MG: 100 INJECTION SUBCUTANEOUS at 17:49

## 2023-11-13 RX ADMIN — OXYCODONE 5 MG: 5 TABLET ORAL at 16:18

## 2023-11-13 RX ADMIN — Medication 220 MG: at 14:27

## 2023-11-13 RX ADMIN — LINEZOLID 600 MG: 600 INJECTION, SOLUTION INTRAVENOUS at 17:51

## 2023-11-13 RX ADMIN — PIPERACILLIN AND TAZOBACTAM 4.5 G: 4; .5 INJECTION, POWDER, FOR SOLUTION INTRAVENOUS at 20:33

## 2023-11-13 RX ADMIN — HYDROMORPHONE HYDROCHLORIDE 0.5 MG: 1 INJECTION, SOLUTION INTRAMUSCULAR; INTRAVENOUS; SUBCUTANEOUS at 20:13

## 2023-11-13 RX ADMIN — OXYCODONE 5 MG: 5 TABLET ORAL at 21:28

## 2023-11-13 RX ADMIN — PIPERACILLIN AND TAZOBACTAM 4.5 G: 4; .5 INJECTION, POWDER, FOR SOLUTION INTRAVENOUS at 05:14

## 2023-11-13 RX ADMIN — DEXTROSE MONOHYDRATE 500 ML: 50 INJECTION, SOLUTION INTRAVENOUS at 09:00

## 2023-11-13 RX ADMIN — HYDROMORPHONE HYDROCHLORIDE 0.5 MG: 1 INJECTION, SOLUTION INTRAMUSCULAR; INTRAVENOUS; SUBCUTANEOUS at 05:58

## 2023-11-13 RX ADMIN — LINEZOLID 600 MG: 600 INJECTION, SOLUTION INTRAVENOUS at 05:17

## 2023-11-13 RX ADMIN — THERA TABS 1 TABLET: TAB at 14:27

## 2023-11-13 RX ADMIN — OXYCODONE HYDROCHLORIDE AND ACETAMINOPHEN 500 MG: 500 TABLET ORAL at 17:49

## 2023-11-13 RX ADMIN — POTASSIUM CHLORIDE 10 MEQ: 7.46 INJECTION, SOLUTION INTRAVENOUS at 11:05

## 2023-11-13 RX ADMIN — SODIUM HYPOCHLORITE 100 ML: 1.25 SOLUTION TOPICAL at 05:21

## 2023-11-13 RX ADMIN — INSULIN HUMAN 1 UNITS: 100 INJECTION, SOLUTION PARENTERAL at 06:07

## 2023-11-13 RX ADMIN — HYDROMORPHONE HYDROCHLORIDE 0.5 MG: 1 INJECTION, SOLUTION INTRAMUSCULAR; INTRAVENOUS; SUBCUTANEOUS at 09:08

## 2023-11-13 RX ADMIN — PIPERACILLIN AND TAZOBACTAM 4.5 G: 4; .5 INJECTION, POWDER, FOR SOLUTION INTRAVENOUS at 12:37

## 2023-11-13 ASSESSMENT — ENCOUNTER SYMPTOMS
BACK PAIN: 1
GASTROINTESTINAL NEGATIVE: 1
WEAKNESS: 1
EYES NEGATIVE: 1
FEVER: 1
MYALGIAS: 1
CHILLS: 1
CARDIOVASCULAR NEGATIVE: 1
SHORTNESS OF BREATH: 1
NERVOUS/ANXIOUS: 1

## 2023-11-13 ASSESSMENT — PAIN DESCRIPTION - PAIN TYPE
TYPE: ACUTE PAIN
TYPE: CHRONIC PAIN
TYPE: ACUTE PAIN

## 2023-11-13 NOTE — CARE PLAN
The patient is Watcher - Medium risk of patient condition declining or worsening    Shift Goals  Clinical Goals: hemodynamic stability; Q4 BMP stability: pain management  Patient Goals: pain control; sleep  Family Goals: LUTHER    Progress made toward(s) clinical / shift goals:    Problem: Knowledge Deficit - Standard  Goal: Patient and family/care givers will demonstrate understanding of plan of care, disease process/condition, diagnostic tests and medications  Outcome: Progressing     Problem: Hemodynamics  Goal: Patient's hemodynamics, fluid balance and neurologic status will be stable or improve  Outcome: Progressing     Problem: Fluid Volume  Goal: Fluid volume balance will be maintained  Outcome: Progressing     Problem: Urinary - Renal Perfusion  Goal: Ability to achieve and maintain adequate renal perfusion and functioning will improve  Outcome: Progressing     Problem: Respiratory  Goal: Patient will achieve/maintain optimum respiratory ventilation and gas exchange  Outcome: Progressing     Problem: Mechanical Ventilation  Goal: Safe management of artificial airway and ventilation  Outcome: Progressing  Goal: Successful weaning off mechanical ventilator, spontaneously maintains adequate gas exchange  Outcome: Progressing  Goal: Patient will be able to express needs and understand communication  Outcome: Progressing     Problem: Physical Regulation  Goal: Diagnostic test results will improve  Outcome: Progressing  Goal: Signs and symptoms of infection will decrease  Outcome: Progressing     Problem: Pain - Standard  Goal: Alleviation of pain or a reduction in pain to the patient’s comfort goal  Outcome: Progressing     Problem: Skin Integrity  Goal: Skin integrity is maintained or improved  Outcome: Progressing     Problem: Fall Risk  Goal: Patient will remain free from falls  Outcome: Progressing       Patient is not progressing towards the following goals:

## 2023-11-13 NOTE — CARE PLAN
The patient is Watcher - Medium risk of patient condition declining or worsening    Shift Goals  Clinical Goals: hemodynamic stability; Q4 BMP stability: pain management  Patient Goals: pain control; sleep  Family Goals: LUTHER    Progress made toward(s) clinical / shift goals:      Problem: Pain - Standard  Goal: Alleviation of pain or a reduction in pain to the patient’s comfort goal  11/13/2023 0112 by Ildefonso Tam, R.N.  Outcome: Progressing  11/13/2023 0112 by Ildefonso Tam, R.N.  Outcome: Progressing       Patient is not progressing towards the following goals:      Problem: Knowledge Deficit - Standard  Goal: Patient and family/care givers will demonstrate understanding of plan of care, disease process/condition, diagnostic tests and medications  11/13/2023 0112 by Ildefonso Tam, R.N.  Outcome: Not Progressing  11/13/2023 0112 by Ildefonso Tam, R.N.  Outcome: Not Met     Problem: Skin Integrity  Goal: Skin integrity is maintained or improved  Outcome: Not Progressing

## 2023-11-13 NOTE — PROGRESS NOTES
"  DATE: 11/13/2023        INTERVAL EVENTS:  Sodium improving, WBC improving.  Tachycardic, afebrile .    REVIEW OF SYSTEMS:  Comprehensive review of systems is negative with the exception of the aforementioned HPI, PMH, and PSH bullets in accordance with CMS guidelines.    PHYSICAL EXAMINATION:  Vital Signs: BP 97/63   Pulse (!) 117   Temp 36.9 °C (98.5 °F)   Resp (!) 25   Ht 1.803 m (5' 11\")   Wt 60.7 kg (133 lb 13.1 oz)   SpO2 92%   Physical Exam  Vitals and nursing note reviewed.   Constitutional:       General: He is sleeping.      Appearance: He is ill-appearing.   HENT:      Head: Normocephalic and atraumatic.      Right Ear: External ear normal.      Left Ear: External ear normal.      Nose: Nose normal.      Mouth/Throat:      Mouth: Mucous membranes are moist.      Pharynx: Oropharynx is clear.   Eyes:      General: No scleral icterus.     Pupils: Pupils are equal, round, and reactive to light.   Cardiovascular:      Rate and Rhythm: Regular rhythm. Tachycardia present.   Pulmonary:      Effort: Pulmonary effort is normal. No respiratory distress.   Abdominal:      General: There is no distension.      Palpations: Abdomen is soft.      Tenderness: There is no abdominal tenderness. There is no guarding or rebound.   Genitourinary:     Comments: Holguin catheter in place  Musculoskeletal:      Cervical back: Normal range of motion and neck supple.      Comments: S/p left above knee amputation with staples in place, stable distal erythema. Open Left groin wound with fibrinopurulent drainage. Right foot with ischemic ulcers.    Left sacral wound with fibrinopurulent drainage, dusky appearing muscle at base      Left Lower Extremity: Left leg is amputated above knee.   Skin:     General: Skin is warm and dry.      Comments: rash on posterior right thigh.   Neurological:      General: No focal deficit present.      Mental Status: He is easily aroused. He is lethargic.         LABORATORY VALUES:  Recent Labs "     11/12/23 0222 11/13/23 0311   WBC 28.0* 21.5*   RBC 3.43* 2.98*   HEMOGLOBIN 9.9* 8.5*   HEMATOCRIT 29.9* 26.2*   MCV 87.2 87.9   MCH 28.9 28.5   MCHC 33.1 32.4   RDW 50.4* 51.5*   PLATELETCT 615* 481*   MPV 8.4* 8.6*     Recent Labs     11/12/23  1810 11/12/23 2130 11/13/23 0311   SODIUM 124* 124* 128*   POTASSIUM 3.4* 3.4* 3.2*   CHLORIDE 92* 94* 95*   CO2 16* 19* 20   GLUCOSE 126* 114* 121*   BUN 41* 35* 30*   CREATININE 1.10 0.86 0.72   CALCIUM 8.0* 8.1* 7.9*     Recent Labs     11/12/23 0222 11/12/23 0737 11/13/23 0311   ASTSGOT 14  --  34   ALTSGPT 11  --  12   TBILIRUBIN 0.2  --  0.2   ALKPHOSPHAT 79  --  66   GLOBULIN 3.9*  --  3.3   INR 1.21* 1.25*  --      Recent Labs     11/12/23 0222 11/12/23 0737   APTT 26.8  --    INR 1.21* 1.25*       IMAGING:  CT-ABDOMEN-PELVIS WITH   Final Result         1. There is a 2 cm ulcerative fraying in the left medial gluteus region. Subjacent iliac bone demonstrates bony ridging, consistent with osteomyelitis. There is small amount of air tracking medially and laterally away from the ulcerative wound along the    left medial gluteal musculature and down to the level of the left paraspinous musculature. Therefore, early necrotizing fasciitis possible. No drainable abscess present. This was discussed with the ordering physician.   2. Severely distended bladder with mild to moderate bilateral associated hydronephrosis. This is likely secondary to prostatic hypertrophy. There is severe median lobe hypertrophy. Consider Holguin catheter placement for decompression. Bladder diverticulum    also noted.   3. Patchy ground glass to aeration lower lobes, suspicious for pneumonia. However, atypical edema or other pneumonitis possible.   4. Severe atherosclerotic disease.   5. Additional soft tissue wound in the left groin.      DX-FEMUR-2+ LEFT   Final Result      No evidence of infection by plain film.      DX-CHEST-PORTABLE (1 VIEW)   Final Result      No acute process.           ASSESSMENT AND PLAN:  Sacral wound- (present on admission)  Assessment & Plan  Patient with approximately 5 x 4 cm unstageable decubitus ulcer that appears infected and will require debridement.  Given that the patient is not requiring vasopressors and does not have rapidly enlarging lesions recommend correcting significant electrolyte abnormalities prior to proceeding with debridement.  Will closely monitor clinical course and take to OR sooner should clinical status change.    PLAN  - Sodium and WBC slowly improving  - Will add for AM case tomorrow         ____________________________________     Thom Christianson M.D.    DD: 11/13/2023  6:56 AM

## 2023-11-13 NOTE — DIETARY
"Nutrition services: Day 1 of admit.  Dillon Centeno is a 58 y.o. male with admitting DX of Severe sepsis.  Consult received for BMI <19. Pt with decubitus wound per chart review.    Spoke with pt at bedside. Diet just advanced to Regular and he states he is hungry. Reviewed menu with pt and discussed importance of good nutrition intake to support healing. He likes Ensure and states he will drink 2-3 Ensure per day. He requests 3 per day currently.    Assessment:  Height: 180.3 cm (5' 11\")  Weight: 60.7 kg (133 lb 13.1 oz)  Body mass index is 18.66 kg/m²., BMI adjusted for AKA = 20.8, BMI classification: normal.  Diet/Intake: Regular, NPO after midnight.    Evaluation:   Pt with BMI <19, but BMI adjusted for AKA is within normal range. No significant weight loss noted since last hospital admission at end of September.  Noted pt with wounds per chart review: unstageable wound to sacrum, left AKA with tissue ischemia at stump, and left femoral surgical incision with dehiscence.   Pt to be NPO after midnight for surgical debridement of sacral wound.  FEES completed today. SLP ordered Regular diet.  Labs 11/13 include Na 133 (L), K+ 2.9 (L), Glu 134 (H), Alb 2.1 (L)  Medications include Zyvox, Zosyn, KCl, Senna.  Addition of oral nutrition supplement appropriate to help provide adequate protein to support wound healing. Pt may also benefit from addition of MVI and Vitamin C.    Malnutrition Risk: ASPEN criteria not met.    Recommendations/Plan:  Ensure Plus 3 x day, strawberry flavor per pt preference.  Recommend vitamin therapy for wound healing: Multivitamin with minerals daily and 500 mg Vitamin C BID for 14 days, discussed with MD.  Encourage intake of meals >50%.  Document intake of all meals and supplements as % taken in ADL's to provide interdisciplinary communication across all shifts.   Monitor weight.  Nutrition rep will continue to see patient for ongoing meal and snack preferences.     RD following    "

## 2023-11-13 NOTE — THERAPY
Speech Language Pathology   Clinical Swallow Evaluation     Patient Name: Dillon Centeno  AGE:  58 y.o., SEX:  male  Medical Record #: [id number]  Date of Service: 2023      History of Present Illness  58M admitted on 23 with severe sepsis, sacral wound, and GELY. PMH notable for HTN, flail chest, PAD, BKA.     Imagin23 CXR: No acute process.       General Information:  Vitals  O2 Delivery Device: Room air w/o2 available  Level of Consciousness: Alert  Patient Behaviors: Fatigue  Orientation: Self, General place  Follows Directives: Yes      Prior Living Situation & Level of Function:  Communication: WFL  Swallowing: Reports several month history of coughing during PO; noted reflux without home medication       Oral Mechanism Evaluation:  Dentition: Poor, Natural dentition, Scattered dentition, Loose front tooth  Facial Symmetry: Equal  Facial Sensation: Equal     Labial Observations: WFL   Lingual Observations: Midline  Motor Speech: WFL, 100% intelligibility          Laryngeal Function:  Secretion Management: Adequate  Voice Quality: Harsh (mild, baseline)  Cough: Perceptually weak       Subjective  Patient reports several month history of coughing during PO intake as well as reflux history, not on home medication. RN does not think patient would tolerate MBSS.      Assessment  Current Method of Nutrition: NPO until cleared by speech pathology  Positioning: Semi-Olvera's (30-45 degrees)  Bolus Administration: Patient    O2 Delivery Device: Room air w/o2 available  Factor(s) Affecting Performance: Impaired endurance  Tracheostomy : No      Swallowing Trials:  Thin Liquid (TN0): Impaired      Comments: Patient positioned to fully upright with immediate request for lowered HOB. Adequate oral bolus acceptance/containment. Complete AP transfer without notable oral bolus residue upon oral inspection. Adequate bite with functional mastication of solids. Delayed cough following 4/4 sips TN0  "(?esophageal). Vocal quality remained stable throughout PO intake. Single swallow completed per bolus. Provided education regarding dysphagia risk factors, signs of aspiration, and indication for FEES. Procedure described in detail, patient agreeable.          Clinical Impressions  Patient presents with clinical indicators of airway invasion, concerning for ascending aspiration. Presentation may be consistent with reported reflux history. A swallowing diagnostic is indicated to guide dysphagia management. Although MBSS is preferred for esophageal screening, patient is not appropriate r/t positioning constraints. Will proceed with FEES; tentatively scheduled for today at 1000.     Recommendations  Diet Consistency: NPO pending diagnostic; Clear for minimal ice chips/hour after oral care to reduce xerostomia and mitigate disuse atrophy of swallow musculature   Instrumentation: FEES  Medication: Non Oral, Crush with applesauce, as appropriate  Oral Care: Q6h         SLP Treatment Plan  Treatment Plan: Dysphagia Treatment  SLP Frequency: 3x Per Week  Estimated Duration: Until Therapy Goals Met      Anticipated Discharge Needs  Discharge Recommendations: Recommend post-acute placement for additional speech therapy services prior to discharge home   Therapy Recommendations Upon DC: Dysphagia Training        Patient / Family Goals  Patient / Family Goal #1: \"I'm so thirsty\"  Short Term Goals  Short Term Goal # 1: Patient will participate in swallow diagnostic to guide dypshagia management.      Karishma Minor, SLP   "

## 2023-11-13 NOTE — ASSESSMENT & PLAN NOTE
Patient with approximately 5 x 4 cm unstageable decubitus ulcer that appears infected and will require debridement.  Given that the patient is not requiring vasopressors and does not have rapidly enlarging lesions recommend correcting significant electrolyte abnormalities prior to proceeding with debridement.  Will closely monitor clinical course and take to OR sooner should clinical status change.

## 2023-11-13 NOTE — PROGRESS NOTES
VASCULAR SURGERY PROGRESS NOTE      Awake, no complaints.  AF, 97 137/76.    General: Pleasant thin male in none apparent distress.  Lower extremities: Well-healed wound in right groin with scattered areas of ecchymosis.  Wound VAC over left groin wound.  Status post left above-knee amputation with some retraction of distal muscles over the stump; however, bone is not exposed.  Multiple old, dry ulcers over right toes without drainage or erythema.    Labs: Reviewed.    Assessment:  1) History of bilateral femoral endarterectomy and left above-knee amputation.  2) Peripheral arterial disease with chronic right toe ulcers.  3) Infected sacral decubitus.      Plan:  Stable from vascular standpoint.  Will obtain vein mapping of the right leg for possible right leg revascularization once medically more stable.  Dry gauzes in between right toes.    Discussed with patient.  All questions were answered.    Discussed with RN.    Vascular service will continue to follow.

## 2023-11-13 NOTE — PROGRESS NOTES
"Critical Care Progress Note    Date of admission  11/12/2023    Chief Complaint  \"58 y.o. male with past medical history of peripheral arterial disease, gangrene of the left lower extremity, hypertension who presented 11/12/2023 after his brother called EMS for the patient failing to eat or move.  In the ER the patient was found to be tachycardic with significant leukocytosis concerning for infection.  He was found to have a large left ischial pressure ulcer therefore a CT scan of the abdomen pelvis was obtained which showed soft tissue air tracking superiorly above the ileum, other findings include evidence of osteomyelitis.  His lab analysis includes a CBC with a WBC of 28, mild anemia with a hemoglobin of 9.9, thrombocytosis with a platelet count of 615; his sodium was decreased at 117 which is worsened from his previous, his CO2 was noted to be 17 without a significant anion gap, he was found to be in GELY with a creatinine of 2.27 and a BUN of 62.  The patient was given crystalloid resuscitation and started on vancomycin, Zosyn and clindamycin for the concern of necrotizing skin and soft tissue infection.  General surgery (Dr. Baird) has been consulted for debridement was requested further medical stabilization prior to operative management.\"    Hospital Course  11/12 admitted to the ICU for severe sepsis/nec fasc     Interval Problem Update  Reviewed last 24 hour events:   - pt is cantankerous and wanted to leave AMA   - a/ox3-4   - SR/ST 90-110s   - SBP 90-120s   - afebrile   - on room air   - NPO, waiting for SLP   - UOP of 880cc overnight, Holguin in place   - BM pta   - no RT issues   - lovenox   - WBCs 21   - Na 133  from 117, 500cc bolus D5W   - K 3.2   - creat 1.72   - empiric linezolid/zosyn   - ?surgery tomorrow       Yesterday's Events:  Somnolent but easily awakes  ST, normal BP  High UOP, Na corrected by 5 so 500 cc D5W bolus  Empiric linezolid + zosyn  VTE ppx  Na level 117--> 122  Mock and Jaquish " consulted for surgical management     Review of Systems  Review of Systems   Unable to perform ROS: Critical illness        Vital Signs for last 24 hours   Temp:  [36.6 °C (97.9 °F)-38.1 °C (100.6 °F)] 36.9 °C (98.5 °F)  Pulse:  [] 104  Resp:  [18-39] 21  BP: ()/(54-83) 113/61  SpO2:  [94 %-99 %] 98 %    Hemodynamic parameters for last 24 hours       Respiratory Information for the last 24 hours       Physical Exam   Physical Exam  Vitals and nursing note reviewed.   Constitutional:       General: He is not in acute distress.     Appearance: He is ill-appearing.   HENT:      Head: Normocephalic and atraumatic.      Right Ear: External ear normal.      Left Ear: External ear normal.      Nose: Nose normal.      Mouth/Throat:      Mouth: Mucous membranes are moist.   Eyes:      Pupils: Pupils are equal, round, and reactive to light.   Cardiovascular:      Rate and Rhythm: Regular rhythm. Tachycardia present.      Pulses: Normal pulses.   Pulmonary:      Effort: Pulmonary effort is normal. No respiratory distress.   Abdominal:      General: Abdomen is flat. There is no distension.      Palpations: Abdomen is soft.      Tenderness: There is no abdominal tenderness. There is no guarding or rebound.   Musculoskeletal:      Comments: Left AKA  Tissue ischemia at the distal tip of his left AKA stump, large wound overlying his left femoral artery access incision with fibrinous tissue and purulence present, large left ischial wound with purulent discharge   Skin:     General: Skin is warm and dry.      Capillary Refill: Capillary refill takes less than 2 seconds.   Neurological:      Comments: Somnolent but awakes easily, follows, appropriate, moves extremities normally     No change to exam    Medications  Current Facility-Administered Medications   Medication Dose Route Frequency Provider Last Rate Last Admin    senna-docusate (Pericolace Or Senokot S) 8.6-50 MG per tablet 2 Tablet  2 Tablet Oral BID Thom AGOSTO  ESTIVEN Carr Jr..LONI        And    polyethylene glycol/lytes (Miralax) PACKET 1 Packet  1 Packet Oral QDAY PRN ESTIVEN Mejias Jr..OJong        And    magnesium hydroxide (Milk Of Magnesia) suspension 30 mL  30 mL Oral QDAY PRN ESTIVEN Mejias Jr..OJong        And    bisacodyl (Dulcolax) suppository 10 mg  10 mg Rectal QDAY PRN ESTIVEN Mejias Jr..O.        heparin injection 5,000 Units  5,000 Units Subcutaneous Q8HRS ESTIVEN Mejias Jr..O.   5,000 Units at 11/13/23 0518    insulin regular (HumuLIN R,NovoLIN R) injection  1-6 Units Subcutaneous Q6HRS ESTIVEN Mejias Jr..O.   1 Units at 11/13/23 0607    And    dextrose 10 % BOLUS 25 g  25 g Intravenous Q15 MIN PRN ESTIVEN Mejias Jr..O.        piperacillin-tazobactam (Zosyn) 4.5 g in  mL IVPB  4.5 g Intravenous Q8HRS ESTIVEN Mejias Jr..O. 25 mL/hr at 11/13/23 0514 4.5 g at 11/13/23 0514    Linezolid (Zyvox) premix 600 mg  600 mg Intravenous BID Thom Carr Jr., D.O. 300 mL/hr at 11/13/23 0517 600 mg at 11/13/23 0517    dakins 0.125% (1/4 strength) topical soln   Topical BID Pardeep Boyd M.D.   100 mL at 11/13/23 0521    oxyCODONE immediate-release (Roxicodone) tablet 2.5-5 mg  2.5-5 mg Oral Q4HRS PRN Pardeep Boyd M.D.   5 mg at 11/12/23 0905    HYDROmorphone (Dilaudid) injection 0.5 mg  0.5 mg Intravenous Q2HRS PRN Pardeep Boyd M.D.   0.5 mg at 11/13/23 0558    dakins 0.125% (1/4 strength) topical soln   Topical QDAY PRN Pardeep Boyd M.D.           Fluids    Intake/Output Summary (Last 24 hours) at 11/13/2023 0612  Last data filed at 11/13/2023 0200  Gross per 24 hour   Intake 2103.89 ml   Output 4700 ml   Net -2596.11 ml         Laboratory          Recent Labs     11/12/23  1810 11/12/23  2130 11/13/23  0311   SODIUM 124* 124* 128*   POTASSIUM 3.4* 3.4* 3.2*   CHLORIDE 92* 94* 95*   CO2 16* 19* 20   BUN 41* 35* 30*   CREATININE 1.10 0.86 0.72   CALCIUM 8.0* 8.1* 7.9*       Recent Labs     11/12/23  0222 11/12/23  0835  11/12/23  1810 11/12/23  2130 11/13/23 0311   ALTSGPT 11  --   --   --  12   ASTSGOT 14  --   --   --  34   ALKPHOSPHAT 79  --   --   --  66   TBILIRUBIN 0.2  --   --   --  0.2   LIPASE 48  --   --   --   --    GLUCOSE 141*   < > 126* 114* 121*    < > = values in this interval not displayed.       Recent Labs     11/12/23 0222 11/13/23 0311   WBC 28.0* 21.5*   NEUTSPOLYS 98.30* 90.90*   LYMPHOCYTES 0.00* 4.70*   MONOCYTES 1.70 3.00   EOSINOPHILS 0.00 0.20   BASOPHILS 0.00 0.20   ASTSGOT 14 34   ALTSGPT 11 12   ALKPHOSPHAT 79 66   TBILIRUBIN 0.2 0.2       Recent Labs     11/12/23 0222 11/12/23  0737 11/13/23 0311   RBC 3.43*  --  2.98*   HEMOGLOBIN 9.9*  --  8.5*   HEMATOCRIT 29.9*  --  26.2*   PLATELETCT 615*  --  481*   PROTHROMBTM 15.5* 15.9*  --    APTT 26.8  --   --    INR 1.21* 1.25*  --          Imaging  X-Ray:  I have personally reviewed the images and compared with prior images.    Assessment/Plan  * Severe sepsis (HCC)- (present on admission)  Assessment & Plan  This is Sepsis Present on admission  SIRS criteria identified on my evaluation include: Tachycardia, with heart rate greater than 90 BPM and Leukocytosis, with WBC greater than 12,000  Clinical indicators of end organ dysfunction include Lactic Acid greater than 2 and Acute Renal Failure, with a finding of creatinine greater than 2 (patient does not have ESRD or CKD  Source is wounds  Sepsis protocol initiated  Crystalloid Fluid Administration: Fluid resuscitation ordered per standard protocol - 30 mL/kg per current or ideal body weight  IV antibiotics as appropriate for source of sepsis  Reassessment: I have reassessed the patient's hemodynamic status  Started on Zosyn and Zyvox for likely polymicrobial wound infection and possible necrotizing skin and soft tissue infection.    MRSA swab pending, BCs negative so far  Need source control with debridement    Open wound of inguinal region- (present on admission)  Assessment & Plan  Recent history  of femoral endarterectomy with incision dehiscence  Melanie consulted 11/12/23     Sacral wound- (present on admission)  Assessment & Plan  Large sacral wound with signs of active infection and CT scan concerning for pelvic osteomyelitis and skin and soft tissue infection  Wound care  Dr. Chow consulted for debridement-->surgery ? On 11/14  Continue antimicrobials    GELY (acute kidney injury) (HCC)- (present on admission)  Assessment & Plan  Likely prerenal with a component of ATN in the setting of sepsis  S/p IVF and resolved   Renal dose meds, avoid nephrotoxins  Strict I/Os  Follow renal function    Hyponatremia- (present on admission)  Assessment & Plan  Following resuscitation very high UOP concerning for low solutes vs beer potomania  Corrected quickly to 133 from 117 in 24 hours  Repeat D5W x 500 cc bolus  Trend sodium q6H with a goal increase to only 127 today  Closely monitor urine output         VTE:  Heparin  Ulcer: Not Indicated  Lines: Holguin Catheter  Ongoing indication addressed    I have performed a physical exam and reviewed and updated ROS and Plan today (11/13/2023). In review of yesterday's note (11/12/2023), there are no changes except as documented above.     Discussed patient condition and risk of morbidity and/or mortality with Hospitalist, RN, RT, Pharmacy, Charge nurse / hot rounds, Patient, and general surgery    The patient has successfully weaned off of vasopressors and continues to await surgery.  Due to issues with electrolytes he will be transferred to the [facility] with continued care with the [facility] hospitalist team.  The critical care team will sign off at this point.  Please reconsult should he require higher level of care needs.

## 2023-11-13 NOTE — THERAPY
Speech Language Pathology   Flexible Endoscopic Evaluation of Swallowing (FEES)        Patient Name: Dillon Centeno  AGE:  58 y.o., SEX:  male  Medical Record #: [id number]  Date of Service: 11/13/2023      History of Present Illness  58M admitted on 11/12/23 with severe sepsis, sacral wound, and GELY. PMH notable for HTN, flail chest, PAD, BKA.       Pertinent Information  Current Method of Nutrition: NPO until cleared by speech pathology  Patient Behaviors: Fatigue   Dentition: Poor, Natural dentition, Scattered dentition (Loose incisor)   Feeding Tube: None   Tracheostomy: No   Factor(s) Affecting Performance: Impaired endurance     Discussed the risks, benefits, and alternatives of the FEES procedure. Patient/family acknowledged and agreed to proceed.      Assessment  Flexible Endoscopic Evaluation of Swallowing (FEES) completed at bedside today. The endoscope was passed transnasally via Right nare to evaluate the anatomy and physiology of swallowing. Pt tolerated the procedure with no apparent distress.    Anatomic Findings: thickening of interarytenoid space  Vocal Fold Motion: Bilateral movement  Secretion Management: Adequate  PO Trials: Ice Chips, Thin Liquid, Mildly Thick Liquid, Liquidised, Pudding, Regular Solid, Mixed  Positioning: Low Olvera's r/t patient preference with back pain      Consistency PAS Score Timing Residue Comments   Thin Liquid 6 Pre Swallow Vallecular Residue: Mild (5%-25%)  Pyriform Sinus Residue: Mild (5%-25%) Cup - PAS 6 before  Straw - PAS 2   Mildly Thick 2 Pre Swallow Vallecular Residue: Mild (5%-25%)  Pyriform Sinus Residue: Mild (5%-25%) Cup, straw   Liquidised 1 N/A Vallecular Residue: Moderate (25%-50%)  Pyriform Sinus Residue: Trace (1%-5%)    Pudding 1 N/A Vallecular Residue: Mild (5%-25%)  Pyriform Sinus Residue: Trace (1%-5%)    Regular Solid 1 N/A Vallecular Residue: Mild (5%-25%)  Pyriform Sinus Residue: Trace (1%-5%)    Mixed (SB6/TN0) 2 Pre Swallow  Vallecular  Residue: Moderate (25%-50%)  Pyriform Sinus Residue: None (0%) Of TN0 juice      Penetration-Aspiration Scale (PAS)  1     No contrast enters airway  2     Contrast enters the airway, remains above the vocal folds, and is ejected from the airway (not seen in the airway at the end of the swallow).  3     Contrast enters the airway, remains above the vocal folds, and is not ejected from the airway (is seen in the airway after the swallow).  4     Contrast enters the airway, contacts the vocal folds, and is ejected from the airway.  5     Contrast enters the airway, contacts the vocal folds, and is not ejected from the airway  6     Contrast enters the airway, crosses the plane of the vocal folds, and is ejected from the airway.  7     Contrast enters the airway, crosses the plane of the vocal folds, and is not ejected from the airway despite effort.  8     Contrast enters the airway, crosses the plane of the vocal folds, is not ejected from the airway and there is no response to aspiration.      Oral phase:  Patient with adequate labial seal around cup and straw without anterior loss of bolus. Incomplete but sufficient mastication with large chunks of peach visualized in pharynx.     Pharyngeal phase:  - Loss of oral bolus control resulted in aspiration of TN0 before the swallow by cup. Laryngeal sensation intact; reflexive cough cleared aspirate and majority of penetrate with trace amount remaining on vocal folds.   - Reduced base of tongue retraction resulted in moderate vallecular residue with mixed consistencies and LQ3; mild vallecular residue with TN0, MT2, PU4, and RG7.   - Impaired pharyngeal shortening resulted in mild pyriform sinus residue with TN0 and MT2.   - Impaired pharyngeal constriction resulted in posterior pharyngeal wall residue with more viscous consistencies.     Esophageal phase:  No retrograde flow visualized above the UES during study.     Compensatory Strategies:  - Reflexive cough cleared  "aspirate and majority of penetrate.   - Straws eliminated aspiration before the swallow with TN0 (low HOB).   - Liquid wash eliminated pharyngeal residue during times that cleansing swallows did not.       Severity Rating:  CRUZ: Mild    Discussed IDDSI diet levels with patient. Patient with loose incisor but reports he can bite into RG7. Will order RG7 tray and follow up to ensure tolerance/downgrade as needed.       Clinical Impressions  Patient presents with a mild oropharyngeal dysphagia, likely acute r/t generalized weakness/positioning. Swallow safety and efficiency are both impaired. Primary deficits include reduced base of tongue retraction, impaired pharyngeal shortening, and loss of oral bolus control. Deficits are best managed with compensatory strategies. Patient is a good candidate for behavioral swallow rehabilitation. Service will follow to ensure diet tolerance/downgrade diet as needed.       Recommendations  Diet Consistency: Regular solids with thin liquids  Medication: Whole with liquid, Whole with puree  Supervision: Assist with meal tray set up  Positioning:  (as upright as tolerated)  Strategies: Slow rate of intake  Oral Care: BID  Additional Instrumentation: None         SLP Treatment Plan  Treatment Plan: Dysphagia Treatment  SLP Frequency: 2x Per Week  Estimated Duration: Until Therapy Goals Met      Anticipated Discharge Needs  Discharge Recommendations: Anticipate that the patient will have no further speech therapy needs after discharge from the hospital   Therapy Recommendations Upon DC: Not Indicated       Patient / Family Goals  Patient / Family Goal #1: \"I'm so thirsty\"  Goal #1 Outcome: Progressing as expected  Short Term Goal # 1: Patient will participate in swallow diagnostic to guide dypshagia management.  Goal Outcome # 1: Goal met, new goal added  Short Term Goal # 1 B : Patient will consume a diet of RG7/TN0 without decline in pulmonary status.      Karishma Minor, SLP  "

## 2023-11-14 ENCOUNTER — APPOINTMENT (OUTPATIENT)
Dept: RADIOLOGY | Facility: MEDICAL CENTER | Age: 58
DRG: 853 | End: 2023-11-14
Attending: SURGERY
Payer: MEDICAID

## 2023-11-14 ENCOUNTER — ANESTHESIA EVENT (OUTPATIENT)
Dept: SURGERY | Facility: MEDICAL CENTER | Age: 58
DRG: 853 | End: 2023-11-14
Payer: MEDICAID

## 2023-11-14 ENCOUNTER — ANESTHESIA (OUTPATIENT)
Dept: SURGERY | Facility: MEDICAL CENTER | Age: 58
DRG: 853 | End: 2023-11-14
Payer: MEDICAID

## 2023-11-14 LAB
ABO GROUP BLD: NORMAL
ALBUMIN SERPL BCP-MCNC: 2 G/DL (ref 3.2–4.9)
ALBUMIN/GLOB SERPL: 0.6 G/DL
ALP SERPL-CCNC: 61 U/L (ref 30–99)
ALT SERPL-CCNC: 36 U/L (ref 2–50)
ANION GAP SERPL CALC-SCNC: 10 MMOL/L (ref 7–16)
ANION GAP SERPL CALC-SCNC: 11 MMOL/L (ref 7–16)
ANION GAP SERPL CALC-SCNC: 13 MMOL/L (ref 7–16)
ANISOCYTOSIS BLD QL SMEAR: ABNORMAL
AST SERPL-CCNC: 75 U/L (ref 12–45)
BACTERIA BLD CULT: ABNORMAL
BACTERIA BLD CULT: ABNORMAL
BACTERIA WND AEROBE CULT: ABNORMAL
BACTERIA WND AEROBE CULT: ABNORMAL
BASOPHILS # BLD AUTO: 0 % (ref 0–1.8)
BASOPHILS # BLD: 0 K/UL (ref 0–0.12)
BILIRUB SERPL-MCNC: 0.2 MG/DL (ref 0.1–1.5)
BLD GP AB SCN SERPL QL: NORMAL
BUN SERPL-MCNC: 10 MG/DL (ref 8–22)
BUN SERPL-MCNC: 13 MG/DL (ref 8–22)
BUN SERPL-MCNC: 8 MG/DL (ref 8–22)
CALCIUM ALBUM COR SERPL-MCNC: 9.5 MG/DL (ref 8.5–10.5)
CALCIUM SERPL-MCNC: 7.6 MG/DL (ref 8.5–10.5)
CALCIUM SERPL-MCNC: 7.9 MG/DL (ref 8.5–10.5)
CALCIUM SERPL-MCNC: 8.8 MG/DL (ref 8.5–10.5)
CHLORIDE SERPL-SCNC: 94 MMOL/L (ref 96–112)
CHLORIDE SERPL-SCNC: 95 MMOL/L (ref 96–112)
CHLORIDE SERPL-SCNC: 96 MMOL/L (ref 96–112)
CO2 SERPL-SCNC: 21 MMOL/L (ref 20–33)
CO2 SERPL-SCNC: 23 MMOL/L (ref 20–33)
CO2 SERPL-SCNC: 24 MMOL/L (ref 20–33)
CREAT SERPL-MCNC: 0.47 MG/DL (ref 0.5–1.4)
CREAT SERPL-MCNC: 0.53 MG/DL (ref 0.5–1.4)
CREAT SERPL-MCNC: 0.57 MG/DL (ref 0.5–1.4)
EOSINOPHIL # BLD AUTO: 0.13 K/UL (ref 0–0.51)
EOSINOPHIL NFR BLD: 0.9 % (ref 0–6.9)
ERYTHROCYTE [DISTWIDTH] IN BLOOD BY AUTOMATED COUNT: 50.1 FL (ref 35.9–50)
ETEST SENSITIVITY ETEST: NORMAL
FERRITIN SERPL-MCNC: 1936 NG/ML (ref 22–322)
FUNGUS SPEC FUNGUS STN: NORMAL
GFR SERPLBLD CREATININE-BSD FMLA CKD-EPI: 112 ML/MIN/1.73 M 2
GFR SERPLBLD CREATININE-BSD FMLA CKD-EPI: 115 ML/MIN/1.73 M 2
GFR SERPLBLD CREATININE-BSD FMLA CKD-EPI: 119 ML/MIN/1.73 M 2
GLOBULIN SER CALC-MCNC: 3.6 G/DL (ref 1.9–3.5)
GLUCOSE BLD STRIP.AUTO-MCNC: 134 MG/DL (ref 65–99)
GLUCOSE SERPL-MCNC: 104 MG/DL (ref 65–99)
GLUCOSE SERPL-MCNC: 112 MG/DL (ref 65–99)
GLUCOSE SERPL-MCNC: 150 MG/DL (ref 65–99)
GRAM STN SPEC: ABNORMAL
GRAM STN SPEC: NORMAL
HCT VFR BLD AUTO: 26.7 % (ref 42–52)
HGB BLD-MCNC: 8.7 G/DL (ref 14–18)
IRON SATN MFR SERPL: 29 % (ref 15–55)
IRON SERPL-MCNC: 30 UG/DL (ref 50–180)
LYMPHOCYTES # BLD AUTO: 1.22 K/UL (ref 1–4.8)
LYMPHOCYTES NFR BLD: 8.5 % (ref 22–41)
MACROCYTES BLD QL SMEAR: ABNORMAL
MAGNESIUM SERPL-MCNC: 1.1 MG/DL (ref 1.5–2.5)
MAGNESIUM SERPL-MCNC: 2.1 MG/DL (ref 1.5–2.5)
MANUAL DIFF BLD: NORMAL
MCH RBC QN AUTO: 28.2 PG (ref 27–33)
MCHC RBC AUTO-ENTMCNC: 32.6 G/DL (ref 32.3–36.5)
MCV RBC AUTO: 86.7 FL (ref 81.4–97.8)
MICROCYTES BLD QL SMEAR: ABNORMAL
MONOCYTES # BLD AUTO: 0.73 K/UL (ref 0–0.85)
MONOCYTES NFR BLD AUTO: 5.1 % (ref 0–13.4)
MORPHOLOGY BLD-IMP: NORMAL
NEUTROPHILS # BLD AUTO: 12.23 K/UL (ref 1.82–7.42)
NEUTROPHILS NFR BLD: 85.5 % (ref 44–72)
NRBC # BLD AUTO: 0 K/UL
NRBC BLD-RTO: 0 /100 WBC (ref 0–0.2)
PHOSPHATE SERPL-MCNC: 2.3 MG/DL (ref 2.5–4.5)
PLATELET # BLD AUTO: 457 K/UL (ref 164–446)
PLATELET BLD QL SMEAR: NORMAL
PMV BLD AUTO: 8.6 FL (ref 9–12.9)
POTASSIUM SERPL-SCNC: 3.4 MMOL/L (ref 3.6–5.5)
POTASSIUM SERPL-SCNC: 3.6 MMOL/L (ref 3.6–5.5)
POTASSIUM SERPL-SCNC: 4.2 MMOL/L (ref 3.6–5.5)
PROT SERPL-MCNC: 5.6 G/DL (ref 6–8.2)
RBC # BLD AUTO: 3.08 M/UL (ref 4.7–6.1)
RBC BLD AUTO: PRESENT
RH BLD: NORMAL
SIGNIFICANT IND 70042: ABNORMAL
SIGNIFICANT IND 70042: ABNORMAL
SIGNIFICANT IND 70042: NORMAL
SIGNIFICANT IND 70042: NORMAL
SITE SITE: ABNORMAL
SITE SITE: ABNORMAL
SITE SITE: NORMAL
SITE SITE: NORMAL
SODIUM SERPL-SCNC: 126 MMOL/L (ref 135–145)
SODIUM SERPL-SCNC: 130 MMOL/L (ref 135–145)
SODIUM SERPL-SCNC: 131 MMOL/L (ref 135–145)
SOURCE SOURCE: ABNORMAL
SOURCE SOURCE: ABNORMAL
SOURCE SOURCE: NORMAL
SOURCE SOURCE: NORMAL
TIBC SERPL-MCNC: 105 UG/DL (ref 250–450)
UIBC SERPL-MCNC: 75 UG/DL (ref 110–370)
WBC # BLD AUTO: 14.3 K/UL (ref 4.8–10.8)

## 2023-11-14 PROCEDURE — A9270 NON-COVERED ITEM OR SERVICE: HCPCS | Performed by: ANESTHESIOLOGY

## 2023-11-14 PROCEDURE — 700105 HCHG RX REV CODE 258: Performed by: HOSPITALIST

## 2023-11-14 PROCEDURE — 97602 WOUND(S) CARE NON-SELECTIVE: CPT

## 2023-11-14 PROCEDURE — 700111 HCHG RX REV CODE 636 W/ 250 OVERRIDE (IP): Mod: JZ | Performed by: HOSPITALIST

## 2023-11-14 PROCEDURE — 160002 HCHG RECOVERY MINUTES (STAT): Performed by: STUDENT IN AN ORGANIZED HEALTH CARE EDUCATION/TRAINING PROGRAM

## 2023-11-14 PROCEDURE — 87076 CULTURE ANAEROBE IDENT EACH: CPT

## 2023-11-14 PROCEDURE — 302098 PASTE RING (FLAT): Performed by: HOSPITALIST

## 2023-11-14 PROCEDURE — 85007 BL SMEAR W/DIFF WBC COUNT: CPT

## 2023-11-14 PROCEDURE — 99253 IP/OBS CNSLTJ NEW/EST LOW 45: CPT | Performed by: STUDENT IN AN ORGANIZED HEALTH CARE EDUCATION/TRAINING PROGRAM

## 2023-11-14 PROCEDURE — 700111 HCHG RX REV CODE 636 W/ 250 OVERRIDE (IP): Mod: JZ | Performed by: ANESTHESIOLOGY

## 2023-11-14 PROCEDURE — 87075 CULTR BACTERIA EXCEPT BLOOD: CPT

## 2023-11-14 PROCEDURE — 700101 HCHG RX REV CODE 250: Performed by: ANESTHESIOLOGY

## 2023-11-14 PROCEDURE — 700102 HCHG RX REV CODE 250 W/ 637 OVERRIDE(OP): Mod: JZ | Performed by: HOSPITALIST

## 2023-11-14 PROCEDURE — 700111 HCHG RX REV CODE 636 W/ 250 OVERRIDE (IP): Mod: JZ | Performed by: INTERNAL MEDICINE

## 2023-11-14 PROCEDURE — 160035 HCHG PACU - 1ST 60 MINS PHASE I: Performed by: STUDENT IN AN ORGANIZED HEALTH CARE EDUCATION/TRAINING PROGRAM

## 2023-11-14 PROCEDURE — 700102 HCHG RX REV CODE 250 W/ 637 OVERRIDE(OP): Performed by: ANESTHESIOLOGY

## 2023-11-14 PROCEDURE — 86900 BLOOD TYPING SEROLOGIC ABO: CPT

## 2023-11-14 PROCEDURE — 83550 IRON BINDING TEST: CPT

## 2023-11-14 PROCEDURE — 83735 ASSAY OF MAGNESIUM: CPT

## 2023-11-14 PROCEDURE — 770006 HCHG ROOM/CARE - MED/SURG/GYN SEMI*

## 2023-11-14 PROCEDURE — 0KBN0ZZ EXCISION OF RIGHT HIP MUSCLE, OPEN APPROACH: ICD-10-PCS | Performed by: STUDENT IN AN ORGANIZED HEALTH CARE EDUCATION/TRAINING PROGRAM

## 2023-11-14 PROCEDURE — 80053 COMPREHEN METABOLIC PANEL: CPT

## 2023-11-14 PROCEDURE — 700102 HCHG RX REV CODE 250 W/ 637 OVERRIDE(OP): Performed by: HOSPITALIST

## 2023-11-14 PROCEDURE — 82728 ASSAY OF FERRITIN: CPT

## 2023-11-14 PROCEDURE — 700105 HCHG RX REV CODE 258: Performed by: INTERNAL MEDICINE

## 2023-11-14 PROCEDURE — 700101 HCHG RX REV CODE 250: Performed by: INTERNAL MEDICINE

## 2023-11-14 PROCEDURE — 160038 HCHG SURGERY MINUTES - EA ADDL 1 MIN LEVEL 2: Performed by: STUDENT IN AN ORGANIZED HEALTH CARE EDUCATION/TRAINING PROGRAM

## 2023-11-14 PROCEDURE — 700105 HCHG RX REV CODE 258: Performed by: ANESTHESIOLOGY

## 2023-11-14 PROCEDURE — 86901 BLOOD TYPING SEROLOGIC RH(D): CPT

## 2023-11-14 PROCEDURE — 80048 BASIC METABOLIC PNL TOTAL CA: CPT | Mod: 91

## 2023-11-14 PROCEDURE — 160048 HCHG OR STATISTICAL LEVEL 1-5: Performed by: STUDENT IN AN ORGANIZED HEALTH CARE EDUCATION/TRAINING PROGRAM

## 2023-11-14 PROCEDURE — 93971 EXTREMITY STUDY: CPT | Mod: 26,RT | Performed by: INTERNAL MEDICINE

## 2023-11-14 PROCEDURE — 87077 CULTURE AEROBIC IDENTIFY: CPT

## 2023-11-14 PROCEDURE — 700101 HCHG RX REV CODE 250: Performed by: HOSPITALIST

## 2023-11-14 PROCEDURE — 87205 SMEAR GRAM STAIN: CPT

## 2023-11-14 PROCEDURE — 160027 HCHG SURGERY MINUTES - 1ST 30 MINS LEVEL 2: Performed by: STUDENT IN AN ORGANIZED HEALTH CARE EDUCATION/TRAINING PROGRAM

## 2023-11-14 PROCEDURE — A9270 NON-COVERED ITEM OR SERVICE: HCPCS | Mod: JZ | Performed by: HOSPITALIST

## 2023-11-14 PROCEDURE — 93971 EXTREMITY STUDY: CPT | Mod: RT

## 2023-11-14 PROCEDURE — A9270 NON-COVERED ITEM OR SERVICE: HCPCS | Performed by: INTERNAL MEDICINE

## 2023-11-14 PROCEDURE — 700102 HCHG RX REV CODE 250 W/ 637 OVERRIDE(OP): Performed by: INTERNAL MEDICINE

## 2023-11-14 PROCEDURE — 85027 COMPLETE CBC AUTOMATED: CPT

## 2023-11-14 PROCEDURE — 87102 FUNGUS ISOLATION CULTURE: CPT

## 2023-11-14 PROCEDURE — 160009 HCHG ANES TIME/MIN: Performed by: STUDENT IN AN ORGANIZED HEALTH CARE EDUCATION/TRAINING PROGRAM

## 2023-11-14 PROCEDURE — 83540 ASSAY OF IRON: CPT

## 2023-11-14 PROCEDURE — A9270 NON-COVERED ITEM OR SERVICE: HCPCS | Performed by: HOSPITALIST

## 2023-11-14 PROCEDURE — 87070 CULTURE OTHR SPECIMN AEROBIC: CPT

## 2023-11-14 PROCEDURE — 99233 SBSQ HOSP IP/OBS HIGH 50: CPT | Performed by: HOSPITALIST

## 2023-11-14 PROCEDURE — 700111 HCHG RX REV CODE 636 W/ 250 OVERRIDE (IP): Performed by: INTERNAL MEDICINE

## 2023-11-14 PROCEDURE — 86850 RBC ANTIBODY SCREEN: CPT

## 2023-11-14 PROCEDURE — 82962 GLUCOSE BLOOD TEST: CPT

## 2023-11-14 PROCEDURE — 84100 ASSAY OF PHOSPHORUS: CPT

## 2023-11-14 PROCEDURE — 11043 DBRDMT MUSC&/FSCA 1ST 20/<: CPT | Performed by: STUDENT IN AN ORGANIZED HEALTH CARE EDUCATION/TRAINING PROGRAM

## 2023-11-14 RX ORDER — EPHEDRINE SULFATE 50 MG/ML
5 INJECTION, SOLUTION INTRAVENOUS
Status: DISCONTINUED | OUTPATIENT
Start: 2023-11-14 | End: 2023-11-14 | Stop reason: HOSPADM

## 2023-11-14 RX ORDER — ONDANSETRON 2 MG/ML
4 INJECTION INTRAMUSCULAR; INTRAVENOUS
Status: DISCONTINUED | OUTPATIENT
Start: 2023-11-14 | End: 2023-11-14 | Stop reason: HOSPADM

## 2023-11-14 RX ORDER — POTASSIUM CHLORIDE 20 MEQ/1
40 TABLET, EXTENDED RELEASE ORAL ONCE
Status: COMPLETED | OUTPATIENT
Start: 2023-11-14 | End: 2023-11-14

## 2023-11-14 RX ORDER — ROCURONIUM BROMIDE 10 MG/ML
INJECTION, SOLUTION INTRAVENOUS PRN
Status: DISCONTINUED | OUTPATIENT
Start: 2023-11-14 | End: 2023-11-14 | Stop reason: SURG

## 2023-11-14 RX ORDER — LABETALOL HYDROCHLORIDE 5 MG/ML
5 INJECTION, SOLUTION INTRAVENOUS
Status: DISCONTINUED | OUTPATIENT
Start: 2023-11-14 | End: 2023-11-14 | Stop reason: HOSPADM

## 2023-11-14 RX ORDER — SODIUM CHLORIDE, SODIUM LACTATE, POTASSIUM CHLORIDE, CALCIUM CHLORIDE 600; 310; 30; 20 MG/100ML; MG/100ML; MG/100ML; MG/100ML
INJECTION, SOLUTION INTRAVENOUS CONTINUOUS
Status: DISCONTINUED | OUTPATIENT
Start: 2023-11-14 | End: 2023-11-14 | Stop reason: HOSPADM

## 2023-11-14 RX ORDER — CEFAZOLIN SODIUM 1 G/3ML
INJECTION, POWDER, FOR SOLUTION INTRAMUSCULAR; INTRAVENOUS PRN
Status: DISCONTINUED | OUTPATIENT
Start: 2023-11-14 | End: 2023-11-14 | Stop reason: SURG

## 2023-11-14 RX ORDER — MAGNESIUM SULFATE HEPTAHYDRATE 40 MG/ML
4 INJECTION, SOLUTION INTRAVENOUS ONCE
Status: COMPLETED | OUTPATIENT
Start: 2023-11-14 | End: 2023-11-14

## 2023-11-14 RX ORDER — ONDANSETRON 2 MG/ML
INJECTION INTRAMUSCULAR; INTRAVENOUS PRN
Status: DISCONTINUED | OUTPATIENT
Start: 2023-11-14 | End: 2023-11-14 | Stop reason: SURG

## 2023-11-14 RX ORDER — HYDROMORPHONE HYDROCHLORIDE 1 MG/ML
0.2 INJECTION, SOLUTION INTRAMUSCULAR; INTRAVENOUS; SUBCUTANEOUS
Status: DISCONTINUED | OUTPATIENT
Start: 2023-11-14 | End: 2023-11-14 | Stop reason: HOSPADM

## 2023-11-14 RX ORDER — HYDROMORPHONE HYDROCHLORIDE 1 MG/ML
0.4 INJECTION, SOLUTION INTRAMUSCULAR; INTRAVENOUS; SUBCUTANEOUS
Status: DISCONTINUED | OUTPATIENT
Start: 2023-11-14 | End: 2023-11-14 | Stop reason: HOSPADM

## 2023-11-14 RX ORDER — HALOPERIDOL 5 MG/ML
1 INJECTION INTRAMUSCULAR
Status: DISCONTINUED | OUTPATIENT
Start: 2023-11-14 | End: 2023-11-14 | Stop reason: HOSPADM

## 2023-11-14 RX ORDER — METOPROLOL TARTRATE 1 MG/ML
2.5 INJECTION, SOLUTION INTRAVENOUS ONCE
Status: COMPLETED | OUTPATIENT
Start: 2023-11-14 | End: 2023-11-14

## 2023-11-14 RX ORDER — HYDRALAZINE HYDROCHLORIDE 20 MG/ML
5 INJECTION INTRAMUSCULAR; INTRAVENOUS
Status: DISCONTINUED | OUTPATIENT
Start: 2023-11-14 | End: 2023-11-14 | Stop reason: HOSPADM

## 2023-11-14 RX ORDER — LIDOCAINE HYDROCHLORIDE 20 MG/ML
INJECTION, SOLUTION EPIDURAL; INFILTRATION; INTRACAUDAL; PERINEURAL PRN
Status: DISCONTINUED | OUTPATIENT
Start: 2023-11-14 | End: 2023-11-14 | Stop reason: SURG

## 2023-11-14 RX ORDER — DIPHENHYDRAMINE HYDROCHLORIDE 50 MG/ML
12.5 INJECTION INTRAMUSCULAR; INTRAVENOUS
Status: DISCONTINUED | OUTPATIENT
Start: 2023-11-14 | End: 2023-11-14 | Stop reason: HOSPADM

## 2023-11-14 RX ORDER — OXYCODONE HCL 5 MG/5 ML
5 SOLUTION, ORAL ORAL
Status: COMPLETED | OUTPATIENT
Start: 2023-11-14 | End: 2023-11-14

## 2023-11-14 RX ORDER — FERROUS SULFATE 325(65) MG
325 TABLET ORAL
Status: DISCONTINUED | OUTPATIENT
Start: 2023-11-15 | End: 2023-11-30 | Stop reason: HOSPADM

## 2023-11-14 RX ORDER — OXYCODONE HCL 5 MG/5 ML
10 SOLUTION, ORAL ORAL
Status: COMPLETED | OUTPATIENT
Start: 2023-11-14 | End: 2023-11-14

## 2023-11-14 RX ORDER — LIDOCAINE HYDROCHLORIDE 40 MG/ML
SOLUTION TOPICAL PRN
Status: DISCONTINUED | OUTPATIENT
Start: 2023-11-14 | End: 2023-11-14 | Stop reason: SURG

## 2023-11-14 RX ORDER — HYDROMORPHONE HYDROCHLORIDE 1 MG/ML
0.1 INJECTION, SOLUTION INTRAMUSCULAR; INTRAVENOUS; SUBCUTANEOUS
Status: DISCONTINUED | OUTPATIENT
Start: 2023-11-14 | End: 2023-11-14 | Stop reason: HOSPADM

## 2023-11-14 RX ORDER — SODIUM CHLORIDE, SODIUM LACTATE, POTASSIUM CHLORIDE, CALCIUM CHLORIDE 600; 310; 30; 20 MG/100ML; MG/100ML; MG/100ML; MG/100ML
INJECTION, SOLUTION INTRAVENOUS
Status: DISCONTINUED | OUTPATIENT
Start: 2023-11-14 | End: 2023-11-14 | Stop reason: SURG

## 2023-11-14 RX ADMIN — PIPERACILLIN AND TAZOBACTAM 4.5 G: 4; .5 INJECTION, POWDER, FOR SOLUTION INTRAVENOUS at 04:23

## 2023-11-14 RX ADMIN — AMPICILLIN AND SULBACTAM 3 G: 1; 2 INJECTION, POWDER, FOR SOLUTION INTRAMUSCULAR; INTRAVENOUS at 17:17

## 2023-11-14 RX ADMIN — SODIUM CHLORIDE, POTASSIUM CHLORIDE, SODIUM LACTATE AND CALCIUM CHLORIDE: 600; 310; 30; 20 INJECTION, SOLUTION INTRAVENOUS at 11:20

## 2023-11-14 RX ADMIN — OXYCODONE 5 MG: 5 TABLET ORAL at 04:22

## 2023-11-14 RX ADMIN — ONDANSETRON 4 MG: 2 INJECTION INTRAMUSCULAR; INTRAVENOUS at 11:43

## 2023-11-14 RX ADMIN — FENTANYL CITRATE 100 MCG: 50 INJECTION, SOLUTION INTRAMUSCULAR; INTRAVENOUS at 11:26

## 2023-11-14 RX ADMIN — SODIUM HYPOCHLORITE 1000 ML: 1.25 SOLUTION TOPICAL at 17:18

## 2023-11-14 RX ADMIN — CEFAZOLIN 2 G: 1 INJECTION, POWDER, FOR SOLUTION INTRAMUSCULAR; INTRAVENOUS at 11:32

## 2023-11-14 RX ADMIN — POTASSIUM PHOSPHATE, MONOBASIC POTASSIUM PHOSPHATE, DIBASIC 15 MMOL: 224; 236 INJECTION, SOLUTION, CONCENTRATE INTRAVENOUS at 08:02

## 2023-11-14 RX ADMIN — ROCURONIUM BROMIDE 50 MG: 50 INJECTION, SOLUTION INTRAVENOUS at 11:29

## 2023-11-14 RX ADMIN — OXYCODONE 5 MG: 5 TABLET ORAL at 17:18

## 2023-11-14 RX ADMIN — METOPROLOL TARTRATE 2.5 MG: 5 INJECTION INTRAVENOUS at 12:43

## 2023-11-14 RX ADMIN — LIDOCAINE HYDROCHLORIDE 4 ML: 40 SOLUTION TOPICAL at 11:29

## 2023-11-14 RX ADMIN — AMPICILLIN AND SULBACTAM 3 G: 1; 2 INJECTION, POWDER, FOR SOLUTION INTRAMUSCULAR; INTRAVENOUS at 23:03

## 2023-11-14 RX ADMIN — OXYCODONE HYDROCHLORIDE AND ACETAMINOPHEN 500 MG: 500 TABLET ORAL at 17:18

## 2023-11-14 RX ADMIN — FENTANYL CITRATE 50 MCG: 50 INJECTION, SOLUTION INTRAMUSCULAR; INTRAVENOUS at 12:39

## 2023-11-14 RX ADMIN — MAGNESIUM SULFATE HEPTAHYDRATE 4 G: 4 INJECTION, SOLUTION INTRAVENOUS at 08:00

## 2023-11-14 RX ADMIN — FENTANYL CITRATE 50 MCG: 50 INJECTION, SOLUTION INTRAMUSCULAR; INTRAVENOUS at 12:45

## 2023-11-14 RX ADMIN — HYDROMORPHONE HYDROCHLORIDE 0.5 MG: 1 INJECTION, SOLUTION INTRAMUSCULAR; INTRAVENOUS; SUBCUTANEOUS at 20:48

## 2023-11-14 RX ADMIN — PROPOFOL 100 MG: 10 INJECTION, EMULSION INTRAVENOUS at 11:29

## 2023-11-14 RX ADMIN — HYDROMORPHONE HYDROCHLORIDE 0.5 MG: 1 INJECTION, SOLUTION INTRAMUSCULAR; INTRAVENOUS; SUBCUTANEOUS at 05:53

## 2023-11-14 RX ADMIN — LINEZOLID 600 MG: 600 INJECTION, SOLUTION INTRAVENOUS at 06:02

## 2023-11-14 RX ADMIN — OXYCODONE 5 MG: 5 TABLET ORAL at 22:57

## 2023-11-14 RX ADMIN — OXYCODONE HYDROCHLORIDE 10 MG: 5 SOLUTION ORAL at 12:23

## 2023-11-14 RX ADMIN — ENOXAPARIN SODIUM 40 MG: 100 INJECTION SUBCUTANEOUS at 17:18

## 2023-11-14 RX ADMIN — POTASSIUM CHLORIDE 40 MEQ: 1500 TABLET, EXTENDED RELEASE ORAL at 07:59

## 2023-11-14 RX ADMIN — SODIUM CHLORIDE, POTASSIUM CHLORIDE, SODIUM LACTATE AND CALCIUM CHLORIDE: 600; 310; 30; 20 INJECTION, SOLUTION INTRAVENOUS at 12:56

## 2023-11-14 RX ADMIN — LIDOCAINE HYDROCHLORIDE 3 ML: 20 INJECTION, SOLUTION EPIDURAL; INFILTRATION; INTRACAUDAL at 11:29

## 2023-11-14 ASSESSMENT — ENCOUNTER SYMPTOMS
DIARRHEA: 0
COUGH: 0
VOMITING: 0
NAUSEA: 0
SHORTNESS OF BREATH: 0
BLURRED VISION: 0
SORE THROAT: 0
CHILLS: 0
HEADACHES: 0
FEVER: 0
ABDOMINAL PAIN: 0
DOUBLE VISION: 0
DIZZINESS: 0
LOSS OF CONSCIOUSNESS: 0
PALPITATIONS: 0
BACK PAIN: 1

## 2023-11-14 ASSESSMENT — PAIN DESCRIPTION - PAIN TYPE
TYPE: ACUTE PAIN
TYPE: ACUTE PAIN;CHRONIC PAIN
TYPE: CHRONIC PAIN;ACUTE PAIN
TYPE: ACUTE PAIN;CHRONIC PAIN;SURGICAL PAIN
TYPE: ACUTE PAIN
TYPE: ACUTE PAIN;CHRONIC PAIN
TYPE: ACUTE PAIN;SURGICAL PAIN
TYPE: ACUTE PAIN

## 2023-11-14 ASSESSMENT — FIBROSIS 4 INDEX: FIB4 SCORE: 1.2

## 2023-11-14 NOTE — ASSESSMENT & PLAN NOTE
- Large sacral/ischial wound with signs of active infection and CT scan concerning for pelvic osteomyelitis and skin and soft tissue infection.  -s/p I&D of the sacral wound on 11/14.  -Surgical cultures grew beta-hemolytic group A streptococcus along with mixed enteric and usual skin kris.  -Continue oral Augmentin to complete 7 days course of antibiotics  -Continue local wound care.  Wound VAC has been placed.   -Continue pain control with oral oxycodone and IV Dilaudid.  -PT/OT recommending SNF placement.  He was sent home on last admission, but did not do well with worsening sacral wound.  At this time may need to pursue placement, although it has been a difficult discharge on his last admission with no accepting SNF or home health services.  May need to look into group home placement.  CM/SW for discharge planning.

## 2023-11-14 NOTE — ANESTHESIA PREPROCEDURE EVALUATION
Case: [id number] Date/Time: 11/14/23 1117    Procedures:       INCISION AND DRAINAGE SACRAL DECUBITUS      APPLICATION OR REPLACEMENT, WOUND VAC    Anesthesia type: General    Location: [facility] OR  / SURGERY [facility]    Surgeons: Thom Christianson M.D.            Relevant Problems   CARDIAC   (positive) PAD (peripheral artery disease) (HCC)         (positive) GELY (acute kidney injury) ([facility])   (positive) Renal cyst      Other   (positive) Anemia   (positive) Hyponatremia   (positive) Sacral wound   (positive) Severe sepsis (HCC)   +TOB 1-2 ppd    Physical Exam    Airway   Mallampati: II  TM distance: >3 FB  Neck ROM: full       Cardiovascular - normal exam  Rhythm: regular  Rate: normal  (-) murmur     Dental - normal exam  Comments: Multiple missing teeth    Very poor dentition     Pulmonary - normal exam  Breath sounds clear to auscultation     Abdominal    Neurological - normal exam                   Anesthesia Plan    ASA 3       Plan - general       Airway plan will be ETT          Induction: intravenous    Postoperative Plan: Postoperative administration of opioids is intended.    Pertinent diagnostic labs and testing reviewed    Informed Consent:    Anesthetic plan and risks discussed with patient.    Use of blood products discussed with: patient whom consented to blood products.

## 2023-11-14 NOTE — ASSESSMENT & PLAN NOTE
-Recent history of femoral endarterectomy with incision dehiscence, open wound  -Vascular surgery following  -Continue wound care

## 2023-11-14 NOTE — THERAPY
Speech Language Therapy Contact Note    Patient Name: Dillon Centeno  Age:  58 y.o., Sex:  male  Medical Record #: [id number]  Today's Date: 11/14/2023    Discussed missed therapy with MARIBEL Mace       11/14/23 1013   Treatment Variance   Reason For Missed Therapy Medical - Patient on Hold from Therapy  (NPO for surgery)   Interdisciplinary Plan of Care Collaboration   IDT Collaboration with  Nursing   Collaboration Comments Rn stated patient is NPO For surgery now.  Will follow for dysphagia tx when appropriate.

## 2023-11-14 NOTE — ASSESSMENT & PLAN NOTE
-Following resuscitation, had very high UOP concerning for low solutes vs beer potomania.   -Sodium levels improved, although initially overcorrected required D5, but has since stabilized with appropriate rate of correction.

## 2023-11-14 NOTE — DISCHARGE PLANNING
"Care Transition Team Assessment    LMSW met w/ pt at bedside to complete assessment. Pt is A/Ox4 and is agreeable to assessment. Pt confirms information on facesheet is accurate. Pt resides in a one-story home with \"one tiny step\". Pt says he is able to maneuver this step with wheelchair. Pt uses public transportation to get around and names his brother as discharge support. Pt says he is independent in all ADLs/IADLs. Pt does not have a PCP and is open to being established with one.       Information Source  Orientation Level: Oriented X4  Information Given By: Patient  Informant's Name: Dillon  Who is responsible for making decisions for patient? : Patient    Readmission Evaluation  Is this a readmission?: No    Elopement Risk  Legal Hold: No  Ambulatory or Self Mobile in Wheelchair: No-Not an Elopement Risk  Elopement Risk: Not at Risk for Elopement         Discharge Preparedness  What is your plan after discharge?: Uncertain - pending medical team collaboration  What are your discharge supports?: Sibling  Prior Functional Level: Independent with Activities of Daily Living, Independent with Medication Management, Wheelchair Dependent  Difficulity with ADLs: Walking  Difficulty with ADLs Comment: Pt is wheelchair dependent  Difficulity with IADLs: None    Functional Assesment  Prior Functional Level: Independent with Activities of Daily Living, Independent with Medication Management, Wheelchair Dependent    Finances  Financial Barriers to Discharge: No  Prescription Coverage: Yes    Vision / Hearing Impairment  Hearing Impairment: Right Ear, Hearing Device Not Available         Advance Directive  Advance Directive?: None    Domestic Abuse  Have you ever been the victim of abuse or violence?: No    Psychological Assessment  History of Substance Abuse: None  History of Psychiatric Problems: No    Discharge Risks or Barriers  Discharge risks or barriers?: No PCP  Patient risk factors: Lack of outside supports, No " PCP, Vulnerable adult    Anticipated Discharge Information  Discharge Disposition: Discharged to home/self care (01)

## 2023-11-14 NOTE — OP REPORT
DATE OF OPERATION:  11/14/2023    PREOPERATIVE DIAGNOSIS:  Necrotic Sacral decubitus wound.    POSTOPERATIVE DIAGNOSIS: Necrotic Sacral decubitus wound.    PROCEDURE PERFORMED: Sharp excisional debridement of necrotic skin, subcutaneous fat, fascia and muscle - sacral wound - 10cm sq.    SURGEON:    Thom Christianson M.D.    ANESTHESIOLOGIST:  Quan Ballard M.D.    ANESTHESIA:   General endotracheal anesthesia.    ASA CLASSIFICATION:  III.    INDICATIONS: The patient is a 58 year-old man with necrotic sacral wound. He is taken to the operating room for debridement and possible wound vac placement.        FINDINGS: 8 cm circular wound to the right sacrum lined with thin rim of necrotic tissue.   A total of approximately 10cm sq of necrotic skin, subcutaneous, fat, fascia, and muscle was excised back to bleeding viable tissue.  At the base periosteum is involved.  A small abscess cavity was found that tracked medially, the tissue within the cavity appeared healthy after draining the purulent fluid .       WOUND CLASSIFICATION:  Class IV, Dirty or Infected.    SPECIMEN:     Sacral wound fluid for culture.    ESTIMATED BLOOD LOSS:  10 mL.    PROCEDURE: Following informed consent consent, the patient was properly identified, taken to the operating room and placed in supine position where a general endotracheal anesthesia administered. Intravenous antibiotics were administered by the anesthesiologist in correct time interval. The patient arrived with a previously placed Holguin catheter. Sequential compression devices were employed to the right lower extremity, the left was surgically absent.  The patient was positioned prone on the operating room table taking care to pad all bony prominences and pressure points.      The right sacral wound was debrided. The surgical debridement included skin, subcutaneous tissue, fascia, soft tissue, and muscle. The debridement technique consisted of sharp excision, electrocautery  resection, scrubbing, and washing debridement utilizing Ashraf scissors, a scalpel, and an electrocautery device. The nature of the tissue removed was necrotic tissue. The final debridement of the wound demonstrated fresh bleeding and viable appearing tissue. In total: approximately 10cm sq of necrotic skin, subcutaneous, fat, fascia, and muscle was excised back to bleeding viable tissue.  The wound was irrigated and hemostasis was achieved.  As stated in the findings above, there was small abscess cavity discovered medially, due to this the decision was made not to apply a wound vac today.      The patient tolerated the procedure well, and there were no apparent complications. All sponge, needle, and instrument counts were correct on 2 separate occasions. The patient was awakened, extubated, and transferred to  to the post anesthesia care unit (PACU) in satisfactory condition.    PLAN:  Surgeon to perform first dressing change on 11/15/23  If no new abscess seen, recommend vac placement by wound care team (Consult placed)  Continue broad spectrum antibiotics per primary team, culture sent today to guide therapy       ___________________________________     Thom Christianson M.D.    DD: 11/14/2023  12:12 PM

## 2023-11-14 NOTE — CARE PLAN
The patient is Stable - Low risk of patient condition declining or worsening    Shift Goals  Clinical Goals: pain control, comfort, mobility  Patient Goals: sleep  Family Goals: LUTHER    Progress made toward(s) clinical / shift goals:    Problem: Knowledge Deficit - Standard  Goal: Patient and family/care givers will demonstrate understanding of plan of care, disease process/condition, diagnostic tests and medications  Outcome: Progressing     Problem: Hemodynamics  Goal: Patient's hemodynamics, fluid balance and neurologic status will be stable or improve  Outcome: Progressing     Problem: Fluid Volume  Goal: Fluid volume balance will be maintained  Outcome: Progressing     Problem: Urinary - Renal Perfusion  Goal: Ability to achieve and maintain adequate renal perfusion and functioning will improve  Outcome: Progressing     Problem: Respiratory  Goal: Patient will achieve/maintain optimum respiratory ventilation and gas exchange  Outcome: Progressing     Problem: Mechanical Ventilation  Goal: Safe management of artificial airway and ventilation  Outcome: Progressing  Goal: Successful weaning off mechanical ventilator, spontaneously maintains adequate gas exchange  Outcome: Progressing  Goal: Patient will be able to express needs and understand communication  Outcome: Progressing     Problem: Physical Regulation  Goal: Diagnostic test results will improve  Outcome: Progressing  Goal: Signs and symptoms of infection will decrease  Outcome: Progressing     Problem: Pain - Standard  Goal: Alleviation of pain or a reduction in pain to the patient’s comfort goal  Outcome: Progressing     Problem: Skin Integrity  Goal: Skin integrity is maintained or improved  Outcome: Progressing     Problem: Fall Risk  Goal: Patient will remain free from falls  Outcome: Progressing       Patient is not progressing towards the following goals:

## 2023-11-14 NOTE — ANESTHESIA POSTPROCEDURE EVALUATION
Patient: Dillon Centeno    Procedure Summary       Date: 11/14/23 Room / Location: [place] [id number] / SURGERY [facility]    Anesthesia Start: 1118 Anesthesia Stop: 1209    Procedure: Sharp exicisional debridement of skin subqutaneous fat and fasha 10cm squard (Left: Buttocks) Diagnosis: (unstageable decubitus ulcer)    Surgeons: Thom Christianson M.D. Responsible Provider: Quan Ballard M.D.    Anesthesia Type: general ASA Status: 3            Final Anesthesia Type: general  Last vitals  BP   Blood Pressure: 122/73    Temp   36.4 °C (97.6 °F)    Pulse   85   Resp   16    SpO2   98 %      Anesthesia Post Evaluation    Patient location during evaluation: PACU  Patient participation: complete - patient participated  Level of consciousness: awake and alert    Airway patency: patent  Anesthetic complications: no  Cardiovascular status: hemodynamically stable  Respiratory status: acceptable  Hydration status: euvolemic    PONV: none          There were no known notable events for this encounter.     Nurse Pain Score: 5 (NPRS)

## 2023-11-14 NOTE — ANESTHESIA PROCEDURE NOTES
Airway    Date/Time: 11/14/2023 11:30 AM    Performed by: Quan Ballard M.D.  Authorized by: Quan Ballard M.D.    Location:  OR  Urgency:  Elective  Difficult Airway: No    Indications for Airway Management:  Anesthesia      Spontaneous Ventilation: absent    Sedation Level:  Deep  Preoxygenated: Yes    Patient Position:  Sniffing  Mask Difficulty Assessment:  2 - vent by mask + OA or adjuvant +/- NMBA  Final Airway Type:  Endotracheal airway  Final Endotracheal Airway:  ETT  Cuffed: Yes    Technique Used for Successful ETT Placement:  Direct laryngoscopy    Insertion Site:  Oral  Blade Type:  Marcos  Laryngoscope Blade/Videolaryngoscope Blade Size:  3  ETT Size (mm):  7.5  Measured from:  Teeth  ETT to Teeth (cm):  23  Placement Verified by: auscultation and capnometry    Cormack-Lehane Classification:  Grade IIa - partial view of glottis  Number of Attempts at Approach:  1   Atraumatic DLx1

## 2023-11-14 NOTE — CARE PLAN
The patient is Watcher - Medium risk of patient condition declining or worsening    Shift Goals  Clinical Goals: Pain control, hemodynamic stability, q6 BMP  Patient Goals: Pain control, sleep  Family Goals: LUTHER    Progress made toward(s) clinical / shift goals:  Patient had minimal progress due to noncompliance.    Patient is not progressing towards the following goals:  Patient refused wound care, q2 turns, vitamins, stool softener, bed bath, oral hygiene, mobilization, and finger stick at midnight. Patient was educated that he is hindering his healing by being noncompliant. Patient becomes agitated easily when attempts were made for repositioning and wound care. Patient asked about what his goal is for his health. Patient refused to interact with palliative care conversation. Patient also continues to have no appetite. Patient also reports that his pain is not being managed with current regimen.       Problem: Knowledge Deficit - Standard  Goal: Patient and family/care givers will demonstrate understanding of plan of care, disease process/condition, diagnostic tests and medications  Description: Target End Date:  1-3 days or as soon as patient condition allows    Document in Patient Education    1.  Patient and family/caregiver oriented to unit, equipment, visitation policy and means for communicating concern  2.  Complete/review Learning Assessment  3.  Assess knowledge level of disease process/condition, treatment plan, diagnostic tests and medications  4.  Explain disease process/condition, treatment plan, diagnostic tests and medications  Outcome: Progressing     Problem: Hemodynamics  Goal: Patient's hemodynamics, fluid balance and neurologic status will be stable or improve  Description: Target End Date:  Prior to discharge or change in level of care    Document on Assessment and I/O flowsheet templates    1.  Monitor vital signs, pulse oximetry and cardiac monitor per provider order and/or policy  2.   Maintain blood pressure per provider order  3.  Hemodynamic monitoring per provider order  4.  Manage IV fluids and IV infusions  5.  Monitor intake and output  6.  Daily weights per unit policy or provider order  7.  Assess peripheral pulses and capillary refill  8.  Assess color and body temperature  9.  Position patient for maximum circulation/cardiac output  10. Monitor for signs/symptoms of excessive bleeding  11. Assess mental status, restlessness and changes in level of consciousness  12. Monitor temperature and report fever or hypothermia to provider immediately. Consideration of targeted temperature management.  Outcome: Progressing     Problem: Fluid Volume  Goal: Fluid volume balance will be maintained  Description: Target End Date:  Prior to discharge or change in level of care    Document on I/O flowsheet    1.  Monitor intake and output as ordered  2.  Promote oral intake as appropriate  3.  Report inadequate intake or output to physician  4.  Administer IV therapy as ordered  5.  Weights per provider order  6.  Assess for signs and symptoms of bleeding  7.  Monitor for signs of fluid overload (respiratory changes, edema, weight gain, increased abdominal girth)  8.  Monitor of signs for inadequate fluid volume (poor skin turgor, dry mucous membranes)  9.  Instruct patient on adherence to fluid restrictions  Outcome: Progressing     Problem: Urinary - Renal Perfusion  Goal: Ability to achieve and maintain adequate renal perfusion and functioning will improve  Description: Target End Date:  Prior to discharge or change in level of care    Document on I/O and Assessment flowsheet    1.  Urine output will remain greater than 0.5ml/Kg/HR  2.  Monitor amount and/or characteristics of urine per order/policy. Specific gravity per order/policy  3.  Assess signs and symptoms of renal dysfunction  Outcome: Progressing     Problem: Respiratory  Goal: Patient will achieve/maintain optimum respiratory ventilation  and gas exchange  Description: Target End Date:  Prior to discharge or change in level of care    Document on Assessment flowsheet    1.  Assess and monitor rate, rhythm, depth and effort of respiration  2.  Breath sounds assessed qshift and/or as needed  3.  Assess O2 saturation, administer/titrate oxygen as ordered  4.  Position patient for maximum ventilatory efficiency  5.  Turn, cough, and deep breath with splinting to improve effectiveness  6.  Collaborate with RT to administer medication/treatments per order  7.  Encourage use of incentive spirometer and encourage patient to cough after use and utilize splinting techniques if applicable  8.  Airway suctioning  9.  Monitor sputum production for changes in color, consistency and frequency  10. Perform frequent oral hygiene  11. Alternate physical activity with rest periods  Outcome: Progressing     Problem: Mechanical Ventilation  Goal: Safe management of artificial airway and ventilation  Description: Target End Date:  when vent discontinued    Document on VAP flowsheet and Airway LDA    1.  Daily awakening trials per provider order/policy  2.  Suctioning and care of ET/Trach tube (document on LDA)  3.  Collaborate with RT to administer medications/treatments  4.  Ambu bag at bedside and available for transport  5.  Trach patient - replacement trach at bedside  6.  Provide communication tools if applicable  Outcome: Progressing  Goal: Successful weaning off mechanical ventilator, spontaneously maintains adequate gas exchange  Description: Target End Date:  when vent discontinued    1.  Follow universal weaning protocol for patients on mechanical ventilation per order  2.  Review contraindication list.  Obtain provider order to wean in presence of contraindication.  3.  Obtain Hoang Sedation-Agitation Score  4.  Hoang Score 1-2:  sedation vacation  5.  Hoang Score 3-4:  Collaborate with provider and/or RT to determine readiness for trial  6.  Begin 2 hour trial  of weaning following protocol  7.  Evaluate for fatigue parameters per protocol  8.  Fatigue parameters triggered:  Stop wean and return to previous ASV% setting or increase % minute volume to offset work or breathing  Outcome: Progressing  Goal: Patient will be able to express needs and understand communication  Description: Target End Date:  when vent discontinued    1.  Assess ability to communicate and understand  2.  Provide communication tools  3.  Collaborate with Speech Therapy for [facility]  Outcome: Progressing     Problem: Physical Regulation  Goal: Diagnostic test results will improve  Description: Target End Date:  Prior to discharge or change in level of care    1.  Monitor lactic acid levels  2.  Monitor ABG's  3.  Monitor diagnostic test results  Outcome: Progressing  Goal: Signs and symptoms of infection will decrease  Description: Target End Date:  Prior to discharge or change in level of care    1.  Remove potential routes of infection, such as central lines and urinary catheter  2.  Follow facility protocol for changing IV tubing and sites  3.  Collaborate with Infectious Disease  4.  Antibiotic therapy per provider order  5.  Note drug effects and monitor for antibiotic toxicity  Outcome: Progressing     Problem: Pain - Standard  Goal: Alleviation of pain or a reduction in pain to the patient’s comfort goal  Description: Target End Date:  Prior to discharge or change in level of care    Document on Vitals flowsheet    1.  Document pain using the appropriate pain scale per order or unit policy  2.  Educate and implement non-pharmacologic comfort measures (i.e. relaxation, distraction, massage, cold/heat therapy, etc.)  3.  Pain management medications as ordered  4.  Reassess pain after pain med administration per policy  5.  If opiods administered assess patient's response to pain medication is appropriate per POSS sedation scale  6.  Follow pain management plan developed in collaboration with patient  and interdisciplinary team (including palliative care or pain specialists if applicable)  Outcome: Progressing     Problem: Skin Integrity  Goal: Skin integrity is maintained or improved  Description: Target End Date:  Prior to discharge or change in level of care    Document interventions on Skin Risk/Jae flowsheet groups and corresponding LDA    1.  Assess and monitor skin integrity, appearance and/or temperature  2.  Assess risk factors for impaired skin integrity and/or pressures ulcers  3.  Implement precautions to protect skin integrity in collaboration with interdisciplinary team  4.  Implement pressure ulcer prevention protocol if at risk for skin breakdown  5.  Confirm wound care consult if at risk for skin breakdown  6.  Ensure patient use of pressure relieving devices  (Low air loss bed, waffle overlay, heel protectors, ROHO cushion, etc)  Outcome: Progressing     Problem: Fall Risk  Goal: Patient will remain free from falls  Description: Target End Date:  Prior to discharge or change in level of care    Document interventions on the Michaela Marques Fall Risk Assessment    1.  Assess for fall risk factors  2.  Implement fall precautions  Outcome: Progressing

## 2023-11-14 NOTE — ASSESSMENT & PLAN NOTE
- Suspect postrenal component, just like before when he required Gonzales catheter, along with prerenal with rising BUN.   -Place Gonzales catheter.  Resume Flomax and Proscar.  Improved with gonzales and IV fluids  Follow up with urology as outpatient, keep gonzales in place  -Continue to monitor renal function closely.  Monitor electrolytes as at risk for electrolyte derangements with GELY.  - avoid nephrotoxins, and continue to renally dose all medications.

## 2023-11-14 NOTE — PROGRESS NOTES
[facility] Medicine Daily Progress Note    Date of Service  11/14/2023    Chief Complaint  Dillon Centeno is a 58 y.o. male admitted 11/12/2023 with altered mentation    Hospital Course  59yo PMHx PAD, HTN, Hx L AKA.  Presnted with altered mentation.  In ED found to have a large ischeal ulcer, Na 117, Creat 2.27.  noted to have dehisence of L groin wound  I&D of sacral wound 11/14 Dr Christianson  Interval Problem Update  Pt c/o pain in his R foot and phantom pain in his L foot as well    I have discussed this patient's plan of care and discharge plan at IDT rounds today with Case Management, Nursing, Nursing leadership, and other members of the IDT team.    Consultants/Specialty  general surgery and vascular surgery    Code Status  Full Code    Disposition  The patient is not medically cleared for discharge to home or a post-acute facility.      I have placed the appropriate orders for post-discharge needs.    Review of Systems  Review of Systems   Constitutional:  Negative for chills and fever.   HENT:  Negative for nosebleeds and sore throat.    Eyes:  Negative for blurred vision and double vision.   Respiratory:  Negative for cough and shortness of breath.    Cardiovascular:  Negative for chest pain, palpitations and leg swelling.   Gastrointestinal:  Negative for abdominal pain, diarrhea, nausea and vomiting.   Genitourinary:  Negative for dysuria and urgency.   Musculoskeletal:  Positive for back pain.        B foot pain   Skin:  Negative for rash.   Neurological:  Negative for dizziness, loss of consciousness and headaches.        Physical Exam  Temp:  [36.4 °C (97.6 °F)-37.3 °C (99.1 °F)] 36.4 °C (97.6 °F)  Pulse:  [] 97  Resp:  [19-38] 24  BP: (101-160)/(52-80) 132/73  SpO2:  [94 %-99 %] 94 %    Physical Exam  Constitutional:       General: He is not in acute distress.     Appearance: He is well-developed. He is not diaphoretic.   HENT:      Head: Normocephalic and atraumatic.   Eyes:       Conjunctiva/sclera: Conjunctivae normal.   Neck:      Vascular: No JVD.   Cardiovascular:      Rate and Rhythm: Normal rate.      Heart sounds: No murmur heard.     No gallop.   Pulmonary:      Effort: Pulmonary effort is normal. No respiratory distress.      Breath sounds: No stridor. No wheezing or rales.   Abdominal:      Palpations: Abdomen is soft.      Tenderness: There is no abdominal tenderness. There is no guarding or rebound.   Musculoskeletal:      Comments: L AKA  R foot with multiple scabs and wounds on the toes.  Unable to palpate DP/TP   Skin:     General: Skin is warm and dry.      Findings: No rash.   Neurological:      Mental Status: He is oriented to person, place, and time.   Psychiatric:         Mood and Affect: Mood normal.         Behavior: Behavior normal.         Thought Content: Thought content normal.         Fluids    Intake/Output Summary (Last 24 hours) at 11/14/2023 0720  Last data filed at 11/14/2023 0600  Gross per 24 hour   Intake 1380.27 ml   Output 2900 ml   Net -1519.73 ml       Laboratory  Recent Labs     11/12/23 0222 11/13/23  0311 11/14/23  0430   WBC 28.0* 21.5* 14.3*   RBC 3.43* 2.98* 3.08*   HEMOGLOBIN 9.9* 8.5* 8.7*   HEMATOCRIT 29.9* 26.2* 26.7*   MCV 87.2 87.9 86.7   MCH 28.9 28.5 28.2   MCHC 33.1 32.4 32.6   RDW 50.4* 51.5* 50.1*   PLATELETCT 615* 481* 457*   MPV 8.4* 8.6* 8.6*     Recent Labs     11/13/23  1315 11/13/23 2025 11/14/23  0430   SODIUM 128* 129* 126*   POTASSIUM 4.1 3.3* 3.6   CHLORIDE 97 96 95*   CO2 21 21 21   GLUCOSE 112* 186* 112*   BUN 18 18 13   CREATININE 0.54 0.60 0.53   CALCIUM 7.3* 7.8* 7.9*     Recent Labs     11/12/23 0222 11/12/23  0737   APTT 26.8  --    INR 1.21* 1.25*               Imaging  US-VEIN MAPPING LOWER EXTREMITY UNILAT RIGHT   Final Result      CT-ABDOMEN-PELVIS WITH   Final Result         1. There is a 2 cm ulcerative fraying in the left medial gluteus region. Subjacent iliac bone demonstrates bony ridging, consistent with  osteomyelitis. There is small amount of air tracking medially and laterally away from the ulcerative wound along the    left medial gluteal musculature and down to the level of the left paraspinous musculature. Therefore, early necrotizing fasciitis possible. No drainable abscess present. This was discussed with the ordering physician.   2. Severely distended bladder with mild to moderate bilateral associated hydronephrosis. This is likely secondary to prostatic hypertrophy. There is severe median lobe hypertrophy. Consider Holguin catheter placement for decompression. Bladder diverticulum    also noted.   3. Patchy ground glass to aeration lower lobes, suspicious for pneumonia. However, atypical edema or other pneumonitis possible.   4. Severe atherosclerotic disease.   5. Additional soft tissue wound in the left groin.      DX-FEMUR-2+ LEFT   Final Result      No evidence of infection by plain film.      DX-CHEST-PORTABLE (1 VIEW)   Final Result      No acute process.           Assessment/Plan  * Severe sepsis (HCC)- (present on admission)  Assessment & Plan  Present on admission  Source sacral wound      Open wound of inguinal region- (present on admission)  Assessment & Plan  Recent history of femoral endarterectomy with incision dehiscence, open wound  Vascular surgery following  Wound care      Sacral wound- (present on admission)  Assessment & Plan  Large sacral/ischial wound with signs of active infection and CT scan concerning for pelvic osteomyelitis and skin and soft tissue infection  Wound care  Now s/p debridement 11/14  Continue local wound care  Cultures have grown group A strep  Change to ampicillin-sulbactam    GELY (acute kidney injury) ([facility])- (present on admission)  Assessment & Plan  Likely prerenal with a component of ATN in the setting of sepsis  Resolved with IV fluids  Continue to follow BMP    PAD (peripheral artery disease) ([facility])- (present on admission)  Assessment & Plan  With recent history  of bypass grafting  Vascular surgery is anticipating a bypass procedure on the right leg once acute infection has been dealt with    Anemia- (present on admission)  Assessment & Plan  Patient had normal hemoglobin and March of this year, but has been anemic since approximately September  Degree of anemia has been essentially stable  Likely multifactorial related to nutritional status, and multiple procedures  Continue to monitor    Hyponatremia- (present on admission)  Assessment & Plan  Following resuscitation very high UOP concerning for low solutes vs beer potomania  Overcorrected requiring D5W  This morning 126  We will DC all active interventions and let him settle down.  Repeat lab this afternoon 130  Continue to follow daily         VTE prophylaxis:   SCDs/TEDs      I have performed a physical exam and reviewed and updated ROS and Plan today (11/14/2023). In review of yesterday's note (11/13/2023), there are no changes except as documented above.

## 2023-11-14 NOTE — CONSULTS
Hospital Medicine Consultation    Date of Service  11/13/2023    Referring Physician  Monique Nettles M.D.    Consulting Physician  Cirilo Cerrato M.D.    Reason for Consultation  Hospital medicine consultation requested in a patient admitted with obtundation, sepsis    History of Presenting Illness  58 y.o. male who presented 11/12/2023 with a past med history of peripheral arterial disease, gangrene of the left lower extremity, hypertension, prior lower extremity amputation, brought to the hospital on 11/12/2023 after the patient's mother called EMS for the patient failing to eat removed at home, the patient was found in the emergency room to be tachycardic, leukocytosis, infected status, found to have a large left ischial pressure ulcer therefore a CT scan of the abdomen pelvis was obtained which showed soft tissue air tracking superiorly above the ilium, likely osteomyelitis, the patient's laboratory data included a white cell count of 28, anemia with a hemoglobin of 9.9, thrombocytosis with a platelet count of 615, sodium at 117, bicarbonate 17 without significant anion gap, GELY with a creatinine of 2.27 and a BUN of 62, the patient was given broad-spectrum antibiotics, IV fluid resuscitation, coverage for the concern of necrotizing soft tissue infection, surgery was consulted, the patient was brought to the ICU for sepsis, and concern of necrotizing fasciitis.  The patient was seen on 11/15 by the intensive care team, the patient at that time was felt to be cantankerous, he wanted to leave AGAINST MEDICAL ADVICE, he is alert and oriented x3-4, sinus tachycardia in the 90s to 1 teens, blood pressure between 90 and 120s, the patient afebrile, on room air, patient was cleared for diet by SLP, he did have urine output of 880 cc overnight, Holguin was placed, the patient's leukocytosis improved to 21, sodium corrected significantly fast to 133 from 117 the patient was given a 500 cc bolus of D5W, potassium 3.2,  creatinine improved to 1.7, the patient continued on empiric linezolid and Zosyn, the patient was seen by surgery, he is found to have approximately 5.4 cm unstageable decubitus ulcer that appears infected and will require debridement, vascular surgery was also consulted, with the patient having a history of bilateral femoral endarterectomy and left above-the-knee amputation, felt to have peripheral arterial disease with chronic toe ulcers on the right, the patient was felt stable on a vascular standpoint, but a consideration was to evaluate the right lower extremity for possible leg revascularization once medically more stable.  The patient was felt to have a open wound in the inguinal region where he had a recent femoral endarterectomy with incision dehiscence.  The patient's laboratory data is reviewed patient has a hemoglobin decreased to 8.5 without overt findings of bleeding, white cell count improved to 21.5, chemistry reviewed and his sodium was additionally corrected now to 128 as it was initially overcorrected, potassium at 4.1, his BUN is 18 and his creatinine 0.54 significantly improved, cortisol level of 29.7, TSH 0.62, CRP elevated 27.78, a wound culture is positive for beta-hemolytic strep a blood culture also positive for Streptococcus  On my evaluation the patient is awake and alert, he has moderate to severe pain intermittently, he denies current nausea vomiting, no fevers,  He appears to have fairly poor insight into his medical condition  He confirms that he lives with his brother with some suboptimal additional care    Review of Systems  Review of Systems   Constitutional:  Positive for chills, fever and malaise/fatigue.   HENT: Negative.     Eyes: Negative.    Respiratory:  Positive for shortness of breath.    Cardiovascular: Negative.    Gastrointestinal: Negative.    Genitourinary: Negative.    Musculoskeletal:  Positive for back pain and myalgias.   Skin: Negative.         Multiple open  lesions   Neurological:  Positive for weakness.   Endo/Heme/Allergies: Negative.    Psychiatric/Behavioral:  The patient is nervous/anxious.    All other systems reviewed and are negative.      Past Medical History   has a past medical history of Hypertension.  Peripheral vascular disease  Lower extremity gangrene  Hypertension  Tobacco abuse  Alcohol use    Surgical History   has a past surgical history that includes closed reduction (Left, 12/24/2017); other; orif, shoulder (Left, 1/4/2018); irrigation & debridement ortho (Left, 3/4/2018); knee amputation below (Left, 9/27/2023); femoral endarterectomy (Bilateral, 9/30/2023); angiogram, with angioplasty, and stent insertion if indicated (Right, 9/30/2023); knee amputation below (Left, 10/20/2023); incision and drainage general (Left, 10/23/2023); application or replacement, wound vac (10/23/2023); and knee amputation above (Left, 10/23/2023).    Family History  Reviewed, negative and noncontributory    Social History   reports that he has been smoking cigars. He has never used smokeless tobacco. He reports current alcohol use. He reports that he does not use drugs.    Medications  Prior to Admission Medications   Prescriptions Last Dose Informant Patient Reported? Taking?   Sodium Hypochlorite (DAKINS 0.125%, 1/4 STRENGTH,) 0.125 % Solution UNK at [facility] Patient's Home Pharmacy No No   Sig: Dampen gauze before applying to wounds. Change damp to dry dressings daily.   aspirin 81 MG EC tablet UNK at [facility] Patient's Home Pharmacy No No   Sig: Take 1 Tablet by mouth every day.   atorvastatin (LIPITOR) 40 MG Tab UNK at [facility] Patient's Home Pharmacy No No   Sig: Take 1 Tablet by mouth every evening.   cyclobenzaprine (FLEXERIL) 10 mg Tab UNK at [facility] Patient's Home Pharmacy No No   Sig: Take 1 Tablet by mouth 3 times a day as needed for Muscle Spasms.   finasteride (PROSCAR) 5 MG Tab UNK at [facility] Patient's Home Pharmacy No No   Sig: Take 1 Tablet by mouth every day.   gabapentin  (NEURONTIN) 300 MG Cap UNK at [facility] Patient's Home Pharmacy No No   Sig: Take 1 Capsule by mouth 3 times a day.   tamsulosin (FLOMAX) 0.4 MG capsule UNK at [facility] Patient's Home Pharmacy No No   Sig: Take 1 Capsule by mouth 1/2 hour after breakfast.      Facility-Administered Medications: None       Allergies  Allergies   Allergen Reactions    Sulfa Drugs Hives and Shortness of Breath     Tolerated Flomax (10/2023)       Physical Exam  Temp:  [36.7 °C (98.1 °F)-37.5 °C (99.5 °F)] 37.3 °C (99.1 °F)  Pulse:  [] 95  Resp:  [19-38] 34  BP: ()/(54-80) 120/72  SpO2:  [92 %-100 %] 98 %    Physical Exam  Vitals and nursing note reviewed.   Constitutional:       Appearance: He is well-developed. He is ill-appearing.      Comments: Disheveled middle-aged male, looks older than stated age, chronically ill-appearing   HENT:      Head: Normocephalic.      Mouth/Throat:      Comments: Poor dental status  Eyes:      Pupils: Pupils are equal, round, and reactive to light.   Cardiovascular:      Rate and Rhythm: Regular rhythm. Tachycardia present.      Heart sounds: Normal heart sounds.   Pulmonary:      Effort: Pulmonary effort is normal.      Breath sounds: Rhonchi and rales present.   Abdominal:      General: Bowel sounds are normal.      Palpations: Abdomen is soft.   Genitourinary:     Penis: Normal.       Rectum: Normal.   Musculoskeletal:         General: Swelling, deformity and signs of injury present. Normal range of motion.      Cervical back: Normal range of motion.      Comments: Left groin open lesion, purulent drainage, postsurgical status  Left BKA, staples in place, bruising, compromised skin area  Right foot with poor skin care, poor capillary refill, ulcerations  Bilateral hands with lacerations, wounds  Left buttock unstageable ulcer  Right upper thigh erythema    For full details please refer to the picture documentation   Skin:     General: Skin is warm.      Coloration: Skin is pale.      Findings:  Bruising, erythema and lesion present.   Neurological:      Mental Status: He is alert and oriented to person, place, and time.         Fluids  Date 11/13/23 0700 - 11/14/23 0659   Shift 6105-7486 2638-7730 8605-8946 24 Hour Total   INTAKE   IV Piggyback 453.1 173.8  626.8   Shift Total 453.1 173.8  626.8   OUTPUT   Urine 250 400  650   Shift Total 250 400  650   Weight (kg) 60.7 60.7 60.7 60.7       Laboratory  Recent Labs     11/12/23 0222 11/13/23 0311   WBC 28.0* 21.5*   RBC 3.43* 2.98*   HEMOGLOBIN 9.9* 8.5*   HEMATOCRIT 29.9* 26.2*   MCV 87.2 87.9   MCH 28.9 28.5   MCHC 33.1 32.4   RDW 50.4* 51.5*   PLATELETCT 615* 481*   MPV 8.4* 8.6*     Recent Labs     11/13/23 0311 11/13/23 0727 11/13/23  1315   SODIUM 128* 133* 128*   POTASSIUM 3.2* 2.9* 4.1   CHLORIDE 95* 101 97   CO2 20 20 21   GLUCOSE 121* 134* 112*   BUN 30* 23* 18   CREATININE 0.72 0.58 0.54   CALCIUM 7.9* 6.9* 7.3*     Recent Labs     11/12/23 0222 11/12/23 0737   APTT 26.8  --    INR 1.21* 1.25*                 Imaging  CT-ABDOMEN-PELVIS WITH   Final Result         1. There is a 2 cm ulcerative fraying in the left medial gluteus region. Subjacent iliac bone demonstrates bony ridging, consistent with osteomyelitis. There is small amount of air tracking medially and laterally away from the ulcerative wound along the    left medial gluteal musculature and down to the level of the left paraspinous musculature. Therefore, early necrotizing fasciitis possible. No drainable abscess present. This was discussed with the ordering physician.   2. Severely distended bladder with mild to moderate bilateral associated hydronephrosis. This is likely secondary to prostatic hypertrophy. There is severe median lobe hypertrophy. Consider Holguin catheter placement for decompression. Bladder diverticulum    also noted.   3. Patchy ground glass to aeration lower lobes, suspicious for pneumonia. However, atypical edema or other pneumonitis possible.   4. Severe  atherosclerotic disease.   5. Additional soft tissue wound in the left groin.      DX-FEMUR-2+ LEFT   Final Result      No evidence of infection by plain film.      DX-CHEST-PORTABLE (1 VIEW)   Final Result      No acute process.      US-VEIN MAPPING LOWER EXTREMITY UNILAT RIGHT    (Results Pending)       Assessment/Plan  * Severe sepsis (HCC)- (present on admission)  Assessment & Plan  This is Sepsis Present on admission  SIRS criteria identified on my evaluation include: Tachycardia, with heart rate greater than 90 BPM and Leukocytosis, with WBC greater than 12,000  Clinical indicators of end organ dysfunction include Lactic Acid greater than 2 and Acute Renal Failure, with a finding of creatinine greater than 2 (patient does not have ESRD or CKD  Source is wounds  Sepsis protocol initiated  Crystalloid Fluid Administration: Fluid resuscitation ordered per standard protocol - 30 mL/kg per current or ideal body weight  IV antibiotics as appropriate for source of sepsis  Reassessment: I have reassessed the patient's hemodynamic status  Started on Zosyn and Zyvox for likely polymicrobial wound infection and possible necrotizing skin and soft tissue infection.  Cultures positive for Streptococcus group A  Need source control with debridement    Sacral wound- (present on admission)  Assessment & Plan  Large sacral/ischial wound with signs of active infection and CT scan concerning for pelvic osteomyelitis and skin and soft tissue infection  Wound care  General surgery consulted for debridement-->surgery ? On 11/14  Continue antimicrobials, follow cultures, currently positive for strep    Hyponatremia- (present on admission)  Assessment & Plan  Following resuscitation very high UOP concerning for low solutes vs beer potomania  Corrected quickly to 133 from 117 in 24 hours  Repeat D5W x 500 cc bolus  Trend sodium q6H with a goal increase to only 127 today  Closely monitor urine output  Additional correction and close  laboratory monitoring    Open wound of inguinal region- (present on admission)  Assessment & Plan  Recent history of femoral endarterectomy with incision dehiscence, open wound  Compromised BKA stump lesion  Vascular surgery consulting      GELY (acute kidney injury) ([facility])- (present on admission)  Assessment & Plan  Likely prerenal with a component of ATN in the setting of sepsis  S/p IVF and resolved   Renal dose meds, avoid nephrotoxins  Strict I/Os  Follow renal function, significantly improved now    PAD (peripheral artery disease) (HCC)- (present on admission)  Assessment & Plan  With recent history of bypass grafting, vascular surgery consulted    Anemia- (present on admission)  Assessment & Plan  Likely multifactorial, chronic disease, blood loss, monitor      Plan  Continue broad-spectrum antibiotic therapy until cultures are further finalized and there is adequate source control  Await surgical intervention, debridement, wound assessment  Wound care  Likely need for skilled nursing versus LTAC level of care, clearly failed outpatient treatment  Further electrolyte correction, sodium correction cautiously  Electrolyte balance and replacement as indicated  See orders  Patient is has a high medical complexity, complex decision making and is at high risk for complication, morbidity, and mortality.  My total time spent caring for the patient on the day of the encounter was 67 minutes.   This does not include time spent on separately billable procedures/tests.      Thank you for consulting with us, we will follow along closely while the patient is hospitalized      Please note that this dictation was created using voice recognition software. I have made every reasonable attempt to correct obvious errors, but I expect that there are errors of grammar and possibly context that I did not discover before finalizing the note.

## 2023-11-14 NOTE — PROGRESS NOTES
"  DATE: 11/14/2023        INTERVAL EVENTS:  Hyponatremic again today, WBC much improved today    REVIEW OF SYSTEMS:  Comprehensive review of systems is negative with the exception of the aforementioned HPI, PMH, and PSH bullets in accordance with CMS guidelines.    PHYSICAL EXAMINATION:  Vital Signs: /73   Pulse 97   Temp 36.4 °C (97.6 °F) (Temporal)   Resp (!) 24   Ht 1.803 m (5' 11\")   Wt 60.5 kg (133 lb 6.1 oz)   SpO2 94%   Physical Exam  Vitals and nursing note reviewed.   Constitutional:       General: He is sleeping.      Appearance: He is ill-appearing.   HENT:      Head: Normocephalic and atraumatic.      Right Ear: External ear normal.      Left Ear: External ear normal.      Nose: Nose normal.      Mouth/Throat:      Mouth: Mucous membranes are moist.      Pharynx: Oropharynx is clear.   Eyes:      General: No scleral icterus.     Pupils: Pupils are equal, round, and reactive to light.   Cardiovascular:      Rate and Rhythm: Normal rate and regular rhythm.   Pulmonary:      Effort: Pulmonary effort is normal. No respiratory distress.   Abdominal:      General: There is no distension.      Palpations: Abdomen is soft.      Tenderness: There is no abdominal tenderness. There is no guarding or rebound.   Genitourinary:     Comments: Holguin catheter in place  Musculoskeletal:      Cervical back: Normal range of motion and neck supple.      Comments: S/p left above knee amputation with staples in place, stable distal erythema. Open Left groin wound with fibrinopurulent drainage. Right foot with ischemic ulcers.    Left sacral wound with fibrinopurulent drainage, dusky appearing muscle at base      Left Lower Extremity: Left leg is amputated above knee.   Skin:     General: Skin is warm and dry.      Comments: rash on posterior right thigh.   Neurological:      General: No focal deficit present.      Mental Status: He is easily aroused.         LABORATORY VALUES:  Recent Labs     11/12/23 0222 " 11/13/23 0311 11/14/23  0430   WBC 28.0* 21.5* 14.3*   RBC 3.43* 2.98* 3.08*   HEMOGLOBIN 9.9* 8.5* 8.7*   HEMATOCRIT 29.9* 26.2* 26.7*   MCV 87.2 87.9 86.7   MCH 28.9 28.5 28.2   MCHC 33.1 32.4 32.6   RDW 50.4* 51.5* 50.1*   PLATELETCT 615* 481* 457*   MPV 8.4* 8.6* 8.6*       Recent Labs     11/13/23  1315 11/13/23 2025 11/14/23  0430   SODIUM 128* 129* 126*   POTASSIUM 4.1 3.3* 3.6   CHLORIDE 97 96 95*   CO2 21 21 21   GLUCOSE 112* 186* 112*   BUN 18 18 13   CREATININE 0.54 0.60 0.53   CALCIUM 7.3* 7.8* 7.9*       Recent Labs     11/12/23 0222 11/12/23  0737 11/13/23 0311 11/14/23  0430   ASTSGOT 14  --  34 75*   ALTSGPT 11  --  12 36   TBILIRUBIN 0.2  --  0.2 0.2   ALKPHOSPHAT 79  --  66 61   GLOBULIN 3.9*  --  3.3 3.6*   INR 1.21* 1.25*  --   --        Recent Labs     11/12/23 0222 11/12/23 0737   APTT 26.8  --    INR 1.21* 1.25*         IMAGING:  CT-ABDOMEN-PELVIS WITH   Final Result         1. There is a 2 cm ulcerative fraying in the left medial gluteus region. Subjacent iliac bone demonstrates bony ridging, consistent with osteomyelitis. There is small amount of air tracking medially and laterally away from the ulcerative wound along the    left medial gluteal musculature and down to the level of the left paraspinous musculature. Therefore, early necrotizing fasciitis possible. No drainable abscess present. This was discussed with the ordering physician.   2. Severely distended bladder with mild to moderate bilateral associated hydronephrosis. This is likely secondary to prostatic hypertrophy. There is severe median lobe hypertrophy. Consider Holguin catheter placement for decompression. Bladder diverticulum    also noted.   3. Patchy ground glass to aeration lower lobes, suspicious for pneumonia. However, atypical edema or other pneumonitis possible.   4. Severe atherosclerotic disease.   5. Additional soft tissue wound in the left groin.      DX-FEMUR-2+ LEFT   Final Result      No evidence of  infection by plain film.      DX-CHEST-PORTABLE (1 VIEW)   Final Result      No acute process.      US-VEIN MAPPING LOWER EXTREMITY UNILAT RIGHT    (Results Pending)       ASSESSMENT AND PLAN:  Sacral wound- (present on admission)  Assessment & Plan  Patient with approximately 5 x 4 cm unstageable decubitus ulcer that appears infected and will require debridement.  Given that the patient is not requiring vasopressors and does not have rapidly enlarging lesions recommend correcting significant electrolyte abnormalities prior to proceeding with debridement.  Will closely monitor clinical course and take to OR sooner should clinical status change.    PLAN  - He clinically looks much better, WBC improving  - Hyponatremia worse again today  - I think he is appropriate for decubitus ulcer debridement today, will defer to anesthesia, if they feel the hyponatremia is risk prohibitive it is also reasonable to wait another day    - To OR today for incision and debridement of sacral decubitus ulcer - prone position, possible wound vac placement.  Full procedure, risks, and alternatives reviewed with patient.  Agrees with operative plan and consent obtained.           ____________________________________     Thom Christianson M.D.    DD: 11/13/2023  6:56 AM

## 2023-11-14 NOTE — ANESTHESIA TIME REPORT
Anesthesia Start and Stop Event Times       Date Time Event    11/14/2023 1040 Ready for Procedure     1118 Anesthesia Start     1209 Anesthesia Stop          Responsible Staff  11/14/23      Name Role Begin End    Quan Ballard M.D. Anesth 1118 1209          Overtime Reason:  no overtime (within assigned shift)    Comments:

## 2023-11-14 NOTE — ASSESSMENT & PLAN NOTE
-Patient had normal hemoglobin and March of this year, but has been anemic since approximately September  -Degree of anemia has been essentially stable  -Likely multifactorial related to nutritional status, and multiple procedures

## 2023-11-14 NOTE — ASSESSMENT & PLAN NOTE
-With recent history of bypass grafting  -Vascular surgery is anticipating need for outpatient elective bypass procedure on the right leg once acute infection has resolved.

## 2023-11-15 LAB
ANION GAP SERPL CALC-SCNC: 11 MMOL/L (ref 7–16)
BASOPHILS # BLD AUTO: 0.4 % (ref 0–1.8)
BASOPHILS # BLD: 0.05 K/UL (ref 0–0.12)
BUN SERPL-MCNC: 8 MG/DL (ref 8–22)
CALCIUM SERPL-MCNC: 7.8 MG/DL (ref 8.5–10.5)
CHLORIDE SERPL-SCNC: 97 MMOL/L (ref 96–112)
CO2 SERPL-SCNC: 24 MMOL/L (ref 20–33)
CREAT SERPL-MCNC: 0.49 MG/DL (ref 0.5–1.4)
EOSINOPHIL # BLD AUTO: 0.33 K/UL (ref 0–0.51)
EOSINOPHIL NFR BLD: 2.4 % (ref 0–6.9)
ERYTHROCYTE [DISTWIDTH] IN BLOOD BY AUTOMATED COUNT: 51.1 FL (ref 35.9–50)
GFR SERPLBLD CREATININE-BSD FMLA CKD-EPI: 118 ML/MIN/1.73 M 2
GLUCOSE BLD STRIP.AUTO-MCNC: 123 MG/DL (ref 65–99)
GLUCOSE BLD STRIP.AUTO-MCNC: 139 MG/DL (ref 65–99)
GLUCOSE BLD STRIP.AUTO-MCNC: 150 MG/DL (ref 65–99)
GLUCOSE SERPL-MCNC: 115 MG/DL (ref 65–99)
HCT VFR BLD AUTO: 26.5 % (ref 42–52)
HGB BLD-MCNC: 8.7 G/DL (ref 14–18)
IMM GRANULOCYTES # BLD AUTO: 0.3 K/UL (ref 0–0.11)
IMM GRANULOCYTES NFR BLD AUTO: 2.2 % (ref 0–0.9)
LYMPHOCYTES # BLD AUTO: 1.45 K/UL (ref 1–4.8)
LYMPHOCYTES NFR BLD: 10.7 % (ref 22–41)
MAGNESIUM SERPL-MCNC: 1.3 MG/DL (ref 1.5–2.5)
MCH RBC QN AUTO: 28.7 PG (ref 27–33)
MCHC RBC AUTO-ENTMCNC: 32.8 G/DL (ref 32.3–36.5)
MCV RBC AUTO: 87.5 FL (ref 81.4–97.8)
MONOCYTES # BLD AUTO: 0.92 K/UL (ref 0–0.85)
MONOCYTES NFR BLD AUTO: 6.8 % (ref 0–13.4)
NEUTROPHILS # BLD AUTO: 10.47 K/UL (ref 1.82–7.42)
NEUTROPHILS NFR BLD: 77.5 % (ref 44–72)
NRBC # BLD AUTO: 0 K/UL
NRBC BLD-RTO: 0 /100 WBC (ref 0–0.2)
PHOSPHATE SERPL-MCNC: 2.5 MG/DL (ref 2.5–4.5)
PLATELET # BLD AUTO: 470 K/UL (ref 164–446)
PMV BLD AUTO: 8.7 FL (ref 9–12.9)
POTASSIUM SERPL-SCNC: 4 MMOL/L (ref 3.6–5.5)
RBC # BLD AUTO: 3.03 M/UL (ref 4.7–6.1)
SODIUM SERPL-SCNC: 132 MMOL/L (ref 135–145)
WBC # BLD AUTO: 13.5 K/UL (ref 4.8–10.8)

## 2023-11-15 PROCEDURE — 80048 BASIC METABOLIC PNL TOTAL CA: CPT

## 2023-11-15 PROCEDURE — 700102 HCHG RX REV CODE 250 W/ 637 OVERRIDE(OP): Performed by: INTERNAL MEDICINE

## 2023-11-15 PROCEDURE — 36415 COLL VENOUS BLD VENIPUNCTURE: CPT

## 2023-11-15 PROCEDURE — 700111 HCHG RX REV CODE 636 W/ 250 OVERRIDE (IP): Performed by: INTERNAL MEDICINE

## 2023-11-15 PROCEDURE — 99232 SBSQ HOSP IP/OBS MODERATE 35: CPT | Performed by: SURGERY

## 2023-11-15 PROCEDURE — 700102 HCHG RX REV CODE 250 W/ 637 OVERRIDE(OP): Performed by: HOSPITALIST

## 2023-11-15 PROCEDURE — 83735 ASSAY OF MAGNESIUM: CPT

## 2023-11-15 PROCEDURE — A9270 NON-COVERED ITEM OR SERVICE: HCPCS | Performed by: INTERNAL MEDICINE

## 2023-11-15 PROCEDURE — 82962 GLUCOSE BLOOD TEST: CPT

## 2023-11-15 PROCEDURE — 85025 COMPLETE CBC W/AUTO DIFF WBC: CPT

## 2023-11-15 PROCEDURE — 84100 ASSAY OF PHOSPHORUS: CPT

## 2023-11-15 PROCEDURE — 700105 HCHG RX REV CODE 258: Performed by: HOSPITALIST

## 2023-11-15 PROCEDURE — 700111 HCHG RX REV CODE 636 W/ 250 OVERRIDE (IP): Mod: JZ | Performed by: INTERNAL MEDICINE

## 2023-11-15 PROCEDURE — 97163 PT EVAL HIGH COMPLEX 45 MIN: CPT

## 2023-11-15 PROCEDURE — A9270 NON-COVERED ITEM OR SERVICE: HCPCS | Performed by: HOSPITALIST

## 2023-11-15 PROCEDURE — 99233 SBSQ HOSP IP/OBS HIGH 50: CPT | Performed by: INTERNAL MEDICINE

## 2023-11-15 PROCEDURE — 770006 HCHG ROOM/CARE - MED/SURG/GYN SEMI*

## 2023-11-15 PROCEDURE — 700111 HCHG RX REV CODE 636 W/ 250 OVERRIDE (IP): Mod: JZ | Performed by: HOSPITALIST

## 2023-11-15 RX ORDER — OXYCODONE HYDROCHLORIDE 10 MG/1
10 TABLET ORAL EVERY 4 HOURS PRN
Status: DISCONTINUED | OUTPATIENT
Start: 2023-11-15 | End: 2023-11-30 | Stop reason: HOSPADM

## 2023-11-15 RX ORDER — OXYCODONE HYDROCHLORIDE 5 MG/1
5 TABLET ORAL EVERY 4 HOURS PRN
Status: DISCONTINUED | OUTPATIENT
Start: 2023-11-15 | End: 2023-11-30 | Stop reason: HOSPADM

## 2023-11-15 RX ADMIN — SODIUM HYPOCHLORITE 1 ML: 1.25 SOLUTION TOPICAL at 08:00

## 2023-11-15 RX ADMIN — OXYCODONE HYDROCHLORIDE 10 MG: 10 TABLET ORAL at 20:33

## 2023-11-15 RX ADMIN — AMPICILLIN AND SULBACTAM 3 G: 1; 2 INJECTION, POWDER, FOR SOLUTION INTRAMUSCULAR; INTRAVENOUS at 05:07

## 2023-11-15 RX ADMIN — OXYCODONE HYDROCHLORIDE AND ACETAMINOPHEN 500 MG: 500 TABLET ORAL at 04:39

## 2023-11-15 RX ADMIN — DOCUSATE SODIUM 50 MG AND SENNOSIDES 8.6 MG 2 TABLET: 8.6; 5 TABLET, FILM COATED ORAL at 04:39

## 2023-11-15 RX ADMIN — ENOXAPARIN SODIUM 40 MG: 100 INJECTION SUBCUTANEOUS at 16:28

## 2023-11-15 RX ADMIN — HYDROMORPHONE HYDROCHLORIDE 0.5 MG: 1 INJECTION, SOLUTION INTRAMUSCULAR; INTRAVENOUS; SUBCUTANEOUS at 06:43

## 2023-11-15 RX ADMIN — SODIUM HYPOCHLORITE 473 ML: 1.25 SOLUTION TOPICAL at 17:39

## 2023-11-15 RX ADMIN — OXYCODONE HYDROCHLORIDE 10 MG: 10 TABLET ORAL at 08:38

## 2023-11-15 RX ADMIN — THERA TABS 1 TABLET: TAB at 04:39

## 2023-11-15 RX ADMIN — OXYCODONE HYDROCHLORIDE 10 MG: 10 TABLET ORAL at 16:27

## 2023-11-15 RX ADMIN — OXYCODONE HYDROCHLORIDE AND ACETAMINOPHEN 500 MG: 500 TABLET ORAL at 16:27

## 2023-11-15 RX ADMIN — HYDROMORPHONE HYDROCHLORIDE 0.5 MG: 1 INJECTION, SOLUTION INTRAMUSCULAR; INTRAVENOUS; SUBCUTANEOUS at 13:53

## 2023-11-15 RX ADMIN — HYDROMORPHONE HYDROCHLORIDE 0.5 MG: 1 INJECTION, SOLUTION INTRAMUSCULAR; INTRAVENOUS; SUBCUTANEOUS at 04:38

## 2023-11-15 RX ADMIN — Medication 220 MG: at 04:41

## 2023-11-15 RX ADMIN — AMPICILLIN AND SULBACTAM 3 G: 1; 2 INJECTION, POWDER, FOR SOLUTION INTRAMUSCULAR; INTRAVENOUS at 12:32

## 2023-11-15 RX ADMIN — FERROUS SULFATE TAB 325 MG (65 MG ELEMENTAL FE) 325 MG: 325 (65 FE) TAB at 10:12

## 2023-11-15 RX ADMIN — AMPICILLIN AND SULBACTAM 3 G: 1; 2 INJECTION, POWDER, FOR SOLUTION INTRAMUSCULAR; INTRAVENOUS at 17:13

## 2023-11-15 RX ADMIN — OXYCODONE HYDROCHLORIDE 10 MG: 10 TABLET ORAL at 12:39

## 2023-11-15 ASSESSMENT — PAIN DESCRIPTION - PAIN TYPE
TYPE: CHRONIC PAIN;ACUTE PAIN
TYPE: ACUTE PAIN;CHRONIC PAIN
TYPE: CHRONIC PAIN;ACUTE PAIN

## 2023-11-15 ASSESSMENT — COGNITIVE AND FUNCTIONAL STATUS - GENERAL
CLIMB 3 TO 5 STEPS WITH RAILING: TOTAL
STANDING UP FROM CHAIR USING ARMS: A LOT
TURNING FROM BACK TO SIDE WHILE IN FLAT BAD: UNABLE
WALKING IN HOSPITAL ROOM: TOTAL
SUGGESTED CMS G CODE MODIFIER MOBILITY: CL
MOVING FROM LYING ON BACK TO SITTING ON SIDE OF FLAT BED: UNABLE
MOBILITY SCORE: 10

## 2023-11-15 ASSESSMENT — GAIT ASSESSMENTS: GAIT LEVEL OF ASSIST: UNABLE TO PARTICIPATE

## 2023-11-15 NOTE — THERAPY
Occupational Therapy Contact Note    Patient Name: Dillon Centeno  Age:  58 y.o., Sex:  male  Medical Record #: [id number]  Today's Date: 11/15/2023       11/15/23 4966   Interdisciplinary Plan of Care Collaboration   Collaboration Comments OT evaluation attempted. However, pt declined to participate secondary to pain. Pt expressed that he was no feeling up for getting OOB and wanted to rest. Pt  explained that he will participate tomorrow. Will reattempt tomorrow as able   Session Information   Date / Session Number  11/15-attempted (eval still needed)

## 2023-11-15 NOTE — CARE PLAN
The patient is Stable - Low risk of patient condition declining or worsening    Shift Goals  Clinical Goals: Pain control and maintain skin integrity  Patient Goals: Sleep  Family Goals: No family at bedside    Progress made toward(s) clinical / shift goals:      Problem: Hemodynamics  Goal: Patient's hemodynamics, fluid balance and neurologic status will be stable or improve  Description: Target End Date:  Prior to discharge or change in level of care    Document on Assessment and I/O flowsheet templates    1.  Monitor vital signs, pulse oximetry and cardiac monitor per provider order and/or policy  2.  Maintain blood pressure per provider order  3.  Hemodynamic monitoring per provider order  4.  Manage IV fluids and IV infusions  5.  Monitor intake and output  6.  Daily weights per unit policy or provider order  7.  Assess peripheral pulses and capillary refill  8.  Assess color and body temperature  9.  Position patient for maximum circulation/cardiac output  10. Monitor for signs/symptoms of excessive bleeding  11. Assess mental status, restlessness and changes in level of consciousness  12. Monitor temperature and report fever or hypothermia to provider immediately. Consideration of targeted temperature management.  Outcome: Progressing      Problem: Pain - Standard  Goal: Alleviation of pain or a reduction in pain to the patient’s comfort goal  Description: Target End Date:  Prior to discharge or change in level of care    Document on Vitals flowsheet    1.  Document pain using the appropriate pain scale per order or unit policy  2.  Educate and implement non-pharmacologic comfort measures (i.e. relaxation, distraction, massage, cold/heat therapy, etc.)  3.  Pain management medications as ordered  4.  Reassess pain after pain med administration per policy  5.  If opiods administered assess patient's response to pain medication is appropriate per POSS sedation scale  6.  Follow pain management plan developed in  collaboration with patient and interdisciplinary team (including palliative care or pain specialists if applicable)  Outcome: Progressing     Problem: Skin Integrity  Goal: Skin integrity is maintained or improved  Description: Target End Date:  Prior to discharge or change in level of care    Document interventions on Skin Risk/Jae flowsheet groups and corresponding LDA    1.  Assess and monitor skin integrity, appearance and/or temperature  2.  Assess risk factors for impaired skin integrity and/or pressures ulcers  3.  Implement precautions to protect skin integrity in collaboration with interdisciplinary team  4.  Implement pressure ulcer prevention protocol if at risk for skin breakdown  5.  Confirm wound care consult if at risk for skin breakdown  6.  Ensure patient use of pressure relieving devices  (Low air loss bed, waffle overlay, heel protectors, ROHO cushion, etc)  Outcome: Progressing        Patient is not progressing towards the following goals:

## 2023-11-15 NOTE — PROGRESS NOTES
VASCULAR SURGERY PROGRESS NOTE      Awake, no complaints.  AF, 85, 153/102     General: Pleasant thin male in none apparent distress.  Lower extremities: Well-healed wound in right groin with scattered areas of ecchymosis,improving.  Wound VAC over left groin wound.  Status post left above-knee amputation with some retraction of distal muscles over the stump; however, bone is not exposed.  Multiple old, dry ulcers over right toes without drainage or erythema.     Labs: Reviewed.  Vein mapping showed the right greater saphenous vein to be diminutive, not suitable for use as bypass conduit.     Assessment:  1) History of bilateral femoral endarterectomy and left above-knee amputation.  2) Peripheral arterial disease with chronic right toe ulcers.  3) Infected sacral decubitus.        Plan:  Stable from vascular standpoint.  Continue wound care.  Dry gauzes in between right toes.    Patient has no usable greater saphenous vein for use as bypass conduit.  Doing bypass using prosthetic graft with patient in current condition would carry a high risk of graft infection.  Fortunately, the ulcers in the right toes are chronic, small, and dry with no evidence of infection.  Patient may follow-up with Dr. Navarro for elective right lower extremity bypass once the infection resolves.     Discussed with patient.  All questions were answered.      Vascular service will follow peripherally but available for any questions or concerns.

## 2023-11-15 NOTE — CONSULTS
Inpatient Palliative Medicine Evaluation (SIGN OFF)     Dillon Centeno  58 y.o. male  [id number]    Location: [facility], [place]  PCP: Shaye Horton M.D.  Referral Source: Ramos Blcok D.o.    Reason for palliative medicine consultation and/or visit: ACP         Assessment and Plan:     GOAL(S) OF CARE = LONGEVITY    SYMPTOMS ETIOLOGY/CAUSES = postop pain sp sacral wound debridement today    PROGNOSIS =   - Borderline hospice-eligible = life-limited due to chronic illness, PAD/atherosclerosis, and lack of sociofamilial resources, but no definitive evidence to indicate high mortality risk next 6 months yet ==> Significant weight loss in 2months noted 170==>133, however, uncertain if this  decline would have naturally happened, had pt been able ot receive proper rehab or SNF support after last admission....  Patient's recent decline post discharge seem circumstantial on many levels....    - NOT YET hospice-appropriate = patient indicated his desire to pursue more interventions to treat disease, rehab, and try to extend life.    CODE STATUS = FULL      PALLIATIVE CARE TEAM INTERVENTIONS =   Case management referral made to try to help connect with VA benefits.  2.   Rehab/SNF post discharge likely will be beneficial.  3.  Stated GOC today is LONGEVITY.   4.  Patient appears frail but not definitively terminal.   5.  PC team will sign off. (IE, Disagreement over interventions does not necessarily indicate hospice.)        Summary:   Dillon Centeno is a 58 y.o. [employer] who was last admitted here 9/26/23-11/3/23, during which time he underwent left BKA for an ischemic + infected LLE on 9/27/2023, also bilateral common femoral artery endarectomy and profundoplasty with saphenous vein angioplasty with stent in R external iliac artery 9/30/2023, and also re-debrided for surgical wound dehiscence, subsequent left AKA and left groin wound debridement with wound vac.... Extensive surgical interventions last  "admission, and he was also treated for an GELY., and conservatively treated for a sacral wound with wound vac...    Unfortunately there was no accepting SNF nor home health services, and patient declined going to a group home. Ultimately  patient was discharged home for outpatient follow up. Patient was found failing to thrive and unable tsel-care, and brought back 11/12/2023.    Patient was reportedly cantankerous last night and uncooperative with care.  Palliative care is consulted to discuss GOC. Reportedly he expressed desire once to not receive any further interventions.  Patient did ultimately agree to proceed with sacral wound debridement today.  --------------------------------------------------------------------------------------------------------------------------------------------------------------     Met patient bedside and introduced my scope of practice.  He was agreeable to proceed with this encounter.    Patient reports growing postop pain in sacral region today. He asked for his PRN pain medications.  He was able to swallow well and eat dinner without much difficulty also.    We chatted a little bit about his difficult circumstance last admission, when he could not find an accepting SNF or rehab.... Patient feels very frustrated, \"after 25 years in [place] and 2 [facility].\" He did express some misgivings about his past [facility] patient experience. I encouraged patient to at least have a [facility] PCP going forward.    Patient agreed to debridement surgery today for his unstagible sacral wound..... He also expressed his willingness to rehab and to to SNF if any can be found available.  He expressed today he wanted to be healthier    Informed patient that I have no strong evidence to support hospice eligibility just yet, nor did I think hospice would align with his goal.    Patient is more of a frailty case, not necessarily terminal yet.    As such palliative care team will sign off. Please re-consult again " later if prognosis drastically changes, GOC drastically changes, or new needs/indications arise.      Advance care planning:    Thank you for allowing me the opportunity to participate in the care of Dillon Centeno     I spent a total of 45 minutes reviewing medical records, direct face-to-face time with the patient and/or family, documentation and coordination of care. This is separate from the time spent on advance care planning, which is documented above.         JON YOUSSEF DO (TIM)  [facility]   [address] [address] [place]  [address]  P: [phone]  F: [phone]  C: [phone]

## 2023-11-15 NOTE — PROGRESS NOTES
4 Eyes Skin Assessment Completed by MARIBEL Lui and MARIBEL Hernandez.    Head WDL  Ears Redness and Blanching  Nose WDL  Mouth WDL  Neck WDL  Breast/Chest Redness, Abrasion, Bruising, Scab, and Scar  Shoulder Blades Redness and Blanching  Spine Redness and Blanching  (R) Arm/Elbow/Hand Redness, Blanching, Bruising, Scab, and Scar  (L) Arm/Elbow/Hand Redness, Blanching, Bruising, Abrasion, and Scab  Abdomen WDL  Groin Redness and Blanching  Scrotum/Coccyx/Buttocks Redness and Blanching  (R) Leg Redness, Blanching, Scar, and Scab  (L) Leg Incision from stump  (R) Heel/Foot/Toe Redness, Blanching, Scab, and Rash      Devices In Places Blood Pressure Cuff, Pulse Ox, and SCD's      Interventions In Place Heel Mepilex, Sacral Mepilex, Pillows, Elbow Mepilex, Q2 Turns, and Barrier Cream    Possible Skin Injury No    Pictures Uploaded Into Epic Yes  Wound Consult Placed Yes  RN Wound Prevention Protocol Ordered Yes

## 2023-11-15 NOTE — PROGRESS NOTES
Pt arrived to the unit with patient transport at 1950.     Assumed care of patient. Assessment completed. Pt is A&O x 3, disoriented to time. Pt reports 8/10 pain on his back. Sx dressing and  wound vac in place on left hip. 2 RN skin check performed.  Checked vital signs, VS stable.  Fall precaution in place: Bed is in lowest, locked position, call light and belongings are within reach. Pt educated to call staff  for assistance when getting out of bed. Placed  sacral mepilex and heel meiplex for skin ulcer prevention. Plan of care discussed and all questions answered. All other needs met at this time. Care continues.

## 2023-11-15 NOTE — THERAPY
"Physical Therapy   Initial Evaluation     Patient Name: Dillon Centeno  Age:  58 y.o., Sex:  male  Medical Record #: [id number]  Today's Date: 11/15/2023     Precautions  Precautions: Fall Risk    Assessment  Patient is 58 y.o. male w/ hx of recent left AKA.  HTN, tobacco, PAD.  Brother, w/ whom he lives, called 911 2/2 pt not eating or moving, per chart review.  They live in a single story house, w/ one very small step to enter, pt reports that it is barely a threshold and he is able to negotiate it w/ his w/c.  W/c is at bedside.  Pt is rec'd alert, w/ noted poor insight into his condition.  Perseverating on going outside to smoke.  Continues to insist on getting into his w/c to go out to look in the ashtrays for cigarettes, despite being educated multiple times regarding the hospital smoking policy and the fact that he is connected to an IV and a wound vac and can not leave the floor.  Attempted to transfer pt to the w/c, however little to no effort on the part of the pt, needing max to total assist to stand.  Not able to accept weight through his RLE, unable to transfer.  Agreeable to sit eob, w/ nsg to assist back to bed.  PT will follow and address impairments noted below.  Plan    Physical Therapy Initial Treatment Plan   Treatment Plan : Therapeutic Activities (transfers)  Treatment Frequency: 3 Times per Week  Duration: Until Therapy Goals Met    DC Equipment Recommendations: Unable to determine at this time  Discharge Recommendations: Recommend post-acute placement for additional physical therapy services prior to discharge home         Objective       11/15/23 1128   Prior Living Situation   Housing / Facility [address] [facility]   Steps Into Home   (\"less than a threshold:)   Equipment Owned Wheelchair;Single Point Cane   Lives with - Patient's Self Care Capacity Sibling   Comments Pt lives with his brother who is a CNA.   Prior Level of Functional Mobility   Bed Mobility Independent   Transfer Status " Independent   Assistive Devices Used Wheelchair   Wheelchair Independent   Cognition    Level of Consciousness Alert   Safety Awareness Impaired;Impulsive   New Learning Impaired   Attention Impaired   Strength Lower Body   Comments LLE NT/AKA; RLE grosslyl 4/5   Balance Assessment   Sitting Balance (Static) Fair +   Sitting Balance (Dynamic) Fair +   Standing Balance (Static) Trace +   Standing Balance (Dynamic) Trace +   Weight Shift Sitting Poor   Weight Shift Standing Absent   Bed Mobility    Supine to Sit Supervised   Sit to Supine Supervised   Gait Analysis   Gait Level Of Assist Unable to Participate   Functional Mobility   Sit to Stand Maximal Assist   Bed, Chair, Wheelchair Transfer Unable to Participate   Short Term Goals    Short Term Goal # 1 Pt to TF bed to/from w/c/chair w/ spv in 6 visits to improve fxl indep.   Short Term Goal # 2 Pt to propel w/c 150 ft w/ spv in 6 visits to improve fxl indep   Physical Therapy Initial Treatment Plan    Treatment Plan  Therapeutic Activities  (transfers)   Treatment Frequency 3 Times per Week   Duration Until Therapy Goals Met   Problem List    Problems Impaired Transfers;Decreased Activity Tolerance;Safety Awareness Deficits / Cognition   Anticipated Discharge Equipment and Recommendations   DC Equipment Recommendations Unable to determine at this time   Discharge Recommendations Recommend post-acute placement for additional physical therapy services prior to discharge home

## 2023-11-15 NOTE — PROGRESS NOTES
"  DATE: 11/15/2023    Post Operative Day  1 wound irrigation and debridement.    INTERVAL EVENTS:  No major events, Dressing changed by me this morning appears clean, no new abscess.    PHYSICAL EXAMINATION:  Vital Signs: BP (!) 153/102   Pulse 85   Temp 36.3 °C (97.3 °F) (Temporal)   Resp 18   Ht 1.803 m (5' 11\")   Wt 60.5 kg (133 lb 6.1 oz)   SpO2 95%     Alert, no apparent distress  Sacral wet to dry dressing removed, wound is hemostatic, minimal slough at base without ongoing necrosis no new abscess or purulent drainage    LABORATORY VALUES:  Recent Labs     11/13/23  0311 11/14/23  0430 11/15/23  0509   WBC 21.5* 14.3* 13.5*   RBC 2.98* 3.08* 3.03*   HEMOGLOBIN 8.5* 8.7* 8.7*   HEMATOCRIT 26.2* 26.7* 26.5*   MCV 87.9 86.7 87.5   MCH 28.5 28.2 28.7   MCHC 32.4 32.6 32.8   RDW 51.5* 50.1* 51.1*   PLATELETCT 481* 457* 470*   MPV 8.6* 8.6* 8.7*     Recent Labs     11/14/23  0820 11/14/23  1518 11/15/23  0509   SODIUM 130* 131* 132*   POTASSIUM 3.4* 4.2 4.0   CHLORIDE 96 94* 97   CO2 23 24 24   GLUCOSE 150* 104* 115*   BUN 10 8 8   CREATININE 0.47* 0.57 0.49*   CALCIUM 7.6* 8.8 7.8*     Recent Labs     11/13/23  0311 11/14/23  0430   ASTSGOT 34 75*   ALTSGPT 12 36   TBILIRUBIN 0.2 0.2   ALKPHOSPHAT 66 61   GLOBULIN 3.3 3.6*            IMAGING:  US-VEIN MAPPING LOWER EXTREMITY UNILAT RIGHT   Final Result      CT-ABDOMEN-PELVIS WITH   Final Result         1. There is a 2 cm ulcerative fraying in the left medial gluteus region. Subjacent iliac bone demonstrates bony ridging, consistent with osteomyelitis. There is small amount of air tracking medially and laterally away from the ulcerative wound along the    left medial gluteal musculature and down to the level of the left paraspinous musculature. Therefore, early necrotizing fasciitis possible. No drainable abscess present. This was discussed with the ordering physician.   2. Severely distended bladder with mild to moderate bilateral associated hydronephrosis. " This is likely secondary to prostatic hypertrophy. There is severe median lobe hypertrophy. Consider Holguin catheter placement for decompression. Bladder diverticulum    also noted.   3. Patchy ground glass to aeration lower lobes, suspicious for pneumonia. However, atypical edema or other pneumonitis possible.   4. Severe atherosclerotic disease.   5. Additional soft tissue wound in the left groin.      DX-FEMUR-2+ LEFT   Final Result      No evidence of infection by plain film.      DX-CHEST-PORTABLE (1 VIEW)   Final Result      No acute process.          ASSESSMENT AND PLAN:  Sacral wound- (present on admission)  Assessment & Plan  - No new abscess or ongoing necrosis, should be ready for vac therapy  - There is a small tunnel medially that I included in my initial consult request to the wound care team  - Defer antibiotic management to the primary team, a culture was sent from the OR  - [facility] will sign off at this time, please reconsult with any new concerns           ____________________________________     Thom Christianson M.D.    DD: 11/15/2023  8:35 AM

## 2023-11-15 NOTE — WOUND TEAM
[facility]  Inpatient Services  Wound and Skin Care Follow-up    Admission Date: 11/12/2023     Last order of IP CONSULT TO WOUND CARE was found on 11/14/2023 from Hospital Encounter on 11/12/2023     HPI, PMH, SH: Reviewed    Past Surgical History:   Procedure Laterality Date    INCISION AND DRAINAGE GENERAL Left 11/14/2023    Procedure: Sharp exicisional debridement of skin subqutaneous fat and fasha 10cm squard;  Surgeon: Thom Christianson M.D.;  Location: [facility];  Service: General    INCISION AND DRAINAGE GENERAL Left 10/23/2023    Procedure: INCISION AND DRAINAGE;  Surgeon: Keith Navarro M.D.;  Location: [facility];  Service: Vascular    APPLICATION OR REPLACEMENT, WOUND VAC  10/23/2023    Procedure: APPLICATION OR REPLACEMENT, WOUND VAC;  Surgeon: Keith Navarro M.D.;  Location: [facility];  Service: Vascular    KNEE AMPUTATION ABOVE Left 10/23/2023    Procedure: AMPUTATION, ABOVE KNEE;  Surgeon: Keith Navarro M.D.;  Location: [facility];  Service: Vascular    KNEE AMPUTATION BELOW Left 10/20/2023    Procedure: AMPUTATION, BELOW KNEE WOUND REVISION;  Surgeon: Pradip Altamirano M.D.;  Location: [facility];  Service: Orthopedics    FEMORAL ENDARTERECTOMY Bilateral 9/30/2023    Procedure: ENDARTERECTOMY, FEMORAL;  Surgeon: Keith Navarro M.D.;  Location: [facility];  Service: Vascular    ANGIOGRAM, WITH ANGIOPLASTY, AND STENT INSERTION IF INDICATED Right 9/30/2023    Procedure: ANGIOGRAM, WITH ILIAC STENT;  Surgeon: Keith Navarro M.D.;  Location: [facility];  Service: Vascular    KNEE AMPUTATION BELOW Left 9/27/2023    Procedure: AMPUTATION, BELOW KNEE;  Surgeon: Pradip Altamirano M.D.;  Location: [facility];  Service: Orthopedics    IRRIGATION & DEBRIDEMENT ORTHO Left 3/4/2018    Procedure: IRRIGATION & DEBRIDEMENT ORTHO - HUMERUS;  Surgeon: Sergio Watkins M.D.;  Location: [facility]  [facility];  Service: Orthopedics    ORIF, SHOULDER Left 1/4/2018    Procedure: SHOULDER ORIF;  Surgeon: Sergio Watkins M.D.;  Location: [facility];  Service: Orthopedics    CLOSED REDUCTION Left 12/24/2017    Procedure: CLOSED REDUCTION;  Surgeon: Keith Subramanian M.D.;  Location: [facility];  Service: Orthopedics    OTHER      multiple surgeries lower legs from past injuries     Social History     Tobacco Use    Smoking status: Every Day     Types: Cigars    Smokeless tobacco: Never   Substance Use Topics    Alcohol use: Yes     Comment: a few beers a week     Chief Complaint   Patient presents with    Other     Pt BIB ems due to failure to thrive, pt's neighbors called ems due to pt unable to care for self and refuses to eat    Diarrhea     Diagnosis: Severe sepsis (HCC) [A41.9, R65.20]    Unit where seen by Wound Team: T912/01     WOUND FOLLOW UP RELATED TO:  JENNY groin       WOUND TEAM PLAN OF CARE - Frequency of Follow-up:   Nursing to follow dressing orders written for wound care. Contact wound team if area fails to progress, deteriorates or with any questions/concerns if something comes up before next scheduled follow up (See below as to whether wound is following and frequency of wound follow up)  Dressing changes by wound team:                   NPWT change 3 times weekly - L. groin    WOUND HISTORY:       58 y.o. male with past medical history of peripheral arterial disease, gangrene of the left lower extremity, hypertension who presented 11/12/2023 after his brother called EMS for the patient failing to eat or move.  In the ER the patient was found to be tachycardic with significant leukocytosis concerning for infection.  He was found to have a large sacral pressure injury therefore a CT scan of the abdomen pelvis was obtained which showed soft tissue air tracking superiorly above the ileum, other findings include evidence of osteomyelitis.            WOUND ASSESSMENT/LDA  Wound  11/12/23 Full Thickness Wound Groin Left open surgical (Active)   Date First Assessed: 11/12/23   Present on Original Admission: Yes  Hand Hygiene Completed: Yes  Primary Wound Type: Full Thickness Wound  Location: Groin  Laterality: Left  Wound Description (Comments): open surgical      Assessments 11/14/2023  6:00 PM   Site Assessment Red;Pink;Yellow   Periwound Assessment Red;Pink   Margins Defined edges;Unattached edges   Closure Secondary intention   Drainage Amount Small   Drainage Description Serosanguineous   Treatments Cleansed;Nonselective debridement;Site care   Wound Cleansing Approved Wound Cleanser   Periwound Protectant No-sting Skin Prep;Paste Ring;Drape   Dressing Status Clean;Dry;Intact   Dressing Changed Changed   Dressing Cleansing/Solutions 1/4 Strength Dakin's Solution   Dressing Options Wound Vac   Dressing Change/Treatment Frequency Monday, Wednesday, Friday, and As Needed   NEXT Dressing Change/Treatment Date 11/16/23   NEXT Weekly Photo (Inpatient Only) 11/19/23   Wound Team Following 3x Weekly   WOUND NURSE ONLY - Time Spent with Patient (mins) 60       Negative Pressure Wound Therapy 11/12/23 Dehisced Groin Left (Active)   Placement Date: 11/12/23   Inserted by: wound team  Hand Hygiene Completed: Yes  Wound Type: Dehisced  Location: Groin  Laterality: Left      Assessments 11/14/2023  6:00 PM   NPWT Pump Mode / Pressure Setting Intermittent;125 mmHg   Dressing Type Small;Black Foam (Veraflo)   Number of Foam Pieces Used 2   Canister Changed No   NEXT Dressing Change/Treatment Date 11/16/23   VAC VeraFlo Irrigant 1/4 Strength Dakins   VAC VeraFlo Soak Time (mins) 8   VAC VeraFlo Instill Volume (ml) 20   VAC VeraFlo - Therapy Time (hrs) 2        Vascular:    INGA:   No results found.    Lab Values:    Lab Results   Component Value Date/Time    WBC 14.3 (H) 11/14/2023 04:30 AM    RBC 3.08 (L) 11/14/2023 04:30 AM    HEMOGLOBIN 8.7 (L) 11/14/2023 04:30 AM    HEMATOCRIT 26.7 (L) 11/14/2023  04:30 AM    CREACTPROT 27.78 (H) 11/12/2023 02:22 AM    SEDRATEWES 66 (H) 11/12/2023 02:22 AM         Culture Results show:  Recent Results (from the past 720 hour(s))   Culture Wound W/ Gram Stain    Collection Time: 11/12/23  2:20 PM    Specimen: Buttock; Wound   Result Value Ref Range    Significant Indicator POS (POS)     Source WND     Site BUTTOCK     Culture Result (A)      Light growth mixed enteric kris.  Moderate growth usual skin kris.      Gram Stain Result Moderate WBCs.  Moderate Gram positive cocci.       Culture Result (A)      Beta Hemolytic Streptococcus group A  Light growth         Pain Level/Medicated:  None, Tolerated without pain medication       INTERVENTIONS BY WOUND TEAM:  Chart and images reviewed. Discussed with bedside RN. All areas of concern (based on picture review, LDA review and discussion with bedside RN) have been thoroughly assessed. Documentation of areas based on significant findings. This RN in to assess patient. Performed standard wound care which includes appropriate positioning, dressing removal and non-selective debridement. Pictures and measurements obtained weekly if/when required.    Wound:  L. groin  Cleansed/Non-selectively Debrided with:  Wound cleanser and Gauze  Kiara wound: Cleansed with Wound cleanser and Gauze, Prepped with No Sting, Paste Rings, and Drape  Primary Dressing:  black foam tucked into wound bed and secured with drape    Advanced Wound Care Discharge Planning  Number of Clinicians necessary to complete wound care: 1  Is patient requiring IV pain medications for dressing changes:  No   Length of time for dressing change 35 min. (This does not include chart review, pre-medication time, set up, clean up or time spent charting.)    Interdisciplinary consultation: Patient, Bedside RN (Rodri), N/A.  Pressure injury and staging reviewed with N/A.    EVALUATION / RATIONALE FOR TREATMENT:     Date:  11/14/23  Wound Status:  Wound progressing as  expected    Wound bed clean, no odor. Continue VF NPWT with Dakins  Date:  11/12/23  Wound Status:  Initial evaluation    Left groin with minimal slough after cleansing, met with Dr. Navarro at bedside who advised to place Dakin's VF to site.  Left sacral wound to go to surgery with Dr. Chow today or tomorrow for debridement and likely VAC placement.  Patient very frail appearing.  Sacral wound probes to bone and will be very difficult to heal considering patient overall state and nutrition.             Goals: Steady decrease in wound area and depth weekly.    NURSING PLAN OF CARE ORDERS:  No new orders this visit    NUTRITION RECOMMENDATIONS   Wound Team Recommendations:  N/A     DIET ORDERS (From admission to next 24h)       Start     Ordered    11/14/23 1609  Diet Order Diet: Regular  ALL MEALS        Question:  Diet:  Answer:  Regular    11/14/23 1608    11/13/23 1328  Supplements  ALL MEALS        Question:  Which Supplement  Answer:  BOOST PLUS  Comment:  or equivalent Ensure    11/13/23 1328                    PREVENTATIVE INTERVENTIONS:   Q shift Jae - performed per nursing policy  Q shift pressure point assessments - performed per nursing policy    Surface/Positioning  ICU Low Airloss - Currently in Place  Low Airloss - Ordered  Reposition q 2 hours - Currently in Place  TAPs Turning system - Currently in Place    Offloading/Redistribution  Sacral offloading dressing (Silicone dressing) - Currently in Place      Respiratory  Silicone O2 tubing - Currently in Place  Gray Foam Ear protectors - Currently in Place    Containment/Moisture Prevention    Holguin Catheter - Currently in Place    Anticipated discharge plans:  TBD        Vac Discharge Needs:  Vac Discharge plan is purely a recommendation from wound team and not a requirement for discharge unless otherwise stated by physician.  Veraflo Vac while inpatient, will need to remain on Veraflo Vac upon discharge

## 2023-11-16 ENCOUNTER — APPOINTMENT (OUTPATIENT)
Dept: WOUND CARE | Facility: MEDICAL CENTER | Age: 58
End: 2023-11-16
Attending: INTERNAL MEDICINE
Payer: MEDICAID

## 2023-11-16 LAB
ANION GAP SERPL CALC-SCNC: 13 MMOL/L (ref 7–16)
BACTERIA WND AEROBE CULT: ABNORMAL
BACTERIA WND AEROBE CULT: ABNORMAL
BASOPHILS # BLD AUTO: 0.4 % (ref 0–1.8)
BASOPHILS # BLD: 0.07 K/UL (ref 0–0.12)
BUN SERPL-MCNC: 11 MG/DL (ref 8–22)
CALCIUM SERPL-MCNC: 7.9 MG/DL (ref 8.5–10.5)
CHLORIDE SERPL-SCNC: 94 MMOL/L (ref 96–112)
CO2 SERPL-SCNC: 20 MMOL/L (ref 20–33)
CREAT SERPL-MCNC: 0.91 MG/DL (ref 0.5–1.4)
EOSINOPHIL # BLD AUTO: 0.16 K/UL (ref 0–0.51)
EOSINOPHIL NFR BLD: 0.9 % (ref 0–6.9)
ERYTHROCYTE [DISTWIDTH] IN BLOOD BY AUTOMATED COUNT: 51.6 FL (ref 35.9–50)
GFR SERPLBLD CREATININE-BSD FMLA CKD-EPI: 97 ML/MIN/1.73 M 2
GLUCOSE BLD STRIP.AUTO-MCNC: 104 MG/DL (ref 65–99)
GLUCOSE BLD STRIP.AUTO-MCNC: 110 MG/DL (ref 65–99)
GLUCOSE BLD STRIP.AUTO-MCNC: 130 MG/DL (ref 65–99)
GLUCOSE BLD STRIP.AUTO-MCNC: 131 MG/DL (ref 65–99)
GLUCOSE BLD STRIP.AUTO-MCNC: 138 MG/DL (ref 65–99)
GLUCOSE SERPL-MCNC: 127 MG/DL (ref 65–99)
GRAM STN SPEC: ABNORMAL
HCT VFR BLD AUTO: 30.3 % (ref 42–52)
HGB BLD-MCNC: 9.8 G/DL (ref 14–18)
IMM GRANULOCYTES # BLD AUTO: 0.42 K/UL (ref 0–0.11)
IMM GRANULOCYTES NFR BLD AUTO: 2.4 % (ref 0–0.9)
LYMPHOCYTES # BLD AUTO: 1.31 K/UL (ref 1–4.8)
LYMPHOCYTES NFR BLD: 7.5 % (ref 22–41)
MCH RBC QN AUTO: 28.6 PG (ref 27–33)
MCHC RBC AUTO-ENTMCNC: 32.3 G/DL (ref 32.3–36.5)
MCV RBC AUTO: 88.3 FL (ref 81.4–97.8)
MONOCYTES # BLD AUTO: 1.07 K/UL (ref 0–0.85)
MONOCYTES NFR BLD AUTO: 6.1 % (ref 0–13.4)
NEUTROPHILS # BLD AUTO: 14.42 K/UL (ref 1.82–7.42)
NEUTROPHILS NFR BLD: 82.7 % (ref 44–72)
NRBC # BLD AUTO: 0 K/UL
NRBC BLD-RTO: 0 /100 WBC (ref 0–0.2)
PLATELET # BLD AUTO: 485 K/UL (ref 164–446)
PMV BLD AUTO: 8.7 FL (ref 9–12.9)
POTASSIUM SERPL-SCNC: 3.7 MMOL/L (ref 3.6–5.5)
RBC # BLD AUTO: 3.43 M/UL (ref 4.7–6.1)
SIGNIFICANT IND 70042: ABNORMAL
SITE SITE: ABNORMAL
SODIUM SERPL-SCNC: 127 MMOL/L (ref 135–145)
SOURCE SOURCE: ABNORMAL
WBC # BLD AUTO: 17.5 K/UL (ref 4.8–10.8)

## 2023-11-16 PROCEDURE — 302098 PASTE RING (FLAT): Performed by: INTERNAL MEDICINE

## 2023-11-16 PROCEDURE — 700102 HCHG RX REV CODE 250 W/ 637 OVERRIDE(OP): Performed by: INTERNAL MEDICINE

## 2023-11-16 PROCEDURE — 700102 HCHG RX REV CODE 250 W/ 637 OVERRIDE(OP): Performed by: HOSPITALIST

## 2023-11-16 PROCEDURE — 700105 HCHG RX REV CODE 258: Performed by: HOSPITALIST

## 2023-11-16 PROCEDURE — 770006 HCHG ROOM/CARE - MED/SURG/GYN SEMI*

## 2023-11-16 PROCEDURE — 36415 COLL VENOUS BLD VENIPUNCTURE: CPT

## 2023-11-16 PROCEDURE — 700111 HCHG RX REV CODE 636 W/ 250 OVERRIDE (IP): Mod: JZ | Performed by: INTERNAL MEDICINE

## 2023-11-16 PROCEDURE — A9270 NON-COVERED ITEM OR SERVICE: HCPCS | Performed by: HOSPITALIST

## 2023-11-16 PROCEDURE — A9270 NON-COVERED ITEM OR SERVICE: HCPCS | Performed by: INTERNAL MEDICINE

## 2023-11-16 PROCEDURE — 700111 HCHG RX REV CODE 636 W/ 250 OVERRIDE (IP): Mod: JZ | Performed by: HOSPITALIST

## 2023-11-16 PROCEDURE — 80048 BASIC METABOLIC PNL TOTAL CA: CPT

## 2023-11-16 PROCEDURE — 97602 WOUND(S) CARE NON-SELECTIVE: CPT

## 2023-11-16 PROCEDURE — 700111 HCHG RX REV CODE 636 W/ 250 OVERRIDE (IP): Performed by: INTERNAL MEDICINE

## 2023-11-16 PROCEDURE — 85025 COMPLETE CBC W/AUTO DIFF WBC: CPT

## 2023-11-16 PROCEDURE — 82962 GLUCOSE BLOOD TEST: CPT | Mod: 91

## 2023-11-16 PROCEDURE — 99233 SBSQ HOSP IP/OBS HIGH 50: CPT | Performed by: INTERNAL MEDICINE

## 2023-11-16 PROCEDURE — 97605 NEG PRS WND THER DME<=50SQCM: CPT

## 2023-11-16 RX ORDER — SODIUM CHLORIDE 1 G/1
1 TABLET ORAL
Status: DISCONTINUED | OUTPATIENT
Start: 2023-11-16 | End: 2023-11-30 | Stop reason: HOSPADM

## 2023-11-16 RX ORDER — AMOXICILLIN AND CLAVULANATE POTASSIUM 875; 125 MG/1; MG/1
1 TABLET, FILM COATED ORAL EVERY 12 HOURS
Status: COMPLETED | OUTPATIENT
Start: 2023-11-16 | End: 2023-11-19

## 2023-11-16 RX ADMIN — SODIUM CHLORIDE 1 G: 1 TABLET ORAL at 20:40

## 2023-11-16 RX ADMIN — OXYCODONE HYDROCHLORIDE AND ACETAMINOPHEN 500 MG: 500 TABLET ORAL at 06:25

## 2023-11-16 RX ADMIN — SODIUM CHLORIDE 1 G: 1 TABLET ORAL at 10:58

## 2023-11-16 RX ADMIN — AMOXICILLIN AND CLAVULANATE POTASSIUM 1 TABLET: 875; 125 TABLET, FILM COATED ORAL at 14:50

## 2023-11-16 RX ADMIN — OXYCODONE HYDROCHLORIDE 10 MG: 10 TABLET ORAL at 06:25

## 2023-11-16 RX ADMIN — FERROUS SULFATE TAB 325 MG (65 MG ELEMENTAL FE) 325 MG: 325 (65 FE) TAB at 10:58

## 2023-11-16 RX ADMIN — AMPICILLIN AND SULBACTAM 3 G: 1; 2 INJECTION, POWDER, FOR SOLUTION INTRAMUSCULAR; INTRAVENOUS at 00:46

## 2023-11-16 RX ADMIN — HYDROMORPHONE HYDROCHLORIDE 0.5 MG: 1 INJECTION, SOLUTION INTRAMUSCULAR; INTRAVENOUS; SUBCUTANEOUS at 07:40

## 2023-11-16 RX ADMIN — Medication 220 MG: at 06:25

## 2023-11-16 RX ADMIN — AMPICILLIN AND SULBACTAM 3 G: 1; 2 INJECTION, POWDER, FOR SOLUTION INTRAMUSCULAR; INTRAVENOUS at 06:26

## 2023-11-16 RX ADMIN — ENOXAPARIN SODIUM 40 MG: 100 INJECTION SUBCUTANEOUS at 16:42

## 2023-11-16 RX ADMIN — OXYCODONE HYDROCHLORIDE 10 MG: 10 TABLET ORAL at 14:50

## 2023-11-16 RX ADMIN — HYDROMORPHONE HYDROCHLORIDE 0.5 MG: 1 INJECTION, SOLUTION INTRAMUSCULAR; INTRAVENOUS; SUBCUTANEOUS at 16:44

## 2023-11-16 RX ADMIN — OXYCODONE HYDROCHLORIDE 10 MG: 10 TABLET ORAL at 20:40

## 2023-11-16 RX ADMIN — OXYCODONE HYDROCHLORIDE AND ACETAMINOPHEN 500 MG: 500 TABLET ORAL at 16:42

## 2023-11-16 RX ADMIN — OXYCODONE HYDROCHLORIDE 10 MG: 10 TABLET ORAL at 00:46

## 2023-11-16 RX ADMIN — THERA TABS 1 TABLET: TAB at 06:25

## 2023-11-16 RX ADMIN — HYDROMORPHONE HYDROCHLORIDE 0.5 MG: 1 INJECTION, SOLUTION INTRAMUSCULAR; INTRAVENOUS; SUBCUTANEOUS at 10:23

## 2023-11-16 ASSESSMENT — PAIN DESCRIPTION - PAIN TYPE
TYPE: CHRONIC PAIN;ACUTE PAIN

## 2023-11-16 NOTE — WOUND TEAM
[facility]  Inpatient Services  Wound and Skin Care Follow-up    Admission Date: 11/12/2023     Last order of IP CONSULT TO WOUND CARE was found on 11/14/2023 from Hospital Encounter on 11/12/2023     HPI, PMH, SH: Reviewed    Past Surgical History:   Procedure Laterality Date    INCISION AND DRAINAGE GENERAL Left 11/14/2023    Procedure: Sharp exicisional debridement of skin subqutaneous fat and fasha 10cm squard;  Surgeon: Thom Christianson M.D.;  Location: [facility];  Service: General    INCISION AND DRAINAGE GENERAL Left 10/23/2023    Procedure: INCISION AND DRAINAGE;  Surgeon: Keith Navarro M.D.;  Location: [facility];  Service: Vascular    APPLICATION OR REPLACEMENT, WOUND VAC  10/23/2023    Procedure: APPLICATION OR REPLACEMENT, WOUND VAC;  Surgeon: Keith Navarro M.D.;  Location: [facility];  Service: Vascular    KNEE AMPUTATION ABOVE Left 10/23/2023    Procedure: AMPUTATION, ABOVE KNEE;  Surgeon: Keith Navarro M.D.;  Location: [facility];  Service: Vascular    KNEE AMPUTATION BELOW Left 10/20/2023    Procedure: AMPUTATION, BELOW KNEE WOUND REVISION;  Surgeon: Pradip Altamirano M.D.;  Location: [facility];  Service: Orthopedics    FEMORAL ENDARTERECTOMY Bilateral 9/30/2023    Procedure: ENDARTERECTOMY, FEMORAL;  Surgeon: Keith Navarro M.D.;  Location: [facility];  Service: Vascular    ANGIOGRAM, WITH ANGIOPLASTY, AND STENT INSERTION IF INDICATED Right 9/30/2023    Procedure: ANGIOGRAM, WITH ILIAC STENT;  Surgeon: Keith Navarro M.D.;  Location: [facility];  Service: Vascular    KNEE AMPUTATION BELOW Left 9/27/2023    Procedure: AMPUTATION, BELOW KNEE;  Surgeon: Pradip Altamirano M.D.;  Location: [facility];  Service: Orthopedics    IRRIGATION & DEBRIDEMENT ORTHO Left 3/4/2018    Procedure: IRRIGATION & DEBRIDEMENT ORTHO - HUMERUS;  Surgeon: Sergio Watkins M.D.;  Location: [facility]  [facility];  Service: Orthopedics    ORIF, SHOULDER Left 1/4/2018    Procedure: SHOULDER ORIF;  Surgeon: Sergio Watkins M.D.;  Location: [facility];  Service: Orthopedics    CLOSED REDUCTION Left 12/24/2017    Procedure: CLOSED REDUCTION;  Surgeon: Keith Subramanian M.D.;  Location: [facility];  Service: Orthopedics    OTHER      multiple surgeries lower legs from past injuries     Social History     Tobacco Use    Smoking status: Every Day     Types: Cigars    Smokeless tobacco: Never   Substance Use Topics    Alcohol use: Yes     Comment: a few beers a week     Chief Complaint   Patient presents with    Other     Pt BIB ems due to failure to thrive, pt's neighbors called ems due to pt unable to care for self and refuses to eat    Diarrhea     Diagnosis: Severe sepsis (HCC) [A41.9, R65.20]    Unit where seen by Wound Team: S631/01     WOUND FOLLOW UP RELATED TO:  L groin NPWT, Sacrum NPWT application       WOUND TEAM PLAN OF CARE - Frequency of Follow-up:   Nursing to follow dressing orders written for wound care. Contact wound team if area fails to progress, deteriorates or with any questions/concerns if something comes up before next scheduled follow up (See below as to whether wound is following and frequency of wound follow up)  Dressing changes by wound team:                   NPWT change 3 times weekly - Left Groin, Sacrum    WOUND HISTORY:       58 y.o. male with past medical history of peripheral arterial disease, gangrene of the left lower extremity, hypertension who presented 11/12/2023 after his brother called EMS for the patient failing to eat or move.  In the ER the patient was found to be tachycardic with significant leukocytosis concerning for infection.  He was found to have a large sacral pressure injury therefore a CT scan of the abdomen pelvis was obtained which showed soft tissue air tracking superiorly above the ileum, other findings include evidence of osteomyelitis.             WOUND ASSESSMENT/LDA  Wound 11/12/23 Pressure Injury Sacrum Stage IV (Active)   Date First Assessed: 11/12/23   Present on Original Admission: Yes  Hand Hygiene Completed: Yes  Primary Wound Type: Pressure Injury  Location: Sacrum  Wound Description (Comments): Stage IV      Assessments 11/16/2023 10:30 AM   Wound Image      Site Assessment Red;Yellow;Undermining;Painful   Periwound Assessment Red;Purple;Fragile   Margins Unattached edges;Undefined edges   Closure Secondary intention   Drainage Amount Small   Drainage Description Serosanguineous   Treatments Cleansed;Site care;Offloading   Wound Cleansing Approved Wound Cleanser   Periwound Protectant No-sting Skin Prep;Hydrocolloid;Paste Ring;Drape   Dressing Status Clean;Dry;Intact   Dressing Changed Changed   Dressing Cleansing/Solutions Not Applicable   Dressing Options Wound Vac;Offloading Dressing - Sacral   Dressing Change/Treatment Frequency Tuesday, Thursday, Saturday, and As Needed   NEXT Dressing Change/Treatment Date 11/18/23   NEXT Weekly Photo (Inpatient Only) 11/23/23   Wound Team Following 3x Weekly   Pressure Injury Stage Stage 4   Wound Length (cm) 6.1 cm   Wound Width (cm) 7 cm   Wound Depth (cm) 2.5 cm   Wound Surface Area (cm^2) 42.7 cm^2   Wound Volume (cm^3) 106.75 cm^3   Wound Healing % -82   Undermining (cm) 4 cm   Undermining of Wound, 1st Location From 12 o'clock;To 12 o'clock   Shape Circular   Wound Odor None   Exposed Structures Bone   WOUND NURSE ONLY - Time Spent with Patient (mins) 60       Wound 11/12/23 Full Thickness Wound Groin Left open surgical (Active)   Date First Assessed: 11/12/23   Present on Original Admission: Yes  Hand Hygiene Completed: Yes  Primary Wound Type: Full Thickness Wound  Location: Groin  Laterality: Left  Wound Description (Comments): open surgical      Assessments 11/16/2023 10:30 AM   Wound Image     Site Assessment Red;Yellow   Periwound Assessment Pink;Intact   Margins Defined edges;Unattached  edges   Closure Secondary intention   Drainage Amount Small   Drainage Description Serosanguineous   Treatments Cleansed;Site care   Wound Cleansing Approved Wound Cleanser   Periwound Protectant No-sting Skin Prep;Paste Ring;Drape   Dressing Status Clean;Dry;Intact   Dressing Changed Changed   Dressing Cleansing/Solutions 1/4 Strength Dakin's Solution   Dressing Options Wound Vac;Offloading Dressing - Sacral   Dressing Change/Treatment Frequency Monday, Wednesday, Friday, and As Needed   NEXT Dressing Change/Treatment Date 11/18/23   NEXT Weekly Photo (Inpatient Only) 11/25/23   Wound Team Following 3x Weekly   Non-staged Wound Description Full thickness   WOUND NURSE ONLY - Time Spent with Patient (mins) 60       Negative Pressure Wound Therapy 11/12/23 Dehisced;Surgical Groin;Sacrum Left (Active)   Placement Date: 11/12/23   Inserted by: wound team  Hand Hygiene Completed: Yes  Wound Type: Dehisced;Surgical  Location: Groin;Sacrum  Laterality: Left      Assessments 11/16/2023 10:30 AM   NPWT Pump Mode / Pressure Setting Ulta;Intermittent;125 mmHg   Dressing Type Small;Black Foam (Veraflo)   Number of Foam Pieces Used 3   Canister Changed No   NEXT Dressing Change/Treatment Date 11/18/23   VAC VeraFlo Irrigant 1/4 Strength Dakins   VAC VeraFlo Soak Time (mins) 8   VAC VeraFlo Instill Volume (ml) 20   VAC VeraFlo - Therapy Time (hrs) 2   VAC VeraFlo Pressure (mm/Hg) Intermittent;125 mmHg        Vascular:    INGA:   No results found.    Lab Values:    Lab Results   Component Value Date/Time    WBC 17.5 (H) 11/16/2023 01:58 AM    RBC 3.43 (L) 11/16/2023 01:58 AM    HEMOGLOBIN 9.8 (L) 11/16/2023 01:58 AM    HEMATOCRIT 30.3 (L) 11/16/2023 01:58 AM    CREACTPROT 27.78 (H) 11/12/2023 02:22 AM    SEDRATEWES 66 (H) 11/12/2023 02:22 AM         Culture Results show:  Recent Results (from the past 720 hour(s))   CULTURE WOUND W/ GRAM STAIN    Collection Time: 11/14/23 11:53 AM    Specimen: Wound   Result Value Ref Range     Significant Indicator POS (POS)     Source WND     Site Sacral     Culture Result (A)      Light growth mixed enteric kris including E coli,  Citrobacter koseri, Enterococcus faecalis and Streptococcus  anginosis.      Gram Stain Result Moderate WBCs.  Few Gram positive cocci.       Culture Result (A)      Beta Hemolytic Streptococcus group A  Light growth         Pain Level/Medicated:  IV pain medications administered by bedside RN immediately prior (Pt educated that IV medication will not be available on outpatient basis)       INTERVENTIONS BY WOUND TEAM:  Chart and images reviewed. Discussed with bedside RN. All areas of concern (based on picture review, LDA review and discussion with bedside RN) have been thoroughly assessed. Documentation of areas based on significant findings. This RN in to assess patient. Performed standard wound care which includes appropriate positioning, dressing removal and non-selective debridement. Pictures and measurements obtained weekly if/when required.    Wound:  Left Groin  Preparation for Dressing removal: Removed without difficulty  Cleansed/Non-selectively Debrided with:  Wound cleanser and Gauze  Kiara wound: Cleansed with Wound cleanser and Gauze, Prepped with No Sting, Paste Rings, and Drape  Primary Dressin piece of spiral VF foam applied to wound bed.  Secondary (Outer) Dressing: Foam secured with VF tracpad and drape. VF NPWT resumed. Y connected with sacrum NPWT. Offloading foam underneath tubing.     Wound:  Sacrum  Preparation for Dressing removal: Removed without difficulty  Cleansed/Non-selectively Debrided with:  Wound cleanser and Gauze  Kiara wound: Cleansed with Wound cleanser and Gauze, Prepped with No Sting, Paste Rings, and Drape  Primary Dressin piece of spiral half thickness black foam tucked into undermining and filled remainder of wound bed.   Secondary (Outer) Dressing: Hole was cut. Button and regular tracpad applied. Y connected with L groin  VF vac. Offloading foam x2 underneath tubing.    Advanced Wound Care Discharge Planning  Number of Clinicians necessary to complete wound care: 2  Is patient requiring IV pain medications for dressing changes:  Yes  Length of time for dressing change 45 min. (This does not include chart review, pre-medication time, set up, clean up or time spent charting.)    Interdisciplinary consultation: Patient, Bedside RN (Krishna), Vania ANTHONY (Wound RN).      EVALUATION / RATIONALE FOR TREATMENT:     Date:  11/16/23  Wound Status:  Wound progressing as expected    Left groin wound with more granulation tissue present. Continue with POC.    S/P debridement of sacral wound on 11/14. Wound with circumferential undermining. Deepest area of undermining measuring 4cm at 2o'clock. Bone exposed towards mid aspect of wound bed. Regular NPWT applied to increase granulation tissue production to the area. Will benefit from VF NPWT on next change to ensure exposed structures remain moist.       Date:  11/14/23  Wound Status:  Wound progressing as expected    Wound bed clean, no odor. Continue VF NPWT with Dakins  Date:  11/12/23  Wound Status:  Initial evaluation    Left groin with minimal slough after cleansing, met with Dr. Navarro at bedside who advised to place Dakin's VF to site.  Left sacral wound to go to surgery with Dr. Chow today or tomorrow for debridement and likely VAC placement.  Patient very frail appearing.  Sacral wound probes to bone and will be very difficult to heal considering patient overall state and nutrition.             Goals: Steady decrease in wound area and depth weekly.    NURSING PLAN OF CARE ORDERS:  Dressing changes: See Dressing Care orders    NUTRITION RECOMMENDATIONS   Wound Team Recommendations:  N/A     DIET ORDERS (From admission to next 24h)       Start     Ordered    11/16/23 1001  Diet Order Diet: Consistent CHO (Diabetic)  ALL MEALS        Question:  Diet:  Answer:  Consistent CHO (Diabetic)    11/16/23  1000    11/13/23 1328  Supplements  ALL MEALS        Question:  Which Supplement  Answer:  BOOST PLUS  Comment:  or equivalent Ensure    11/13/23 1328                    PREVENTATIVE INTERVENTIONS:   Q shift Jae - performed per nursing policy  Q shift pressure point assessments - performed per nursing policy    Surface/Positioning  ICU Low Airloss - Currently in Place  Reposition q 2 hours - Currently in Place  TAPs Turning system - Currently in Place    Offloading/Redistribution  Sacral offloading dressing (Silicone dressing) - Currently in Place    Anticipated discharge plans:  TBD        Vac Discharge Needs:  Vac Discharge plan is purely a recommendation from wound team and not a requirement for discharge unless otherwise stated by physician.  Veraflo Vac while inpatient, will need to remain on Veraflo Vac upon discharge

## 2023-11-16 NOTE — PROGRESS NOTES
Hospital Medicine Daily Progress Note    Date of Service  11/16/2023    Chief Complaint   altered mentation    Hospital Course  Dillon Centeno is a 58 y.o. male with PAD, hypertension, history of left AKA, admitted 11/12/2023 with altered mentation.  On evaluation, he was found to have a large ischial unstageable decubitus ulcer, and labs showed Na 117, and creatinine of 2.27.  He was also noted to have dehisence of L groin wound.  Patient started on antibiotics and IV fluids.  Surgery was consulted, and patient underwent I&D of the sacral wound on 11/14 (Dr. Christianson).  Cultures have grown beta-hemolytic group A Streptococcus along with mixed enteric and usual skin kris.  Renal function improved with hydration.  Sodium levels improved, although initially overcorrected requiring D5, but has since stabilized with appropriate rate of correction.  He was seen by vascular surgery who anticipated need for outpatient elective bypass procedure on the right leg once acute infection has resolved.      Interval Problem Update  11/16/2023 - I reviewed the patient's chart. There were no significant overnight events. Remains hemodynamically stable and afebrile. Stable on RA.  WBC 17,500.  Sodium 127.  Creatinine is normal.  .  Wound VAC has been placed.  PT recommends SNF placement.    > I have personally seen and examined the patient today.  Still complaining of pain in the back and diffusely.  No nausea or vomiting.  No chest pain.  No shortness of breath.  Not in respiratory distress.      I personally reviewed all lab results mentioned above. Prior medical records from this institution and outside facilities were independently reviewed as noted. I also personally reviewed all ER physician and consultant recommendations and plans as documented above. History was independently obtained by myself. I have discussed this patient's plan of care and discharge plan at IDT rounds today with Case Management, Nursing,  Nursing leadership, and other members of the IDT team.    Consultants/Specialty  general surgery and vascular surgery    Code Status  Full Code    Disposition  The patient is not medically cleared for discharge to home or a post-acute facility.      Needs SNF placement.  He was in the hospital in late October/early November for left BKA, and at that time developed sacral wound with no indication for operative intervention.  Wound VAC was placed, and despite discharge planning was unable to be placed as an SNF and no home health services accepting.  Patient refused to go to a group home, and was sent home).  I have placed the appropriate orders for post-discharge needs.    Review of Systems  ROS     Pertinent positives/negatives as mentioned above.     A complete review of systems was personally done by me. All other systems were negative.       Physical Exam  Temp:  [36.5 °C (97.7 °F)-36.9 °C (98.5 °F)] 36.9 °C (98.5 °F)  Pulse:  [84-98] 87  Resp:  [16-18] 16  BP: (137-160)/() 137/81  SpO2:  [97 %-98 %] 97 %    Physical Exam  Vitals reviewed.   Constitutional:       General: He is not in acute distress.     Appearance: Normal appearance. He is well-developed. He is not toxic-appearing or diaphoretic.   HENT:      Head: Normocephalic and atraumatic.      Right Ear: External ear normal.      Left Ear: External ear normal.      Mouth/Throat:      Mouth: Mucous membranes are moist.      Pharynx: No oropharyngeal exudate.   Eyes:      General: No scleral icterus.     Extraocular Movements: Extraocular movements intact.      Conjunctiva/sclera: Conjunctivae normal.      Pupils: Pupils are equal, round, and reactive to light.   Neck:      Vascular: No JVD.   Cardiovascular:      Rate and Rhythm: Normal rate.      Heart sounds: Normal heart sounds. No murmur heard.     No gallop.   Pulmonary:      Effort: Pulmonary effort is normal. No respiratory distress.      Breath sounds: Normal breath sounds. No stridor. No  wheezing, rhonchi or rales.   Chest:      Chest wall: No tenderness.   Abdominal:      General: Bowel sounds are normal. There is no distension.      Palpations: Abdomen is soft. There is no mass.      Tenderness: There is no abdominal tenderness. There is no guarding or rebound.   Musculoskeletal:         General: No swelling. Normal range of motion.      Cervical back: Normal range of motion and neck supple.      Comments: L AKA  R foot with multiple scabs and wounds on the toes.  Unable to palpate DP/TP  (+) surgical site sacral area, dressing CDI   Lymphadenopathy:      Cervical: No cervical adenopathy.   Skin:     General: Skin is warm and dry.      Coloration: Skin is not jaundiced.      Findings: No rash.   Neurological:      General: No focal deficit present.      Mental Status: He is alert and oriented to person, place, and time.      Cranial Nerves: No cranial nerve deficit.   Psychiatric:         Mood and Affect: Mood normal.         Behavior: Behavior normal.         Thought Content: Thought content normal.         Judgment: Judgment normal.       I have performed the physical examination today 11/16/2023.  In review of yesterday's note, there are no new changes except as documented above.      Fluids  No intake or output data in the 24 hours ending 11/16/23 1120      Laboratory  Recent Labs     11/14/23  0430 11/15/23  0509 11/16/23  0158   WBC 14.3* 13.5* 17.5*   RBC 3.08* 3.03* 3.43*   HEMOGLOBIN 8.7* 8.7* 9.8*   HEMATOCRIT 26.7* 26.5* 30.3*   MCV 86.7 87.5 88.3   MCH 28.2 28.7 28.6   MCHC 32.6 32.8 32.3   RDW 50.1* 51.1* 51.6*   PLATELETCT 457* 470* 485*   MPV 8.6* 8.7* 8.7*     Recent Labs     11/14/23  1518 11/15/23  0509 11/16/23  0158   SODIUM 131* 132* 127*   POTASSIUM 4.2 4.0 3.7   CHLORIDE 94* 97 94*   CO2 24 24 20   GLUCOSE 104* 115* 127*   BUN 8 8 11   CREATININE 0.57 0.49* 0.91   CALCIUM 8.8 7.8* 7.9*                   Imaging  US-VEIN MAPPING LOWER EXTREMITY UNILAT RIGHT   Final Result       CT-ABDOMEN-PELVIS WITH   Final Result         1. There is a 2 cm ulcerative fraying in the left medial gluteus region. Subjacent iliac bone demonstrates bony ridging, consistent with osteomyelitis. There is small amount of air tracking medially and laterally away from the ulcerative wound along the    left medial gluteal musculature and down to the level of the left paraspinous musculature. Therefore, early necrotizing fasciitis possible. No drainable abscess present. This was discussed with the ordering physician.   2. Severely distended bladder with mild to moderate bilateral associated hydronephrosis. This is likely secondary to prostatic hypertrophy. There is severe median lobe hypertrophy. Consider Holguin catheter placement for decompression. Bladder diverticulum    also noted.   3. Patchy ground glass to aeration lower lobes, suspicious for pneumonia. However, atypical edema or other pneumonitis possible.   4. Severe atherosclerotic disease.   5. Additional soft tissue wound in the left groin.      DX-FEMUR-2+ LEFT   Final Result      No evidence of infection by plain film.      DX-CHEST-PORTABLE (1 VIEW)   Final Result      No acute process.           Assessment/Plan  * Infected sacral wound- (present on admission)  Assessment & Plan  - Large sacral/ischial wound with signs of active infection and CT scan concerning for pelvic osteomyelitis and skin and soft tissue infection.  -s/p I&D of the sacral wound on 11/14.  -Surgical cultures grew beta-hemolytic group A streptococcus along with mixed enteric and usual skin kris.  -Continue Unasyn while in the hospital.  I anticipate that he can be transitioned to oral antibiotics, likely Augmentin.  -Continue local wound care.  Wound VAC has been placed.   -Continue pain control with oral oxycodone and IV Dilaudid.  -PT/OT recommending SNF placement.  He was sent home on last admission, but did not do well with worsening sacral wound.  At this time may need to  pursue placement, although it has been a difficult discharge on his last admission with no accepting SNF or home health services.  May need to look into group home placement.  CM/SW for discharge planning.    Severe sepsis (HCC)- (present on admission)  Assessment & Plan  -Due to infected sacral wound.  Resolved.  Management as above.    GELY (acute kidney injury) ([facility])- (present on admission)  Assessment & Plan  - Likely prerenal with a component of ATN in the setting of sepsis  -Resolved with IV fluids  -Continue to follow BMP.  - avoid nephrotoxins, and continue to renally dose all medications.    Open wound of inguinal region- (present on admission)  Assessment & Plan  -Recent history of femoral endarterectomy with incision dehiscence, open wound  -Vascular surgery following  -Continue wound care.      PAD (peripheral artery disease) ([facility])- (present on admission)  Assessment & Plan  -With recent history of bypass grafting  -Vascular surgery is anticipating need for outpatient elective bypass procedure on the right leg once acute infection has resolved.    Hyponatremia- (present on admission)  Assessment & Plan  -Following resuscitation, had very high UOP concerning for low solutes vs beer potomania.   -Sodium levels improved, although initially overcorrected required D5, but has since stabilized with appropriate rate of correction.  -Na still fluctuates, 127 this morning.  Start salt tablets 1 g twice daily.  -Continue to monitor sodium daily.    Anemia- (present on admission)  Assessment & Plan  -Patient had normal hemoglobin and March of this year, but has been anemic since approximately September  -Degree of anemia has been essentially stable  -Likely multifactorial related to nutritional status, and multiple procedures  -Continue to monitor hemoglobin daily.  Restrictive transfusion strategy.         VTE prophylaxis: Lovenox SQ      My total time spent caring for the patient on the day of the encounter was 51  minutes. This does not include time spent on separately billable procedures/tests.

## 2023-11-16 NOTE — DISCHARGE PLANNING
"Case Management Discharge Planning    Admission Date: 11/12/2023  GMLOS: 9.9  ALOS: 4    6-Clicks ADL Score:    6-Clicks Mobility Score: 10  PT and/or OT Eval ordered: Yes  Post-acute Referrals Ordered: Yes  Post-acute Choice Obtained: No - pending options  Has referral(s) been sent to post-acute provider:  Yes      Anticipated Discharge Dispo: Discharge Disposition: Discharged to home/self care (01)    DME Needed: No    Action(s) Taken: Updated Provider/Nurse on Discharge Plan, Patient Conference, and Referral(s) sent, Completed PASRR/LOC  RN HIRAM attended IDT rounds and collaborated with the team regarding discharge planning needs and barriers.  Chart reviewed - last DC on 11/3/23 to home with wound vac by KCLIANNE and OP wound care 3x/week with [facility]. Per notes, could not find an accepting SNF.  Per rounds, pt may need placement if able to find an accepting facility  RN HIRAM met with pt at bedside and discussed POC for SNF referrals, discussed concerns that he needs further rehab and he cannot care for his current wounds prior to his return home  Pt reports he lives with a \"friend\" who is a CNA and states his friend can help \"sometimes if he has time\"  Requested DPA to send balrashad SNF referrals  PASRR [id number], LOC [phone]     Escalations Completed: None    Medically Clear: No    Next Steps:   Follow up on SNF referrals and acceptance  If SNF not option - will need to reconfirm home wound vac and OP wound care appts with [facility]      Barriers to Discharge: Medical clearance and Pending Placement    Is the patient up for discharge tomorrow: No        "

## 2023-11-16 NOTE — PROGRESS NOTES
"Pt is A&Ox3-4,  RA, WC-cound. All medications taken and tolerated, medicated for pain per MAR. Pt resistant to being cleaned up/changed after incontinent episodes citing discomfort and \"dont want to deal with this right now.\" Educated pt on importance of cleaning up incontinent episodes quickly and importance of Q2 turns. Pt verbalized understanding but still refusing turns/resistant during bed changes. Pt resting in bed w/ call light and belongings in reach, bed alarm on, hourly rounding in place. No additional needs at this time.  "

## 2023-11-16 NOTE — WOUND TEAM
Assisted Jemma MELENDEZ (Wound RN), with wound care, non-selective debridement performed using wound cleanser/NS and gauze. Please see Jemma MELENDEZ (Wound RN) wound note for further wound care details.

## 2023-11-16 NOTE — THERAPY
"Occupational Therapy Contact Note    Patient Name: Dillon Centeno  Age:  58 y.o., Sex:  male  Medical Record #: [id number]  Today's Date: 11/16/2023 11/16/23 1137   Interdisciplinary Plan of Care Collaboration   Collaboration Comments This therapist attempted OT evaluation as second attempt. Once again, pt declined to participate in evaluation secondary to hip pain. Pt was educated on the importance of frequent OOB activity and the pathology of bedrest. Pt verbalized understanding but continued to state, \"I understand all of that but I'm not getting up today so you can try again tomorrow.\" This therapist reiterated that yesterday pt verbalized agreeance to participate in OT evaluation and was asked what time accommodates him best for pain level and that he verbalized he did not have a specific time and that any time would work great but he just needed rest. Pt verbalized that he remembers the commentary from yesterday but continued to refuse. Will reattempt tomorrow. However, if pt refuses on third attempt will discontinue order.   Session Information   Date / Session Number  11/16-attempted (eval still needed)       "

## 2023-11-16 NOTE — WOUND TEAM
Patient seen for prevalence:     Right lateral leg and lateral malleolus with concern for pressure injury. Patient seen and assessed. Aforementioned areas with diffuse area of redness that is slow to dee. Discoloration worsened by rash to right leg. Offloaded with mepilex and heel float boot.

## 2023-11-16 NOTE — CARE PLAN
The patient is Watcher - Medium risk of patient condition declining or worsening    Shift Goals  Clinical Goals: Pain mngmt, maintain skin integrity, rest,  Patient Goals: Rest, pain mngmt, comfort  Family Goals: No family at bedside    Progress made toward(s) clinical / shift goals:  Pt medicated for pain per MAR, provided quiet environment for rest.    Patient is not progressing towards the following goals: Pt refusing Q2 turns, and reluctant to turn even with assistance for cleaning stool/urine after incontinent episode. Still resistant even after verbalizing understanding of education.      Problem: Skin Integrity  Goal: Skin integrity is maintained or improved  Outcome: Not Progressing     Problem: Knowledge Deficit - Standard  Goal: Patient and family/care givers will demonstrate understanding of plan of care, disease process/condition, diagnostic tests and medications  Outcome: Progressing     Problem: Pain - Standard  Goal: Alleviation of pain or a reduction in pain to the patient’s comfort goal  Outcome: Progressing     Problem: Fall Risk  Goal: Patient will remain free from falls  Outcome: Progressing

## 2023-11-16 NOTE — CARE PLAN
The patient is Stable - Low risk of patient condition declining or worsening    Shift Goals  Clinical Goals: Control pain per tolerance  Patient Goals: rest, control pain  Family Goals: No family at bedside    Progress made toward(s) clinical / shift goals:  Pt. Pain controlled with PRN pain medication. PT. Educated on the uses of each pain medication and when they are necessary.     Patient is not progressing towards the following goals:

## 2023-11-17 LAB
ANION GAP SERPL CALC-SCNC: 14 MMOL/L (ref 7–16)
BACTERIA BLD CULT: NORMAL
BACTERIA SPEC ANAEROBE CULT: ABNORMAL
BACTERIA SPEC ANAEROBE CULT: ABNORMAL
BASOPHILS # BLD AUTO: 0.3 % (ref 0–1.8)
BASOPHILS # BLD: 0.04 K/UL (ref 0–0.12)
BUN SERPL-MCNC: 15 MG/DL (ref 8–22)
CALCIUM SERPL-MCNC: 8.3 MG/DL (ref 8.5–10.5)
CHLORIDE SERPL-SCNC: 98 MMOL/L (ref 96–112)
CO2 SERPL-SCNC: 19 MMOL/L (ref 20–33)
CREAT SERPL-MCNC: 1.22 MG/DL (ref 0.5–1.4)
EOSINOPHIL # BLD AUTO: 0.26 K/UL (ref 0–0.51)
EOSINOPHIL NFR BLD: 2.2 % (ref 0–6.9)
ERYTHROCYTE [DISTWIDTH] IN BLOOD BY AUTOMATED COUNT: 53.1 FL (ref 35.9–50)
GFR SERPLBLD CREATININE-BSD FMLA CKD-EPI: 68 ML/MIN/1.73 M 2
GLUCOSE BLD STRIP.AUTO-MCNC: 109 MG/DL (ref 65–99)
GLUCOSE BLD STRIP.AUTO-MCNC: 132 MG/DL (ref 65–99)
GLUCOSE BLD STRIP.AUTO-MCNC: 91 MG/DL (ref 65–99)
GLUCOSE BLD STRIP.AUTO-MCNC: 92 MG/DL (ref 65–99)
GLUCOSE SERPL-MCNC: 95 MG/DL (ref 65–99)
HCT VFR BLD AUTO: 26.8 % (ref 42–52)
HGB BLD-MCNC: 8.6 G/DL (ref 14–18)
IMM GRANULOCYTES # BLD AUTO: 0.33 K/UL (ref 0–0.11)
IMM GRANULOCYTES NFR BLD AUTO: 2.8 % (ref 0–0.9)
LYMPHOCYTES # BLD AUTO: 1.27 K/UL (ref 1–4.8)
LYMPHOCYTES NFR BLD: 10.7 % (ref 22–41)
MCH RBC QN AUTO: 28.3 PG (ref 27–33)
MCHC RBC AUTO-ENTMCNC: 32.1 G/DL (ref 32.3–36.5)
MCV RBC AUTO: 88.2 FL (ref 81.4–97.8)
MONOCYTES # BLD AUTO: 1.02 K/UL (ref 0–0.85)
MONOCYTES NFR BLD AUTO: 8.6 % (ref 0–13.4)
NEUTROPHILS # BLD AUTO: 9 K/UL (ref 1.82–7.42)
NEUTROPHILS NFR BLD: 75.4 % (ref 44–72)
NRBC # BLD AUTO: 0 K/UL
NRBC BLD-RTO: 0 /100 WBC (ref 0–0.2)
PLATELET # BLD AUTO: 454 K/UL (ref 164–446)
PMV BLD AUTO: 8.7 FL (ref 9–12.9)
POTASSIUM SERPL-SCNC: 3.6 MMOL/L (ref 3.6–5.5)
RBC # BLD AUTO: 3.04 M/UL (ref 4.7–6.1)
SIGNIFICANT IND 70042: ABNORMAL
SIGNIFICANT IND 70042: NORMAL
SITE SITE: ABNORMAL
SITE SITE: NORMAL
SODIUM SERPL-SCNC: 131 MMOL/L (ref 135–145)
SOURCE SOURCE: ABNORMAL
SOURCE SOURCE: NORMAL
WBC # BLD AUTO: 11.9 K/UL (ref 4.8–10.8)

## 2023-11-17 PROCEDURE — 700102 HCHG RX REV CODE 250 W/ 637 OVERRIDE(OP): Performed by: INTERNAL MEDICINE

## 2023-11-17 PROCEDURE — A9270 NON-COVERED ITEM OR SERVICE: HCPCS | Performed by: INTERNAL MEDICINE

## 2023-11-17 PROCEDURE — 80048 BASIC METABOLIC PNL TOTAL CA: CPT

## 2023-11-17 PROCEDURE — A9270 NON-COVERED ITEM OR SERVICE: HCPCS | Performed by: HOSPITALIST

## 2023-11-17 PROCEDURE — 700101 HCHG RX REV CODE 250: Performed by: INTERNAL MEDICINE

## 2023-11-17 PROCEDURE — 92526 ORAL FUNCTION THERAPY: CPT

## 2023-11-17 PROCEDURE — 700111 HCHG RX REV CODE 636 W/ 250 OVERRIDE (IP): Mod: JZ | Performed by: INTERNAL MEDICINE

## 2023-11-17 PROCEDURE — 85025 COMPLETE CBC W/AUTO DIFF WBC: CPT

## 2023-11-17 PROCEDURE — 700102 HCHG RX REV CODE 250 W/ 637 OVERRIDE(OP): Performed by: HOSPITALIST

## 2023-11-17 PROCEDURE — 99233 SBSQ HOSP IP/OBS HIGH 50: CPT | Performed by: INTERNAL MEDICINE

## 2023-11-17 PROCEDURE — 36415 COLL VENOUS BLD VENIPUNCTURE: CPT

## 2023-11-17 PROCEDURE — 770006 HCHG ROOM/CARE - MED/SURG/GYN SEMI*

## 2023-11-17 PROCEDURE — 82962 GLUCOSE BLOOD TEST: CPT | Mod: 91

## 2023-11-17 RX ADMIN — ENOXAPARIN SODIUM 40 MG: 100 INJECTION SUBCUTANEOUS at 16:50

## 2023-11-17 RX ADMIN — OXYCODONE HYDROCHLORIDE AND ACETAMINOPHEN 500 MG: 500 TABLET ORAL at 05:48

## 2023-11-17 RX ADMIN — OXYCODONE HYDROCHLORIDE 10 MG: 10 TABLET ORAL at 14:37

## 2023-11-17 RX ADMIN — OXYCODONE HYDROCHLORIDE 10 MG: 10 TABLET ORAL at 05:48

## 2023-11-17 RX ADMIN — OXYCODONE HYDROCHLORIDE AND ACETAMINOPHEN 500 MG: 500 TABLET ORAL at 16:56

## 2023-11-17 RX ADMIN — DOCUSATE SODIUM 50 MG AND SENNOSIDES 8.6 MG 2 TABLET: 8.6; 5 TABLET, FILM COATED ORAL at 16:49

## 2023-11-17 RX ADMIN — AMOXICILLIN AND CLAVULANATE POTASSIUM 1 TABLET: 875; 125 TABLET, FILM COATED ORAL at 16:49

## 2023-11-17 RX ADMIN — FERROUS SULFATE TAB 325 MG (65 MG ELEMENTAL FE) 325 MG: 325 (65 FE) TAB at 08:36

## 2023-11-17 RX ADMIN — THERA TABS 1 TABLET: TAB at 05:48

## 2023-11-17 RX ADMIN — SODIUM CHLORIDE 1 G: 1 TABLET ORAL at 16:50

## 2023-11-17 RX ADMIN — AMOXICILLIN AND CLAVULANATE POTASSIUM 1 TABLET: 875; 125 TABLET, FILM COATED ORAL at 05:48

## 2023-11-17 RX ADMIN — Medication 220 MG: at 05:48

## 2023-11-17 RX ADMIN — OXYCODONE HYDROCHLORIDE 10 MG: 10 TABLET ORAL at 18:31

## 2023-11-17 RX ADMIN — SODIUM CHLORIDE 1 G: 1 TABLET ORAL at 11:19

## 2023-11-17 RX ADMIN — DAKIN'S SOLUTION 0.125% (QUARTER STRENGTH) 473 ML: 0.12 SOLUTION at 12:56

## 2023-11-17 RX ADMIN — OXYCODONE HYDROCHLORIDE 10 MG: 10 TABLET ORAL at 09:56

## 2023-11-17 RX ADMIN — SODIUM CHLORIDE 1 G: 1 TABLET ORAL at 08:36

## 2023-11-17 ASSESSMENT — PAIN DESCRIPTION - PAIN TYPE
TYPE: ACUTE PAIN;CHRONIC PAIN
TYPE: ACUTE PAIN;CHRONIC PAIN
TYPE: CHRONIC PAIN

## 2023-11-17 NOTE — CARE PLAN
The patient is Watcher - Medium risk of patient condition declining or worsening    Shift Goals  Clinical Goals: monitor wound vac  Patient Goals: pain management  Family Goals: No family at bedside      Patient is not progressing towards the following goals:  Problem: Skin Integrity  Goal: Skin integrity is maintained or improved  Outcome: Not Progressing    Patient was very irritated and non compliant this pm. When RN and CNA went in to clean him up since he was soaked in urine, he threatened to get violent with us. He is persistent on refusing to be cleaned up, patient educated on the fact that he has wounds and this puts him at an even greater risk for infection. Frequent checks made, call light within reach.

## 2023-11-17 NOTE — PROGRESS NOTES
Hospital Medicine Daily Progress Note    Date of Service  11/17/2023    Chief Complaint   altered mentation    Hospital Course  Dillon Centeno is a 58 y.o. male with PAD, hypertension, history of left AKA, admitted 11/12/2023 with altered mentation.  On evaluation, he was found to have a large ischial unstageable decubitus ulcer, and labs showed Na 117, and creatinine of 2.27.  He was also noted to have dehisence of L groin wound.  Patient started on antibiotics and IV fluids.  Surgery was consulted, and patient underwent I&D of the sacral wound on 11/14 (Dr. Christianson).  Cultures have grown beta-hemolytic group A Streptococcus along with mixed enteric and usual skin kris.  Renal function improved with hydration.  Sodium levels improved, although initially overcorrected requiring D5, but has since stabilized with appropriate rate of correction.  He was seen by vascular surgery who anticipated need for outpatient elective bypass procedure on the right leg once acute infection has resolved. Wound VAC has been placed.  PT recommends SNF placement.      Interval Problem Update  11/17/2023 - I reviewed the patient's chart today. Uneventful night. VSS. Afebrile. Saturating well on RA.  WBC has improved to 11,900.  Hemoglobin stable at 8.6.  Sodium improved to 131.  Creatinine 1.22.  FBG 91.    > I have personally seen and examined the patient today.  States he has pain on the left leg.  Otherwise not short of breath.  No nausea or vomiting.  No chest pain.      I personally reviewed all lab results mentioned above. Prior medical records from this institution and outside facilities were independently reviewed as noted. I also personally reviewed all ER physician and consultant recommendations and plans as documented above. History was independently obtained by myself. I have discussed this patient's plan of care and discharge plan at IDT rounds today with Case Management, Nursing, Nursing leadership, and other members  of the IDT team.    Consultants/Specialty  general surgery and vascular surgery    Code Status  Full Code    Disposition  The patient is not medically cleared for discharge to home or a post-acute facility.      Needs SNF placement.  He was in the hospital in late October/early November for left BKA, and at that time developed sacral wound with no indication for operative intervention.  Wound VAC was placed, and despite discharge planning was unable to be placed as an SNF and no home health services accepting.  Patient refused to go to a group home, and was sent home).  I have placed the appropriate orders for post-discharge needs.    Review of Systems  ROS     Pertinent positives/negatives as mentioned above.     A complete review of systems was personally done by me. All other systems were negative.       Physical Exam  Temp:  [36.2 °C (97.2 °F)-36.4 °C (97.5 °F)] 36.3 °C (97.4 °F)  Pulse:  [] 90  Resp:  [16-19] 16  BP: (138-150)/() 138/97  SpO2:  [95 %-97 %] 96 %    Physical Exam  Vitals reviewed.   Constitutional:       General: He is not in acute distress.     Appearance: Normal appearance. He is well-developed. He is not toxic-appearing or diaphoretic.   HENT:      Head: Normocephalic and atraumatic.      Right Ear: External ear normal.      Left Ear: External ear normal.      Mouth/Throat:      Mouth: Mucous membranes are moist.      Pharynx: No oropharyngeal exudate.   Eyes:      General: No scleral icterus.     Extraocular Movements: Extraocular movements intact.      Conjunctiva/sclera: Conjunctivae normal.      Pupils: Pupils are equal, round, and reactive to light.   Neck:      Vascular: No JVD.   Cardiovascular:      Rate and Rhythm: Normal rate.      Heart sounds: Normal heart sounds. No murmur heard.     No gallop.   Pulmonary:      Effort: Pulmonary effort is normal. No respiratory distress.      Breath sounds: Normal breath sounds. No stridor. No wheezing, rhonchi or rales.   Chest:       Chest wall: No tenderness.   Abdominal:      General: Bowel sounds are normal. There is no distension.      Palpations: Abdomen is soft. There is no mass.      Tenderness: There is no abdominal tenderness. There is no guarding or rebound.   Musculoskeletal:         General: No swelling. Normal range of motion.      Cervical back: Normal range of motion and neck supple.      Comments: L AKA  R foot with multiple scabs and wounds on the toes.  Unable to palpate DP/TP  (+) surgical site sacral area, dressing CDI   Lymphadenopathy:      Cervical: No cervical adenopathy.   Skin:     General: Skin is warm and dry.      Coloration: Skin is not jaundiced.      Findings: No rash.   Neurological:      General: No focal deficit present.      Mental Status: He is alert and oriented to person, place, and time.      Cranial Nerves: No cranial nerve deficit.   Psychiatric:         Mood and Affect: Mood normal.         Behavior: Behavior normal.         Thought Content: Thought content normal.         Judgment: Judgment normal.       I have performed the physical examination today 11/17/2023.  In review of yesterday's note, there are no new changes except as documented above.      Fluids    Intake/Output Summary (Last 24 hours) at 11/17/2023 1019  Last data filed at 11/16/2023 2053  Gross per 24 hour   Intake 800 ml   Output 200 ml   Net 600 ml         Laboratory  Recent Labs     11/15/23  0509 11/16/23 0158 11/17/23  0435   WBC 13.5* 17.5* 11.9*   RBC 3.03* 3.43* 3.04*   HEMOGLOBIN 8.7* 9.8* 8.6*   HEMATOCRIT 26.5* 30.3* 26.8*   MCV 87.5 88.3 88.2   MCH 28.7 28.6 28.3   MCHC 32.8 32.3 32.1*   RDW 51.1* 51.6* 53.1*   PLATELETCT 470* 485* 454*   MPV 8.7* 8.7* 8.7*     Recent Labs     11/15/23  0509 11/16/23  0158 11/17/23  0435   SODIUM 132* 127* 131*   POTASSIUM 4.0 3.7 3.6   CHLORIDE 97 94* 98   CO2 24 20 19*   GLUCOSE 115* 127* 95   BUN 8 11 15   CREATININE 0.49* 0.91 1.22   CALCIUM 7.8* 7.9* 8.3*                    Imaging  US-VEIN MAPPING LOWER EXTREMITY UNILAT RIGHT   Final Result      CT-ABDOMEN-PELVIS WITH   Final Result         1. There is a 2 cm ulcerative fraying in the left medial gluteus region. Subjacent iliac bone demonstrates bony ridging, consistent with osteomyelitis. There is small amount of air tracking medially and laterally away from the ulcerative wound along the    left medial gluteal musculature and down to the level of the left paraspinous musculature. Therefore, early necrotizing fasciitis possible. No drainable abscess present. This was discussed with the ordering physician.   2. Severely distended bladder with mild to moderate bilateral associated hydronephrosis. This is likely secondary to prostatic hypertrophy. There is severe median lobe hypertrophy. Consider Holguin catheter placement for decompression. Bladder diverticulum    also noted.   3. Patchy ground glass to aeration lower lobes, suspicious for pneumonia. However, atypical edema or other pneumonitis possible.   4. Severe atherosclerotic disease.   5. Additional soft tissue wound in the left groin.      DX-FEMUR-2+ LEFT   Final Result      No evidence of infection by plain film.      DX-CHEST-PORTABLE (1 VIEW)   Final Result      No acute process.           Assessment/Plan  * Infected sacral wound- (present on admission)  Assessment & Plan  - Large sacral/ischial wound with signs of active infection and CT scan concerning for pelvic osteomyelitis and skin and soft tissue infection.  -s/p I&D of the sacral wound on 11/14.  -Surgical cultures grew beta-hemolytic group A streptococcus along with mixed enteric and usual skin kris.  -Change to oral Augmentin to complete 7 days course of antibiotics  -Continue local wound care.  Wound VAC has been placed.   -Continue pain control with oral oxycodone and IV Dilaudid.  -PT/OT recommending SNF placement.  He was sent home on last admission, but did not do well with worsening sacral wound.  At this  time may need to pursue placement, although it has been a difficult discharge on his last admission with no accepting SNF or home health services.  May need to look into group home placement.  CM/SW for discharge planning.    Severe sepsis (HCC)- (present on admission)  Assessment & Plan  -Due to infected sacral wound.  Resolved.  Management as above.    GELY (acute kidney injury) ([facility])- (present on admission)  Assessment & Plan  - Likely prerenal with a component of ATN in the setting of sepsis  -Resolved with IV fluids  -Continue to follow BMP.  - avoid nephrotoxins, and continue to renally dose all medications.    Open wound of inguinal region- (present on admission)  Assessment & Plan  -Recent history of femoral endarterectomy with incision dehiscence, open wound  -Vascular surgery following  -Continue wound care.      PAD (peripheral artery disease) ([facility])- (present on admission)  Assessment & Plan  -With recent history of bypass grafting  -Vascular surgery is anticipating need for outpatient elective bypass procedure on the right leg once acute infection has resolved.    Hyponatremia- (present on admission)  Assessment & Plan  -Following resuscitation, had very high UOP concerning for low solutes vs beer potomania.   -Sodium levels improved, although initially overcorrected required D5, but has since stabilized with appropriate rate of correction.  -Sodium improving.  Continue salt tablets 1 g twice daily.  -Continue to monitor sodium daily.    Anemia- (present on admission)  Assessment & Plan  -Patient had normal hemoglobin and March of this year, but has been anemic since approximately September  -Degree of anemia has been essentially stable  -Likely multifactorial related to nutritional status, and multiple procedures  -Continue to monitor hemoglobin daily.  Restrictive transfusion strategy.         VTE prophylaxis: Lovenox SQ      My total time spent caring for the patient on the day of the encounter was 50  minutes. This does not include time spent on separately billable procedures/tests.

## 2023-11-17 NOTE — THERAPY
"Physical Therapy Contact Note    Patient Name: Dillon Centeno  Age:  58 y.o., Sex:  male  Medical Record #: [id number]  Today's Date: 11/17/2023 11/17/23 1205   Interdisciplinary Plan of Care Collaboration   Collaboration Comments Attempted to initiate PT tx this date. Attempted ~1030 (RN reports pre-medicating prior); however, pt reported being in too much pain and requested therapist come 1-1.5 hours later. Returned, and pt proceeded to start stating \"Please, no! No, just let me lay here to rot!\" Provided encouragement for collaboration with therapist and solutions to assist with pain management; however, pt not receptive at this time. Will continue to follow.       "

## 2023-11-17 NOTE — THERAPY
"Speech Language Pathology   Daily Treatment     Patient Name: Dillon Centeno  AGE:  58 y.o., SEX:  male  Medical Record #: [id number]  Date of Service: 11/17/2023      Precautions:  Precautions: Fall Risk         Subjective  Patient seen this date for dysphagia management. Patient alert, agreeable to session, and stayed with low HOB. Patient reports no difficulty with RG7 diet.       Assessment  PO presentations of TN0 (cup, straw), PU4, SB6, and RG7 from lunch tray. Adequate oral bolus acceptance/containment. Complete AP transfer without notable oral bolus residue upon oral inspection. Adequate bite with functional mastication of solids. No cough/throat clear appreciated with PO. Vocal quality remained stable throughout PO intake. Single swallow completed per bolus. No signs of esophageal dysfunction. Patient adequately self-feeding with appropriate rate and volume of intake. Provided education regarding general aspiration precautions as well as signs of aspiration. All questions addressed.        Clinical Impressions  Patient presents with clinically functional oropharyngeal swallow. Acute dysphagia intervention is not warranted at this time.        Recommendations  Diet Consistency: Regular solids with thin liquids  Instrumentation: None indicated at this time  Medication: Whole with liquid, As tolerated  Supervision: Assist with meal tray set up  Positioning:  (as upright as tolerated)  Oral Care: BID                     SLP Treatment Plan  Treatment Plan: None Indicated (d/c goals met)            Anticipated Discharge Needs  Discharge Recommendations: Anticipate that the patient will have no further speech therapy needs after discharge from the hospital  Therapy Recommendations Upon DC: Not Indicated      Patient / Family Goals  Patient / Family Goal #1: \"I'm so thirsty\"  Goal #1 Outcome: Goal met  Short Term Goals  Short Term Goal # 1: Patient will participate in swallow diagnostic to guide dypshagia " management.  Goal Outcome # 1: Goal met, new goal added  Short Term Goal # 1 B : Patient will consume a diet of RG7/TN0 without decline in pulmonary status.  Goal Outcome  # 1 B: Goal met      Karishma Minor, SLP

## 2023-11-17 NOTE — THERAPY
Occupational Therapy Contact Note    Patient Name: Dillon Centeno  Age:  58 y.o., Sex:  male  Medical Record #: [id number]  Today's Date: 11/17/2023 11/17/23 1428   Initial Contact Note    Initial Contact Note Order Received and Verified. Occupational Therapy Evaluation NOT Completed Because Patient Does Not Require Acute Occupational Therapy at this Time.   Interdisciplinary Plan of Care Collaboration   Collaboration Comments OT evaluation attempted for third time. However, pt continued to explain that he is in pain and will not participate in OT evaluation this date. At this time, evaluation could not be completed due to pt's consistent refusals. Will discontinue order.

## 2023-11-18 LAB
ANION GAP SERPL CALC-SCNC: 10 MMOL/L (ref 7–16)
BASOPHILS # BLD AUTO: 0.2 % (ref 0–1.8)
BASOPHILS # BLD: 0.02 K/UL (ref 0–0.12)
BUN SERPL-MCNC: 18 MG/DL (ref 8–22)
CALCIUM SERPL-MCNC: 7.9 MG/DL (ref 8.5–10.5)
CHLORIDE SERPL-SCNC: 100 MMOL/L (ref 96–112)
CO2 SERPL-SCNC: 20 MMOL/L (ref 20–33)
CREAT SERPL-MCNC: 1.44 MG/DL (ref 0.5–1.4)
EOSINOPHIL # BLD AUTO: 0.28 K/UL (ref 0–0.51)
EOSINOPHIL NFR BLD: 2.5 % (ref 0–6.9)
ERYTHROCYTE [DISTWIDTH] IN BLOOD BY AUTOMATED COUNT: 52.7 FL (ref 35.9–50)
GFR SERPLBLD CREATININE-BSD FMLA CKD-EPI: 56 ML/MIN/1.73 M 2
GLUCOSE BLD STRIP.AUTO-MCNC: 109 MG/DL (ref 65–99)
GLUCOSE BLD STRIP.AUTO-MCNC: 115 MG/DL (ref 65–99)
GLUCOSE BLD STRIP.AUTO-MCNC: 130 MG/DL (ref 65–99)
GLUCOSE BLD STRIP.AUTO-MCNC: 136 MG/DL (ref 65–99)
GLUCOSE SERPL-MCNC: 109 MG/DL (ref 65–99)
HCT VFR BLD AUTO: 25.6 % (ref 42–52)
HGB BLD-MCNC: 8.3 G/DL (ref 14–18)
IMM GRANULOCYTES # BLD AUTO: 0.36 K/UL (ref 0–0.11)
IMM GRANULOCYTES NFR BLD AUTO: 3.3 % (ref 0–0.9)
LYMPHOCYTES # BLD AUTO: 1.63 K/UL (ref 1–4.8)
LYMPHOCYTES NFR BLD: 14.8 % (ref 22–41)
MCH RBC QN AUTO: 28.7 PG (ref 27–33)
MCHC RBC AUTO-ENTMCNC: 32.4 G/DL (ref 32.3–36.5)
MCV RBC AUTO: 88.6 FL (ref 81.4–97.8)
MONOCYTES # BLD AUTO: 1.11 K/UL (ref 0–0.85)
MONOCYTES NFR BLD AUTO: 10.1 % (ref 0–13.4)
NEUTROPHILS # BLD AUTO: 7.64 K/UL (ref 1.82–7.42)
NEUTROPHILS NFR BLD: 69.1 % (ref 44–72)
NRBC # BLD AUTO: 0 K/UL
NRBC BLD-RTO: 0 /100 WBC (ref 0–0.2)
PLATELET # BLD AUTO: 500 K/UL (ref 164–446)
PMV BLD AUTO: 8.6 FL (ref 9–12.9)
POTASSIUM SERPL-SCNC: 3.8 MMOL/L (ref 3.6–5.5)
RBC # BLD AUTO: 2.89 M/UL (ref 4.7–6.1)
SODIUM SERPL-SCNC: 130 MMOL/L (ref 135–145)
WBC # BLD AUTO: 11 K/UL (ref 4.8–10.8)

## 2023-11-18 PROCEDURE — 85025 COMPLETE CBC W/AUTO DIFF WBC: CPT

## 2023-11-18 PROCEDURE — 306591 TRAY SUTURE REMOVAL DISP: Performed by: INTERNAL MEDICINE

## 2023-11-18 PROCEDURE — 302098 PASTE RING (FLAT): Performed by: INTERNAL MEDICINE

## 2023-11-18 PROCEDURE — 700102 HCHG RX REV CODE 250 W/ 637 OVERRIDE(OP): Performed by: HOSPITALIST

## 2023-11-18 PROCEDURE — 97605 NEG PRS WND THER DME<=50SQCM: CPT

## 2023-11-18 PROCEDURE — 36415 COLL VENOUS BLD VENIPUNCTURE: CPT

## 2023-11-18 PROCEDURE — A9270 NON-COVERED ITEM OR SERVICE: HCPCS | Performed by: INTERNAL MEDICINE

## 2023-11-18 PROCEDURE — 80048 BASIC METABOLIC PNL TOTAL CA: CPT

## 2023-11-18 PROCEDURE — 99233 SBSQ HOSP IP/OBS HIGH 50: CPT | Performed by: INTERNAL MEDICINE

## 2023-11-18 PROCEDURE — 700111 HCHG RX REV CODE 636 W/ 250 OVERRIDE (IP): Performed by: INTERNAL MEDICINE

## 2023-11-18 PROCEDURE — A9270 NON-COVERED ITEM OR SERVICE: HCPCS | Performed by: HOSPITALIST

## 2023-11-18 PROCEDURE — 82962 GLUCOSE BLOOD TEST: CPT | Mod: 91

## 2023-11-18 PROCEDURE — 700102 HCHG RX REV CODE 250 W/ 637 OVERRIDE(OP): Performed by: INTERNAL MEDICINE

## 2023-11-18 PROCEDURE — 770006 HCHG ROOM/CARE - MED/SURG/GYN SEMI*

## 2023-11-18 PROCEDURE — 700101 HCHG RX REV CODE 250: Performed by: INTERNAL MEDICINE

## 2023-11-18 PROCEDURE — 700105 HCHG RX REV CODE 258: Performed by: INTERNAL MEDICINE

## 2023-11-18 PROCEDURE — 97602 WOUND(S) CARE NON-SELECTIVE: CPT

## 2023-11-18 PROCEDURE — 700111 HCHG RX REV CODE 636 W/ 250 OVERRIDE (IP): Mod: JZ | Performed by: INTERNAL MEDICINE

## 2023-11-18 RX ORDER — SODIUM CHLORIDE, SODIUM LACTATE, POTASSIUM CHLORIDE, CALCIUM CHLORIDE 600; 310; 30; 20 MG/100ML; MG/100ML; MG/100ML; MG/100ML
INJECTION, SOLUTION INTRAVENOUS CONTINUOUS
Status: DISCONTINUED | OUTPATIENT
Start: 2023-11-18 | End: 2023-11-21

## 2023-11-18 RX ADMIN — SODIUM CHLORIDE 1 G: 1 TABLET ORAL at 16:05

## 2023-11-18 RX ADMIN — FERROUS SULFATE TAB 325 MG (65 MG ELEMENTAL FE) 325 MG: 325 (65 FE) TAB at 07:44

## 2023-11-18 RX ADMIN — DAKIN'S SOLUTION 0.125% (QUARTER STRENGTH) 473 ML: 0.12 SOLUTION at 11:00

## 2023-11-18 RX ADMIN — OXYCODONE HYDROCHLORIDE 10 MG: 10 TABLET ORAL at 16:04

## 2023-11-18 RX ADMIN — HYDROMORPHONE HYDROCHLORIDE 0.5 MG: 1 INJECTION, SOLUTION INTRAMUSCULAR; INTRAVENOUS; SUBCUTANEOUS at 10:10

## 2023-11-18 RX ADMIN — SODIUM CHLORIDE 1 G: 1 TABLET ORAL at 07:44

## 2023-11-18 RX ADMIN — SODIUM CHLORIDE 1 G: 1 TABLET ORAL at 11:47

## 2023-11-18 RX ADMIN — OXYCODONE HYDROCHLORIDE AND ACETAMINOPHEN 500 MG: 500 TABLET ORAL at 05:28

## 2023-11-18 RX ADMIN — SODIUM CHLORIDE, POTASSIUM CHLORIDE, SODIUM LACTATE AND CALCIUM CHLORIDE: 600; 310; 30; 20 INJECTION, SOLUTION INTRAVENOUS at 08:46

## 2023-11-18 RX ADMIN — AMOXICILLIN AND CLAVULANATE POTASSIUM 1 TABLET: 875; 125 TABLET, FILM COATED ORAL at 05:28

## 2023-11-18 RX ADMIN — SODIUM CHLORIDE, POTASSIUM CHLORIDE, SODIUM LACTATE AND CALCIUM CHLORIDE: 600; 310; 30; 20 INJECTION, SOLUTION INTRAVENOUS at 18:05

## 2023-11-18 RX ADMIN — AMOXICILLIN AND CLAVULANATE POTASSIUM 1 TABLET: 875; 125 TABLET, FILM COATED ORAL at 16:05

## 2023-11-18 RX ADMIN — DOCUSATE SODIUM 50 MG AND SENNOSIDES 8.6 MG 2 TABLET: 8.6; 5 TABLET, FILM COATED ORAL at 16:04

## 2023-11-18 RX ADMIN — OXYCODONE HYDROCHLORIDE AND ACETAMINOPHEN 500 MG: 500 TABLET ORAL at 16:05

## 2023-11-18 RX ADMIN — DOCUSATE SODIUM 50 MG AND SENNOSIDES 8.6 MG 2 TABLET: 8.6; 5 TABLET, FILM COATED ORAL at 05:28

## 2023-11-18 RX ADMIN — ENOXAPARIN SODIUM 40 MG: 100 INJECTION SUBCUTANEOUS at 16:05

## 2023-11-18 RX ADMIN — THERA TABS 1 TABLET: TAB at 05:28

## 2023-11-18 RX ADMIN — OXYCODONE HYDROCHLORIDE 10 MG: 10 TABLET ORAL at 11:46

## 2023-11-18 RX ADMIN — OXYCODONE HYDROCHLORIDE 10 MG: 10 TABLET ORAL at 07:46

## 2023-11-18 RX ADMIN — Medication 220 MG: at 05:28

## 2023-11-18 ASSESSMENT — PAIN DESCRIPTION - PAIN TYPE
TYPE: ACUTE PAIN;CHRONIC PAIN
TYPE: ACUTE PAIN

## 2023-11-18 ASSESSMENT — FIBROSIS 4 INDEX: FIB4 SCORE: 1.475

## 2023-11-18 NOTE — CARE PLAN
The patient is Stable - Low risk of patient condition declining or worsening    Shift Goals  Clinical Goals: mobilize patient  Patient Goals: pain control  Family Goals: LUTHER    Progress made toward(s) clinical / shift goals:    Problem: Knowledge Deficit - Standard  Goal: Patient and family/care givers will demonstrate understanding of plan of care, disease process/condition, diagnostic tests and medications  Outcome: Progressing     Problem: Hemodynamics  Goal: Patient's hemodynamics, fluid balance and neurologic status will be stable or improve  Outcome: Progressing     Problem: Fluid Volume  Goal: Fluid volume balance will be maintained  Outcome: Progressing       Patient is not progressing towards the following goals:

## 2023-11-18 NOTE — WOUND TEAM
[facility]  Inpatient Services  Initial Wound and Skin Care Evaluation    Admission Date: 11/12/2023     Last order of IP CONSULT TO WOUND CARE was found on 11/14/2023 from Hospital Encounter on 11/12/2023     HPI, PMH, SH: Reviewed    Past Surgical History:   Procedure Laterality Date    INCISION AND DRAINAGE GENERAL Left 11/14/2023    Procedure: Sharp exicisional debridement of skin subqutaneous fat and fasha 10cm squard;  Surgeon: Thom Christianson M.D.;  Location: [facility];  Service: General    INCISION AND DRAINAGE GENERAL Left 10/23/2023    Procedure: INCISION AND DRAINAGE;  Surgeon: Keith Navarro M.D.;  Location: [facility];  Service: Vascular    APPLICATION OR REPLACEMENT, WOUND VAC  10/23/2023    Procedure: APPLICATION OR REPLACEMENT, WOUND VAC;  Surgeon: Keith Navarro M.D.;  Location: [facility];  Service: Vascular    KNEE AMPUTATION ABOVE Left 10/23/2023    Procedure: AMPUTATION, ABOVE KNEE;  Surgeon: Keith Navarro M.D.;  Location: [facility];  Service: Vascular    KNEE AMPUTATION BELOW Left 10/20/2023    Procedure: AMPUTATION, BELOW KNEE WOUND REVISION;  Surgeon: Pradip Altamirano M.D.;  Location: [facility];  Service: Orthopedics    FEMORAL ENDARTERECTOMY Bilateral 9/30/2023    Procedure: ENDARTERECTOMY, FEMORAL;  Surgeon: Keith Navarro M.D.;  Location: [facility];  Service: Vascular    ANGIOGRAM, WITH ANGIOPLASTY, AND STENT INSERTION IF INDICATED Right 9/30/2023    Procedure: ANGIOGRAM, WITH ILIAC STENT;  Surgeon: Keith Navarro M.D.;  Location: [facility];  Service: Vascular    KNEE AMPUTATION BELOW Left 9/27/2023    Procedure: AMPUTATION, BELOW KNEE;  Surgeon: Pradip Altamirano M.D.;  Location: [facility];  Service: Orthopedics    IRRIGATION & DEBRIDEMENT ORTHO Left 3/4/2018    Procedure: IRRIGATION & DEBRIDEMENT ORTHO - HUMERUS;  Surgeon: Sergio Watkins M.D.;  Location: SURGERY  [facility];  Service: Orthopedics    ORIF, SHOULDER Left 1/4/2018    Procedure: SHOULDER ORIF;  Surgeon: Sergio Watkins M.D.;  Location: SURGERY [facility];  Service: Orthopedics    CLOSED REDUCTION Left 12/24/2017    Procedure: CLOSED REDUCTION;  Surgeon: Keith Subramanian M.D.;  Location: SURGERY [facility];  Service: Orthopedics    OTHER      multiple surgeries lower legs from past injuries     Social History     Tobacco Use    Smoking status: Every Day     Types: Cigars    Smokeless tobacco: Never   Substance Use Topics    Alcohol use: Yes     Comment: a few beers a week     Chief Complaint   Patient presents with    Other     Pt BIB ems due to failure to thrive, pt's neighbors called ems due to pt unable to care for self and refuses to eat    Diarrhea     Diagnosis: Severe sepsis (HCC) [A41.9, R65.20]    Unit where seen by Wound Team: S631/01     WOUND CONSULT RELATED TO:  Left Groin and Sacral NPWT    WOUND TEAM PLAN OF CARE - Frequency of Follow-up:   Nursing to follow dressing orders written for wound care. Contact wound team if area fails to progress, deteriorates or with any questions/concerns if something comes up before next scheduled follow up (See below as to whether wound is following and frequency of wound follow up)   NPWT change 3 times weekly - Left Groin, Sacrum    WOUND HISTORY:   58 y.o. male with past medical history of peripheral arterial disease, gangrene of the left lower extremity, hypertension who presented 11/12/2023 after his brother called EMS for the patient failing to eat or move.  In the ER the patient was found to be tachycardic with significant leukocytosis concerning for infection.  He was found to have a large sacral pressure injury therefore a CT scan of the abdomen pelvis was obtained which showed soft tissue air tracking superiorly above the ileum, other findings include evidence of osteomyelitis.           WOUND ASSESSMENT/LDA  Wound 11/12/23 Pressure  Injury Sacrum Stage IV (Active)   Date First Assessed: 11/12/23   Present on Original Admission: Yes  Hand Hygiene Completed: Yes  Primary Wound Type: Pressure Injury  Location: Sacrum  Wound Description (Comments): Stage IV      Assessments 11/18/2023 11:00 AM   Site Assessment Red;Yellow;Undermining;Painful   Periwound Assessment Red;Fragile   Margins Undefined edges;Unattached edges   Closure Secondary intention   Drainage Amount Small   Drainage Description Serosanguineous   Treatments Cleansed;Site care;Offloading;Chemical debridement   Wound Cleansing Approved Wound Cleanser   Periwound Protectant No-sting Skin Prep;Paste Ring;Hydrocolloid;Drape   Dressing Status Clean;Dry;Intact   Dressing Changed Changed   Dressing Cleansing/Solutions 1/4 Strength Dakin's Solution   Dressing Options Wound Vac;Offloading Dressing - Sacral   Dressing Change/Treatment Frequency Tuesday, Thursday, Saturday, and As Needed   NEXT Dressing Change/Treatment Date 11/21/23   NEXT Weekly Photo (Inpatient Only) 11/23/23   Wound Team Following 3x Weekly   Pressure Injury Stage Stage 4   Shape Circular   Wound Odor None   Exposed Structures Bone   WOUND NURSE ONLY - Time Spent with Patient (mins) 60       Wound 11/12/23 Full Thickness Wound Groin Left open surgical (Active)   Date First Assessed: 11/12/23   Present on Original Admission: Yes  Hand Hygiene Completed: Yes  Primary Wound Type: Full Thickness Wound  Location: Groin  Laterality: Left  Wound Description (Comments): open surgical      Assessments 11/18/2023 11:00 AM   Site Assessment Yellow;Red   Periwound Assessment Clean;Dry;Intact   Margins Defined edges;Attached edges   Closure Secondary intention   Drainage Amount Small   Drainage Description Serosanguineous   Treatments Cleansed;Site care   Wound Cleansing Approved Wound Cleanser   Periwound Protectant No-sting Skin Prep;Paste Ring;Drape   Dressing Status Clean;Dry;Intact   Dressing Changed Changed   Dressing  Cleansing/Solutions 1/4 Strength Dakin's Solution   Dressing Options Wound Vac;Offloading Dressing - Sacral   Dressing Change/Treatment Frequency Tuesday, Thursday, Saturday, and As Needed   NEXT Dressing Change/Treatment Date 11/21/23   NEXT Weekly Photo (Inpatient Only) 11/23/23   Wound Team Following 3x Weekly   Non-staged Wound Description Full thickness   Shape Irregular   Wound Odor None       Negative Pressure Wound Therapy 11/12/23 Dehisced;Surgical Groin;Sacrum Left (Active)   Placement Date: 11/12/23   Inserted by: wound team  Hand Hygiene Completed: Yes  Wound Type: Dehisced;Surgical  Location: Groin;Sacrum  Laterality: Left      Assessments 11/18/2023 11:00 AM   NPWT Pump Mode / Pressure Setting Ulta;Intermittent;125 mmHg   Dressing Type Small;Black Foam (Regular)   Number of Foam Pieces Used 1   Canister Changed No   NEXT Dressing Change/Treatment Date 11/21/23   VAC VeraFlo Irrigant 1/4 Strength Dakins   VAC VeraFlo Soak Time (mins) 8   VAC VeraFlo Instill Volume (ml) 20   VAC VeraFlo - Therapy Time (hrs) 2   VAC VeraFlo Pressure (mm/Hg) Intermittent;125 mmHg       Negative Pressure Wound Therapy 11/18/23 Sacrum (Active)   Placement Date/Time: 11/18/23 1100   Location: Sacrum      Assessments 11/18/2023 11:00 AM   Wound Image      Vacuum Serial Number [id number]   NPWT Pump Mode / Pressure Setting Ulta;Intermittent;125 mmHg   Dressing Type Medium;Black Foam (Veraflo)   Number of Foam Pieces Used 1   Canister Changed No   NEXT Dressing Change/Treatment Date 11/21/23   VAC VeraFlo Irrigant 1/4 Strength Dakins   VAC VeraFlo Soak Time (mins) 8   VAC VeraFlo Instill Volume (ml) 18   VAC VeraFlo - Therapy Time (hrs) 2.5        Vascular:    INGA:   No results found.    Lab Values:    Lab Results   Component Value Date/Time    WBC 11.0 (H) 11/18/2023 02:46 AM    RBC 2.89 (L) 11/18/2023 02:46 AM    HEMOGLOBIN 8.3 (L) 11/18/2023 02:46 AM    HEMATOCRIT 25.6 (L) 11/18/2023 02:46 AM    CREACTPROT 27.78 (H) 11/12/2023  02:22 AM    SEDRATEWES 66 (H) 2023 02:22 AM         Culture Results show:  Recent Results (from the past 720 hour(s))   CULTURE WOUND W/ GRAM STAIN    Collection Time: 23 11:53 AM    Specimen: Wound   Result Value Ref Range    Significant Indicator POS (POS)     Source WND     Site Sacral     Culture Result (A)      Light growth mixed enteric kris including E coli,  Citrobacter koseri, Enterococcus faecalis and Streptococcus  anginosis.      Gram Stain Result Moderate WBCs.  Few Gram positive cocci.       Culture Result (A)      Beta Hemolytic Streptococcus group A  Light growth         Pain Level/Medicated:  IV pain medications administered by bedside RN 10 min prior (Pt educated that IV medication will not be available on outpatient basis)       INTERVENTIONS BY WOUND TEAM:  Chart and images reviewed. Discussed with bedside RN. All areas of concern (based on picture review, LDA review and discussion with bedside RN) have been thoroughly assessed. Documentation of areas based on significant findings. This RN in to assess patient. Performed standard wound care which includes appropriate positioning, dressing removal and non-selective debridement. Pictures and measurements obtained weekly if/when required.  Wound:  Left Groin  Preparation for Dressing removal: Removed without difficulty  Cleansed/Non-selectively Debrided with:  Wound cleanser and Gauze  Kiara wound: Cleansed with Wound cleanser and Gauze, Prepped with No Sting, Paste Rings, and Drape  Primary Dressin piece of spiral VF foam applied to wound bed.  Secondary (Outer) Dressing: Foam secured with VF tracpad and drape. VF NPWT resumed. Offloading foam underneath tubing.     Wound:  Sacrum  Preparation for Dressing removal: Removed without difficulty, Dressing soaked with wound cleanser  Cleansed/Non-selectively Debrided with:  Wound cleanser and Gauze  Kiara wound: Cleansed with Wound cleanser and Gauze, Prepped with No Sting, Paste Rings,  and Drape  Primary Dressin piece of spiral VF foam tucked into undermining and filled remainder of wound bed.   Secondary (Outer) Dressing: Foam secured with VF tracpad and drape. VF NPWT initiated using fill assist. See settings in LDA. No leaks noted. Offloading foam x2 underneath tubing.    Advanced Wound Care Discharge Planning  Number of Clinicians necessary to complete wound care: 2  Is patient requiring IV pain medications for dressing changes:  Yes  Length of time for dressing change 45 min. (This does not include chart review, pre-medication time, set up, clean up or time spent charting.)    Interdisciplinary consultation: Patient, Bedside RN (Anderson), Vania ANTHONY (Wound RN).      EVALUATION / RATIONALE FOR TREATMENT:     Date:  23  Wound Status:  Wound progressing as expected    Left groin continues to improve with VF NPWT. Continue POC.     Sacral wound still with significant undermining and bone exposure. VF NPWT applied to this area as well to increase granulation tissue production and assist in wound closure by secondary intention.      Date:  23  Wound Status:  Wound progressing as expected    Left groin wound with more granulation tissue present. Continue with POC.    S/P debridement of sacral wound on . Wound with circumferential undermining. Deepest area of undermining measuring 4cm at 2o'clock. Bone exposed towards mid aspect of wound bed. Regular NPWT applied to increase granulation tissue production to the area. Will benefit from VF NPWT on next change to ensure exposed structures remain moist.       Date:  23  Wound Status:  Wound progressing as expected    Wound bed clean, no odor. Continue VF NPWT with Dakins  Date:  23  Wound Status:  Initial evaluation    Left groin with minimal slough after cleansing, met with Dr. Navarro at bedside who advised to place Dakin's VF to site.  Left sacral wound to go to surgery with Dr. Chow today or tomorrow for debridement and  likely VAC placement.  Patient very frail appearing.  Sacral wound probes to bone and will be very difficult to heal considering patient overall state and nutrition.             Goals: Steady decrease in wound area and depth weekly.    NURSING PLAN OF CARE ORDERS:  Dressing changes: See Dressing Care orders    NUTRITION RECOMMENDATIONS   Wound Team Recommendations:  N/A    DIET ORDERS (From admission to next 24h)       Start     Ordered    11/16/23 1001  Diet Order Diet: Consistent CHO (Diabetic)  ALL MEALS        Question:  Diet:  Answer:  Consistent CHO (Diabetic)    11/16/23 1000    11/13/23 1328  Supplements  ALL MEALS        Question:  Which Supplement  Answer:  BOOST PLUS  Comment:  or equivalent Ensure    11/13/23 1328                    PREVENTATIVE INTERVENTIONS:    Q shift Jae - performed per nursing policy  Q shift pressure point assessments - performed per nursing policy    PREVENTATIVE INTERVENTIONS:   Q shift Jae - performed per nursing policy  Q shift pressure point assessments - performed per nursing policy     Surface/Positioning  ICU Low Airloss - Currently in Place  Reposition q 2 hours - Currently in Place  TAPs Turning system - Currently in Place     Offloading/Redistribution  Sacral offloading dressing (Silicone dressing) - Currently in Place    Anticipated discharge plans:  TBD        Vac Discharge Needs:  Vac Discharge plan is purely a recommendation from wound team and not a requirement for discharge unless otherwise stated by physician.  Patient will benefit from remaining on Veraflo VAC, however may transition to regular VAC on discharge

## 2023-11-18 NOTE — CARE PLAN
"The patient is Watcher - Medium risk of patient condition declining or worsening    Shift Goals  Clinical Goals: Monitor wound vac  Patient Goals: Pain control  Family Goals: LUTHER    Progress made toward(s) clinical / shift goals:      Patient is not progressing towards the following goals:      Problem: Skin Integrity  Goal: Skin integrity is maintained or improved  Outcome: Not Progressing  Patient continues to choose to be incontinent in the evening soaking the wound dressings, education was given. Was also found with a cigarette and said \"he just puts it in his mouth to sooth the smoking cravings\".  Charge Nurse was informed and came and took it away.  "

## 2023-11-18 NOTE — PROGRESS NOTES
"Hospital Medicine Daily Progress Note    Date of Service  11/18/2023    Chief Complaint   altered mentation    Hospital Course  Dillon Centeno is a 58 y.o. male with PAD, hypertension, history of left AKA, admitted 11/12/2023 with altered mentation.  On evaluation, he was found to have a large ischial unstageable decubitus ulcer, and labs showed Na 117, and creatinine of 2.27.  He was also noted to have dehisence of L groin wound.  Patient started on antibiotics and IV fluids.  Surgery was consulted, and patient underwent I&D of the sacral wound on 11/14 (Dr. Christianson).  Cultures have grown beta-hemolytic group A Streptococcus along with mixed enteric and usual skin kris.  Renal function improved with hydration.  Sodium levels improved, although initially overcorrected requiring D5, but has since stabilized with appropriate rate of correction.  He was seen by vascular surgery who anticipated need for outpatient elective bypass procedure on the right leg once acute infection has resolved. Wound VAC has been placed.  PT recommends SNF placement.      Interval Problem Update  11/18/2023 - I reviewed the patient's chart. There were no significant overnight events. Remains hemodynamically stable and afebrile. Stable on RA.  WBC improved to 11,000.  Hemoglobin is stable.  Sodium stable at 130.  Creatinine 1.44.    > I have personally seen and examined the patient today.  States pain is \"so-so.\"  Not short of breath.  No nausea or vomiting.  States appetite is not good.      I personally reviewed all lab results mentioned above. Prior medical records from this institution and outside facilities were independently reviewed as noted. I also personally reviewed all ER physician and consultant recommendations and plans as documented above. History was independently obtained by myself. I have discussed this patient's plan of care and discharge plan at IDT rounds today with Case Management, Nursing, Nursing leadership, and " other members of the IDT team.    Consultants/Specialty  general surgery and vascular surgery    Code Status  Full Code    Disposition  The patient is not medically cleared for discharge to home or a post-acute facility.      Refusing PT/OT.  May need group home placement.  He was in the hospital in late October/early November for left BKA, and at that time developed sacral wound with no indication for operative intervention.  Wound VAC was placed, and despite discharge planning was unable to be placed as an SNF and no home health services accepting.  Patient refused to go to a group home, and was sent home).  I have placed the appropriate orders for post-discharge needs.    Review of Systems  ROS     Pertinent positives/negatives as mentioned above.     A complete review of systems was personally done by me. All other systems were negative.       Physical Exam  Temp:  [36.3 °C (97.4 °F)-36.8 °C (98.2 °F)] 36.6 °C (97.8 °F)  Pulse:  [80-98] 80  Resp:  [18] 18  BP: (126-150)/(83-97) 136/83  SpO2:  [97 %-98 %] 98 %    Physical Exam  Vitals reviewed.   Constitutional:       General: He is not in acute distress.     Appearance: Normal appearance. He is well-developed. He is not toxic-appearing or diaphoretic.   HENT:      Head: Normocephalic and atraumatic.      Right Ear: External ear normal.      Left Ear: External ear normal.      Mouth/Throat:      Mouth: Mucous membranes are moist.      Pharynx: No oropharyngeal exudate.   Eyes:      General: No scleral icterus.     Extraocular Movements: Extraocular movements intact.      Conjunctiva/sclera: Conjunctivae normal.      Pupils: Pupils are equal, round, and reactive to light.   Neck:      Vascular: No JVD.   Cardiovascular:      Rate and Rhythm: Normal rate.      Heart sounds: Normal heart sounds. No murmur heard.     No gallop.   Pulmonary:      Effort: Pulmonary effort is normal. No respiratory distress.      Breath sounds: Normal breath sounds. No stridor. No  wheezing, rhonchi or rales.   Chest:      Chest wall: No tenderness.   Abdominal:      General: Bowel sounds are normal. There is no distension.      Palpations: Abdomen is soft. There is no mass.      Tenderness: There is no abdominal tenderness. There is no guarding or rebound.   Musculoskeletal:         General: No swelling. Normal range of motion.      Cervical back: Normal range of motion and neck supple.      Comments: L AKA  R foot with multiple scabs and wounds on the toes.  Unable to palpate DP/TP  (+) surgical site sacral area, dressing CDI   Lymphadenopathy:      Cervical: No cervical adenopathy.   Skin:     General: Skin is warm and dry.      Coloration: Skin is not jaundiced.      Findings: No rash.   Neurological:      General: No focal deficit present.      Mental Status: He is alert and oriented to person, place, and time.      Cranial Nerves: No cranial nerve deficit.   Psychiatric:         Mood and Affect: Mood normal.         Behavior: Behavior normal.         Thought Content: Thought content normal.         Judgment: Judgment normal.       I have performed the physical examination today 11/18/2023.  In review of yesterday's note, there are no new changes except as documented above.      Fluids    Intake/Output Summary (Last 24 hours) at 11/18/2023 0906  Last data filed at 11/17/2023 2200  Gross per 24 hour   Intake 680 ml   Output 300 ml   Net 380 ml         Laboratory  Recent Labs     11/16/23 0158 11/17/23 0435 11/18/23  0246   WBC 17.5* 11.9* 11.0*   RBC 3.43* 3.04* 2.89*   HEMOGLOBIN 9.8* 8.6* 8.3*   HEMATOCRIT 30.3* 26.8* 25.6*   MCV 88.3 88.2 88.6   MCH 28.6 28.3 28.7   MCHC 32.3 32.1* 32.4   RDW 51.6* 53.1* 52.7*   PLATELETCT 485* 454* 500*   MPV 8.7* 8.7* 8.6*     Recent Labs     11/16/23 0158 11/17/23 0435 11/18/23  0246   SODIUM 127* 131* 130*   POTASSIUM 3.7 3.6 3.8   CHLORIDE 94* 98 100   CO2 20 19* 20   GLUCOSE 127* 95 109*   BUN 11 15 18   CREATININE 0.91 1.22 1.44*   CALCIUM  7.9* 8.3* 7.9*                   Imaging  US-VEIN MAPPING LOWER EXTREMITY UNILAT RIGHT   Final Result      CT-ABDOMEN-PELVIS WITH   Final Result         1. There is a 2 cm ulcerative fraying in the left medial gluteus region. Subjacent iliac bone demonstrates bony ridging, consistent with osteomyelitis. There is small amount of air tracking medially and laterally away from the ulcerative wound along the    left medial gluteal musculature and down to the level of the left paraspinous musculature. Therefore, early necrotizing fasciitis possible. No drainable abscess present. This was discussed with the ordering physician.   2. Severely distended bladder with mild to moderate bilateral associated hydronephrosis. This is likely secondary to prostatic hypertrophy. There is severe median lobe hypertrophy. Consider Holguin catheter placement for decompression. Bladder diverticulum    also noted.   3. Patchy ground glass to aeration lower lobes, suspicious for pneumonia. However, atypical edema or other pneumonitis possible.   4. Severe atherosclerotic disease.   5. Additional soft tissue wound in the left groin.      DX-FEMUR-2+ LEFT   Final Result      No evidence of infection by plain film.      DX-CHEST-PORTABLE (1 VIEW)   Final Result      No acute process.           Assessment/Plan  * Infected sacral wound- (present on admission)  Assessment & Plan  - Large sacral/ischial wound with signs of active infection and CT scan concerning for pelvic osteomyelitis and skin and soft tissue infection.  -s/p I&D of the sacral wound on 11/14.  -Surgical cultures grew beta-hemolytic group A streptococcus along with mixed enteric and usual skin kris.  -Continue oral Augmentin to complete 7 days course of antibiotics  -Continue local wound care.  Wound VAC has been placed.   -Continue pain control with oral oxycodone and IV Dilaudid.  -PT/OT recommending SNF placement.  He was sent home on last admission, but did not do well with  worsening sacral wound.  At this time may need to pursue placement, although it has been a difficult discharge on his last admission with no accepting SNF or home health services.  May need to look into group home placement.  CM/SW for discharge planning.    Severe sepsis (HCC)- (present on admission)  Assessment & Plan  -Due to infected sacral wound.  Resolved.  Management as above.    GELY (acute kidney injury) ([facility])- (present on admission)  Assessment & Plan  - Likely prerenal with a component of ATN in the setting of sepsis.  Creatinine up to 1.44 today.  -Restart IV fluids with LR at 100 cc/h.  -Continue to monitor renal function closely.  Monitor electrolytes as at risk for electrolyte derangements with GELY.  Continue to follow BMP.  - avoid nephrotoxins, and continue to renally dose all medications.    Open wound of inguinal region- (present on admission)  Assessment & Plan  -Recent history of femoral endarterectomy with incision dehiscence, open wound  -Vascular surgery following  -Continue wound care.      PAD (peripheral artery disease) ([facility])- (present on admission)  Assessment & Plan  -With recent history of bypass grafting  -Vascular surgery is anticipating need for outpatient elective bypass procedure on the right leg once acute infection has resolved.    Hyponatremia- (present on admission)  Assessment & Plan  -Following resuscitation, had very high UOP concerning for low solutes vs beer potomania.   -Sodium levels improved, although initially overcorrected required D5, but has since stabilized with appropriate rate of correction.  -Sodium improving.  Continue salt tablets 1 g twice daily.  Also start IV LR for GELY today.  -Continue to monitor sodium daily.    Anemia- (present on admission)  Assessment & Plan  -Patient had normal hemoglobin and March of this year, but has been anemic since approximately September  -Degree of anemia has been essentially stable  -Likely multifactorial related to  nutritional status, and multiple procedures  -Continue to monitor hemoglobin daily.  Restrictive transfusion strategy.         VTE prophylaxis: Lovenox SQ      My total time spent caring for the patient on the day of the encounter was 50 minutes. This does not include time spent on separately billable procedures/tests.

## 2023-11-19 PROBLEM — F41.9 ANXIETY: Status: ACTIVE | Noted: 2023-11-19

## 2023-11-19 PROBLEM — G47.00 INSOMNIA: Status: ACTIVE | Noted: 2023-11-19

## 2023-11-19 LAB
ANION GAP SERPL CALC-SCNC: 11 MMOL/L (ref 7–16)
BASOPHILS # BLD AUTO: 0.3 % (ref 0–1.8)
BASOPHILS # BLD: 0.03 K/UL (ref 0–0.12)
BUN SERPL-MCNC: 22 MG/DL (ref 8–22)
CALCIUM SERPL-MCNC: 7.9 MG/DL (ref 8.5–10.5)
CHLORIDE SERPL-SCNC: 99 MMOL/L (ref 96–112)
CO2 SERPL-SCNC: 20 MMOL/L (ref 20–33)
CREAT SERPL-MCNC: 1.48 MG/DL (ref 0.5–1.4)
EOSINOPHIL # BLD AUTO: 0.21 K/UL (ref 0–0.51)
EOSINOPHIL NFR BLD: 1.9 % (ref 0–6.9)
ERYTHROCYTE [DISTWIDTH] IN BLOOD BY AUTOMATED COUNT: 52.2 FL (ref 35.9–50)
GFR SERPLBLD CREATININE-BSD FMLA CKD-EPI: 54 ML/MIN/1.73 M 2
GLUCOSE BLD STRIP.AUTO-MCNC: 110 MG/DL (ref 65–99)
GLUCOSE BLD STRIP.AUTO-MCNC: 118 MG/DL (ref 65–99)
GLUCOSE BLD STRIP.AUTO-MCNC: 128 MG/DL (ref 65–99)
GLUCOSE SERPL-MCNC: 109 MG/DL (ref 65–99)
HCT VFR BLD AUTO: 24.8 % (ref 42–52)
HGB BLD-MCNC: 8.1 G/DL (ref 14–18)
IMM GRANULOCYTES # BLD AUTO: 0.21 K/UL (ref 0–0.11)
IMM GRANULOCYTES NFR BLD AUTO: 1.9 % (ref 0–0.9)
LYMPHOCYTES # BLD AUTO: 1.21 K/UL (ref 1–4.8)
LYMPHOCYTES NFR BLD: 11.1 % (ref 22–41)
MCH RBC QN AUTO: 28.8 PG (ref 27–33)
MCHC RBC AUTO-ENTMCNC: 32.7 G/DL (ref 32.3–36.5)
MCV RBC AUTO: 88.3 FL (ref 81.4–97.8)
MONOCYTES # BLD AUTO: 0.93 K/UL (ref 0–0.85)
MONOCYTES NFR BLD AUTO: 8.5 % (ref 0–13.4)
NEUTROPHILS # BLD AUTO: 8.32 K/UL (ref 1.82–7.42)
NEUTROPHILS NFR BLD: 76.3 % (ref 44–72)
NRBC # BLD AUTO: 0 K/UL
NRBC BLD-RTO: 0 /100 WBC (ref 0–0.2)
PLATELET # BLD AUTO: 540 K/UL (ref 164–446)
PMV BLD AUTO: 8.9 FL (ref 9–12.9)
POTASSIUM SERPL-SCNC: 4 MMOL/L (ref 3.6–5.5)
RBC # BLD AUTO: 2.81 M/UL (ref 4.7–6.1)
SODIUM SERPL-SCNC: 130 MMOL/L (ref 135–145)
WBC # BLD AUTO: 10.9 K/UL (ref 4.8–10.8)

## 2023-11-19 PROCEDURE — 82962 GLUCOSE BLOOD TEST: CPT | Mod: 91

## 2023-11-19 PROCEDURE — 80048 BASIC METABOLIC PNL TOTAL CA: CPT

## 2023-11-19 PROCEDURE — 99233 SBSQ HOSP IP/OBS HIGH 50: CPT | Performed by: INTERNAL MEDICINE

## 2023-11-19 PROCEDURE — A9270 NON-COVERED ITEM OR SERVICE: HCPCS | Performed by: HOSPITALIST

## 2023-11-19 PROCEDURE — A9270 NON-COVERED ITEM OR SERVICE: HCPCS | Performed by: INTERNAL MEDICINE

## 2023-11-19 PROCEDURE — 700102 HCHG RX REV CODE 250 W/ 637 OVERRIDE(OP): Performed by: INTERNAL MEDICINE

## 2023-11-19 PROCEDURE — 770006 HCHG ROOM/CARE - MED/SURG/GYN SEMI*

## 2023-11-19 PROCEDURE — 700105 HCHG RX REV CODE 258: Performed by: INTERNAL MEDICINE

## 2023-11-19 PROCEDURE — 36415 COLL VENOUS BLD VENIPUNCTURE: CPT

## 2023-11-19 PROCEDURE — 700111 HCHG RX REV CODE 636 W/ 250 OVERRIDE (IP): Mod: JZ | Performed by: INTERNAL MEDICINE

## 2023-11-19 PROCEDURE — 700102 HCHG RX REV CODE 250 W/ 637 OVERRIDE(OP): Performed by: HOSPITALIST

## 2023-11-19 PROCEDURE — 85025 COMPLETE CBC W/AUTO DIFF WBC: CPT

## 2023-11-19 RX ORDER — HYDROXYZINE 50 MG/1
25 TABLET, FILM COATED ORAL 3 TIMES DAILY PRN
Status: DISCONTINUED | OUTPATIENT
Start: 2023-11-19 | End: 2023-11-28

## 2023-11-19 RX ORDER — TRAZODONE HYDROCHLORIDE 50 MG/1
50 TABLET ORAL
Status: DISCONTINUED | OUTPATIENT
Start: 2023-11-19 | End: 2023-11-24

## 2023-11-19 RX ADMIN — AMOXICILLIN AND CLAVULANATE POTASSIUM 1 TABLET: 875; 125 TABLET, FILM COATED ORAL at 05:43

## 2023-11-19 RX ADMIN — Medication 220 MG: at 05:43

## 2023-11-19 RX ADMIN — SODIUM CHLORIDE 1 G: 1 TABLET ORAL at 07:53

## 2023-11-19 RX ADMIN — OXYCODONE HYDROCHLORIDE 10 MG: 10 TABLET ORAL at 07:53

## 2023-11-19 RX ADMIN — OXYCODONE HYDROCHLORIDE 10 MG: 10 TABLET ORAL at 16:03

## 2023-11-19 RX ADMIN — HYDROXYZINE HYDROCHLORIDE 25 MG: 50 TABLET, FILM COATED ORAL at 09:00

## 2023-11-19 RX ADMIN — ENOXAPARIN SODIUM 40 MG: 100 INJECTION SUBCUTANEOUS at 16:03

## 2023-11-19 RX ADMIN — THERA TABS 1 TABLET: TAB at 05:43

## 2023-11-19 RX ADMIN — FERROUS SULFATE TAB 325 MG (65 MG ELEMENTAL FE) 325 MG: 325 (65 FE) TAB at 07:52

## 2023-11-19 RX ADMIN — SODIUM CHLORIDE, POTASSIUM CHLORIDE, SODIUM LACTATE AND CALCIUM CHLORIDE 1000 ML: 600; 310; 30; 20 INJECTION, SOLUTION INTRAVENOUS at 04:19

## 2023-11-19 RX ADMIN — TRAZODONE HYDROCHLORIDE 50 MG: 50 TABLET ORAL at 20:22

## 2023-11-19 RX ADMIN — OXYCODONE HYDROCHLORIDE AND ACETAMINOPHEN 500 MG: 500 TABLET ORAL at 16:03

## 2023-11-19 RX ADMIN — SODIUM CHLORIDE 1 G: 1 TABLET ORAL at 16:04

## 2023-11-19 RX ADMIN — SODIUM CHLORIDE, POTASSIUM CHLORIDE, SODIUM LACTATE AND CALCIUM CHLORIDE: 600; 310; 30; 20 INJECTION, SOLUTION INTRAVENOUS at 21:36

## 2023-11-19 RX ADMIN — OXYCODONE 5 MG: 5 TABLET ORAL at 11:53

## 2023-11-19 RX ADMIN — DOCUSATE SODIUM 50 MG AND SENNOSIDES 8.6 MG 2 TABLET: 8.6; 5 TABLET, FILM COATED ORAL at 05:43

## 2023-11-19 RX ADMIN — SODIUM CHLORIDE, POTASSIUM CHLORIDE, SODIUM LACTATE AND CALCIUM CHLORIDE: 600; 310; 30; 20 INJECTION, SOLUTION INTRAVENOUS at 13:07

## 2023-11-19 RX ADMIN — OXYCODONE HYDROCHLORIDE 10 MG: 10 TABLET ORAL at 20:22

## 2023-11-19 RX ADMIN — OXYCODONE HYDROCHLORIDE AND ACETAMINOPHEN 500 MG: 500 TABLET ORAL at 05:43

## 2023-11-19 RX ADMIN — SODIUM CHLORIDE 1 G: 1 TABLET ORAL at 11:53

## 2023-11-19 ASSESSMENT — PAIN DESCRIPTION - PAIN TYPE
TYPE: ACUTE PAIN
TYPE: ACUTE PAIN

## 2023-11-19 NOTE — PROGRESS NOTES
Hospital Medicine Daily Progress Note    Date of Service  11/19/2023    Chief Complaint   altered mentation    Hospital Course  Dillon Centeno is a 58 y.o. male with PAD, hypertension, history of left AKA, admitted 11/12/2023 with altered mentation.  On evaluation, he was found to have a large ischial unstageable decubitus ulcer, and labs showed Na 117, and creatinine of 2.27.  He was also noted to have dehisence of L groin wound.  Patient started on antibiotics and IV fluids.  Surgery was consulted, and patient underwent I&D of the sacral wound on 11/14 (Dr. Christianson).  Cultures have grown beta-hemolytic group A Streptococcus along with mixed enteric and usual skin kris.  Renal function improved with hydration.  Sodium levels improved, although initially overcorrected requiring D5, but has since stabilized with appropriate rate of correction.  He was seen by vascular surgery who anticipated need for outpatient elective bypass procedure on the right leg once acute infection has resolved. Wound VAC has been placed.  PT recommends SNF placement.      Interval Problem Update  11/19/2023 - I reviewed the patient's chart today. Uneventful night. VSS. Afebrile. Saturating well on RA.  WBC improved to 10,900.  Hemoglobin is stable.  Sodium stable at 130.  Creatinine 1.48.    > I have personally seen and examined the patient today.  He is feeling anxious.  He states he has not slept well in the last several days.  His pain is tolerable.  No nausea, vomiting, chest pain.  Not short of breath.    I personally reviewed all lab results mentioned above. Prior medical records from this institution and outside facilities were independently reviewed as noted. I also personally reviewed all ER physician and consultant recommendations and plans as documented above. History was independently obtained by myself. I have discussed this patient's plan of care and discharge plan at IDT rounds today with Case Management, Nursing,  Nursing leadership, and other members of the IDT team.    Consultants/Specialty  general surgery and vascular surgery    Code Status  Full Code    Disposition  The patient is not medically cleared for discharge to home or a post-acute facility.      Refusing PT/OT.  May need group home placement.  He was in the hospital in late October/early November for left BKA, and at that time developed sacral wound with no indication for operative intervention.  Wound VAC was placed, and despite discharge planning was unable to be placed as an SNF and no home health services accepting.  Patient refused to go to a group home, and was sent home).  I have placed the appropriate orders for post-discharge needs.    Review of Systems  ROS     Pertinent positives/negatives as mentioned above.     A complete review of systems was personally done by me. All other systems were negative.       Physical Exam  Temp:  [36.4 °C (97.5 °F)-36.6 °C (97.9 °F)] 36.5 °C (97.7 °F)  Pulse:  [75-94] 90  Resp:  [18-19] 19  BP: (129-153)/(80-95) 153/95  SpO2:  [96 %-98 %] 97 %    Physical Exam  Vitals reviewed.   Constitutional:       General: He is not in acute distress.     Appearance: Normal appearance. He is well-developed. He is not toxic-appearing or diaphoretic.   HENT:      Head: Normocephalic and atraumatic.      Right Ear: External ear normal.      Left Ear: External ear normal.      Mouth/Throat:      Mouth: Mucous membranes are moist.      Pharynx: No oropharyngeal exudate.   Eyes:      General: No scleral icterus.     Extraocular Movements: Extraocular movements intact.      Conjunctiva/sclera: Conjunctivae normal.      Pupils: Pupils are equal, round, and reactive to light.   Neck:      Vascular: No JVD.   Cardiovascular:      Rate and Rhythm: Normal rate.      Heart sounds: Normal heart sounds. No murmur heard.     No gallop.   Pulmonary:      Effort: Pulmonary effort is normal. No respiratory distress.      Breath sounds: Normal breath  sounds. No stridor. No wheezing, rhonchi or rales.   Chest:      Chest wall: No tenderness.   Abdominal:      General: Bowel sounds are normal. There is no distension.      Palpations: Abdomen is soft. There is no mass.      Tenderness: There is no abdominal tenderness. There is no guarding or rebound.   Musculoskeletal:         General: No swelling. Normal range of motion.      Cervical back: Normal range of motion and neck supple.      Comments: L AKA  R foot with multiple scabs and wounds on the toes.  Unable to palpate DP/TP  (+) surgical site sacral area, dressing CDI   Lymphadenopathy:      Cervical: No cervical adenopathy.   Skin:     General: Skin is warm and dry.      Coloration: Skin is not jaundiced.      Findings: No rash.   Neurological:      General: No focal deficit present.      Mental Status: He is alert and oriented to person, place, and time.      Cranial Nerves: No cranial nerve deficit.   Psychiatric:         Mood and Affect: Mood normal.         Behavior: Behavior normal.         Thought Content: Thought content normal.         Judgment: Judgment normal.       I have performed the physical examination today 11/19/2023.  In review of yesterday's note, there are no new changes except as documented above.      Fluids    Intake/Output Summary (Last 24 hours) at 11/19/2023 0927  Last data filed at 11/18/2023 1400  Gross per 24 hour   Intake 600 ml   Output 600 ml   Net 0 ml         Laboratory  Recent Labs     11/17/23 0435 11/18/23 0246 11/19/23  0158   WBC 11.9* 11.0* 10.9*   RBC 3.04* 2.89* 2.81*   HEMOGLOBIN 8.6* 8.3* 8.1*   HEMATOCRIT 26.8* 25.6* 24.8*   MCV 88.2 88.6 88.3   MCH 28.3 28.7 28.8   MCHC 32.1* 32.4 32.7   RDW 53.1* 52.7* 52.2*   PLATELETCT 454* 500* 540*   MPV 8.7* 8.6* 8.9*     Recent Labs     11/17/23 0435 11/18/23 0246 11/19/23  0158   SODIUM 131* 130* 130*   POTASSIUM 3.6 3.8 4.0   CHLORIDE 98 100 99   CO2 19* 20 20   GLUCOSE 95 109* 109*   BUN 15 18 22   CREATININE 1.22  1.44* 1.48*   CALCIUM 8.3* 7.9* 7.9*                   Imaging  US-VEIN MAPPING LOWER EXTREMITY UNILAT RIGHT   Final Result      CT-ABDOMEN-PELVIS WITH   Final Result         1. There is a 2 cm ulcerative fraying in the left medial gluteus region. Subjacent iliac bone demonstrates bony ridging, consistent with osteomyelitis. There is small amount of air tracking medially and laterally away from the ulcerative wound along the    left medial gluteal musculature and down to the level of the left paraspinous musculature. Therefore, early necrotizing fasciitis possible. No drainable abscess present. This was discussed with the ordering physician.   2. Severely distended bladder with mild to moderate bilateral associated hydronephrosis. This is likely secondary to prostatic hypertrophy. There is severe median lobe hypertrophy. Consider Holguin catheter placement for decompression. Bladder diverticulum    also noted.   3. Patchy ground glass to aeration lower lobes, suspicious for pneumonia. However, atypical edema or other pneumonitis possible.   4. Severe atherosclerotic disease.   5. Additional soft tissue wound in the left groin.      DX-FEMUR-2+ LEFT   Final Result      No evidence of infection by plain film.      DX-CHEST-PORTABLE (1 VIEW)   Final Result      No acute process.           Assessment/Plan  * Infected sacral wound- (present on admission)  Assessment & Plan  - Large sacral/ischial wound with signs of active infection and CT scan concerning for pelvic osteomyelitis and skin and soft tissue infection.  -s/p I&D of the sacral wound on 11/14.  -Surgical cultures grew beta-hemolytic group A streptococcus along with mixed enteric and usual skin kris.  -Continue oral Augmentin to complete 7 days course of antibiotics  -Continue local wound care.  Wound VAC has been placed.   -Continue pain control with oral oxycodone and IV Dilaudid.  -PT/OT recommending SNF placement.  He was sent home on last admission, but did  not do well with worsening sacral wound.  At this time may need to pursue placement, although it has been a difficult discharge on his last admission with no accepting SNF or home health services.  May need to look into group home placement.  CM/SW for discharge planning.    Severe sepsis ([facility])- (present on admission)  Assessment & Plan  -Due to infected sacral wound.  Resolved.  Management as above.    GELY (acute kidney injury) ([facility])- (present on admission)  Assessment & Plan  - Likely prerenal with a component of ATN in the setting of sepsis.  Creatinine 1.48 today.    -Continue IV fluids with LR, increase to 125 cc/h.  -Continue to monitor renal function closely.  Monitor electrolytes as at risk for electrolyte derangements with GELY.  Continue to follow BMP.  - avoid nephrotoxins, and continue to renally dose all medications.    Insomnia- (present on admission)  Assessment & Plan  -Start trazodone at night.    Open wound of inguinal region- (present on admission)  Assessment & Plan  -Recent history of femoral endarterectomy with incision dehiscence, open wound  -Vascular surgery following  -Continue wound care.      PAD (peripheral artery disease) ([facility])- (present on admission)  Assessment & Plan  -With recent history of bypass grafting  -Vascular surgery is anticipating need for outpatient elective bypass procedure on the right leg once acute infection has resolved.    Hyponatremia- (present on admission)  Assessment & Plan  -Following resuscitation, had very high UOP concerning for low solutes vs beer potomania.   -Sodium levels improved, although initially overcorrected required D5, but has since stabilized with appropriate rate of correction.  -Sodium improved and stable.  Continue salt tablets 1 g twice daily.  Continue IV LR for GELY.  -Continue to monitor sodium daily.    Anxiety- (present on admission)  Assessment & Plan  - Start as needed Atarax.    Anemia- (present on admission)  Assessment &  Plan  -Patient had normal hemoglobin and March of this year, but has been anemic since approximately September  -Degree of anemia has been essentially stable  -Likely multifactorial related to nutritional status, and multiple procedures  -Continue to monitor hemoglobin daily.  Restrictive transfusion strategy.         VTE prophylaxis: Lovenox SQ      My total time spent caring for the patient on the day of the encounter was 50 minutes. This does not include time spent on separately billable procedures/tests.

## 2023-11-19 NOTE — CARE PLAN
The patient is Stable - Low risk of patient condition declining or worsening    Shift Goals  Clinical Goals: Monitor wound vac  Patient Goals: pain control  Family Goals: ashlie    Progress made toward(s) clinical / shift goals:      Problem: Skin Integrity  Goal: Skin integrity is maintained or improved  Outcome: Progressing     Problem: Pain - Standard  Goal: Alleviation of pain or a reduction in pain to the patient’s comfort goal  Outcome: Progressing     Problem: Fluid Volume  Goal: Fluid volume balance will be maintained  Outcome: Progressing     Problem: Knowledge Deficit - Standard  Goal: Patient and family/care givers will demonstrate understanding of plan of care, disease process/condition, diagnostic tests and medications  Outcome: Progressing     Patient is not progressing towards the following goals:

## 2023-11-20 ENCOUNTER — APPOINTMENT (OUTPATIENT)
Dept: RADIOLOGY | Facility: MEDICAL CENTER | Age: 58
DRG: 853 | End: 2023-11-20
Attending: INTERNAL MEDICINE
Payer: MEDICAID

## 2023-11-20 LAB
ANION GAP SERPL CALC-SCNC: 8 MMOL/L (ref 7–16)
BASOPHILS # BLD AUTO: 0.2 % (ref 0–1.8)
BASOPHILS # BLD: 0.02 K/UL (ref 0–0.12)
BUN SERPL-MCNC: 30 MG/DL (ref 8–22)
CALCIUM SERPL-MCNC: 8 MG/DL (ref 8.5–10.5)
CHLORIDE SERPL-SCNC: 102 MMOL/L (ref 96–112)
CK SERPL-CCNC: 17 U/L (ref 0–154)
CO2 SERPL-SCNC: 21 MMOL/L (ref 20–33)
CREAT SERPL-MCNC: 1.8 MG/DL (ref 0.5–1.4)
EOSINOPHIL # BLD AUTO: 0.13 K/UL (ref 0–0.51)
EOSINOPHIL NFR BLD: 1.3 % (ref 0–6.9)
ERYTHROCYTE [DISTWIDTH] IN BLOOD BY AUTOMATED COUNT: 53.4 FL (ref 35.9–50)
GFR SERPLBLD CREATININE-BSD FMLA CKD-EPI: 43 ML/MIN/1.73 M 2
GLUCOSE BLD STRIP.AUTO-MCNC: 101 MG/DL (ref 65–99)
GLUCOSE BLD STRIP.AUTO-MCNC: 111 MG/DL (ref 65–99)
GLUCOSE BLD STRIP.AUTO-MCNC: 122 MG/DL (ref 65–99)
GLUCOSE BLD STRIP.AUTO-MCNC: 86 MG/DL (ref 65–99)
GLUCOSE BLD STRIP.AUTO-MCNC: 95 MG/DL (ref 65–99)
GLUCOSE SERPL-MCNC: 97 MG/DL (ref 65–99)
HCT VFR BLD AUTO: 23.9 % (ref 42–52)
HGB BLD-MCNC: 7.8 G/DL (ref 14–18)
IMM GRANULOCYTES # BLD AUTO: 0.11 K/UL (ref 0–0.11)
IMM GRANULOCYTES NFR BLD AUTO: 1.1 % (ref 0–0.9)
LYMPHOCYTES # BLD AUTO: 1.25 K/UL (ref 1–4.8)
LYMPHOCYTES NFR BLD: 12.8 % (ref 22–41)
MCH RBC QN AUTO: 28.8 PG (ref 27–33)
MCHC RBC AUTO-ENTMCNC: 32.6 G/DL (ref 32.3–36.5)
MCV RBC AUTO: 88.2 FL (ref 81.4–97.8)
MONOCYTES # BLD AUTO: 0.9 K/UL (ref 0–0.85)
MONOCYTES NFR BLD AUTO: 9.2 % (ref 0–13.4)
NEUTROPHILS # BLD AUTO: 7.32 K/UL (ref 1.82–7.42)
NEUTROPHILS NFR BLD: 75.4 % (ref 44–72)
NRBC # BLD AUTO: 0 K/UL
NRBC BLD-RTO: 0 /100 WBC (ref 0–0.2)
PLATELET # BLD AUTO: 545 K/UL (ref 164–446)
PMV BLD AUTO: 8.7 FL (ref 9–12.9)
POTASSIUM SERPL-SCNC: 4.8 MMOL/L (ref 3.6–5.5)
RBC # BLD AUTO: 2.71 M/UL (ref 4.7–6.1)
SODIUM SERPL-SCNC: 131 MMOL/L (ref 135–145)
WBC # BLD AUTO: 9.7 K/UL (ref 4.8–10.8)

## 2023-11-20 PROCEDURE — A9270 NON-COVERED ITEM OR SERVICE: HCPCS | Performed by: INTERNAL MEDICINE

## 2023-11-20 PROCEDURE — 85025 COMPLETE CBC W/AUTO DIFF WBC: CPT

## 2023-11-20 PROCEDURE — 99233 SBSQ HOSP IP/OBS HIGH 50: CPT | Performed by: INTERNAL MEDICINE

## 2023-11-20 PROCEDURE — 700111 HCHG RX REV CODE 636 W/ 250 OVERRIDE (IP): Mod: JZ | Performed by: INTERNAL MEDICINE

## 2023-11-20 PROCEDURE — 700101 HCHG RX REV CODE 250: Performed by: INTERNAL MEDICINE

## 2023-11-20 PROCEDURE — 700111 HCHG RX REV CODE 636 W/ 250 OVERRIDE (IP): Performed by: INTERNAL MEDICINE

## 2023-11-20 PROCEDURE — 82550 ASSAY OF CK (CPK): CPT

## 2023-11-20 PROCEDURE — 700105 HCHG RX REV CODE 258: Performed by: INTERNAL MEDICINE

## 2023-11-20 PROCEDURE — 302098 PASTE RING (FLAT): Performed by: INTERNAL MEDICINE

## 2023-11-20 PROCEDURE — 700102 HCHG RX REV CODE 250 W/ 637 OVERRIDE(OP): Performed by: HOSPITALIST

## 2023-11-20 PROCEDURE — 97605 NEG PRS WND THER DME<=50SQCM: CPT

## 2023-11-20 PROCEDURE — A9270 NON-COVERED ITEM OR SERVICE: HCPCS | Performed by: HOSPITALIST

## 2023-11-20 PROCEDURE — 82962 GLUCOSE BLOOD TEST: CPT | Mod: 91

## 2023-11-20 PROCEDURE — 770006 HCHG ROOM/CARE - MED/SURG/GYN SEMI*

## 2023-11-20 PROCEDURE — 51798 US URINE CAPACITY MEASURE: CPT

## 2023-11-20 PROCEDURE — 80048 BASIC METABOLIC PNL TOTAL CA: CPT

## 2023-11-20 PROCEDURE — 700102 HCHG RX REV CODE 250 W/ 637 OVERRIDE(OP): Performed by: INTERNAL MEDICINE

## 2023-11-20 PROCEDURE — 76775 US EXAM ABDO BACK WALL LIM: CPT

## 2023-11-20 PROCEDURE — 36415 COLL VENOUS BLD VENIPUNCTURE: CPT

## 2023-11-20 RX ORDER — FINASTERIDE 5 MG/1
5 TABLET, FILM COATED ORAL DAILY
Status: DISCONTINUED | OUTPATIENT
Start: 2023-11-20 | End: 2023-11-30 | Stop reason: HOSPADM

## 2023-11-20 RX ORDER — TAMSULOSIN HYDROCHLORIDE 0.4 MG/1
0.4 CAPSULE ORAL
Status: DISCONTINUED | OUTPATIENT
Start: 2023-11-20 | End: 2023-11-30 | Stop reason: HOSPADM

## 2023-11-20 RX ADMIN — OXYCODONE HYDROCHLORIDE 10 MG: 10 TABLET ORAL at 09:09

## 2023-11-20 RX ADMIN — OXYCODONE HYDROCHLORIDE 10 MG: 10 TABLET ORAL at 22:20

## 2023-11-20 RX ADMIN — THERA TABS 1 TABLET: TAB at 04:18

## 2023-11-20 RX ADMIN — DOCUSATE SODIUM 50 MG AND SENNOSIDES 8.6 MG 2 TABLET: 8.6; 5 TABLET, FILM COATED ORAL at 04:18

## 2023-11-20 RX ADMIN — HYDROMORPHONE HYDROCHLORIDE 0.5 MG: 1 INJECTION, SOLUTION INTRAMUSCULAR; INTRAVENOUS; SUBCUTANEOUS at 12:02

## 2023-11-20 RX ADMIN — DAKIN'S SOLUTION 0.125% (QUARTER STRENGTH) 1000 ML: 0.12 SOLUTION at 09:58

## 2023-11-20 RX ADMIN — FERROUS SULFATE TAB 325 MG (65 MG ELEMENTAL FE) 325 MG: 325 (65 FE) TAB at 11:01

## 2023-11-20 RX ADMIN — OXYCODONE HYDROCHLORIDE 10 MG: 10 TABLET ORAL at 04:18

## 2023-11-20 RX ADMIN — SODIUM CHLORIDE, POTASSIUM CHLORIDE, SODIUM LACTATE AND CALCIUM CHLORIDE: 600; 310; 30; 20 INJECTION, SOLUTION INTRAVENOUS at 12:02

## 2023-11-20 RX ADMIN — OXYCODONE HYDROCHLORIDE 10 MG: 10 TABLET ORAL at 17:53

## 2023-11-20 RX ADMIN — OXYCODONE HYDROCHLORIDE AND ACETAMINOPHEN 500 MG: 500 TABLET ORAL at 17:53

## 2023-11-20 RX ADMIN — FINASTERIDE 5 MG: 5 TABLET, FILM COATED ORAL at 11:01

## 2023-11-20 RX ADMIN — HYDROMORPHONE HYDROCHLORIDE 0.5 MG: 1 INJECTION, SOLUTION INTRAMUSCULAR; INTRAVENOUS; SUBCUTANEOUS at 20:27

## 2023-11-20 RX ADMIN — Medication 220 MG: at 04:18

## 2023-11-20 RX ADMIN — SODIUM CHLORIDE 1 G: 1 TABLET ORAL at 11:00

## 2023-11-20 RX ADMIN — DOCUSATE SODIUM 50 MG AND SENNOSIDES 8.6 MG 2 TABLET: 8.6; 5 TABLET, FILM COATED ORAL at 17:53

## 2023-11-20 RX ADMIN — HYDROXYZINE HYDROCHLORIDE 25 MG: 50 TABLET, FILM COATED ORAL at 06:05

## 2023-11-20 RX ADMIN — TRAZODONE HYDROCHLORIDE 50 MG: 50 TABLET ORAL at 20:18

## 2023-11-20 RX ADMIN — OXYCODONE HYDROCHLORIDE 10 MG: 10 TABLET ORAL at 13:19

## 2023-11-20 RX ADMIN — OXYCODONE HYDROCHLORIDE AND ACETAMINOPHEN 500 MG: 500 TABLET ORAL at 04:18

## 2023-11-20 RX ADMIN — ENOXAPARIN SODIUM 40 MG: 100 INJECTION SUBCUTANEOUS at 17:53

## 2023-11-20 RX ADMIN — SODIUM CHLORIDE, POTASSIUM CHLORIDE, SODIUM LACTATE AND CALCIUM CHLORIDE: 600; 310; 30; 20 INJECTION, SOLUTION INTRAVENOUS at 20:20

## 2023-11-20 RX ADMIN — SODIUM CHLORIDE 1 G: 1 TABLET ORAL at 15:18

## 2023-11-20 RX ADMIN — SODIUM CHLORIDE 1 G: 1 TABLET ORAL at 20:18

## 2023-11-20 RX ADMIN — TAMSULOSIN HYDROCHLORIDE 0.4 MG: 0.4 CAPSULE ORAL at 11:00

## 2023-11-20 ASSESSMENT — PAIN DESCRIPTION - PAIN TYPE
TYPE: ACUTE PAIN

## 2023-11-20 NOTE — DIETARY
Nutrition Update:    Day 8 of admit.  Dillon Centeno is a 58 y.o. male with admitting DX of Severe sepsis (HCC) [A41.9, R65.20].  Patient being followed to optimize nutrition.    Current Diet: consistent CHO (diabetic) w/ Glucerna TID    Recorded PO intake has improved to % of meals and % of supplements.    Per WT note, left groin wound is improving. Sacral wound still has significant undermining and bone exposure. Pt continues w/ wound vac to sacral wound.       Meds: vitamin C, MVI, zinc.     Problem: Nutritional:  Goal: Achieve adequate nutritional intake  Description: Patient will consume >50% of meals and supplements  Outcome: progressing    RD following.

## 2023-11-20 NOTE — PROGRESS NOTES
Hospital Medicine Daily Progress Note    Date of Service  11/20/2023    Chief Complaint   altered mentation    Hospital Course  Dillon Centeno is a 58 y.o. male with PAD, hypertension, history of left AKA, admitted 11/12/2023 with altered mentation.  On evaluation, he was found to have a large ischial unstageable decubitus ulcer, and labs showed Na 117, and creatinine of 2.27.  He was also noted to have dehisence of L groin wound.  Patient started on antibiotics and IV fluids.  Surgery was consulted, and patient underwent I&D of the sacral wound on 11/14 (Dr. Christianson).  Cultures have grown beta-hemolytic group A Streptococcus along with mixed enteric and usual skin kris.  Renal function improved with hydration.  Sodium levels improved, although initially overcorrected requiring D5, but has since stabilized with appropriate rate of correction.  He was seen by vascular surgery who anticipated need for outpatient elective bypass procedure on the right leg once acute infection has resolved. Wound VAC has been placed.  PT recommends SNF placement.      Interval Problem Update  11/20/2023 - I reviewed the patient's chart. There were no significant overnight events. Remains hemodynamically stable and afebrile. Stable on RA.  No leukocytosis.  Hemoglobin stable at 7.8.  Sodium 131, creatinine 1.8.  Chart review showed that he has had the same rising creatinine on his last admission related to obstructive uropathy.    > I have personally seen and examined the patient today.  He states he is being, had bowel movements last night.  Continues to complain of generalized pain, asking for pain medications.  No chest pain or shortness of breath.  No nausea or vomiting.  No fevers or chills.  I ordered a bladder scan which showed retention >1L.    I personally reviewed all lab results mentioned above. Prior medical records from this institution and outside facilities were independently reviewed as noted. I also personally  reviewed all ER physician and consultant recommendations and plans as documented above. History was independently obtained by myself. I have discussed this patient's plan of care and discharge plan at IDT rounds today with Case Management, Nursing, Nursing leadership, and other members of the IDT team.    Consultants/Specialty  general surgery and vascular surgery    Code Status  Full Code    Disposition  The patient is not medically cleared for discharge to home or a post-acute facility.      Refusing PT/OT.  May need group home placement.  He was in the hospital in late October/early November for left BKA, and at that time developed sacral wound with no indication for operative intervention.  Wound VAC was placed, and despite discharge planning was unable to be placed as an SNF and no home health services accepting.  Patient refused to go to a group home, and was sent home).  I have placed the appropriate orders for post-discharge needs.    Review of Systems  ROS     Pertinent positives/negatives as mentioned above.     A complete review of systems was personally done by me. All other systems were negative.       Physical Exam  Temp:  [36.3 °C (97.3 °F)-36.9 °C (98.4 °F)] 36.4 °C (97.6 °F)  Pulse:  [87-98] 89  Resp:  [15-19] 15  BP: (134-147)/(83-94) 134/83  SpO2:  [95 %-98 %] 98 %    Physical Exam  Vitals reviewed.   Constitutional:       General: He is not in acute distress.     Appearance: Normal appearance. He is well-developed. He is not toxic-appearing or diaphoretic.   HENT:      Head: Normocephalic and atraumatic.      Right Ear: External ear normal.      Left Ear: External ear normal.      Mouth/Throat:      Mouth: Mucous membranes are moist.      Pharynx: No oropharyngeal exudate.   Eyes:      General: No scleral icterus.     Extraocular Movements: Extraocular movements intact.      Conjunctiva/sclera: Conjunctivae normal.      Pupils: Pupils are equal, round, and reactive to light.   Neck:       Vascular: No JVD.   Cardiovascular:      Rate and Rhythm: Normal rate.      Heart sounds: Normal heart sounds. No murmur heard.     No gallop.   Pulmonary:      Effort: Pulmonary effort is normal. No respiratory distress.      Breath sounds: Normal breath sounds. No stridor. No wheezing, rhonchi or rales.   Chest:      Chest wall: No tenderness.   Abdominal:      General: Bowel sounds are normal. There is no distension.      Palpations: Abdomen is soft. There is no mass.      Tenderness: There is no abdominal tenderness. There is no guarding or rebound.   Musculoskeletal:         General: No swelling. Normal range of motion.      Cervical back: Normal range of motion and neck supple.      Comments: L AKA  R foot with multiple scabs and wounds on the toes.  Unable to palpate DP/TP  (+) surgical site sacral area, dressing CDI   Lymphadenopathy:      Cervical: No cervical adenopathy.   Skin:     General: Skin is warm and dry.      Coloration: Skin is not jaundiced.      Findings: No rash.   Neurological:      General: No focal deficit present.      Mental Status: He is alert and oriented to person, place, and time.      Cranial Nerves: No cranial nerve deficit.   Psychiatric:         Mood and Affect: Mood normal.         Behavior: Behavior normal.         Thought Content: Thought content normal.         Judgment: Judgment normal.       I have performed the physical examination today 11/20/2023.  In review of yesterday's note, there are no new changes except as documented above.      Fluids    Intake/Output Summary (Last 24 hours) at 11/20/2023 0926  Last data filed at 11/20/2023 0900  Gross per 24 hour   Intake 1320 ml   Output 2525 ml   Net -1205 ml         Laboratory  Recent Labs     11/18/23  0246 11/19/23  0158 11/20/23  0447   WBC 11.0* 10.9* 9.7   RBC 2.89* 2.81* 2.71*   HEMOGLOBIN 8.3* 8.1* 7.8*   HEMATOCRIT 25.6* 24.8* 23.9*   MCV 88.6 88.3 88.2   MCH 28.7 28.8 28.8   MCHC 32.4 32.7 32.6   RDW 52.7* 52.2* 53.4*    PLATELETCT 500* 540* 545*   MPV 8.6* 8.9* 8.7*     Recent Labs     11/18/23  0246 11/19/23  0158 11/20/23  0447   SODIUM 130* 130* 131*   POTASSIUM 3.8 4.0 4.8   CHLORIDE 100 99 102   CO2 20 20 21   GLUCOSE 109* 109* 97   BUN 18 22 30*   CREATININE 1.44* 1.48* 1.80*   CALCIUM 7.9* 7.9* 8.0*                   Imaging  US-VEIN MAPPING LOWER EXTREMITY UNILAT RIGHT   Final Result      CT-ABDOMEN-PELVIS WITH   Final Result         1. There is a 2 cm ulcerative fraying in the left medial gluteus region. Subjacent iliac bone demonstrates bony ridging, consistent with osteomyelitis. There is small amount of air tracking medially and laterally away from the ulcerative wound along the    left medial gluteal musculature and down to the level of the left paraspinous musculature. Therefore, early necrotizing fasciitis possible. No drainable abscess present. This was discussed with the ordering physician.   2. Severely distended bladder with mild to moderate bilateral associated hydronephrosis. This is likely secondary to prostatic hypertrophy. There is severe median lobe hypertrophy. Consider Holguin catheter placement for decompression. Bladder diverticulum    also noted.   3. Patchy ground glass to aeration lower lobes, suspicious for pneumonia. However, atypical edema or other pneumonitis possible.   4. Severe atherosclerotic disease.   5. Additional soft tissue wound in the left groin.      DX-FEMUR-2+ LEFT   Final Result      No evidence of infection by plain film.      DX-CHEST-PORTABLE (1 VIEW)   Final Result      No acute process.      US-RENAL    (Results Pending)        Assessment/Plan  * Infected sacral wound- (present on admission)  Assessment & Plan  - Large sacral/ischial wound with signs of active infection and CT scan concerning for pelvic osteomyelitis and skin and soft tissue infection.  -s/p I&D of the sacral wound on 11/14.  -Surgical cultures grew beta-hemolytic group A streptococcus along with mixed enteric  and usual skin kris.  -Continue oral Augmentin to complete 7 days course of antibiotics  -Continue local wound care.  Wound VAC has been placed.   -Continue pain control with oral oxycodone and IV Dilaudid.  -PT/OT recommending SNF placement.  He was sent home on last admission, but did not do well with worsening sacral wound.  At this time may need to pursue placement, although it has been a difficult discharge on his last admission with no accepting SNF or home health services.  May need to look into group home placement.  CM/SW for discharge planning.    Severe sepsis ([facility])- (present on admission)  Assessment & Plan  -Due to infected sacral wound.  Resolved.  Management as above.    GELY (acute kidney injury) ([facility])- (present on admission)  Assessment & Plan  - Suspect postrenal component, just like before when he required Holguin catheter, along with prerenal with rising BUN.  Creatinine increased to 1.8 today.  -Place Holguin catheter.  Resume Flomax and Proscar.  -Will get a renal ultrasound.  -Continue IV fluids with LR at 125 cc/h.  -Continue to monitor renal function closely.  Monitor electrolytes as at risk for electrolyte derangements with GELY.  Continue to follow BMP.  - avoid nephrotoxins, and continue to renally dose all medications.    Insomnia- (present on admission)  Assessment & Plan  -Continue trazodone at night.    Open wound of inguinal region- (present on admission)  Assessment & Plan  -Recent history of femoral endarterectomy with incision dehiscence, open wound  -Vascular surgery following  -Continue wound care.      PAD (peripheral artery disease) ([facility])- (present on admission)  Assessment & Plan  -With recent history of bypass grafting  -Vascular surgery is anticipating need for outpatient elective bypass procedure on the right leg once acute infection has resolved.    Hyponatremia- (present on admission)  Assessment & Plan  -Following resuscitation, had very high UOP concerning for low solutes  vs beer potomania.   -Sodium levels improved, although initially overcorrected required D5, but has since stabilized with appropriate rate of correction.  -Sodium improved and stable.  Continue salt tablets 1 g twice daily.  Continue IV LR for GELY.  -Continue to monitor sodium daily.    Anxiety- (present on admission)  Assessment & Plan  - Continue as needed Atarax.    Anemia- (present on admission)  Assessment & Plan  -Patient had normal hemoglobin and March of this year, but has been anemic since approximately September  -Degree of anemia has been essentially stable  -Likely multifactorial related to nutritional status, and multiple procedures  -Continue to monitor hemoglobin daily.  Restrictive transfusion strategy.         VTE prophylaxis: Lovenox SQ      My total time spent caring for the patient on the day of the encounter was 53 minutes. This does not include time spent on separately billable procedures/tests.

## 2023-11-20 NOTE — WOUND TEAM
[facility]  Inpatient Services  Wound and Skin Care Follow-up    Admission Date: 11/12/2023     Last order of IP CONSULT TO WOUND CARE was found on 11/19/2023 from Hospital Encounter on 11/12/2023     HPI, PMH, SH: Reviewed    Past Surgical History:   Procedure Laterality Date    INCISION AND DRAINAGE GENERAL Left 11/14/2023    Procedure: Sharp exicisional debridement of skin subqutaneous fat and fasha 10cm squard;  Surgeon: Thom Christianson M.D.;  Location: [facility];  Service: General    INCISION AND DRAINAGE GENERAL Left 10/23/2023    Procedure: INCISION AND DRAINAGE;  Surgeon: Keith Navarro M.D.;  Location: [facility];  Service: Vascular    APPLICATION OR REPLACEMENT, WOUND VAC  10/23/2023    Procedure: APPLICATION OR REPLACEMENT, WOUND VAC;  Surgeon: Keith Navarro M.D.;  Location: [facility];  Service: Vascular    KNEE AMPUTATION ABOVE Left 10/23/2023    Procedure: AMPUTATION, ABOVE KNEE;  Surgeon: Keith Navarro M.D.;  Location: [facility];  Service: Vascular    KNEE AMPUTATION BELOW Left 10/20/2023    Procedure: AMPUTATION, BELOW KNEE WOUND REVISION;  Surgeon: Pradip Altamirano M.D.;  Location: [facility];  Service: Orthopedics    FEMORAL ENDARTERECTOMY Bilateral 9/30/2023    Procedure: ENDARTERECTOMY, FEMORAL;  Surgeon: Keith Navarro M.D.;  Location: [facility];  Service: Vascular    ANGIOGRAM, WITH ANGIOPLASTY, AND STENT INSERTION IF INDICATED Right 9/30/2023    Procedure: ANGIOGRAM, WITH ILIAC STENT;  Surgeon: Keith Navarro M.D.;  Location: [facility];  Service: Vascular    KNEE AMPUTATION BELOW Left 9/27/2023    Procedure: AMPUTATION, BELOW KNEE;  Surgeon: Pradip Altamirano M.D.;  Location: [facility];  Service: Orthopedics    IRRIGATION & DEBRIDEMENT ORTHO Left 3/4/2018    Procedure: IRRIGATION & DEBRIDEMENT ORTHO - HUMERUS;  Surgeon: Sergio Watkins M.D.;  Location: [facility]  ORS;  Service: Orthopedics    ORIF, SHOULDER Left 1/4/2018    Procedure: SHOULDER ORIF;  Surgeon: Sergio Watkins M.D.;  Location: SUR[facility]ERY [facility];  Service: Orthopedics    CLOSED REDUCTION Left 12/24/2017    Procedure: CLOSED REDUCTION;  Surgeon: Keith Subramanian M.D.;  Location: SURGERY [facility];  Service: Orthopedics    OTHER      multiple surgeries lower legs from past injuries     Social History     Tobacco Use    Smoking status: Every Day     Types: Cigars    Smokeless tobacco: Never   Substance Use Topics    Alcohol use: Yes     Comment: a few beers a week     Chief Complaint   Patient presents with    Other     Pt BIB ems due to failure to thrive, pt's neighbors called ems due to pt unable to care for self and refuses to eat    Diarrhea     Diagnosis: Severe sepsis (HCC) [A41.9, R65.20]    Unit where seen by Wound Team: S631/01     WOUND FOLLOW UP RELATED TO:  left groin and sacrum scheduled NPWT change.  R thigh incision consult       WOUND TEAM PLAN OF CARE - Frequency of Follow-up:   Nursing to follow dressing orders written for wound care. Contact wound team if area fails to progress, deteriorates or with any questions/concerns if something comes up before next scheduled follow up (See below as to whether wound is following and frequency of wound follow up)  Dressing changes by wound team:                   NPWT change 3 times weekly - L groin and sacrum  Not following, consult as needed  - R thigh    WOUND HISTORY:       58 y.o. male with past medical history of peripheral arterial disease, gangrene of the left lower extremity, hypertension who presented 11/12/2023 after his brother called EMS for the patient failing to eat or move.  In the ER the patient was found to be tachycardic with significant leukocytosis concerning for infection.  He was found to have a large sacral pressure injury therefore a CT scan of the abdomen pelvis was obtained which showed soft tissue air tracking  superiorly above the ileum, other findings include evidence of osteomyelitis.           WOUND ASSESSMENT/LDA  Wound 11/12/23 Pressure Injury Sacrum Stage IV (Active)   Date First Assessed: 11/12/23   Present on Original Admission: Yes  Hand Hygiene Completed: Yes  Primary Wound Type: Pressure Injury  Location: Sacrum  Wound Description (Comments): Stage IV      Assessments 11/20/2023  3:00 PM   Wound Image      Site Assessment Red;Pink;White   Periwound Assessment Clean;Dry;Intact   Margins Undefined edges;Unattached edges   Closure Secondary intention   Drainage Amount Small   Drainage Description Serosanguineous   Treatments Cleansed;Irrigation;Site care;Nonselective debridement   Wound Cleansing Approved Wound Cleanser   Periwound Protectant No-sting Skin Prep;Paste Ring;Drape   Dressing Status Clean;Dry;Intact   Dressing Changed Changed   Dressing Cleansing/Solutions 1/4 Strength Dakin's Solution   Dressing Options Wound Vac;Offloading Dressing - Sacral   Dressing Change/Treatment Frequency Monday, Wednesday, Friday, and As Needed   NEXT Dressing Change/Treatment Date 11/22/23   Shape circular   Wound Odor None   Exposed Structures Bone   WOUND NURSE ONLY - Time Spent with Patient (mins) 60       Wound 11/12/23 Full Thickness Wound Groin Left open surgical (Active)   Date First Assessed: 11/12/23   Present on Original Admission: Yes  Hand Hygiene Completed: Yes  Primary Wound Type: Full Thickness Wound  Location: Groin  Laterality: Left  Wound Description (Comments): open surgical      Assessments 11/20/2023  3:00 PM   Wound Image      Site Assessment Red;Early/partial granulation   Periwound Assessment Clean;Dry;Intact   Margins Defined edges   Closure Secondary intention   Drainage Amount Small   Drainage Description Serosanguineous   Treatments Cleansed;Irrigation;Nonselective debridement;Site care   Wound Cleansing Approved Wound Cleanser   Periwound Protectant Skin Protectant Wipes to Periwound;Paste  Ring;Drape   Dressing Status Clean;Dry;Intact   Dressing Changed Changed   Dressing Cleansing/Solutions 1/4 Strength Dakin's Solution   Dressing Options Wound Vac   Dressing Change/Treatment Frequency Monday, Wednesday, Friday, and As Needed   NEXT Dressing Change/Treatment Date 11/22/23   Shape irregular   Wound Odor None   WOUND NURSE ONLY - Time Spent with Patient (mins) 45       Negative Pressure Wound Therapy 11/12/23 Dehisced;Surgical Groin;Sacrum Left (Active)   Placement Date: 11/12/23   Inserted by: wound team  Hand Hygiene Completed: Yes  Wound Type: Dehisced;Surgical  Location: Groin;Sacrum  Laterality: Left      Assessments 11/20/2023  3:00 PM   NPWT Pump Mode / Pressure Setting Ulta;Intermittent;125 mmHg   Dressing Type Small;Black Foam (Veraflo)   Number of Foam Pieces Used 1   NEXT Dressing Change/Treatment Date 11/22/23   VAC VeraFlo Irrigant 1/4 Strength Dakins   VAC VeraFlo Soak Time (mins) 8   VAC VeraFlo Instill Volume (ml) 20   VAC VeraFlo - Therapy Time (hrs) 2   VAC VeraFlo Pressure (mm/Hg) Intermittent;125 mmHg   WOUND NURSE ONLY - Time Spent with Patient (mins) 45       Negative Pressure Wound Therapy 11/18/23 Sacrum (Active)   Placement Date/Time: 11/18/23 1100   Location: Sacrum      Assessments 11/20/2023  3:00 PM   NPWT Pump Mode / Pressure Setting Ulta;Intermittent;125 mmHg   Dressing Type Black Foam (Veraflo);Small   Number of Foam Pieces Used 2   VAC VeraFlo Irrigant 1/4 Strength Dakins   VAC VeraFlo Soak Time (mins) 8   VAC VeraFlo Instill Volume (ml) 18   VAC VeraFlo - Therapy Time (hrs) 2.5   VAC VeraFlo Pressure (mm/Hg) Intermittent;125 mmHg   WOUND NURSE ONLY - Time Spent with Patient (mins) 30       Wound 11/19/23 Other (comment) Thigh Proximal;Anterior Right open area (Active)   Date First Assessed/Time First Assessed: 11/19/23 2020   Hand Hygiene Completed: Yes  Primary Wound Type: Other (comment)  Location: Thigh  Wound Orientation: Proximal;Anterior  Laterality: Right  Wound  Description (Comments): open area      Assessments 11/20/2023  3:00 PM   Wound Image      Site Assessment Red;White   Periwound Assessment Pink;Clean;Dry;Intact   Margins Unable to assess   Closure Open to air   Drainage Amount Scant   Drainage Description Serosanguineous   Treatments Cleansed;Irrigation;Nonselective debridement   Wound Cleansing Soap and Water   Periwound Protectant Not Applicable   Dressing Status Clean;Dry;Intact   Dressing Changed New   Dressing Cleansing/Solutions Not Applicable   Dressing Options Hydrofiber Silver;Silicone Adhesive Foam   Dressing Change/Treatment Frequency Every 48 hrs, and As Needed   NEXT Dressing Change/Treatment Date 11/22/23   NEXT Weekly Photo (Inpatient Only) 11/27/23   Wound Team Following Not following   Non-staged Wound Description Not applicable   Shape small dehisced area to incision   Wound Odor None        Vascular:    INGA:   No results found.    Lab Values:    Lab Results   Component Value Date/Time    WBC 9.7 11/20/2023 04:47 AM    RBC 2.71 (L) 11/20/2023 04:47 AM    HEMOGLOBIN 7.8 (L) 11/20/2023 04:47 AM    HEMATOCRIT 23.9 (L) 11/20/2023 04:47 AM    CREACTPROT 27.78 (H) 11/12/2023 02:22 AM    SEDRATEWES 66 (H) 11/12/2023 02:22 AM         Culture Results show:  Recent Results (from the past 720 hour(s))   CULTURE WOUND W/ GRAM STAIN    Collection Time: 11/14/23 11:53 AM    Specimen: Wound   Result Value Ref Range    Significant Indicator POS (POS)     Source WND     Site Sacral     Culture Result (A)      Light growth mixed enteric kris including E coli,  Citrobacter koseri, Enterococcus faecalis and Streptococcus  anginosis.      Gram Stain Result Moderate WBCs.  Few Gram positive cocci.       Culture Result (A)      Beta Hemolytic Streptococcus group A  Light growth         Pain Level/Medicated:  PO pain medications administered by bedside RN 60minutes prior       INTERVENTIONS BY WOUND TEAM:  Chart and images reviewed. Discussed with bedside RN. All areas  of concern (based on picture review, LDA review and discussion with bedside RN) have been thoroughly assessed. Documentation of areas based on significant findings. This RN in to assess patient. Performed standard wound care which includes appropriate positioning, dressing removal and non-selective debridement. Pictures and measurements obtained weekly if/when required.    Wound:  Right groin  Preparation for Dressing removal: Open to air  Cleansed/Non-selectively Debrided with:  Moist warm washcloth  Kiara wound: Cleansed with Moist warm washcloth, Prepped with N/A  Primary Dressing:  silver hydrofiber   Secondary (Outer) Dressing: silicone adhesive foam   Wound:  Left Groin  Preparation for Dressing removal: Removed without difficulty  Cleansed/Non-selectively Debrided with:  Wound cleanser and Gauze  Kiara wound: Cleansed with Wound cleanser and Gauze, Prepped with No Sting, Paste Rings, and Drape  Primary Dressin piece of spiral VF foam applied to wound bed.  Secondary (Outer) Dressing: Foam secured with VF tracpad and drape. VF NPWT resumed. Offloading foam underneath tubing.     Wound:  Sacrum  Preparation for Dressing removal: Removed without difficulty, Dressing soaked with wound cleanser  Cleansed/Non-selectively Debrided with:  Wound cleanser and Gauze  Kiara wound: Cleansed with Wound cleanser and Gauze, Prepped with No Sting, Paste Rings, and Drape  Primary Dressin piece of spiral VF foam tucked into undermining and filled remainder of wound bed.   Secondary (Outer) Dressing: Foam secured with VF tracpad and drape. VF NPWT initiated using fill assist. See settings in LDA. No leaks noted. Offloading foam x2 underneath tubing.    Advanced Wound Care Discharge Planning  Number of Clinicians necessary to complete wound care: 1 + tech  Is patient requiring IV pain medications for dressing changes:  No   Length of time for dressing change 20 min. (This does not include chart review, pre-medication time,  set up, clean up or time spent charting.)    Interdisciplinary consultation: Patient, Bedside RN (Tawanna), Hope (Wound Tech).  Pressure injury and staging reviewed with N/A.    EVALUATION / RATIONALE FOR TREATMENT:     Date:  11/20/23  Wound Status:  Wound progressing as expected    L groin likely can transition to leah and regular NPWT at next change to help with granulation tissue at the depth of the proximal wound.    No changes to the sacral wound bed and continue to have exposed bone.  Continue with VF NPWT and POC.     Date:  11/18/23  Wound Status:  Wound progressing as expected    Left groin continues to improve with VF NPWT. Continue POC.     Sacral wound still with significant undermining and bone exposure. VF NPWT applied to this area as well to increase granulation tissue production and assist in wound closure by secondary intention.      Date:  11/16/23  Wound Status:  Wound progressing as expected    Left groin wound with more granulation tissue present. Continue with POC.    S/P debridement of sacral wound on 11/14. Wound with circumferential undermining. Deepest area of undermining measuring 4cm at 2o'clock. Bone exposed towards mid aspect of wound bed. Regular NPWT applied to increase granulation tissue production to the area. Will benefit from VF NPWT on next change to ensure exposed structures remain moist.       Date:  11/14/23  Wound Status:  Wound progressing as expected    Wound bed clean, no odor. Continue VF NPWT with Dakins  Date:  11/12/23  Wound Status:  Initial evaluation    Left groin with minimal slough after cleansing, met with Dr. Navarro at bedside who advised to place Dakin's VF to site.  Left sacral wound to go to surgery with Dr. Chow today or tomorrow for debridement and likely VAC placement.  Patient very frail appearing.  Sacral wound probes to bone and will be very difficult to heal considering patient overall state and nutrition.             Goals: Steady decrease in  wound area and depth weekly.    NURSING PLAN OF CARE ORDERS:  Dressing changes: See Dressing Care orders    NUTRITION RECOMMENDATIONS   Wound Team Recommendations:  Protein supplements  Arginine powder     DIET ORDERS (From admission to next 24h)       Start     Ordered    11/16/23 1001  Diet Order Diet: Consistent CHO (Diabetic)  ALL MEALS        Question:  Diet:  Answer:  Consistent CHO (Diabetic)    11/16/23 1000    11/13/23 1328  Supplements  ALL MEALS        Question:  Which Supplement  Answer:  BOOST PLUS  Comment:  or equivalent Ensure    11/13/23 1328                    PREVENTATIVE INTERVENTIONS:   Q shift Jae - performed per nursing policy  Q shift pressure point assessments - performed per nursing policy    Surface/Positioning  Low Airloss - Currently in Place  Reposition q 2 hours - Currently in Place  TAPs Turning system - Currently in Place    Offloading/Redistribution  Sacral offloading dressing (Silicone dressing) - Currently in Place  Heel offloading dressing (Silicone dressing) - Currently in Place  Heel float boots (Prevalon boot) - Currently in Place      Respiratory  N/A    Containment/Moisture Prevention    Dri-timi pad - Currently in Place    Mobilization      Unable to assess     Anticipated discharge plans:  Skilled Nursing    recommended    Vac Discharge Needs:  Vac Discharge plan is purely a recommendation from wound team and not a requirement for discharge unless otherwise stated by physician.  Veraflo Vac while inpatient, ok to transition to Regular Vac on discharge

## 2023-11-20 NOTE — CARE PLAN
The patient is Stable - Low risk of patient condition declining or worsening    Shift Goals  Clinical Goals: monitor wound vac  Patient Goals: comfort  Family Goals: ashlie    Progress made toward(s) clinical / shift goals:    Problem: Hemodynamics  Goal: Patient's hemodynamics, fluid balance and neurologic status will be stable or improve  Outcome: Progressing     Problem: Fluid Volume  Goal: Fluid volume balance will be maintained  Outcome: Progressing       Patient is not progressing towards the following goals:

## 2023-11-20 NOTE — CARE PLAN
The patient is Stable - Low risk of patient condition declining or worsening    Shift Goals  Clinical Goals: Maintain skin integrity  Patient Goals: Comfort  Family Goals: sahlie    Progress made toward(s) clinical / shift goals:      Patient is not progressing towards the following goals:      Problem: Skin Integrity  Goal: Skin integrity is maintained or improved  Outcome: Not Progressing       Patient received A&Ox3. Assessed and updated on plan of care. Medicated for pain per MAR orders needs addressed at this time.

## 2023-11-21 ENCOUNTER — APPOINTMENT (OUTPATIENT)
Dept: WOUND CARE | Facility: MEDICAL CENTER | Age: 58
End: 2023-11-21
Attending: INTERNAL MEDICINE
Payer: MEDICAID

## 2023-11-21 LAB
ANION GAP SERPL CALC-SCNC: 9 MMOL/L (ref 7–16)
BASOPHILS # BLD AUTO: 0.3 % (ref 0–1.8)
BASOPHILS # BLD: 0.03 K/UL (ref 0–0.12)
BUN SERPL-MCNC: 20 MG/DL (ref 8–22)
CALCIUM SERPL-MCNC: 8.1 MG/DL (ref 8.5–10.5)
CHLORIDE SERPL-SCNC: 102 MMOL/L (ref 96–112)
CO2 SERPL-SCNC: 26 MMOL/L (ref 20–33)
CREAT SERPL-MCNC: 0.91 MG/DL (ref 0.5–1.4)
EOSINOPHIL # BLD AUTO: 0.09 K/UL (ref 0–0.51)
EOSINOPHIL NFR BLD: 0.9 % (ref 0–6.9)
ERYTHROCYTE [DISTWIDTH] IN BLOOD BY AUTOMATED COUNT: 55.2 FL (ref 35.9–50)
GFR SERPLBLD CREATININE-BSD FMLA CKD-EPI: 97 ML/MIN/1.73 M 2
GLUCOSE BLD STRIP.AUTO-MCNC: 100 MG/DL (ref 65–99)
GLUCOSE BLD STRIP.AUTO-MCNC: 94 MG/DL (ref 65–99)
GLUCOSE BLD STRIP.AUTO-MCNC: 97 MG/DL (ref 65–99)
GLUCOSE BLD STRIP.AUTO-MCNC: 98 MG/DL (ref 65–99)
GLUCOSE SERPL-MCNC: 97 MG/DL (ref 65–99)
HCT VFR BLD AUTO: 25.5 % (ref 42–52)
HGB BLD-MCNC: 7.9 G/DL (ref 14–18)
IMM GRANULOCYTES # BLD AUTO: 0.07 K/UL (ref 0–0.11)
IMM GRANULOCYTES NFR BLD AUTO: 0.7 % (ref 0–0.9)
LYMPHOCYTES # BLD AUTO: 1.39 K/UL (ref 1–4.8)
LYMPHOCYTES NFR BLD: 13.8 % (ref 22–41)
MCH RBC QN AUTO: 28.1 PG (ref 27–33)
MCHC RBC AUTO-ENTMCNC: 31 G/DL (ref 32.3–36.5)
MCV RBC AUTO: 90.7 FL (ref 81.4–97.8)
MONOCYTES # BLD AUTO: 0.62 K/UL (ref 0–0.85)
MONOCYTES NFR BLD AUTO: 6.2 % (ref 0–13.4)
NEUTROPHILS # BLD AUTO: 7.85 K/UL (ref 1.82–7.42)
NEUTROPHILS NFR BLD: 78.1 % (ref 44–72)
NRBC # BLD AUTO: 0 K/UL
NRBC BLD-RTO: 0 /100 WBC (ref 0–0.2)
PLATELET # BLD AUTO: 594 K/UL (ref 164–446)
PMV BLD AUTO: 8.6 FL (ref 9–12.9)
POTASSIUM SERPL-SCNC: 4.5 MMOL/L (ref 3.6–5.5)
RBC # BLD AUTO: 2.81 M/UL (ref 4.7–6.1)
SODIUM SERPL-SCNC: 137 MMOL/L (ref 135–145)
WBC # BLD AUTO: 10.1 K/UL (ref 4.8–10.8)

## 2023-11-21 PROCEDURE — A9270 NON-COVERED ITEM OR SERVICE: HCPCS | Performed by: INTERNAL MEDICINE

## 2023-11-21 PROCEDURE — 36415 COLL VENOUS BLD VENIPUNCTURE: CPT

## 2023-11-21 PROCEDURE — 85025 COMPLETE CBC W/AUTO DIFF WBC: CPT

## 2023-11-21 PROCEDURE — 700102 HCHG RX REV CODE 250 W/ 637 OVERRIDE(OP): Performed by: HOSPITALIST

## 2023-11-21 PROCEDURE — 700102 HCHG RX REV CODE 250 W/ 637 OVERRIDE(OP): Performed by: INTERNAL MEDICINE

## 2023-11-21 PROCEDURE — 97530 THERAPEUTIC ACTIVITIES: CPT | Mod: CQ

## 2023-11-21 PROCEDURE — 99232 SBSQ HOSP IP/OBS MODERATE 35: CPT | Performed by: STUDENT IN AN ORGANIZED HEALTH CARE EDUCATION/TRAINING PROGRAM

## 2023-11-21 PROCEDURE — A9270 NON-COVERED ITEM OR SERVICE: HCPCS | Performed by: HOSPITALIST

## 2023-11-21 PROCEDURE — 700105 HCHG RX REV CODE 258: Performed by: INTERNAL MEDICINE

## 2023-11-21 PROCEDURE — 770006 HCHG ROOM/CARE - MED/SURG/GYN SEMI*

## 2023-11-21 PROCEDURE — 80048 BASIC METABOLIC PNL TOTAL CA: CPT

## 2023-11-21 PROCEDURE — 700111 HCHG RX REV CODE 636 W/ 250 OVERRIDE (IP): Performed by: INTERNAL MEDICINE

## 2023-11-21 PROCEDURE — 82962 GLUCOSE BLOOD TEST: CPT

## 2023-11-21 RX ADMIN — HYDROXYZINE HYDROCHLORIDE 25 MG: 50 TABLET, FILM COATED ORAL at 15:49

## 2023-11-21 RX ADMIN — THERA TABS 1 TABLET: TAB at 05:00

## 2023-11-21 RX ADMIN — TRAZODONE HYDROCHLORIDE 50 MG: 50 TABLET ORAL at 20:20

## 2023-11-21 RX ADMIN — SODIUM CHLORIDE, POTASSIUM CHLORIDE, SODIUM LACTATE AND CALCIUM CHLORIDE: 600; 310; 30; 20 INJECTION, SOLUTION INTRAVENOUS at 05:01

## 2023-11-21 RX ADMIN — OXYCODONE HYDROCHLORIDE 10 MG: 10 TABLET ORAL at 05:00

## 2023-11-21 RX ADMIN — Medication 220 MG: at 05:00

## 2023-11-21 RX ADMIN — OXYCODONE HYDROCHLORIDE AND ACETAMINOPHEN 500 MG: 500 TABLET ORAL at 05:00

## 2023-11-21 RX ADMIN — HYDROMORPHONE HYDROCHLORIDE 0.5 MG: 1 INJECTION, SOLUTION INTRAMUSCULAR; INTRAVENOUS; SUBCUTANEOUS at 21:54

## 2023-11-21 RX ADMIN — OXYCODONE HYDROCHLORIDE 10 MG: 10 TABLET ORAL at 08:45

## 2023-11-21 RX ADMIN — DOCUSATE SODIUM 50 MG AND SENNOSIDES 8.6 MG 2 TABLET: 8.6; 5 TABLET, FILM COATED ORAL at 05:00

## 2023-11-21 RX ADMIN — TAMSULOSIN HYDROCHLORIDE 0.4 MG: 0.4 CAPSULE ORAL at 10:42

## 2023-11-21 RX ADMIN — OXYCODONE HYDROCHLORIDE 10 MG: 10 TABLET ORAL at 15:03

## 2023-11-21 RX ADMIN — SODIUM CHLORIDE 1 G: 1 TABLET ORAL at 10:42

## 2023-11-21 RX ADMIN — OXYCODONE HYDROCHLORIDE 10 MG: 10 TABLET ORAL at 20:21

## 2023-11-21 RX ADMIN — FERROUS SULFATE TAB 325 MG (65 MG ELEMENTAL FE) 325 MG: 325 (65 FE) TAB at 10:41

## 2023-11-21 RX ADMIN — HYDROMORPHONE HYDROCHLORIDE 0.5 MG: 1 INJECTION, SOLUTION INTRAMUSCULAR; INTRAVENOUS; SUBCUTANEOUS at 15:51

## 2023-11-21 RX ADMIN — FINASTERIDE 5 MG: 5 TABLET, FILM COATED ORAL at 05:00

## 2023-11-21 ASSESSMENT — PAIN DESCRIPTION - PAIN TYPE
TYPE: ACUTE PAIN

## 2023-11-21 ASSESSMENT — COGNITIVE AND FUNCTIONAL STATUS - GENERAL
STANDING UP FROM CHAIR USING ARMS: A LOT
SUGGESTED CMS G CODE MODIFIER MOBILITY: CM
MOBILITY SCORE: 7
MOVING FROM LYING ON BACK TO SITTING ON SIDE OF FLAT BED: UNABLE
CLIMB 3 TO 5 STEPS WITH RAILING: TOTAL
TURNING FROM BACK TO SIDE WHILE IN FLAT BAD: UNABLE
MOVING TO AND FROM BED TO CHAIR: UNABLE
WALKING IN HOSPITAL ROOM: TOTAL

## 2023-11-21 ASSESSMENT — ENCOUNTER SYMPTOMS
CHILLS: 0
COUGH: 0
PALPITATIONS: 0
DIZZINESS: 0
ABDOMINAL PAIN: 0
EYE PAIN: 0
HEADACHES: 0
NAUSEA: 0
VOMITING: 0
BACK PAIN: 0
SHORTNESS OF BREATH: 0
BLURRED VISION: 0
FEVER: 0
INSOMNIA: 0

## 2023-11-21 ASSESSMENT — LIFESTYLE VARIABLES: SUBSTANCE_ABUSE: 0

## 2023-11-21 ASSESSMENT — GAIT ASSESSMENTS: GAIT LEVEL OF ASSIST: UNABLE TO PARTICIPATE

## 2023-11-21 NOTE — DISCHARGE PLANNING
Care Transition Team Discharge Planning    Anticipated Discharge Information  Discharge Disposition: D/T to SNF with Medicare cert in anticipation of skilled care (03)       Discharge Plan:    RN CM attended IDT rounds and collaborated with the team regarding discharge planning needs and barriers.  Pt is medically cleared for DC but is pending placement to SNF due to his wound care needs with wound vac x2 (left groin and sacrum).   Per prior notes, pt is home alone while roommate is at work, pt is unable to care for wounds or himself, he has no insurance coverage for HH services, he cannot get to/from Op wound clinic appts, pt is a max assist and refusing PT and OT  Referrals were sent today to [facility]  Received call from Yesica with [facility] - she reports they cannot place Medicaid FFS patients. She recommends to send referrals to the following as they take LTC patients with Medicaid;  [facility]  Rn CM notified Sonia MALONEY, to above

## 2023-11-21 NOTE — PROGRESS NOTES
Hospital Medicine Daily Progress Note    Date of Service  11/21/2023    Chief Complaint   altered mentation    Hospital Course  Dillon Centeno is a 58 y.o. male with PAD, hypertension, history of left AKA, admitted 11/12/2023 with altered mentation.  On evaluation, he was found to have a large ischial unstageable decubitus ulcer, and labs showed Na 117, and creatinine of 2.27.  He was also noted to have dehisence of L groin wound.  Patient started on antibiotics and IV fluids.  Surgery was consulted, and patient underwent I&D of the sacral wound on 11/14 (Dr. Christianson).  Cultures have grown beta-hemolytic group A Streptococcus along with mixed enteric and usual skin kris.  Renal function improved with hydration.  Sodium levels improved, although initially overcorrected requiring D5, but has since stabilized with appropriate rate of correction.  He was seen by vascular surgery who anticipated need for outpatient elective bypass procedure on the right leg once acute infection has resolved. Wound VAC has been placed.  PT recommends SNF placement.      Interval Problem Update  No acute events overnight.  GELY resolved after gonzales placement. Stop IV fluids.  Keep gonzales in place, follow up with urology as outpatient.  Wound vac in place for sacral ulcer.  Patient is medically cleared.  Pending SNF placement, may be difficult discharge due to insurance.    Consultants/Specialty  general surgery and vascular surgery    Code Status  Full Code    Disposition  The patient is medically cleared for discharge to home or a post-acute facility.  Anticipate discharge to: skilled nursing facility      I have placed the appropriate orders for post-discharge needs.    Review of Systems  Review of Systems   Constitutional:  Negative for chills and fever.   Eyes:  Negative for blurred vision and pain.   Respiratory:  Negative for cough and shortness of breath.    Cardiovascular:  Negative for chest pain, palpitations and leg swelling.    Gastrointestinal:  Negative for abdominal pain, nausea and vomiting.   Genitourinary:  Negative for dysuria and urgency.   Musculoskeletal:  Negative for back pain.   Skin:  Negative for itching and rash.   Neurological:  Negative for dizziness and headaches.   Psychiatric/Behavioral:  Negative for substance abuse. The patient does not have insomnia.         Pertinent positives/negatives as mentioned above.     A complete review of systems was personally done by me. All other systems were negative.       Physical Exam  Temp:  [36.6 °C (97.8 °F)-37.2 °C (99 °F)] 36.6 °C (97.8 °F)  Pulse:  [] 89  Resp:  [15-20] 17  BP: (125-140)/(75-85) 140/82  SpO2:  [94 %-100 %] 99 %    Physical Exam  Vitals reviewed.   Constitutional:       General: He is not in acute distress.     Appearance: Normal appearance. He is well-developed. He is not toxic-appearing or diaphoretic.   HENT:      Head: Normocephalic and atraumatic.      Right Ear: External ear normal.      Left Ear: External ear normal.      Mouth/Throat:      Mouth: Mucous membranes are moist.      Pharynx: No oropharyngeal exudate.   Eyes:      General: No scleral icterus.     Extraocular Movements: Extraocular movements intact.      Conjunctiva/sclera: Conjunctivae normal.      Pupils: Pupils are equal, round, and reactive to light.   Neck:      Vascular: No JVD.   Cardiovascular:      Rate and Rhythm: Normal rate.      Heart sounds: Normal heart sounds. No murmur heard.     No gallop.   Pulmonary:      Effort: Pulmonary effort is normal. No respiratory distress.      Breath sounds: Normal breath sounds. No stridor. No wheezing, rhonchi or rales.   Chest:      Chest wall: No tenderness.   Abdominal:      General: Bowel sounds are normal. There is no distension.      Palpations: Abdomen is soft. There is no mass.      Tenderness: There is no abdominal tenderness. There is no guarding or rebound.   Musculoskeletal:         General: No swelling. Normal range of  motion.      Cervical back: Normal range of motion and neck supple.      Comments: L AKA  R foot with multiple scabs and wounds on the toes.  Unable to palpate DP/TP  (+) surgical site sacral area, dressing CDI   Lymphadenopathy:      Cervical: No cervical adenopathy.   Skin:     General: Skin is warm and dry.      Coloration: Skin is not jaundiced.      Findings: No rash.   Neurological:      General: No focal deficit present.      Mental Status: He is alert and oriented to person, place, and time.      Cranial Nerves: No cranial nerve deficit.   Psychiatric:         Mood and Affect: Mood normal.         Behavior: Behavior normal.         Thought Content: Thought content normal.         Judgment: Judgment normal.       I have performed the physical examination today 11/20/2023.  In review of yesterday's note, there are no new changes except as documented above.      Fluids    Intake/Output Summary (Last 24 hours) at 11/21/2023 1304  Last data filed at 11/21/2023 0800  Gross per 24 hour   Intake 1840 ml   Output 49625 ml   Net -8235 ml         Laboratory  Recent Labs     11/19/23  0158 11/20/23  0447 11/21/23  0150   WBC 10.9* 9.7 10.1   RBC 2.81* 2.71* 2.81*   HEMOGLOBIN 8.1* 7.8* 7.9*   HEMATOCRIT 24.8* 23.9* 25.5*   MCV 88.3 88.2 90.7   MCH 28.8 28.8 28.1   MCHC 32.7 32.6 31.0*   RDW 52.2* 53.4* 55.2*   PLATELETCT 540* 545* 594*   MPV 8.9* 8.7* 8.6*     Recent Labs     11/19/23  0158 11/20/23  0447 11/21/23  0150   SODIUM 130* 131* 137   POTASSIUM 4.0 4.8 4.5   CHLORIDE 99 102 102   CO2 20 21 26   GLUCOSE 109* 97 97   BUN 22 30* 20   CREATININE 1.48* 1.80* 0.91   CALCIUM 7.9* 8.0* 8.1*                   Imaging  US-RENAL   Final Result      1.  Normal renal ultrasound.      US-VEIN MAPPING LOWER EXTREMITY UNILAT RIGHT   Final Result      CT-ABDOMEN-PELVIS WITH   Final Result         1. There is a 2 cm ulcerative fraying in the left medial gluteus region. Subjacent iliac bone demonstrates bony ridging, consistent  with osteomyelitis. There is small amount of air tracking medially and laterally away from the ulcerative wound along the    left medial gluteal musculature and down to the level of the left paraspinous musculature. Therefore, early necrotizing fasciitis possible. No drainable abscess present. This was discussed with the ordering physician.   2. Severely distended bladder with mild to moderate bilateral associated hydronephrosis. This is likely secondary to prostatic hypertrophy. There is severe median lobe hypertrophy. Consider Holguin catheter placement for decompression. Bladder diverticulum    also noted.   3. Patchy ground glass to aeration lower lobes, suspicious for pneumonia. However, atypical edema or other pneumonitis possible.   4. Severe atherosclerotic disease.   5. Additional soft tissue wound in the left groin.      DX-FEMUR-2+ LEFT   Final Result      No evidence of infection by plain film.      DX-CHEST-PORTABLE (1 VIEW)   Final Result      No acute process.           Assessment/Plan  * Infected sacral wound- (present on admission)  Assessment & Plan  - Large sacral/ischial wound with signs of active infection and CT scan concerning for pelvic osteomyelitis and skin and soft tissue infection.  -s/p I&D of the sacral wound on 11/14.  -Surgical cultures grew beta-hemolytic group A streptococcus along with mixed enteric and usual skin kris.  -Continue oral Augmentin to complete 7 days course of antibiotics  -Continue local wound care.  Wound VAC has been placed.   -Continue pain control with oral oxycodone and IV Dilaudid.  -PT/OT recommending SNF placement.  He was sent home on last admission, but did not do well with worsening sacral wound.  At this time may need to pursue placement, although it has been a difficult discharge on his last admission with no accepting SNF or home health services.  May need to look into group home placement.  CM/SW for discharge planning.    Anxiety- (present on  admission)  Assessment & Plan  - Continue as needed Atarax.    Insomnia- (present on admission)  Assessment & Plan  -Continue trazodone at night.    Open wound of inguinal region- (present on admission)  Assessment & Plan  -Recent history of femoral endarterectomy with incision dehiscence, open wound  -Vascular surgery following  -Continue wound care.      Severe sepsis ([facility])- (present on admission)  Assessment & Plan  -Due to infected sacral wound.  Resolved.  Management as above.    EGLY (acute kidney injury) ([facility])- (present on admission)  Assessment & Plan  - Suspect postrenal component, just like before when he required Gonzales catheter, along with prerenal with rising BUN.   -Place Gonzales catheter.  Resume Flomax and Proscar.  Improved with gonzales and IV fluids  Follow up with urology as outpatient, keep gonzales in place  -Continue to monitor renal function closely.  Monitor electrolytes as at risk for electrolyte derangements with GELY.  - avoid nephrotoxins, and continue to renally dose all medications.    PAD (peripheral artery disease) ([facility])- (present on admission)  Assessment & Plan  -With recent history of bypass grafting  -Vascular surgery is anticipating need for outpatient elective bypass procedure on the right leg once acute infection has resolved.    Anemia- (present on admission)  Assessment & Plan  -Patient had normal hemoglobin and March of this year, but has been anemic since approximately September  -Degree of anemia has been essentially stable  -Likely multifactorial related to nutritional status, and multiple procedures    Hyponatremia- (present on admission)  Assessment & Plan  -Following resuscitation, had very high UOP concerning for low solutes vs beer potomania.   -Sodium levels improved, although initially overcorrected required D5, but has since stabilized with appropriate rate of correction.  -Sodium improved and stable.  Continue salt tablets 1 g twice daily.         VTE prophylaxis: Lovenox  SQ

## 2023-11-21 NOTE — CARE PLAN
The patient is Watcher - Medium risk of patient condition declining or worsening    Shift Goals  Clinical Goals: Pain management  Patient Goals: Rest  Family Goals: ashlie    Progress made toward(s) clinical / shift goals:      Patient is not progressing towards the following goals:    Assessed and updated on POC.   Medicated for pain per MAR orders  Holguin catheter in place and draining without issues  Safety interventions in place  Needs addressed at this time.

## 2023-11-21 NOTE — DISCHARGE PLANNING
Case Management Discharge Planning    Admission Date: 11/12/2023  GMLOS: 9.9  ALOS: 8    6-Clicks ADL Score:    6-Clicks Mobility Score: 10  PT and/or OT Eval ordered: Yes  Post-acute Referrals Ordered: Yes  Post-acute Choice Obtained: No  Has referral(s) been sent to post-acute provider:  Yes      Anticipated Discharge Dispo: Discharge Disposition: D/T to SNF with Medicare cert in anticipation of skilled care (03)    Action(s) Taken:   RN HIRAM met with patient at bedside.  He stated that he lives in a one story house in [place] with his roommate, Otis.  Pt uses a wc at baseline.  Otis works full time at the [employer] and has a variable schedule.  Pt is left alone.  Pt is a readmission.  Pt currently has wound vac x 2 (left groin and sacrum).  Has a stage IV sacral PI.  OT discontinued order as attempt was made x 3 and patient refused.  Pt refused PT on 11-17.  Participated on 11-15.  Pt is a max assist.  Referral sent to [facility] in [place] for SNF and long term care.    Medically Clear: No    Next Steps:   Follow up with [facility], [facility], [facility] and [facility] for SNF and long term care.    Barriers to Discharge: Medical clearance and Pending Placement    Care Transition Team Assessment    Information Source  Orientation Level: Oriented to person, Disoriented to place, Oriented to situation  Information Given By: Patient  Informant's Name: Dillon  Who is responsible for making decisions for patient? : Patient    Readmission Evaluation  Is this a readmission?: Yes - unplanned readmission    Elopement Risk  Legal Hold: No  Ambulatory or Self Mobile in Wheelchair: No-Not an Elopement Risk  Disoriented: Time-At Risk for Elopement  Psychiatric Symptoms: None  History of Wandering: No  Elopement this Admit: No  Vocalizing Wanting to Leave: No  Displays Behaviors, Body Language Wanting to Leave: No-Not at Risk for Elopement  Elopement Risk: Not at Risk for  Elopement    Interdisciplinary Discharge Planning  Lives with - Patient's Self Care Capacity: Sibling  Housing / Facility: [address]    Discharge Preparedness  What is your plan after discharge?: Uncertain - pending medical team collaboration  What are your discharge supports?: Other (comment)  Prior Functional Level: Independent with Activities of Daily Living, Independent with Medication Management, Uses Wheelchair  Difficulity with ADLs: Bathing, Dressing, Toileting, Walking  Difficulty with ADLs Comment: Pt is wheelchair dependent  Difficulity with IADLs: Cooking, Driving, Laundry, Managing medication, Shopping    Functional Assesment  Prior Functional Level: Independent with Activities of Daily Living, Independent with Medication Management, Uses Wheelchair    Finances  Financial Barriers to Discharge: No  Prescription Coverage: Yes    Vision / Hearing Impairment  Hearing Impairment: Right Ear, Hearing Device Not Available         Advance Directive  Advance Directive?: None    Domestic Abuse  Have you ever been the victim of abuse or violence?: No    Psychological Assessment  History of Substance Abuse: None  History of Psychiatric Problems: No    Discharge Risks or Barriers  Discharge risks or barriers?: Uninsured / underinsured  Patient risk factors: Cognitive / sensory / physical deficit    Anticipated Discharge Information  Discharge Disposition: D/T to SNF with Medicare cert in anticipation of skilled care (03)

## 2023-11-21 NOTE — DISCHARGE PLANNING
@0920  Agency/Facility Name: [facility]  Outcome: DPA left a voice message for Iman in the general mailbox.  Awaiting a call back.    @[id number]  Agency/Facility Name: [facility]  Outcome: DPA left a voice message for Vianey in admissions.  Awaiting a call back.    00820  Agency/Facility Name: [facility]  Spoke To: Carlos Manuel  Outcome: Referral declined.  Out of network with pt's insurance (Medicaid for adults) and no beds.    @1045  Agency/Facility Name: [facility]  Outcome: DPA left a voice message for admissions.  Awaiting a call back.    @1042  DPA faxed a SNF referral to [facility].  Phone:  [phone]  Fax:  [phone]

## 2023-11-21 NOTE — THERAPY
Physical Therapy   Daily Treatment     Patient Name: Dillon Centeno  Age:  58 y.o., Sex:  male  Medical Record #: [id number]  Today's Date: 11/21/2023     Precautions  Precautions: (P) Fall Risk  Comments: (P) VAC Lft groin/sacrum    Assessment    Pt reluctantly agrees to OOB as long as he can have a cup of coffee. The pt remains w/2 wound VACS, one to his groin and the 2nd one to his sacrum. Today the pt required min assist w/sup<->sit transitions w/HOB flat and use of railing and Max assist STS/transfers bed<->w/c. The pt dc'd the hospital on 11/3 and re-admitted 11/12 for failure to thrive w/incont of bowel/bladder. The pt is not at a level to d/c home at this time, he was at a supervised level w/bed mobility and transfers when dc'd back on 11/3 w/his room mates support. Unfortunately the pt was unable to take care of himself and re-admitted w/change in his wound and functional mobility. MD notified that he still has his staples in from his AKA 10/23/23.   PT will cont to follow. es max assist for transfers.    Plan    Treatment Plan Status: (P) Continue Current Treatment Plan  Type of Treatment: Therapeutic Activities (transfers)  Treatment Frequency: 3 Times per Week  Treatment Duration: Until Therapy Goals Met    DC Equipment Recommendations: (P) Unable to determine at this time  Discharge Recommendations: (P) Recommend post-acute placement for additional physical therapy services prior to discharge home    Objective       11/21/23 1009   Charge Group   PT Therapeutic Activities (Units) 3   Total Time Spent   PT Therapeutic Activities Time Spent (Mins) 39   Precautions   Precautions Fall Risk   Comments VAC Lft groin/sacrum   Pain 0 - 10 Group   Location Generalized   Pain Rating Scale (NPRS) 9   Other Treatments   Other Treatments Provided EOB x 9 mins w/SBA for static sit w/wo UE support.   Balance   Sitting Balance (Static) Fair +   Sitting Balance (Dynamic) Fair   Standing Balance (Static) Trace +    Standing Balance (Dynamic) Trace +   Weight Shift Sitting Poor   Weight Shift Standing Absent   Bed Mobility    Supine to Sit Moderate Assist   Scooting Moderate Assist  (seated)   Comments HOB elevated and use of railing   Gait Analysis   Gait Level Of Assist Unable to Participate   Weight Bearing Status NWB Lft LE  (AKA)   Comments non-amb PTA, pivot transfers only   Functional Mobility   Sit to Stand Maximal Assist   Bed, Chair, Wheelchair Transfer Maximal Assist   Transfer Method Stand Pivot   Skilled Intervention Verbal Cuing;Postural Facilitation;Compensatory Strategies   Comments The pt required max assist for STS and transfers bed<->w/c.   How much difficulty does the patient currently have...   Turning over in bed (including adjusting bedclothes, sheets and blankets)? 1   Sitting down on and standing up from a chair with arms (e.g., wheelchair, bedside commode, etc.) 1   Moving from lying on back to sitting on the side of the bed? 1   How much help from another person does the patient currently need...   Moving to and from a bed to a chair (including a wheelchair)? 2   Need to walk in a hospital room? 1   Climbing 3-5 steps with a railing? 1   6 clicks Mobility Score 7   Short Term Goals    Short Term Goal # 1 Pt to TF bed to/from w/c/chair w/ spv in 6 visits to improve fxl indep.   Goal Outcome # 1 goal not met   Short Term Goal # 2 Pt to propel w/c 150 ft w/ spv in 6 visits to improve fxl indep   Goal Outcome # 2 Goal not met   Short Term Goal # 3 Patient will perform sit-stand with FWW with supervision in 6 visits   Goal Outcome # 3 Goal not met   Short Term Goal # 4 Pt will self propel wheelchair with supervision within 6 visits to ensure independent mobility at home.   Goal Outcome # 4 Goal not met   Short Term Goal # 5 Patient will verbalize post amputation education including residual limb positioning, desensitization exercises within 6tx in order to demonstrate understanding   Goal Outcome # 5  Goal not met   Education Group   Role of Physical Therapist Patient Response Patient;Acceptance;Explanation;Reinforcement Needed   Physical Therapy Treatment Plan   Physical Therapy Treatment Plan Continue Current Treatment Plan   Anticipated Discharge Equipment and Recommendations   DC Equipment Recommendations Unable to determine at this time   Discharge Recommendations Recommend post-acute placement for additional physical therapy services prior to discharge home   Interdisciplinary Plan of Care Collaboration   IDT Collaboration with  Nursing   Patient Position at End of Therapy In Bed;Bed Alarm On;Call Light within Reach;Tray Table within Reach;Phone within Reach   Collaboration Comments Nrsg notified of pts tx efforts   Session Information   Date / Session Number  11/21--2 (2/3, 11/21) PTA/1   Supervising Physical Therapist (PTA Treatments Only)   Supervising Physical Therapist Svetlana Mcdowell

## 2023-11-22 LAB
GLUCOSE BLD STRIP.AUTO-MCNC: 102 MG/DL (ref 65–99)
GLUCOSE BLD STRIP.AUTO-MCNC: 103 MG/DL (ref 65–99)
GLUCOSE BLD STRIP.AUTO-MCNC: 114 MG/DL (ref 65–99)
GLUCOSE BLD STRIP.AUTO-MCNC: 138 MG/DL (ref 65–99)

## 2023-11-22 PROCEDURE — 99231 SBSQ HOSP IP/OBS SF/LOW 25: CPT | Performed by: STUDENT IN AN ORGANIZED HEALTH CARE EDUCATION/TRAINING PROGRAM

## 2023-11-22 PROCEDURE — A9270 NON-COVERED ITEM OR SERVICE: HCPCS | Performed by: HOSPITALIST

## 2023-11-22 PROCEDURE — 700111 HCHG RX REV CODE 636 W/ 250 OVERRIDE (IP): Mod: JZ | Performed by: INTERNAL MEDICINE

## 2023-11-22 PROCEDURE — A9270 NON-COVERED ITEM OR SERVICE: HCPCS | Performed by: INTERNAL MEDICINE

## 2023-11-22 PROCEDURE — 770006 HCHG ROOM/CARE - MED/SURG/GYN SEMI*

## 2023-11-22 PROCEDURE — 97605 NEG PRS WND THER DME<=50SQCM: CPT

## 2023-11-22 PROCEDURE — 82962 GLUCOSE BLOOD TEST: CPT

## 2023-11-22 PROCEDURE — 700102 HCHG RX REV CODE 250 W/ 637 OVERRIDE(OP): Performed by: INTERNAL MEDICINE

## 2023-11-22 PROCEDURE — 700102 HCHG RX REV CODE 250 W/ 637 OVERRIDE(OP): Performed by: HOSPITALIST

## 2023-11-22 PROCEDURE — 97602 WOUND(S) CARE NON-SELECTIVE: CPT

## 2023-11-22 PROCEDURE — 302098 PASTE RING (FLAT): Performed by: STUDENT IN AN ORGANIZED HEALTH CARE EDUCATION/TRAINING PROGRAM

## 2023-11-22 RX ADMIN — THERA TABS 1 TABLET: TAB at 05:23

## 2023-11-22 RX ADMIN — TAMSULOSIN HYDROCHLORIDE 0.4 MG: 0.4 CAPSULE ORAL at 10:03

## 2023-11-22 RX ADMIN — OXYCODONE HYDROCHLORIDE 10 MG: 10 TABLET ORAL at 05:23

## 2023-11-22 RX ADMIN — TRAZODONE HYDROCHLORIDE 50 MG: 50 TABLET ORAL at 20:13

## 2023-11-22 RX ADMIN — Medication 220 MG: at 05:22

## 2023-11-22 RX ADMIN — ENOXAPARIN SODIUM 40 MG: 100 INJECTION SUBCUTANEOUS at 18:24

## 2023-11-22 RX ADMIN — HYDROXYZINE HYDROCHLORIDE 25 MG: 50 TABLET, FILM COATED ORAL at 13:55

## 2023-11-22 RX ADMIN — OXYCODONE HYDROCHLORIDE AND ACETAMINOPHEN 500 MG: 500 TABLET ORAL at 18:23

## 2023-11-22 RX ADMIN — FERROUS SULFATE TAB 325 MG (65 MG ELEMENTAL FE) 325 MG: 325 (65 FE) TAB at 08:46

## 2023-11-22 RX ADMIN — OXYCODONE HYDROCHLORIDE AND ACETAMINOPHEN 500 MG: 500 TABLET ORAL at 05:21

## 2023-11-22 RX ADMIN — DOCUSATE SODIUM 50 MG AND SENNOSIDES 8.6 MG 2 TABLET: 8.6; 5 TABLET, FILM COATED ORAL at 05:21

## 2023-11-22 RX ADMIN — SODIUM CHLORIDE 1 G: 1 TABLET ORAL at 08:47

## 2023-11-22 RX ADMIN — FINASTERIDE 5 MG: 5 TABLET, FILM COATED ORAL at 05:22

## 2023-11-22 RX ADMIN — OXYCODONE HYDROCHLORIDE 10 MG: 10 TABLET ORAL at 10:02

## 2023-11-22 RX ADMIN — OXYCODONE HYDROCHLORIDE 10 MG: 10 TABLET ORAL at 20:13

## 2023-11-22 ASSESSMENT — ENCOUNTER SYMPTOMS
BLURRED VISION: 0
NAUSEA: 0
DIZZINESS: 0
HEADACHES: 0
SHORTNESS OF BREATH: 0
FEVER: 0
BACK PAIN: 0
PALPITATIONS: 0
VOMITING: 0
EYE PAIN: 0
COUGH: 0
INSOMNIA: 0
CHILLS: 0
ABDOMINAL PAIN: 0

## 2023-11-22 ASSESSMENT — PAIN DESCRIPTION - PAIN TYPE
TYPE: ACUTE PAIN
TYPE: ACUTE PAIN

## 2023-11-22 ASSESSMENT — LIFESTYLE VARIABLES: SUBSTANCE_ABUSE: 0

## 2023-11-22 NOTE — DISCHARGE PLANNING
DPA faxed to the following [place] SNFs that accept Medicaid FFS:    [facility]

## 2023-11-22 NOTE — CARE PLAN
The patient is Stable - Low risk of patient condition declining or worsening    Shift Goals  Clinical Goals: alice's pain will be below a 7 during the shift  Patient Goals: pain control  Family Goals: ashlie    Progress made toward(s) clinical / shift goals:  Patient complains of pain during the shift.PRN medication given. Patient able to sleep after dilaudid was given. Unlabored breathing noted.     Patient is not progressing towards the following goals:

## 2023-11-22 NOTE — PROGRESS NOTES
Hospital Medicine Daily Progress Note    Date of Service  11/22/2023    Chief Complaint   altered mentation    Hospital Course  Dillon Centeno is a 58 y.o. male with PAD, hypertension, history of left AKA, admitted 11/12/2023 with altered mentation.  On evaluation, he was found to have a large ischial unstageable decubitus ulcer, and labs showed Na 117, and creatinine of 2.27.  He was also noted to have dehisence of L groin wound.  Patient started on antibiotics and IV fluids.  Surgery was consulted, and patient underwent I&D of the sacral wound on 11/14 (Dr. Christianson).  Cultures have grown beta-hemolytic group A Streptococcus along with mixed enteric and usual skin kris.  Renal function improved with hydration.  Sodium levels improved, although initially overcorrected requiring D5, but has since stabilized with appropriate rate of correction.  He was seen by vascular surgery who anticipated need for outpatient elective bypass procedure on the right leg once acute infection has resolved. Wound VAC has been placed.  PT recommends SNF placement.      Interval Problem Update  No acute events overnight.  Patient is medically cleared.  Pending SNF placement, may be difficult discharge due to insurance.    Consultants/Specialty  general surgery and vascular surgery    Code Status  Full Code    Disposition  Medically Cleared    I have placed the appropriate orders for post-discharge needs.    Review of Systems  Review of Systems   Constitutional:  Negative for chills and fever.   Eyes:  Negative for blurred vision and pain.   Respiratory:  Negative for cough and shortness of breath.    Cardiovascular:  Negative for chest pain, palpitations and leg swelling.   Gastrointestinal:  Negative for abdominal pain, nausea and vomiting.   Genitourinary:  Negative for dysuria and urgency.   Musculoskeletal:  Negative for back pain.   Skin:  Negative for itching and rash.   Neurological:  Negative for dizziness and headaches.    Psychiatric/Behavioral:  Negative for substance abuse. The patient does not have insomnia.         Pertinent positives/negatives as mentioned above.     A complete review of systems was personally done by me. All other systems were negative.       Physical Exam  Temp:  [36.6 °C (97.8 °F)-37.1 °C (98.8 °F)] 37.1 °C (98.8 °F)  Pulse:  [89-99] 89  Resp:  [16-20] 16  BP: (122-131)/(69-84) 122/78  SpO2:  [93 %-98 %] 95 %    Physical Exam  Vitals reviewed.   Constitutional:       General: He is not in acute distress.     Appearance: Normal appearance. He is well-developed. He is not toxic-appearing or diaphoretic.   HENT:      Head: Normocephalic and atraumatic.      Right Ear: External ear normal.      Left Ear: External ear normal.      Mouth/Throat:      Mouth: Mucous membranes are moist.      Pharynx: No oropharyngeal exudate.   Eyes:      General: No scleral icterus.     Extraocular Movements: Extraocular movements intact.      Conjunctiva/sclera: Conjunctivae normal.      Pupils: Pupils are equal, round, and reactive to light.   Neck:      Vascular: No JVD.   Cardiovascular:      Rate and Rhythm: Normal rate.      Heart sounds: Normal heart sounds. No murmur heard.     No gallop.   Pulmonary:      Effort: Pulmonary effort is normal. No respiratory distress.      Breath sounds: Normal breath sounds. No stridor. No wheezing, rhonchi or rales.   Chest:      Chest wall: No tenderness.   Abdominal:      General: Bowel sounds are normal. There is no distension.      Palpations: Abdomen is soft. There is no mass.      Tenderness: There is no abdominal tenderness. There is no guarding or rebound.   Musculoskeletal:         General: No swelling. Normal range of motion.      Cervical back: Normal range of motion and neck supple.      Comments: L AKA  R foot with multiple scabs and wounds on the toes.  Unable to palpate DP/TP  (+) surgical site sacral area, dressing CDI   Lymphadenopathy:      Cervical: No cervical  adenopathy.   Skin:     General: Skin is warm and dry.      Coloration: Skin is not jaundiced.      Findings: No rash.   Neurological:      General: No focal deficit present.      Mental Status: He is alert and oriented to person, place, and time.      Cranial Nerves: No cranial nerve deficit.   Psychiatric:         Mood and Affect: Mood normal.         Behavior: Behavior normal.         Thought Content: Thought content normal.         Judgment: Judgment normal.       I have performed the physical examination today 11/20/2023.  In review of yesterday's note, there are no new changes except as documented above.      Fluids    Intake/Output Summary (Last 24 hours) at 11/22/2023 1301  Last data filed at 11/22/2023 0317  Gross per 24 hour   Intake 720 ml   Output 4250 ml   Net -3530 ml           Laboratory  Recent Labs     11/20/23  0447 11/21/23  0150   WBC 9.7 10.1   RBC 2.71* 2.81*   HEMOGLOBIN 7.8* 7.9*   HEMATOCRIT 23.9* 25.5*   MCV 88.2 90.7   MCH 28.8 28.1   MCHC 32.6 31.0*   RDW 53.4* 55.2*   PLATELETCT 545* 594*   MPV 8.7* 8.6*       Recent Labs     11/20/23  0447 11/21/23  0150   SODIUM 131* 137   POTASSIUM 4.8 4.5   CHLORIDE 102 102   CO2 21 26   GLUCOSE 97 97   BUN 30* 20   CREATININE 1.80* 0.91   CALCIUM 8.0* 8.1*                     Imaging  US-RENAL   Final Result      1.  Normal renal ultrasound.      US-VEIN MAPPING LOWER EXTREMITY UNILAT RIGHT   Final Result      CT-ABDOMEN-PELVIS WITH   Final Result         1. There is a 2 cm ulcerative fraying in the left medial gluteus region. Subjacent iliac bone demonstrates bony ridging, consistent with osteomyelitis. There is small amount of air tracking medially and laterally away from the ulcerative wound along the    left medial gluteal musculature and down to the level of the left paraspinous musculature. Therefore, early necrotizing fasciitis possible. No drainable abscess present. This was discussed with the ordering physician.   2. Severely distended  bladder with mild to moderate bilateral associated hydronephrosis. This is likely secondary to prostatic hypertrophy. There is severe median lobe hypertrophy. Consider Holguin catheter placement for decompression. Bladder diverticulum    also noted.   3. Patchy ground glass to aeration lower lobes, suspicious for pneumonia. However, atypical edema or other pneumonitis possible.   4. Severe atherosclerotic disease.   5. Additional soft tissue wound in the left groin.      DX-FEMUR-2+ LEFT   Final Result      No evidence of infection by plain film.      DX-CHEST-PORTABLE (1 VIEW)   Final Result      No acute process.           Assessment/Plan  * Infected sacral wound- (present on admission)  Assessment & Plan  - Large sacral/ischial wound with signs of active infection and CT scan concerning for pelvic osteomyelitis and skin and soft tissue infection.  -s/p I&D of the sacral wound on 11/14.  -Surgical cultures grew beta-hemolytic group A streptococcus along with mixed enteric and usual skin kris.  -Continue oral Augmentin to complete 7 days course of antibiotics  -Continue local wound care.  Wound VAC has been placed.   -Continue pain control with oral oxycodone and IV Dilaudid.  -PT/OT recommending SNF placement.  He was sent home on last admission, but did not do well with worsening sacral wound.  At this time may need to pursue placement, although it has been a difficult discharge on his last admission with no accepting SNF or home health services.  May need to look into group home placement.  CM/SW for discharge planning.    Anxiety- (present on admission)  Assessment & Plan  - Continue as needed Atarax.    Insomnia- (present on admission)  Assessment & Plan  -Continue trazodone at night.    Open wound of inguinal region- (present on admission)  Assessment & Plan  -Recent history of femoral endarterectomy with incision dehiscence, open wound  -Vascular surgery following  -Continue wound care.      Severe sepsis  ([facility])- (present on admission)  Assessment & Plan  -Due to infected sacral wound.  Resolved.  Management as above.    GELY (acute kidney injury) ([facility])- (present on admission)  Assessment & Plan  - Suspect postrenal component, just like before when he required Gonzales catheter, along with prerenal with rising BUN.   -Place Gonzales catheter.  Resume Flomax and Proscar.  Improved with gonzales and IV fluids  Follow up with urology as outpatient, keep gonzales in place  -Continue to monitor renal function closely.  Monitor electrolytes as at risk for electrolyte derangements with GELY.  - avoid nephrotoxins, and continue to renally dose all medications.    PAD (peripheral artery disease) ([facility])- (present on admission)  Assessment & Plan  -With recent history of bypass grafting  -Vascular surgery is anticipating need for outpatient elective bypass procedure on the right leg once acute infection has resolved.    Anemia- (present on admission)  Assessment & Plan  -Patient had normal hemoglobin and March of this year, but has been anemic since approximately September  -Degree of anemia has been essentially stable  -Likely multifactorial related to nutritional status, and multiple procedures    Hyponatremia- (present on admission)  Assessment & Plan  -Following resuscitation, had very high UOP concerning for low solutes vs beer potomania.   -Sodium levels improved, although initially overcorrected required D5, but has since stabilized with appropriate rate of correction.  -Sodium improved and stable.  Continue salt tablets 1 g twice daily.         VTE prophylaxis: Lovenox SQ

## 2023-11-22 NOTE — PROGRESS NOTES
Received report from day shift nurse. Patient is alert and oriented x4. Bed I locked and in the lowest position. Patient complains of pain at 9/10. Medication given per MAR.

## 2023-11-22 NOTE — CARE PLAN
The patient is Stable - Low risk of patient condition declining or worsening    Shift Goals  Clinical Goals: pain management  Patient Goals: pain control  Family Goals: ashlie    Progress made toward(s) clinical / shift goals:  encouraged Q2hr turns. Prn oxy IR for pain. Pain rating 7-8/10. Staples removed from left AKA. Holguin in place draining clear yellow urine.   Problem: Knowledge Deficit - Standard  Goal: Patient and family/care givers will demonstrate understanding of plan of care, disease process/condition, diagnostic tests and medications  Outcome: Progressing     Problem: Skin Integrity  Goal: Skin integrity is maintained or improved  Outcome: Progressing       Patient is not progressing towards the following goals:  Problem: Pain - Standard  Goal: Alleviation of pain or a reduction in pain to the patient’s comfort goal  Outcome: Progressing

## 2023-11-22 NOTE — WOUND TEAM
[facility]  Inpatient Services  Wound and Skin Care Follow-up    Admission Date: 11/12/2023     Last order of IP CONSULT TO WOUND CARE was found on 11/19/2023 from Hospital Encounter on 11/12/2023     HPI, PMH, SH: Reviewed    Past Surgical History:   Procedure Laterality Date    INCISION AND DRAINAGE GENERAL Left 11/14/2023    Procedure: Sharp exicisional debridement of skin subqutaneous fat and fasha 10cm squard;  Surgeon: Thom Christianson M.D.;  Location: [facility];  Service: General    INCISION AND DRAINAGE GENERAL Left 10/23/2023    Procedure: INCISION AND DRAINAGE;  Surgeon: Keith Navarro M.D.;  Location: [facility];  Service: Vascular    APPLICATION OR REPLACEMENT, WOUND VAC  10/23/2023    Procedure: APPLICATION OR REPLACEMENT, WOUND VAC;  Surgeon: Keith Navarro M.D.;  Location: [facility];  Service: Vascular    KNEE AMPUTATION ABOVE Left 10/23/2023    Procedure: AMPUTATION, ABOVE KNEE;  Surgeon: Keith Navarro M.D.;  Location: [facility];  Service: Vascular    KNEE AMPUTATION BELOW Left 10/20/2023    Procedure: AMPUTATION, BELOW KNEE WOUND REVISION;  Surgeon: Pradip Altamirano M.D.;  Location: [facility];  Service: Orthopedics    FEMORAL ENDARTERECTOMY Bilateral 9/30/2023    Procedure: ENDARTERECTOMY, FEMORAL;  Surgeon: Keith Navarro M.D.;  Location: [facility];  Service: Vascular    ANGIOGRAM, WITH ANGIOPLASTY, AND STENT INSERTION IF INDICATED Right 9/30/2023    Procedure: ANGIOGRAM, WITH ILIAC STENT;  Surgeon: Keith Navarro M.D.;  Location: [facility];  Service: Vascular    KNEE AMPUTATION BELOW Left 9/27/2023    Procedure: AMPUTATION, BELOW KNEE;  Surgeon: Pradip Altamirano M.D.;  Location: [facility];  Service: Orthopedics    IRRIGATION & DEBRIDEMENT ORTHO Left 3/4/2018    Procedure: IRRIGATION & DEBRIDEMENT ORTHO - HUMERUS;  Surgeon: Sergio Watkins M.D.;  Location: [facility]  ORS;  Service: Orthopedics    ORIF, SHOULDER Left 1/4/2018    Procedure: SHOULDER ORIF;  Surgeon: Sergio Watkins M.D.;  Location: SUR[facility];  Service: Orthopedics    CLOSED REDUCTION Left 12/24/2017    Procedure: CLOSED REDUCTION;  Surgeon: Keith Subramanian M.D.;  Location: SUR[facility]ERY [facility];  Service: Orthopedics    OTHER      multiple surgeries lower legs from past injuries     Social History     Tobacco Use    Smoking status: Every Day     Types: Cigars    Smokeless tobacco: Never   Substance Use Topics    Alcohol use: Yes     Comment: a few beers a week     Chief Complaint   Patient presents with    Other     Pt BIB ems due to failure to thrive, pt's neighbors called ems due to pt unable to care for self and refuses to eat    Diarrhea     Diagnosis: Severe sepsis (HCC) [A41.9, R65.20]    Unit where seen by Wound Team: S631/01     WOUND FOLLOW UP RELATED TO:  L Groin, L sacrum NPWT change     WOUND TEAM PLAN OF CARE - Frequency of Follow-up:   Nursing to follow dressing orders written for wound care. Contact wound team if area fails to progress, deteriorates or with any questions/concerns if something comes up before next scheduled follow up (See below as to whether wound is following and frequency of wound follow up)  Dressing changes by wound team:                   NPWT change 3 times weekly - L groin & L sacrum; MWF change schedule    WOUND HISTORY:       58 y.o. male with past medical history of peripheral arterial disease, gangrene of the left lower extremity, hypertension who presented 11/12/2023 after his brother called EMS for the patient failing to eat or move.  In the ER the patient was found to be tachycardic with significant leukocytosis concerning for infection.  He was found to have a large sacral pressure injury therefore a CT scan of the abdomen pelvis was obtained which showed soft tissue air tracking superiorly above the ileum, other findings include evidence of  osteomyelitis.      11/14/23 I&D of sacral decubitus wound with Dr. Christianson       WOUND ASSESSMENT/LDA  Wound 11/12/23 Pressure Injury Sacrum Stage IV (Active)   Date First Assessed: 11/12/23   Present on Original Admission: Yes  Hand Hygiene Completed: Yes  Primary Wound Type: Pressure Injury  Location: Sacrum  Wound Description (Comments): Stage IV      Assessments 11/22/2023 12:00 PM   Site Assessment Pink;Red;Tunneling   Periwound Assessment Clean;Dry;Intact   Margins Defined edges;Unattached edges   Closure Secondary intention   Drainage Amount Scant   Drainage Description Serosanguineous   Treatments Cleansed;Offloading   Wound Cleansing Normal Saline Irrigation   Periwound Protectant Skin Protectant Wipes to Periwound;Paste Ring;Drape   Dressing Status Clean;Dry;Intact   Dressing Changed Changed   Dressing Cleansing/Solutions 1/4 Strength Dakin's Solution   Dressing Options Wound Vac   Dressing Change/Treatment Frequency Monday, Wednesday, Friday, and As Needed   NEXT Dressing Change/Treatment Date 11/24/23   NEXT Weekly Photo (Inpatient Only) 11/27/23   Wound Team Following 3x Weekly   Pressure Injury Stage Stage 4   Shape Round   Wound Odor None   Exposed Structures Bone   WOUND NURSE ONLY - Time Spent with Patient (mins) 45       Wound 11/12/23 Full Thickness Wound Groin Left open surgical (Active)   Date First Assessed: 11/12/23   Present on Original Admission: Yes  Hand Hygiene Completed: Yes  Primary Wound Type: Full Thickness Wound  Location: Groin  Laterality: Left  Wound Description (Comments): open surgical      Assessments 11/22/2023 12:00 PM   Site Assessment Pink   Periwound Assessment Clean;Dry;Intact   Margins Defined edges;Unattached edges   Closure Secondary intention   Drainage Amount Scant   Drainage Description Serosanguineous   Treatments Cleansed   Wound Cleansing Normal Saline Irrigation   Periwound Protectant Skin Protectant Wipes to Periwound;Paste Ring;Drape   Dressing Status  Clean;Dry;Intact   Dressing Changed Changed   Dressing Cleansing/Solutions Not Applicable   Dressing Options Collagen Dressing;Wound Vac   Dressing Change/Treatment Frequency Monday, Wednesday, Friday, and As Needed   NEXT Dressing Change/Treatment Date 11/24/23   NEXT Weekly Photo (Inpatient Only) 11/27/23   Wound Team Following 3x Weekly   Shape Irregular   Wound Odor None       Negative Pressure Wound Therapy 11/12/23 Dehisced;Surgical Groin;Sacrum Left (Active)   Placement Date: 11/12/23   Inserted by: wound team  Hand Hygiene Completed: Yes  Wound Type: Dehisced;Surgical  Location: Groin;Sacrum  Laterality: Left      Assessments 11/22/2023 12:00 PM   NPWT Pump Mode / Pressure Setting Ulta;Continuous;125 mmHg   Dressing Type Small;Black Foam (Veraflo)   Number of Foam Pieces Used 2   Canister Changed No   NEXT Dressing Change/Treatment Date 11/24/23   VAC VeraFlo Irrigant 1/4 Strength Dakins   VAC VeraFlo Soak Time (mins) 8   VAC VeraFlo Instill Volume (ml) 20   VAC VeraFlo - Therapy Time (hrs) 2   VAC VeraFlo Pressure (mm/Hg) Intermittent;125 mmHg        Vascular:    INGA:   No results found.    Lab Values:    Lab Results   Component Value Date/Time    WBC 10.1 11/21/2023 01:50 AM    RBC 2.81 (L) 11/21/2023 01:50 AM    HEMOGLOBIN 7.9 (L) 11/21/2023 01:50 AM    HEMATOCRIT 25.5 (L) 11/21/2023 01:50 AM    CREACTPROT 27.78 (H) 11/12/2023 02:22 AM    SEDRATEWES 66 (H) 11/12/2023 02:22 AM         Culture Results show:  Recent Results (from the past 720 hour(s))   CULTURE WOUND W/ GRAM STAIN    Collection Time: 11/14/23 11:53 AM    Specimen: Wound   Result Value Ref Range    Significant Indicator POS (POS)     Source WND     Site Sacral     Culture Result (A)      Light growth mixed enteric kris including E coli,  Citrobacter koseri, Enterococcus faecalis and Streptococcus  anginosis.      Gram Stain Result Moderate WBCs.  Few Gram positive cocci.       Culture Result (A)      Beta Hemolytic Streptococcus group  A  Light growth         Pain Level/Medicated:  PO pain medications administered by bedside RN 60min prior       INTERVENTIONS BY WOUND TEAM:  Chart and images reviewed. Discussed with bedside RN. All areas of concern (based on picture review, LDA review and discussion with bedside RN) have been thoroughly assessed. Documentation of areas based on significant findings. This RN in to assess patient. Performed standard wound care which includes appropriate positioning, dressing removal and non-selective debridement. Pictures and measurements obtained weekly if/when required.    Wound:  L Groin  Preparation for Dressing removal: Dressing soaked with saline  Cleansed/Non-selectively Debrided with:  Normal Saline and Gauze  Kiara wound: Cleansed with Normal Saline and Gauze, Prepped with No Sting, Paste Rings, and Drape  Primary Dressing:  Ann-Marie applied into wound bed. Then one piece of black foam to fill wound bed, secured with drape.  Secondary (Outer) Dressing: Second piece of foam applied as a button, then Trac pad applied over. Suction resumed at 125mmHg, no leaks detected. Y-connected to sacral wound vac machine.     Wound:  L Sacrum  Preparation for Dressing removal: Dressing soaked with saline  Cleansed/Non-selectively Debrided with:  Normal Saline and Gauze  Kiara wound: Cleansed with Normal Saline and Gauze, Prepped with No Sting and Paste Rings  Primary Dressing:  One piece of black Veraflo foam applied into wound bed and secured with drape.  Secondary (Outer) Dressing: Trac pad applied and suction resumed at 125mmHg, no leaks detected. Offloading adhesive foams applied to offload tubing.    Advanced Wound Care Discharge Planning  Number of Clinicians necessary to complete wound care: 1-2 (for positioning)  Is patient requiring IV pain medications for dressing changes:  No   Length of time for dressing change 45 min. (This does not include chart review, pre-medication time, set up, clean up or time spent  charting.)    Interdisciplinary consultation: Patient, Bedside RN, Jemma MELENDEZ (Wound RN).    EVALUATION / RATIONALE FOR TREATMENT:     Date:  11/22/23  Wound Status:  Wound progressing as expected  L groin Vac transitioned from Veraflo to leah with regular Vac. This was then Y-connected to sacral Vac, thus patient only needing one machine at this time.  Sacral wound still with exposed bone, continuing Veraflo here.    Date:  11/20/23  Wound Status:  Wound progressing as expected    L groin likely can transition to leah and regular NPWT at next change to help with granulation tissue at the depth of the proximal wound.    No changes to the sacral wound bed and continue to have exposed bone.  Continue with VF NPWT and POC.     Date:  11/18/23  Wound Status:  Wound progressing as expected    Left groin continues to improve with VF NPWT. Continue POC.     Sacral wound still with significant undermining and bone exposure. VF NPWT applied to this area as well to increase granulation tissue production and assist in wound closure by secondary intention.      Date:  11/16/23  Wound Status:  Wound progressing as expected    Left groin wound with more granulation tissue present. Continue with POC.    S/P debridement of sacral wound on 11/14. Wound with circumferential undermining. Deepest area of undermining measuring 4cm at 2o'clock. Bone exposed towards mid aspect of wound bed. Regular NPWT applied to increase granulation tissue production to the area. Will benefit from VF NPWT on next change to ensure exposed structures remain moist.       Date:  11/14/23  Wound Status:  Wound progressing as expected    Wound bed clean, no odor. Continue VF NPWT with Dakins  Date:  11/12/23  Wound Status:  Initial evaluation    Left groin with minimal slough after cleansing, met with Dr. Navarro at bedside who advised to place Dakin's VF to site.  Left sacral wound to go to surgery with Dr. Chow today or tomorrow for debridement and likely  VAC placement.  Patient very frail appearing.  Sacral wound probes to bone and will be very difficult to heal considering patient overall state and nutrition.             Goals: Steady decrease in wound area and depth weekly.    NURSING PLAN OF CARE ORDERS:  No new orders this visit    NUTRITION RECOMMENDATIONS   Wound Team Recommendations:  N/A     DIET ORDERS (From admission to next 24h)       Start     Ordered    11/16/23 1001  Diet Order Diet: Consistent CHO (Diabetic)  ALL MEALS        Question:  Diet:  Answer:  Consistent CHO (Diabetic)    11/16/23 1000    11/13/23 1328  Supplements  ALL MEALS        Question:  Which Supplement  Answer:  BOOST PLUS  Comment:  or equivalent Ensure    11/13/23 1328                    PREVENTATIVE INTERVENTIONS:   Q shift Jae - performed per nursing policy  Q shift pressure point assessments - performed per nursing policy    Surface/Positioning  Low Airloss - Currently in Place  Reposition q 2 hours - Currently in Place    Offloading/Redistribution  Sacral offloading dressing (Silicone dressing) - Currently in Place  Heel float boots (Prevalon boot) - Currently in Place    Anticipated discharge plans:  TBD        Vac Discharge Needs:  Vac Discharge plan is purely a recommendation from wound team and not a requirement for discharge unless otherwise stated by physician.  Veraflo Vac while inpatient, ok to transition to Regular Vac on discharge

## 2023-11-22 NOTE — CARE PLAN
The patient is Stable - Low risk of patient condition declining or worsening    Shift Goals  Clinical Goals: control pain  Patient Goals: pain control  Family Goals: ashlie    Progress made toward(s) clinical / shift goals:    Problem: Pain - Standard  Goal: Alleviation of pain or a reduction in pain to the patient’s comfort goal  Outcome: Progressing     Problem: Skin Integrity  Goal: Skin integrity is maintained or improved  Outcome: Progressing

## 2023-11-23 LAB
GLUCOSE BLD STRIP.AUTO-MCNC: 105 MG/DL (ref 65–99)
GLUCOSE BLD STRIP.AUTO-MCNC: 117 MG/DL (ref 65–99)
GLUCOSE BLD STRIP.AUTO-MCNC: 126 MG/DL (ref 65–99)
GLUCOSE BLD STRIP.AUTO-MCNC: 145 MG/DL (ref 65–99)

## 2023-11-23 PROCEDURE — 700102 HCHG RX REV CODE 250 W/ 637 OVERRIDE(OP): Performed by: STUDENT IN AN ORGANIZED HEALTH CARE EDUCATION/TRAINING PROGRAM

## 2023-11-23 PROCEDURE — A9270 NON-COVERED ITEM OR SERVICE: HCPCS | Performed by: HOSPITALIST

## 2023-11-23 PROCEDURE — 700102 HCHG RX REV CODE 250 W/ 637 OVERRIDE(OP): Performed by: INTERNAL MEDICINE

## 2023-11-23 PROCEDURE — A9270 NON-COVERED ITEM OR SERVICE: HCPCS | Performed by: INTERNAL MEDICINE

## 2023-11-23 PROCEDURE — 82962 GLUCOSE BLOOD TEST: CPT | Mod: 91

## 2023-11-23 PROCEDURE — 700102 HCHG RX REV CODE 250 W/ 637 OVERRIDE(OP): Performed by: HOSPITALIST

## 2023-11-23 PROCEDURE — 700111 HCHG RX REV CODE 636 W/ 250 OVERRIDE (IP): Mod: JZ | Performed by: INTERNAL MEDICINE

## 2023-11-23 PROCEDURE — 99231 SBSQ HOSP IP/OBS SF/LOW 25: CPT | Performed by: STUDENT IN AN ORGANIZED HEALTH CARE EDUCATION/TRAINING PROGRAM

## 2023-11-23 PROCEDURE — A9270 NON-COVERED ITEM OR SERVICE: HCPCS | Performed by: STUDENT IN AN ORGANIZED HEALTH CARE EDUCATION/TRAINING PROGRAM

## 2023-11-23 PROCEDURE — 770006 HCHG ROOM/CARE - MED/SURG/GYN SEMI*

## 2023-11-23 RX ORDER — CHOLECALCIFEROL (VITAMIN D3) 125 MCG
5 CAPSULE ORAL NIGHTLY
Status: DISCONTINUED | OUTPATIENT
Start: 2023-11-23 | End: 2023-11-24

## 2023-11-23 RX ADMIN — OXYCODONE HYDROCHLORIDE 10 MG: 10 TABLET ORAL at 07:51

## 2023-11-23 RX ADMIN — Medication 5 MG: at 21:12

## 2023-11-23 RX ADMIN — OXYCODONE HYDROCHLORIDE AND ACETAMINOPHEN 500 MG: 500 TABLET ORAL at 16:25

## 2023-11-23 RX ADMIN — OXYCODONE HYDROCHLORIDE AND ACETAMINOPHEN 500 MG: 500 TABLET ORAL at 05:05

## 2023-11-23 RX ADMIN — OXYCODONE HYDROCHLORIDE 10 MG: 10 TABLET ORAL at 15:24

## 2023-11-23 RX ADMIN — FINASTERIDE 5 MG: 5 TABLET, FILM COATED ORAL at 05:05

## 2023-11-23 RX ADMIN — TAMSULOSIN HYDROCHLORIDE 0.4 MG: 0.4 CAPSULE ORAL at 09:38

## 2023-11-23 RX ADMIN — HYDROXYZINE HYDROCHLORIDE 25 MG: 50 TABLET, FILM COATED ORAL at 15:25

## 2023-11-23 RX ADMIN — SODIUM CHLORIDE 1 G: 1 TABLET ORAL at 16:24

## 2023-11-23 RX ADMIN — HYDROXYZINE HYDROCHLORIDE 25 MG: 50 TABLET, FILM COATED ORAL at 07:51

## 2023-11-23 RX ADMIN — TRAZODONE HYDROCHLORIDE 50 MG: 50 TABLET ORAL at 21:12

## 2023-11-23 RX ADMIN — OXYCODONE HYDROCHLORIDE 10 MG: 10 TABLET ORAL at 19:31

## 2023-11-23 RX ADMIN — FERROUS SULFATE TAB 325 MG (65 MG ELEMENTAL FE) 325 MG: 325 (65 FE) TAB at 09:38

## 2023-11-23 RX ADMIN — SODIUM CHLORIDE 1 G: 1 TABLET ORAL at 14:01

## 2023-11-23 RX ADMIN — Medication 220 MG: at 05:05

## 2023-11-23 RX ADMIN — ENOXAPARIN SODIUM 40 MG: 100 INJECTION SUBCUTANEOUS at 16:24

## 2023-11-23 RX ADMIN — DOCUSATE SODIUM 50 MG AND SENNOSIDES 8.6 MG 2 TABLET: 8.6; 5 TABLET, FILM COATED ORAL at 16:24

## 2023-11-23 RX ADMIN — THERA TABS 1 TABLET: TAB at 05:05

## 2023-11-23 RX ADMIN — SODIUM CHLORIDE 1 G: 1 TABLET ORAL at 09:38

## 2023-11-23 ASSESSMENT — ENCOUNTER SYMPTOMS
ABDOMINAL PAIN: 0
DIZZINESS: 0
COUGH: 0
SHORTNESS OF BREATH: 0
BLURRED VISION: 0
INSOMNIA: 0
CHILLS: 0
VOMITING: 0
PALPITATIONS: 0
NAUSEA: 0
FEVER: 0
BACK PAIN: 0
HEADACHES: 0
EYE PAIN: 0

## 2023-11-23 ASSESSMENT — LIFESTYLE VARIABLES: SUBSTANCE_ABUSE: 0

## 2023-11-23 ASSESSMENT — PAIN DESCRIPTION - PAIN TYPE
TYPE: ACUTE PAIN
TYPE: ACUTE PAIN

## 2023-11-23 ASSESSMENT — FIBROSIS 4 INDEX: FIB4 SCORE: 1.24

## 2023-11-23 NOTE — PROGRESS NOTES
Hospital Medicine Daily Progress Note    Date of Service  11/23/2023    Chief Complaint   altered mentation    Hospital Course  Dillon Centeno is a 58 y.o. male with PAD, hypertension, history of left AKA, admitted 11/12/2023 with altered mentation.  On evaluation, he was found to have a large ischial unstageable decubitus ulcer, and labs showed Na 117, and creatinine of 2.27.  He was also noted to have dehisence of L groin wound.  Patient started on antibiotics and IV fluids.  Surgery was consulted, and patient underwent I&D of the sacral wound on 11/14 (Dr. Christianson).  Cultures have grown beta-hemolytic group A Streptococcus along with mixed enteric and usual skin kris.  Renal function improved with hydration.  Sodium levels improved, although initially overcorrected requiring D5, but has since stabilized with appropriate rate of correction.  He was seen by vascular surgery who anticipated need for outpatient elective bypass procedure on the right leg once acute infection has resolved. Wound VAC has been placed.  PT recommends SNF placement.      Interval Problem Update  No acute events overnight.  Patient is medically cleared.  Pending SNF placement, may be difficult discharge due to insurance.    Consultants/Specialty  general surgery and vascular surgery    Code Status  Full Code    Disposition  Medically Cleared    I have placed the appropriate orders for post-discharge needs.    Review of Systems  Review of Systems   Constitutional:  Negative for chills and fever.   Eyes:  Negative for blurred vision and pain.   Respiratory:  Negative for cough and shortness of breath.    Cardiovascular:  Negative for chest pain, palpitations and leg swelling.   Gastrointestinal:  Negative for abdominal pain, nausea and vomiting.   Genitourinary:  Negative for dysuria and urgency.   Musculoskeletal:  Negative for back pain.   Skin:  Negative for itching and rash.   Neurological:  Negative for dizziness and headaches.    Psychiatric/Behavioral:  Negative for substance abuse. The patient does not have insomnia.         Pertinent positives/negatives as mentioned above.     A complete review of systems was personally done by me. All other systems were negative.       Physical Exam  Temp:  [36.3 °C (97.4 °F)-36.6 °C (97.9 °F)] 36.6 °C (97.9 °F)  Pulse:  [63-98] 95  Resp:  [16-20] 20  BP: (113-127)/(74-85) 113/74  SpO2:  [94 %-98 %] 96 %    Physical Exam  Vitals reviewed.   Constitutional:       General: He is not in acute distress.     Appearance: Normal appearance. He is well-developed. He is not toxic-appearing or diaphoretic.   HENT:      Head: Normocephalic and atraumatic.      Right Ear: External ear normal.      Left Ear: External ear normal.      Mouth/Throat:      Mouth: Mucous membranes are moist.      Pharynx: No oropharyngeal exudate.   Eyes:      General: No scleral icterus.     Extraocular Movements: Extraocular movements intact.      Conjunctiva/sclera: Conjunctivae normal.      Pupils: Pupils are equal, round, and reactive to light.   Neck:      Vascular: No JVD.   Cardiovascular:      Rate and Rhythm: Normal rate.      Heart sounds: Normal heart sounds. No murmur heard.     No gallop.   Pulmonary:      Effort: Pulmonary effort is normal. No respiratory distress.      Breath sounds: Normal breath sounds. No stridor. No wheezing, rhonchi or rales.   Chest:      Chest wall: No tenderness.   Abdominal:      General: Bowel sounds are normal. There is no distension.      Palpations: Abdomen is soft. There is no mass.      Tenderness: There is no abdominal tenderness. There is no guarding or rebound.   Musculoskeletal:         General: No swelling. Normal range of motion.      Cervical back: Normal range of motion and neck supple.      Comments: L AKA  R foot with multiple scabs and wounds on the toes.  Unable to palpate DP/TP  (+) surgical site sacral area, dressing CDI   Lymphadenopathy:      Cervical: No cervical  adenopathy.   Skin:     General: Skin is warm and dry.      Coloration: Skin is not jaundiced.      Findings: No rash.   Neurological:      General: No focal deficit present.      Mental Status: He is alert and oriented to person, place, and time.      Cranial Nerves: No cranial nerve deficit.   Psychiatric:         Mood and Affect: Mood normal.         Behavior: Behavior normal.         Thought Content: Thought content normal.         Judgment: Judgment normal.       I have performed the physical examination today 11/20/2023.  In review of yesterday's note, there are no new changes except as documented above.      Fluids    Intake/Output Summary (Last 24 hours) at 11/23/2023 1248  Last data filed at 11/23/2023 1004  Gross per 24 hour   Intake 480 ml   Output 4050 ml   Net -3570 ml           Laboratory  Recent Labs     11/21/23  0150   WBC 10.1   RBC 2.81*   HEMOGLOBIN 7.9*   HEMATOCRIT 25.5*   MCV 90.7   MCH 28.1   MCHC 31.0*   RDW 55.2*   PLATELETCT 594*   MPV 8.6*       Recent Labs     11/21/23  0150   SODIUM 137   POTASSIUM 4.5   CHLORIDE 102   CO2 26   GLUCOSE 97   BUN 20   CREATININE 0.91   CALCIUM 8.1*                     Imaging  US-RENAL   Final Result      1.  Normal renal ultrasound.      US-VEIN MAPPING LOWER EXTREMITY UNILAT RIGHT   Final Result      CT-ABDOMEN-PELVIS WITH   Final Result         1. There is a 2 cm ulcerative fraying in the left medial gluteus region. Subjacent iliac bone demonstrates bony ridging, consistent with osteomyelitis. There is small amount of air tracking medially and laterally away from the ulcerative wound along the    left medial gluteal musculature and down to the level of the left paraspinous musculature. Therefore, early necrotizing fasciitis possible. No drainable abscess present. This was discussed with the ordering physician.   2. Severely distended bladder with mild to moderate bilateral associated hydronephrosis. This is likely secondary to prostatic hypertrophy.  There is severe median lobe hypertrophy. Consider Holguin catheter placement for decompression. Bladder diverticulum    also noted.   3. Patchy ground glass to aeration lower lobes, suspicious for pneumonia. However, atypical edema or other pneumonitis possible.   4. Severe atherosclerotic disease.   5. Additional soft tissue wound in the left groin.      DX-FEMUR-2+ LEFT   Final Result      No evidence of infection by plain film.      DX-CHEST-PORTABLE (1 VIEW)   Final Result      No acute process.           Assessment/Plan  * Infected sacral wound- (present on admission)  Assessment & Plan  - Large sacral/ischial wound with signs of active infection and CT scan concerning for pelvic osteomyelitis and skin and soft tissue infection.  -s/p I&D of the sacral wound on 11/14.  -Surgical cultures grew beta-hemolytic group A streptococcus along with mixed enteric and usual skin kris.  -Continue oral Augmentin to complete 7 days course of antibiotics  -Continue local wound care.  Wound VAC has been placed.   -Continue pain control with oral oxycodone and IV Dilaudid.  -PT/OT recommending SNF placement.  He was sent home on last admission, but did not do well with worsening sacral wound.  At this time may need to pursue placement, although it has been a difficult discharge on his last admission with no accepting SNF or home health services.  May need to look into group home placement.  CM/SW for discharge planning.    Anxiety- (present on admission)  Assessment & Plan  - Continue as needed Atarax.    Insomnia- (present on admission)  Assessment & Plan  -Continue trazodone at night.    Open wound of inguinal region- (present on admission)  Assessment & Plan  -Recent history of femoral endarterectomy with incision dehiscence, open wound  -Vascular surgery following  -Continue wound care.      Severe sepsis (HCC)- (present on admission)  Assessment & Plan  -Due to infected sacral wound.  Resolved.  Management as above.    GELY  (acute kidney injury) (HCC)- (present on admission)  Assessment & Plan  - Suspect postrenal component, just like before when he required Gonzales catheter, along with prerenal with rising BUN.   -Place Gonzales catheter.  Resume Flomax and Proscar.  Improved with gonzales and IV fluids  Follow up with urology as outpatient, keep gonzales in place  -Continue to monitor renal function closely.  Monitor electrolytes as at risk for electrolyte derangements with GELY.  - avoid nephrotoxins, and continue to renally dose all medications.    PAD (peripheral artery disease) ([facility])- (present on admission)  Assessment & Plan  -With recent history of bypass grafting  -Vascular surgery is anticipating need for outpatient elective bypass procedure on the right leg once acute infection has resolved.    Anemia- (present on admission)  Assessment & Plan  -Patient had normal hemoglobin and March of this year, but has been anemic since approximately September  -Degree of anemia has been essentially stable  -Likely multifactorial related to nutritional status, and multiple procedures    Hyponatremia- (present on admission)  Assessment & Plan  -Following resuscitation, had very high UOP concerning for low solutes vs beer potomania.   -Sodium levels improved, although initially overcorrected required D5, but has since stabilized with appropriate rate of correction.  -Sodium improved and stable.  Continue salt tablets 1 g twice daily.         VTE prophylaxis: Lovenox SQ

## 2023-11-23 NOTE — CARE PLAN
The patient is Stable - Low risk of patient condition declining or worsening    Shift Goals  Clinical Goals: Pts pain will be controlled and allow pt to get some rest  Patient Goals: pain control  Family Goals: ashlie    Progress made toward(s) clinical / shift goals:  Patient complains of pain during the shift. PRN medication given and pt able to sleep through the remaining of the night.     Patient is not progressing towards the following goals:

## 2023-11-23 NOTE — PROGRESS NOTES
Received report from day shift nurse. Patient complains of 9/10 pin. Medication given per MAR. Bed is locked and in the lowest position. Personal belongings are within reach.

## 2023-11-23 NOTE — PROGRESS NOTES
Report received from night shift RN. No changes to patients assessment at this time. Pt resting in bed. No pain at this time. Bed in low position and belongings within reach.

## 2023-11-24 LAB
GLUCOSE BLD STRIP.AUTO-MCNC: 111 MG/DL (ref 65–99)
GLUCOSE BLD STRIP.AUTO-MCNC: 113 MG/DL (ref 65–99)
GLUCOSE BLD STRIP.AUTO-MCNC: 115 MG/DL (ref 65–99)
GLUCOSE BLD STRIP.AUTO-MCNC: 117 MG/DL (ref 65–99)

## 2023-11-24 PROCEDURE — 97602 WOUND(S) CARE NON-SELECTIVE: CPT

## 2023-11-24 PROCEDURE — 306591 TRAY SUTURE REMOVAL DISP: Performed by: STUDENT IN AN ORGANIZED HEALTH CARE EDUCATION/TRAINING PROGRAM

## 2023-11-24 PROCEDURE — A9270 NON-COVERED ITEM OR SERVICE: HCPCS | Performed by: STUDENT IN AN ORGANIZED HEALTH CARE EDUCATION/TRAINING PROGRAM

## 2023-11-24 PROCEDURE — A9270 NON-COVERED ITEM OR SERVICE: HCPCS | Performed by: HOSPITALIST

## 2023-11-24 PROCEDURE — 97605 NEG PRS WND THER DME<=50SQCM: CPT

## 2023-11-24 PROCEDURE — 302098 PASTE RING (FLAT): Performed by: STUDENT IN AN ORGANIZED HEALTH CARE EDUCATION/TRAINING PROGRAM

## 2023-11-24 PROCEDURE — A9270 NON-COVERED ITEM OR SERVICE: HCPCS | Performed by: INTERNAL MEDICINE

## 2023-11-24 PROCEDURE — 700102 HCHG RX REV CODE 250 W/ 637 OVERRIDE(OP): Performed by: INTERNAL MEDICINE

## 2023-11-24 PROCEDURE — 82962 GLUCOSE BLOOD TEST: CPT | Mod: 91

## 2023-11-24 PROCEDURE — 99231 SBSQ HOSP IP/OBS SF/LOW 25: CPT | Performed by: STUDENT IN AN ORGANIZED HEALTH CARE EDUCATION/TRAINING PROGRAM

## 2023-11-24 PROCEDURE — 770006 HCHG ROOM/CARE - MED/SURG/GYN SEMI*

## 2023-11-24 PROCEDURE — 700102 HCHG RX REV CODE 250 W/ 637 OVERRIDE(OP): Performed by: HOSPITALIST

## 2023-11-24 PROCEDURE — 700102 HCHG RX REV CODE 250 W/ 637 OVERRIDE(OP): Performed by: STUDENT IN AN ORGANIZED HEALTH CARE EDUCATION/TRAINING PROGRAM

## 2023-11-24 RX ORDER — TRAZODONE HYDROCHLORIDE 50 MG/1
100 TABLET ORAL
Status: DISCONTINUED | OUTPATIENT
Start: 2023-11-24 | End: 2023-11-26

## 2023-11-24 RX ADMIN — OXYCODONE HYDROCHLORIDE AND ACETAMINOPHEN 500 MG: 500 TABLET ORAL at 04:52

## 2023-11-24 RX ADMIN — OXYCODONE HYDROCHLORIDE 10 MG: 10 TABLET ORAL at 04:52

## 2023-11-24 RX ADMIN — TRAZODONE HYDROCHLORIDE 100 MG: 50 TABLET ORAL at 21:23

## 2023-11-24 RX ADMIN — FINASTERIDE 5 MG: 5 TABLET, FILM COATED ORAL at 04:52

## 2023-11-24 RX ADMIN — OXYCODONE HYDROCHLORIDE 10 MG: 10 TABLET ORAL at 18:19

## 2023-11-24 RX ADMIN — Medication 220 MG: at 04:52

## 2023-11-24 RX ADMIN — DOCUSATE SODIUM 50 MG AND SENNOSIDES 8.6 MG 2 TABLET: 8.6; 5 TABLET, FILM COATED ORAL at 04:51

## 2023-11-24 RX ADMIN — OXYCODONE HYDROCHLORIDE 10 MG: 10 TABLET ORAL at 09:29

## 2023-11-24 RX ADMIN — OXYCODONE HYDROCHLORIDE 10 MG: 10 TABLET ORAL at 14:11

## 2023-11-24 RX ADMIN — OXYCODONE HYDROCHLORIDE 10 MG: 10 TABLET ORAL at 22:21

## 2023-11-24 RX ADMIN — SODIUM CHLORIDE 1 G: 1 TABLET ORAL at 11:30

## 2023-11-24 ASSESSMENT — ENCOUNTER SYMPTOMS
FEVER: 0
EYE PAIN: 0
COUGH: 0
ABDOMINAL PAIN: 0
NAUSEA: 0
PALPITATIONS: 0
BACK PAIN: 0
DIZZINESS: 0
VOMITING: 0
CHILLS: 0
BLURRED VISION: 0
SHORTNESS OF BREATH: 0
HEADACHES: 0
INSOMNIA: 0

## 2023-11-24 ASSESSMENT — PAIN DESCRIPTION - PAIN TYPE
TYPE: ACUTE PAIN

## 2023-11-24 ASSESSMENT — PAIN SCALES - WONG BAKER: WONGBAKER_NUMERICALRESPONSE: DOESN'T HURT AT ALL

## 2023-11-24 ASSESSMENT — LIFESTYLE VARIABLES: SUBSTANCE_ABUSE: 0

## 2023-11-24 NOTE — DIETARY
Nutrition Update:    Day 12 of admit.  Dillon Centeno is a 58 y.o. male with admitting DX of Severe sepsis  Patient being followed to optimize nutrition.    Current Diet: Diabetic with supplements. Pt with % of nearly all meals and recorded supplements. RD continues to encourage intake of all meals and supplements.     Problem: Nutritional:  Goal: Achieve adequate nutritional intake  Description: Patient will consume >50% of meals  Outcome: met    RD to monitor per department policy

## 2023-11-24 NOTE — WOUND TEAM
Assisted Cristina RIDLEY (Wound PT), with wound care, non-selective debridement performed using wound cleanser/NS and gauze. Please see Cristina RIDLEY (Wound PT) wound note for further wound care details.

## 2023-11-24 NOTE — WOUND TEAM
[facility]  Inpatient Services  Wound and Skin Care Follow-up    Admission Date: 11/12/2023     Last order of IP CONSULT TO WOUND CARE was found on 11/19/2023 from Hospital Encounter on 11/12/2023     HPI, PMH, SH: Reviewed    Past Surgical History:   Procedure Laterality Date    INCISION AND DRAINAGE GENERAL Left 11/14/2023    Procedure: Sharp exicisional debridement of skin subqutaneous fat and fasha 10cm squard;  Surgeon: Thom Christianson M.D.;  Location: [facility];  Service: General    INCISION AND DRAINAGE GENERAL Left 10/23/2023    Procedure: INCISION AND DRAINAGE;  Surgeon: Keith Navarro M.D.;  Location: [facility];  Service: Vascular    APPLICATION OR REPLACEMENT, WOUND VAC  10/23/2023    Procedure: APPLICATION OR REPLACEMENT, WOUND VAC;  Surgeon: Keith Navarro M.D.;  Location: [facility];  Service: Vascular    KNEE AMPUTATION ABOVE Left 10/23/2023    Procedure: AMPUTATION, ABOVE KNEE;  Surgeon: Keith Navarro M.D.;  Location: [facility];  Service: Vascular    KNEE AMPUTATION BELOW Left 10/20/2023    Procedure: AMPUTATION, BELOW KNEE WOUND REVISION;  Surgeon: Pradip Altamirano M.D.;  Location: [facility];  Service: Orthopedics    FEMORAL ENDARTERECTOMY Bilateral 9/30/2023    Procedure: ENDARTERECTOMY, FEMORAL;  Surgeon: Keith Navarro M.D.;  Location: [facility];  Service: Vascular    ANGIOGRAM, WITH ANGIOPLASTY, AND STENT INSERTION IF INDICATED Right 9/30/2023    Procedure: ANGIOGRAM, WITH ILIAC STENT;  Surgeon: Keith Navarro M.D.;  Location: [facility];  Service: Vascular    KNEE AMPUTATION BELOW Left 9/27/2023    Procedure: AMPUTATION, BELOW KNEE;  Surgeon: Pradip Altamirano M.D.;  Location: [facility];  Service: Orthopedics    IRRIGATION & DEBRIDEMENT ORTHO Left 3/4/2018    Procedure: IRRIGATION & DEBRIDEMENT ORTHO - HUMERUS;  Surgeon: Sergio Watkins M.D.;  Location: [facility]  ORS;  Service: Orthopedics    ORIF, SHOULDER Left 1/4/2018    Procedure: SHOULDER ORIF;  Surgeon: Sergio Watkins M.D.;  Location: [facility];  Service: Orthopedics    CLOSED REDUCTION Left 12/24/2017    Procedure: CLOSED REDUCTION;  Surgeon: Keith Subramanian M.D.;  Location: [facility];  Service: Orthopedics    OTHER      multiple surgeries lower legs from past injuries     Social History     Tobacco Use    Smoking status: Every Day     Types: Cigars    Smokeless tobacco: Never   Substance Use Topics    Alcohol use: Yes     Comment: a few beers a week     Chief Complaint   Patient presents with    Other     Pt BIB ems due to failure to thrive, pt's neighbors called ems due to pt unable to care for self and refuses to eat    Diarrhea     Diagnosis: Severe sepsis (HCC) [A41.9, R65.20]    Unit where seen by Wound Team: S631/01     WOUND FOLLOW UP RELATED TO:  sacra; and L groin VAC changes       WOUND TEAM PLAN OF CARE - Frequency of Follow-up:   Nursing to follow dressing orders written for wound care. Contact wound team if area fails to progress, deteriorates or with any questions/concerns if something comes up before next scheduled follow up (See below as to whether wound is following and frequency of wound follow up)  Dressing changes by wound team:                   NPWT change 3 times weekly -      WOUND HISTORY:       58 y.o. male with past medical history of peripheral arterial disease, gangrene of the left lower extremity, hypertension who presented 11/12/2023 after his brother called EMS for the patient failing to eat or move.  In the ER the patient was found to be tachycardic with significant leukocytosis concerning for infection.  He was found to have a large sacral pressure injury therefore a CT scan of the abdomen pelvis was obtained which showed soft tissue air tracking superiorly above the ileum, other findings include evidence of osteomyelitis.      11/14/23 I&D of sacral  decubitus wound with Dr. Christianson       WOUND ASSESSMENT/LDA          Negative Pressure Wound Therapy 11/12/23 Dehisced;Surgical Groin;Sacrum Left (Active)   Wound Image    11/12/23 1400   Vacuum Serial Number [id number] 11/24/23 0745   NPWT Pump Mode / Pressure Setting Ulta;Continuous;125 mmHg 11/24/23 0745   Dressing Type Small;Black Foam (Regular) 11/24/23 1400   Number of Foam Pieces Used 3 11/22/23 1200   Canister Changed Yes 11/24/23 0745   Output (mL) 450 mL 11/23/23 1930   NEXT Dressing Change/Treatment Date 11/24/23 11/23/23 1930   VAC VeraFlo Irrigant 1/4 Strength Dakins 11/22/23 1200   VAC VeraFlo Soak Time (mins) 8 11/24/23 1400   VAC VeraFlo Instill Volume (ml) 20 11/24/23 1400   VAC VeraFlo - Therapy Time (hrs) 2 11/24/23 1400   VAC VeraFlo Pressure (mm/Hg) 125 mmHg 11/24/23 1400   WOUND NURSE ONLY - Time Spent with Patient (mins) 45 11/20/23 1500              Wound 11/12/23 Pressure Injury Sacrum Stage IV (Active)   Wound Image   11/24/23 1400   Site Assessment LUTHER 11/23/23 1930   Periwound Assessment LUTHER 11/23/23 1930   Margins LUTHER 11/24/23 0745   Closure Secondary intention 11/23/23 1930   Drainage Amount Small 11/24/23 1400   Drainage Description Serosanguineous 11/24/23 1400   Treatments Offloading 11/23/23 1930   Offloading/DME Heel float boot 11/12/23 1400   Wound Cleansing Normal Saline Irrigation 11/22/23 1200   Periwound Protectant Skin Protectant Wipes to Periwound;Paste Ring;Drape 11/22/23 1200   Dressing Status Clean;Dry;Intact 11/23/23 1930   Dressing Changed Changed 11/24/23 1400   Dressing Cleansing/Solutions 1/4 Strength Dakin's Solution 11/22/23 1200   Dressing Options Wound Vac 11/24/23 1400   Dressing Change/Treatment Frequency Monday, Wednesday, Friday, and As Needed 11/24/23 1400   NEXT Dressing Change/Treatment Date 11/27/23 11/24/23 1400   NEXT Weekly Photo (Inpatient Only) 12/01/23 11/24/23 1400   Wound Team Following 3x Weekly 11/22/23 1200   WOUND NURSE ONLY - Pressure Injury  Stage 4 11/22/23 1200   Wound Length (cm) 4 cm 11/24/23 1400   Wound Width (cm) 7 cm 11/24/23 1400   Wound Depth (cm) 1.4 cm 11/24/23 1400   Wound Surface Area (cm^2) 28 cm^2 11/24/23 1400   Wound Volume (cm^3) 39.2 cm^3 11/24/23 1400   Wound Healing % 33 11/24/23 1400   Wound Bed Granulation (%) 80 % 11/24/23 1400   Wound Bed Slough (%) 20 % 11/24/23 1400   Undermining (cm) 4 cm 11/16/23 1030   Undermining of Wound, 1st Location From 12 o'clock;To 12 o'clock 11/16/23 1030   Shape Round 11/22/23 1200   Wound Odor None 11/24/23 1400   Exposed Structures Bone 11/24/23 1400   WOUND NURSE ONLY - Time Spent with Patient (mins) 60 11/24/23 1400       Wound 11/12/23 Full Thickness Wound Groin Left open surgical (Active)   Wound Image    11/24/23 1400   Site Assessment LUTHER 11/23/23 1930   Periwound Assessment [place] 11/24/23 1400   Margins LUTHER 11/24/23 0745   Closure Secondary intention 11/23/23 1930   Drainage Amount Small 11/24/23 1400   Drainage Description Serosanguineous 11/24/23 1400   Treatments Cleansed 11/22/23 1200   Wound Cleansing Normal Saline Irrigation 11/22/23 1200   Periwound Protectant Skin Protectant Wipes to Periwound;Paste Ring;Drape 11/22/23 1200   Dressing Status Clean;Dry;Intact 11/23/23 1930   Dressing Changed Changed 11/24/23 1400   Dressing Cleansing/Solutions Not Applicable 11/22/23 1200   Dressing Options Wound Vac 11/24/23 1400   Dressing Change/Treatment Frequency Monday, Wednesday, Friday, and As Needed 11/24/23 1400   NEXT Dressing Change/Treatment Date 11/27/23 11/24/23 1400   NEXT Weekly Photo (Inpatient Only) 12/01/23 11/24/23 1400   Wound Team Following 3x Weekly 11/22/23 1200   Non-staged Wound Description Full thickness 11/18/23 1100   Wound Length (cm) 10 cm 11/24/23 1400   Wound Width (cm) 3 cm 11/24/23 1400   Wound Depth (cm) 1 cm 11/24/23 1400   Wound Surface Area (cm^2) 30 cm^2 11/24/23 1400   Wound Volume (cm^3) 30 cm^3 11/24/23 1400   Wound Healing % 41 11/24/23 1400   Undermining  (cm) 1.5 cm 11/24/23 1400   Undermining of Wound, 1st Location From 9 o'clock 11/24/23 1400   Shape Irregular 11/22/23 1200   Wound Odor None 11/24/23 1400   Pulses Bounding 11/12/23 1400   WOUND NURSE ONLY - Time Spent with Patient (mins) 45 11/20/23 1500         Vascular:    INGA:   No results found.    Lab Values:    Lab Results   Component Value Date/Time    WBC 10.1 11/21/2023 01:50 AM    RBC 2.81 (L) 11/21/2023 01:50 AM    HEMOGLOBIN 7.9 (L) 11/21/2023 01:50 AM    HEMATOCRIT 25.5 (L) 11/21/2023 01:50 AM    CREACTPROT 27.78 (H) 11/12/2023 02:22 AM    SEDRATEWES 66 (H) 11/12/2023 02:22 AM         Culture Results show:  Recent Results (from the past 720 hour(s))   CULTURE WOUND W/ GRAM STAIN    Collection Time: 11/14/23 11:53 AM    Specimen: Wound   Result Value Ref Range    Significant Indicator POS (POS)     Source WND     Site Sacral     Culture Result (A)      Light growth mixed enteric kris including E coli,  Citrobacter koseri, Enterococcus faecalis and Streptococcus  anginosis.      Gram Stain Result Moderate WBCs.  Few Gram positive cocci.       Culture Result (A)      Beta Hemolytic Streptococcus group A  Light growth         Pain Level/Medicated:  PO pain medications administered by bedside RN 60 prior       INTERVENTIONS BY WOUND TEAM:   Performed standard wound care which includes appropriate positioning, dressing removal and non-selective debridement. Pictures and measurements obtained weekly if/when required.    Wound:  sacrum  Preparation for Dressing removal: Removed without difficulty  Cleansed/Non-selectively Debrided with:  Wound cleanser and Gauze  Kiara wound: Cleansed with Wound cleanser and Gauze, Prepped with No Sting, Paste Rings, and Drape  Primary Dressing:  black VF foam sealed with drape  Secondary (Outer) Dressing: off loading foam x2     Wound:  L groin  Preparation for Dressing removal: Removed without difficulty  Cleansed/Non-selectively Debrided with:  Wound cleanser and  Gauze  Kiara wound: Cleansed with Wound cleanser and Gauze, Prepped with No Sting, Paste Rings, and Drape  Primary Dressing:  leah, black reg foam, drape  Secondary (Outer) Dressing:      Advanced Wound Care Discharge Planning  Number of Clinicians necessary to complete wound care: 1  Is patient requiring IV pain medications for dressing changes:  No   Length of time for dressing change 60 min. (This does not include chart review, pre-medication time, set up, clean up or time spent charting.)    Interdisciplinary consultation: Patient, Bedside RN (), Stacey ANTHONY (Wound RN).  Pressure injury and staging reviewed with .    EVALUATION / RATIONALE FOR TREATMENT:     Date:  11/24/23  Wound Status:      Sacral wound with palpable bone which may delay resolution.  Otherwise site is mostly granular.  L groin clean, using collagen dressing to support healthy wound bed environment.      Date:  11/22/23  Wound Status:  Wound progressing as expected  L groin Vac transitioned from Veraflo to leah with regular Vac. This was then Y-connected to sacral Vac, thus patient only needing one machine at this time.  Sacral wound still with exposed bone, continuing Veraflo here.    Date:  11/20/23  Wound Status:  Wound progressing as expected    L groin likely can transition to leah and regular NPWT at next change to help with granulation tissue at the depth of the proximal wound.    No changes to the sacral wound bed and continue to have exposed bone.  Continue with VF NPWT and POC.     Date:  11/18/23  Wound Status:  Wound progressing as expected    Left groin continues to improve with VF NPWT. Continue POC.     Sacral wound still with significant undermining and bone exposure. VF NPWT applied to this area as well to increase granulation tissue production and assist in wound closure by secondary intention.      Date:  11/16/23  Wound Status:  Wound progressing as expected    Left groin wound with more granulation tissue present.  Continue with POC.    S/P debridement of sacral wound on 11/14. Wound with circumferential undermining. Deepest area of undermining measuring 4cm at 2o'clock. Bone exposed towards mid aspect of wound bed. Regular NPWT applied to increase granulation tissue production to the area. Will benefit from VF NPWT on next change to ensure exposed structures remain moist.       Date:  11/14/23  Wound Status:  Wound progressing as expected    Wound bed clean, no odor. Continue VF NPWT with Dakins  Date:  11/12/23  Wound Status:  Initial evaluation    Left groin with minimal slough after cleansing, met with Dr. Navarro at bedside who advised to place Dakin's VF to site.  Left sacral wound to go to surgery with Dr. Chow today or tomorrow for debridement and likely VAC placement.  Patient very frail appearing.  Sacral wound probes to bone and will be very difficult to heal considering patient overall state and nutrition.             Goals: Steady decrease in wound area and depth weekly.    NURSING PLAN OF CARE ORDERS:  No new orders this visit    NUTRITION RECOMMENDATIONS   Wound Team Recommendations:  N/A     DIET ORDERS (From admission to next 24h)       Start     Ordered    11/16/23 1001  Diet Order Diet: Consistent CHO (Diabetic)  ALL MEALS        Question:  Diet:  Answer:  Consistent CHO (Diabetic)    11/16/23 1000    11/13/23 1328  Supplements  ALL MEALS        Question:  Which Supplement  Answer:  BOOST PLUS  Comment:  or equivalent Ensure    11/13/23 1328                    PREVENTATIVE INTERVENTIONS:   Q shift Jae - performed per nursing policy  Q shift pressure point assessments - performed per nursing policy        Anticipated discharge plans:  Skilled Nursing        Vac Discharge Needs:  Vac Discharge plan is purely a recommendation from wound team and not a requirement for discharge unless otherwise stated by physician.  Veraflo Vac while inpatient, ok to transition to Regular Vac on discharge

## 2023-11-24 NOTE — PROGRESS NOTES
"Refused miralax. Last documented BM was 11/20. Pt stated he had a BM \"last night.\" He did not.  "

## 2023-11-24 NOTE — CARE PLAN
The patient is Stable - Low risk of patient condition declining or worsening    Shift Goals  Clinical Goals: pain control/comfort  Patient Goals: pain control  Family Goals: LUTHER      Patient is not progressing towards the following goals:      Problem: Skin Integrity  Goal: Skin integrity is maintained or improved  Outcome: Not Progressing

## 2023-11-24 NOTE — DISCHARGE PLANNING
Case Management Discharge Planning    Admission Date: 11/12/2023  GMLOS: 9.9  ALOS: 12    6-Clicks ADL Score:    6-Clicks Mobility Score: 7  PT and/or OT Eval ordered: Yes  Post-acute Referrals Ordered: Yes  Post-acute Choice Obtained: Yes  Has referral(s) been sent to post-acute provider:  Yes      Anticipated Discharge Dispo: Discharge Disposition: D/T to SNF with Medicare cert in anticipation of skilled care (03)    DME Needed: No    Action(s) Taken: Updated Provider/Nurse on Discharge Plan and OTHER    Discussed discharge planning needs during rounds. Per MD, pt is still medically cleared, pending placement. Per chart review, PT/OT recommended post acute placement, pt has wound vac x 2, has Medicaid insurance, no accepting facility at this time, referrals were expanded to [facility] facilities.     Escalations Completed: None    Medically Clear: Yes    Next Steps:   Follow up with placement acceptance.    Barriers to Discharge: Pending Placement    Is the patient up for discharge tomorrow: No

## 2023-11-24 NOTE — PROGRESS NOTES
Hospital Medicine Daily Progress Note    Date of Service  11/24/2023    Chief Complaint   altered mentation    Hospital Course  Dillon Centeno is a 58 y.o. male with PAD, hypertension, history of left AKA, admitted 11/12/2023 with altered mentation.  On evaluation, he was found to have a large ischial unstageable decubitus ulcer, and labs showed Na 117, and creatinine of 2.27.  He was also noted to have dehisence of L groin wound.  Patient started on antibiotics and IV fluids.  Surgery was consulted, and patient underwent I&D of the sacral wound on 11/14 (Dr. Christianson).  Cultures have grown beta-hemolytic group A Streptococcus along with mixed enteric and usual skin kris.  Renal function improved with hydration.  Sodium levels improved, although initially overcorrected requiring D5, but has since stabilized with appropriate rate of correction.  He was seen by vascular surgery who anticipated need for outpatient elective bypass procedure on the right leg once acute infection has resolved. Wound VAC has been placed.  PT recommends SNF placement.      Interval Problem Update  No acute events overnight.  Patient did not sleep well overnight.  Will increase trazodone dose to 100 mg at night, stop melatonin.  Patient is medically cleared.  Pending SNF placement, may be difficult discharge due to insurance.    Consultants/Specialty  general surgery and vascular surgery    Code Status  Full Code    Disposition  The patient is medically cleared for discharge to home or a post-acute facility.  Anticipate discharge to: skilled nursing facility      I have placed the appropriate orders for post-discharge needs.    Review of Systems  Review of Systems   Constitutional:  Negative for chills and fever.   Eyes:  Negative for blurred vision and pain.   Respiratory:  Negative for cough and shortness of breath.    Cardiovascular:  Negative for chest pain, palpitations and leg swelling.   Gastrointestinal:  Negative for abdominal  pain, nausea and vomiting.   Genitourinary:  Negative for dysuria and urgency.   Musculoskeletal:  Negative for back pain.   Skin:  Negative for itching and rash.   Neurological:  Negative for dizziness and headaches.   Psychiatric/Behavioral:  Negative for substance abuse. The patient does not have insomnia.         Pertinent positives/negatives as mentioned above.     A complete review of systems was personally done by me. All other systems were negative.       Physical Exam  Temp:  [36.3 °C (97.3 °F)-37 °C (98.6 °F)] 36.5 °C (97.7 °F)  Pulse:  [] 106  Resp:  [18-19] 18  BP: (124-161)/(81-94) 124/82  SpO2:  [95 %-97 %] 96 %    Physical Exam  Vitals reviewed.   Constitutional:       General: He is not in acute distress.     Appearance: Normal appearance. He is well-developed. He is not toxic-appearing or diaphoretic.   HENT:      Head: Normocephalic and atraumatic.      Right Ear: External ear normal.      Left Ear: External ear normal.      Mouth/Throat:      Mouth: Mucous membranes are moist.      Pharynx: No oropharyngeal exudate.   Eyes:      General: No scleral icterus.     Extraocular Movements: Extraocular movements intact.      Conjunctiva/sclera: Conjunctivae normal.      Pupils: Pupils are equal, round, and reactive to light.   Neck:      Vascular: No JVD.   Cardiovascular:      Rate and Rhythm: Normal rate.      Heart sounds: Normal heart sounds. No murmur heard.     No gallop.   Pulmonary:      Effort: Pulmonary effort is normal. No respiratory distress.      Breath sounds: Normal breath sounds. No stridor. No wheezing, rhonchi or rales.   Chest:      Chest wall: No tenderness.   Abdominal:      General: Bowel sounds are normal. There is no distension.      Palpations: Abdomen is soft. There is no mass.      Tenderness: There is no abdominal tenderness. There is no guarding or rebound.   Musculoskeletal:         General: No swelling. Normal range of motion.      Cervical back: Normal range of  motion and neck supple.      Comments: L AKA  R foot with multiple scabs and wounds on the toes.  Unable to palpate DP/TP  (+) surgical site sacral area, dressing CDI   Lymphadenopathy:      Cervical: No cervical adenopathy.   Skin:     General: Skin is warm and dry.      Coloration: Skin is not jaundiced.      Findings: No rash.   Neurological:      General: No focal deficit present.      Mental Status: He is alert and oriented to person, place, and time.      Cranial Nerves: No cranial nerve deficit.   Psychiatric:         Mood and Affect: Mood normal.         Behavior: Behavior normal.         Thought Content: Thought content normal.         Judgment: Judgment normal.       I have performed the physical examination today 11/20/2023.  In review of yesterday's note, there are no new changes except as documented above.      Fluids    Intake/Output Summary (Last 24 hours) at 11/24/2023 1029  Last data filed at 11/24/2023 0526  Gross per 24 hour   Intake 1500 ml   Output 2350 ml   Net -850 ml           Laboratory                            Imaging  US-RENAL   Final Result      1.  Normal renal ultrasound.      US-VEIN MAPPING LOWER EXTREMITY UNILAT RIGHT   Final Result      CT-ABDOMEN-PELVIS WITH   Final Result         1. There is a 2 cm ulcerative fraying in the left medial gluteus region. Subjacent iliac bone demonstrates bony ridging, consistent with osteomyelitis. There is small amount of air tracking medially and laterally away from the ulcerative wound along the    left medial gluteal musculature and down to the level of the left paraspinous musculature. Therefore, early necrotizing fasciitis possible. No drainable abscess present. This was discussed with the ordering physician.   2. Severely distended bladder with mild to moderate bilateral associated hydronephrosis. This is likely secondary to prostatic hypertrophy. There is severe median lobe hypertrophy. Consider Holguin catheter placement for decompression.  Bladder diverticulum    also noted.   3. Patchy ground glass to aeration lower lobes, suspicious for pneumonia. However, atypical edema or other pneumonitis possible.   4. Severe atherosclerotic disease.   5. Additional soft tissue wound in the left groin.      DX-FEMUR-2+ LEFT   Final Result      No evidence of infection by plain film.      DX-CHEST-PORTABLE (1 VIEW)   Final Result      No acute process.           Assessment/Plan  * Infected sacral wound- (present on admission)  Assessment & Plan  - Large sacral/ischial wound with signs of active infection and CT scan concerning for pelvic osteomyelitis and skin and soft tissue infection.  -s/p I&D of the sacral wound on 11/14.  -Surgical cultures grew beta-hemolytic group A streptococcus along with mixed enteric and usual skin kris.  -Continue oral Augmentin to complete 7 days course of antibiotics  -Continue local wound care.  Wound VAC has been placed.   -Continue pain control with oral oxycodone and IV Dilaudid.  -PT/OT recommending SNF placement.  He was sent home on last admission, but did not do well with worsening sacral wound.  At this time may need to pursue placement, although it has been a difficult discharge on his last admission with no accepting SNF or home health services.  May need to look into group home placement.  CM/SW for discharge planning.    Anxiety- (present on admission)  Assessment & Plan  - Continue as needed Atarax.    Insomnia- (present on admission)  Assessment & Plan  -Continue trazodone at night.    Open wound of inguinal region- (present on admission)  Assessment & Plan  -Recent history of femoral endarterectomy with incision dehiscence, open wound  -Vascular surgery following  -Continue wound care.      Severe sepsis (HCC)- (present on admission)  Assessment & Plan  -Due to infected sacral wound.  Resolved.  Management as above.    GELY (acute kidney injury) (HCC)- (present on admission)  Assessment & Plan  - Suspect postrenal  component, just like before when he required Gonzales catheter, along with prerenal with rising BUN.   -Place Gonzales catheter.  Resume Flomax and Proscar.  Improved with gonzales and IV fluids  Follow up with urology as outpatient, keep gonzales in place  -Continue to monitor renal function closely.  Monitor electrolytes as at risk for electrolyte derangements with GELY.  - avoid nephrotoxins, and continue to renally dose all medications.    PAD (peripheral artery disease) (HCC)- (present on admission)  Assessment & Plan  -With recent history of bypass grafting  -Vascular surgery is anticipating need for outpatient elective bypass procedure on the right leg once acute infection has resolved.    Anemia- (present on admission)  Assessment & Plan  -Patient had normal hemoglobin and March of this year, but has been anemic since approximately September  -Degree of anemia has been essentially stable  -Likely multifactorial related to nutritional status, and multiple procedures    Hyponatremia- (present on admission)  Assessment & Plan  -Following resuscitation, had very high UOP concerning for low solutes vs beer potomania.   -Sodium levels improved, although initially overcorrected required D5, but has since stabilized with appropriate rate of correction.  -Sodium improved and stable.  Continue salt tablets 1 g twice daily.         VTE prophylaxis: Lovenox SQ

## 2023-11-24 NOTE — THERAPY
"Physical Therapy Contact Note    Patient Name: Dillon Centeno  Age:  58 y.o., Sex:  male  Medical Record #: [id number]  Today's Date: 11/24/2023 11/24/23 1121   Treatment Variance   Reason For Missed Therapy Non-Medical - Patient Refused   Other Treatments   Other Treatments Provided Pt refusing PT @ this time, \"I'm having a bad morning\". PT will attempt in afternoon if time permits.   Interdisciplinary Plan of Care Collaboration   IDT Collaboration with  Nursing   Session Information   Date / Session Number  11/21--3 (0/3, 11/28) Ref 11/24 PTA/1   Supervising Physical Therapist (PTA Treatments Only)   Supervising Physical Therapist Svetlana Mcdowell       "

## 2023-11-24 NOTE — PROGRESS NOTES
"Refused Q2 turning. Educated on importance. Continues to refuse \"Listen man, I turn myself, my butt hurts and I have to get off of it.\"     - Patient has not turned since start of shift [phone]. Asked on multiple occasions, pt refuses.       Refused gonzales care when asked by CNA, requested by this RN and patient refused. \"I did it once today, I don't need to do it again.\"    Educated on importance. Pt refused x3.  "

## 2023-11-24 NOTE — CARE PLAN
The patient is Stable - Low risk of patient condition declining or worsening    Shift Goals  Clinical Goals: pain control/comfort  Patient Goals: pain control  Family Goals: LUTHER    Progress made toward(s) clinical / shift goals:    Axo4. Able to make needs known. VSS WNL. Seen with wound-vac suction set to 125mmhg. Afebrile. Pt remained free from falls or injury throughout shift. Placed call light within reach and encouraged pt to increase oral fluid intake as tolerated.  Problem: Knowledge Deficit - Standard  Goal: Patient and family/care givers will demonstrate understanding of plan of care, disease process/condition, diagnostic tests and medications  Outcome: Progressing     Problem: Hemodynamics  Goal: Patient's hemodynamics, fluid balance and neurologic status will be stable or improve  Outcome: Progressing     Problem: Fluid Volume  Goal: Fluid volume balance will be maintained  Outcome: Progressing     Problem: Urinary - Renal Perfusion  Goal: Ability to achieve and maintain adequate renal perfusion and functioning will improve  Outcome: Progressing     Problem: Pain - Standard  Goal: Alleviation of pain or a reduction in pain to the patient’s comfort goal  Outcome: Progressing     Problem: Fall Risk  Goal: Patient will remain free from falls  Outcome: Progressing

## 2023-11-25 LAB
GLUCOSE BLD STRIP.AUTO-MCNC: 108 MG/DL (ref 65–99)
GLUCOSE BLD STRIP.AUTO-MCNC: 113 MG/DL (ref 65–99)
GLUCOSE BLD STRIP.AUTO-MCNC: 133 MG/DL (ref 65–99)
GLUCOSE BLD STRIP.AUTO-MCNC: 90 MG/DL (ref 65–99)

## 2023-11-25 PROCEDURE — A9270 NON-COVERED ITEM OR SERVICE: HCPCS | Performed by: INTERNAL MEDICINE

## 2023-11-25 PROCEDURE — A9270 NON-COVERED ITEM OR SERVICE: HCPCS | Performed by: HOSPITALIST

## 2023-11-25 PROCEDURE — 700111 HCHG RX REV CODE 636 W/ 250 OVERRIDE (IP): Performed by: INTERNAL MEDICINE

## 2023-11-25 PROCEDURE — 82962 GLUCOSE BLOOD TEST: CPT | Mod: 91

## 2023-11-25 PROCEDURE — 700101 HCHG RX REV CODE 250: Performed by: STUDENT IN AN ORGANIZED HEALTH CARE EDUCATION/TRAINING PROGRAM

## 2023-11-25 PROCEDURE — 700102 HCHG RX REV CODE 250 W/ 637 OVERRIDE(OP): Performed by: INTERNAL MEDICINE

## 2023-11-25 PROCEDURE — 770006 HCHG ROOM/CARE - MED/SURG/GYN SEMI*

## 2023-11-25 PROCEDURE — 700102 HCHG RX REV CODE 250 W/ 637 OVERRIDE(OP): Performed by: HOSPITALIST

## 2023-11-25 PROCEDURE — A9270 NON-COVERED ITEM OR SERVICE: HCPCS | Performed by: STUDENT IN AN ORGANIZED HEALTH CARE EDUCATION/TRAINING PROGRAM

## 2023-11-25 PROCEDURE — 99232 SBSQ HOSP IP/OBS MODERATE 35: CPT | Performed by: STUDENT IN AN ORGANIZED HEALTH CARE EDUCATION/TRAINING PROGRAM

## 2023-11-25 PROCEDURE — 700102 HCHG RX REV CODE 250 W/ 637 OVERRIDE(OP): Performed by: STUDENT IN AN ORGANIZED HEALTH CARE EDUCATION/TRAINING PROGRAM

## 2023-11-25 RX ADMIN — OXYCODONE HYDROCHLORIDE 10 MG: 10 TABLET ORAL at 20:31

## 2023-11-25 RX ADMIN — THERA TABS 1 TABLET: TAB at 05:21

## 2023-11-25 RX ADMIN — TAMSULOSIN HYDROCHLORIDE 0.4 MG: 0.4 CAPSULE ORAL at 09:31

## 2023-11-25 RX ADMIN — OXYCODONE HYDROCHLORIDE 10 MG: 10 TABLET ORAL at 16:45

## 2023-11-25 RX ADMIN — DAKIN'S SOLUTION 0.125% (QUARTER STRENGTH) 1000 ML: 0.12 SOLUTION at 20:04

## 2023-11-25 RX ADMIN — OXYCODONE HYDROCHLORIDE 10 MG: 10 TABLET ORAL at 09:31

## 2023-11-25 RX ADMIN — FINASTERIDE 5 MG: 5 TABLET, FILM COATED ORAL at 05:21

## 2023-11-25 RX ADMIN — OXYCODONE HYDROCHLORIDE 10 MG: 10 TABLET ORAL at 05:27

## 2023-11-25 RX ADMIN — OXYCODONE HYDROCHLORIDE AND ACETAMINOPHEN 500 MG: 500 TABLET ORAL at 05:21

## 2023-11-25 RX ADMIN — OXYCODONE HYDROCHLORIDE AND ACETAMINOPHEN 500 MG: 500 TABLET ORAL at 18:21

## 2023-11-25 RX ADMIN — OXYCODONE HYDROCHLORIDE 10 MG: 10 TABLET ORAL at 12:35

## 2023-11-25 RX ADMIN — HYDROXYZINE HYDROCHLORIDE 25 MG: 50 TABLET, FILM COATED ORAL at 13:27

## 2023-11-25 RX ADMIN — HYDROMORPHONE HYDROCHLORIDE 0.5 MG: 1 INJECTION, SOLUTION INTRAMUSCULAR; INTRAVENOUS; SUBCUTANEOUS at 07:56

## 2023-11-25 RX ADMIN — TRAZODONE HYDROCHLORIDE 100 MG: 50 TABLET ORAL at 20:29

## 2023-11-25 RX ADMIN — Medication 220 MG: at 05:21

## 2023-11-25 ASSESSMENT — PAIN DESCRIPTION - PAIN TYPE
TYPE: ACUTE PAIN

## 2023-11-25 ASSESSMENT — ENCOUNTER SYMPTOMS
ABDOMINAL PAIN: 0
PALPITATIONS: 0
HEADACHES: 0
DIZZINESS: 0
FEVER: 0
VOMITING: 0
MYALGIAS: 0
COUGH: 0
CHILLS: 0
SHORTNESS OF BREATH: 0
NAUSEA: 0

## 2023-11-25 NOTE — CARE PLAN
The patient is Stable - Low risk of patient condition declining or worsening    Shift Goals  Clinical Goals: control pain  Patient Goals: Comfort  Family Goals: LUTHER    Progress made toward(s) clinical / shift goals:    Problem: Urinary - Renal Perfusion  Goal: Ability to achieve and maintain adequate renal perfusion and functioning will improve  Outcome: Progressing     Problem: Respiratory  Goal: Patient will achieve/maintain optimum respiratory ventilation and gas exchange  Outcome: Progressing

## 2023-11-25 NOTE — CARE PLAN
The patient is Watcher - Medium risk of patient condition declining or worsening    Shift Goals  Clinical Goals: Pain control  Patient Goals: Comfort  Family Goals: LUTHER    Progress made toward(s) clinical / shift goals:      Patient is not progressing towards the following goals:      Problem: Knowledge Deficit - Standard  Goal: Patient and family/care givers will demonstrate understanding of plan of care, disease process/condition, diagnostic tests and medications  Outcome: Not Progressing     Problem: Pain - Standard  Goal: Alleviation of pain or a reduction in pain to the patient’s comfort goal  Outcome: Not Progressing     Problem: Skin Integrity  Goal: Skin integrity is maintained or improved  Outcome: Not Progressing    Patient refuses Q2 turns and demands pain medication for back pain. Need for change of position explained to the patient but he continues to refuses and becomes very arrogant.

## 2023-11-26 LAB
GLUCOSE BLD STRIP.AUTO-MCNC: 112 MG/DL (ref 65–99)
GLUCOSE BLD STRIP.AUTO-MCNC: 135 MG/DL (ref 65–99)
GLUCOSE BLD STRIP.AUTO-MCNC: 141 MG/DL (ref 65–99)
GLUCOSE BLD STRIP.AUTO-MCNC: 98 MG/DL (ref 65–99)

## 2023-11-26 PROCEDURE — 82962 GLUCOSE BLOOD TEST: CPT

## 2023-11-26 PROCEDURE — 700102 HCHG RX REV CODE 250 W/ 637 OVERRIDE(OP): Performed by: STUDENT IN AN ORGANIZED HEALTH CARE EDUCATION/TRAINING PROGRAM

## 2023-11-26 PROCEDURE — 700102 HCHG RX REV CODE 250 W/ 637 OVERRIDE(OP): Performed by: HOSPITALIST

## 2023-11-26 PROCEDURE — A9270 NON-COVERED ITEM OR SERVICE: HCPCS | Performed by: INTERNAL MEDICINE

## 2023-11-26 PROCEDURE — A9270 NON-COVERED ITEM OR SERVICE: HCPCS | Performed by: STUDENT IN AN ORGANIZED HEALTH CARE EDUCATION/TRAINING PROGRAM

## 2023-11-26 PROCEDURE — 700102 HCHG RX REV CODE 250 W/ 637 OVERRIDE(OP): Performed by: INTERNAL MEDICINE

## 2023-11-26 PROCEDURE — 770006 HCHG ROOM/CARE - MED/SURG/GYN SEMI*

## 2023-11-26 PROCEDURE — 99232 SBSQ HOSP IP/OBS MODERATE 35: CPT | Performed by: STUDENT IN AN ORGANIZED HEALTH CARE EDUCATION/TRAINING PROGRAM

## 2023-11-26 PROCEDURE — A9270 NON-COVERED ITEM OR SERVICE: HCPCS | Performed by: HOSPITALIST

## 2023-11-26 RX ORDER — DULOXETIN HYDROCHLORIDE 30 MG/1
30 CAPSULE, DELAYED RELEASE ORAL DAILY
Status: DISCONTINUED | OUTPATIENT
Start: 2023-11-26 | End: 2023-11-26

## 2023-11-26 RX ORDER — MIRTAZAPINE 15 MG/1
15 TABLET, FILM COATED ORAL
Status: DISCONTINUED | OUTPATIENT
Start: 2023-11-26 | End: 2023-11-30 | Stop reason: HOSPADM

## 2023-11-26 RX ADMIN — TAMSULOSIN HYDROCHLORIDE 0.4 MG: 0.4 CAPSULE ORAL at 07:36

## 2023-11-26 RX ADMIN — HYDROXYZINE HYDROCHLORIDE 25 MG: 50 TABLET, FILM COATED ORAL at 09:26

## 2023-11-26 RX ADMIN — FINASTERIDE 5 MG: 5 TABLET, FILM COATED ORAL at 05:33

## 2023-11-26 RX ADMIN — OXYCODONE HYDROCHLORIDE 10 MG: 10 TABLET ORAL at 17:07

## 2023-11-26 RX ADMIN — Medication 220 MG: at 05:33

## 2023-11-26 RX ADMIN — OXYCODONE HYDROCHLORIDE 10 MG: 10 TABLET ORAL at 13:18

## 2023-11-26 RX ADMIN — THERA TABS 1 TABLET: TAB at 05:33

## 2023-11-26 RX ADMIN — OXYCODONE HYDROCHLORIDE 10 MG: 10 TABLET ORAL at 09:26

## 2023-11-26 RX ADMIN — DOCUSATE SODIUM 50 MG AND SENNOSIDES 8.6 MG 2 TABLET: 8.6; 5 TABLET, FILM COATED ORAL at 05:33

## 2023-11-26 RX ADMIN — OXYCODONE HYDROCHLORIDE 10 MG: 10 TABLET ORAL at 02:56

## 2023-11-26 RX ADMIN — MIRTAZAPINE 15 MG: 15 TABLET, FILM COATED ORAL at 21:49

## 2023-11-26 RX ADMIN — OXYCODONE HYDROCHLORIDE AND ACETAMINOPHEN 500 MG: 500 TABLET ORAL at 05:33

## 2023-11-26 ASSESSMENT — COGNITIVE AND FUNCTIONAL STATUS - GENERAL
STANDING UP FROM CHAIR USING ARMS: A LOT
PERSONAL GROOMING: A LITTLE
DRESSING REGULAR UPPER BODY CLOTHING: A LITTLE
TURNING FROM BACK TO SIDE WHILE IN FLAT BAD: A LITTLE
DAILY ACTIVITIY SCORE: 18
CLIMB 3 TO 5 STEPS WITH RAILING: TOTAL
TOILETING: A LOT
HELP NEEDED FOR BATHING: A LITTLE
MOVING FROM LYING ON BACK TO SITTING ON SIDE OF FLAT BED: A LOT
DRESSING REGULAR LOWER BODY CLOTHING: A LITTLE
SUGGESTED CMS G CODE MODIFIER DAILY ACTIVITY: CK
MOVING TO AND FROM BED TO CHAIR: A LITTLE

## 2023-11-26 ASSESSMENT — ENCOUNTER SYMPTOMS
CHILLS: 0
SHORTNESS OF BREATH: 0
FEVER: 0
BACK PAIN: 1
PALPITATIONS: 0
HEADACHES: 0
DIZZINESS: 0
VOMITING: 0
NAUSEA: 0
COUGH: 0
ABDOMINAL PAIN: 0
MYALGIAS: 0

## 2023-11-26 ASSESSMENT — PAIN DESCRIPTION - PAIN TYPE
TYPE: ACUTE PAIN

## 2023-11-26 NOTE — PROGRESS NOTES
"Received a report that the wound vac was working on and off during the day. Then eventually the Wound vac to the groin stopped working. This RN and Charge Nurse applied a drape to the site but was still not effective. Ordered another wound vac but took too long to be delivered. Wound vac was discontinued at 1110hrs and wet to dry dressing applied.    The sacrum wound vac also continued to alarming \" Vac Veraflo blocked\". Effort was made to order the \" Vac Tubing\", unfortunately two hour lapsed before receiving it, hence wound vac discontinued and wet to dry dressing applied.  "

## 2023-11-26 NOTE — CARE PLAN
The patient is Stable - Low risk of patient condition declining or worsening    Shift Goals  Clinical Goals: Wound Vac  Patient Goals: Pain control  Family Goals: LUTHER    Progress made toward(s) clinical / shift goals:      Problem: Fall Risk  Goal: Patient will remain free from falls  Outcome: Progressing       Patient is not progressing towards the following goals:    Problem: Pain - Standard  Goal: Alleviation of pain or a reduction in pain to the patient’s comfort goal  Outcome: Not Progressing     Problem: Skin Integrity  Goal: Skin integrity is maintained or improved  Outcome: Not Progressing     Patient reluctant to change positions and to participate on activities of daily living.

## 2023-11-26 NOTE — PROGRESS NOTES
Hospital Medicine Daily Progress Note    Date of Service  11/25/2023    Chief Complaint   altered mentation    Hospital Course  Dillon Centeno is a 58 y.o. male with PAD, hypertension, history of left AKA, admitted 11/12/2023 with altered mentation.  On evaluation, he was found to have a large ischial unstageable decubitus ulcer, and labs showed Na 117, and creatinine of 2.27.  He was also noted to have dehisence of L groin wound.  Patient started on antibiotics and IV fluids.  Surgery was consulted, and patient underwent I&D of the sacral wound on 11/14 (Dr. Christianson).  Cultures have grown beta-hemolytic group A Streptococcus along with mixed enteric and usual skin kris.  Renal function improved with hydration.  Sodium levels improved, although initially overcorrected requiring D5, but has since stabilized with appropriate rate of correction.  He was seen by vascular surgery who anticipated need for outpatient elective bypass procedure on the right leg once acute infection has resolved. Wound VAC has been placed.  PT recommends SNF placement.      Interval Problem Update  No acute events overnight.  Patient did not sleep well overnight.  Will increase trazodone dose to 100 mg at night, stop melatonin.  Patient is medically cleared.  Pending SNF placement, may be difficult discharge due to insurance.    11/25/23  Patient feeling tired today.  Has Holguin catheter in place.  Left lower extremity wound VAC in place.  Discussed with case management.  Complex discharge committee following.  Working on discharging to skilled nursing facility.      Consultants/Specialty  general surgery and vascular surgery    Code Status  Full Code    Disposition  The patient is not medically cleared for discharge to home or a post-acute facility.  Anticipate discharge to: skilled nursing facility      I have placed the appropriate orders for post-discharge needs.    Review of Systems  Review of Systems   Constitutional:  Positive for  malaise/fatigue. Negative for chills and fever.   Respiratory:  Negative for cough and shortness of breath.    Cardiovascular:  Negative for chest pain and palpitations.   Gastrointestinal:  Negative for abdominal pain, nausea and vomiting.   Genitourinary:  Negative for dysuria and hematuria.   Musculoskeletal:  Negative for joint pain and myalgias.   Neurological:  Negative for dizziness and headaches.        Pertinent positives/negatives as mentioned above.     A complete review of systems was personally done by me. All other systems were negative.       Physical Exam  Temp:  [36.2 °C (97.2 °F)-37.1 °C (98.7 °F)] 36.9 °C (98.4 °F)  Pulse:  [92-97] 92  Resp:  [16-18] 17  BP: (126-141)/(81-89) 126/84  SpO2:  [94 %-99 %] 97 %    Physical Exam  Vitals and nursing note reviewed.   Constitutional:       General: He is not in acute distress.     Appearance: Normal appearance. He is not ill-appearing.   HENT:      Head: Normocephalic and atraumatic.      Mouth/Throat:      Mouth: Mucous membranes are moist.      Pharynx: Oropharynx is clear. No oropharyngeal exudate.   Eyes:      General: No scleral icterus.        Right eye: No discharge.         Left eye: No discharge.      Conjunctiva/sclera: Conjunctivae normal.   Cardiovascular:      Rate and Rhythm: Normal rate and regular rhythm.      Pulses: Normal pulses.      Heart sounds: Normal heart sounds. No murmur heard.  Pulmonary:      Effort: Pulmonary effort is normal. No respiratory distress.      Breath sounds: Normal breath sounds.   Abdominal:      General: Abdomen is flat. Bowel sounds are normal. There is no distension.      Palpations: Abdomen is soft.   Genitourinary:     Comments: Holguin catheter in place  Musculoskeletal:         General: Deformity present. No swelling.      Cervical back: Neck supple. No tenderness.      Right lower leg: No edema.      Left lower leg: No edema.      Comments: Left AKA with wound VAC in place.   Skin:     General: Skin is  warm and dry.      Coloration: Skin is pale.   Neurological:      Mental Status: He is alert and oriented to person, place, and time. Mental status is at baseline.      Motor: No weakness.   Psychiatric:         Thought Content: Thought content normal.         Judgment: Judgment normal.       I have performed the physical examination today 11/20/2023.  In review of yesterday's note, there are no new changes except as documented above.      Fluids    Intake/Output Summary (Last 24 hours) at 11/25/2023 1725  Last data filed at 11/25/2023 1359  Gross per 24 hour   Intake 1430 ml   Output 2200 ml   Net -770 ml         Laboratory                            Imaging  US-RENAL   Final Result      1.  Normal renal ultrasound.      US-VEIN MAPPING LOWER EXTREMITY UNILAT RIGHT   Final Result      CT-ABDOMEN-PELVIS WITH   Final Result         1. There is a 2 cm ulcerative fraying in the left medial gluteus region. Subjacent iliac bone demonstrates bony ridging, consistent with osteomyelitis. There is small amount of air tracking medially and laterally away from the ulcerative wound along the    left medial gluteal musculature and down to the level of the left paraspinous musculature. Therefore, early necrotizing fasciitis possible. No drainable abscess present. This was discussed with the ordering physician.   2. Severely distended bladder with mild to moderate bilateral associated hydronephrosis. This is likely secondary to prostatic hypertrophy. There is severe median lobe hypertrophy. Consider Holguin catheter placement for decompression. Bladder diverticulum    also noted.   3. Patchy ground glass to aeration lower lobes, suspicious for pneumonia. However, atypical edema or other pneumonitis possible.   4. Severe atherosclerotic disease.   5. Additional soft tissue wound in the left groin.      DX-FEMUR-2+ LEFT   Final Result      No evidence of infection by plain film.      DX-CHEST-PORTABLE (1 VIEW)   Final Result      No  acute process.           Assessment/Plan  * Infected sacral wound- (present on admission)  Assessment & Plan  - Large sacral/ischial wound with signs of active infection and CT scan concerning for pelvic osteomyelitis and skin and soft tissue infection.  -s/p I&D of the sacral wound on 11/14.  -Surgical cultures grew beta-hemolytic group A streptococcus along with mixed enteric and usual skin kris.  -Continue oral Augmentin to complete 7 days course of antibiotics  -Continue local wound care.  Wound VAC has been placed.   -Continue pain control with oral oxycodone and IV Dilaudid.  -PT/OT recommending SNF placement.  He was sent home on last admission, but did not do well with worsening sacral wound.  At this time may need to pursue placement, although it has been a difficult discharge on his last admission with no accepting SNF or home health services.  May need to look into group home placement.  CM/SW for discharge planning.    11/25/23  Wound VAC has been removed from the sacral wound.    Left sacral wound trauma 11/20/2023.    Anxiety- (present on admission)  Assessment & Plan  - Continue as needed Atarax.    Insomnia- (present on admission)  Assessment & Plan  -Continue trazodone at night.    Open wound of inguinal region- (present on admission)  Assessment & Plan  -Recent history of femoral endarterectomy with incision dehiscence, open wound  -Vascular surgery following  -Continue wound care.      11/25/23  Continuing wound VAC to the left lower extremity.        Severe sepsis (HCC)- (present on admission)  Assessment & Plan  -Due to infected sacral wound.  Resolved.  Management as above.    GELY (acute kidney injury) (HCC)- (present on admission)  Assessment & Plan  - Suspect postrenal component, just like before when he required Gonzales catheter, along with prerenal with rising BUN.   -Place Gonzales catheter.  Resume Flomax and Proscar.  Improved with gonzales and IV fluids  Follow up with urology as outpatient,  keep gonzales in place  -Continue to monitor renal function closely.  Monitor electrolytes as at risk for electrolyte derangements with GELY.  - avoid nephrotoxins, and continue to renally dose all medications.    PAD (peripheral artery disease) (HCC)- (present on admission)  Assessment & Plan  -With recent history of bypass grafting  -Vascular surgery is anticipating need for outpatient elective bypass procedure on the right leg once acute infection has resolved.    Anemia- (present on admission)  Assessment & Plan  -Patient had normal hemoglobin and March of this year, but has been anemic since approximately September  -Degree of anemia has been essentially stable  -Likely multifactorial related to nutritional status, and multiple procedures    Hyponatremia- (present on admission)  Assessment & Plan  -Following resuscitation, had very high UOP concerning for low solutes vs beer potomania.   -Sodium levels improved, although initially overcorrected required D5, but has since stabilized with appropriate rate of correction.  -Sodium improved and stable.  Continue salt tablets 1 g twice daily.         VTE prophylaxis: Lovenox SQ

## 2023-11-27 PROBLEM — F43.21 ADJUSTMENT DISORDER WITH DEPRESSED MOOD: Status: ACTIVE | Noted: 2023-11-27

## 2023-11-27 LAB
GLUCOSE BLD STRIP.AUTO-MCNC: 110 MG/DL (ref 65–99)
GLUCOSE BLD STRIP.AUTO-MCNC: 135 MG/DL (ref 65–99)
GLUCOSE BLD STRIP.AUTO-MCNC: 150 MG/DL (ref 65–99)
GLUCOSE BLD STRIP.AUTO-MCNC: 94 MG/DL (ref 65–99)

## 2023-11-27 PROCEDURE — 700102 HCHG RX REV CODE 250 W/ 637 OVERRIDE(OP): Performed by: INTERNAL MEDICINE

## 2023-11-27 PROCEDURE — 82962 GLUCOSE BLOOD TEST: CPT | Mod: 91

## 2023-11-27 PROCEDURE — 97606 NEG PRS WND THER DME>50 SQCM: CPT

## 2023-11-27 PROCEDURE — A9270 NON-COVERED ITEM OR SERVICE: HCPCS | Performed by: STUDENT IN AN ORGANIZED HEALTH CARE EDUCATION/TRAINING PROGRAM

## 2023-11-27 PROCEDURE — 99254 IP/OBS CNSLTJ NEW/EST MOD 60: CPT | Performed by: PSYCHIATRY & NEUROLOGY

## 2023-11-27 PROCEDURE — 302098 PASTE RING (FLAT): Performed by: STUDENT IN AN ORGANIZED HEALTH CARE EDUCATION/TRAINING PROGRAM

## 2023-11-27 PROCEDURE — A9270 NON-COVERED ITEM OR SERVICE: HCPCS | Performed by: INTERNAL MEDICINE

## 2023-11-27 PROCEDURE — 700111 HCHG RX REV CODE 636 W/ 250 OVERRIDE (IP): Performed by: INTERNAL MEDICINE

## 2023-11-27 PROCEDURE — 99232 SBSQ HOSP IP/OBS MODERATE 35: CPT | Performed by: STUDENT IN AN ORGANIZED HEALTH CARE EDUCATION/TRAINING PROGRAM

## 2023-11-27 PROCEDURE — 770006 HCHG ROOM/CARE - MED/SURG/GYN SEMI*

## 2023-11-27 PROCEDURE — A9270 NON-COVERED ITEM OR SERVICE: HCPCS | Performed by: HOSPITALIST

## 2023-11-27 PROCEDURE — 700102 HCHG RX REV CODE 250 W/ 637 OVERRIDE(OP): Performed by: HOSPITALIST

## 2023-11-27 PROCEDURE — 700102 HCHG RX REV CODE 250 W/ 637 OVERRIDE(OP): Performed by: STUDENT IN AN ORGANIZED HEALTH CARE EDUCATION/TRAINING PROGRAM

## 2023-11-27 RX ADMIN — TAMSULOSIN HYDROCHLORIDE 0.4 MG: 0.4 CAPSULE ORAL at 08:40

## 2023-11-27 RX ADMIN — DOCUSATE SODIUM 50 MG AND SENNOSIDES 8.6 MG 2 TABLET: 8.6; 5 TABLET, FILM COATED ORAL at 05:20

## 2023-11-27 RX ADMIN — SODIUM CHLORIDE 1 G: 1 TABLET ORAL at 08:40

## 2023-11-27 RX ADMIN — FINASTERIDE 5 MG: 5 TABLET, FILM COATED ORAL at 05:20

## 2023-11-27 RX ADMIN — Medication 220 MG: at 05:20

## 2023-11-27 RX ADMIN — MIRTAZAPINE 15 MG: 15 TABLET, FILM COATED ORAL at 19:49

## 2023-11-27 RX ADMIN — OXYCODONE HYDROCHLORIDE AND ACETAMINOPHEN 500 MG: 500 TABLET ORAL at 05:20

## 2023-11-27 RX ADMIN — OXYCODONE HYDROCHLORIDE 10 MG: 10 TABLET ORAL at 19:26

## 2023-11-27 RX ADMIN — THERA TABS 1 TABLET: TAB at 05:20

## 2023-11-27 RX ADMIN — DOCUSATE SODIUM 50 MG AND SENNOSIDES 8.6 MG 2 TABLET: 8.6; 5 TABLET, FILM COATED ORAL at 19:48

## 2023-11-27 RX ADMIN — FERROUS SULFATE TAB 325 MG (65 MG ELEMENTAL FE) 325 MG: 325 (65 FE) TAB at 08:40

## 2023-11-27 RX ADMIN — OXYCODONE HYDROCHLORIDE 10 MG: 10 TABLET ORAL at 08:40

## 2023-11-27 RX ADMIN — HYDROMORPHONE HYDROCHLORIDE 0.5 MG: 1 INJECTION, SOLUTION INTRAMUSCULAR; INTRAVENOUS; SUBCUTANEOUS at 11:13

## 2023-11-27 RX ADMIN — OXYCODONE HYDROCHLORIDE 10 MG: 10 TABLET ORAL at 14:41

## 2023-11-27 RX ADMIN — OXYCODONE HYDROCHLORIDE 10 MG: 10 TABLET ORAL at 02:34

## 2023-11-27 ASSESSMENT — ENCOUNTER SYMPTOMS
CONSTITUTIONAL NEGATIVE: 1
CHILLS: 0
ABDOMINAL PAIN: 0
NERVOUS/ANXIOUS: 1
BACK PAIN: 1
DEPRESSION: 1
COUGH: 0
HALLUCINATIONS: 0
INSOMNIA: 1
CARDIOVASCULAR NEGATIVE: 1
GASTROINTESTINAL NEGATIVE: 1
NAUSEA: 0
HEADACHES: 0
MUSCULOSKELETAL NEGATIVE: 1
FEVER: 0
MYALGIAS: 0
DIZZINESS: 0
SHORTNESS OF BREATH: 0
RESPIRATORY NEGATIVE: 1
PALPITATIONS: 0
VOMITING: 0

## 2023-11-27 ASSESSMENT — PAIN DESCRIPTION - PAIN TYPE
TYPE: ACUTE PAIN

## 2023-11-27 NOTE — PROGRESS NOTES
Hospital Medicine Daily Progress Note    Date of Service  11/26/2023    Chief Complaint   altered mentation    Hospital Course  Dillon Centeno is a 58 y.o. male with PAD, hypertension, history of left AKA, admitted 11/12/2023 with altered mentation.  On evaluation, he was found to have a large ischial unstageable decubitus ulcer, and labs showed Na 117, and creatinine of 2.27.  He was also noted to have dehisence of L groin wound.  Patient started on antibiotics and IV fluids.  Surgery was consulted, and patient underwent I&D of the sacral wound on 11/14 (Dr. Christianson).  Cultures have grown beta-hemolytic group A Streptococcus along with mixed enteric and usual skin kris.  Renal function improved with hydration.  Sodium levels improved, although initially overcorrected requiring D5, but has since stabilized with appropriate rate of correction.  He was seen by vascular surgery who anticipated need for outpatient elective bypass procedure on the right leg once acute infection has resolved. Wound VAC has been placed.  PT recommends SNF placement.      Interval Problem Update  No acute events overnight.  Patient did not sleep well overnight.  Will increase trazodone dose to 100 mg at night, stop melatonin.  Patient is medically cleared.  Pending SNF placement, may be difficult discharge due to insurance.    11/25/23  Patient feeling tired today.  Has Holguin catheter in place.  Left lower extremity wound VAC in place.  Discussed with case management.  Complex discharge committee following.  Working on discharging to skilled nursing facility.    11/26/23  Patient has no new complaints.  Left lower extremity wound VAC has been discontinued.  Tachycardia in the 100s.  Likely due to pain.  Continuing on oxycodone.  Complaining of insomnia.  I am starting patient on mirtazapine 15 mg at nighttime.    Consultants/Specialty  general surgery and vascular surgery    Code Status  Full Code    Disposition  The patient is  medically cleared for discharge to home or a post-acute facility.  Anticipate discharge to: skilled nursing facility      I have placed the appropriate orders for post-discharge needs.    Review of Systems  Review of Systems   Constitutional:  Positive for malaise/fatigue. Negative for chills and fever.   Respiratory:  Negative for cough and shortness of breath.    Cardiovascular:  Negative for chest pain and palpitations.   Gastrointestinal:  Negative for abdominal pain, nausea and vomiting.   Genitourinary:  Negative for dysuria and hematuria.   Musculoskeletal:  Positive for back pain. Negative for joint pain and myalgias.   Neurological:  Negative for dizziness and headaches.        Pertinent positives/negatives as mentioned above.     A complete review of systems was personally done by me. All other systems were negative.       Physical Exam  Temp:  [36.6 °C (97.9 °F)-37.1 °C (98.7 °F)] 37.1 °C (98.7 °F)  Pulse:  [] 102  Resp:  [16-18] 18  BP: (119-132)/(71-85) 124/77  SpO2:  [96 %-99 %] 99 %    Physical Exam  Vitals and nursing note reviewed.   Constitutional:       General: He is not in acute distress.     Appearance: Normal appearance. He is not ill-appearing.   HENT:      Head: Normocephalic and atraumatic.      Mouth/Throat:      Mouth: Mucous membranes are moist.      Pharynx: Oropharynx is clear. No oropharyngeal exudate.   Eyes:      General: No scleral icterus.        Right eye: No discharge.         Left eye: No discharge.      Conjunctiva/sclera: Conjunctivae normal.   Cardiovascular:      Rate and Rhythm: Normal rate and regular rhythm.      Pulses: Normal pulses.      Heart sounds: Normal heart sounds. No murmur heard.  Pulmonary:      Effort: Pulmonary effort is normal. No respiratory distress.      Breath sounds: Normal breath sounds.   Abdominal:      General: Abdomen is flat. Bowel sounds are normal. There is no distension.      Palpations: Abdomen is soft.   Genitourinary:     Comments:  Holguin catheter in place  Musculoskeletal:         General: Deformity present. No swelling.      Cervical back: Neck supple. No tenderness.      Right lower leg: No edema.      Left lower leg: No edema.      Comments: Left AKA dressings in place.  Wound vacs removed.  From the left lower extremity.   Skin:     General: Skin is warm and dry.      Coloration: Skin is pale.   Neurological:      Mental Status: He is alert and oriented to person, place, and time. Mental status is at baseline.      Motor: No weakness.   Psychiatric:         Thought Content: Thought content normal.         Judgment: Judgment normal.       I have performed the physical examination today 11/20/2023.  In review of yesterday's note, there are no new changes except as documented above.      Fluids    Intake/Output Summary (Last 24 hours) at 11/26/2023 1610  Last data filed at 11/26/2023 1500  Gross per 24 hour   Intake 200 ml   Output 5050 ml   Net -4850 ml         Laboratory                            Imaging  US-RENAL   Final Result      1.  Normal renal ultrasound.      US-VEIN MAPPING LOWER EXTREMITY UNILAT RIGHT   Final Result      CT-ABDOMEN-PELVIS WITH   Final Result         1. There is a 2 cm ulcerative fraying in the left medial gluteus region. Subjacent iliac bone demonstrates bony ridging, consistent with osteomyelitis. There is small amount of air tracking medially and laterally away from the ulcerative wound along the    left medial gluteal musculature and down to the level of the left paraspinous musculature. Therefore, early necrotizing fasciitis possible. No drainable abscess present. This was discussed with the ordering physician.   2. Severely distended bladder with mild to moderate bilateral associated hydronephrosis. This is likely secondary to prostatic hypertrophy. There is severe median lobe hypertrophy. Consider Holguin catheter placement for decompression. Bladder diverticulum    also noted.   3. Patchy ground glass to  aeration lower lobes, suspicious for pneumonia. However, atypical edema or other pneumonitis possible.   4. Severe atherosclerotic disease.   5. Additional soft tissue wound in the left groin.      DX-FEMUR-2+ LEFT   Final Result      No evidence of infection by plain film.      DX-CHEST-PORTABLE (1 VIEW)   Final Result      No acute process.           Assessment/Plan  * Infected sacral wound- (present on admission)  Assessment & Plan  - Large sacral/ischial wound with signs of active infection and CT scan concerning for pelvic osteomyelitis and skin and soft tissue infection.  -s/p I&D of the sacral wound on 11/14.  -Surgical cultures grew beta-hemolytic group A streptococcus along with mixed enteric and usual skin kris.  -Continue oral Augmentin to complete 7 days course of antibiotics  -Continue local wound care.  Wound VAC has been placed.   -Continue pain control with oral oxycodone and IV Dilaudid.  -PT/OT recommending SNF placement.  He was sent home on last admission, but did not do well with worsening sacral wound.  At this time may need to pursue placement, although it has been a difficult discharge on his last admission with no accepting SNF or home health services.  May need to look into group home placement.  CM/SW for discharge planning.    11/25/23  Wound VAC has been removed from the sacral wound.    Left sacral wound trauma 11/20/2023.    Open wound of inguinal region- (present on admission)  Assessment & Plan  -Recent history of femoral endarterectomy with incision dehiscence, open wound  -Vascular surgery following  -Continue wound care.      11/25/23  Continuing wound VAC to the left lower extremity.    11/26/23    Left lower extremity wound from 11/24/2023.  Wound VAC removed.   Continuing wound care.    Anxiety- (present on admission)  Assessment & Plan  - Continue as needed Atarax.    Insomnia- (present on admission)  Assessment & Plan  -Continue trazodone at night.    11/26/23  Continue to  have insomnia despite trazodone  Start mirtazapine 15 mg daily  Discontinue trazodone    Severe sepsis (HCC)- (present on admission)  Assessment & Plan  -Due to infected sacral wound.  Resolved.  Management as above.    GELY (acute kidney injury) ([facility])- (present on admission)  Assessment & Plan  - Suspect postrenal component, just like before when he required Gonzales catheter, along with prerenal with rising BUN.   -Place Gonzales catheter.  Resume Flomax and Proscar.  Improved with gonzales and IV fluids  Follow up with urology as outpatient, keep gonzales in place  -Continue to monitor renal function closely.  Monitor electrolytes as at risk for electrolyte derangements with GELY.  - avoid nephrotoxins, and continue to renally dose all medications.    PAD (peripheral artery disease) ([facility])- (present on admission)  Assessment & Plan  -With recent history of bypass grafting  -Vascular surgery is anticipating need for outpatient elective bypass procedure on the right leg once acute infection has resolved.    Anemia- (present on admission)  Assessment & Plan  -Patient had normal hemoglobin and March of this year, but has been anemic since approximately September  -Degree of anemia has been essentially stable  -Likely multifactorial related to nutritional status, and multiple procedures    Hyponatremia- (present on admission)  Assessment & Plan  -Following resuscitation, had very high UOP concerning for low solutes vs beer potomania.   -Sodium levels improved, although initially overcorrected required D5, but has since stabilized with appropriate rate of correction.  -Sodium improved and stable.  Continue salt tablets 1 g twice daily.         VTE prophylaxis: Lovenox SQ

## 2023-11-27 NOTE — CARE PLAN
The patient is Stable - Low risk of patient condition declining or worsening    Shift Goals  Clinical Goals: Patient's pain will be <3 throughout shift  Patient Goals: Sleep and rest  Family Goals: LUTHER    Progress made toward(s) clinical / shift goals:      Patient is alert and oriented x4; able to communicate needs. Patient's pain is controlled with PRN pain med and rest. Patient refused dressing changes to sacrum despite education of its importance. Wound team to see patient today to connect him back to  wound vac. Patient on wet to dry dressing. Bed alarm is on, call light within reach.

## 2023-11-27 NOTE — DISCHARGE PLANNING
Case Management Discharge Planning    Admission Date: 11/12/2023  GMLOS: 9.9  ALOS: 15    6-Clicks ADL Score: 18  6-Clicks Mobility Score: 7  PT and/or OT Eval ordered: Yes  Post-acute Referrals Ordered: Yes  Post-acute Choice Obtained: No - pending acceptance  Has referral(s) been sent to post-acute provider:  Yes      Anticipated Discharge Dispo: Discharge Disposition: D/T to SNF with Medicare cert in anticipation of skilled care (03)    DME Needed: No    Action(s) Taken: Updated Provider/Nurse on Discharge Plan  RN HIRAM attended IDT rounds and collaborated with the team regarding discharge planning needs and barriers.  Pt has wound vac x2, has Medicaid insurance  Pt is pending SNF acceptance - referrals expanded to [facility], [facility]  Requested Sonia MALONEY to follow up with the above facilities    Escalations Completed: None    Medically Clear: Yes    Next Steps:   Follow up for placement acceptance    Barriers to Discharge: Pending Placement    Is the patient up for discharge tomorrow: No

## 2023-11-27 NOTE — DISCHARGE PLANNING
Agency/Facility Name: [facility]  Outcome: DPA left a voice message for admissions.  Awaiting a call back.    @1327  Agency/Facility Name: [facility]  Outcome:  DPA left a voice message for admissions.  Awaiting a call back.    @1329  Agency/Facility Name: [facility]  Outcome: DPA left a voice message for admissions.  Awaiting a call back.    @1330  Agency/Facility Name: [facility]  Spoke To: Gianna  Outcome: Referral was not received.  Fax to the alternate fax number, [phone]    @[phone]  HAIDER faxed SNF referral to [facility] via comm mgt, fax #[phone].

## 2023-11-27 NOTE — CARE PLAN
Problem: Skin Integrity  Goal: Skin integrity is maintained or improved  Outcome: Not Progressing   Pt. Refuses to q2 turns and dressing changes.  Problem: Fall Risk  Goal: Patient will remain free from falls  Outcome: Progressing   The patient is Stable - Low risk of patient condition declining or worsening    Shift Goals  Clinical Goals: Pain control  Patient Goals: Rest, pain control  Family Goals: NA    Progress made toward(s) clinical / shift goals:  maintain skin integrity.     Patient is not progressing towards the following goals:      Problem: Skin Integrity  Goal: Skin integrity is maintained or improved  Outcome: Not Progressing
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
## 2023-11-27 NOTE — WOUND TEAM
[facility]  Inpatient Services  Wound and Skin Care Follow-up    Admission Date: 11/12/2023     Last order of IP CONSULT TO WOUND CARE was found on 11/19/2023 from Hospital Encounter on 11/12/2023     HPI, PMH, SH: Reviewed    Past Surgical History:   Procedure Laterality Date    INCISION AND DRAINAGE GENERAL Left 11/14/2023    Procedure: Sharp exicisional debridement of skin subqutaneous fat and fasha 10cm squard;  Surgeon: Thom Christianson M.D.;  Location: [facility];  Service: General    INCISION AND DRAINAGE GENERAL Left 10/23/2023    Procedure: INCISION AND DRAINAGE;  Surgeon: Keith Navarro M.D.;  Location: [facility];  Service: Vascular    APPLICATION OR REPLACEMENT, WOUND VAC  10/23/2023    Procedure: APPLICATION OR REPLACEMENT, WOUND VAC;  Surgeon: Keith Navarro M.D.;  Location: [facility];  Service: Vascular    KNEE AMPUTATION ABOVE Left 10/23/2023    Procedure: AMPUTATION, ABOVE KNEE;  Surgeon: Keith Navarro M.D.;  Location: [facility];  Service: Vascular    KNEE AMPUTATION BELOW Left 10/20/2023    Procedure: AMPUTATION, BELOW KNEE WOUND REVISION;  Surgeon: Pradip Altamirano M.D.;  Location: [facility];  Service: Orthopedics    FEMORAL ENDARTERECTOMY Bilateral 9/30/2023    Procedure: ENDARTERECTOMY, FEMORAL;  Surgeon: Keith Navarro M.D.;  Location: [facility];  Service: Vascular    ANGIOGRAM, WITH ANGIOPLASTY, AND STENT INSERTION IF INDICATED Right 9/30/2023    Procedure: ANGIOGRAM, WITH ILIAC STENT;  Surgeon: Keith Navarro M.D.;  Location: [facility];  Service: Vascular    KNEE AMPUTATION BELOW Left 9/27/2023    Procedure: AMPUTATION, BELOW KNEE;  Surgeon: Pradip Altamirano M.D.;  Location: [facility];  Service: Orthopedics    IRRIGATION & DEBRIDEMENT ORTHO Left 3/4/2018    Procedure: IRRIGATION & DEBRIDEMENT ORTHO - HUMERUS;  Surgeon: Sergio Watkins M.D.;  Location: [facility]  ORS;  Service: Orthopedics    ORIF, SHOULDER Left 1/4/2018    Procedure: SHOULDER ORIF;  Surgeon: Sergio Watkins M.D.;  Location: SUR[facility];  Service: Orthopedics    CLOSED REDUCTION Left 12/24/2017    Procedure: CLOSED REDUCTION;  Surgeon: Keith Subramanian M.D.;  Location: SURGERY [facility];  Service: Orthopedics    OTHER      multiple surgeries lower legs from past injuries     Social History     Tobacco Use    Smoking status: Every Day     Types: Cigars    Smokeless tobacco: Never   Substance Use Topics    Alcohol use: Yes     Comment: a few beers a week     Chief Complaint   Patient presents with    Other     Pt BIB ems due to failure to thrive, pt's neighbors called ems due to pt unable to care for self and refuses to eat    Diarrhea     Diagnosis: Severe sepsis (HCC) [A41.9, R65.20]    Unit where seen by Wound Team: S631/01     WOUND FOLLOW UP RELATED TO:  VAC change L groin, L sacrum        WOUND TEAM PLAN OF CARE - Frequency of Follow-up:   Nursing to follow dressing orders written for wound care. Contact wound team if area fails to progress, deteriorates or with any questions/concerns if something comes up before next scheduled follow up (See below as to whether wound is following and frequency of wound follow up)  Dressing changes by wound team:                   NPWT change 3 times weekly - Left groin, sacrum     WOUND HISTORY:       58 y.o. male with past medical history of peripheral arterial disease, gangrene of the left lower extremity, hypertension who presented 11/12/2023 after his brother called EMS for the patient failing to eat or move.  In the ER the patient was found to be tachycardic with significant leukocytosis concerning for infection.  He was found to have a large sacral pressure injury therefore a CT scan of the abdomen pelvis was obtained which showed soft tissue air tracking superiorly above the ileum, other findings include evidence of osteomyelitis.       11/14/23 I&D of sacral decubitus wound with Dr. Christianson       WOUND ASSESSMENT/LDA  Wound 11/12/23 Pressure Injury Sacrum Stage IV (Active)   Date First Assessed: 11/12/23   Present on Original Admission: Yes  Hand Hygiene Completed: Yes  Primary Wound Type: Pressure Injury  Location: Sacrum  Wound Description (Comments): Stage IV      Assessments 11/27/2023  1:00 PM   Site Assessment Pink;Slough;Undermining;Red   Periwound Assessment Clean;Dry;Intact   Margins Unattached edges;Defined edges   Closure Secondary intention   Drainage Amount Moderate   Drainage Description Serosanguineous;Serous;Tan   Treatments Cleansed;Site care   Wound Cleansing Approved Wound Cleanser   Periwound Protectant Skin Protectant Wipes to Periwound;Paste Ring;Drape   Dressing Status Clean;Dry;Intact   Dressing Changed Changed   Dressing Cleansing/Solutions Not Applicable   Dressing Options Wound Vac;Offloading Dressing - Sacral   Dressing Change/Treatment Frequency Monday, Wednesday, Friday, and As Needed   NEXT Dressing Change/Treatment Date 11/29/23   NEXT Weekly Photo (Inpatient Only) 12/01/23   Wound Team Following 3x Weekly   Pressure Injury Stage Stage 4       Wound 11/12/23 Full Thickness Wound Groin Left open surgical (Active)   Date First Assessed: 11/12/23   Present on Original Admission: Yes  Hand Hygiene Completed: Yes  Primary Wound Type: Full Thickness Wound  Location: Groin  Laterality: Left  Wound Description (Comments): open surgical      Assessments 11/27/2023  1:00 PM   Site Assessment Pink;Red;Non-healing;Drainage   Periwound Assessment Clean;Intact;Dry   Margins Attached edges;Defined edges   Closure Secondary intention   Drainage Amount Small   Drainage Description Serosanguineous;Purulent   Treatments Cleansed;Site care   Wound Cleansing Approved Wound Cleanser   Periwound Protectant Skin Protectant Wipes to Periwound;Paste Ring;Drape   Dressing Status Clean;Dry;Intact   Dressing Changed New   Dressing  Cleansing/Solutions Not Applicable   Dressing Options Wound Vac;Offloading Dressing - Sacral   Dressing Change/Treatment Frequency Monday, Wednesday, Friday, and As Needed   NEXT Dressing Change/Treatment Date 11/29/23   NEXT Weekly Photo (Inpatient Only) 12/01/23   Wound Team Following 3x Weekly   Non-staged Wound Description Full thickness       Negative Pressure Wound Therapy 11/12/23 Dehisced;Surgical Groin;Sacrum Left (Active)   Placement Date: 11/12/23   Inserted by: wound team  Hand Hygiene Completed: Yes  Wound Type: Dehisced;Surgical  Location: Groin;Sacrum  Laterality: Left      Assessments 11/27/2023  1:00 PM   NPWT Pump Mode / Pressure Setting Ulta;Continuous;125 mmHg   Dressing Type Small;Black Foam (Veraflo)   Number of Foam Pieces Used 3   Canister Changed No   NEXT Dressing Change/Treatment Date 11/29/23       Wound 11/19/23 Other (comment) Thigh Proximal;Anterior Right open area (Active)   Date First Assessed/Time First Assessed: 11/19/23 2020   Hand Hygiene Completed: Yes  Primary Wound Type: Other (comment)  Location: Thigh  Wound Orientation: Proximal;Anterior  Laterality: Right  Wound Description (Comments): open area      Assessments 11/27/2023  1:00 PM   Site Assessment Pink;Non-healing;Drainage   Periwound Assessment Scar tissue;Clean;Intact;Dry   Margins Unattached edges;Defined edges   Closure Secondary intention;Adhesive bandage   Drainage Amount Moderate   Drainage Description Purulent   Treatments Site care;Cleansed   Wound Cleansing Approved Wound Cleanser   Periwound Protectant Skin Protectant Wipes to Periwound   Dressing Status Clean;Dry;Intact   Dressing Changed Changed   Dressing Cleansing/Solutions Not Applicable   Dressing Options Hydrofiber Silver;Silicone Adhesive Foam   Dressing Change/Treatment Frequency Every 48 hrs, and As Needed   NEXT Dressing Change/Treatment Date 11/29/23   NEXT Weekly Photo (Inpatient Only) 11/29/23   Wound Team Following Not following         Vascular:    INGA:   No results found.    Lab Values:    Lab Results   Component Value Date/Time    WBC 10.1 11/21/2023 01:50 AM    RBC 2.81 (L) 11/21/2023 01:50 AM    HEMOGLOBIN 7.9 (L) 11/21/2023 01:50 AM    HEMATOCRIT 25.5 (L) 11/21/2023 01:50 AM    CREACTPROT 27.78 (H) 11/12/2023 02:22 AM    SEDRATEWES 66 (H) 11/12/2023 02:22 AM         Culture Results show:  Recent Results (from the past 720 hour(s))   CULTURE WOUND W/ GRAM STAIN    Collection Time: 11/14/23 11:53 AM    Specimen: Wound   Result Value Ref Range    Significant Indicator POS (POS)     Source WND     Site Sacral     Culture Result (A)      Light growth mixed enteric kris including E coli,  Citrobacter koseri, Enterococcus faecalis and Streptococcus  anginosis.      Gram Stain Result Moderate WBCs.  Few Gram positive cocci.       Culture Result (A)      Beta Hemolytic Streptococcus group A  Light growth         Pain Level/Medicated:  PO pain medications administered by bedside RN 2.5 hours prior and IV pain medications administered by bedside RN 10 min prior (Pt educated that IV medication will not be available on outpatient basis)       INTERVENTIONS BY WOUND TEAM:  Chart and images reviewed. Discussed with bedside RN. All areas of concern (based on picture review, LDA review and discussion with bedside RN) have been thoroughly assessed. Documentation of areas based on significant findings. This RN in to assess patient. Performed standard wound care which includes appropriate positioning, dressing removal and non-selective debridement. Pictures and measurements obtained weekly if/when required.    Wound:  Sacrum   Preparation for Dressing removal: Removed without difficulty  Cleansed/Non-selectively Debrided with:  Wound cleanser and Gauze  Kiara wound: Cleansed with Wound cleanser and Gauze, Prepped with No Sting, Paste Rings, and Drape  Primary Dressing:  Black VF applied to wound bed, secured with drape.  Secondary (Outer) Dressing: trak pad  applied, suction obtained. Offloading dressing applied underneath tubing  **No Cassettes available so patient placed on regular NPWT**     Wound:  Left Groin  Preparation for Dressing removal: Removed without difficulty  Cleansed/Non-selectively Debrided with:  Wound cleanser and Gauze  Kiara wound: Cleansed with Wound cleanser and Gauze, Prepped with No Sting, Paste Rings, and Drape  Primary Dressing:  black foam applied to wound bed.  Secondary (Outer) Dressing: button and trak pad applied. Suction obtained. Offloading dressing applied underneath tubing    RIGHT ANT THIGH: Dressing change. Cleansed with wound cleanser and gauze. Aquacel ag and adhesive foam applied (wound team not following - but changed dressing per patient request)    Advanced Wound Care Discharge Planning  Number of Clinicians necessary to complete wound care: 1  Is patient requiring IV pain medications for dressing changes:  No   Length of time for dressing change 20 min. (This does not include chart review, pre-medication time, set up, clean up or time spent charting.)    Interdisciplinary consultation: Patient, Bedside RNHope (Wound Tech).  Pressure injury and staging reviewed with N/A.    EVALUATION / RATIONALE FOR TREATMENT:     Date:  11/27/23  Wound Status:  Wound progressing as expected    Patient sacral wound with palpable bone and some stringy non-viable tissue, otherwise appears clean - will benefit from continued Veraflo once supplies available. Patient left groin with scant to small tan-purulent drainage present to superior portion, wound bed appears pink and red. Continue VAC at this time.     Date:  11/24/23  Wound Status:      Sacral wound with palpable bone which may delay resolution.  Otherwise site is mostly granular.  L groin clean, using collagen dressing to support healthy wound bed environment.      Date:  11/22/23  Wound Status:  Wound progressing as expected  L groin Vac transitioned from Veraflo to leah with  regular Vac. This was then Y-connected to sacral Vac, thus patient only needing one machine at this time.  Sacral wound still with exposed bone, continuing Veraflo here.    Date:  11/20/23  Wound Status:  Wound progressing as expected    L groin likely can transition to leah and regular NPWT at next change to help with granulation tissue at the depth of the proximal wound.    No changes to the sacral wound bed and continue to have exposed bone.  Continue with VF NPWT and POC.     Date:  11/18/23  Wound Status:  Wound progressing as expected    Left groin continues to improve with VF NPWT. Continue POC.     Sacral wound still with significant undermining and bone exposure. VF NPWT applied to this area as well to increase granulation tissue production and assist in wound closure by secondary intention.      Date:  11/16/23  Wound Status:  Wound progressing as expected    Left groin wound with more granulation tissue present. Continue with POC.    S/P debridement of sacral wound on 11/14. Wound with circumferential undermining. Deepest area of undermining measuring 4cm at 2o'clock. Bone exposed towards mid aspect of wound bed. Regular NPWT applied to increase granulation tissue production to the area. Will benefit from VF NPWT on next change to ensure exposed structures remain moist.       Date:  11/14/23  Wound Status:  Wound progressing as expected    Wound bed clean, no odor. Continue VF NPWT with Dakins  Date:  11/12/23  Wound Status:  Initial evaluation    Left groin with minimal slough after cleansing, met with Dr. Navarro at bedside who advised to place Dakin's VF to site.  Left sacral wound to go to surgery with Dr. Chow today or tomorrow for debridement and likely VAC placement.  Patient very frail appearing.  Sacral wound probes to bone and will be very difficult to heal considering patient overall state and nutrition.             Goals: Steady decrease in wound area and depth weekly.    NURSING PLAN OF  CARE ORDERS:  No new orders this visit    NUTRITION RECOMMENDATIONS   Wound Team Recommendations:  N/A     DIET ORDERS (From admission to next 24h)       Start     Ordered    11/16/23 1001  Diet Order Diet: Consistent CHO (Diabetic)  ALL MEALS        Question:  Diet:  Answer:  Consistent CHO (Diabetic)    11/16/23 1000    11/13/23 1328  Supplements  ALL MEALS        Question:  Which Supplement  Answer:  BOOST PLUS  Comment:  or equivalent Ensure    11/13/23 1328                    PREVENTATIVE INTERVENTIONS:   Q shift Jae - performed per nursing policy  Q shift pressure point assessments - performed per nursing policy    Surface/Positioning  Low Airloss - Currently in Place  Reposition q 2 hours - Currently in Place    Offloading/Redistribution  Sacral offloading dressing (Silicone dressing) - Currently in Place  Float Heels off Bed with Pillows - Currently in Place         Anticipated discharge plans:  Skilled Nursing, Rehab, and LTACH        Vac Discharge Needs:  Vac Discharge plan is purely a recommendation from wound team and not a requirement for discharge unless otherwise stated by physician.  Regular Vac in use and continued at discharge  Veraflo Vac while inpatient, ok to transition to Regular Vac on discharge

## 2023-11-27 NOTE — PROGRESS NOTES
Hospital Medicine Daily Progress Note    Date of Service  11/27/2023    Chief Complaint   altered mentation    Hospital Course  Dillon Centeno is a 58 y.o. male with PAD, hypertension, history of left AKA, admitted 11/12/2023 with altered mentation.  On evaluation, he was found to have a large ischial unstageable decubitus ulcer, and labs showed Na 117, and creatinine of 2.27.  He was also noted to have dehisence of L groin wound.  Patient started on antibiotics and IV fluids.  Surgery was consulted, and patient underwent I&D of the sacral wound on 11/14 (Dr. Christianson).  Cultures have grown beta-hemolytic group A Streptococcus along with mixed enteric and usual skin kris.  Renal function improved with hydration.  Sodium levels improved, although initially overcorrected requiring D5, but has since stabilized with appropriate rate of correction.  He was seen by vascular surgery who anticipated need for outpatient elective bypass procedure on the right leg once acute infection has resolved. Wound VAC has been placed.  PT recommends SNF placement.      Interval Problem Update  No acute events overnight.  Patient did not sleep well overnight.  Will increase trazodone dose to 100 mg at night, stop melatonin.  Patient is medically cleared.  Pending SNF placement, may be difficult discharge due to insurance.    11/25/23  Patient feeling tired today.  Has Holguin catheter in place.  Left lower extremity wound VAC in place.  Discussed with case management.  Complex discharge committee following.  Working on discharging to skilled nursing facility.    11/26/23  Patient has no new complaints.  Left lower extremity wound VAC has been discontinued.  Tachycardia in the 100s.  Likely due to pain.  Continuing on oxycodone.  Complaining of insomnia.  I am starting patient on mirtazapine 15 mg at nighttime.    11/27/23  Patient has not feeling well today.  Appears to be depressed.  Per physical therapy not engaged today in therapy.   Psychiatry consulted.  Starting sertraline 50 mg daily per recommendations.  Continuing on Atarax as needed for anxiety.  Continuing mirtazapine 15 mg at nighttime for sleep and depression.    Consultants/Specialty  general surgery and vascular surgery    Code Status  Full Code    Disposition  The patient is medically cleared for discharge to home or a post-acute facility.  Anticipate discharge to: skilled nursing facility      I have placed the appropriate orders for post-discharge needs.    Review of Systems  Review of Systems   Constitutional:  Positive for malaise/fatigue. Negative for chills and fever.   Respiratory:  Negative for cough and shortness of breath.    Cardiovascular:  Negative for chest pain and palpitations.   Gastrointestinal:  Negative for abdominal pain, nausea and vomiting.   Genitourinary:  Negative for dysuria and hematuria.   Musculoskeletal:  Positive for back pain. Negative for joint pain and myalgias.   Neurological:  Negative for dizziness and headaches.   Psychiatric/Behavioral:  Positive for depression. The patient has insomnia.         Pertinent positives/negatives as mentioned above.     A complete review of systems was personally done by me. All other systems were negative.       Physical Exam  Temp:  [36.3 °C (97.4 °F)-37.1 °C (98.7 °F)] 36.3 °C (97.4 °F)  Pulse:  [] 100  Resp:  [15-18] 15  BP: (119-125)/(77-80) 119/79  SpO2:  [95 %-99 %] 95 %    Physical Exam  Vitals and nursing note reviewed.   Constitutional:       General: He is not in acute distress.     Appearance: Normal appearance. He is not ill-appearing.   HENT:      Head: Normocephalic and atraumatic.      Mouth/Throat:      Mouth: Mucous membranes are moist.      Pharynx: Oropharynx is clear. No oropharyngeal exudate.   Eyes:      General: No scleral icterus.        Right eye: No discharge.         Left eye: No discharge.      Conjunctiva/sclera: Conjunctivae normal.   Cardiovascular:      Rate and Rhythm: Normal  rate and regular rhythm.      Pulses: Normal pulses.      Heart sounds: Normal heart sounds. No murmur heard.  Pulmonary:      Effort: Pulmonary effort is normal. No respiratory distress.      Breath sounds: Normal breath sounds.   Abdominal:      General: Abdomen is flat. Bowel sounds are normal. There is no distension.      Palpations: Abdomen is soft.   Genitourinary:     Comments: Holguni catheter in place  Musculoskeletal:         General: Deformity present. No swelling.      Cervical back: Neck supple. No tenderness.      Right lower leg: No edema.      Left lower leg: No edema.      Comments: Left AKA dressings in place.  Wound vacs removed.  From the left lower extremity.   Skin:     General: Skin is warm and dry.      Coloration: Skin is pale.   Neurological:      Mental Status: He is alert and oriented to person, place, and time. Mental status is at baseline.      Motor: No weakness.   Psychiatric:         Attention and Perception: Attention normal.         Mood and Affect: Mood is depressed. Affect is blunt and flat.         Speech: Speech normal.         Behavior: Behavior normal.         Thought Content: Thought content normal.         Cognition and Memory: Cognition normal.         Judgment: Judgment normal.       I have performed the physical examination today 11/20/2023.  In review of yesterday's note, there are no new changes except as documented above.      Fluids    Intake/Output Summary (Last 24 hours) at 11/27/2023 0940  Last data filed at 11/27/2023 0234  Gross per 24 hour   Intake 360 ml   Output 4800 ml   Net -4440 ml           Laboratory                            Imaging  US-RENAL   Final Result      1.  Normal renal ultrasound.      US-VEIN MAPPING LOWER EXTREMITY UNILAT RIGHT   Final Result      CT-ABDOMEN-PELVIS WITH   Final Result         1. There is a 2 cm ulcerative fraying in the left medial gluteus region. Subjacent iliac bone demonstrates bony ridging, consistent with osteomyelitis.  There is small amount of air tracking medially and laterally away from the ulcerative wound along the    left medial gluteal musculature and down to the level of the left paraspinous musculature. Therefore, early necrotizing fasciitis possible. No drainable abscess present. This was discussed with the ordering physician.   2. Severely distended bladder with mild to moderate bilateral associated hydronephrosis. This is likely secondary to prostatic hypertrophy. There is severe median lobe hypertrophy. Consider Holguin catheter placement for decompression. Bladder diverticulum    also noted.   3. Patchy ground glass to aeration lower lobes, suspicious for pneumonia. However, atypical edema or other pneumonitis possible.   4. Severe atherosclerotic disease.   5. Additional soft tissue wound in the left groin.      DX-FEMUR-2+ LEFT   Final Result      No evidence of infection by plain film.      DX-CHEST-PORTABLE (1 VIEW)   Final Result      No acute process.           Assessment/Plan  * Infected sacral wound- (present on admission)  Assessment & Plan  - Large sacral/ischial wound with signs of active infection and CT scan concerning for pelvic osteomyelitis and skin and soft tissue infection.  -s/p I&D of the sacral wound on 11/14.  -Surgical cultures grew beta-hemolytic group A streptococcus along with mixed enteric and usual skin kris.  -Continue oral Augmentin to complete 7 days course of antibiotics  -Continue local wound care.  Wound VAC has been placed.   -Continue pain control with oral oxycodone and IV Dilaudid.  -PT/OT recommending SNF placement.  He was sent home on last admission, but did not do well with worsening sacral wound.  At this time may need to pursue placement, although it has been a difficult discharge on his last admission with no accepting SNF or home health services.  May need to look into group home placement.  CM/SW for discharge planning.    11/25/23  Wound VAC has been removed from the  sacral wound.    Left sacral wound trauma 11/20/2023.    Adjustment disorder with depressed mood  Assessment & Plan  11/27/23  Psychiatry consulted.  Starting sertraline as per recommendations.  Continue Atarax as needed for anxiety.    Open wound of inguinal region- (present on admission)  Assessment & Plan  -Recent history of femoral endarterectomy with incision dehiscence, open wound  -Vascular surgery following  -Continue wound care.      11/25/23  Continuing wound VAC to the left lower extremity.    11/26/23    Left lower extremity wound from 11/24/2023.  Wound VAC removed.   Continuing wound care.    Anxiety- (present on admission)  Assessment & Plan  - Continue as needed Atarax.    Insomnia- (present on admission)  Assessment & Plan  -Continue trazodone at night.    11/26/23  Continue to have insomnia despite trazodone  Start mirtazapine 15 mg daily  Discontinue trazodone    11/27/23  Continue mirtazapine 50 mg at nighttime.    Severe sepsis (HCC)- (present on admission)  Assessment & Plan  -Due to infected sacral wound.  Resolved.  Management as above.    GELY (acute kidney injury) ([facility])- (present on admission)  Assessment & Plan  - Suspect postrenal component, just like before when he required Gonzales catheter, along with prerenal with rising BUN.   -Place Gonzales catheter.  Resume Flomax and Proscar.  Improved with gonzales and IV fluids  Follow up with urology as outpatient, keep gonzales in place  -Continue to monitor renal function closely.  Monitor electrolytes as at risk for electrolyte derangements with GELY.  - avoid nephrotoxins, and continue to renally dose all medications.    PAD (peripheral artery disease) (HCC)- (present on admission)  Assessment & Plan  -With recent history of bypass grafting  -Vascular surgery is anticipating need for outpatient elective bypass procedure on the right leg once acute infection has resolved.    Anemia- (present on admission)  Assessment & Plan  -Patient had normal  hemoglobin and March of this year, but has been anemic since approximately September  -Degree of anemia has been essentially stable  -Likely multifactorial related to nutritional status, and multiple procedures    Hyponatremia- (present on admission)  Assessment & Plan  -Following resuscitation, had very high UOP concerning for low solutes vs beer potomania.   -Sodium levels improved, although initially overcorrected required D5, but has since stabilized with appropriate rate of correction.  -Sodium improved and stable.  Continue salt tablets 1 g twice daily.         VTE prophylaxis: Lovenox SQ

## 2023-11-28 LAB
ANION GAP SERPL CALC-SCNC: 11 MMOL/L (ref 7–16)
BUN SERPL-MCNC: 13 MG/DL (ref 8–22)
CALCIUM SERPL-MCNC: 8.8 MG/DL (ref 8.5–10.5)
CHLORIDE SERPL-SCNC: 94 MMOL/L (ref 96–112)
CO2 SERPL-SCNC: 24 MMOL/L (ref 20–33)
CREAT SERPL-MCNC: 0.5 MG/DL (ref 0.5–1.4)
ERYTHROCYTE [DISTWIDTH] IN BLOOD BY AUTOMATED COUNT: 51.8 FL (ref 35.9–50)
EST. AVERAGE GLUCOSE BLD GHB EST-MCNC: 100 MG/DL
GFR SERPLBLD CREATININE-BSD FMLA CKD-EPI: 117 ML/MIN/1.73 M 2
GLUCOSE SERPL-MCNC: 115 MG/DL (ref 65–99)
HBA1C MFR BLD: 5.1 % (ref 4–5.6)
HCT VFR BLD AUTO: 26.7 % (ref 42–52)
HGB BLD-MCNC: 8.6 G/DL (ref 14–18)
MAGNESIUM SERPL-MCNC: 1.7 MG/DL (ref 1.5–2.5)
MCH RBC QN AUTO: 28.9 PG (ref 27–33)
MCHC RBC AUTO-ENTMCNC: 32.2 G/DL (ref 32.3–36.5)
MCV RBC AUTO: 89.6 FL (ref 81.4–97.8)
PHOSPHATE SERPL-MCNC: 3.5 MG/DL (ref 2.5–4.5)
PLATELET # BLD AUTO: 586 K/UL (ref 164–446)
PMV BLD AUTO: 8.7 FL (ref 9–12.9)
POTASSIUM SERPL-SCNC: 4.4 MMOL/L (ref 3.6–5.5)
RBC # BLD AUTO: 2.98 M/UL (ref 4.7–6.1)
SODIUM SERPL-SCNC: 129 MMOL/L (ref 135–145)
WBC # BLD AUTO: 10.9 K/UL (ref 4.8–10.8)

## 2023-11-28 PROCEDURE — A9270 NON-COVERED ITEM OR SERVICE: HCPCS | Performed by: HOSPITALIST

## 2023-11-28 PROCEDURE — 80048 BASIC METABOLIC PNL TOTAL CA: CPT

## 2023-11-28 PROCEDURE — 36415 COLL VENOUS BLD VENIPUNCTURE: CPT

## 2023-11-28 PROCEDURE — A9270 NON-COVERED ITEM OR SERVICE: HCPCS | Performed by: INTERNAL MEDICINE

## 2023-11-28 PROCEDURE — 700102 HCHG RX REV CODE 250 W/ 637 OVERRIDE(OP): Performed by: INTERNAL MEDICINE

## 2023-11-28 PROCEDURE — 97535 SELF CARE MNGMENT TRAINING: CPT

## 2023-11-28 PROCEDURE — 99232 SBSQ HOSP IP/OBS MODERATE 35: CPT | Mod: GC | Performed by: PSYCHIATRY & NEUROLOGY

## 2023-11-28 PROCEDURE — 700102 HCHG RX REV CODE 250 W/ 637 OVERRIDE(OP): Performed by: STUDENT IN AN ORGANIZED HEALTH CARE EDUCATION/TRAINING PROGRAM

## 2023-11-28 PROCEDURE — 770006 HCHG ROOM/CARE - MED/SURG/GYN SEMI*

## 2023-11-28 PROCEDURE — A9270 NON-COVERED ITEM OR SERVICE: HCPCS | Performed by: STUDENT IN AN ORGANIZED HEALTH CARE EDUCATION/TRAINING PROGRAM

## 2023-11-28 PROCEDURE — 700102 HCHG RX REV CODE 250 W/ 637 OVERRIDE(OP): Performed by: HOSPITALIST

## 2023-11-28 PROCEDURE — 84100 ASSAY OF PHOSPHORUS: CPT

## 2023-11-28 PROCEDURE — 99232 SBSQ HOSP IP/OBS MODERATE 35: CPT | Performed by: GENERAL PRACTICE

## 2023-11-28 PROCEDURE — 85027 COMPLETE CBC AUTOMATED: CPT

## 2023-11-28 PROCEDURE — 83036 HEMOGLOBIN GLYCOSYLATED A1C: CPT

## 2023-11-28 PROCEDURE — 83735 ASSAY OF MAGNESIUM: CPT

## 2023-11-28 RX ORDER — HYDROXYZINE 50 MG/1
50 TABLET, FILM COATED ORAL 3 TIMES DAILY PRN
Status: DISCONTINUED | OUTPATIENT
Start: 2023-11-28 | End: 2023-11-30 | Stop reason: HOSPADM

## 2023-11-28 RX ADMIN — MIRTAZAPINE 15 MG: 15 TABLET, FILM COATED ORAL at 20:31

## 2023-11-28 RX ADMIN — OXYCODONE HYDROCHLORIDE AND ACETAMINOPHEN 500 MG: 500 TABLET ORAL at 04:09

## 2023-11-28 RX ADMIN — SODIUM CHLORIDE 1 G: 1 TABLET ORAL at 11:27

## 2023-11-28 RX ADMIN — SERTRALINE 50 MG: 50 TABLET, FILM COATED ORAL at 04:09

## 2023-11-28 RX ADMIN — OXYCODONE HYDROCHLORIDE 10 MG: 10 TABLET ORAL at 20:31

## 2023-11-28 RX ADMIN — HYDROXYZINE HYDROCHLORIDE 25 MG: 50 TABLET, FILM COATED ORAL at 04:15

## 2023-11-28 RX ADMIN — OXYCODONE HYDROCHLORIDE 10 MG: 10 TABLET ORAL at 04:10

## 2023-11-28 RX ADMIN — SODIUM CHLORIDE 1 G: 1 TABLET ORAL at 08:03

## 2023-11-28 RX ADMIN — TAMSULOSIN HYDROCHLORIDE 0.4 MG: 0.4 CAPSULE ORAL at 08:03

## 2023-11-28 RX ADMIN — FERROUS SULFATE TAB 325 MG (65 MG ELEMENTAL FE) 325 MG: 325 (65 FE) TAB at 08:04

## 2023-11-28 RX ADMIN — Medication 220 MG: at 04:10

## 2023-11-28 RX ADMIN — THERA TABS 1 TABLET: TAB at 04:09

## 2023-11-28 RX ADMIN — OXYCODONE HYDROCHLORIDE 10 MG: 10 TABLET ORAL at 15:32

## 2023-11-28 RX ADMIN — DOCUSATE SODIUM 50 MG AND SENNOSIDES 8.6 MG 2 TABLET: 8.6; 5 TABLET, FILM COATED ORAL at 04:10

## 2023-11-28 RX ADMIN — FINASTERIDE 5 MG: 5 TABLET, FILM COATED ORAL at 04:09

## 2023-11-28 ASSESSMENT — ENCOUNTER SYMPTOMS
CARDIOVASCULAR NEGATIVE: 1
NEUROLOGICAL NEGATIVE: 1
MUSCULOSKELETAL NEGATIVE: 1
CONSTITUTIONAL NEGATIVE: 1
RESPIRATORY NEGATIVE: 1
GASTROINTESTINAL NEGATIVE: 1
EYES NEGATIVE: 1

## 2023-11-28 ASSESSMENT — PAIN DESCRIPTION - PAIN TYPE
TYPE: ACUTE PAIN

## 2023-11-28 NOTE — THERAPY
Physical Therapy Contact Note    Patient Name: Dillon Centeno  Age:  58 y.o., Sex:  male  Medical Record #: [id number]  Today's Date: 11/28/2023 11/28/23 1310   Education Group   Additional Comments Attempted to initiate PT tx this AM (~1030); however, pt reported inability to participate due to poor nights sleep and widespread uncontrolled pain. Pt requesting therapist to return later, and pt requested 1300 time. Returned; however, pt reported continued inability to participate due to back pain, wound site pain, and phantom limb pain. Discussed decline in functional mobility, and goal to regain function/attend rehab. Discussed anticipated need for participation and rehab acceptance, and recent recent refusals. Discussed factors that reduce pain (pt stated pills), and factors that increase pain (pt reported everything). Discussed benefits of mobility on wound healing, and attempted to coordinate collaboration moving forward. Pt able to prompt suggestion of therapy session in the mornings, just prior to breakfast time. Given confounding factors of recent refusals, decline in recent function between hospital admissions, and psych involvement, will attempt one more time prior to discontinuing order.

## 2023-11-28 NOTE — HOSPITAL COURSE
This is a 58-year-old male with past medical history of hypertension, dyslipidemia, PAD s/p bilateral femoral endarterectomies, left AKA who was admitted on 11/12/2023 with severe hyponatremia of 117, GELY, and severe sepsis secondary to large unstageable ischial decubitus ulcer.    Patient taken to the OR by general surgery on 11/14 for I&D and wound VAC placement x2. OR cultures grew beta-hemolytic group A strep, patient covered by empiric antibiotics.  Patient evaluated by vascular surgery with recommendations for outpatient elective bypass procedure once infection has resolved.     Patient noted to have significant urinary retention, started on Flomax and Proscar with Holguin catheter in place. Patient is to follow-up with urology outpatient.    Patient seen by psychiatry during this admission, he was started on Zoloft and Remeron.

## 2023-11-28 NOTE — ASSESSMENT & PLAN NOTE
Psychiatry consulted.  Starting sertraline as per recommendations.  Continue Atarax as needed for anxiety.

## 2023-11-28 NOTE — THERAPY
Physical Therapy Contact Note    Patient Name: Dillon Centeno  Age:  58 y.o., Sex:  male  Medical Record #: [id number]  Today's Date: 11/27/2023 11/27/23 1224   Treatment Variance   Reason For Missed Therapy Non-Medical - Patient Refused   Other Treatments   Other Treatments Provided Pt again refusing PT stating he has been OOB x 2 today which nrsg stated has not happened and that he is feeling depressed. Notified MD of pt's c/o depression which is preventing him from participating w/PT @ this time. MD did start pt on a anti-depressant and involved carlotta. PT will cont to follow   Session Information   Date / Session Number  11/21--3 (0/3, 11/28) Ref 11/24, 11/27 PTA/1   Supervising Physical Therapist (PTA Treatments Only)   Supervising Physical Therapist Betzaida Armas

## 2023-11-28 NOTE — CONSULTS
"PSYCHIATRIC FOLLOW-UP: (established)  Reason for admission:  AMS  Legal Hold Status: N/A        Chart reviewed.         HPI:          Interview challenged by irritability and limited engagement. Today reports mood is \"sleepy\", later states mood is \"fine\". Frustrated over being awoken frequently at night due to staff & noise. Sleeps \"good\" at home, averages 8hrs. Appetite adequate. Has not noticed any changes since starting zoloft, including side effects. Denies SI/HI/AVH. When [facility], he says \"no, not interested, I have my reasons\" and will not elaborate. Denies other changes, questions, needs or concerns.      Medical ROS (as pertinent):     Review of Systems   Constitutional: Negative.    HENT: Negative.     Eyes: Negative.    Respiratory: Negative.     Cardiovascular: Negative.    Gastrointestinal: Negative.    Genitourinary: Negative.    Musculoskeletal: Negative.    Skin: Negative.    Neurological: Negative.            Psychiatric Examination:  Vitals:   Vitals:    11/28/23 0728   BP: 113/78   Pulse: 96   Resp: 16   Temp: 36.4 °C (97.5 °F)   SpO2: 98%        General Appearance: Appears older than state age, appropriate grooming & hygiene, no acute distress  Behavior:    -Somewhat guarded, appropriate eye contact   -No tremors noted   -No psychomotor agitation or retardation   -No posture or gait abnormalities appreciated  Speech: Limited responses, otherwise WNL  Language: English  Thought processes: Coherent, linear, logical and goal-directed. No evidence of loose associations  Thought content: No evidence of SI/HI/AVH. Not observed responding to internal stimuli. No evidence of delusions or paranoia  Mood: \"fine\" as stated by patient  Affect: Restricted. No evidence of lability, tyson, or agitation  Judgement and Insight: Fair/Fair  Cognition:    -Alert & oriented x 3 (Person, place and time)   -Attention & concentration grossly intact   -Immediate/delayed memory not formally " tested but grossly intact   -Age-appropriate fund of knowledge      New PAST MEDICAL/PSYCH/FAMILY/SOCIAL(as reported by patient):       None      EKG:   Results for orders placed or performed during the hospital encounter of 23   EKG   Result Value Ref Range    Report       [facility]    Test Date:  2023  Pt Name:    ELI GREWAL                 Department: ER  MRN:        [id number]                      Room:        05  Gender:     Male                         Technician: [id number]  :        1965                   Requested By:ELMIRA PENN  Order #:    [id number]                    Reading MD:    Measurements  Intervals                                Axis  Rate:       115                          P:          -44  WA:         147                          QRS:        52  QRSD:       88                           T:          85  QT:         313  QTc:        433    Interpretive Statements  Sinus tachycardia  Anterior infarct, old  Compared to ECG 10/18/2023 11:19:22  Myocardial infarct finding now present  Sinus rhythm no longer present  Left-axis deviation no longer present     EKG (IP)   Result Value Ref Range    Report       [facility]    Test Date:  2023  Pt Name:    [facility]                 [facility]  MRN:        [id number]                      Room:       [facility]  Gender:     Male                         Technician: CFR  :        1965                   Requested By:SILVESTRE PORTILLO  Order #:    [id number]                    Reading MD: Carlos Manuel Charlton MD    Measurements  Intervals                                Axis  Rate:       129                          P:          6  WA:         157                          QRS:        63  QRSD:       97                           T:          89  QT:         326  QTc:        478    Interpretive Statements  Sinus tachycardia  Supraventricular bigeminy  Probable anteroseptal infarct, age  indeterminate  Compared to ECG 2023 04:24:33  Atrial premature complex(es) now present    Electronically Signed On 2023 23:58:17 PST by Carlo sManuel Charlton MD     EKG   Result Value Ref Range    Report       [facility]    Test Date:  2023  Pt Name:    ELI GREWAL                 Department: [facility]  MRN:        [id number]                      [facility]  Gender:     Male                         Technician: [facility]  :        1965                   Requested By:SILVESTRE PORTILLO  Order #:    [id number]                    Reading MD: Carlso Manuel Charlton MD    Measurements  Intervals                                Axis  Rate:       123                          P:          57  NJ:         141                          QRS:        39  QRSD:       103                          T:          89  QT:         346  QTc:        495    Interpretive Statements  Sinus tachycardia  Atrial premature complex(es)  Consider anterior infarction, age indeterminate    Electronically Signed On 2023 00:22:36 PST by Carlos Manuel Charlton MD        Brain Imagin20 CT HEAD:   IMPRESSION:     1.  Unremarkable appearance of the brain for age with mild atrophy  2.  Diffuse soft tissue gas as described above including LEFT orbital extraconal gas  EEG:  n/a     Labs personally reviewed:   Recent Results (from the past 24 hour(s))   POCT glucose device results    Collection Time: 23  1:19 PM   Result Value Ref Range    POC Glucose, Blood 94 65 - 99 mg/dL   POCT glucose device results    Collection Time: 23  5:46 PM   Result Value Ref Range    POC Glucose, Blood 150 (H) 65 - 99 mg/dL   POCT glucose device results    Collection Time: 23  7:51 PM   Result Value Ref Range    POC Glucose, Blood 135 (H) 65 - 99 mg/dL   CBC WITHOUT DIFFERENTIAL    Collection Time: 23  8:56 AM   Result Value Ref Range    WBC 10.9 (H) 4.8 - 10.8 K/uL    RBC 2.98 (L) 4.70 - 6.10 M/uL    Hemoglobin 8.6 (L) 14.0 - 18.0  g/dL    Hematocrit 26.7 (L) 42.0 - 52.0 %    MCV 89.6 81.4 - 97.8 fL    MCH 28.9 27.0 - 33.0 pg    MCHC 32.2 (L) 32.3 - 36.5 g/dL    RDW 51.8 (H) 35.9 - 50.0 fL    Platelet Count 586 (H) 164 - 446 K/uL    MPV 8.7 (L) 9.0 - 12.9 fL   Basic Metabolic Panel    Collection Time: 11/28/23  8:56 AM   Result Value Ref Range    Sodium 129 (L) 135 - 145 mmol/L    Potassium 4.4 3.6 - 5.5 mmol/L    Chloride 94 (L) 96 - 112 mmol/L    Co2 24 20 - 33 mmol/L    Glucose 115 (H) 65 - 99 mg/dL    Bun 13 8 - 22 mg/dL    Creatinine 0.50 0.50 - 1.40 mg/dL    Calcium 8.8 8.5 - 10.5 mg/dL    Anion Gap 11.0 7.0 - 16.0   HEMOGLOBIN A1C    Collection Time: 11/28/23  8:56 AM   Result Value Ref Range    Glycohemoglobin 5.1 4.0 - 5.6 %    Est Avg Glucose 100 mg/dL   MAGNESIUM    Collection Time: 11/28/23  8:56 AM   Result Value Ref Range    Magnesium 1.7 1.5 - 2.5 mg/dL   PHOSPHORUS    Collection Time: 11/28/23  8:56 AM   Result Value Ref Range    Phosphorus 3.5 2.5 - 4.5 mg/dL   ESTIMATED GFR    Collection Time: 11/28/23  8:56 AM   Result Value Ref Range    GFR (CKD-EPI) 117 >60 mL/min/1.73 m 2         Assessment:  No significant changes to presentation today, tolerating current med regimen. Remains guarded and irritable but is denying needs or concerns and is not reporting or demonstrating signs of acute safety concerns. Would benefit from talk therapy if he is willing to engage. Otherwise no changes or acute concerns.    Dx:  #Adjustment disorder with depressed and anxious mood  #PTSD      Medical :  Per primary team      Plan:  1- Legal hold: N/A  2- Psychotropic medications   Continue sertraline 50mg PO daily for mood, anxiety & PTSD   Continue mirtazapine 15mg PO QHS for insomnia & depression   Continue to recommend increasing hydroxyzine to 50mg TID PRN for acute anxiety  3- Consult has been placed for inpatient psychotherapy  4- Discussed the case with: Dr. Ramirez  5- Psychiatry will follow-up    Thank you for the consult.

## 2023-11-29 PROCEDURE — A9270 NON-COVERED ITEM OR SERVICE: HCPCS | Performed by: HOSPITALIST

## 2023-11-29 PROCEDURE — 700102 HCHG RX REV CODE 250 W/ 637 OVERRIDE(OP): Performed by: INTERNAL MEDICINE

## 2023-11-29 PROCEDURE — 700102 HCHG RX REV CODE 250 W/ 637 OVERRIDE(OP): Performed by: GENERAL PRACTICE

## 2023-11-29 PROCEDURE — 99232 SBSQ HOSP IP/OBS MODERATE 35: CPT | Mod: GC | Performed by: PSYCHIATRY & NEUROLOGY

## 2023-11-29 PROCEDURE — 302098 PASTE RING (FLAT): Performed by: GENERAL PRACTICE

## 2023-11-29 PROCEDURE — 700111 HCHG RX REV CODE 636 W/ 250 OVERRIDE (IP): Mod: JZ | Performed by: INTERNAL MEDICINE

## 2023-11-29 PROCEDURE — A9270 NON-COVERED ITEM OR SERVICE: HCPCS | Performed by: INTERNAL MEDICINE

## 2023-11-29 PROCEDURE — 770006 HCHG ROOM/CARE - MED/SURG/GYN SEMI*

## 2023-11-29 PROCEDURE — 99232 SBSQ HOSP IP/OBS MODERATE 35: CPT | Performed by: GENERAL PRACTICE

## 2023-11-29 PROCEDURE — 97535 SELF CARE MNGMENT TRAINING: CPT | Mod: CQ

## 2023-11-29 PROCEDURE — 700102 HCHG RX REV CODE 250 W/ 637 OVERRIDE(OP): Performed by: HOSPITALIST

## 2023-11-29 PROCEDURE — A9270 NON-COVERED ITEM OR SERVICE: HCPCS | Performed by: STUDENT IN AN ORGANIZED HEALTH CARE EDUCATION/TRAINING PROGRAM

## 2023-11-29 PROCEDURE — A9270 NON-COVERED ITEM OR SERVICE: HCPCS | Performed by: GENERAL PRACTICE

## 2023-11-29 PROCEDURE — 97602 WOUND(S) CARE NON-SELECTIVE: CPT

## 2023-11-29 PROCEDURE — 97605 NEG PRS WND THER DME<=50SQCM: CPT

## 2023-11-29 PROCEDURE — 700102 HCHG RX REV CODE 250 W/ 637 OVERRIDE(OP): Performed by: STUDENT IN AN ORGANIZED HEALTH CARE EDUCATION/TRAINING PROGRAM

## 2023-11-29 RX ORDER — ASCORBIC ACID 500 MG
500 TABLET ORAL 2 TIMES DAILY
Qty: 30 TABLET | Status: SHIPPED
Start: 2023-11-30

## 2023-11-29 RX ORDER — SODIUM CHLORIDE 1 G/1
1 TABLET ORAL
Qty: 90 TABLET | Refills: 3 | Status: SHIPPED
Start: 2023-11-30

## 2023-11-29 RX ORDER — MIRTAZAPINE 15 MG/1
15 TABLET, FILM COATED ORAL
Qty: 30 TABLET | Status: SHIPPED
Start: 2023-11-29

## 2023-11-29 RX ORDER — FERROUS SULFATE 325(65) MG
325 TABLET ORAL
Qty: 30 TABLET | Status: SHIPPED
Start: 2023-11-30

## 2023-11-29 RX ORDER — ZINC SULFATE 50(220)MG
220 CAPSULE ORAL DAILY
Qty: 30 CAPSULE | Refills: 3 | Status: SHIPPED
Start: 2023-11-30

## 2023-11-29 RX ORDER — HYDROXYZINE 50 MG/1
50 TABLET, FILM COATED ORAL 3 TIMES DAILY PRN
Qty: 30 TABLET | Refills: 0 | Status: SHIPPED
Start: 2023-11-29

## 2023-11-29 RX ADMIN — SODIUM CHLORIDE 1 G: 1 TABLET ORAL at 08:14

## 2023-11-29 RX ADMIN — ENOXAPARIN SODIUM 40 MG: 100 INJECTION SUBCUTANEOUS at 16:37

## 2023-11-29 RX ADMIN — OXYCODONE HYDROCHLORIDE 10 MG: 10 TABLET ORAL at 04:19

## 2023-11-29 RX ADMIN — HYDROXYZINE HYDROCHLORIDE 50 MG: 50 TABLET, FILM COATED ORAL at 04:18

## 2023-11-29 RX ADMIN — OXYCODONE HYDROCHLORIDE 10 MG: 10 TABLET ORAL at 22:50

## 2023-11-29 RX ADMIN — THERA TABS 1 TABLET: TAB at 04:18

## 2023-11-29 RX ADMIN — OXYCODONE HYDROCHLORIDE 10 MG: 10 TABLET ORAL at 13:49

## 2023-11-29 RX ADMIN — TAMSULOSIN HYDROCHLORIDE 0.4 MG: 0.4 CAPSULE ORAL at 08:14

## 2023-11-29 RX ADMIN — SODIUM CHLORIDE 1 G: 1 TABLET ORAL at 16:38

## 2023-11-29 RX ADMIN — FINASTERIDE 5 MG: 5 TABLET, FILM COATED ORAL at 04:19

## 2023-11-29 RX ADMIN — MIRTAZAPINE 15 MG: 15 TABLET, FILM COATED ORAL at 20:47

## 2023-11-29 RX ADMIN — DOCUSATE SODIUM 50 MG AND SENNOSIDES 8.6 MG 2 TABLET: 8.6; 5 TABLET, FILM COATED ORAL at 16:38

## 2023-11-29 RX ADMIN — SERTRALINE 50 MG: 50 TABLET, FILM COATED ORAL at 04:18

## 2023-11-29 RX ADMIN — Medication 220 MG: at 04:19

## 2023-11-29 RX ADMIN — DOCUSATE SODIUM 50 MG AND SENNOSIDES 8.6 MG 2 TABLET: 8.6; 5 TABLET, FILM COATED ORAL at 04:18

## 2023-11-29 RX ADMIN — OXYCODONE HYDROCHLORIDE AND ACETAMINOPHEN 500 MG: 500 TABLET ORAL at 04:19

## 2023-11-29 RX ADMIN — HYDROXYZINE HYDROCHLORIDE 50 MG: 50 TABLET, FILM COATED ORAL at 16:38

## 2023-11-29 RX ADMIN — OXYCODONE HYDROCHLORIDE 10 MG: 10 TABLET ORAL at 18:47

## 2023-11-29 RX ADMIN — FERROUS SULFATE TAB 325 MG (65 MG ELEMENTAL FE) 325 MG: 325 (65 FE) TAB at 08:14

## 2023-11-29 RX ADMIN — OXYCODONE HYDROCHLORIDE 10 MG: 10 TABLET ORAL at 08:13

## 2023-11-29 RX ADMIN — OXYCODONE HYDROCHLORIDE AND ACETAMINOPHEN 500 MG: 500 TABLET ORAL at 16:38

## 2023-11-29 RX ADMIN — SODIUM CHLORIDE 1 G: 1 TABLET ORAL at 13:47

## 2023-11-29 ASSESSMENT — ENCOUNTER SYMPTOMS
EYES NEGATIVE: 1
BACK PAIN: 1
GASTROINTESTINAL NEGATIVE: 1
NEUROLOGICAL NEGATIVE: 1
CARDIOVASCULAR NEGATIVE: 1
RESPIRATORY NEGATIVE: 1
CONSTITUTIONAL NEGATIVE: 1

## 2023-11-29 ASSESSMENT — PAIN DESCRIPTION - PAIN TYPE
TYPE: ACUTE PAIN
TYPE: ACUTE PAIN
TYPE: ACUTE PAIN;CHRONIC PAIN

## 2023-11-29 NOTE — DISCHARGE PLANNING
Case Management Discharge Planning    Admission Date: 11/12/2023  GMLOS: 9.9  ALOS: 17    6-Clicks ADL Score: 18  6-Clicks Mobility Score: 7  PT and/or OT Eval ordered: Yes  Post-acute Referrals Ordered: Yes  Post-acute Choice Obtained: No  Has referral(s) been sent to post-acute provider:  Yes      Anticipated Discharge Dispo: Discharge Disposition: D/T to Medicare cert LTCH w/planned hosp IP readmit (91)        Action(s) Taken:   Request to [facility] to please send referrals to [place] [facility].  Stage IV sacral PI with wound vac  Left groin wound with wound vac  Left AKA      Medically Clear: Yes    Next Steps:   Follow up on referrals.    Barriers to Discharge: Pending Placement

## 2023-11-29 NOTE — PROGRESS NOTES
[facility] Medicine Daily Progress Note    Date of Service  11/28/2023    Chief Complaint  Dillon Centeno is a 58 y.o. male admitted 11/12/2023 with sacral decubitus ulcer    Hospital Course  This is a 58-year-old male with past medical history of hypertension, dyslipidemia, PAD s/p bilateral femoral endarterectomies, left AKA who was admitted on 11/12/2023 with severe hyponatremia of 117, GLEY, and severe sepsis secondary to large unstageable ischial decubitus ulcer.    Patient taken to the OR by general surgery on 11/14 for I&D and wound VAC placement. OR cultures grew beta-hemolytic group A strep, patient covered by empiric antibiotics.  Patient evaluated by vascular surgery with recommendations for outpatient elective bypass procedure once infection has resolved.     Patient noted to have significant urinary retention, started on Flomax and Proscar with Holguin catheter in place. Patient is to follow-up with urology outpatient.    Interval Problem Update  Patient seen by psychiatry, started on Zoloft yesterday, increase Atarax.    Patient had wound VAC changed yesterday, will have wound VAC exchange tomorrow, will be present with wound team to evaluate wounds.  Reviewed photos from 11/24 wound VAC changes, wound appears to be healing well.    Difficult discharge, complex discharge team following.  No SNF or LTAC accepting.    I have discussed this patient's plan of care and discharge plan at IDT rounds today with Case Management, Nursing, Nursing leadership, and other members of the IDT team.    Consultants/Specialty  general surgery, infectious disease, nephrology, orthopedics, palliative care, psychiatry, and vascular surgery    Code Status  Full Code    Disposition  The patient is medically cleared for discharge to home or a post-acute facility.  Anticipate discharge to: skilled nursing facility    I have placed the appropriate orders for post-discharge needs.    Review of Systems  Review of Systems   All  other systems reviewed and are negative.       Physical Exam  Temp:  [36.3 °C (97.4 °F)-36.7 °C (98.1 °F)] 36.3 °C (97.4 °F)  Pulse:  [] 95  Resp:  [16-18] 16  BP: (112-126)/(72-78) 112/78  SpO2:  [92 %-98 %] 96 %    Physical Exam  Vitals and nursing note reviewed.   Constitutional:       General: He is not in acute distress.     Appearance: He is ill-appearing.   HENT:      Head: Normocephalic and atraumatic.   Eyes:      Extraocular Movements: Extraocular movements intact.      Conjunctiva/sclera: Conjunctivae normal.      Pupils: Pupils are equal, round, and reactive to light.   Cardiovascular:      Rate and Rhythm: Normal rate and regular rhythm.      Pulses: Normal pulses.      Heart sounds: No murmur heard.     No friction rub. No gallop.   Pulmonary:      Effort: Pulmonary effort is normal. No respiratory distress.      Breath sounds: Normal breath sounds. No wheezing, rhonchi or rales.   Abdominal:      General: Bowel sounds are normal. There is no distension.      Palpations: Abdomen is soft.      Tenderness: There is no abdominal tenderness.   Musculoskeletal:         General: No swelling or tenderness. Normal range of motion.      Cervical back: Normal range of motion and neck supple. No muscular tenderness.      Right lower leg: No edema.      Left lower leg: No edema.      Comments: Left AKA  Sacral wound with bone exposed, wound VAC in place.  Left proximal anterior thigh, with wound VAC in place.   Skin:     General: Skin is warm and dry.      Capillary Refill: Capillary refill takes less than 2 seconds.      Findings: No bruising, erythema or rash.   Neurological:      General: No focal deficit present.      Mental Status: He is alert and oriented to person, place, and time.       Fluids    Intake/Output Summary (Last 24 hours) at 11/28/2023 1714  Last data filed at 11/28/2023 0839  Gross per 24 hour   Intake 240 ml   Output 3400 ml   Net -3160 ml       Laboratory  Recent Labs      11/28/23  0856   WBC 10.9*   RBC 2.98*   HEMOGLOBIN 8.6*   HEMATOCRIT 26.7*   MCV 89.6   MCH 28.9   MCHC 32.2*   RDW 51.8*   PLATELETCT 586*   MPV 8.7*     Recent Labs     11/28/23  0856   SODIUM 129*   POTASSIUM 4.4   CHLORIDE 94*   CO2 24   GLUCOSE 115*   BUN 13   CREATININE 0.50   CALCIUM 8.8                   Imaging  US-RENAL   Final Result      1.  Normal renal ultrasound.      US-VEIN MAPPING LOWER EXTREMITY UNILAT RIGHT   Final Result      CT-ABDOMEN-PELVIS WITH   Final Result         1. There is a 2 cm ulcerative fraying in the left medial gluteus region. Subjacent iliac bone demonstrates bony ridging, consistent with osteomyelitis. There is small amount of air tracking medially and laterally away from the ulcerative wound along the    left medial gluteal musculature and down to the level of the left paraspinous musculature. Therefore, early necrotizing fasciitis possible. No drainable abscess present. This was discussed with the ordering physician.   2. Severely distended bladder with mild to moderate bilateral associated hydronephrosis. This is likely secondary to prostatic hypertrophy. There is severe median lobe hypertrophy. Consider Holguin catheter placement for decompression. Bladder diverticulum    also noted.   3. Patchy ground glass to aeration lower lobes, suspicious for pneumonia. However, atypical edema or other pneumonitis possible.   4. Severe atherosclerotic disease.   5. Additional soft tissue wound in the left groin.      DX-FEMUR-2+ LEFT   Final Result      No evidence of infection by plain film.      DX-CHEST-PORTABLE (1 VIEW)   Final Result      No acute process.           Assessment/Plan  * Infected sacral wound- (present on admission)  Assessment & Plan  - Large sacral/ischial wound with signs of active infection and CT scan concerning for pelvic osteomyelitis and skin and soft tissue infection.  -s/p I&D of the sacral wound on 11/14.  -Surgical cultures grew beta-hemolytic group A  streptococcus along with mixed enteric and usual skin kris.  -Continue oral Augmentin to complete 7 days course of antibiotics  -Continue local wound care.  Wound VAC has been placed.   -Continue pain control with oral oxycodone and IV Dilaudid.  -PT/OT recommending SNF placement.  He was sent home on last admission, but did not do well with worsening sacral wound.  At this time may need to pursue placement, although it has been a difficult discharge on his last admission with no accepting SNF or home health services.  May need to look into group home placement.  CM/SW for discharge planning.    Severe sepsis ([facility])- (present on admission)  Assessment & Plan  -Due to infected sacral wound.  Resolved.  Management as above.    PAD (peripheral artery disease) ([facility])- (present on admission)  Assessment & Plan  -With recent history of bypass grafting  -Vascular surgery is anticipating need for outpatient elective bypass procedure on the right leg once acute infection has resolved.    Adjustment disorder with depressed mood  Assessment & Plan  Psychiatry consulted.  Starting sertraline as per recommendations.  Continue Atarax as needed for anxiety.    Anxiety- (present on admission)  Assessment & Plan  - Continue as needed Atarax.    Insomnia- (present on admission)  Assessment & Plan  -Continue trazodone at night.  Continue mirtazapine 50 mg at nighttime.    Open wound of inguinal region- (present on admission)  Assessment & Plan  -Recent history of femoral endarterectomy with incision dehiscence, open wound  -Vascular surgery following  -Continue wound care    GELY (acute kidney injury) ([facility])- (present on admission)  Assessment & Plan  - Suspect postrenal component, just like before when he required Gonzales catheter, along with prerenal with rising BUN.   -Place Gonzales catheter.  Resume Flomax and Proscar.  Improved with gonzales and IV fluids  Follow up with urology as outpatient, keep gonzales in place  -Continue to monitor  renal function closely.  Monitor electrolytes as at risk for electrolyte derangements with GELY.  - avoid nephrotoxins, and continue to renally dose all medications.    Anemia- (present on admission)  Assessment & Plan  -Patient had normal hemoglobin and March of this year, but has been anemic since approximately September  -Degree of anemia has been essentially stable  -Likely multifactorial related to nutritional status, and multiple procedures    Hyponatremia- (present on admission)  Assessment & Plan  -Following resuscitation, had very high UOP concerning for low solutes vs beer potomania.   -Sodium levels improved, although initially overcorrected required D5, but has since stabilized with appropriate rate of correction.         VTE prophylaxis: Lovenox    I have performed a physical exam and reviewed and updated ROS and Plan today (11/28/2023). In review of yesterday's note (11/27/2023), there are no changes except as documented above.

## 2023-11-29 NOTE — CARE PLAN
The patient is Stable - Low risk of patient condition declining or worsening    Shift Goals  Clinical Goals: Patient's pain will be <5 throughout shift  Patient Goals: Sleep and rest  Family Goals: LUTHER    Progress made toward(s) clinical / shift goals:      Patient is alert and oriented x4; able to verbalize needs. Patient's pain is controlled with PRN pain medication and rest. Patient refuses to be repositioned. Patient's woundvac in place on sacrum and left groin on 125mmHg pressure. Bed alarm is on, call light within reach.

## 2023-11-29 NOTE — DISCHARGE PLANNING
DPA faxed referral to the following LTAC hospitals in [place], [place]  [facility]

## 2023-11-29 NOTE — THERAPY
"Physical Therapy Contact Note    Patient Name: Dillon Centeno  Age:  58 y.o., Sex:  male  Medical Record #: [id number]  Today's Date: 11/29/2023 11/29/23 105   Other Treatments   Other Treatments Provided Met w/pt regarding whether he wants to cont to participate w/PT. The pt does not want PT @ this time, he conts to struggle w/his depressed mood. The pt has been recently started on new meds for mood, he wants to hold off on PT until his meds are \"kicked in\". Educated pt on the importance of @ least getting to the EOB to prevent further deconditioning. The pt agrees and is pleased w/the current plan. PT will dc pt @ this time, he has refused PT the last 4 attempts.Please re-order when the pt is willing to participate.   Physical Therapy Treatment Plan   Physical Therapy Treatment Plan Modify Current Treatment Plan   Reason For Discharge Discharge Secondary to Patient Non Compliance   Anticipated Discharge Equipment and Recommendations   DC Equipment Recommendations Unable to determine at this time   Discharge Recommendations Recommend post-acute placement for additional physical therapy services prior to discharge home   Interdisciplinary Plan of Care Collaboration   IDT Collaboration with  Nursing   Collaboration Comments Nrsg notified that the pt is dc'd from PT services   Session Information   Date / Session Number  Dc'd--11/29  (PT notified pt dc'd from PT services)   Supervising Physical Therapist (PTA Treatments Only)   Supervising Physical Therapist Betzaida Armas       "

## 2023-11-29 NOTE — CONSULTS
"PSYCHIATRIC FOLLOW-UP: (established)  Reason for admission:  AMS  Legal Hold Status: N/A        Chart reviewed.              *HPI:            No acute events overnight.  Per PT report, patient has not been thoroughly engaging in physical therapy.  On interview with patient, reported he is only sleeping several hours a night struggling with sleep initiation and maintenance.  Patient reports his goals are to return home and to his daily life of managing chores.  Patient endorsed willingness to engage with physical therapy despite being painful to do so in an effort to expedite his return home.  Patient denies suicidal ideation AVH.  However he endorses frustration with his continued medical issues.  Does not wish to discuss depression    Denies adverse side effects to medications at this time    Medical ROS (as pertinent):     Review of Systems   Constitutional: Negative.    HENT: Negative.     Eyes: Negative.    Respiratory: Negative.     Cardiovascular: Negative.    Gastrointestinal: Negative.    Genitourinary: Negative.    Musculoskeletal:  Positive for back pain and joint pain.   Skin: Negative.    Neurological: Negative.    Endo/Heme/Allergies: Negative.            *Psychiatric Examination:  Vitals:   Vitals:    11/29/23 0710   BP: 125/81   Pulse: 93   Resp: 16   Temp: 36.3 °C (97.4 °F)   SpO2: 96%       General:   - Grooming and hygiene: Appropriate hygiene and grooming  - Apparent distress: NAD   - Behavior: Calm and appropriate  - Eye Contact: Within normal limits  - no psychomotor agitation or retardation  - Participation: Active verbal participation  Orientation: Grossly oriented to person, place and time  Mood: \"Well, I am still here\"  Affect:, Mildly agitated, dysthymic  Thought Process: Mostly linear, not always logical  Thought Content: Denies suicidal or homicidal ideations, intent or plan.  No evidence of paranoia  Perception: Denies auditory or visual hallucinations. No delusions noted   Attention " span and concentration: Intact   Speech: Regular rate, volume and prosody  Language: Appropriate   Insight: Limited  Judgment: Good  Recent and remote memory: Grossly intact    Current Facility-Administered Medications   Medication Dose Route Frequency Provider Last Rate Last Admin    hydrOXYzine HCl (Atarax) tablet 50 mg  50 mg Oral TID PRN Mona Mcgill D.O.   50 mg at 11/29/23 0418    sertraline (Zoloft) tablet 50 mg  50 mg Oral DAILY Buzz Sotelo M.D.   50 mg at 11/29/23 0418    mirtazapine (Remeron) tablet 15 mg  15 mg Oral QHS Buzz Sotelo M.D.   15 mg at 11/28/23 2031    finasteride (Proscar) tablet 5 mg  5 mg Oral DAILY Darrel Chahal M.D.   5 mg at 11/29/23 0419    tamsulosin (Flomax) capsule 0.4 mg  0.4 mg Oral AFTER BREAKFAST Darrel Chahal M.D.   0.4 mg at 11/29/23 0814    dextrose 10 % BOLUS 25 g  25 g Intravenous Q15 MIN PRN Darrel Chahal M.D.        sodium chloride (Salt) tablet 1 g  1 g Oral TID WITH MEALS Darrel Chahal M.D.   1 g at 11/29/23 0814    oxyCODONE immediate-release (Roxicodone) tablet 5 mg  5 mg Oral Q4HRS PRN Darrel Chahal M.D.   5 mg at 11/19/23 1153    Or    oxyCODONE immediate release (Roxicodone) tablet 10 mg  10 mg Oral Q4HRS PRN Darrel Chahal M.D.   10 mg at 11/29/23 0813    ferrous sulfate tablet 325 mg  325 mg Oral QDAY with Breakfast Ramos Titus D.O.   325 mg at 11/29/23 0814    enoxaparin (Lovenox) inj 40 mg  40 mg Subcutaneous DAILY AT 1800 Monique Nettles M.D.   40 mg at 11/23/23 1624    multivitamin tablet 1 Tablet  1 Tablet Oral DAILY Cirilo Cerrato M.D.   1 Tablet at 11/29/23 0418    ascorbic acid (Vitamin C) tablet 500 mg  500 mg Oral BID Cirilo Cerrato M.D.   500 mg at 11/29/23 0419    zinc sulfate (Zincate) capsule 220 mg  220 mg Oral DAILY Cirilo Cerrato M.D.   220 mg at 11/29/23 0419    senna-docusate (Pericolace Or Senokot S) 8.6-50 MG per tablet 2 Tablet  2 Tablet Oral BID Thom Carr Jr., D.O.   2 Tablet at 11/29/23  0418    And    polyethylene glycol/lytes (Miralax) PACKET 1 Packet  1 Packet Oral QDAY PRN Thom Carr Jr., D.O.        And    magnesium hydroxide (Milk Of Magnesia) suspension 30 mL  30 mL Oral QDAY PRN Thom Carr Jr., D.O.        And    bisacodyl (Dulcolax) suppository 10 mg  10 mg Rectal QDAY PRN Thom Carr Jr., D.O.        HYDROmorphone (Dilaudid) injection 0.5 mg  0.5 mg Intravenous Q2HRS PRN Darrel Chahal M.D.   0.5 mg at 11/27/23 1113    dakins 0.125% (1/4 strength) topical soln   Topical QDAY PRN Carlos Manuel Sterling M.D.   1,000 mL at 11/25/23 2004     No results found for this or any previous visit (from the past 24 hour(s)).                       Assessment:  No significant psychiatric changes.  Patient continues to experience difficulties with sleep which may be exacerbating his overall irritability and compliance with recommended medical management.  Patient is taking mirtazapine for sleep.  It has been tends to have paradoxical reaction and the more sedating at lower dosages such as 15 mg.  Thus, do not recommend increasing at this time.  Alternatively, may utilize hydroxyzine at night in addition to mirtazapine to help with sedation and sleep if patient continues to have insomnia.          Dx:  #Adjustment disorder with depressed and anxious mood  #PTSD        Medical :  Per primary team        Plan:  Legal hold: N/A  Continue physical therapy, patient agrees to cooperate with recommended care  Psychotropic medications  Continue sertraline 50mg PO daily for mood, anxiety & PTSD.  Continue mirtazapine 15mg PO QHS for insomnia & depression  Continue hydroxyzine 50 mg 3 times daily as needed for acute anxiety.  May utilize nightly as needed for sleep  Discussed the case with: Dr. Ramirez  Psychiatry will follow-up             This note was created using voice recognition software (Dragon). The accuracy of the dictation is limited by the abilities of the software. I have reviewed the note prior  to signing. However, error related to voice recognition software and /or scribes may still exist and should be interpreted within the appropriate context.

## 2023-11-29 NOTE — CARE PLAN
The patient is Stable - Low risk of patient condition declining or worsening    Shift Goals  Clinical Goals: safety  Patient Goals: rest, pain management  Family Goals: LUTHER    Progress made toward(s) clinical / shift goals:  NAD today, fair appetite, wound vac c,d,I.     Patient is not progressing towards the following goals:      Problem: Knowledge Deficit - Standard  Goal: Patient and family/care givers will demonstrate understanding of plan of care, disease process/condition, diagnostic tests and medications  Outcome: Progressing     Problem: Fluid Volume  Goal: Fluid volume balance will be maintained  Outcome: Progressing     Problem: Pain - Standard  Goal: Alleviation of pain or a reduction in pain to the patient’s comfort goal  Outcome: Progressing     Problem: Fall Risk  Goal: Patient will remain free from falls  Outcome: Progressing

## 2023-11-29 NOTE — CARE PLAN
The patient is Stable - Low risk of patient condition declining or worsening    Shift Goals  Clinical Goals: pain control; participate in cares  Patient Goals: sleep; drink coffee  Family Goals: LUTHER    Progress made toward(s) clinical / shift goals:  Educated patient on pain rating scales, frequent pain assessments in place, medicating per MAR, non pharmaceutical pain relief such as heat pads and positioning in use.      Q2 turns in place, SRAVAN bed used, mepelex over bony prominences, and skin protectant in use        Patient is not progressing towards the following goals:

## 2023-11-29 NOTE — PROGRESS NOTES
Hospital Medicine Daily Progress Note    Date of Service  11/29/2023    Chief Complaint  Dillon Centeno is a 58 y.o. male admitted 11/12/2023 with sacral decubitus ulcer    Hospital Course  This is a 58-year-old male with past medical history of hypertension, dyslipidemia, PAD s/p bilateral femoral endarterectomies, left AKA who was admitted on 11/12/2023 with severe hyponatremia of 117, GELY, and severe sepsis secondary to large unstageable ischial decubitus ulcer.    Patient taken to the OR by general surgery on 11/14 for I&D and wound VAC placement. OR cultures grew beta-hemolytic group A strep, patient covered by empiric antibiotics.  Patient evaluated by vascular surgery with recommendations for outpatient elective bypass procedure once infection has resolved.     Patient noted to have significant urinary retention, started on Flomax and Proscar with Holguin catheter in place. Patient is to follow-up with urology outpatient.    Interval Problem Update  Patient continues requiring to vera flow for his wounds.  Continue wound care.    Difficult discharge, complex discharge team following.  No SNF or LTAC accepting.    I have discussed this patient's plan of care and discharge plan at IDT rounds today with Case Management, Nursing, Nursing leadership, and other members of the IDT team.    Consultants/Specialty  general surgery, infectious disease, nephrology, orthopedics, palliative care, psychiatry, and vascular surgery    Code Status  Full Code    Disposition  The patient is medically cleared for discharge to home or a post-acute facility.  Anticipate discharge to: skilled nursing facility    I have placed the appropriate orders for post-discharge needs.    Review of Systems  Review of Systems   All other systems reviewed and are negative.       Physical Exam  Temp:  [36.3 °C (97.4 °F)-36.6 °C (97.8 °F)] 36.3 °C (97.4 °F)  Pulse:  [] 93  Resp:  [16-18] 16  BP: ()/(74-81) 125/81  SpO2:  [95 %-97 %]  96 %    Physical Exam  Vitals and nursing note reviewed.   Constitutional:       General: He is not in acute distress.     Appearance: He is ill-appearing.   HENT:      Head: Normocephalic and atraumatic.   Eyes:      Extraocular Movements: Extraocular movements intact.      Conjunctiva/sclera: Conjunctivae normal.      Pupils: Pupils are equal, round, and reactive to light.   Cardiovascular:      Rate and Rhythm: Normal rate and regular rhythm.      Pulses: Normal pulses.      Heart sounds: No murmur heard.     No friction rub. No gallop.   Pulmonary:      Effort: Pulmonary effort is normal. No respiratory distress.      Breath sounds: Normal breath sounds. No wheezing, rhonchi or rales.   Abdominal:      General: Bowel sounds are normal. There is no distension.      Palpations: Abdomen is soft.      Tenderness: There is no abdominal tenderness.   Musculoskeletal:         General: No swelling or tenderness. Normal range of motion.      Cervical back: Normal range of motion and neck supple. No muscular tenderness.      Right lower leg: No edema.      Left lower leg: No edema.      Comments: Left AKA  Sacral wound with bone exposed, wound VAC in place.  Left proximal anterior thigh, with wound VAC in place.   Skin:     General: Skin is warm and dry.      Capillary Refill: Capillary refill takes less than 2 seconds.      Findings: No bruising, erythema or rash.   Neurological:      General: No focal deficit present.      Mental Status: He is alert and oriented to person, place, and time.       Fluids    Intake/Output Summary (Last 24 hours) at 11/29/2023 1249  Last data filed at 11/29/2023 1000  Gross per 24 hour   Intake 1610 ml   Output 3600 ml   Net -1990 ml       Laboratory  Recent Labs     11/28/23  0856   WBC 10.9*   RBC 2.98*   HEMOGLOBIN 8.6*   HEMATOCRIT 26.7*   MCV 89.6   MCH 28.9   MCHC 32.2*   RDW 51.8*   PLATELETCT 586*   MPV 8.7*     Recent Labs     11/28/23  0856   SODIUM 129*   POTASSIUM 4.4   CHLORIDE  94*   CO2 24   GLUCOSE 115*   BUN 13   CREATININE 0.50   CALCIUM 8.8                   Imaging  US-RENAL   Final Result      1.  Normal renal ultrasound.      US-VEIN MAPPING LOWER EXTREMITY UNILAT RIGHT   Final Result      CT-ABDOMEN-PELVIS WITH   Final Result         1. There is a 2 cm ulcerative fraying in the left medial gluteus region. Subjacent iliac bone demonstrates bony ridging, consistent with osteomyelitis. There is small amount of air tracking medially and laterally away from the ulcerative wound along the    left medial gluteal musculature and down to the level of the left paraspinous musculature. Therefore, early necrotizing fasciitis possible. No drainable abscess present. This was discussed with the ordering physician.   2. Severely distended bladder with mild to moderate bilateral associated hydronephrosis. This is likely secondary to prostatic hypertrophy. There is severe median lobe hypertrophy. Consider Holguin catheter placement for decompression. Bladder diverticulum    also noted.   3. Patchy ground glass to aeration lower lobes, suspicious for pneumonia. However, atypical edema or other pneumonitis possible.   4. Severe atherosclerotic disease.   5. Additional soft tissue wound in the left groin.      DX-FEMUR-2+ LEFT   Final Result      No evidence of infection by plain film.      DX-CHEST-PORTABLE (1 VIEW)   Final Result      No acute process.           Assessment/Plan  * Infected sacral wound- (present on admission)  Assessment & Plan  - Large sacral/ischial wound with signs of active infection and CT scan concerning for pelvic osteomyelitis and skin and soft tissue infection.  -s/p I&D of the sacral wound on 11/14.  -Surgical cultures grew beta-hemolytic group A streptococcus along with mixed enteric and usual skin kris.  -Continue oral Augmentin to complete 7 days course of antibiotics  -Continue local wound care.  Wound VAC has been placed.   -Continue pain control with oral oxycodone  and IV Dilaudid.  -PT/OT recommending SNF placement.  He was sent home on last admission, but did not do well with worsening sacral wound.  At this time may need to pursue placement, although it has been a difficult discharge on his last admission with no accepting SNF or home health services.  May need to look into group home placement.  CM/SW for discharge planning.    Severe sepsis (HCC)- (present on admission)  Assessment & Plan  -Due to infected sacral wound.  Resolved.  Management as above.    PAD (peripheral artery disease) ([facility])- (present on admission)  Assessment & Plan  -With recent history of bypass grafting  -Vascular surgery is anticipating need for outpatient elective bypass procedure on the right leg once acute infection has resolved.    Adjustment disorder with depressed mood  Assessment & Plan  Psychiatry consulted.  Starting sertraline as per recommendations.  Continue Atarax as needed for anxiety.    Anxiety- (present on admission)  Assessment & Plan  - Continue as needed Atarax.    Insomnia- (present on admission)  Assessment & Plan  -Continue trazodone at night.  Continue mirtazapine 50 mg at nighttime.    Open wound of inguinal region- (present on admission)  Assessment & Plan  -Recent history of femoral endarterectomy with incision dehiscence, open wound  -Vascular surgery following  -Continue wound care    GELY (acute kidney injury) ([facility])- (present on admission)  Assessment & Plan  - Suspect postrenal component, just like before when he required Gonzales catheter, along with prerenal with rising BUN.   -Place Gonzales catheter.  Resume Flomax and Proscar.  Improved with gonzales and IV fluids  Follow up with urology as outpatient, keep gonzales in place  -Continue to monitor renal function closely.  Monitor electrolytes as at risk for electrolyte derangements with GELY.  - avoid nephrotoxins, and continue to renally dose all medications.    Anemia- (present on admission)  Assessment & Plan  -Patient had  normal hemoglobin and March of this year, but has been anemic since approximately September  -Degree of anemia has been essentially stable  -Likely multifactorial related to nutritional status, and multiple procedures    Hyponatremia- (present on admission)  Assessment & Plan  -Following resuscitation, had very high UOP concerning for low solutes vs beer potomania.   -Sodium levels improved, although initially overcorrected required D5, but has since stabilized with appropriate rate of correction.         VTE prophylaxis: Lovenox    I have performed a physical exam and reviewed and updated ROS and Plan today (11/29/2023). In review of yesterday's note (11/28/2023), there are no changes except as documented above.

## 2023-11-29 NOTE — DISCHARGE PLANNING
Case Management Discharge Planning    Admission Date: 11/12/2023  GMLOS: 9.9  ALOS: 17    6-Clicks ADL Score: 18  6-Clicks Mobility Score: 7  PT and/or OT Eval ordered: Yes  Post-acute Referrals Ordered: Yes  Post-acute Choice Obtained: No  Has referral(s) been sent to post-acute provider:  Yes      Anticipated Discharge Dispo: Discharge Disposition: D/T to SNF with Medicare cert in anticipation of skilled care (03)    DME Needed: wound vac x 2, wound vac supplies    Action(s) Taken:   Informed by Director of Post-Acute Service, Ana Luisa Griffith that patient is accepted at [facility].  She is coordinating wound vac supplies.  Referral sent via epic.      Medically Clear: Yes    Next Steps:   Follow up with [facility] in a.m.  Follow up with Ana Luisa Griffith.  Follow up with patient.  Coordinate transportation.    Barriers to Discharge: Pending Placement

## 2023-11-30 VITALS
RESPIRATION RATE: 17 BRPM | DIASTOLIC BLOOD PRESSURE: 82 MMHG | HEART RATE: 87 BPM | WEIGHT: 133.38 LBS | OXYGEN SATURATION: 96 % | HEIGHT: 71 IN | SYSTOLIC BLOOD PRESSURE: 134 MMHG | TEMPERATURE: 98.5 F | BODY MASS INDEX: 18.67 KG/M2

## 2023-11-30 LAB
ANION GAP SERPL CALC-SCNC: 9 MMOL/L (ref 7–16)
BUN SERPL-MCNC: 10 MG/DL (ref 8–22)
CALCIUM SERPL-MCNC: 8.9 MG/DL (ref 8.5–10.5)
CHLORIDE SERPL-SCNC: 95 MMOL/L (ref 96–112)
CO2 SERPL-SCNC: 25 MMOL/L (ref 20–33)
CREAT SERPL-MCNC: 0.41 MG/DL (ref 0.5–1.4)
GFR SERPLBLD CREATININE-BSD FMLA CKD-EPI: 124 ML/MIN/1.73 M 2
GLUCOSE SERPL-MCNC: 100 MG/DL (ref 65–99)
POTASSIUM SERPL-SCNC: 4.6 MMOL/L (ref 3.6–5.5)
SODIUM SERPL-SCNC: 129 MMOL/L (ref 135–145)

## 2023-11-30 PROCEDURE — 700102 HCHG RX REV CODE 250 W/ 637 OVERRIDE(OP): Performed by: STUDENT IN AN ORGANIZED HEALTH CARE EDUCATION/TRAINING PROGRAM

## 2023-11-30 PROCEDURE — 99239 HOSP IP/OBS DSCHRG MGMT >30: CPT | Performed by: GENERAL PRACTICE

## 2023-11-30 PROCEDURE — 90832 PSYTX W PT 30 MINUTES: CPT | Performed by: SOCIAL WORKER

## 2023-11-30 PROCEDURE — 80048 BASIC METABOLIC PNL TOTAL CA: CPT

## 2023-11-30 PROCEDURE — 700102 HCHG RX REV CODE 250 W/ 637 OVERRIDE(OP): Performed by: INTERNAL MEDICINE

## 2023-11-30 PROCEDURE — A9270 NON-COVERED ITEM OR SERVICE: HCPCS | Performed by: HOSPITALIST

## 2023-11-30 PROCEDURE — 700102 HCHG RX REV CODE 250 W/ 637 OVERRIDE(OP): Performed by: HOSPITALIST

## 2023-11-30 PROCEDURE — A9270 NON-COVERED ITEM OR SERVICE: HCPCS | Performed by: GENERAL PRACTICE

## 2023-11-30 PROCEDURE — 36415 COLL VENOUS BLD VENIPUNCTURE: CPT

## 2023-11-30 PROCEDURE — 700111 HCHG RX REV CODE 636 W/ 250 OVERRIDE (IP): Performed by: INTERNAL MEDICINE

## 2023-11-30 PROCEDURE — 700102 HCHG RX REV CODE 250 W/ 637 OVERRIDE(OP): Performed by: GENERAL PRACTICE

## 2023-11-30 PROCEDURE — A9270 NON-COVERED ITEM OR SERVICE: HCPCS | Performed by: STUDENT IN AN ORGANIZED HEALTH CARE EDUCATION/TRAINING PROGRAM

## 2023-11-30 PROCEDURE — A9270 NON-COVERED ITEM OR SERVICE: HCPCS | Performed by: INTERNAL MEDICINE

## 2023-11-30 RX ORDER — OXYCODONE HYDROCHLORIDE 10 MG/1
10 TABLET ORAL EVERY 8 HOURS PRN
Qty: 9 TABLET | Refills: 0 | Status: SHIPPED | OUTPATIENT
Start: 2023-11-30 | End: 2023-12-03

## 2023-11-30 RX ADMIN — OXYCODONE HYDROCHLORIDE AND ACETAMINOPHEN 500 MG: 500 TABLET ORAL at 04:43

## 2023-11-30 RX ADMIN — SODIUM CHLORIDE 1 G: 1 TABLET ORAL at 11:22

## 2023-11-30 RX ADMIN — THERA TABS 1 TABLET: TAB at 04:43

## 2023-11-30 RX ADMIN — TAMSULOSIN HYDROCHLORIDE 0.4 MG: 0.4 CAPSULE ORAL at 07:52

## 2023-11-30 RX ADMIN — Medication 220 MG: at 04:43

## 2023-11-30 RX ADMIN — HYDROMORPHONE HYDROCHLORIDE 0.5 MG: 1 INJECTION, SOLUTION INTRAMUSCULAR; INTRAVENOUS; SUBCUTANEOUS at 07:52

## 2023-11-30 RX ADMIN — SERTRALINE 50 MG: 50 TABLET, FILM COATED ORAL at 04:43

## 2023-11-30 RX ADMIN — HYDROXYZINE HYDROCHLORIDE 50 MG: 50 TABLET, FILM COATED ORAL at 04:53

## 2023-11-30 RX ADMIN — FERROUS SULFATE TAB 325 MG (65 MG ELEMENTAL FE) 325 MG: 325 (65 FE) TAB at 07:52

## 2023-11-30 RX ADMIN — SODIUM CHLORIDE 1 G: 1 TABLET ORAL at 07:52

## 2023-11-30 RX ADMIN — OXYCODONE HYDROCHLORIDE 10 MG: 10 TABLET ORAL at 11:22

## 2023-11-30 RX ADMIN — FINASTERIDE 5 MG: 5 TABLET, FILM COATED ORAL at 04:43

## 2023-11-30 RX ADMIN — OXYCODONE HYDROCHLORIDE 10 MG: 10 TABLET ORAL at 04:43

## 2023-11-30 ASSESSMENT — PAIN DESCRIPTION - PAIN TYPE
TYPE: ACUTE PAIN

## 2023-11-30 NOTE — WOUND TEAM
[facility]  Inpatient Services  Wound and Skin Care Follow-up    Admission Date: 11/12/2023     Last order of IP CONSULT TO WOUND CARE was found on 11/19/2023 from Hospital Encounter on 11/12/2023     HPI, PMH, SH: Reviewed    Past Surgical History:   Procedure Laterality Date    INCISION AND DRAINAGE GENERAL Left 11/14/2023    Procedure: Sharp exicisional debridement of skin subqutaneous fat and fasha 10cm squard;  Surgeon: Thom Christianson M.D.;  Location: [facility];  Service: General    INCISION AND DRAINAGE GENERAL Left 10/23/2023    Procedure: INCISION AND DRAINAGE;  Surgeon: Keith Navarro M.D.;  Location: [facility];  Service: Vascular    APPLICATION OR REPLACEMENT, WOUND VAC  10/23/2023    Procedure: APPLICATION OR REPLACEMENT, WOUND VAC;  Surgeon: Keith Navarro M.D.;  Location: [facility];  Service: Vascular    KNEE AMPUTATION ABOVE Left 10/23/2023    Procedure: AMPUTATION, ABOVE KNEE;  Surgeon: Keith Navarro M.D.;  Location: [facility];  Service: Vascular    KNEE AMPUTATION BELOW Left 10/20/2023    Procedure: AMPUTATION, BELOW KNEE WOUND REVISION;  Surgeon: Pradip Altamirano M.D.;  Location: [facility];  Service: Orthopedics    FEMORAL ENDARTERECTOMY Bilateral 9/30/2023    Procedure: ENDARTERECTOMY, FEMORAL;  Surgeon: Keith Navarro M.D.;  Location: [facility];  Service: Vascular    ANGIOGRAM, WITH ANGIOPLASTY, AND STENT INSERTION IF INDICATED Right 9/30/2023    Procedure: ANGIOGRAM, WITH ILIAC STENT;  Surgeon: Keith Navarro M.D.;  Location: [facility];  Service: Vascular    KNEE AMPUTATION BELOW Left 9/27/2023    Procedure: AMPUTATION, BELOW KNEE;  Surgeon: Pradip Altamirano M.D.;  Location: [facility];  Service: Orthopedics    IRRIGATION & DEBRIDEMENT ORTHO Left 3/4/2018    Procedure: IRRIGATION & DEBRIDEMENT ORTHO - HUMERUS;  Surgeon: Sergio Watkins M.D.;  Location: [facility]  ORS;  Service: Orthopedics    ORIF, SHOULDER Left 1/4/2018    Procedure: SHOULDER ORIF;  Surgeon: Sergio Watkins M.D.;  Location: SUR[facility];  Service: Orthopedics    CLOSED REDUCTION Left 12/24/2017    Procedure: CLOSED REDUCTION;  Surgeon: Keith Subramanian M.D.;  Location: SUR[facility]ERY [facility];  Service: Orthopedics    OTHER      multiple surgeries lower legs from past injuries     Social History     Tobacco Use    Smoking status: Every Day     Types: Cigars    Smokeless tobacco: Never   Substance Use Topics    Alcohol use: Yes     Comment: a few beers a week     Chief Complaint   Patient presents with    Other     Pt BIB ems due to failure to thrive, pt's neighbors called ems due to pt unable to care for self and refuses to eat    Diarrhea     Diagnosis: Severe sepsis (HCC) [A41.9, R65.20]    Unit where seen by Wound Team: S631/01     WOUND FOLLOW UP RELATED TO:  L groin and L sacrum NPWT change       WOUND TEAM PLAN OF CARE - Frequency of Follow-up:   Nursing to follow dressing orders written for wound care. Contact wound team if area fails to progress, deteriorates or with any questions/concerns if something comes up before next scheduled follow up (See below as to whether wound is following and frequency of wound follow up)  Dressing changes by wound team:                   NPWT change 3 times weekly - L groin & L sacrum, on a MWF change schedule  Weekly - R coccyx    WOUND HISTORY:       58 y.o. male with past medical history of peripheral arterial disease, gangrene of the left lower extremity, hypertension who presented 11/12/2023 after his brother called EMS for the patient failing to eat or move.  In the ER the patient was found to be tachycardic with significant leukocytosis concerning for infection.  He was found to have a large sacral pressure injury therefore a CT scan of the abdomen pelvis was obtained which showed soft tissue air tracking superiorly above the ileum, other findings  include evidence of osteomyelitis.      11/14/23 I&D of sacral decubitus wound with Dr. 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)   Date First Assessed: 11/12/23   Present on Original Admission: Yes  Hand Hygiene Completed: Yes  Primary Wound Type: Pressure Injury  Location: Sacrum  Laterality: Left  Wound Description (Comments): Stage IV      Assessments 11/29/2023  4:00 PM   Wound Image      Site Assessment Red;Undermining;Painful;White;Pink;Pale   Periwound Assessment Pink;Intact   Margins Defined edges;Epibole (rolled edges)   Closure Secondary intention   Drainage Amount Small   Drainage Description Serosanguineous;Purulent   Treatments Cleansed;Offloading   Wound Cleansing Approved Wound Cleanser   Periwound Protectant Skin Protectant Wipes to Periwound;Paste Ring   Dressing Status Clean;Dry;Intact   Dressing Changed Changed   Dressing Cleansing/Solutions Normal Saline   Dressing Options Wound Vac;Offloading Dressing - Sacral   Dressing Change/Treatment Frequency Monday, Wednesday, Friday, and As Needed   NEXT Dressing Change/Treatment Date 12/01/23   NEXT Weekly Photo (Inpatient Only) 12/06/23   Wound Team Following 3x Weekly   Pressure Injury Stage Stage 4   Wound Length (cm) 4.5 cm   Wound Width (cm) 7.1 cm   Wound Depth (cm) 0.9 cm   Wound Surface Area (cm^2) 31.95 cm^2   Wound Volume (cm^3) 28.755 cm^3   Wound Healing % 51   Undermining (cm) 2.7 cm   Undermining of Wound, 1st Location From 12 o'clock;To 12 o'clock   Shape Oval   Wound Odor None   Exposed Structures Bone   WOUND NURSE ONLY - Time Spent with Patient (mins) 60       Wound 11/12/23 Full Thickness Wound Groin Left open surgical (Active)   Date First Assessed: 11/12/23   Present on Original Admission: Yes  Hand Hygiene Completed: Yes  Primary Wound Type: Full Thickness Wound  Location: Groin  Laterality: Left  Wound Description (Comments): open surgical      Assessments 11/29/2023  4:00 PM    Wound Image      Site Assessment Red;Yellow   Periwound Assessment Pink;Clean;Dry;Intact   Margins Defined edges;Attached edges   Closure Secondary intention   Drainage Amount Scant   Drainage Description Serosanguineous   Treatments Cleansed   Wound Cleansing Approved Wound Cleanser   Periwound Protectant Skin Protectant Wipes to Periwound;Paste Ring;Drape   Dressing Status Clean;Dry;Intact   Dressing Changed Changed   Dressing Cleansing/Solutions Not Applicable   Dressing Options Collagen Dressing;Wound Vac   Dressing Change/Treatment Frequency Monday, Wednesday, Friday, and As Needed   NEXT Dressing Change/Treatment Date 12/01/23   NEXT Weekly Photo (Inpatient Only) 12/06/23   Wound Team Following 3x Weekly   Non-staged Wound Description Full thickness   Wound Length (cm) 9 cm   Wound Width (cm) 4 cm   Wound Depth (cm) 1.3 cm   Wound Surface Area (cm^2) 36 cm^2   Wound Volume (cm^3) 46.8 cm^3   Wound Healing % 8   Shape Irregular   Wound Odor None       Wound 11/12/23 Pressure Injury Coccyx sDTI POA (Active)   Date First Assessed: 11/12/23   Present on Original Admission: Yes  Hand Hygiene Completed: Yes  Primary Wound Type: Pressure Injury  Location: Coccyx  Wound Description (Comments): sDTI POA      Assessments 11/29/2023  4:00 PM   Wound Image     Site Assessment Purple;Fragile   Periwound Assessment Fragile;Pink;Red   Margins Defined edges;Attached edges   Closure Adhesive bandage   Drainage Amount None   Treatments Cleansed;Offloading   Wound Cleansing Approved Wound Cleanser   Periwound Protectant Not Applicable   Dressing Status Clean;Dry;Intact   Dressing Changed New   Dressing Cleansing/Solutions Not Applicable   Dressing Options Offloading Dressing - Sacral   Dressing Change/Treatment Frequency Every 72 hrs, and As Needed   NEXT Dressing Change/Treatment Date 12/02/23   NEXT Weekly Photo (Inpatient Only) 12/06/23   Wound Team Following Weekly   Pressure Injury Stage Deep Tissue   Wound Length (cm) 5  cm   Wound Width (cm) 3 cm   Wound Surface Area (cm^2) 15 cm^2   Shape Oval   Wound Odor None       Negative Pressure Wound Therapy 11/12/23 Dehisced;Surgical Groin;Sacrum Left (Active)   Placement Date: 11/12/23   Inserted by: wound team  Hand Hygiene Completed: Yes  Wound Type: Dehisced;Surgical  Location: Groin;Sacrum  Laterality: Left      Assessments 11/29/2023  4:00 PM   Wound Image     Vacuum Serial Number [id number]   NPWT Pump Mode / Pressure Setting Ulta;Continuous;125 mmHg   Dressing Type Small;Black Foam (Regular);Black Foam (Veraflo)   Number of Foam Pieces Used 4   Canister Changed No   NEXT Dressing Change/Treatment Date 12/01/23   VAC VeraFlo Irrigant Normal Saline   VAC VeraFlo Soak Time (mins) 10   VAC VeraFlo Instill Volume (ml) 24   VAC VeraFlo - Therapy Time (hrs) 2.5   VAC VeraFlo Pressure (mm/Hg) Continuous;125 mmHg        Vascular:    INGA:   No results found.    Lab Values:    Lab Results   Component Value Date/Time    WBC 10.9 (H) 11/28/2023 08:56 AM    RBC 2.98 (L) 11/28/2023 08:56 AM    HEMOGLOBIN 8.6 (L) 11/28/2023 08:56 AM    HEMATOCRIT 26.7 (L) 11/28/2023 08:56 AM    CREACTPROT 27.78 (H) 11/12/2023 02:22 AM    SEDRATEWES 66 (H) 11/12/2023 02:22 AM    HBA1C 5.1 11/28/2023 08:56 AM         Culture Results show:  Recent Results (from the past 720 hour(s))   CULTURE WOUND W/ GRAM STAIN    Collection Time: 11/14/23 11:53 AM    Specimen: Wound   Result Value Ref Range    Significant Indicator POS (POS)     Source WND     Site Sacral     Culture Result (A)      Light growth mixed enteric kris including E coli,  Citrobacter koseri, Enterococcus faecalis and Streptococcus  anginosis.      Gram Stain Result Moderate WBCs.  Few Gram positive cocci.       Culture Result (A)      Beta Hemolytic Streptococcus group A  Light growth         Pain Level/Medicated:  PO pain medications administered by bedside RN 30 prior       INTERVENTIONS BY WOUND TEAM:  Chart and images reviewed. Discussed with bedside  RN. All areas of concern (based on picture review, LDA review and discussion with bedside RN) have been thoroughly assessed. Documentation of areas based on significant findings. This RN in to assess patient. Performed standard wound care which includes appropriate positioning, dressing removal and non-selective debridement. Pictures and measurements obtained weekly if/when required.    Wound:  L Groin  Preparation for Dressing removal: Removed without difficulty  Cleansed/Non-selectively Debrided with:  Wound cleanser and Gauze  Kiara wound: Cleansed with Wound cleanser and Gauze, Prepped with No Sting, Paste Rings, and Drape  Primary Dressing:  Ann-Marie applied over wound bed. Half thickness regular black foam then applied over wound bed and secured with drape.  Secondary (Outer) Dressing: Hole cut into drape, button and Trac pad then applied. Suction resumed at 125mmHg, no leaks detected. Tubing offloading with 1 offloading adhesive foam.     Wound:  L Sacrum  Preparation for Dressing removal: Removed without difficulty  Cleansed/Non-selectively Debrided with:  Wound cleanser and Gauze  Kiara wound: Cleansed with Wound cleanser and Gauze, Prepped with No Sting and Paste Rings  Primary Dressing:  One spiral of veraflo black foam applied into wound bed and secured with Trac pad. Trac pad edges then bordered with drape.  Secondary (Outer) Dressing: Suction resumed at 125mmHg, no leaks detected. Tubing was Y-connected to L groin. Tubing offloaded with offload adhesive foams x2.     Wound:  R Coccyx  Cleansed/Non-selectively Debrided with:  Wound cleanser and Gauze  Kiara wound: Cleansed with Wound cleanser and Gauze, Prepped with small dollop of barrier paste  Primary Dressing:  Offloading adhesive foam    Advanced Wound Care Discharge Planning  Number of Clinicians necessary to complete wound care: 1-2 (for positioning)  Is patient requiring IV pain medications for dressing changes:  No   Length of time for dressing  change 45 min. (This does not include chart review, pre-medication time, set up, clean up or time spent charting.)    Interdisciplinary consultation: Patient, Bedside RN, Vania ANTHONY (Wound RN) and Dr. Mcgill .    EVALUATION / RATIONALE FOR TREATMENT:     Date:  11/29/23  Wound Status:  Wound progressing as expected  L groin wound nearly surface level with good granular tissue, leah applied to absorb destructive components of wound exudate and create an optimal environment for cellular growth, may be able to transition to a dry dressing within the next few weeks.  L sacrum still with exposed bone and undermining, continuing Veraflo to keep bone hydrated and further encourage granulation over it.  R coccyx DTI largely decompensated and appears vastly worse. Expect this wound to evolve and become an open wound. Dr. Mcgill also present during session and provided education to patient on importance of being compliant with turns to prevent further development of pressure injuries in addition to preventing current ones from deteriorating, patient agreeable.  Patient was also accepted to [facility], but needing Vac supplies to go to SNF. Wound RN Fabricio ordered 9 sets of medium regular Vac dressing kits and 9 Trac pads for patient to discharge with, bedside RN was updated to ensure delivery of supplies to patient's room.    Date:  11/27/23  Wound Status:  Wound progressing as expected    Patient sacral wound with palpable bone and some stringy non-viable tissue, otherwise appears clean - will benefit from continued Veraflo once supplies available. Patient left groin with scant to small tan-purulent drainage present to superior portion, wound bed appears pink and red. Continue VAC at this time.     Date:  11/24/23  Wound Status:      Sacral wound with palpable bone which may delay resolution.  Otherwise site is mostly granular.  L groin clean, using collagen dressing to support healthy wound bed environment.      Date:   11/22/23  Wound Status:  Wound progressing as expected  L groin Vac transitioned from Veraflo to leah with regular Vac. This was then Y-connected to sacral Vac, thus patient only needing one machine at this time.  Sacral wound still with exposed bone, continuing Veraflo here.    Date:  11/20/23  Wound Status:  Wound progressing as expected    L groin likely can transition to leah and regular NPWT at next change to help with granulation tissue at the depth of the proximal wound.    No changes to the sacral wound bed and continue to have exposed bone.  Continue with VF NPWT and POC.     Date:  11/18/23  Wound Status:  Wound progressing as expected    Left groin continues to improve with VF NPWT. Continue POC.     Sacral wound still with significant undermining and bone exposure. VF NPWT applied to this area as well to increase granulation tissue production and assist in wound closure by secondary intention.      Date:  11/16/23  Wound Status:  Wound progressing as expected    Left groin wound with more granulation tissue present. Continue with POC.    S/P debridement of sacral wound on 11/14. Wound with circumferential undermining. Deepest area of undermining measuring 4cm at 2o'clock. Bone exposed towards mid aspect of wound bed. Regular NPWT applied to increase granulation tissue production to the area. Will benefit from VF NPWT on next change to ensure exposed structures remain moist.       Date:  11/14/23  Wound Status:  Wound progressing as expected    Wound bed clean, no odor. Continue VF NPWT with Dakins  Date:  11/12/23  Wound Status:  Initial evaluation    Left groin with minimal slough after cleansing, met with Dr. Navarro at bedside who advised to place Dakin's VF to site.  Left sacral wound to go to surgery with Dr. Chow today or tomorrow for debridement and likely VAC placement.  Patient very frail appearing.  Sacral wound probes to bone and will be very difficult to heal considering patient overall  state and nutrition.             Goals: Steady decrease in wound area and depth weekly.    NURSING PLAN OF CARE ORDERS:  No new orders this visit    NUTRITION RECOMMENDATIONS   Wound Team Recommendations:  N/A     DIET ORDERS (From admission to next 24h)       Start     Ordered    11/16/23 1001  Diet Order Diet: Consistent CHO (Diabetic)  ALL MEALS        Question:  Diet:  Answer:  Consistent CHO (Diabetic)    11/16/23 1000    11/13/23 1328  Supplements  ALL MEALS        Question:  Which Supplement  Answer:  BOOST PLUS  Comment:  or equivalent Ensure    11/13/23 1328                    PREVENTATIVE INTERVENTIONS:   Q shift Jae - performed per nursing policy  Q shift pressure point assessments - performed per nursing policy    Surface/Positioning  Low Airloss - Currently in Place  Reposition q 2 hours - Ordered, but patient is often refusing turns    Offloading/Redistribution  Sacral offloading dressing (Silicone dressing) - Currently in Place  Heel offloading dressing (Silicone dressing) - Currently in Place      Containment/Moisture Prevention    Holguin Catheter - Currently in Place    Anticipated discharge plans:  Skilled Nursing        Vac Discharge Needs:  Vac Discharge plan is purely a recommendation from wound team and not a requirement for discharge unless otherwise stated by physician.  Veraflo Vac while inpatient, ok to transition to Regular Vac on discharge

## 2023-11-30 NOTE — DISCHARGE SUMMARY
Discharge Summary    CHIEF COMPLAINT ON ADMISSION  Chief Complaint   Patient presents with    Other     Pt BIB ems due to failure to thrive, pt's neighbors called ems due to pt unable to care for self and refuses to eat    Diarrhea       Reason for Admission  EMS    Admission Date  11/12/2023     CODE STATUS  Full Code    HPI & HOSPITAL COURSE  This is a 58-year-old male with past medical history of hypertension, dyslipidemia, PAD s/p bilateral femoral endarterectomies, left AKA who was admitted on 11/12/2023 with severe hyponatremia of 117, GELY, and severe sepsis secondary to large unstageable ischial decubitus ulcer.    Patient taken to the OR by general surgery on 11/14 for I&D and wound VAC placement x2. OR cultures grew beta-hemolytic group A strep, patient covered by empiric antibiotics.  Patient evaluated by vascular surgery with recommendations for outpatient elective bypass procedure once infection has resolved.     Patient noted to have significant urinary retention, started on Flomax and Proscar with Holguin catheter in place. Patient is to follow-up with urology outpatient.    Patient seen by psychiatry during this admission, he was started on Zoloft and Remeron.    Therefore, he is discharged in good and stable condition to skilled nursing facility.    The patient met 2-midnight criteria for an inpatient stay at the time of discharge.      FOLLOW UP ITEMS POST DISCHARGE  Primary care physician  Wound care  Urology patient was seen by psychiatry and during this admission, he was started on Zoloft and    DISCHARGE DIAGNOSES  Principal Problem:    Infected sacral wound (POA: Yes)  Active Problems:    PAD (peripheral artery disease) (HCC) (POA: Yes)    Severe sepsis (HCC) (POA: Yes)    Hyponatremia (POA: Yes)    Anemia (POA: Yes)    GELY (acute kidney injury) (HCC) (POA: Yes)    Open wound of inguinal region (POA: Yes)    Insomnia (POA: Yes)    Anxiety (POA: Yes)    Adjustment disorder with depressed mood  (POA: Clinically Undetermined)  Resolved Problems:    * No resolved hospital problems. *      FOLLOW UP  Shaye Horton M.D.  [address] [place] [id number] [phone]    Follow up        MEDICATIONS ON DISCHARGE     Medication List        START taking these medications        Instructions   ascorbic acid 500 MG tablet  Commonly known as: Vitamin C   Take 1 Tablet by mouth 2 times a day.  Dose: 500 mg     ferrous sulfate 325 (65 Fe) MG tablet   Take 1 Tablet by mouth every morning with breakfast.  Dose: 325 mg     hydrOXYzine HCl 50 MG Tabs  Commonly known as: Atarax   Take 1 Tablet by mouth 3 times a day as needed for Anxiety.  Dose: 50 mg     mirtazapine 15 MG Tabs  Commonly known as: Remeron   Take 1 Tablet by mouth at bedtime.  Dose: 15 mg     multivitamin Tabs   Take 1 Tablet by mouth every day.  Dose: 1 Tablet     oxyCODONE immediate release 10 MG immediate release tablet  Commonly known as: Roxicodone   Take 1 Tablet by mouth every 8 hours as needed for Severe Pain for up to 3 days.  Dose: 10 mg     sertraline 50 MG Tabs  Commonly known as: Zoloft   Take 1 Tablet by mouth every day.  Dose: 50 mg     sodium chloride 1 GM Tabs  Commonly known as: Salt   Take 1 Tablet by mouth 3 times a day with meals.  Dose: 1 g     zinc sulfate 220 (50 Zn) MG Caps  Commonly known as: Zincate   Take 1 Capsule by mouth every day.  Dose: 220 mg            CONTINUE taking these medications        Instructions   dakins 0.125% (1/4 strength) 0.125 % Soln   Dampen gauze before applying to wounds. Change damp to dry dressings daily.     finasteride 5 MG Tabs  Commonly known as: Proscar   Take 1 Tablet by mouth every day.  Dose: 5 mg     tamsulosin 0.4 MG capsule  Commonly known as: Flomax   Take 1 Capsule by mouth 1/2 hour after breakfast.  Dose: 0.4 mg            STOP taking these medications      Aspirin Low Dose 81 MG EC tablet  Generic drug: aspirin     atorvastatin 40 MG Tabs  Commonly known as: Lipitor      cyclobenzaprine 10 mg Tabs  Commonly known as: Flexeril     gabapentin 300 MG Caps  Commonly known as: Neurontin              Allergies  Allergies   Allergen Reactions    Sulfa Drugs Hives and Shortness of Breath     Tolerated Flomax (10/2023)       DIET  Orders Placed This Encounter   Procedures    Diet Order Diet: Consistent CHO (Diabetic)     Standing Status:   Standing     Number of Occurrences:   1     Order Specific Question:   Diet:     Answer:   Consistent CHO (Diabetic) [4]       ACTIVITY  As tolerated and directed by skilled nursing.  Weight bearing as tolerated    LINES, DRAINS, AND WOUNDS  This is an automated list. Peripheral IVs will be removed prior to discharge.  Peripheral IV 11/14/23 20 G Anterior;Right Forearm (Active)   Site Assessment Dry;Clean;Intact 11/29/23 2045   Dressing Type Transparent 11/29/23 2045   Line Status Scrubbed the hub prior to access;Saline locked;Flushed 11/29/23 2045   Dressing Status Clean;Dry;Intact 11/29/23 2045   Dressing Intervention N/A 11/29/23 2045   Dressing Change Due 12/02/23 11/29/23 2045   Date Primary Tubing Changed 11/20/23 11/20/23 1200   NEXT Primary Tubing Change  11/18/23 11/14/23 0600   NEXT Secondary Tubing Change  11/15/23 11/14/23 0600   Infiltration Grading ([facility]) 0 11/29/23 2045   Phlebitis Scale ([facility] Only) 0 11/29/23 2045     Urethral Catheter Non-latex 16 Fr. (Active)   Site Assessment Clean;Skin intact 11/29/23 2045   Collection Container Standard drainage bag 11/29/23 2045   Urinary Catheter Care Tamper Evident Seal in Place;Drainage Tube Extended;Drainage Bag Not Overfilled;Drainage Tubing Properly Secured;Drainage Bag Below Bladder Level and Not on Floor 11/29/23 2045   Securement Method Securing device (Describe) 11/29/23 2045   Output (mL) 1300 mL 11/29/23 1800      Wound 11/06/23 Thigh Distal;Anterior Left Left AKA (Active)   Wound Image    11/19/23 2022   Site Assessment LUTHER 11/29/23 2045   Periwound Assessment LUTHER 11/29/23  2045   Margins LUTHER 11/29/23 1000   Closure LUTHER 11/29/23 1000   Drainage Amount LUTHER 11/29/23 1000   Drainage Description LUTHER 11/29/23 1000   Wound Cleansing Normal Saline Irrigation 11/27/23 1954   Dressing Status Open to Air 11/28/23 2030   Dressing Changed Observed 11/26/23 2150   Dressing Options Open to Air 11/28/23 2030       Wound 11/12/23 Pressure Injury Sacrum Left Stage IV (Active)   Wound Image    11/29/23 1600   Site Assessment LUTHER 11/29/23 2045   Periwound Assessment LUTHER 11/29/23 2045   Margins LUTHER 11/29/23 2045   Closure Secondary intention 11/29/23 1600   Drainage Amount Small 11/29/23 1600   Drainage Description Serosanguineous;Purulent 11/29/23 1600   Treatments Cleansed;Offloading 11/29/23 1600   Offloading/DME Other (comment) 11/28/23 2030   Wound Cleansing Approved Wound Cleanser 11/29/23 1600   Periwound Protectant Skin Protectant Wipes to Periwound;Paste Ring 11/29/23 1600   Dressing Status Clean;Dry;Intact 11/29/23 1600   Dressing Changed Changed 11/29/23 1600   Dressing Cleansing/Solutions Normal Saline 11/29/23 1600   Dressing Options Wound Vac;Offloading Dressing - Sacral 11/29/23 1600   Dressing Change/Treatment Frequency Monday, Wednesday, Friday, and As Needed 11/29/23 1600   NEXT Dressing Change/Treatment Date 12/01/23 11/29/23 1600   NEXT Weekly Photo (Inpatient Only) 12/06/23 11/29/23 1600   Wound Team Following 3x Weekly 11/29/23 1600   WOUND NURSE ONLY - Pressure Injury Stage 4 11/29/23 1600   Wound Length (cm) 4.5 cm 11/29/23 1600   Wound Width (cm) 7.1 cm 11/29/23 1600   Wound Depth (cm) 0.9 cm 11/29/23 1600   Wound Surface Area (cm^2) 31.95 cm^2 11/29/23 1600   Wound Volume (cm^3) 28.755 cm^3 11/29/23 1600   Wound Healing % 51 11/29/23 1600   Wound Bed Granulation (%) 80 % 11/24/23 1400   Wound Bed Slough (%) 20 % 11/24/23 1400   Undermining (cm) 2.7 cm 11/29/23 1600   Undermining of Wound, 1st Location From 12 o'clock;To 12 o'clock 11/29/23 1600   Shape Oval 11/29/23 1600   Wound  Odor None 11/29/23 1600   Exposed Structures Bone 11/29/23 1600   WOUND NURSE ONLY - Time Spent with Patient (mins) 60 11/29/23 1600       Wound 11/12/23 Full Thickness Wound Groin Left open surgical (Active)   Wound Image    11/29/23 1600   Site Assessment LUTHER 11/29/23 2045   Periwound Assessment LUTHER 11/29/23 2045   Margins LUTHER 11/29/23 2045   Closure Secondary intention 11/29/23 1600   Drainage Amount Scant 11/29/23 1600   Drainage Description Serosanguineous 11/29/23 1600   Treatments Cleansed 11/29/23 1600   Wound Cleansing Approved Wound Cleanser 11/29/23 1600   Periwound Protectant Skin Protectant Wipes to Periwound;Paste Ring;Drape 11/29/23 1600   Dressing Status Clean;Dry;Intact 11/29/23 1600   Dressing Changed Changed 11/29/23 1600   Dressing Cleansing/Solutions Not Applicable 11/29/23 1600   Dressing Options Collagen Dressing;Wound Vac 11/29/23 1600   Dressing Change/Treatment Frequency Monday, Wednesday, Friday, and As Needed 11/29/23 1600   NEXT Dressing Change/Treatment Date 12/01/23 11/29/23 1600   NEXT Weekly Photo (Inpatient Only) 12/06/23 11/29/23 1600   Wound Team Following 3x Weekly 11/29/23 1600   Non-staged Wound Description Full thickness 11/29/23 1600   Wound Length (cm) 9 cm 11/29/23 1600   Wound Width (cm) 4 cm 11/29/23 1600   Wound Depth (cm) 1.3 cm 11/29/23 1600   Wound Surface Area (cm^2) 36 cm^2 11/29/23 1600   Wound Volume (cm^3) 46.8 cm^3 11/29/23 1600   Wound Healing % 8 11/29/23 1600   Undermining (cm) 1.5 cm 11/24/23 1400   Undermining of Wound, 1st Location From 9 o'clock 11/24/23 1400   Shape Irregular 11/29/23 1600   Wound Odor None 11/29/23 1600   Pulses Bounding 11/12/23 1400   WOUND NURSE ONLY - Time Spent with Patient (mins) 45 11/20/23 1500       Wound 11/12/23 Arterial Ulcer MTH 1;MTH 2;MTH 3;MTH 4;MTH 5 Right arterial insufficiency (Active)   Wound Image    11/12/23 1400   Site Assessment Dry;Intact 11/28/23 2030   Periwound Assessment Dry;Intact 11/28/23 2030   Margins  Attached edges 11/28/23 2030   Closure Open to air 11/28/23 2030   Drainage Amount None 11/28/23 2030   Treatments Other (Comment) 11/28/23 2030   Offloading/DME Heel float boot 11/28/23 1544   Wound Cleansing Povidone-Iodine 11/28/23 1544   Dressing Status Clean;Dry;Intact 11/28/23 2030   Dressing Changed Observed 11/28/23 2030   Dressing Cleansing/Solutions Not Applicable 11/27/23 1954   Dressing Change/Treatment Frequency Every Shift, and As Needed 11/28/23 1544   NEXT Dressing Change/Treatment Date 11/26/23 11/25/23 2100   NEXT Weekly Photo (Inpatient Only) 11/29/23 11/25/23 2100   Wound Team Following Not following 11/17/23 2100       Wound 11/12/23 Pressure Injury Coccyx sDTI POA (Active)   Wound Image   11/29/23 1600   Site Assessment Purple;Fragile 11/29/23 1600   Periwound Assessment Fragile;Pink;Red 11/29/23 1600   Margins Defined edges;Attached edges 11/29/23 1600   Closure Adhesive bandage 11/29/23 1600   Drainage Amount None 11/29/23 1600   Treatments Cleansed;Offloading 11/29/23 1600   Offloading/DME Other (comment) 11/27/23 1954   Wound Cleansing Approved Wound Cleanser 11/29/23 1600   Periwound Protectant Not Applicable 11/29/23 1600   Dressing Status Clean;Dry;Intact 11/29/23 1600   Dressing Changed New 11/29/23 1600   Dressing Cleansing/Solutions Not Applicable 11/29/23 1600   Dressing Options Offloading Dressing - Sacral 11/29/23 1600   Dressing Change/Treatment Frequency Every 72 hrs, and As Needed 11/29/23 1600   NEXT Dressing Change/Treatment Date 12/02/23 11/29/23 1600   NEXT Weekly Photo (Inpatient Only) 12/06/23 11/29/23 1600   Wound Team Following Weekly 11/29/23 1600   WOUND NURSE ONLY - Pressure Injury Stage DTPI 11/29/23 1600   Wound Length (cm) 5 cm 11/29/23 1600   Wound Width (cm) 3 cm 11/29/23 1600   Wound Surface Area (cm^2) 15 cm^2 11/29/23 1600   Shape Oval 11/29/23 1600   Wound Odor None 11/29/23 1600       Wound 11/19/23 Other (comment) Thigh Proximal;Anterior Right open area  (Active)   Wound Image   11/22/23 1600   Site Assessment LUTHER 11/29/23 2045   Periwound Assessment LUTHER 11/29/23 2045   Margins LUTHER 11/29/23 1000   Closure LUTHER 11/29/23 1000   Drainage Amount LUTHER 11/29/23 1000   Drainage Description LUTHER 11/29/23 1000   Treatments Site care;Cleansed 11/27/23 1300   Wound Cleansing Approved Wound Cleanser 11/27/23 1954   Periwound Protectant Skin Protectant Wipes to Periwound 11/27/23 1954   Dressing Status Refused 11/28/23 2030   Dressing Changed Other (Comment) 11/28/23 2030   Dressing Cleansing/Solutions Not Applicable 11/27/23 1954   Dressing Options Hydrofiber Silver;Silicone Adhesive Foam 11/27/23 1954   Dressing Change/Treatment Frequency Every 48 hrs, and As Needed 11/28/23 0839   NEXT Dressing Change/Treatment Date 11/29/23 11/27/23 1954   NEXT Weekly Photo (Inpatient Only) 11/29/23 11/27/23 1954   Wound Team Following Not following 11/27/23 1300   Non-staged Wound Description Not applicable 11/20/23 1500   Shape small dehisced area to incision 11/20/23 1500   Wound Odor None 11/20/23 1500       Peripheral IV 11/14/23 20 G Anterior;Right Forearm (Active)   Site Assessment Dry;Clean;Intact 11/29/23 2045   Dressing Type Transparent 11/29/23 2045   Line Status Scrubbed the hub prior to access;Saline locked;Flushed 11/29/23 2045   Dressing Status Clean;Dry;Intact 11/29/23 2045   Dressing Intervention N/A 11/29/23 2045   Dressing Change Due 12/02/23 11/29/23 2045   Date Primary Tubing Changed 11/20/23 11/20/23 1200   NEXT Primary Tubing Change  11/18/23 11/14/23 0600   NEXT Secondary Tubing Change  11/15/23 11/14/23 0600   Infiltration Grading ([facility]) 0 11/29/23 2045   Phlebitis Scale ([facility] Only) 0 11/29/23 2045           Urethral Catheter Non-latex 16 Fr. (Active)   Site Assessment Clean;Skin intact 11/29/23 2045   Collection Container Standard drainage bag 11/29/23 2045   Urinary Catheter Care Tamper Evident Seal in Place;Drainage Tube Extended;Drainage Bag Not  Overfilled;Drainage Tubing Properly Secured;Drainage Bag Below Bladder Level and Not on Floor 11/29/23 2045   Securement Method Securing device (Describe) 11/29/23 2045   Output (mL) 1300 mL 11/29/23 1800        MENTAL STATUS ON TRANSFER             CONSULTATIONS  General surgery, infectious disease, nephrology, orthopedics, palliative care, psychiatry and vascular surgery    PROCEDURES  Left BKA, revision of left BKA, left AKA bilateral femoral artery endarterectomy stent placement and right external iliac artery, sacral wound debridement    LABORATORY  Lab Results   Component Value Date    SODIUM 129 (L) 11/30/2023    POTASSIUM 4.6 11/30/2023    CHLORIDE 95 (L) 11/30/2023    CO2 25 11/30/2023    GLUCOSE 100 (H) 11/30/2023    BUN 10 11/30/2023    CREATININE 0.41 (L) 11/30/2023        Lab Results   Component Value Date    WBC 10.9 (H) 11/28/2023    HEMOGLOBIN 8.6 (L) 11/28/2023    HEMATOCRIT 26.7 (L) 11/28/2023    PLATELETCT 586 (H) 11/28/2023      US-RENAL   Final Result      1.  Normal renal ultrasound.      US-VEIN MAPPING LOWER EXTREMITY UNILAT RIGHT   Final Result      CT-ABDOMEN-PELVIS WITH   Final Result         1. There is a 2 cm ulcerative fraying in the left medial gluteus region. Subjacent iliac bone demonstrates bony ridging, consistent with osteomyelitis. There is small amount of air tracking medially and laterally away from the ulcerative wound along the    left medial gluteal musculature and down to the level of the left paraspinous musculature. Therefore, early necrotizing fasciitis possible. No drainable abscess present. This was discussed with the ordering physician.   2. Severely distended bladder with mild to moderate bilateral associated hydronephrosis. This is likely secondary to prostatic hypertrophy. There is severe median lobe hypertrophy. Consider Holguin catheter placement for decompression. Bladder diverticulum    also noted.   3. Patchy ground glass to aeration lower lobes, suspicious for  pneumonia. However, atypical edema or other pneumonitis possible.   4. Severe atherosclerotic disease.   5. Additional soft tissue wound in the left groin.      DX-FEMUR-2+ LEFT   Final Result      No evidence of infection by plain film.      DX-CHEST-PORTABLE (1 VIEW)   Final Result      No acute process.         Total time of the discharge process exceeds 45 minutes.

## 2023-11-30 NOTE — DISCHARGE PLANNING
SW emailed PCS/ride line form to coordinator. COBRA packet completed and given to bedside RN. Pt consents to transfer to [facility]. Markel with [place] okay with 12pm transport.

## 2023-11-30 NOTE — DOCUMENTATION QUERY
[facility]                                                                       Query Response Note      PATIENT:               ELI GREWAL  ACCT #:                  [id number]  MRN:                     [id number]  :                      1965  ADMIT DATE:       2023 1:47 AM  DISCH DATE:          RESPONDING  PROVIDER #:        [id number]           QUERY TEXT:    Based on the noted clinical finding and your clinical jugdement and the diagnosis of altered mental status be further clarified?    The patient's Clinical Indicators include:  The documentation indicates that this patient was admitted for altered mentation and sepsis from sacral pressure  wound (unstageable.  Wound culture positive for Parvimonas micra; citrobacter koseri; enterococcus faecalis; streptococcus anginosis; and beta hemolytic streptococcus group A.  Alerted mentation has returned to baseline - memory and cognition is normal noted on 23  Treatment:  IV abx for sepsis/wounds, IV NS for hyponatremia, surgical debridement  Risk:  worsening sepsis and worsening sacral wound  Options provided:   -- Acute metabolic encephalopathy   -- Acute encephalopathy due to other medical condition, (please specify other medical condition)   -- Delirium due to multiple etiologies   -- Other explanation, (please specify other explanation)   -- Unable to determine      Query created by: Lashawn García on 2023 3:08 PM    RESPONSE TEXT:    Unable to determine          Electronically signed by:  LORE YE MD 2023 5:03 PM

## 2023-11-30 NOTE — DISCHARGE PLANNING
@1042  Agency/Facility Name: [facility]  Spoke To: Markel  Outcome: Pt's white count is high and the hemoglobin is 8.9.  Per Markel, they cannot accept the pt today.  Pt must be stabilized.    @1046  Agency/Facility Name: [facility]  Spoke To: Markel  Outcome: Markel was with DON with questions.  HAIDER stated the physician would like to speak with the DON.  DPA gave Markel the physician's name and phone number.      @1050  Agency/Facility Name: [facility]  Outcome: HAIDER received a voice message from Markel stating it is okay for pt to transport today.    @1116  DPA notified Markel that pt will transport at 1200 via [facility].

## 2023-11-30 NOTE — DISCHARGE PLANNING
DC Transport Scheduled    Received request at: 11/30/2023 at 0927    Transport Company Scheduled:  [employer]  Spoke with Aldo at [facility] to schedule transport.  [facility] Trip #: [id number]     Scheduled Date: 11/30/2023  Scheduled Time: 1200    Destination: [facility] at [address] [place]     Notified care team of scheduled transport via [employer].     If there are any changes needed to the DC transportation scheduled, please contact [facility] Ride Line at ext. [phone] between the hours of [phone] Mon-Fri. If outside those hours, contact the ED Case Manager at ext. [phone].

## 2023-11-30 NOTE — CONSULTS
"[facility]    INPATIENT ASSESSMENT    Name: Dillon Centeno  MRN: [id number]  : 1965  Age: 58 y.o.  Date of assessment: 2023  PCP: Shaye Horton M.D.  Persons in attendance: Patient    CHIEF COMPLAINT/PRESENTING ISSUE (as stated by Patient): Dillon Centeno is a 58 year old man who appears older than stated age. Patient was seen lying on hospital bed, alert, oriented, speech clear, appeared agitated and irritable but put forth effort to be cooperative. Patient reports being upset due to the lack of sleep. Patient states he continues to report his need for sleep, however each night he struggles to get a few hours of consistent rest per patient report.  Patient denies depression or anxiety, no suicidal ideations, or auditory or visual hallucinations. Patient feels his home life is manageable with the exception of transportation. Agreed to request assistance from Case Management in accessing wheelchair accessible transportation. Patient expressed appreciation for advocating for this assistance.    Psychotherapy Session Summary  Clinician used Solution Focused therapy with patient who is more task oriented than emotion oriented. Currently patient masks emotions with anger, agitation and the use of irritable exchanges with others. Patient states, \"I don't know how I feel\". Patient then states, \"I feel angry most days but yesterday I was really upset, today I can handle it better\". Patient was unable to identify what change for him from the previous day that allows him to handle his frustration better. Patient reports being open to learning how to identify and process feelings with the goal of building his ability to tolerate distress and regulate his emotions more effectively. Will continue to follow.    Chief Complaint   Patient presents with    Other     Pt BIB ems due to failure to thrive, pt's neighbors called ems due to pt unable to care for self and refuses to eat    Diarrhea    "     CURRENT LIVING SITUATION/SOCIAL SUPPORT: Resides with a friend. Previous  career. No extended family or friends.    BEHAVIORAL HEALTH TREATMENT HISTORY  Does patient/parent report a history of prior behavioral health treatment for patient?   No    SAFETY ASSESSMENT - SELF  Does patient acknowledge current or past symptoms of dangerousness to self? no  Does parent/significant other report patient has current or past symptoms of dangerousness to self? N\A  Does presenting problem suggest symptoms of dangerousness to self? No    SAFETY ASSESSMENT - OTHERS  Does patient acknowledge current or past symptoms of aggressive behavior or risk to others? no  Does parent/significant other report patient has current or past symptoms of aggressive behavior or risk to others?  N\A  Does presenting problem suggest symptoms of dangerousness to others? No    Crisis Safety Plan completed and copy given to patient? no    ABUSE/NEGLECT SCREENING  Does patient report feeling “unsafe” in his/her home, or afraid of anyone?  no  Does patient report any history of physical, sexual, or emotional abuse?  no  Does parent or significant other report any of the above? N\A  Is there evidence of neglect by self? appears to benefit from assistance with ADL's  Is there evidence of neglect by a caregiver? no  Does the patient/parent report any history of CPS/APS/police involvement related to suspected abuse/neglect or domestic violence? no  Based on the information provided during the current assessment, is a mandated report of suspected abuse/neglect being made?  No    SUBSTANCE USE SCREENING  No labs assessed during this admission      MENTAL STATUS              Participation: Active verbal participation  Grooming: Disheveled  Orientation: Alert  Behavior: Agitated  Eye contact: Good  Mood: Irritable  Affect: Congruent with content  Thought process: Circumstantial  Thought content: Within normal limits  Speech: Volume within normal  limits  Perception: Within normal limits  Memory:  Recent:  Adequate  Insight: Limited  Judgment:  Limited  Other:    Collateral information:   Source:   Significant other present in person:    Significant other by telephone   [facility]   [facility] Record-reviewed   Other:      Unable to complete full assessment due to:   Acute intoxication   Patient declined to participate/engage   Patient verbally unresponsive   Significant cognitive deficits   Significant perceptual distortions or behavioral disorganization   Other:             CLINICAL IMPRESSIONS:  Primary:  Adjustment Disorder with mixed emotions  Secondary:                                         IDENTIFIED NEEDS/PLAN:  [Trigger DISPOSITION list for any items marked]      Imminent safety risk - self  Imminent safety risk - others     Acute substance withdrawal   Psychosis/Impaired reality testing     Mood/anxiety   Substance use/Addictive behavior     Maladaptive behavior   Parent/child conflict     Family/Couples conflict   Biomedical     Housing   Financial      Legal  Occupational/Educational     Domestic violence   Other:     Recommendations and Observation Level:  Case Management please assist patient in accessing transportation services for wheelchair transport to apointments and other needs.    Legal Hold: N/A      Thank you,    Kori Graves, Ph.D.,[facility]  11/30/2023   Length of intervention: 30 minutes

## 2023-12-01 NOTE — PROGRESS NOTES
Reviewed D/C instructions with patient, pt verbalized understanding of D/C instructions. IV removed.

## 2023-12-11 LAB
FUNGUS SPEC CULT: NORMAL
FUNGUS SPEC FUNGUS STN: NORMAL
SIGNIFICANT IND 70042: NORMAL
SITE SITE: NORMAL
SOURCE SOURCE: NORMAL

## 2024-01-04 NOTE — ED NOTES
Problem: Discharge Planning  Goal: Discharge to home or other facility with appropriate resources  1/4/2024 0602 by Cathy Greenfield RN  Outcome: Progressing     Problem: Safety - Adult  Goal: Free from fall injury  1/4/2024 0602 by Cathy Greenfield RN  Outcome: Progressing     Problem: Pain  Goal: Verbalizes/displays adequate comfort level or baseline comfort level  1/4/2024 0602 by Cathy Greenfield RN  Outcome: Progressing     Problem: Chronic Conditions and Co-morbidities  Goal: Patient's chronic conditions and co-morbidity symptoms are monitored and maintained or improved  1/4/2024 0602 by Cathy Greenfield RN  Outcome: Progressing      ERP at bedside

## 2024-02-09 NOTE — CARE PLAN
POSTPARTUM PROGRESS NOTE     Reynaldo Gutierrez is a 36 y.o. female POD #1 status post Primary  section at 37w5d secondary to h/o myomectomy in a pregnancy complicated by AMA, h/o myomectomy x 2, .   Patient is doing well this morning. She denies nausea, vomiting, fever or chills.  Patient reports mild abdominal pain that is well relieved by oral pain medications. Lochia is mild. Patient is voiding without difficulty and ambulating with no difficulty. She has not passed flatus,   Objective:       Temp:  [97.9 °F (36.6 °C)-98.3 °F (36.8 °C)] 98.3 °F (36.8 °C)  Pulse:  [74-91] 74  Resp:  [17-18] 18  SpO2:  [99 %-100 %] 99 %  BP: ()/(54-68) 109/65    General:   alert, appears stated age, and cooperative   Lungs:    Non-labored   Heart:   regular rate and rhythm   Abdomen:  soft, non-tender; bowel sounds normal; no masses,  no organomegaly   Uterus:  firm located at the umblicus.        Incision: Bandage in place, clean, dry and intact   Extremities: no pedal edema noted     Lab Review  No results found for this or any previous visit (from the past 4 hour(s)).    I/O    Intake/Output Summary (Last 24 hours) at 2024 1138  Last data filed at 2024 1947  Gross per 24 hour   Intake --   Output 650 ml   Net -650 ml        Assessment:     Patient Active Problem List   Diagnosis    Chronic pain of left knee    S/P myomectomy - NO VAGINAL DELIVERY    Class 1 obesity due to excess calories without serious comorbidity with body mass index (BMI) of 32.0 to 32.9 in adult    Iron deficiency anemia    Thalassemia alpha carrier    Encounter for supervision of normal pregnancy in third trimester    History of in vitro fertilization     delivery delivered        Plan:   1. Postpartum care:  - Patient doing well. Continue routine management and advances.  - Continue PO pain meds. Pain well controlled.    2. Acute on chronic blood loss anemia  - stable  - will start oral iron        Dispo: As patient  The patient is Stable - Low risk of patient condition declining or worsening    Shift Goals  Clinical Goals: pain control  Patient Goals: rest  Family Goals: ashlie    Progress made toward(s) clinical / shift goals:    Problem: Knowledge Deficit - Standard  Goal: Patient and family/care givers will demonstrate understanding of plan of care, disease process/condition, diagnostic tests and medications  Outcome: Progressing       Patient is not progressing towards the following goals:  Mobility    Patient aaox4, resting comfortably in bed, has intermittent pain at stump site     meets milestones, will plan to discharge POD#3-4.    Loni Guerrero

## 2024-02-28 ENCOUNTER — HOSPITAL ENCOUNTER (OUTPATIENT)
Facility: MEDICAL CENTER | Age: 59
End: 2024-02-28
Attending: FAMILY MEDICINE
Payer: MEDICAID

## 2024-02-28 PROCEDURE — 87205 SMEAR GRAM STAIN: CPT

## 2024-02-28 PROCEDURE — 87077 CULTURE AEROBIC IDENTIFY: CPT

## 2024-02-28 PROCEDURE — 87070 CULTURE OTHR SPECIMN AEROBIC: CPT

## 2024-03-01 LAB
GRAM STN SPEC: NORMAL
SIGNIFICANT IND 70042: NORMAL
SITE SITE: NORMAL
SOURCE SOURCE: NORMAL

## 2024-03-02 LAB
BACTERIA WND AEROBE CULT: NORMAL
GRAM STN SPEC: NORMAL
SIGNIFICANT IND 70042: NORMAL
SITE SITE: NORMAL
SOURCE SOURCE: NORMAL

## 2024-10-17 ENCOUNTER — APPOINTMENT (OUTPATIENT)
Dept: RADIOLOGY | Facility: MEDICAL CENTER | Age: 59
DRG: 100 | End: 2024-10-17
Attending: EMERGENCY MEDICINE
Payer: MEDICAID

## 2024-10-17 ENCOUNTER — APPOINTMENT (OUTPATIENT)
Dept: RADIOLOGY | Facility: MEDICAL CENTER | Age: 59
DRG: 100 | End: 2024-10-17
Payer: MEDICAID

## 2024-10-17 ENCOUNTER — HOSPITAL ENCOUNTER (INPATIENT)
Facility: MEDICAL CENTER | Age: 59
LOS: 14 days | End: 2024-10-31
Attending: EMERGENCY MEDICINE | Admitting: FAMILY MEDICINE
Payer: MEDICAID

## 2024-10-17 DIAGNOSIS — Z89.612 HX OF AKA (ABOVE KNEE AMPUTATION), LEFT (HCC): ICD-10-CM

## 2024-10-17 DIAGNOSIS — R00.0 TACHYCARDIA: ICD-10-CM

## 2024-10-17 DIAGNOSIS — R56.9 SEIZURE (HCC): ICD-10-CM

## 2024-10-17 DIAGNOSIS — L89.154 PRESSURE INJURY OF COCCYGEAL REGION, STAGE 4 (HCC): Chronic | ICD-10-CM

## 2024-10-17 PROBLEM — Z72.0 TOBACCO USE: Status: ACTIVE | Noted: 2024-10-17

## 2024-10-17 PROBLEM — Z86.79 HISTORY OF HYPERTENSION: Status: ACTIVE | Noted: 2024-10-17

## 2024-10-17 PROBLEM — I73.9 PAD (PERIPHERAL ARTERY DISEASE) (HCC): Status: RESOLVED | Noted: 2023-09-30 | Resolved: 2024-10-17

## 2024-10-17 PROBLEM — G40.909 RECURRENT SEIZURES (HCC): Status: ACTIVE | Noted: 2024-10-17

## 2024-10-17 PROBLEM — L03.90 CELLULITIS: Status: ACTIVE | Noted: 2024-10-17

## 2024-10-17 LAB
ALBUMIN SERPL BCP-MCNC: 3 G/DL (ref 3.2–4.9)
ALBUMIN/GLOB SERPL: 1.2 G/DL
ALP SERPL-CCNC: 89 U/L (ref 30–99)
ALT SERPL-CCNC: 11 U/L (ref 2–50)
ANION GAP SERPL CALC-SCNC: 20 MMOL/L (ref 7–16)
AST SERPL-CCNC: 25 U/L (ref 12–45)
BASOPHILS # BLD AUTO: 0.2 % (ref 0–1.8)
BASOPHILS # BLD: 0.03 K/UL (ref 0–0.12)
BILIRUB SERPL-MCNC: 0.5 MG/DL (ref 0.1–1.5)
BUN SERPL-MCNC: 7 MG/DL (ref 8–22)
CALCIUM ALBUM COR SERPL-MCNC: 8.2 MG/DL (ref 8.5–10.5)
CALCIUM SERPL-MCNC: 7.4 MG/DL (ref 8.5–10.5)
CHLORIDE SERPL-SCNC: 98 MMOL/L (ref 96–112)
CHOLEST SERPL-MCNC: 129 MG/DL (ref 100–199)
CK SERPL-CCNC: 156 U/L (ref 0–154)
CO2 SERPL-SCNC: 19 MMOL/L (ref 20–33)
CREAT SERPL-MCNC: 0.27 MG/DL (ref 0.5–1.4)
EKG IMPRESSION: NORMAL
EOSINOPHIL # BLD AUTO: 0.01 K/UL (ref 0–0.51)
EOSINOPHIL NFR BLD: 0.1 % (ref 0–6.9)
ERYTHROCYTE [DISTWIDTH] IN BLOOD BY AUTOMATED COUNT: 62.2 FL (ref 35.9–50)
EST. AVERAGE GLUCOSE BLD GHB EST-MCNC: 97 MG/DL
ETHANOL BLD-MCNC: <10.1 MG/DL
GFR SERPLBLD CREATININE-BSD FMLA CKD-EPI: 140 ML/MIN/1.73 M 2
GLOBULIN SER CALC-MCNC: 2.6 G/DL (ref 1.9–3.5)
GLUCOSE SERPL-MCNC: 104 MG/DL (ref 65–99)
HBA1C MFR BLD: 5 % (ref 4–5.6)
HCT VFR BLD AUTO: 45.4 % (ref 42–52)
HDLC SERPL-MCNC: 86 MG/DL
HGB BLD-MCNC: 15.9 G/DL (ref 14–18)
IMM GRANULOCYTES # BLD AUTO: 0.09 K/UL (ref 0–0.11)
IMM GRANULOCYTES NFR BLD AUTO: 0.7 % (ref 0–0.9)
LACTATE SERPL-SCNC: 1.9 MMOL/L (ref 0.5–2)
LDLC SERPL CALC-MCNC: 33 MG/DL
LYMPHOCYTES # BLD AUTO: 0.52 K/UL (ref 1–4.8)
LYMPHOCYTES NFR BLD: 3.8 % (ref 22–41)
MAGNESIUM SERPL-MCNC: 1.1 MG/DL (ref 1.5–2.5)
MCH RBC QN AUTO: 34.6 PG (ref 27–33)
MCHC RBC AUTO-ENTMCNC: 35 G/DL (ref 32.3–36.5)
MCV RBC AUTO: 98.9 FL (ref 81.4–97.8)
MONOCYTES # BLD AUTO: 0.54 K/UL (ref 0–0.85)
MONOCYTES NFR BLD AUTO: 3.9 % (ref 0–13.4)
NEUTROPHILS # BLD AUTO: 12.65 K/UL (ref 1.82–7.42)
NEUTROPHILS NFR BLD: 91.3 % (ref 44–72)
NRBC # BLD AUTO: 0 K/UL
NRBC BLD-RTO: 0 /100 WBC (ref 0–0.2)
PLATELET # BLD AUTO: 249 K/UL (ref 164–446)
PMV BLD AUTO: 9.6 FL (ref 9–12.9)
POTASSIUM SERPL-SCNC: 3.3 MMOL/L (ref 3.6–5.5)
PROT SERPL-MCNC: 5.6 G/DL (ref 6–8.2)
RBC # BLD AUTO: 4.59 M/UL (ref 4.7–6.1)
SODIUM SERPL-SCNC: 137 MMOL/L (ref 135–145)
TRIGL SERPL-MCNC: 52 MG/DL (ref 0–149)
TSH SERPL DL<=0.005 MIU/L-ACNC: 1.23 UIU/ML (ref 0.38–5.33)
WBC # BLD AUTO: 13.8 K/UL (ref 4.8–10.8)

## 2024-10-17 PROCEDURE — 96366 THER/PROPH/DIAG IV INF ADDON: CPT

## 2024-10-17 PROCEDURE — 36415 COLL VENOUS BLD VENIPUNCTURE: CPT

## 2024-10-17 PROCEDURE — 80307 DRUG TEST PRSMV CHEM ANLYZR: CPT

## 2024-10-17 PROCEDURE — 82077 ASSAY SPEC XCP UR&BREATH IA: CPT

## 2024-10-17 PROCEDURE — 80061 LIPID PANEL: CPT

## 2024-10-17 PROCEDURE — 99999 PR NO CHARGE: CPT | Performed by: STUDENT IN AN ORGANIZED HEALTH CARE EDUCATION/TRAINING PROGRAM

## 2024-10-17 PROCEDURE — 700111 HCHG RX REV CODE 636 W/ 250 OVERRIDE (IP)

## 2024-10-17 PROCEDURE — 700102 HCHG RX REV CODE 250 W/ 637 OVERRIDE(OP)

## 2024-10-17 PROCEDURE — 85025 COMPLETE CBC W/AUTO DIFF WBC: CPT

## 2024-10-17 PROCEDURE — 93005 ELECTROCARDIOGRAM TRACING: CPT

## 2024-10-17 PROCEDURE — A9270 NON-COVERED ITEM OR SERVICE: HCPCS

## 2024-10-17 PROCEDURE — 80053 COMPREHEN METABOLIC PANEL: CPT

## 2024-10-17 PROCEDURE — 82550 ASSAY OF CK (CPK): CPT

## 2024-10-17 PROCEDURE — 83605 ASSAY OF LACTIC ACID: CPT

## 2024-10-17 PROCEDURE — 99285 EMERGENCY DEPT VISIT HI MDM: CPT

## 2024-10-17 PROCEDURE — 700105 HCHG RX REV CODE 258

## 2024-10-17 PROCEDURE — 73551 X-RAY EXAM OF FEMUR 1: CPT | Mod: LT

## 2024-10-17 PROCEDURE — 770020 HCHG ROOM/CARE - TELE (206)

## 2024-10-17 PROCEDURE — 83036 HEMOGLOBIN GLYCOSYLATED A1C: CPT

## 2024-10-17 PROCEDURE — 96365 THER/PROPH/DIAG IV INF INIT: CPT

## 2024-10-17 PROCEDURE — 83735 ASSAY OF MAGNESIUM: CPT

## 2024-10-17 PROCEDURE — 70450 CT HEAD/BRAIN W/O DYE: CPT

## 2024-10-17 PROCEDURE — 96375 TX/PRO/DX INJ NEW DRUG ADDON: CPT

## 2024-10-17 PROCEDURE — 71045 X-RAY EXAM CHEST 1 VIEW: CPT

## 2024-10-17 PROCEDURE — 700111 HCHG RX REV CODE 636 W/ 250 OVERRIDE (IP): Mod: JZ,UD | Performed by: EMERGENCY MEDICINE

## 2024-10-17 PROCEDURE — 84443 ASSAY THYROID STIM HORMONE: CPT

## 2024-10-17 RX ORDER — ACETAMINOPHEN 325 MG/1
650 TABLET ORAL EVERY 4 HOURS PRN
Status: DISCONTINUED | OUTPATIENT
Start: 2024-10-17 | End: 2024-10-19

## 2024-10-17 RX ORDER — POTASSIUM CHLORIDE 1500 MG/1
40 TABLET, EXTENDED RELEASE ORAL ONCE
Status: COMPLETED | OUTPATIENT
Start: 2024-10-17 | End: 2024-10-17

## 2024-10-17 RX ORDER — PROMETHAZINE HYDROCHLORIDE 25 MG/1
12.5-25 SUPPOSITORY RECTAL EVERY 4 HOURS PRN
Status: DISCONTINUED | OUTPATIENT
Start: 2024-10-17 | End: 2024-10-22

## 2024-10-17 RX ORDER — LABETALOL HYDROCHLORIDE 5 MG/ML
10 INJECTION, SOLUTION INTRAVENOUS EVERY 4 HOURS PRN
Status: DISCONTINUED | OUTPATIENT
Start: 2024-10-17 | End: 2024-10-31 | Stop reason: HOSPADM

## 2024-10-17 RX ORDER — LEVETIRACETAM 500 MG/5ML
500 INJECTION, SOLUTION, CONCENTRATE INTRAVENOUS ONCE
Status: COMPLETED | OUTPATIENT
Start: 2024-10-17 | End: 2024-10-17

## 2024-10-17 RX ORDER — LEVETIRACETAM 500 MG/1
500 TABLET ORAL 2 TIMES DAILY
Status: DISCONTINUED | OUTPATIENT
Start: 2024-10-17 | End: 2024-10-24

## 2024-10-17 RX ORDER — MAGNESIUM SULFATE HEPTAHYDRATE 40 MG/ML
4 INJECTION, SOLUTION INTRAVENOUS ONCE
Status: COMPLETED | OUTPATIENT
Start: 2024-10-17 | End: 2024-10-17

## 2024-10-17 RX ORDER — PROMETHAZINE HYDROCHLORIDE 25 MG/1
12.5-25 TABLET ORAL EVERY 4 HOURS PRN
Status: DISCONTINUED | OUTPATIENT
Start: 2024-10-17 | End: 2024-10-22

## 2024-10-17 RX ORDER — ONDANSETRON 4 MG/1
4 TABLET, ORALLY DISINTEGRATING ORAL EVERY 4 HOURS PRN
Status: DISCONTINUED | OUTPATIENT
Start: 2024-10-17 | End: 2024-10-30

## 2024-10-17 RX ORDER — LORAZEPAM 2 MG/ML
0.5 INJECTION INTRAMUSCULAR EVERY 4 HOURS PRN
Status: DISCONTINUED | OUTPATIENT
Start: 2024-10-17 | End: 2024-10-30

## 2024-10-17 RX ORDER — AMOXICILLIN 500 MG/1
500 CAPSULE ORAL EVERY 8 HOURS
Status: COMPLETED | OUTPATIENT
Start: 2024-10-17 | End: 2024-10-22

## 2024-10-17 RX ORDER — PROCHLORPERAZINE EDISYLATE 5 MG/ML
5-10 INJECTION INTRAMUSCULAR; INTRAVENOUS EVERY 4 HOURS PRN
Status: DISCONTINUED | OUTPATIENT
Start: 2024-10-17 | End: 2024-10-22

## 2024-10-17 RX ORDER — SODIUM CHLORIDE 9 MG/ML
1000 INJECTION, SOLUTION INTRAVENOUS ONCE
Status: COMPLETED | OUTPATIENT
Start: 2024-10-17 | End: 2024-10-17

## 2024-10-17 RX ORDER — NICOTINE 21 MG/24HR
14 PATCH, TRANSDERMAL 24 HOURS TRANSDERMAL
Status: DISCONTINUED | OUTPATIENT
Start: 2024-10-17 | End: 2024-10-31 | Stop reason: HOSPADM

## 2024-10-17 RX ORDER — ONDANSETRON 2 MG/ML
4 INJECTION INTRAMUSCULAR; INTRAVENOUS EVERY 4 HOURS PRN
Status: DISCONTINUED | OUTPATIENT
Start: 2024-10-17 | End: 2024-10-30

## 2024-10-17 RX ORDER — LORAZEPAM 2 MG/ML
1 INJECTION INTRAMUSCULAR ONCE
Status: COMPLETED | OUTPATIENT
Start: 2024-10-17 | End: 2024-10-17

## 2024-10-17 RX ADMIN — MAGNESIUM SULFATE HEPTAHYDRATE 4 G: 4 INJECTION, SOLUTION INTRAVENOUS at 17:05

## 2024-10-17 RX ADMIN — AMOXICILLIN 500 MG: 500 CAPSULE ORAL at 22:24

## 2024-10-17 RX ADMIN — LORAZEPAM 1 MG: 2 INJECTION INTRAMUSCULAR; INTRAVENOUS at 13:00

## 2024-10-17 RX ADMIN — SODIUM CHLORIDE 1000 ML: 9 INJECTION, SOLUTION INTRAVENOUS at 16:19

## 2024-10-17 RX ADMIN — NICOTINE 14 MG: 14 PATCH TRANSDERMAL at 19:31

## 2024-10-17 RX ADMIN — SODIUM CHLORIDE 1000 ML: 9 INJECTION, SOLUTION INTRAVENOUS at 23:21

## 2024-10-17 RX ADMIN — ACETAMINOPHEN 650 MG: 325 TABLET ORAL at 23:17

## 2024-10-17 RX ADMIN — LEVETIRACETAM 500 MG: 100 INJECTION, SOLUTION INTRAVENOUS at 13:00

## 2024-10-17 RX ADMIN — POTASSIUM CHLORIDE 40 MEQ: 1500 TABLET, EXTENDED RELEASE ORAL at 16:19

## 2024-10-17 RX ADMIN — AMOXICILLIN 500 MG: 500 CAPSULE ORAL at 17:04

## 2024-10-17 RX ADMIN — LEVETIRACETAM 500 MG: 500 TABLET, FILM COATED ORAL at 22:24

## 2024-10-17 RX ADMIN — Medication 10 MG: at 23:58

## 2024-10-17 SDOH — ECONOMIC STABILITY: TRANSPORTATION INSECURITY
IN THE PAST 12 MONTHS, HAS LACK OF RELIABLE TRANSPORTATION KEPT YOU FROM MEDICAL APPOINTMENTS, MEETINGS, WORK OR FROM GETTING THINGS NEEDED FOR DAILY LIVING?: NO

## 2024-10-17 SDOH — ECONOMIC STABILITY: TRANSPORTATION INSECURITY
IN THE PAST 12 MONTHS, HAS THE LACK OF TRANSPORTATION KEPT YOU FROM MEDICAL APPOINTMENTS OR FROM GETTING MEDICATIONS?: NO

## 2024-10-17 ASSESSMENT — SOCIAL DETERMINANTS OF HEALTH (SDOH)
WITHIN THE LAST YEAR, HAVE YOU BEEN AFRAID OF YOUR PARTNER OR EX-PARTNER?: NO
WITHIN THE PAST 12 MONTHS, THE FOOD YOU BOUGHT JUST DIDN'T LAST AND YOU DIDN'T HAVE MONEY TO GET MORE: NEVER TRUE
WITHIN THE LAST YEAR, HAVE YOU BEEN KICKED, HIT, SLAPPED, OR OTHERWISE PHYSICALLY HURT BY YOUR PARTNER OR EX-PARTNER?: NO
IN THE PAST 12 MONTHS, HAS THE ELECTRIC, GAS, OIL, OR WATER COMPANY THREATENED TO SHUT OFF SERVICE IN YOUR HOME?: NO
WITHIN THE LAST YEAR, HAVE YOU BEEN HUMILIATED OR EMOTIONALLY ABUSED IN OTHER WAYS BY YOUR PARTNER OR EX-PARTNER?: NO
WITHIN THE LAST YEAR, HAVE TO BEEN RAPED OR FORCED TO HAVE ANY KIND OF SEXUAL ACTIVITY BY YOUR PARTNER OR EX-PARTNER?: NO
WITHIN THE PAST 12 MONTHS, YOU WORRIED THAT YOUR FOOD WOULD RUN OUT BEFORE YOU GOT THE MONEY TO BUY MORE: NEVER TRUE

## 2024-10-17 ASSESSMENT — LIFESTYLE VARIABLES
HAVE YOU EVER FELT YOU SHOULD CUT DOWN ON YOUR DRINKING: NO
DOES PATIENT WANT TO STOP DRINKING: NO
ALCOHOL_USE: YES
TOTAL SCORE: 0
HAVE PEOPLE ANNOYED YOU BY CRITICIZING YOUR DRINKING: NO
AVERAGE NUMBER OF DAYS PER WEEK YOU HAVE A DRINK CONTAINING ALCOHOL: 3
TOTAL SCORE: 0
TOTAL SCORE: 0
EVER FELT BAD OR GUILTY ABOUT YOUR DRINKING: NO
ON A TYPICAL DAY WHEN YOU DRINK ALCOHOL HOW MANY DRINKS DO YOU HAVE: 3
HOW MANY TIMES IN THE PAST YEAR HAVE YOU HAD 5 OR MORE DRINKS IN A DAY: 1
CONSUMPTION TOTAL: POSITIVE
EVER HAD A DRINK FIRST THING IN THE MORNING TO STEADY YOUR NERVES TO GET RID OF A HANGOVER: NO

## 2024-10-17 ASSESSMENT — COGNITIVE AND FUNCTIONAL STATUS - GENERAL
MOVING FROM LYING ON BACK TO SITTING ON SIDE OF FLAT BED: A LITTLE
DRESSING REGULAR LOWER BODY CLOTHING: A LOT
MOVING TO AND FROM BED TO CHAIR: A LOT
DAILY ACTIVITIY SCORE: 17
TOILETING: A LITTLE
MOBILITY SCORE: 12
PERSONAL GROOMING: A LITTLE
WALKING IN HOSPITAL ROOM: TOTAL
DRESSING REGULAR UPPER BODY CLOTHING: A LITTLE
STANDING UP FROM CHAIR USING ARMS: A LOT
HELP NEEDED FOR BATHING: A LOT
TURNING FROM BACK TO SIDE WHILE IN FLAT BAD: A LITTLE
SUGGESTED CMS G CODE MODIFIER DAILY ACTIVITY: CK
SUGGESTED CMS G CODE MODIFIER MOBILITY: CL
CLIMB 3 TO 5 STEPS WITH RAILING: TOTAL

## 2024-10-17 ASSESSMENT — PAIN DESCRIPTION - PAIN TYPE
TYPE: ACUTE PAIN
TYPE: ACUTE PAIN

## 2024-10-17 ASSESSMENT — FIBROSIS 4 INDEX
FIB4 SCORE: 1.82

## 2024-10-18 PROBLEM — F41.9 ANXIETY: Status: RESOLVED | Noted: 2023-11-19 | Resolved: 2024-10-18

## 2024-10-18 PROBLEM — Z74.09: Status: ACTIVE | Noted: 2024-10-18

## 2024-10-18 PROBLEM — F32.A DEPRESSION: Status: ACTIVE | Noted: 2024-10-18

## 2024-10-18 PROBLEM — D72.829 LEUKOCYTOSIS: Status: RESOLVED | Noted: 2023-09-26 | Resolved: 2024-10-18

## 2024-10-18 PROBLEM — E87.8 ELECTROLYTE ABNORMALITY: Status: ACTIVE | Noted: 2024-10-18

## 2024-10-18 LAB
AMPHET UR QL SCN: NEGATIVE
ANION GAP SERPL CALC-SCNC: 12 MMOL/L (ref 7–16)
BARBITURATES UR QL SCN: NEGATIVE
BENZODIAZ UR QL SCN: POSITIVE
BUN SERPL-MCNC: 9 MG/DL (ref 8–22)
BZE UR QL SCN: NEGATIVE
CALCIUM SERPL-MCNC: 7.8 MG/DL (ref 8.5–10.5)
CANNABINOIDS UR QL SCN: NEGATIVE
CHLORIDE SERPL-SCNC: 97 MMOL/L (ref 96–112)
CO2 SERPL-SCNC: 24 MMOL/L (ref 20–33)
CREAT SERPL-MCNC: 0.47 MG/DL (ref 0.5–1.4)
ERYTHROCYTE [DISTWIDTH] IN BLOOD BY AUTOMATED COUNT: 63.9 FL (ref 35.9–50)
FENTANYL UR QL: NEGATIVE
GFR SERPLBLD CREATININE-BSD FMLA CKD-EPI: 119 ML/MIN/1.73 M 2
GLUCOSE SERPL-MCNC: 99 MG/DL (ref 65–99)
HCT VFR BLD AUTO: 39.3 % (ref 42–52)
HGB BLD-MCNC: 13.5 G/DL (ref 14–18)
MAGNESIUM SERPL-MCNC: 2.1 MG/DL (ref 1.5–2.5)
MCH RBC QN AUTO: 34.9 PG (ref 27–33)
MCHC RBC AUTO-ENTMCNC: 34.4 G/DL (ref 32.3–36.5)
MCV RBC AUTO: 101.6 FL (ref 81.4–97.8)
METHADONE UR QL SCN: NEGATIVE
OPIATES UR QL SCN: NEGATIVE
OXYCODONE UR QL SCN: NEGATIVE
PCP UR QL SCN: NEGATIVE
PHOSPHATE SERPL-MCNC: 1.9 MG/DL (ref 2.5–4.5)
PLATELET # BLD AUTO: 186 K/UL (ref 164–446)
PMV BLD AUTO: 9.3 FL (ref 9–12.9)
POTASSIUM SERPL-SCNC: 3.6 MMOL/L (ref 3.6–5.5)
PROPOXYPH UR QL SCN: NEGATIVE
RBC # BLD AUTO: 3.87 M/UL (ref 4.7–6.1)
SODIUM SERPL-SCNC: 133 MMOL/L (ref 135–145)
WBC # BLD AUTO: 6 K/UL (ref 4.8–10.8)

## 2024-10-18 PROCEDURE — 700102 HCHG RX REV CODE 250 W/ 637 OVERRIDE(OP)

## 2024-10-18 PROCEDURE — 36415 COLL VENOUS BLD VENIPUNCTURE: CPT

## 2024-10-18 PROCEDURE — 99223 1ST HOSP IP/OBS HIGH 75: CPT | Mod: GC | Performed by: STUDENT IN AN ORGANIZED HEALTH CARE EDUCATION/TRAINING PROGRAM

## 2024-10-18 PROCEDURE — 83735 ASSAY OF MAGNESIUM: CPT

## 2024-10-18 PROCEDURE — 97602 WOUND(S) CARE NON-SELECTIVE: CPT

## 2024-10-18 PROCEDURE — A9270 NON-COVERED ITEM OR SERVICE: HCPCS

## 2024-10-18 PROCEDURE — 84100 ASSAY OF PHOSPHORUS: CPT

## 2024-10-18 PROCEDURE — 770020 HCHG ROOM/CARE - TELE (206)

## 2024-10-18 PROCEDURE — 97162 PT EVAL MOD COMPLEX 30 MIN: CPT

## 2024-10-18 PROCEDURE — 97535 SELF CARE MNGMENT TRAINING: CPT

## 2024-10-18 PROCEDURE — 80048 BASIC METABOLIC PNL TOTAL CA: CPT

## 2024-10-18 PROCEDURE — 85027 COMPLETE CBC AUTOMATED: CPT

## 2024-10-18 RX ORDER — HYDROXYZINE HYDROCHLORIDE 25 MG/1
25 TABLET, FILM COATED ORAL 3 TIMES DAILY PRN
Status: DISCONTINUED | OUTPATIENT
Start: 2024-10-18 | End: 2024-10-19

## 2024-10-18 RX ORDER — OXYCODONE HYDROCHLORIDE 5 MG/1
5 TABLET ORAL EVERY 4 HOURS PRN
Status: DISCONTINUED | OUTPATIENT
Start: 2024-10-18 | End: 2024-10-22

## 2024-10-18 RX ADMIN — LEVETIRACETAM 500 MG: 500 TABLET, FILM COATED ORAL at 08:41

## 2024-10-18 RX ADMIN — NICOTINE 14 MG: 14 PATCH TRANSDERMAL at 04:45

## 2024-10-18 RX ADMIN — Medication 10 MG: at 20:02

## 2024-10-18 RX ADMIN — ACETAMINOPHEN 650 MG: 325 TABLET ORAL at 14:23

## 2024-10-18 RX ADMIN — OXYCODONE 5 MG: 5 TABLET ORAL at 20:01

## 2024-10-18 RX ADMIN — OXYCODONE 5 MG: 5 TABLET ORAL at 13:40

## 2024-10-18 RX ADMIN — DIBASIC SODIUM PHOSPHATE, MONOBASIC POTASSIUM PHOSPHATE AND MONOBASIC SODIUM PHOSPHATE 250 MG: 852; 155; 130 TABLET ORAL at 17:12

## 2024-10-18 RX ADMIN — ACETAMINOPHEN 650 MG: 325 TABLET ORAL at 04:44

## 2024-10-18 RX ADMIN — DIBASIC SODIUM PHOSPHATE, MONOBASIC POTASSIUM PHOSPHATE AND MONOBASIC SODIUM PHOSPHATE 250 MG: 852; 155; 130 TABLET ORAL at 06:43

## 2024-10-18 RX ADMIN — LEVETIRACETAM 500 MG: 500 TABLET, FILM COATED ORAL at 20:01

## 2024-10-18 RX ADMIN — AMOXICILLIN 500 MG: 500 CAPSULE ORAL at 20:01

## 2024-10-18 RX ADMIN — DIBASIC SODIUM PHOSPHATE, MONOBASIC POTASSIUM PHOSPHATE AND MONOBASIC SODIUM PHOSPHATE 250 MG: 852; 155; 130 TABLET ORAL at 12:19

## 2024-10-18 RX ADMIN — AMOXICILLIN 500 MG: 500 CAPSULE ORAL at 04:44

## 2024-10-18 RX ADMIN — OXYCODONE 5 MG: 5 TABLET ORAL at 09:38

## 2024-10-18 RX ADMIN — AMOXICILLIN 500 MG: 500 CAPSULE ORAL at 13:38

## 2024-10-18 ASSESSMENT — COGNITIVE AND FUNCTIONAL STATUS - GENERAL
SUGGESTED CMS G CODE MODIFIER MOBILITY: CL
STANDING UP FROM CHAIR USING ARMS: A LOT
MOVING FROM LYING ON BACK TO SITTING ON SIDE OF FLAT BED: A LOT
MOVING TO AND FROM BED TO CHAIR: A LITTLE
WALKING IN HOSPITAL ROOM: TOTAL
MOBILITY SCORE: 11
TURNING FROM BACK TO SIDE WHILE IN FLAT BAD: A LOT
CLIMB 3 TO 5 STEPS WITH RAILING: TOTAL

## 2024-10-18 ASSESSMENT — FIBROSIS 4 INDEX: FIB4 SCORE: 1.82

## 2024-10-19 LAB
ANION GAP SERPL CALC-SCNC: 11 MMOL/L (ref 7–16)
BUN SERPL-MCNC: 6 MG/DL (ref 8–22)
CALCIUM SERPL-MCNC: 8.7 MG/DL (ref 8.5–10.5)
CHLORIDE SERPL-SCNC: 96 MMOL/L (ref 96–112)
CO2 SERPL-SCNC: 25 MMOL/L (ref 20–33)
CREAT SERPL-MCNC: 0.34 MG/DL (ref 0.5–1.4)
ERYTHROCYTE [DISTWIDTH] IN BLOOD BY AUTOMATED COUNT: 60.5 FL (ref 35.9–50)
GFR SERPLBLD CREATININE-BSD FMLA CKD-EPI: 131 ML/MIN/1.73 M 2
GLUCOSE SERPL-MCNC: 103 MG/DL (ref 65–99)
HCT VFR BLD AUTO: 39.6 % (ref 42–52)
HGB BLD-MCNC: 13.2 G/DL (ref 14–18)
MAGNESIUM SERPL-MCNC: 1.6 MG/DL (ref 1.5–2.5)
MCH RBC QN AUTO: 33.5 PG (ref 27–33)
MCHC RBC AUTO-ENTMCNC: 33.3 G/DL (ref 32.3–36.5)
MCV RBC AUTO: 100.5 FL (ref 81.4–97.8)
PHOSPHATE SERPL-MCNC: 3.2 MG/DL (ref 2.5–4.5)
PLATELET # BLD AUTO: 177 K/UL (ref 164–446)
PMV BLD AUTO: 9.6 FL (ref 9–12.9)
POTASSIUM SERPL-SCNC: 3.7 MMOL/L (ref 3.6–5.5)
RBC # BLD AUTO: 3.94 M/UL (ref 4.7–6.1)
SODIUM SERPL-SCNC: 132 MMOL/L (ref 135–145)
WBC # BLD AUTO: 5.4 K/UL (ref 4.8–10.8)

## 2024-10-19 PROCEDURE — 700102 HCHG RX REV CODE 250 W/ 637 OVERRIDE(OP)

## 2024-10-19 PROCEDURE — 83735 ASSAY OF MAGNESIUM: CPT

## 2024-10-19 PROCEDURE — 80048 BASIC METABOLIC PNL TOTAL CA: CPT

## 2024-10-19 PROCEDURE — A9270 NON-COVERED ITEM OR SERVICE: HCPCS

## 2024-10-19 PROCEDURE — 99232 SBSQ HOSP IP/OBS MODERATE 35: CPT | Mod: GC | Performed by: STUDENT IN AN ORGANIZED HEALTH CARE EDUCATION/TRAINING PROGRAM

## 2024-10-19 PROCEDURE — 36415 COLL VENOUS BLD VENIPUNCTURE: CPT

## 2024-10-19 PROCEDURE — 700102 HCHG RX REV CODE 250 W/ 637 OVERRIDE(OP): Mod: JZ

## 2024-10-19 PROCEDURE — 770020 HCHG ROOM/CARE - TELE (206)

## 2024-10-19 PROCEDURE — A9270 NON-COVERED ITEM OR SERVICE: HCPCS | Mod: JZ

## 2024-10-19 PROCEDURE — 85027 COMPLETE CBC AUTOMATED: CPT

## 2024-10-19 PROCEDURE — 84100 ASSAY OF PHOSPHORUS: CPT

## 2024-10-19 RX ORDER — DULOXETIN HYDROCHLORIDE 30 MG/1
30 CAPSULE, DELAYED RELEASE ORAL DAILY
Status: DISCONTINUED | OUTPATIENT
Start: 2024-10-19 | End: 2024-10-26

## 2024-10-19 RX ORDER — ACETAMINOPHEN 500 MG
1000 TABLET ORAL EVERY 8 HOURS
Status: DISCONTINUED | OUTPATIENT
Start: 2024-10-19 | End: 2024-10-31 | Stop reason: HOSPADM

## 2024-10-19 RX ORDER — HYDROXYZINE HYDROCHLORIDE 50 MG/1
25 TABLET, FILM COATED ORAL 3 TIMES DAILY PRN
Status: DISCONTINUED | OUTPATIENT
Start: 2024-10-19 | End: 2024-10-29

## 2024-10-19 RX ORDER — POTASSIUM CHLORIDE 1500 MG/1
20 TABLET, EXTENDED RELEASE ORAL ONCE
Status: COMPLETED | OUTPATIENT
Start: 2024-10-19 | End: 2024-10-19

## 2024-10-19 RX ADMIN — Medication 10 MG: at 19:43

## 2024-10-19 RX ADMIN — DULOXETINE HYDROCHLORIDE 30 MG: 30 CAPSULE, DELAYED RELEASE ORAL at 14:57

## 2024-10-19 RX ADMIN — LEVETIRACETAM 500 MG: 500 TABLET, FILM COATED ORAL at 07:40

## 2024-10-19 RX ADMIN — HYDROXYZINE HYDROCHLORIDE 25 MG: 25 TABLET, FILM COATED ORAL at 01:25

## 2024-10-19 RX ADMIN — OXYCODONE 5 MG: 5 TABLET ORAL at 19:43

## 2024-10-19 RX ADMIN — AMOXICILLIN 500 MG: 500 CAPSULE ORAL at 14:57

## 2024-10-19 RX ADMIN — LEVETIRACETAM 500 MG: 500 TABLET, FILM COATED ORAL at 19:43

## 2024-10-19 RX ADMIN — AMOXICILLIN 500 MG: 500 CAPSULE ORAL at 06:22

## 2024-10-19 RX ADMIN — POTASSIUM CHLORIDE 20 MEQ: 1500 TABLET, EXTENDED RELEASE ORAL at 14:58

## 2024-10-19 RX ADMIN — NICOTINE 14 MG: 14 PATCH TRANSDERMAL at 06:22

## 2024-10-19 RX ADMIN — ACETAMINOPHEN 1000 MG: 500 TABLET ORAL at 22:01

## 2024-10-19 RX ADMIN — OXYCODONE 5 MG: 5 TABLET ORAL at 15:26

## 2024-10-19 RX ADMIN — AMOXICILLIN 500 MG: 500 CAPSULE ORAL at 22:01

## 2024-10-19 RX ADMIN — ACETAMINOPHEN 1000 MG: 500 TABLET ORAL at 14:57

## 2024-10-19 ASSESSMENT — FIBROSIS 4 INDEX: FIB4 SCORE: 2.43

## 2024-10-20 PROCEDURE — 770006 HCHG ROOM/CARE - MED/SURG/GYN SEMI*

## 2024-10-20 PROCEDURE — A9270 NON-COVERED ITEM OR SERVICE: HCPCS

## 2024-10-20 PROCEDURE — 99232 SBSQ HOSP IP/OBS MODERATE 35: CPT | Mod: GC | Performed by: STUDENT IN AN ORGANIZED HEALTH CARE EDUCATION/TRAINING PROGRAM

## 2024-10-20 PROCEDURE — 700102 HCHG RX REV CODE 250 W/ 637 OVERRIDE(OP)

## 2024-10-20 RX ADMIN — OXYCODONE 5 MG: 5 TABLET ORAL at 20:04

## 2024-10-20 RX ADMIN — AMOXICILLIN 500 MG: 500 CAPSULE ORAL at 20:04

## 2024-10-20 RX ADMIN — ACETAMINOPHEN 1000 MG: 500 TABLET ORAL at 20:04

## 2024-10-20 RX ADMIN — OXYCODONE 5 MG: 5 TABLET ORAL at 13:14

## 2024-10-20 RX ADMIN — AMOXICILLIN 500 MG: 500 CAPSULE ORAL at 13:14

## 2024-10-20 RX ADMIN — DULOXETINE HYDROCHLORIDE 30 MG: 30 CAPSULE, DELAYED RELEASE ORAL at 05:26

## 2024-10-20 RX ADMIN — ACETAMINOPHEN 1000 MG: 500 TABLET ORAL at 08:44

## 2024-10-20 RX ADMIN — LEVETIRACETAM 500 MG: 500 TABLET, FILM COATED ORAL at 08:45

## 2024-10-20 RX ADMIN — LEVETIRACETAM 500 MG: 500 TABLET, FILM COATED ORAL at 20:04

## 2024-10-20 RX ADMIN — AMOXICILLIN 500 MG: 500 CAPSULE ORAL at 05:26

## 2024-10-20 RX ADMIN — NICOTINE 14 MG: 14 PATCH TRANSDERMAL at 05:26

## 2024-10-20 RX ADMIN — ACETAMINOPHEN 1000 MG: 500 TABLET ORAL at 15:54

## 2024-10-20 RX ADMIN — Medication 10 MG: at 20:04

## 2024-10-20 ASSESSMENT — FIBROSIS 4 INDEX: FIB4 SCORE: 2.56

## 2024-10-20 ASSESSMENT — PAIN DESCRIPTION - PAIN TYPE: TYPE: ACUTE PAIN

## 2024-10-21 PROCEDURE — 700102 HCHG RX REV CODE 250 W/ 637 OVERRIDE(OP)

## 2024-10-21 PROCEDURE — A9270 NON-COVERED ITEM OR SERVICE: HCPCS

## 2024-10-21 PROCEDURE — 770006 HCHG ROOM/CARE - MED/SURG/GYN SEMI*

## 2024-10-21 PROCEDURE — 99232 SBSQ HOSP IP/OBS MODERATE 35: CPT | Mod: GC | Performed by: FAMILY MEDICINE

## 2024-10-21 RX ORDER — KETOROLAC TROMETHAMINE 10 MG/1
10 TABLET, FILM COATED ORAL EVERY 6 HOURS PRN
Status: DISCONTINUED | OUTPATIENT
Start: 2024-10-21 | End: 2024-10-26

## 2024-10-21 RX ADMIN — AMOXICILLIN 500 MG: 500 CAPSULE ORAL at 21:34

## 2024-10-21 RX ADMIN — ACETAMINOPHEN 1000 MG: 500 TABLET ORAL at 23:25

## 2024-10-21 RX ADMIN — OXYCODONE 5 MG: 5 TABLET ORAL at 16:35

## 2024-10-21 RX ADMIN — ACETAMINOPHEN 1000 MG: 500 TABLET ORAL at 05:00

## 2024-10-21 RX ADMIN — ACETAMINOPHEN 1000 MG: 500 TABLET ORAL at 14:56

## 2024-10-21 RX ADMIN — NICOTINE 14 MG: 14 PATCH TRANSDERMAL at 04:59

## 2024-10-21 RX ADMIN — OXYCODONE 5 MG: 5 TABLET ORAL at 20:40

## 2024-10-21 RX ADMIN — AMOXICILLIN 500 MG: 500 CAPSULE ORAL at 04:58

## 2024-10-21 RX ADMIN — Medication 10 MG: at 19:55

## 2024-10-21 RX ADMIN — DULOXETINE HYDROCHLORIDE 30 MG: 30 CAPSULE, DELAYED RELEASE ORAL at 04:58

## 2024-10-21 RX ADMIN — AMOXICILLIN 500 MG: 500 CAPSULE ORAL at 14:01

## 2024-10-21 RX ADMIN — LEVETIRACETAM 500 MG: 500 TABLET, FILM COATED ORAL at 08:23

## 2024-10-21 RX ADMIN — OXYCODONE 5 MG: 5 TABLET ORAL at 12:35

## 2024-10-21 RX ADMIN — OXYCODONE 5 MG: 5 TABLET ORAL at 00:15

## 2024-10-21 RX ADMIN — OXYCODONE 5 MG: 5 TABLET ORAL at 08:22

## 2024-10-21 RX ADMIN — LEVETIRACETAM 500 MG: 500 TABLET, FILM COATED ORAL at 19:55

## 2024-10-21 ASSESSMENT — PAIN DESCRIPTION - PAIN TYPE
TYPE: ACUTE PAIN

## 2024-10-21 ASSESSMENT — PATIENT HEALTH QUESTIONNAIRE - PHQ9
1. LITTLE INTEREST OR PLEASURE IN DOING THINGS: NOT AT ALL
2. FEELING DOWN, DEPRESSED, IRRITABLE, OR HOPELESS: NOT AT ALL
SUM OF ALL RESPONSES TO PHQ9 QUESTIONS 1 AND 2: 0

## 2024-10-22 LAB
ANION GAP SERPL CALC-SCNC: 11 MMOL/L (ref 7–16)
APPEARANCE UR: CLEAR
BACTERIA #/AREA URNS HPF: ABNORMAL /HPF
BILIRUB UR QL STRIP.AUTO: NEGATIVE
BUN SERPL-MCNC: 11 MG/DL (ref 8–22)
CALCIUM SERPL-MCNC: 9.1 MG/DL (ref 8.5–10.5)
CHLORIDE SERPL-SCNC: 94 MMOL/L (ref 96–112)
CO2 SERPL-SCNC: 20 MMOL/L (ref 20–33)
COLOR UR: YELLOW
CREAT SERPL-MCNC: 0.62 MG/DL (ref 0.5–1.4)
EPI CELLS #/AREA URNS HPF: NEGATIVE /HPF
ERYTHROCYTE [DISTWIDTH] IN BLOOD BY AUTOMATED COUNT: 58.8 FL (ref 35.9–50)
GFR SERPLBLD CREATININE-BSD FMLA CKD-EPI: 109 ML/MIN/1.73 M 2
GLUCOSE SERPL-MCNC: 105 MG/DL (ref 65–99)
GLUCOSE UR STRIP.AUTO-MCNC: NEGATIVE MG/DL
HCT VFR BLD AUTO: 43.4 % (ref 42–52)
HGB BLD-MCNC: 14.8 G/DL (ref 14–18)
HYALINE CASTS #/AREA URNS LPF: ABNORMAL /LPF
KETONES UR STRIP.AUTO-MCNC: ABNORMAL MG/DL
LEUKOCYTE ESTERASE UR QL STRIP.AUTO: ABNORMAL
MCH RBC QN AUTO: 34 PG (ref 27–33)
MCHC RBC AUTO-ENTMCNC: 34.1 G/DL (ref 32.3–36.5)
MCV RBC AUTO: 99.8 FL (ref 81.4–97.8)
MICRO URNS: ABNORMAL
NITRITE UR QL STRIP.AUTO: POSITIVE
OSMOLALITY SERPL: 267 MOSM/KG H2O (ref 278–298)
OSMOLALITY UR: 435 MOSM/KG H2O (ref 300–900)
PH UR STRIP.AUTO: 5.5 [PH] (ref 5–8)
PLATELET # BLD AUTO: 231 K/UL (ref 164–446)
PMV BLD AUTO: 8.7 FL (ref 9–12.9)
POTASSIUM SERPL-SCNC: 4.5 MMOL/L (ref 3.6–5.5)
PROT UR QL STRIP: NEGATIVE MG/DL
RBC # BLD AUTO: 4.35 M/UL (ref 4.7–6.1)
RBC # URNS HPF: ABNORMAL /HPF
RBC UR QL AUTO: NEGATIVE
SODIUM SERPL-SCNC: 125 MMOL/L (ref 135–145)
SP GR UR STRIP.AUTO: 1.02
UROBILINOGEN UR STRIP.AUTO-MCNC: 1 MG/DL
WBC # BLD AUTO: 6.9 K/UL (ref 4.8–10.8)
WBC #/AREA URNS HPF: ABNORMAL /HPF

## 2024-10-22 PROCEDURE — 700102 HCHG RX REV CODE 250 W/ 637 OVERRIDE(OP)

## 2024-10-22 PROCEDURE — 87077 CULTURE AEROBIC IDENTIFY: CPT | Mod: 91

## 2024-10-22 PROCEDURE — A9270 NON-COVERED ITEM OR SERVICE: HCPCS

## 2024-10-22 PROCEDURE — 770006 HCHG ROOM/CARE - MED/SURG/GYN SEMI*

## 2024-10-22 PROCEDURE — 80048 BASIC METABOLIC PNL TOTAL CA: CPT

## 2024-10-22 PROCEDURE — 83930 ASSAY OF BLOOD OSMOLALITY: CPT

## 2024-10-22 PROCEDURE — 97535 SELF CARE MNGMENT TRAINING: CPT

## 2024-10-22 PROCEDURE — 85027 COMPLETE CBC AUTOMATED: CPT

## 2024-10-22 PROCEDURE — 87186 SC STD MICRODIL/AGAR DIL: CPT | Mod: 91

## 2024-10-22 PROCEDURE — 81001 URINALYSIS AUTO W/SCOPE: CPT

## 2024-10-22 PROCEDURE — 99232 SBSQ HOSP IP/OBS MODERATE 35: CPT | Mod: GC | Performed by: FAMILY MEDICINE

## 2024-10-22 PROCEDURE — 83935 ASSAY OF URINE OSMOLALITY: CPT

## 2024-10-22 PROCEDURE — 97166 OT EVAL MOD COMPLEX 45 MIN: CPT

## 2024-10-22 PROCEDURE — 87086 URINE CULTURE/COLONY COUNT: CPT

## 2024-10-22 PROCEDURE — 36415 COLL VENOUS BLD VENIPUNCTURE: CPT

## 2024-10-22 RX ORDER — OXYCODONE HYDROCHLORIDE 5 MG/1
5 TABLET ORAL EVERY 8 HOURS PRN
Status: DISCONTINUED | OUTPATIENT
Start: 2024-10-22 | End: 2024-10-24

## 2024-10-22 RX ADMIN — DULOXETINE HYDROCHLORIDE 30 MG: 30 CAPSULE, DELAYED RELEASE ORAL at 05:07

## 2024-10-22 RX ADMIN — Medication 10 MG: at 19:57

## 2024-10-22 RX ADMIN — ACETAMINOPHEN 1000 MG: 500 TABLET ORAL at 05:07

## 2024-10-22 RX ADMIN — KETOROLAC TROMETHAMINE 10 MG: 10 TABLET, FILM COATED ORAL at 13:08

## 2024-10-22 RX ADMIN — AMOXICILLIN 500 MG: 500 CAPSULE ORAL at 05:07

## 2024-10-22 RX ADMIN — LEVETIRACETAM 500 MG: 500 TABLET, FILM COATED ORAL at 09:17

## 2024-10-22 RX ADMIN — KETOROLAC TROMETHAMINE 10 MG: 10 TABLET, FILM COATED ORAL at 19:57

## 2024-10-22 RX ADMIN — OXYCODONE 5 MG: 5 TABLET ORAL at 07:07

## 2024-10-22 RX ADMIN — NICOTINE 14 MG: 14 PATCH TRANSDERMAL at 05:06

## 2024-10-22 RX ADMIN — LEVETIRACETAM 500 MG: 500 TABLET, FILM COATED ORAL at 19:57

## 2024-10-22 RX ADMIN — OXYCODONE 5 MG: 5 TABLET ORAL at 15:08

## 2024-10-22 ASSESSMENT — COGNITIVE AND FUNCTIONAL STATUS - GENERAL
CLIMB 3 TO 5 STEPS WITH RAILING: TOTAL
SUGGESTED CMS G CODE MODIFIER MOBILITY: CK
WALKING IN HOSPITAL ROOM: A LOT
DRESSING REGULAR LOWER BODY CLOTHING: A LITTLE
TOILETING: A LOT
STANDING UP FROM CHAIR USING ARMS: A LITTLE
MOVING TO AND FROM BED TO CHAIR: A LITTLE
MOBILITY SCORE: 15
HELP NEEDED FOR BATHING: A LITTLE
SUGGESTED CMS G CODE MODIFIER DAILY ACTIVITY: CJ
TURNING FROM BACK TO SIDE WHILE IN FLAT BAD: A LITTLE
DAILY ACTIVITIY SCORE: 20
MOVING FROM LYING ON BACK TO SITTING ON SIDE OF FLAT BED: A LITTLE

## 2024-10-22 ASSESSMENT — PAIN DESCRIPTION - PAIN TYPE
TYPE: ACUTE PAIN

## 2024-10-22 ASSESSMENT — GAIT ASSESSMENTS: GAIT LEVEL OF ASSIST: REFUSED

## 2024-10-22 ASSESSMENT — ACTIVITIES OF DAILY LIVING (ADL): TOILETING: INDEPENDENT

## 2024-10-23 PROBLEM — N30.00 ACUTE CYSTITIS WITHOUT HEMATURIA: Status: ACTIVE | Noted: 2024-10-23

## 2024-10-23 LAB
ANION GAP SERPL CALC-SCNC: 11 MMOL/L (ref 7–16)
BUN SERPL-MCNC: 19 MG/DL (ref 8–22)
CALCIUM SERPL-MCNC: 9.3 MG/DL (ref 8.5–10.5)
CHLORIDE SERPL-SCNC: 97 MMOL/L (ref 96–112)
CO2 SERPL-SCNC: 23 MMOL/L (ref 20–33)
CREAT SERPL-MCNC: 0.76 MG/DL (ref 0.5–1.4)
GFR SERPLBLD CREATININE-BSD FMLA CKD-EPI: 102 ML/MIN/1.73 M 2
GLUCOSE SERPL-MCNC: 102 MG/DL (ref 65–99)
POTASSIUM SERPL-SCNC: 5.2 MMOL/L (ref 3.6–5.5)
SODIUM SERPL-SCNC: 131 MMOL/L (ref 135–145)

## 2024-10-23 PROCEDURE — A9270 NON-COVERED ITEM OR SERVICE: HCPCS

## 2024-10-23 PROCEDURE — 80048 BASIC METABOLIC PNL TOTAL CA: CPT

## 2024-10-23 PROCEDURE — 99232 SBSQ HOSP IP/OBS MODERATE 35: CPT | Mod: GC | Performed by: FAMILY MEDICINE

## 2024-10-23 PROCEDURE — 700102 HCHG RX REV CODE 250 W/ 637 OVERRIDE(OP)

## 2024-10-23 PROCEDURE — 770006 HCHG ROOM/CARE - MED/SURG/GYN SEMI*

## 2024-10-23 PROCEDURE — 700111 HCHG RX REV CODE 636 W/ 250 OVERRIDE (IP): Mod: JZ

## 2024-10-23 PROCEDURE — 36415 COLL VENOUS BLD VENIPUNCTURE: CPT

## 2024-10-23 RX ORDER — ENOXAPARIN SODIUM 100 MG/ML
40 INJECTION SUBCUTANEOUS DAILY
Status: DISCONTINUED | OUTPATIENT
Start: 2024-10-23 | End: 2024-10-31 | Stop reason: HOSPADM

## 2024-10-23 RX ORDER — NITROFURANTOIN 25; 75 MG/1; MG/1
100 CAPSULE ORAL 2 TIMES DAILY WITH MEALS
Status: DISCONTINUED | OUTPATIENT
Start: 2024-10-23 | End: 2024-10-26

## 2024-10-23 RX ORDER — GABAPENTIN 100 MG/1
100 CAPSULE ORAL 3 TIMES DAILY
Status: DISCONTINUED | OUTPATIENT
Start: 2024-10-23 | End: 2024-10-28

## 2024-10-23 RX ADMIN — OXYCODONE 5 MG: 5 TABLET ORAL at 05:27

## 2024-10-23 RX ADMIN — LEVETIRACETAM 500 MG: 500 TABLET, FILM COATED ORAL at 08:47

## 2024-10-23 RX ADMIN — ACETAMINOPHEN 1000 MG: 500 TABLET ORAL at 07:30

## 2024-10-23 RX ADMIN — LEVETIRACETAM 500 MG: 500 TABLET, FILM COATED ORAL at 21:19

## 2024-10-23 RX ADMIN — OXYCODONE 5 MG: 5 TABLET ORAL at 14:51

## 2024-10-23 RX ADMIN — NITROFURANTOIN MONOHYDRATE/MACROCRYSTALS 100 MG: 75; 25 CAPSULE ORAL at 08:47

## 2024-10-23 RX ADMIN — GABAPENTIN 100 MG: 100 CAPSULE ORAL at 17:25

## 2024-10-23 RX ADMIN — ENOXAPARIN SODIUM 40 MG: 100 INJECTION SUBCUTANEOUS at 17:25

## 2024-10-23 RX ADMIN — KETOROLAC TROMETHAMINE 10 MG: 10 TABLET, FILM COATED ORAL at 13:04

## 2024-10-23 RX ADMIN — NICOTINE 14 MG: 14 PATCH TRANSDERMAL at 05:27

## 2024-10-23 RX ADMIN — GABAPENTIN 100 MG: 100 CAPSULE ORAL at 08:47

## 2024-10-23 RX ADMIN — GABAPENTIN 100 MG: 100 CAPSULE ORAL at 13:04

## 2024-10-23 RX ADMIN — DULOXETINE HYDROCHLORIDE 30 MG: 30 CAPSULE, DELAYED RELEASE ORAL at 05:27

## 2024-10-23 RX ADMIN — ACETAMINOPHEN 1000 MG: 500 TABLET ORAL at 14:51

## 2024-10-23 RX ADMIN — NITROFURANTOIN MONOHYDRATE/MACROCRYSTALS 100 MG: 75; 25 CAPSULE ORAL at 17:25

## 2024-10-23 RX ADMIN — Medication 10 MG: at 21:19

## 2024-10-23 ASSESSMENT — PAIN DESCRIPTION - PAIN TYPE
TYPE: ACUTE PAIN

## 2024-10-24 LAB
ANION GAP SERPL CALC-SCNC: 14 MMOL/L (ref 7–16)
BUN SERPL-MCNC: 23 MG/DL (ref 8–22)
CALCIUM SERPL-MCNC: 9.3 MG/DL (ref 8.5–10.5)
CHLORIDE SERPL-SCNC: 97 MMOL/L (ref 96–112)
CO2 SERPL-SCNC: 21 MMOL/L (ref 20–33)
CREAT SERPL-MCNC: 0.54 MG/DL (ref 0.5–1.4)
ERYTHROCYTE [DISTWIDTH] IN BLOOD BY AUTOMATED COUNT: 59.7 FL (ref 35.9–50)
GFR SERPLBLD CREATININE-BSD FMLA CKD-EPI: 114 ML/MIN/1.73 M 2
GLUCOSE SERPL-MCNC: 105 MG/DL (ref 65–99)
HCT VFR BLD AUTO: 39.9 % (ref 42–52)
HGB BLD-MCNC: 13.3 G/DL (ref 14–18)
MCH RBC QN AUTO: 33.7 PG (ref 27–33)
MCHC RBC AUTO-ENTMCNC: 33.3 G/DL (ref 32.3–36.5)
MCV RBC AUTO: 101 FL (ref 81.4–97.8)
PLATELET # BLD AUTO: 271 K/UL (ref 164–446)
PMV BLD AUTO: 8.8 FL (ref 9–12.9)
POTASSIUM SERPL-SCNC: 4.7 MMOL/L (ref 3.6–5.5)
RBC # BLD AUTO: 3.95 M/UL (ref 4.7–6.1)
SODIUM SERPL-SCNC: 132 MMOL/L (ref 135–145)
WBC # BLD AUTO: 5.3 K/UL (ref 4.8–10.8)

## 2024-10-24 PROCEDURE — 700102 HCHG RX REV CODE 250 W/ 637 OVERRIDE(OP)

## 2024-10-24 PROCEDURE — 700111 HCHG RX REV CODE 636 W/ 250 OVERRIDE (IP): Mod: JZ

## 2024-10-24 PROCEDURE — 85027 COMPLETE CBC AUTOMATED: CPT

## 2024-10-24 PROCEDURE — 80048 BASIC METABOLIC PNL TOTAL CA: CPT

## 2024-10-24 PROCEDURE — A9270 NON-COVERED ITEM OR SERVICE: HCPCS

## 2024-10-24 PROCEDURE — 99254 IP/OBS CNSLTJ NEW/EST MOD 60: CPT | Performed by: PSYCHIATRY & NEUROLOGY

## 2024-10-24 PROCEDURE — 99232 SBSQ HOSP IP/OBS MODERATE 35: CPT | Mod: GC | Performed by: FAMILY MEDICINE

## 2024-10-24 PROCEDURE — 770006 HCHG ROOM/CARE - MED/SURG/GYN SEMI*

## 2024-10-24 PROCEDURE — 36415 COLL VENOUS BLD VENIPUNCTURE: CPT

## 2024-10-24 RX ORDER — OXYCODONE HYDROCHLORIDE 5 MG/1
5 TABLET ORAL EVERY 12 HOURS PRN
Status: DISCONTINUED | OUTPATIENT
Start: 2024-10-24 | End: 2024-10-31 | Stop reason: HOSPADM

## 2024-10-24 RX ADMIN — Medication 10 MG: at 21:42

## 2024-10-24 RX ADMIN — NITROFURANTOIN MONOHYDRATE/MACROCRYSTALS 100 MG: 75; 25 CAPSULE ORAL at 18:05

## 2024-10-24 RX ADMIN — ACETAMINOPHEN 1000 MG: 500 TABLET ORAL at 08:38

## 2024-10-24 RX ADMIN — GABAPENTIN 100 MG: 100 CAPSULE ORAL at 18:06

## 2024-10-24 RX ADMIN — DULOXETINE HYDROCHLORIDE 30 MG: 30 CAPSULE, DELAYED RELEASE ORAL at 05:41

## 2024-10-24 RX ADMIN — GABAPENTIN 100 MG: 100 CAPSULE ORAL at 05:41

## 2024-10-24 RX ADMIN — LEVETIRACETAM 750 MG: 500 TABLET, FILM COATED ORAL at 18:05

## 2024-10-24 RX ADMIN — LEVETIRACETAM 500 MG: 500 TABLET, FILM COATED ORAL at 08:38

## 2024-10-24 RX ADMIN — GABAPENTIN 100 MG: 100 CAPSULE ORAL at 13:05

## 2024-10-24 RX ADMIN — OXYCODONE 5 MG: 5 TABLET ORAL at 08:44

## 2024-10-24 RX ADMIN — NITROFURANTOIN MONOHYDRATE/MACROCRYSTALS 100 MG: 75; 25 CAPSULE ORAL at 08:39

## 2024-10-24 RX ADMIN — NICOTINE 14 MG: 14 PATCH TRANSDERMAL at 05:41

## 2024-10-24 RX ADMIN — ACETAMINOPHEN 1000 MG: 500 TABLET ORAL at 22:27

## 2024-10-24 RX ADMIN — ENOXAPARIN SODIUM 40 MG: 100 INJECTION SUBCUTANEOUS at 18:06

## 2024-10-24 ASSESSMENT — PAIN DESCRIPTION - PAIN TYPE
TYPE: ACUTE PAIN

## 2024-10-25 PROCEDURE — 700102 HCHG RX REV CODE 250 W/ 637 OVERRIDE(OP)

## 2024-10-25 PROCEDURE — 4A10X4Z MONITORING OF CENTRAL NERVOUS ELECTRICAL ACTIVITY, EXTERNAL APPROACH: ICD-10-PCS | Performed by: PSYCHIATRY & NEUROLOGY

## 2024-10-25 PROCEDURE — A9270 NON-COVERED ITEM OR SERVICE: HCPCS

## 2024-10-25 PROCEDURE — 99232 SBSQ HOSP IP/OBS MODERATE 35: CPT | Mod: GC | Performed by: STUDENT IN AN ORGANIZED HEALTH CARE EDUCATION/TRAINING PROGRAM

## 2024-10-25 PROCEDURE — 700111 HCHG RX REV CODE 636 W/ 250 OVERRIDE (IP): Mod: JZ

## 2024-10-25 PROCEDURE — 95819 EEG AWAKE AND ASLEEP: CPT | Mod: 26 | Performed by: PSYCHIATRY & NEUROLOGY

## 2024-10-25 PROCEDURE — 95819 EEG AWAKE AND ASLEEP: CPT | Performed by: PSYCHIATRY & NEUROLOGY

## 2024-10-25 PROCEDURE — 770006 HCHG ROOM/CARE - MED/SURG/GYN SEMI*

## 2024-10-25 PROCEDURE — 97602 WOUND(S) CARE NON-SELECTIVE: CPT

## 2024-10-25 RX ADMIN — LEVETIRACETAM 750 MG: 500 TABLET, FILM COATED ORAL at 06:19

## 2024-10-25 RX ADMIN — GABAPENTIN 100 MG: 100 CAPSULE ORAL at 06:19

## 2024-10-25 RX ADMIN — ACETAMINOPHEN 1000 MG: 500 TABLET ORAL at 15:52

## 2024-10-25 RX ADMIN — NITROFURANTOIN MONOHYDRATE/MACROCRYSTALS 100 MG: 75; 25 CAPSULE ORAL at 16:53

## 2024-10-25 RX ADMIN — DULOXETINE HYDROCHLORIDE 30 MG: 30 CAPSULE, DELAYED RELEASE ORAL at 06:19

## 2024-10-25 RX ADMIN — LEVETIRACETAM 750 MG: 500 TABLET, FILM COATED ORAL at 16:54

## 2024-10-25 RX ADMIN — OXYCODONE 5 MG: 5 TABLET ORAL at 10:02

## 2024-10-25 RX ADMIN — NITROFURANTOIN MONOHYDRATE/MACROCRYSTALS 100 MG: 75; 25 CAPSULE ORAL at 09:16

## 2024-10-25 RX ADMIN — GABAPENTIN 100 MG: 100 CAPSULE ORAL at 16:54

## 2024-10-25 RX ADMIN — NICOTINE 14 MG: 14 PATCH TRANSDERMAL at 06:18

## 2024-10-25 RX ADMIN — ENOXAPARIN SODIUM 40 MG: 100 INJECTION SUBCUTANEOUS at 16:53

## 2024-10-25 RX ADMIN — ACETAMINOPHEN 1000 MG: 500 TABLET ORAL at 06:19

## 2024-10-25 RX ADMIN — GABAPENTIN 100 MG: 100 CAPSULE ORAL at 13:17

## 2024-10-25 ASSESSMENT — PAIN DESCRIPTION - PAIN TYPE
TYPE: ACUTE PAIN

## 2024-10-25 ASSESSMENT — PATIENT HEALTH QUESTIONNAIRE - PHQ9
2. FEELING DOWN, DEPRESSED, IRRITABLE, OR HOPELESS: NOT AT ALL
1. LITTLE INTEREST OR PLEASURE IN DOING THINGS: NOT AT ALL
1. LITTLE INTEREST OR PLEASURE IN DOING THINGS: NOT AT ALL
2. FEELING DOWN, DEPRESSED, IRRITABLE, OR HOPELESS: NOT AT ALL
SUM OF ALL RESPONSES TO PHQ9 QUESTIONS 1 AND 2: 0
SUM OF ALL RESPONSES TO PHQ9 QUESTIONS 1 AND 2: 0

## 2024-10-26 PROBLEM — L89.154 PRESSURE INJURY OF COCCYGEAL REGION, STAGE 4 (HCC): Chronic | Status: ACTIVE | Noted: 2024-10-26

## 2024-10-26 LAB
ANION GAP SERPL CALC-SCNC: 9 MMOL/L (ref 7–16)
BACTERIA UR CULT: ABNORMAL
BUN SERPL-MCNC: 22 MG/DL (ref 8–22)
CALCIUM SERPL-MCNC: 8.9 MG/DL (ref 8.5–10.5)
CHLORIDE SERPL-SCNC: 102 MMOL/L (ref 96–112)
CO2 SERPL-SCNC: 21 MMOL/L (ref 20–33)
CREAT SERPL-MCNC: 0.51 MG/DL (ref 0.5–1.4)
ERYTHROCYTE [DISTWIDTH] IN BLOOD BY AUTOMATED COUNT: 58.7 FL (ref 35.9–50)
GFR SERPLBLD CREATININE-BSD FMLA CKD-EPI: 116 ML/MIN/1.73 M 2
GLUCOSE SERPL-MCNC: 92 MG/DL (ref 65–99)
HCT VFR BLD AUTO: 39.7 % (ref 42–52)
HGB BLD-MCNC: 13.4 G/DL (ref 14–18)
MCH RBC QN AUTO: 33.9 PG (ref 27–33)
MCHC RBC AUTO-ENTMCNC: 33.8 G/DL (ref 32.3–36.5)
MCV RBC AUTO: 100.5 FL (ref 81.4–97.8)
PLATELET # BLD AUTO: 382 K/UL (ref 164–446)
PMV BLD AUTO: 8.3 FL (ref 9–12.9)
POTASSIUM SERPL-SCNC: 4.5 MMOL/L (ref 3.6–5.5)
RBC # BLD AUTO: 3.95 M/UL (ref 4.7–6.1)
SIGNIFICANT IND 70042: ABNORMAL
SITE SITE: ABNORMAL
SODIUM SERPL-SCNC: 132 MMOL/L (ref 135–145)
SOURCE SOURCE: ABNORMAL
WBC # BLD AUTO: 5.7 K/UL (ref 4.8–10.8)

## 2024-10-26 PROCEDURE — 80048 BASIC METABOLIC PNL TOTAL CA: CPT

## 2024-10-26 PROCEDURE — A9270 NON-COVERED ITEM OR SERVICE: HCPCS

## 2024-10-26 PROCEDURE — 700111 HCHG RX REV CODE 636 W/ 250 OVERRIDE (IP): Mod: JZ

## 2024-10-26 PROCEDURE — 700102 HCHG RX REV CODE 250 W/ 637 OVERRIDE(OP)

## 2024-10-26 PROCEDURE — 99232 SBSQ HOSP IP/OBS MODERATE 35: CPT | Mod: GC | Performed by: FAMILY MEDICINE

## 2024-10-26 PROCEDURE — 36415 COLL VENOUS BLD VENIPUNCTURE: CPT

## 2024-10-26 PROCEDURE — 85027 COMPLETE CBC AUTOMATED: CPT

## 2024-10-26 PROCEDURE — 770006 HCHG ROOM/CARE - MED/SURG/GYN SEMI*

## 2024-10-26 RX ORDER — DULOXETIN HYDROCHLORIDE 30 MG/1
60 CAPSULE, DELAYED RELEASE ORAL DAILY
Status: DISCONTINUED | OUTPATIENT
Start: 2024-10-27 | End: 2024-10-31 | Stop reason: HOSPADM

## 2024-10-26 RX ORDER — KETOROLAC TROMETHAMINE 10 MG/1
10 TABLET, FILM COATED ORAL EVERY 6 HOURS PRN
Status: DISPENSED | OUTPATIENT
Start: 2024-10-26 | End: 2024-10-26

## 2024-10-26 RX ORDER — CEFUROXIME AXETIL 500 MG/1
500 TABLET ORAL EVERY 12 HOURS
Status: DISCONTINUED | OUTPATIENT
Start: 2024-10-26 | End: 2024-10-29

## 2024-10-26 RX ADMIN — ACETAMINOPHEN 1000 MG: 500 TABLET ORAL at 15:32

## 2024-10-26 RX ADMIN — GABAPENTIN 100 MG: 100 CAPSULE ORAL at 18:17

## 2024-10-26 RX ADMIN — DULOXETINE HYDROCHLORIDE 30 MG: 30 CAPSULE, DELAYED RELEASE ORAL at 05:28

## 2024-10-26 RX ADMIN — CEFUROXIME AXETIL 500 MG: 500 TABLET ORAL at 11:45

## 2024-10-26 RX ADMIN — LEVETIRACETAM 750 MG: 500 TABLET, FILM COATED ORAL at 18:17

## 2024-10-26 RX ADMIN — ENOXAPARIN SODIUM 40 MG: 100 INJECTION SUBCUTANEOUS at 18:17

## 2024-10-26 RX ADMIN — ACETAMINOPHEN 1000 MG: 500 TABLET ORAL at 22:03

## 2024-10-26 RX ADMIN — GABAPENTIN 100 MG: 100 CAPSULE ORAL at 05:28

## 2024-10-26 RX ADMIN — GABAPENTIN 100 MG: 100 CAPSULE ORAL at 11:45

## 2024-10-26 RX ADMIN — OXYCODONE 5 MG: 5 TABLET ORAL at 20:17

## 2024-10-26 RX ADMIN — Medication 10 MG: at 20:17

## 2024-10-26 RX ADMIN — CEFUROXIME AXETIL 500 MG: 500 TABLET ORAL at 20:16

## 2024-10-26 RX ADMIN — LEVETIRACETAM 750 MG: 500 TABLET, FILM COATED ORAL at 05:28

## 2024-10-26 RX ADMIN — NITROFURANTOIN MONOHYDRATE/MACROCRYSTALS 100 MG: 75; 25 CAPSULE ORAL at 08:37

## 2024-10-26 RX ADMIN — ACETAMINOPHEN 1000 MG: 500 TABLET ORAL at 08:37

## 2024-10-26 RX ADMIN — NICOTINE 14 MG: 14 PATCH TRANSDERMAL at 05:28

## 2024-10-26 ASSESSMENT — PAIN DESCRIPTION - PAIN TYPE
TYPE: ACUTE PAIN

## 2024-10-26 ASSESSMENT — FIBROSIS 4 INDEX: FIB4 SCORE: 1.18

## 2024-10-27 PROCEDURE — A9270 NON-COVERED ITEM OR SERVICE: HCPCS

## 2024-10-27 PROCEDURE — 700111 HCHG RX REV CODE 636 W/ 250 OVERRIDE (IP): Mod: JZ

## 2024-10-27 PROCEDURE — 700102 HCHG RX REV CODE 250 W/ 637 OVERRIDE(OP)

## 2024-10-27 PROCEDURE — 99232 SBSQ HOSP IP/OBS MODERATE 35: CPT | Performed by: FAMILY MEDICINE

## 2024-10-27 PROCEDURE — 770006 HCHG ROOM/CARE - MED/SURG/GYN SEMI*

## 2024-10-27 RX ORDER — IBUPROFEN 600 MG/1
600 TABLET, FILM COATED ORAL 3 TIMES DAILY PRN
Status: DISCONTINUED | OUTPATIENT
Start: 2024-10-27 | End: 2024-10-31 | Stop reason: HOSPADM

## 2024-10-27 RX ADMIN — IBUPROFEN 600 MG: 600 TABLET, FILM COATED ORAL at 15:56

## 2024-10-27 RX ADMIN — NICOTINE 14 MG: 14 PATCH TRANSDERMAL at 05:17

## 2024-10-27 RX ADMIN — Medication 10 MG: at 20:22

## 2024-10-27 RX ADMIN — GABAPENTIN 100 MG: 100 CAPSULE ORAL at 18:32

## 2024-10-27 RX ADMIN — DULOXETINE HYDROCHLORIDE 60 MG: 30 CAPSULE, DELAYED RELEASE ORAL at 05:17

## 2024-10-27 RX ADMIN — GABAPENTIN 100 MG: 100 CAPSULE ORAL at 05:17

## 2024-10-27 RX ADMIN — OXYCODONE 5 MG: 5 TABLET ORAL at 10:11

## 2024-10-27 RX ADMIN — CEFUROXIME AXETIL 500 MG: 500 TABLET ORAL at 09:32

## 2024-10-27 RX ADMIN — ENOXAPARIN SODIUM 40 MG: 100 INJECTION SUBCUTANEOUS at 18:30

## 2024-10-27 RX ADMIN — LEVETIRACETAM 750 MG: 500 TABLET, FILM COATED ORAL at 18:31

## 2024-10-27 RX ADMIN — CEFUROXIME AXETIL 500 MG: 500 TABLET ORAL at 20:22

## 2024-10-27 RX ADMIN — LEVETIRACETAM 750 MG: 500 TABLET, FILM COATED ORAL at 05:17

## 2024-10-27 RX ADMIN — GABAPENTIN 100 MG: 100 CAPSULE ORAL at 12:32

## 2024-10-27 RX ADMIN — ACETAMINOPHEN 1000 MG: 500 TABLET ORAL at 15:56

## 2024-10-27 ASSESSMENT — PAIN DESCRIPTION - PAIN TYPE
TYPE: ACUTE PAIN

## 2024-10-27 ASSESSMENT — PATIENT HEALTH QUESTIONNAIRE - PHQ9
1. LITTLE INTEREST OR PLEASURE IN DOING THINGS: NOT AT ALL
1. LITTLE INTEREST OR PLEASURE IN DOING THINGS: NOT AT ALL
2. FEELING DOWN, DEPRESSED, IRRITABLE, OR HOPELESS: NOT AT ALL
SUM OF ALL RESPONSES TO PHQ9 QUESTIONS 1 AND 2: 0
SUM OF ALL RESPONSES TO PHQ9 QUESTIONS 1 AND 2: 0
2. FEELING DOWN, DEPRESSED, IRRITABLE, OR HOPELESS: NOT AT ALL

## 2024-10-28 PROBLEM — G40.909 RECURRENT SEIZURES (HCC): Status: RESOLVED | Noted: 2024-10-17 | Resolved: 2024-10-28

## 2024-10-28 LAB
ANION GAP SERPL CALC-SCNC: 11 MMOL/L (ref 7–16)
BUN SERPL-MCNC: 15 MG/DL (ref 8–22)
CALCIUM SERPL-MCNC: 9.5 MG/DL (ref 8.5–10.5)
CHLORIDE SERPL-SCNC: 100 MMOL/L (ref 96–112)
CO2 SERPL-SCNC: 22 MMOL/L (ref 20–33)
CREAT SERPL-MCNC: 0.52 MG/DL (ref 0.5–1.4)
ERYTHROCYTE [DISTWIDTH] IN BLOOD BY AUTOMATED COUNT: 57.4 FL (ref 35.9–50)
GFR SERPLBLD CREATININE-BSD FMLA CKD-EPI: 115 ML/MIN/1.73 M 2
GLUCOSE SERPL-MCNC: 108 MG/DL (ref 65–99)
HCT VFR BLD AUTO: 40.5 % (ref 42–52)
HGB BLD-MCNC: 13.5 G/DL (ref 14–18)
MCH RBC QN AUTO: 33.8 PG (ref 27–33)
MCHC RBC AUTO-ENTMCNC: 33.3 G/DL (ref 32.3–36.5)
MCV RBC AUTO: 101.3 FL (ref 81.4–97.8)
PLATELET # BLD AUTO: 488 K/UL (ref 164–446)
PMV BLD AUTO: 8.3 FL (ref 9–12.9)
POTASSIUM SERPL-SCNC: 4.5 MMOL/L (ref 3.6–5.5)
RBC # BLD AUTO: 4 M/UL (ref 4.7–6.1)
SODIUM SERPL-SCNC: 133 MMOL/L (ref 135–145)
WBC # BLD AUTO: 6.9 K/UL (ref 4.8–10.8)

## 2024-10-28 PROCEDURE — A9270 NON-COVERED ITEM OR SERVICE: HCPCS

## 2024-10-28 PROCEDURE — 85027 COMPLETE CBC AUTOMATED: CPT

## 2024-10-28 PROCEDURE — 36415 COLL VENOUS BLD VENIPUNCTURE: CPT

## 2024-10-28 PROCEDURE — 700102 HCHG RX REV CODE 250 W/ 637 OVERRIDE(OP)

## 2024-10-28 PROCEDURE — 700111 HCHG RX REV CODE 636 W/ 250 OVERRIDE (IP): Mod: JZ

## 2024-10-28 PROCEDURE — 99232 SBSQ HOSP IP/OBS MODERATE 35: CPT | Mod: GC | Performed by: FAMILY MEDICINE

## 2024-10-28 PROCEDURE — 80048 BASIC METABOLIC PNL TOTAL CA: CPT

## 2024-10-28 PROCEDURE — 770006 HCHG ROOM/CARE - MED/SURG/GYN SEMI*

## 2024-10-28 RX ORDER — GABAPENTIN 100 MG/1
100 CAPSULE ORAL EVERY EVENING
Status: DISCONTINUED | OUTPATIENT
Start: 2024-10-28 | End: 2024-10-31 | Stop reason: HOSPADM

## 2024-10-28 RX ORDER — GABAPENTIN 100 MG/1
100 CAPSULE ORAL 3 TIMES DAILY
Status: DISCONTINUED | OUTPATIENT
Start: 2024-10-28 | End: 2024-10-31 | Stop reason: HOSPADM

## 2024-10-28 RX ADMIN — DULOXETINE HYDROCHLORIDE 60 MG: 30 CAPSULE, DELAYED RELEASE ORAL at 06:04

## 2024-10-28 RX ADMIN — ENOXAPARIN SODIUM 40 MG: 100 INJECTION SUBCUTANEOUS at 17:47

## 2024-10-28 RX ADMIN — ACETAMINOPHEN 1000 MG: 500 TABLET ORAL at 16:18

## 2024-10-28 RX ADMIN — GABAPENTIN 100 MG: 100 CAPSULE ORAL at 11:45

## 2024-10-28 RX ADMIN — OXYCODONE 5 MG: 5 TABLET ORAL at 11:44

## 2024-10-28 RX ADMIN — LEVETIRACETAM 750 MG: 500 TABLET, FILM COATED ORAL at 06:04

## 2024-10-28 RX ADMIN — GABAPENTIN 100 MG: 100 CAPSULE ORAL at 06:04

## 2024-10-28 RX ADMIN — Medication 10 MG: at 20:08

## 2024-10-28 RX ADMIN — GABAPENTIN 100 MG: 100 CAPSULE ORAL at 17:47

## 2024-10-28 RX ADMIN — CEFUROXIME AXETIL 500 MG: 500 TABLET ORAL at 09:41

## 2024-10-28 RX ADMIN — LEVETIRACETAM 750 MG: 500 TABLET, FILM COATED ORAL at 17:47

## 2024-10-28 RX ADMIN — CEFUROXIME AXETIL 500 MG: 500 TABLET ORAL at 20:08

## 2024-10-28 RX ADMIN — ACETAMINOPHEN 1000 MG: 500 TABLET ORAL at 06:05

## 2024-10-28 RX ADMIN — GABAPENTIN 100 MG: 100 CAPSULE ORAL at 17:50

## 2024-10-28 RX ADMIN — ACETAMINOPHEN 1000 MG: 500 TABLET ORAL at 23:26

## 2024-10-28 ASSESSMENT — PAIN DESCRIPTION - PAIN TYPE
TYPE: ACUTE PAIN

## 2024-10-29 PROBLEM — G47.09 OTHER INSOMNIA: Status: ACTIVE | Noted: 2023-11-19

## 2024-10-29 PROCEDURE — 700102 HCHG RX REV CODE 250 W/ 637 OVERRIDE(OP)

## 2024-10-29 PROCEDURE — 99232 SBSQ HOSP IP/OBS MODERATE 35: CPT | Mod: GC | Performed by: FAMILY MEDICINE

## 2024-10-29 PROCEDURE — A9270 NON-COVERED ITEM OR SERVICE: HCPCS

## 2024-10-29 PROCEDURE — 770006 HCHG ROOM/CARE - MED/SURG/GYN SEMI*

## 2024-10-29 PROCEDURE — 700111 HCHG RX REV CODE 636 W/ 250 OVERRIDE (IP): Mod: JZ

## 2024-10-29 RX ORDER — CEFUROXIME AXETIL 500 MG/1
500 TABLET ORAL EVERY 12 HOURS
Status: COMPLETED | OUTPATIENT
Start: 2024-10-29 | End: 2024-10-30

## 2024-10-29 RX ORDER — HYDROXYZINE HYDROCHLORIDE 50 MG/1
25 TABLET, FILM COATED ORAL NIGHTLY
Status: DISCONTINUED | OUTPATIENT
Start: 2024-10-29 | End: 2024-10-31 | Stop reason: HOSPADM

## 2024-10-29 RX ADMIN — LEVETIRACETAM 750 MG: 500 TABLET, FILM COATED ORAL at 17:33

## 2024-10-29 RX ADMIN — OXYCODONE 5 MG: 5 TABLET ORAL at 02:47

## 2024-10-29 RX ADMIN — IBUPROFEN 600 MG: 600 TABLET, FILM COATED ORAL at 12:53

## 2024-10-29 RX ADMIN — GABAPENTIN 100 MG: 100 CAPSULE ORAL at 17:32

## 2024-10-29 RX ADMIN — OXYCODONE 5 MG: 5 TABLET ORAL at 17:32

## 2024-10-29 RX ADMIN — GABAPENTIN 100 MG: 100 CAPSULE ORAL at 17:36

## 2024-10-29 RX ADMIN — HYDROXYZINE HYDROCHLORIDE 25 MG: 50 TABLET, FILM COATED ORAL at 20:58

## 2024-10-29 RX ADMIN — GABAPENTIN 100 MG: 100 CAPSULE ORAL at 12:53

## 2024-10-29 RX ADMIN — CEFUROXIME AXETIL 500 MG: 500 TABLET ORAL at 08:35

## 2024-10-29 RX ADMIN — DULOXETINE HYDROCHLORIDE 60 MG: 30 CAPSULE, DELAYED RELEASE ORAL at 05:56

## 2024-10-29 RX ADMIN — IBUPROFEN 600 MG: 600 TABLET, FILM COATED ORAL at 04:18

## 2024-10-29 RX ADMIN — NICOTINE 14 MG: 14 PATCH TRANSDERMAL at 05:56

## 2024-10-29 RX ADMIN — ACETAMINOPHEN 1000 MG: 500 TABLET ORAL at 17:33

## 2024-10-29 RX ADMIN — CEFUROXIME AXETIL 500 MG: 500 TABLET ORAL at 20:57

## 2024-10-29 RX ADMIN — ACETAMINOPHEN 1000 MG: 500 TABLET ORAL at 08:36

## 2024-10-29 RX ADMIN — GABAPENTIN 100 MG: 100 CAPSULE ORAL at 05:56

## 2024-10-29 RX ADMIN — LEVETIRACETAM 750 MG: 500 TABLET, FILM COATED ORAL at 05:55

## 2024-10-29 RX ADMIN — ENOXAPARIN SODIUM 40 MG: 100 INJECTION SUBCUTANEOUS at 17:32

## 2024-10-29 RX ADMIN — Medication 10 MG: at 20:58

## 2024-10-29 ASSESSMENT — PAIN DESCRIPTION - PAIN TYPE
TYPE: ACUTE PAIN

## 2024-10-30 PROCEDURE — A9270 NON-COVERED ITEM OR SERVICE: HCPCS

## 2024-10-30 PROCEDURE — 700102 HCHG RX REV CODE 250 W/ 637 OVERRIDE(OP)

## 2024-10-30 PROCEDURE — 99232 SBSQ HOSP IP/OBS MODERATE 35: CPT | Mod: GC | Performed by: FAMILY MEDICINE

## 2024-10-30 PROCEDURE — 770006 HCHG ROOM/CARE - MED/SURG/GYN SEMI*

## 2024-10-30 PROCEDURE — 97530 THERAPEUTIC ACTIVITIES: CPT

## 2024-10-30 PROCEDURE — 700111 HCHG RX REV CODE 636 W/ 250 OVERRIDE (IP): Mod: JZ

## 2024-10-30 RX ADMIN — GABAPENTIN 100 MG: 100 CAPSULE ORAL at 05:02

## 2024-10-30 RX ADMIN — ENOXAPARIN SODIUM 40 MG: 100 INJECTION SUBCUTANEOUS at 17:38

## 2024-10-30 RX ADMIN — OXYCODONE 5 MG: 5 TABLET ORAL at 08:24

## 2024-10-30 RX ADMIN — GABAPENTIN 100 MG: 100 CAPSULE ORAL at 17:39

## 2024-10-30 RX ADMIN — OXYCODONE 5 MG: 5 TABLET ORAL at 20:39

## 2024-10-30 RX ADMIN — IBUPROFEN 600 MG: 600 TABLET, FILM COATED ORAL at 16:10

## 2024-10-30 RX ADMIN — ACETAMINOPHEN 1000 MG: 500 TABLET ORAL at 08:23

## 2024-10-30 RX ADMIN — LEVETIRACETAM 750 MG: 500 TABLET, FILM COATED ORAL at 05:02

## 2024-10-30 RX ADMIN — NICOTINE 14 MG: 14 PATCH TRANSDERMAL at 05:03

## 2024-10-30 RX ADMIN — IBUPROFEN 600 MG: 600 TABLET, FILM COATED ORAL at 08:23

## 2024-10-30 RX ADMIN — CEFUROXIME AXETIL 500 MG: 500 TABLET ORAL at 08:23

## 2024-10-30 RX ADMIN — ACETAMINOPHEN 1000 MG: 500 TABLET ORAL at 22:49

## 2024-10-30 RX ADMIN — ACETAMINOPHEN 1000 MG: 500 TABLET ORAL at 16:10

## 2024-10-30 RX ADMIN — CEFUROXIME AXETIL 500 MG: 500 TABLET ORAL at 20:40

## 2024-10-30 RX ADMIN — LEVETIRACETAM 750 MG: 500 TABLET, FILM COATED ORAL at 17:38

## 2024-10-30 RX ADMIN — HYDROXYZINE HYDROCHLORIDE 25 MG: 50 TABLET, FILM COATED ORAL at 20:40

## 2024-10-30 RX ADMIN — GABAPENTIN 100 MG: 100 CAPSULE ORAL at 13:35

## 2024-10-30 RX ADMIN — DULOXETINE HYDROCHLORIDE 60 MG: 30 CAPSULE, DELAYED RELEASE ORAL at 05:02

## 2024-10-30 RX ADMIN — Medication 10 MG: at 20:39

## 2024-10-30 ASSESSMENT — PAIN DESCRIPTION - PAIN TYPE
TYPE: ACUTE PAIN

## 2024-10-30 ASSESSMENT — COGNITIVE AND FUNCTIONAL STATUS - GENERAL
SUGGESTED CMS G CODE MODIFIER MOBILITY: CJ
WALKING IN HOSPITAL ROOM: A LOT
CLIMB 3 TO 5 STEPS WITH RAILING: A LOT
MOBILITY SCORE: 20

## 2024-10-30 ASSESSMENT — GAIT ASSESSMENTS: GAIT LEVEL OF ASSIST: REFUSED

## 2024-10-31 ENCOUNTER — PHARMACY VISIT (OUTPATIENT)
Dept: PHARMACY | Facility: MEDICAL CENTER | Age: 59
End: 2024-10-31
Payer: COMMERCIAL

## 2024-10-31 VITALS
SYSTOLIC BLOOD PRESSURE: 103 MMHG | OXYGEN SATURATION: 93 % | WEIGHT: 160.94 LBS | HEART RATE: 68 BPM | BODY MASS INDEX: 22.53 KG/M2 | TEMPERATURE: 96.8 F | DIASTOLIC BLOOD PRESSURE: 67 MMHG | RESPIRATION RATE: 18 BRPM | HEIGHT: 71 IN

## 2024-10-31 PROCEDURE — A9270 NON-COVERED ITEM OR SERVICE: HCPCS

## 2024-10-31 PROCEDURE — 700102 HCHG RX REV CODE 250 W/ 637 OVERRIDE(OP)

## 2024-10-31 PROCEDURE — RXMED WILLOW AMBULATORY MEDICATION CHARGE

## 2024-10-31 PROCEDURE — 99238 HOSP IP/OBS DSCHRG MGMT 30/<: CPT | Mod: GC | Performed by: FAMILY MEDICINE

## 2024-10-31 RX ORDER — LEVETIRACETAM 750 MG/1
750 TABLET ORAL 2 TIMES DAILY
Qty: 60 TABLET | Refills: 0 | Status: SHIPPED | OUTPATIENT
Start: 2024-10-31

## 2024-10-31 RX ORDER — GABAPENTIN 100 MG/1
100 CAPSULE ORAL 3 TIMES DAILY
Qty: 90 CAPSULE | Refills: 0 | Status: SHIPPED | OUTPATIENT
Start: 2024-10-31

## 2024-10-31 RX ORDER — ACETAMINOPHEN 500 MG
500-1000 TABLET ORAL EVERY 6 HOURS PRN
Qty: 60 TABLET | Refills: 0 | Status: SHIPPED | OUTPATIENT
Start: 2024-10-31

## 2024-10-31 RX ORDER — DULOXETIN HYDROCHLORIDE 60 MG/1
60 CAPSULE, DELAYED RELEASE ORAL DAILY
Qty: 30 CAPSULE | Refills: 0 | Status: SHIPPED | OUTPATIENT
Start: 2024-11-01

## 2024-10-31 RX ADMIN — OXYCODONE 5 MG: 5 TABLET ORAL at 09:27

## 2024-10-31 RX ADMIN — LEVETIRACETAM 750 MG: 500 TABLET, FILM COATED ORAL at 06:14

## 2024-10-31 RX ADMIN — NICOTINE 14 MG: 14 PATCH TRANSDERMAL at 06:15

## 2024-10-31 RX ADMIN — IBUPROFEN 600 MG: 600 TABLET, FILM COATED ORAL at 09:27

## 2024-10-31 RX ADMIN — GABAPENTIN 100 MG: 100 CAPSULE ORAL at 06:14

## 2024-10-31 RX ADMIN — ACETAMINOPHEN 1000 MG: 500 TABLET ORAL at 06:15

## 2024-10-31 RX ADMIN — DULOXETINE HYDROCHLORIDE 60 MG: 30 CAPSULE, DELAYED RELEASE ORAL at 06:14

## 2024-10-31 ASSESSMENT — PAIN DESCRIPTION - PAIN TYPE
TYPE: ACUTE PAIN

## 2024-11-01 ENCOUNTER — TELEPHONE (OUTPATIENT)
Dept: HEALTH INFORMATION MANAGEMENT | Facility: OTHER | Age: 59
End: 2024-11-01

## 2024-11-14 NOTE — ED NOTES
Pt states that he has to get home soon to let his brother into his house. Requested that pt stay to finish IV fluids and to speak with the ERP about POC.   Detail Level: Zone Detail Level: Generalized Detail Level: Detailed

## 2024-12-31 ENCOUNTER — APPOINTMENT (OUTPATIENT)
Dept: WOUND CARE | Facility: MEDICAL CENTER | Age: 59
End: 2024-12-31
Payer: MEDICAID

## 2025-05-28 ENCOUNTER — HOSPITAL ENCOUNTER (INPATIENT)
Facility: MEDICAL CENTER | Age: 60
End: 2025-05-28
Attending: EMERGENCY MEDICINE | Admitting: INTERNAL MEDICINE
Payer: MEDICAID

## 2025-05-28 DIAGNOSIS — R56.9 SEIZURE (HCC): Primary | ICD-10-CM

## 2025-05-28 DIAGNOSIS — R56.9 SEIZURES (HCC): ICD-10-CM

## 2025-05-28 DIAGNOSIS — R00.0 TACHYCARDIA: ICD-10-CM

## 2025-05-28 DIAGNOSIS — N30.00 ACUTE CYSTITIS WITHOUT HEMATURIA: ICD-10-CM

## 2025-05-28 DIAGNOSIS — N30.90 CYSTITIS: ICD-10-CM

## 2025-05-28 DIAGNOSIS — I10 HYPERTENSION, UNSPECIFIED TYPE: ICD-10-CM

## 2025-05-28 DIAGNOSIS — Z91.89 AT RISK FOR MALNUTRITION: ICD-10-CM

## 2025-05-28 DIAGNOSIS — R21 RASH: ICD-10-CM

## 2025-05-28 PROBLEM — E87.20 LACTIC ACIDOSIS: Status: ACTIVE | Noted: 2025-05-28

## 2025-05-28 PROBLEM — E11.65 TYPE 2 DIABETES MELLITUS WITH HYPERGLYCEMIA, WITHOUT LONG-TERM CURRENT USE OF INSULIN (HCC): Status: ACTIVE | Noted: 2025-05-28

## 2025-05-28 PROBLEM — F10.10 ALCOHOL ABUSE: Status: ACTIVE | Noted: 2025-05-28

## 2025-05-28 LAB
ALBUMIN SERPL BCP-MCNC: 4.3 G/DL (ref 3.2–4.9)
ALBUMIN/GLOB SERPL: 1.3 G/DL
ALP SERPL-CCNC: 99 U/L (ref 30–99)
ALT SERPL-CCNC: 12 U/L (ref 2–50)
AMPHET UR QL SCN: NEGATIVE
ANION GAP SERPL CALC-SCNC: 25 MMOL/L (ref 7–16)
AST SERPL-CCNC: 29 U/L (ref 12–45)
B-OH-BUTYR SERPL-MCNC: 0.36 MMOL/L (ref 0.02–0.27)
BACTERIA BLD CULT: NORMAL
BARBITURATES UR QL SCN: NEGATIVE
BASE EXCESS BLDV CALC-SCNC: -2 MMOL/L (ref -2–3)
BASOPHILS # BLD AUTO: 0.3 % (ref 0–1.8)
BASOPHILS # BLD: 0.05 K/UL (ref 0–0.12)
BENZODIAZ UR QL SCN: NEGATIVE
BILIRUB SERPL-MCNC: 0.9 MG/DL (ref 0.1–1.5)
BODY TEMPERATURE: 36.3 CENTIGRADE
BUN SERPL-MCNC: 20 MG/DL (ref 8–22)
BZE UR QL SCN: NEGATIVE
CALCIUM ALBUM COR SERPL-MCNC: 9.2 MG/DL (ref 8.5–10.5)
CALCIUM SERPL-MCNC: 9.4 MG/DL (ref 8.5–10.5)
CANNABINOIDS UR QL SCN: NEGATIVE
CHLORIDE SERPL-SCNC: 91 MMOL/L (ref 96–112)
CK SERPL-CCNC: 155 U/L (ref 0–154)
CO2 SERPL-SCNC: 14 MMOL/L (ref 20–33)
CREAT SERPL-MCNC: 0.64 MG/DL (ref 0.5–1.4)
EKG IMPRESSION: NORMAL
EOSINOPHIL # BLD AUTO: 0 K/UL (ref 0–0.51)
EOSINOPHIL NFR BLD: 0 % (ref 0–6.9)
ERYTHROCYTE [DISTWIDTH] IN BLOOD BY AUTOMATED COUNT: 58.2 FL (ref 35.9–50)
ETHANOL BLD-MCNC: <10.1 MG/DL
FENTANYL UR QL: NEGATIVE
GFR SERPLBLD CREATININE-BSD FMLA CKD-EPI: 108 ML/MIN/1.73 M 2
GLOBULIN SER CALC-MCNC: 3.2 G/DL (ref 1.9–3.5)
GLUCOSE BLD STRIP.AUTO-MCNC: 108 MG/DL (ref 65–99)
GLUCOSE BLD STRIP.AUTO-MCNC: 116 MG/DL (ref 65–99)
GLUCOSE SERPL-MCNC: 270 MG/DL (ref 65–99)
HCO3 BLDV-SCNC: 20 MMOL/L (ref 22–29)
HCT VFR BLD AUTO: 41.7 % (ref 42–52)
HGB BLD-MCNC: 14 G/DL (ref 14–18)
IMM GRANULOCYTES # BLD AUTO: 0.13 K/UL (ref 0–0.11)
IMM GRANULOCYTES NFR BLD AUTO: 0.8 % (ref 0–0.9)
LACTATE SERPL-SCNC: 12.8 MMOL/L (ref 0.5–2)
LYMPHOCYTES # BLD AUTO: 0.3 K/UL (ref 1–4.8)
LYMPHOCYTES NFR BLD: 1.9 % (ref 22–41)
MCH RBC QN AUTO: 34 PG (ref 27–33)
MCHC RBC AUTO-ENTMCNC: 33.6 G/DL (ref 32.3–36.5)
MCV RBC AUTO: 101.2 FL (ref 81.4–97.8)
METHADONE UR QL SCN: NEGATIVE
MONOCYTES # BLD AUTO: 0.76 K/UL (ref 0–0.85)
MONOCYTES NFR BLD AUTO: 4.8 % (ref 0–13.4)
NEUTROPHILS # BLD AUTO: 14.72 K/UL (ref 1.82–7.42)
NEUTROPHILS NFR BLD: 92.2 % (ref 44–72)
NRBC # BLD AUTO: 0.03 K/UL
NRBC BLD-RTO: 0.2 /100 WBC (ref 0–0.2)
OPIATES UR QL SCN: NEGATIVE
OXYCODONE UR QL SCN: NEGATIVE
PCO2 BLDV: 28.5 MMHG (ref 38–54)
PCO2 TEMP ADJ BLDV: 27.6 MMHG (ref 38–54)
PCP UR QL SCN: NEGATIVE
PH BLDV: 7.47 [PH] (ref 7.31–7.45)
PH TEMP ADJ BLDV: 7.48 [PH] (ref 7.31–7.45)
PLATELET # BLD AUTO: 219 K/UL (ref 164–446)
PMV BLD AUTO: 8.8 FL (ref 9–12.9)
PO2 BLDV: 67.7 MMHG (ref 23–48)
PO2 TEMP ADJ BLDV: 64.5 MMHG (ref 23–48)
POTASSIUM SERPL-SCNC: 4.3 MMOL/L (ref 3.6–5.5)
PROPOXYPH UR QL SCN: NEGATIVE
PROT SERPL-MCNC: 7.5 G/DL (ref 6–8.2)
RBC # BLD AUTO: 4.12 M/UL (ref 4.7–6.1)
SAO2 % BLDV: 92.5 % (ref 60–85)
SIGNIFICANT IND 70042: NORMAL
SITE SITE: NORMAL
SODIUM SERPL-SCNC: 130 MMOL/L (ref 135–145)
SOURCE SOURCE: NORMAL
WBC # BLD AUTO: 16 K/UL (ref 4.8–10.8)

## 2025-05-28 PROCEDURE — 96365 THER/PROPH/DIAG IV INF INIT: CPT

## 2025-05-28 PROCEDURE — 82077 ASSAY SPEC XCP UR&BREATH IA: CPT

## 2025-05-28 PROCEDURE — 700105 HCHG RX REV CODE 258: Mod: UD | Performed by: EMERGENCY MEDICINE

## 2025-05-28 PROCEDURE — 83605 ASSAY OF LACTIC ACID: CPT

## 2025-05-28 PROCEDURE — 700111 HCHG RX REV CODE 636 W/ 250 OVERRIDE (IP): Mod: JZ | Performed by: INTERNAL MEDICINE

## 2025-05-28 PROCEDURE — A9270 NON-COVERED ITEM OR SERVICE: HCPCS | Mod: UD

## 2025-05-28 PROCEDURE — 82803 BLOOD GASES ANY COMBINATION: CPT

## 2025-05-28 PROCEDURE — 93005 ELECTROCARDIOGRAM TRACING: CPT | Mod: TC | Performed by: EMERGENCY MEDICINE

## 2025-05-28 PROCEDURE — 96375 TX/PRO/DX INJ NEW DRUG ADDON: CPT

## 2025-05-28 PROCEDURE — 96366 THER/PROPH/DIAG IV INF ADDON: CPT

## 2025-05-28 PROCEDURE — 82962 GLUCOSE BLOOD TEST: CPT

## 2025-05-28 PROCEDURE — 87150 DNA/RNA AMPLIFIED PROBE: CPT

## 2025-05-28 PROCEDURE — 87077 CULTURE AEROBIC IDENTIFY: CPT | Mod: 91

## 2025-05-28 PROCEDURE — 36415 COLL VENOUS BLD VENIPUNCTURE: CPT

## 2025-05-28 PROCEDURE — 700102 HCHG RX REV CODE 250 W/ 637 OVERRIDE(OP): Performed by: INTERNAL MEDICINE

## 2025-05-28 PROCEDURE — 81001 URINALYSIS AUTO W/SCOPE: CPT

## 2025-05-28 PROCEDURE — 770006 HCHG ROOM/CARE - MED/SURG/GYN SEMI*

## 2025-05-28 PROCEDURE — 85025 COMPLETE CBC W/AUTO DIFF WBC: CPT

## 2025-05-28 PROCEDURE — 87086 URINE CULTURE/COLONY COUNT: CPT

## 2025-05-28 PROCEDURE — 87186 SC STD MICRODIL/AGAR DIL: CPT

## 2025-05-28 PROCEDURE — 80307 DRUG TEST PRSMV CHEM ANLYZR: CPT

## 2025-05-28 PROCEDURE — 99223 1ST HOSP IP/OBS HIGH 75: CPT | Performed by: INTERNAL MEDICINE

## 2025-05-28 PROCEDURE — 80053 COMPREHEN METABOLIC PANEL: CPT

## 2025-05-28 PROCEDURE — 87040 BLOOD CULTURE FOR BACTERIA: CPT

## 2025-05-28 PROCEDURE — A9270 NON-COVERED ITEM OR SERVICE: HCPCS | Performed by: INTERNAL MEDICINE

## 2025-05-28 PROCEDURE — 93005 ELECTROCARDIOGRAM TRACING: CPT | Mod: TC

## 2025-05-28 PROCEDURE — 99285 EMERGENCY DEPT VISIT HI MDM: CPT

## 2025-05-28 PROCEDURE — 700105 HCHG RX REV CODE 258: Performed by: INTERNAL MEDICINE

## 2025-05-28 PROCEDURE — 82010 KETONE BODYS QUAN: CPT

## 2025-05-28 PROCEDURE — 700102 HCHG RX REV CODE 250 W/ 637 OVERRIDE(OP): Mod: UD

## 2025-05-28 PROCEDURE — 82550 ASSAY OF CK (CPK): CPT

## 2025-05-28 PROCEDURE — 700111 HCHG RX REV CODE 636 W/ 250 OVERRIDE (IP): Mod: UD | Performed by: EMERGENCY MEDICINE

## 2025-05-28 RX ORDER — ACETAMINOPHEN 500 MG
1000 TABLET ORAL EVERY 6 HOURS PRN
COMMUNITY

## 2025-05-28 RX ORDER — PHENYTOIN SODIUM 100 MG/1
100 CAPSULE, EXTENDED RELEASE ORAL 3 TIMES DAILY
Status: DISCONTINUED | OUTPATIENT
Start: 2025-05-29 | End: 2025-06-01 | Stop reason: HOSPADM

## 2025-05-28 RX ORDER — ONDANSETRON 2 MG/ML
4 INJECTION INTRAMUSCULAR; INTRAVENOUS EVERY 4 HOURS PRN
Status: DISCONTINUED | OUTPATIENT
Start: 2025-05-28 | End: 2025-06-01 | Stop reason: HOSPADM

## 2025-05-28 RX ORDER — SODIUM CHLORIDE 9 MG/ML
1000 INJECTION, SOLUTION INTRAVENOUS ONCE
Status: COMPLETED | OUTPATIENT
Start: 2025-05-28 | End: 2025-05-28

## 2025-05-28 RX ORDER — PHENYTOIN SODIUM 50 MG/ML
100 INJECTION INTRAMUSCULAR; INTRAVENOUS EVERY 8 HOURS
Status: DISCONTINUED | OUTPATIENT
Start: 2025-05-28 | End: 2025-05-28

## 2025-05-28 RX ORDER — ENOXAPARIN SODIUM 100 MG/ML
40 INJECTION SUBCUTANEOUS DAILY
Status: DISCONTINUED | OUTPATIENT
Start: 2025-05-28 | End: 2025-06-01 | Stop reason: HOSPADM

## 2025-05-28 RX ORDER — SODIUM CHLORIDE 9 MG/ML
INJECTION, SOLUTION INTRAVENOUS CONTINUOUS
Status: DISCONTINUED | OUTPATIENT
Start: 2025-05-28 | End: 2025-05-29

## 2025-05-28 RX ORDER — PROCHLORPERAZINE EDISYLATE 5 MG/ML
5-10 INJECTION INTRAMUSCULAR; INTRAVENOUS EVERY 4 HOURS PRN
Status: DISCONTINUED | OUTPATIENT
Start: 2025-05-28 | End: 2025-06-01 | Stop reason: HOSPADM

## 2025-05-28 RX ORDER — DEXTROSE MONOHYDRATE 25 G/50ML
25 INJECTION, SOLUTION INTRAVENOUS
Status: DISCONTINUED | OUTPATIENT
Start: 2025-05-28 | End: 2025-05-29

## 2025-05-28 RX ORDER — PROMETHAZINE HYDROCHLORIDE 25 MG/1
12.5-25 SUPPOSITORY RECTAL EVERY 4 HOURS PRN
Status: DISCONTINUED | OUTPATIENT
Start: 2025-05-28 | End: 2025-06-01 | Stop reason: HOSPADM

## 2025-05-28 RX ORDER — ACETAMINOPHEN 325 MG/1
650 TABLET ORAL EVERY 6 HOURS PRN
Status: DISCONTINUED | OUTPATIENT
Start: 2025-05-28 | End: 2025-06-01 | Stop reason: HOSPADM

## 2025-05-28 RX ORDER — SODIUM CHLORIDE 9 MG/ML
30 INJECTION, SOLUTION INTRAVENOUS ONCE
Status: COMPLETED | OUTPATIENT
Start: 2025-05-28 | End: 2025-05-28

## 2025-05-28 RX ORDER — DIAZEPAM 10 MG/2ML
5 INJECTION, SOLUTION INTRAMUSCULAR; INTRAVENOUS ONCE
Status: COMPLETED | OUTPATIENT
Start: 2025-05-28 | End: 2025-05-28

## 2025-05-28 RX ORDER — INSULIN LISPRO 100 [IU]/ML
3-14 INJECTION, SOLUTION INTRAVENOUS; SUBCUTANEOUS
Status: DISCONTINUED | OUTPATIENT
Start: 2025-05-28 | End: 2025-05-29

## 2025-05-28 RX ORDER — ONDANSETRON 4 MG/1
4 TABLET, ORALLY DISINTEGRATING ORAL EVERY 4 HOURS PRN
Status: DISCONTINUED | OUTPATIENT
Start: 2025-05-28 | End: 2025-06-01 | Stop reason: HOSPADM

## 2025-05-28 RX ORDER — LORAZEPAM 2 MG/ML
4 INJECTION INTRAMUSCULAR
Status: DISCONTINUED | OUTPATIENT
Start: 2025-05-28 | End: 2025-06-01 | Stop reason: HOSPADM

## 2025-05-28 RX ORDER — LABETALOL HYDROCHLORIDE 5 MG/ML
10 INJECTION, SOLUTION INTRAVENOUS EVERY 4 HOURS PRN
Status: DISCONTINUED | OUTPATIENT
Start: 2025-05-28 | End: 2025-06-01 | Stop reason: HOSPADM

## 2025-05-28 RX ORDER — PROMETHAZINE HYDROCHLORIDE 25 MG/1
12.5-25 TABLET ORAL EVERY 4 HOURS PRN
Status: DISCONTINUED | OUTPATIENT
Start: 2025-05-28 | End: 2025-06-01 | Stop reason: HOSPADM

## 2025-05-28 RX ORDER — ACETAMINOPHEN 500 MG
1000 TABLET ORAL ONCE
Status: COMPLETED | OUTPATIENT
Start: 2025-05-28 | End: 2025-05-28

## 2025-05-28 RX ADMIN — SODIUM CHLORIDE 1000 ML: 9 INJECTION, SOLUTION INTRAVENOUS at 15:03

## 2025-05-28 RX ADMIN — SODIUM CHLORIDE: 9 INJECTION, SOLUTION INTRAVENOUS at 19:52

## 2025-05-28 RX ADMIN — SODIUM CHLORIDE 2448 ML: 9 INJECTION, SOLUTION INTRAVENOUS at 13:16

## 2025-05-28 RX ADMIN — ACETAMINOPHEN 650 MG: 325 TABLET ORAL at 19:28

## 2025-05-28 RX ADMIN — SODIUM CHLORIDE: 9 INJECTION, SOLUTION INTRAVENOUS at 15:49

## 2025-05-28 RX ADMIN — ENOXAPARIN SODIUM 40 MG: 100 INJECTION SUBCUTANEOUS at 17:46

## 2025-05-28 RX ADMIN — FOLIC ACID: 5 INJECTION, SOLUTION INTRAMUSCULAR; INTRAVENOUS; SUBCUTANEOUS at 12:04

## 2025-05-28 RX ADMIN — DIAZEPAM 5 MG: 10 INJECTION, SOLUTION INTRAMUSCULAR; INTRAVENOUS at 12:00

## 2025-05-28 RX ADMIN — ACETAMINOPHEN 1000 MG: 500 TABLET ORAL at 13:39

## 2025-05-28 RX ADMIN — FOSPHENYTOIN SODIUM 1500 MG PE: 50 INJECTION INTRAMUSCULAR; INTRAVENOUS at 15:57

## 2025-05-28 ASSESSMENT — SOCIAL DETERMINANTS OF HEALTH (SDOH)
IN THE PAST 12 MONTHS, HAS THE ELECTRIC, GAS, OIL, OR WATER COMPANY THREATENED TO SHUT OFF SERVICE IN YOUR HOME?: NO
WITHIN THE PAST 12 MONTHS, YOU WORRIED THAT YOUR FOOD WOULD RUN OUT BEFORE YOU GOT THE MONEY TO BUY MORE: NEVER TRUE
WITHIN THE LAST YEAR, HAVE TO BEEN RAPED OR FORCED TO HAVE ANY KIND OF SEXUAL ACTIVITY BY YOUR PARTNER OR EX-PARTNER?: NO
WITHIN THE LAST YEAR, HAVE YOU BEEN AFRAID OF YOUR PARTNER OR EX-PARTNER?: NO
WITHIN THE PAST 12 MONTHS, THE FOOD YOU BOUGHT JUST DIDN'T LAST AND YOU DIDN'T HAVE MONEY TO GET MORE: NEVER TRUE
WITHIN THE LAST YEAR, HAVE YOU BEEN KICKED, HIT, SLAPPED, OR OTHERWISE PHYSICALLY HURT BY YOUR PARTNER OR EX-PARTNER?: NO
WITHIN THE LAST YEAR, HAVE YOU BEEN HUMILIATED OR EMOTIONALLY ABUSED IN OTHER WAYS BY YOUR PARTNER OR EX-PARTNER?: NO

## 2025-05-28 ASSESSMENT — LIFESTYLE VARIABLES
TOTAL SCORE: 0
DOES PATIENT WANT TO STOP DRINKING: NO
HOW MANY TIMES IN THE PAST YEAR HAVE YOU HAD 5 OR MORE DRINKS IN A DAY: 0
HAVE PEOPLE ANNOYED YOU BY CRITICIZING YOUR DRINKING: NO
AVERAGE NUMBER OF DAYS PER WEEK YOU HAVE A DRINK CONTAINING ALCOHOL: 1
TOTAL SCORE: 0
ALCOHOL_USE: YES
HAVE YOU EVER FELT YOU SHOULD CUT DOWN ON YOUR DRINKING: NO
TOTAL SCORE: 0
EVER HAD A DRINK FIRST THING IN THE MORNING TO STEADY YOUR NERVES TO GET RID OF A HANGOVER: NO
ON A TYPICAL DAY WHEN YOU DRINK ALCOHOL HOW MANY DRINKS DO YOU HAVE: 0
CONSUMPTION TOTAL: NEGATIVE
EVER FELT BAD OR GUILTY ABOUT YOUR DRINKING: NO

## 2025-05-28 ASSESSMENT — PATIENT HEALTH QUESTIONNAIRE - PHQ9
SUM OF ALL RESPONSES TO PHQ9 QUESTIONS 1 AND 2: 0
1. LITTLE INTEREST OR PLEASURE IN DOING THINGS: NOT AT ALL
2. FEELING DOWN, DEPRESSED, IRRITABLE, OR HOPELESS: NOT AT ALL
2. FEELING DOWN, DEPRESSED, IRRITABLE, OR HOPELESS: NOT AT ALL
SUM OF ALL RESPONSES TO PHQ9 QUESTIONS 1 AND 2: 0
1. LITTLE INTEREST OR PLEASURE IN DOING THINGS: NOT AT ALL

## 2025-05-28 ASSESSMENT — ENCOUNTER SYMPTOMS
DIZZINESS: 0
DEPRESSION: 0
TINGLING: 0
COUGH: 0
ABDOMINAL PAIN: 0
HEADACHES: 0
LOSS OF CONSCIOUSNESS: 1
SEIZURES: 1
BACK PAIN: 1
CONSTIPATION: 0
MYALGIAS: 1
PALPITATIONS: 0
CHILLS: 0
NECK PAIN: 1
SHORTNESS OF BREATH: 0
VOMITING: 0
NAUSEA: 0
FEVER: 0
STRIDOR: 0
FALLS: 0
DIARRHEA: 0
WEAKNESS: 0
SPUTUM PRODUCTION: 0

## 2025-05-28 ASSESSMENT — COGNITIVE AND FUNCTIONAL STATUS - GENERAL
TOILETING: A LITTLE
MOVING TO AND FROM BED TO CHAIR: A LOT
CLIMB 3 TO 5 STEPS WITH RAILING: TOTAL
SUGGESTED CMS G CODE MODIFIER MOBILITY: CL
MOVING FROM LYING ON BACK TO SITTING ON SIDE OF FLAT BED: A LOT
DRESSING REGULAR LOWER BODY CLOTHING: A LITTLE
SUGGESTED CMS G CODE MODIFIER DAILY ACTIVITY: CK
PERSONAL GROOMING: A LITTLE
WALKING IN HOSPITAL ROOM: TOTAL
DRESSING REGULAR UPPER BODY CLOTHING: A LITTLE
HELP NEEDED FOR BATHING: A LITTLE
STANDING UP FROM CHAIR USING ARMS: A LOT
TURNING FROM BACK TO SIDE WHILE IN FLAT BAD: A LITTLE
MOBILITY SCORE: 11
DAILY ACTIVITIY SCORE: 19

## 2025-05-28 ASSESSMENT — PAIN DESCRIPTION - PAIN TYPE
TYPE: ACUTE PAIN
TYPE: ACUTE PAIN

## 2025-05-28 ASSESSMENT — FIBROSIS 4 INDEX: FIB4 SCORE: 0.94

## 2025-05-28 NOTE — ASSESSMENT & PLAN NOTE
Mild elevation of his beta hydroxybutyric acid level however I do not think this is DKA causing the high metabolic anion gap acidosis, I do think that is a lactic acid post seizure  I am going to start insulin sliding scale  Patient is noncompliant with all medications  Adjust medications as needed

## 2025-05-28 NOTE — ASSESSMENT & PLAN NOTE
Significant lactic acidosis with a lactic acid of 12.8, CO2 of 14 and a gap of 25  Patient does have hyperglycemia however beta hydroxybutyric acid is only mildly elevated  Think this is secondary to his seizure  I do not see any sign of bacterial infection, no sepsis   I will give an IV fluid bolus and start IV fluids at a rapid rate  Repeat lactic acid in a few hours  If it remains elevated, will trend

## 2025-05-28 NOTE — ED NOTES
Attempted to draw repeat lactic post sepsis bolus. IV not able to draw from. Phlebotomy will have to draw

## 2025-05-28 NOTE — H&P
Hospital Medicine History & Physical Note    Date of Service  5/28/2025    Primary Care Physician  No primary care provider on file.    Consultants  None    Specialist Names: None    Code Status  Full Code    Chief Complaint  Chief Complaint   Patient presents with    Seizure    ALOC       History of Presenting Illness  Dillon Centeno is a 60 y.o. male who presented 5/28/2025 with seizure.  Patient states he was in his usual state of health until today when he was sitting in his wheelchair when his roommate noted he began shaking.  This persisted for some time and patient was noted to be confused afterwards.  Patient does have a seizure history, attributed to a brain injury.  Patient states he ran out of all of his medications including his antiepileptic sometime ago.  Patient states prior to the seizure, he had no complaints, no fever or chills, nausea or vomiting, diarrhea or constipation, dysuria.  Now, patient does have a generalized pain as well as back and neck pain.  He does have chronic neck and back pain, currently exacerbated by laying in the ER bed.  Patient does states he drinks alcohol, not daily, last drink 3 days ago.  Patient denies any palpitations, tachycardia or hallucinations.  I did discuss the case including labs and imaging with the ER physician.    I discussed the plan of care with patient.    Review of Systems  Review of Systems   Constitutional:  Positive for malaise/fatigue. Negative for chills and fever.   HENT:  Negative for congestion.    Respiratory:  Negative for cough, sputum production, shortness of breath and stridor.    Cardiovascular:  Negative for chest pain, palpitations and leg swelling.   Gastrointestinal:  Negative for abdominal pain, constipation, diarrhea, nausea and vomiting.   Genitourinary:  Negative for dysuria and urgency.   Musculoskeletal:  Positive for back pain, myalgias and neck pain. Negative for falls.   Neurological:  Positive for seizures and loss of  consciousness. Negative for dizziness, tingling, weakness and headaches.   Psychiatric/Behavioral:  Negative for depression and suicidal ideas.    All other systems reviewed and are negative.      Past Medical History   has a past medical history of Hypertension and PAD (peripheral artery disease) (HCC) (09/30/2023).    Surgical History   has a past surgical history that includes closed reduction (Left, 12/24/2017); other; orif, shoulder (Left, 1/4/2018); irrigation & debridement ortho (Left, 3/4/2018); knee amputation below (Left, 9/27/2023); femoral endarterectomy (Bilateral, 9/30/2023); angiogram, with angioplasty, and stent insertion if indicated (Right, 9/30/2023); knee amputation below (Left, 10/20/2023); incision and drainage (Left, 10/23/2023); application or replacement, wound vac (10/23/2023); knee amputation above (Left, 10/23/2023); and incision and drainage (Left, 11/14/2023).     Family History  family history is not on file.   Family history reviewed with patient. There is no family history that is pertinent to the chief complaint.     Social History   reports that he has been smoking cigars. He has never used smokeless tobacco. He reports current alcohol use. He reports that he does not use drugs.    Allergies  Allergies[1]    Medications  None       Physical Exam  Temp:  [36.3 °C (97.3 °F)] 36.3 °C (97.3 °F)  Pulse:  [130-133] 130  Resp:  [18-35] 35  BP: (161-168)/() 161/87  SpO2:  [88 %-92 %] 91 %  Blood Pressure: (!) 161/87   Temperature: 36.3 °C (97.3 °F)   Pulse: (!) 130   Respiration: (!) 35   Pulse Oximetry: 91 %       Physical Exam  Vitals and nursing note reviewed.   Constitutional:       General: He is not in acute distress.     Appearance: He is well-developed. He is ill-appearing. He is not toxic-appearing or diaphoretic.   HENT:      Head: Normocephalic and atraumatic.      Right Ear: External ear normal.      Left Ear: External ear normal.      Nose: Nose normal. No congestion or  "rhinorrhea.      Mouth/Throat:      Mouth: Mucous membranes are dry.      Pharynx: No oropharyngeal exudate.   Eyes:      General:         Right eye: No discharge.         Left eye: No discharge.   Neck:      Trachea: No tracheal deviation.   Cardiovascular:      Rate and Rhythm: Normal rate and regular rhythm.      Heart sounds: No murmur heard.     No friction rub. No gallop.   Pulmonary:      Effort: Pulmonary effort is normal. No respiratory distress.      Breath sounds: Normal breath sounds. No stridor. No wheezing or rales.   Chest:      Chest wall: No tenderness.   Abdominal:      General: Bowel sounds are normal. There is no distension.      Palpations: Abdomen is soft.      Tenderness: There is no abdominal tenderness.   Musculoskeletal:      Cervical back: Normal range of motion and neck supple. No edema or erythema.      Right lower leg: No edema.      Left lower leg: No edema.      Comments: Left at knee amputation    Lymphadenopathy:      Cervical: No cervical adenopathy.   Skin:     General: Skin is warm and dry.      Findings: No erythema or rash.   Neurological:      Mental Status: He is alert and oriented to person, place, and time.      Cranial Nerves: No cranial nerve deficit.   Psychiatric:         Mood and Affect: Mood normal.         Behavior: Behavior normal.         Thought Content: Thought content normal.         Judgment: Judgment normal.         Laboratory:  Recent Labs     05/28/25  1134   WBC 16.0*   RBC 4.12*   HEMOGLOBIN 14.0   HEMATOCRIT 41.7*   .2*   MCH 34.0*   MCHC 33.6   RDW 58.2*   PLATELETCT 219   MPV 8.8*     Recent Labs     05/28/25  1134   SODIUM 130*   POTASSIUM 4.3   CHLORIDE 91*   CO2 14*   GLUCOSE 270*   BUN 20   CREATININE 0.64   CALCIUM 9.4     Recent Labs     05/28/25  1134   ALTSGPT 12   ASTSGOT 29   ALKPHOSPHAT 99   TBILIRUBIN 0.9   GLUCOSE 270*         No results for input(s): \"NTPROBNP\" in the last 72 hours.      No results for input(s): \"TROPONINT\" in the " last 72 hours.    Imaging:  DX-CHEST-PORTABLE (1 VIEW)   Final Result      No acute cardiopulmonary abnormality.      CT-HEAD W/O    (Results Pending)       X-Ray:  I have personally reviewed the images and compared with prior images.    Assessment/Plan:  Justification for Admission Status  I anticipate this patient will require at least two midnights for appropriate medical management, necessitating inpatient admission because seizure with marked elevation of his lactic acid level    Patient will need a Med/Surg bed on NEUROLOGY service .  The need is secondary to seizure, lactic acidosis.    * Seizures (HCC)- (present on admission)  Assessment & Plan  Patient was noted to have tonic-clonic activity by his roommate  Patient does have a seizure history after brain injury  Patient is supposed to be on Dilantin however he has been noncompliant for quite some time  I am going to load with IV fosphenytoin  Start phenytoin 100 mg 3 times daily tomorrow  Obtain Dilantin level in the morning  I do feel the seizure is secondary to noncompliance and not related to alcohol or drugs, patient denies drug use  It is causing significant lactic acidosis  Start IV fluids   obtain CPK    Essential hypertension- (present on admission)  Assessment & Plan  Patient is currently hypertensive, I am unsure if he is supposed to be on an hypertensive agent at home  Noncompliant with meds  Start as needed labetalol  Potentially acutely elevated due to seizures  If remains elevated, will consider initiation of antihypertensive    Type 2 diabetes mellitus with hyperglycemia, without long-term current use of insulin (HCC)- (present on admission)  Assessment & Plan  Mild elevation of his beta hydroxybutyric acid level however I do not think this is DKA causing the high metabolic anion gap acidosis, I do think that is a lactic acid post seizure  I am going to start insulin sliding scale  Patient is noncompliant with all medications  Adjust  medications as needed    Lactic acidosis- (present on admission)  Assessment & Plan  Significant lactic acidosis with a lactic acid of 12.8, CO2 of 14 and a gap of 25  Patient does have hyperglycemia however beta hydroxybutyric acid is only mildly elevated  Think this is secondary to his seizure  I do not see any sign of bacterial infection, no sepsis   I will give an IV fluid bolus and start IV fluids at a rapid rate  Repeat lactic acid in a few hours  If it remains elevated, will trend    Alcohol abuse- (present on admission)  Assessment & Plan  Patient denies current use  Denies any sign of alcohol withdrawal at home  I do not see alcohol withdrawal currently  I do not feel this is an alcohol withdrawal seizure  Will monitor for signs of withdrawal however this point in time patient does not need CIWA protocol    Hyponatremia- (present on admission)  Assessment & Plan  Mild, secondary to dehydration  Start IV fluids   repeat BMP in the morning    Leukocytosis- (present on admission)  Assessment & Plan  Likely reactive postseizure  No complaints to suggest infection   no antibiotics at this time  Repeat CBC in the morning        VTE prophylaxis: SCDs/TEDs and enoxaparin ppx         [1]   Allergies  Allergen Reactions    Sulfa Drugs Hives and Shortness of Breath     Tolerated Flomax (10/2023)

## 2025-05-28 NOTE — ASSESSMENT & PLAN NOTE
Patient was noted to have tonic-clonic activity by his roommate  Patient does have a seizure history after brain injury  Patient is supposed to be on Dilantin however he has been noncompliant for quite some time  I am going to load with IV fosphenytoin  Start phenytoin 100 mg 3 times daily tomorrow  Obtain Dilantin level in the morning  I do feel the seizure is secondary to noncompliance and not related to alcohol or drugs, patient denies drug use  It is causing significant lactic acidosis  Start IV fluids   obtain CPK

## 2025-05-28 NOTE — ED NOTES
Medication history reviewed with PT at bedside    [facility] is complete per PT reporting    Allergies reviewed.     Patient denies any outpatient antibiotics in the last 30 days.     Patient is not taking anticoagulants.    Dispense history is not available in [facility].    Preferred pharmacy for this encounter - [facility] on [address] ([phone])

## 2025-05-28 NOTE — ASSESSMENT & PLAN NOTE
Likely reactive postseizure  No complaints to suggest infection   no antibiotics at this time  Repeat CBC in the morning    Recent Labs     05/29/25  0040 05/30/25  0139 05/31/25  0451   WBC 8.3 6.3 5.8   RBC 3.89* 3.88* 4.06*   HEMOGLOBIN 13.5* 13.3* 13.9*   HEMATOCRIT 38.7* 39.4* 41.0*   MCV 99.5* 101.5* 101.0*   MCH 34.7* 34.3* 34.2*   RDW 58.4* 58.1* 58.6*   PLATELETCT 170 170 202   MPV 9.5 9.4 9.3     UTI start antibiotics

## 2025-05-28 NOTE — PROGRESS NOTES
Received report and assumed care of patient at change of shift. Patient is A&Ox4, on 2L NC, and reports no pain at this time. Pt has wounds, photos updated in chart. Bed bath completed. Seizure precautions in place with padded side rails and suction at bedside. Patient assessment completed, bed in lowest position, and call light and personal belongings are within reach. Patient expressed no further needs at this time.

## 2025-05-28 NOTE — ASSESSMENT & PLAN NOTE
Patient denies current use  Denies any sign of alcohol withdrawal at home  I do not see alcohol withdrawal currently  I do not feel this is an alcohol withdrawal seizure  Will monitor for signs of withdrawal however this point in time patient does not need CIWA protocol

## 2025-05-28 NOTE — ED PROVIDER NOTES
"ED Provider Note    CHIEF COMPLAINT  Chief Complaint   Patient presents with    Seizure    ALOC       EXTERNAL RECORDS REVIEWED  Patient was admitted back in October for seizures had a workup, Keppra 750 twice daily was recommended    HPI/ROS  LIMITATION TO HISTORY   Select: : None  OUTSIDE HISTORIAN(S):  EMS states he was stable during transport    Dillon Centeno is a 60 y.o. male who presents complaining of \"I guess I had a seizure.\"  The patient states that his roommate said that he had some shaking while in a wheelchair.  He did not fall out of it.  Right now he says he feels fine.  Is not been sick at all recently, no recent trauma.  He denies any headache, sore throat, neck pain, belly pain, chest pain, shortness of breath. No cough.  There  Is no weakness or numbness.  He has had seizures in the past he attributes that to a brain injury.  He denies any change in bowel or bladder.  He does plan on has not had a drink for about a week.  Typically he is a heavy drinker.  He has actually no complaints at all.     PAST MEDICAL HISTORY   has a past medical history of Hypertension and PAD (peripheral artery disease) (HCC) (09/30/2023).    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    FAMILY HISTORY  No family history on file.    SOCIAL HISTORY  Social History     Tobacco Use    Smoking status: Every Day     Types: Cigars    Smokeless tobacco: Never   Substance and Sexual Activity    " "Alcohol use: Yes     Comment: a few beers a week    Drug use: No    Sexual activity: Not on file       CURRENT MEDICATIONS  Home Medications    **Home medications have not yet been reviewed for this encounter**       Audit from Redirected Encounters    **Home medications have not yet been reviewed for this encounter**         ALLERGIES  Allergies[1]    PHYSICAL EXAM  VITAL SIGNS: BP (!) 161/87   Pulse (!) 130   Temp 36.3 °C (97.3 °F) (Temporal)   Resp (!) 35   Ht 1.803 m (5' 11\")   Wt 81.6 kg (180 lb)   SpO2 91%   BMI 25.10 kg/m²    Constitutional: Disheveled but not toxic April  HENT: Head is without trauma.  Oropharynx is clear.  Mucous membranes are moist.  Eyes: Sclerae are nonicteric, pupils are equally round.  Neck: Supple with grossly normal range of motion.  Cardiovascular: Tachycardic  Thorax & Lungs: Breathing easily.  Good air movement.  There is no wheeze, rhonchi or rales.  Abdomen: Bowel sounds normal, soft, non-distended, nontender, no mass nor pulsatile mass. I do not appreciate hepatosplenomegaly.  Skin: No apparent rash.  I do not see petechiae or purpura.  Extremities: No evidence of acute trauma.  No clubbing, cyanosis, edema, no Homans or cords.  Neurologic: Alert. Moving all extremities.  Intact sensation and strength throughout.  Gait is normal.  Psychiatric: Normal for situation      EKG/LABS  Labs Reviewed   CBC WITH DIFFERENTIAL - Abnormal; Notable for the following components:       Result Value    WBC 16.0 (*)     RBC 4.12 (*)     Hematocrit 41.7 (*)     .2 (*)     MCH 34.0 (*)     RDW 58.2 (*)     MPV 8.8 (*)     Neutrophils-Polys 92.20 (*)     Lymphocytes 1.90 (*)     Neutrophils (Absolute) 14.72 (*)     Lymphs (Absolute) 0.30 (*)     Immature Granulocytes (abs) 0.13 (*)     All other components within normal limits   COMP METABOLIC PANEL - Abnormal; Notable for the following components:    Sodium 130 (*)     Chloride 91 (*)     Co2 14 (*)     Anion Gap 25.0 (*)     " Glucose 270 (*)     All other components within normal limits   LACTIC ACID - Abnormal; Notable for the following components:    Lactic Acid 12.8 (*)     All other components within normal limits   BETA-HYDROXYBUTYRIC ACID - Abnormal; Notable for the following components:    beta-Hydroxybutyric Acid 0.36 (*)     All other components within normal limits   DIAGNOSTIC ALCOHOL   ESTIMATED GFR   URINALYSIS   URINE DRUG SCREEN   LACTIC ACID   LACTIC ACID   URINE CULTURE(NEW)   BLOOD CULTURE   BLOOD CULTURE   VENOUS BLOOD GAS   POCT GLUCOSE       I have independently interpreted this EKG    RADIOLOGY/PROCEDURES   I have independently interpreted the diagnostic imaging associated with this visit and am waiting the final reading from the radiologist.   My preliminary interpretation is as follows: No infiltrate    Radiologist interpretation:  DX-CHEST-PORTABLE (1 VIEW)   Final Result      No acute cardiopulmonary abnormality.      CT-HEAD W/O    (Results Pending)       COURSE & MEDICAL DECISION MAKING    ASSESSMENT, COURSE AND PLAN  Care Narrative: Patient presents with a history of seizure with a seizure today it sounds.  Patient has no complaints at all initially.  He is tachycardic, hypertensive.  With further questioning, it sounds like he is a heavy drinker and is not anything to drink recently.  W I have ordered Ativan, IV fluids with a detoxification bag.      Upon recheck, laboratories are returning, he has gap acidosis 25, blood sugar 270.  White count of 8 16,000 with a preponderance of neutrophils.  Upon recheck, his heart rate send in the 1 teens, the patient states he like some Tylenol as the bed is uncomfortable but other than this and generally feeling tired he has no complaints at all.      He is lactic acid has returned elevated.  However my concern is still this is a related to alcohol withdrawal and seizure.  Out of abundance of caution I did go ahead and give him a sepsis bolus however.  30 cc/kg.   Withheld antibiotics as he has no focus of infection.  I did go ahead and send off the beta-hydroxybutyrate which is mildly elevated, and does not suggest DKA as a cause of his symptoms.    I discussed the patient's case with Dr. Shi, he has seen and admitted the patient.  He agrees with the workup thus far.  At this point I think the patient does not need the intensive care unit but he does need to be closely monitored on the floor.    DISPOSITION AND DISCUSSIONS  I have discussed management of the patient with the following physicians and KIMBERLI's: Hospitalist    Discussion of management with other QHP or appropriate source(s):      Escalation of care considered, and ultimately not performed: Admission to the ICU    Barriers to care at this time, including but not limited to: .     Decision tools and prescription drugs considered including, but not limited to: .    FINAL DIAGNOSIS  1. Seizure (HCC)    2. Tachycardia    3. Hypertension, unspecified type         Electronically signed by: Tirso Manning M.D., 5/28/2025 12:53 PM           [1]   Allergies  Allergen Reactions    Sulfa Drugs Hives and Shortness of Breath     Tolerated Flomax (10/2023)

## 2025-05-28 NOTE — ASSESSMENT & PLAN NOTE
Patient is currently hypertensive, I am unsure if he is supposed to be on an hypertensive agent at home  Noncompliant with meds  Start as needed labetalol  Potentially acutely elevated due to seizures  If remains elevated, will consider initiation of antihypertensive    Vitals:    05/31/25 1542   BP: 124/78   Pulse: 66   Resp: 18   Temp: 36.6 °C (97.8 °F)   SpO2: 95%   ;s

## 2025-05-28 NOTE — ED TRIAGE NOTES
".  Chief Complaint   Patient presents with    Seizure    ALOC     BIBA from home   Roommate saw pt have \"seizure like\" activity for about 2 minutes in wheelchair. Pt did not fall out of WC. NKH of seizures  Upon EMS arrival pt A&O x 2 self and place, BL A&O x 4    Hx etoh, roommate states pt sleeps with a bottle. Pt reports last drink was one week ago    Pt tachycardic and diaphoretic A&O x 2 requiring 2 liters NC to maintain spo2 greater than 90%    Pt denies pain     .BP (!) 168/104   Pulse (!) 133   Temp 36.3 °C (97.3 °F) (Temporal)   Resp 18   Ht 1.803 m (5' 11\")   Wt 81.6 kg (180 lb)   SpO2 90%   BMI 25.10 kg/m²     "

## 2025-05-29 PROBLEM — N30.90 CYSTITIS: Status: ACTIVE | Noted: 2024-10-23

## 2025-05-29 LAB
ANION GAP SERPL CALC-SCNC: 13 MMOL/L (ref 7–16)
APPEARANCE UR: CLEAR
BACTERIA #/AREA URNS HPF: ABNORMAL /HPF
BILIRUB UR QL STRIP.AUTO: NEGATIVE
BUN SERPL-MCNC: 8 MG/DL (ref 8–22)
CALCIUM SERPL-MCNC: 8.3 MG/DL (ref 8.5–10.5)
CASTS URNS QL MICRO: ABNORMAL /LPF (ref 0–2)
CHLORIDE SERPL-SCNC: 92 MMOL/L (ref 96–112)
CO2 SERPL-SCNC: 24 MMOL/L (ref 20–33)
COLOR UR: YELLOW
CREAT SERPL-MCNC: 0.46 MG/DL (ref 0.5–1.4)
EPITHELIAL CELLS 1715: ABNORMAL /HPF (ref 0–5)
ERYTHROCYTE [DISTWIDTH] IN BLOOD BY AUTOMATED COUNT: 58.4 FL (ref 35.9–50)
GFR SERPLBLD CREATININE-BSD FMLA CKD-EPI: 119 ML/MIN/1.73 M 2
GLUCOSE BLD STRIP.AUTO-MCNC: 103 MG/DL (ref 65–99)
GLUCOSE BLD STRIP.AUTO-MCNC: 95 MG/DL (ref 65–99)
GLUCOSE SERPL-MCNC: 88 MG/DL (ref 65–99)
GLUCOSE UR STRIP.AUTO-MCNC: >=1000 MG/DL
GRANULAR CASTS  1716G: PRESENT /LPF
HCT VFR BLD AUTO: 38.7 % (ref 42–52)
HGB BLD-MCNC: 13.5 G/DL (ref 14–18)
HYALINE CAST   1831: PRESENT /LPF
KETONES UR STRIP.AUTO-MCNC: 15 MG/DL
LACTATE SERPL-SCNC: 1.2 MMOL/L (ref 0.5–2)
LEUKOCYTE ESTERASE UR QL STRIP.AUTO: ABNORMAL
MCH RBC QN AUTO: 34.7 PG (ref 27–33)
MCHC RBC AUTO-ENTMCNC: 34.9 G/DL (ref 32.3–36.5)
MCV RBC AUTO: 99.5 FL (ref 81.4–97.8)
MICRO URNS: ABNORMAL
NITRITE UR QL STRIP.AUTO: POSITIVE
PH UR STRIP.AUTO: 5.5 [PH] (ref 5–8)
PHENYTOIN SERPL-MCNC: 19.2 UG/ML (ref 10–20)
PLATELET # BLD AUTO: 170 K/UL (ref 164–446)
PMV BLD AUTO: 9.5 FL (ref 9–12.9)
POTASSIUM SERPL-SCNC: 3.3 MMOL/L (ref 3.6–5.5)
PROT UR QL STRIP: 100 MG/DL
RBC # BLD AUTO: 3.89 M/UL (ref 4.7–6.1)
RBC # URNS HPF: ABNORMAL /HPF (ref 0–2)
RBC UR QL AUTO: ABNORMAL
SODIUM SERPL-SCNC: 127 MMOL/L (ref 135–145)
SODIUM SERPL-SCNC: 129 MMOL/L (ref 135–145)
SODIUM SERPL-SCNC: 132 MMOL/L (ref 135–145)
SP GR UR STRIP.AUTO: 1.02
UROBILINOGEN UR STRIP.AUTO-MCNC: 1 EU/DL
WBC # BLD AUTO: 8.3 K/UL (ref 4.8–10.8)
WBC #/AREA URNS HPF: ABNORMAL /HPF

## 2025-05-29 PROCEDURE — 700102 HCHG RX REV CODE 250 W/ 637 OVERRIDE(OP): Performed by: NURSE PRACTITIONER

## 2025-05-29 PROCEDURE — 99233 SBSQ HOSP IP/OBS HIGH 50: CPT | Performed by: INTERNAL MEDICINE

## 2025-05-29 PROCEDURE — 97163 PT EVAL HIGH COMPLEX 45 MIN: CPT

## 2025-05-29 PROCEDURE — 36415 COLL VENOUS BLD VENIPUNCTURE: CPT

## 2025-05-29 PROCEDURE — 97166 OT EVAL MOD COMPLEX 45 MIN: CPT

## 2025-05-29 PROCEDURE — A9270 NON-COVERED ITEM OR SERVICE: HCPCS | Performed by: INTERNAL MEDICINE

## 2025-05-29 PROCEDURE — A9270 NON-COVERED ITEM OR SERVICE: HCPCS | Performed by: NURSE PRACTITIONER

## 2025-05-29 PROCEDURE — 700111 HCHG RX REV CODE 636 W/ 250 OVERRIDE (IP): Mod: JZ | Performed by: INTERNAL MEDICINE

## 2025-05-29 PROCEDURE — 82962 GLUCOSE BLOOD TEST: CPT

## 2025-05-29 PROCEDURE — 83605 ASSAY OF LACTIC ACID: CPT

## 2025-05-29 PROCEDURE — 700105 HCHG RX REV CODE 258: Performed by: INTERNAL MEDICINE

## 2025-05-29 PROCEDURE — 80048 BASIC METABOLIC PNL TOTAL CA: CPT

## 2025-05-29 PROCEDURE — 700102 HCHG RX REV CODE 250 W/ 637 OVERRIDE(OP): Performed by: INTERNAL MEDICINE

## 2025-05-29 PROCEDURE — 97162 PT EVAL MOD COMPLEX 30 MIN: CPT

## 2025-05-29 PROCEDURE — 84295 ASSAY OF SERUM SODIUM: CPT | Mod: 91

## 2025-05-29 PROCEDURE — 770006 HCHG ROOM/CARE - MED/SURG/GYN SEMI*

## 2025-05-29 PROCEDURE — 80185 ASSAY OF PHENYTOIN TOTAL: CPT

## 2025-05-29 PROCEDURE — 85027 COMPLETE CBC AUTOMATED: CPT

## 2025-05-29 RX ORDER — FOLIC ACID 1 MG/1
1 TABLET ORAL DAILY
Status: DISCONTINUED | OUTPATIENT
Start: 2025-05-29 | End: 2025-06-01 | Stop reason: HOSPADM

## 2025-05-29 RX ORDER — LORAZEPAM 0.5 MG/1
0.5 TABLET ORAL EVERY 4 HOURS PRN
Status: DISCONTINUED | OUTPATIENT
Start: 2025-05-29 | End: 2025-06-01 | Stop reason: HOSPADM

## 2025-05-29 RX ORDER — THIAMINE HYDROCHLORIDE 100 MG/ML
100 INJECTION, SOLUTION INTRAMUSCULAR; INTRAVENOUS DAILY
Status: COMPLETED | OUTPATIENT
Start: 2025-05-29 | End: 2025-05-31

## 2025-05-29 RX ORDER — LORAZEPAM 1 MG/1
1 TABLET ORAL EVERY 4 HOURS PRN
Status: DISCONTINUED | OUTPATIENT
Start: 2025-05-29 | End: 2025-06-01 | Stop reason: HOSPADM

## 2025-05-29 RX ORDER — LORAZEPAM 2 MG/1
2 TABLET ORAL
Status: DISCONTINUED | OUTPATIENT
Start: 2025-05-29 | End: 2025-06-01 | Stop reason: HOSPADM

## 2025-05-29 RX ORDER — NYSTATIN 100000 [USP'U]/G
POWDER TOPICAL 2 TIMES DAILY
Status: DISCONTINUED | OUTPATIENT
Start: 2025-05-29 | End: 2025-06-01 | Stop reason: HOSPADM

## 2025-05-29 RX ORDER — LORAZEPAM 2 MG/1
4 TABLET ORAL
Status: DISCONTINUED | OUTPATIENT
Start: 2025-05-29 | End: 2025-06-01 | Stop reason: HOSPADM

## 2025-05-29 RX ORDER — DIAZEPAM 10 MG/2ML
10 INJECTION, SOLUTION INTRAMUSCULAR; INTRAVENOUS
Status: DISCONTINUED | OUTPATIENT
Start: 2025-05-29 | End: 2025-06-01 | Stop reason: HOSPADM

## 2025-05-29 RX ADMIN — SODIUM CHLORIDE: 9 INJECTION, SOLUTION INTRAVENOUS at 02:18

## 2025-05-29 RX ADMIN — THERA TABS 1 TABLET: TAB at 14:49

## 2025-05-29 RX ADMIN — CEFAZOLIN 2 G: 2 INJECTION, POWDER, FOR SOLUTION INTRAVENOUS at 14:58

## 2025-05-29 RX ADMIN — PHENYTOIN SODIUM 100 MG: 100 CAPSULE, EXTENDED RELEASE ORAL at 12:28

## 2025-05-29 RX ADMIN — LORAZEPAM 1 MG: 1 TABLET ORAL at 22:27

## 2025-05-29 RX ADMIN — ACETAMINOPHEN 650 MG: 325 TABLET ORAL at 20:38

## 2025-05-29 RX ADMIN — PHENYTOIN SODIUM 100 MG: 100 CAPSULE, EXTENDED RELEASE ORAL at 20:38

## 2025-05-29 RX ADMIN — NYSTATIN: 100000 POWDER TOPICAL at 17:46

## 2025-05-29 RX ADMIN — ENOXAPARIN SODIUM 40 MG: 100 INJECTION SUBCUTANEOUS at 17:46

## 2025-05-29 RX ADMIN — FOLIC ACID 1 MG: 1 TABLET ORAL at 14:49

## 2025-05-29 RX ADMIN — THIAMINE HYDROCHLORIDE 100 MG: 100 INJECTION, SOLUTION INTRAMUSCULAR; INTRAVENOUS at 14:49

## 2025-05-29 RX ADMIN — CEFAZOLIN 2 G: 2 INJECTION, POWDER, FOR SOLUTION INTRAVENOUS at 22:33

## 2025-05-29 RX ADMIN — NYSTATIN: 100000 POWDER TOPICAL at 05:23

## 2025-05-29 RX ADMIN — ACETAMINOPHEN 650 MG: 325 TABLET ORAL at 08:20

## 2025-05-29 ASSESSMENT — COGNITIVE AND FUNCTIONAL STATUS - GENERAL
CLIMB 3 TO 5 STEPS WITH RAILING: TOTAL
SUGGESTED CMS G CODE MODIFIER MOBILITY: CL
HELP NEEDED FOR BATHING: A LOT
MOBILITY SCORE: 13
WALKING IN HOSPITAL ROOM: TOTAL
MOVING FROM LYING ON BACK TO SITTING ON SIDE OF FLAT BED: A LITTLE
DAILY ACTIVITIY SCORE: 16
DRESSING REGULAR LOWER BODY CLOTHING: A LOT
TOILETING: A LOT
PERSONAL GROOMING: A LITTLE
STANDING UP FROM CHAIR USING ARMS: TOTAL
MOVING TO AND FROM BED TO CHAIR: A LITTLE
SUGGESTED CMS G CODE MODIFIER DAILY ACTIVITY: CK
DRESSING REGULAR UPPER BODY CLOTHING: A LITTLE

## 2025-05-29 ASSESSMENT — LIFESTYLE VARIABLES
VISUAL DISTURBANCES: NOT PRESENT
AUDITORY DISTURBANCES: NOT PRESENT
TREMOR: *
PAROXYSMAL SWEATS: NO SWEAT VISIBLE
NAUSEA AND VOMITING: NO NAUSEA AND NO VOMITING
TOTAL SCORE: 4
ORIENTATION AND CLOUDING OF SENSORIUM: CANNOT DO SERIAL ADDITIONS OR IS UNCERTAIN ABOUT DATE
HEADACHE, FULLNESS IN HEAD: VERY MILD
AGITATION: SOMEWHAT MORE THAN NORMAL ACTIVITY
AUDITORY DISTURBANCES: NOT PRESENT
PAROXYSMAL SWEATS: NO SWEAT VISIBLE
TOTAL SCORE: 8
ANXIETY: MILDLY ANXIOUS
ORIENTATION AND CLOUDING OF SENSORIUM: DATE DISORIENTATION BY MORE THAN TWO CALENDAR DAYS
HEADACHE, FULLNESS IN HEAD: VERY MILD
AGITATION: NORMAL ACTIVITY
VISUAL DISTURBANCES: NOT PRESENT
NAUSEA AND VOMITING: NO NAUSEA AND NO VOMITING
TREMOR: *
ANXIETY: NO ANXIETY (AT EASE)

## 2025-05-29 ASSESSMENT — PAIN DESCRIPTION - PAIN TYPE
TYPE: ACUTE PAIN

## 2025-05-29 ASSESSMENT — GAIT ASSESSMENTS: GAIT LEVEL OF ASSIST: UNABLE TO PARTICIPATE

## 2025-05-29 ASSESSMENT — ACTIVITIES OF DAILY LIVING (ADL): TOILETING: INDEPENDENT

## 2025-05-29 NOTE — PROGRESS NOTES
Assumed patient care and received bedside report from Day RN. Patient is A&Ox4 and reports 5/10 pain of the left leg stump, medicated earlier per MAR. Non-pharmacological interventions encouraged like ice/heat pack. Patient on 3L of O2/NC, awake, and alert.     Patient educated on the use of the call light and verbalized understanding. Bed in the lowest and locked position with alarm on. Personal belongings and call light within reach. Moderate Fall Risk precautions and hourly rounding in place. Safety education provided. POC discussed and patient declines any further needs at this time. Orders carried out.
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
## 2025-05-29 NOTE — CARE PLAN
The patient is Stable - Low risk of patient condition declining or worsening    Shift Goals  Clinical Goals: Maintain seizure precautions. Pt will remain free from falls.  Patient Goals: Rest  Family Goals: LUTHER    Pt is A&Ox4, on 2L via NC, and stable. Maintain seizure precautions. All needs are met and questions answered at this time.    Progress made toward(s) clinical / shift goals:    Problem: Knowledge Deficit - Standard  Goal: Patient and family/care givers will demonstrate understanding of plan of care, disease process/condition, diagnostic tests and medications  Outcome: Progressing  Note: Pt verbalizes understanding of POC and status.     Problem: Skin Integrity  Goal: Skin integrity is maintained or improved  Outcome: Progressing  Note: Pt has maintained skin integrity throughout shift.      Problem: Pain - Standard  Goal: Alleviation of pain or a reduction in pain to the patient’s comfort goal  Outcome: Progressing  Note: Pt pain has been managed per MAR.     Problem: Fall Risk  Goal: Patient will remain free from falls  Outcome: Progressing  Note: Pt has remained free from falls throughout shift.        Patient is not progressing towards the following goals:       No

## 2025-05-29 NOTE — CARE PLAN
The patient is Stable - Low risk of patient condition declining or worsening    Shift Goals  Clinical Goals: Patient will maintain on seizure precaution, pt will remain free from falls  Patient Goals: sleep and rest  Family Goals: LUTHER    Progress made toward(s) clinical / shift goals: Patient rounded on hourly. Bed locked to its lowest position. Personal belongings and call light within reach. Educated on asking theresa help when needed assistance and verbalized understanding. Q2 turns in place and emphasized the importance of offloading. Pain is being assessed Q4 . Patient remains free from falls at this time.        Problem: Knowledge Deficit - Standard  Goal: Patient and family/care givers will demonstrate understanding of plan of care, disease process/condition, diagnostic tests and medications  Description: Target End Date:  1-3 days or as soon as patient condition allowsDocument in Patient Education1.  Patient and family/caregiver oriented to unit, equipment, visitation policy and means for communicating concern2.  Complete/review Learning Assessment3.  Assess knowledge level of disease process/condition, treatment plan, diagnostic tests and medications4.  Explain disease process/condition, treatment plan, diagnostic tests and medications  5/29/2025 0243 by Ammy Pham R.N.  Outcome: Progressing  5/29/2025 0242 by Ammy Pham R.N.  Outcome: Progressing     Problem: Skin Integrity  Goal: Skin integrity is maintained or improved  Description: Target End Date:  Prior to discharge or change in level of careDocument interventions on Skin Risk/Jae flowsheet groups and corresponding LDA1.  Assess and monitor skin integrity, appearance and/or temperature2.  Assess risk factors for impaired skin integrity and/or pressures ulcers3.  Implement precautions to protect skin integrity in collaboration with interdisciplinary team4.  Implement pressure ulcer prevention protocol if at risk for skin breakdown5.  Confirm wound  care consult if at risk for skin breakdown6.  Ensure patient use of pressure relieving devices  (Low air loss bed, waffle overlay, heel protectors, ROHO cushion, etc)  Outcome: Progressing     Problem: Seizure Precautions  Goal: Implementation of seizure precautions  Description: Target End Date:  Prior to discharge or change in level of care1.  Padded side rails up at all times2.  Suction equipment and oxygen delivery system at bedside3.  Continuous pulse oximeter in use4.  Implement fall precautions, bed alarm on, bed in lowest position5.  IV access (per order)6.  Provide low stimulus environment, avoid exposure to triggers7.  Instruct patient to use call light/seizure button if having warning signs of impending seizure  Outcome: Progressing     Problem: Fall Risk  Goal: Patient will remain free from falls  Description: Target End Date:  Prior to discharge or change in level of careDocument interventions on the [facility] Fall Risk Assessment1.  Assess for fall risk factors2.  Implement fall precautions  Outcome: Progressing       Patient is not progressing towards the following goals:

## 2025-05-29 NOTE — PROGRESS NOTES
Report received from [facility] RN Ammy and assumed patient care at 0700. Patient is A&Ox 4, on 2L via NC, and awake and alert. Patient reporting a pain level of 2/10, declines intervention at this time. Seizure precautions in place. Call light within reach and bed in lowest position. Reinforced the need to call for assistance. Plan of care discussed and patient does not have any further needs at this time.

## 2025-05-29 NOTE — WOUND TEAM
[facility]  Inpatient Services  Initial Wound and Skin Care Evaluation    Admission Date: 5/28/2025     Last order of IP CONSULT TO WOUND CARE was found on 5/28/2025 from Hospital Encounter on 5/28/2025     HPI, PMH, SH: Reviewed    Past Surgical History[1]  Social History     Tobacco Use    Smoking status: Every Day     Types: Cigars    Smokeless tobacco: Never   Substance Use Topics    Alcohol use: Yes     Comment: a few beers a week     Chief Complaint   Patient presents with    Seizure    ALOC     Diagnosis: Seizures (HCC) [R56.9]    Unit where seen by Wound Team: S530/02     WOUND CONSULT RELATED TO:  buttocks, R inner thigh, groin, R toe, L hip    WOUND TEAM PLAN OF CARE - Frequency of Follow-up:   Nursing to follow dressing orders written for wound care. Contact wound team if area fails to progress, deteriorates or with any questions/concerns if something comes up before next scheduled follow up (See below as to whether wound is following and frequency of wound follow up)   Weekly -      WOUND HISTORY:   Pt is a 59 yo male who presented to the Ed for seizure, ALOC.  Pt admitted for seizures.  Hx includes DM2, PAD , L AKA, vascular procedures       WOUND ASSESSMENT/LDA       Wound 11/12/23 Pressure Injury Sacrum Left POA healing stage IV (Active)       Wound 10/17/24 Partial Thickness Wound Leg Left (Active)       Wound 05/28/25 Pressure Injury Buttocks Left unstag (Active)   Wound Image    05/29/25 1500   Site Assessment Red 05/29/25 1500   Periwound Assessment Edema 05/29/25 1500   Margins Epibole (rolled edges) 05/29/25 1500   Closure Open to air 05/29/25 0900   Drainage Amount Small 05/29/25 1500   Drainage Description Serosanguineous 05/29/25 1500   Treatments Cleansed;Site care 05/28/25 2055   Wound Cleansing Approved Wound Cleanser 05/28/25 2055   Periwound Protectant Not Applicable 05/29/25 0900   Moisture Containment Device None 05/29/25 0900   Dressing Status Other (Comment) 05/29/25  1500   Dressing Options Collagen Dressing;Hydrofera Blue Ready 05/29/25 1500   Dressing Change/Treatment Frequency Every 48 hrs, and As Needed 05/29/25 1500   NEXT Dressing Change/Treatment Date 05/31/25 05/29/25 1500   NEXT Weekly Photo (Inpatient Only) 06/05/25 05/29/25 1500   Wound Team Following Other (comment) 05/29/25 1500   WOUND NURSE ONLY - Pressure Injury Stage U 05/29/25 1500   Wound Length (cm) 3 cm 05/29/25 1500   Wound Width (cm) 0.5 cm 05/29/25 1500   Wound Depth (cm) 1.5 cm 05/29/25 1500   Wound Surface Area (cm^2) 1.18 cm^2 05/29/25 1500   Wound Volume (cm^3) 1.178 cm^3 05/29/25 1500   Wound Bed Granulation (%) 80 % 05/29/25 1500   Wound Bed Slough (%) 20 % 05/29/25 1500   Wound Odor None 05/29/25 1500   WOUND NURSE ONLY - Time Spent with Patient (mins) 60 05/29/25 1500       Wound 05/28/25 Pressure Injury Hip Left unstag (Active)   Wound Image   05/29/25 1500   Site Assessment Clean;Pink 05/29/25 0900   Periwound Assessment Red 05/28/25 2055   Margins Attached edges 05/29/25 0900   Closure Open to air 05/29/25 0900   Drainage Amount Scant 05/29/25 1500   Drainage Description Serosanguineous 05/29/25 1500   Treatments Cleansed;Site care 05/28/25 2055   Wound Cleansing Approved Wound Cleanser 05/28/25 2055   Periwound Protectant Not Applicable 05/29/25 0900   Moisture Containment Device None 05/29/25 0900   Dressing Status Other (Comment) 05/29/25 1500   Dressing Changed Changed 05/29/25 1500   Dressing Options Hydrocolloid Thin;Sacral Dressing - Offloading 05/29/25 1500   Dressing Change/Treatment Frequency Every 48 hrs, and As Needed 05/29/25 1500   NEXT Dressing Change/Treatment Date 05/31/25 05/29/25 1500   NEXT Weekly Photo (Inpatient Only) 06/05/25 05/29/25 1500   Wound Team Following Other (comment) 05/29/25 1500   WOUND NURSE ONLY - Pressure Injury Stage U 05/29/25 1500   Wound Length (cm) 2.2 cm 05/29/25 1500   Wound Width (cm) 2.1 cm 05/29/25 1500   Wound Depth (cm) 0.1 cm 05/29/25 1500    Wound Surface Area (cm^2) 3.63 cm^2 05/29/25 1500   Wound Volume (cm^3) 0.242 cm^3 05/29/25 1500   Wound Bed Granulation (%) 50 % 05/29/25 1500   Wound Bed Slough (%) 50 % 05/29/25 1500   Wound Odor None 05/29/25 1500       Wound 05/28/25 Groin redness (Active)   Wound Image   05/28/25 1519   Site Assessment Pink;Red 05/29/25 0900   Periwound Assessment Red 05/29/25 0900   Margins Attached edges 05/29/25 0900   Closure Open to air 05/29/25 0900   Drainage Amount None 05/29/25 0900   Treatments Cleansed;Site care 05/28/25 2055   Wound Cleansing Approved Wound Cleanser 05/28/25 2055   Periwound Protectant Antifungal Therapy 05/28/25 2055   Moisture Containment Device None 05/29/25 0900   Dressing Status Open to Air 05/29/25 0900   NEXT Weekly Photo (Inpatient Only) 06/04/25 05/29/25 0900       Wound 05/28/25 Thigh Medial Right small red spot (Active)   Wound Image   05/28/25 1519   Site Assessment Red 05/29/25 0900   Periwound Assessment Clean;Dry;Intact 05/29/25 0900   Margins Attached edges 05/29/25 0900   Closure Open to air 05/29/25 0900   Drainage Amount None 05/29/25 0900   Treatments Cleansed;Site care 05/28/25 2055   Wound Cleansing Approved Wound Cleanser 05/28/25 2055   Periwound Protectant Not Applicable 05/29/25 0900   Moisture Containment Device None 05/29/25 0900   Dressing Status Open to Air 05/29/25 0900   NEXT Weekly Photo (Inpatient Only) 06/04/25 05/29/25 0900       Wound 05/28/25 Dorsal Right hallux, 2nd toe (Active)   Wound Image    05/29/25 1500   Site Assessment Red;Dry 05/29/25 1500   Periwound Assessment Pink;Edema 05/29/25 1500   Margins Attached edges 05/29/25 0900   Closure Open to air 05/29/25 0900   Drainage Amount None 05/29/25 1500   Treatments Cleansed;Site care 05/28/25 2055   Wound Cleansing Approved Wound Cleanser 05/28/25 2055   Periwound Protectant Not Applicable 05/29/25 0900   Moisture Containment Device None 05/29/25 0900   Dressing Status Other (Comment) 05/29/25 1983    Dressing Changed Changed 05/29/25 1500   Dressing Options Open to Air 05/29/25 1500   NEXT Weekly Photo (Inpatient Only) 06/05/25 05/29/25 1500   Wound Team Following Other (comment) 05/29/25 1500   Wound Odor None 05/29/25 1500     Rash groin - nystatin in use        Vascular:    INGA:   No results found.    Lab Values:    Lab Results   Component Value Date/Time    WBC 8.3 05/29/2025 12:40 AM    RBC 3.89 (L) 05/29/2025 12:40 AM    HEMOGLOBIN 13.5 (L) 05/29/2025 12:40 AM    HEMATOCRIT 38.7 (L) 05/29/2025 12:40 AM    CREACTPROT 27.78 (H) 11/12/2023 02:22 AM    SEDRATEWES 66 (H) 11/12/2023 02:22 AM    HBA1C 5.0 10/17/2024 12:35 PM    PLATELETCT 170 05/29/2025 12:40 AM         Culture Results show:  No results found for this or any previous visit (from the past 720 hours).    Pain Level/Medicated:  None, Tolerated without pain medication       INTERVENTIONS BY WOUND TEAM:      Wound:  L gluteal  Preparation for Dressing removal: Removed without difficulty  Cleansed/Non-selectively Debrided with:  Wound cleanser and Gauze  Kiara wound: Cleansed with Wound cleanser and Gauze, Prepped with No Sting  Primary Dressing:  collagen, hydrofera blue off loading sacrum     Wound:  R hip  Preparation for Dressing removal: Removed without difficulty  Cleansed/Non-selectively Debrided with:  Wound cleanser and Gauze  Kiara wound: Cleansed with Wound cleanser and Gauze, Prepped with No Sting  Primary Dressing:  hydrocolloid, off loading foam    Advanced Wound Care Discharge Planning  Number of Clinicians necessary to complete wound care: 1  Is patient requiring IV pain medications for dressing changes:  No   Length of time for dressing change 60 min. (This does not include chart review, pre-medication time, set up, clean up or time spent charting.)    Interdisciplinary consultation: Patient, Bedside RN (), .   .    EVALUATION / RATIONALE FOR TREATMENT:     Date:  05/29/25  Wound Status:  Initial evaluation    Pt has a full thickness  unstageable pressure injury on the L buttock near the iliac crest.  No structures visible at this point.  Will initiate wound therapy and reassess should wound reveal depth to structures.  L hip wound appears superficial, dry.  Will use hydrocolloid to provide better moisture balance to allow wound to resolve.  Toe wounds on the R foot appear superficial.  Will keep a watch on them for now.           Goals: 100% granulation tissue L gluteal and hip wounds    NURSING PLAN OF CARE ORDERS:  Dressing changes: See Dressing Care orders    NUTRITION RECOMMENDATIONS   Wound Team Recommendations:  Dietary consult    DIET ORDERS (From admission to next 24h)       Start     Ordered    05/28/25 1355  Diet Order Diet: Consistent CHO (Diabetic)  ALL MEALS        Question:  Diet:  Answer:  Consistent CHO (Diabetic)    05/28/25 8946                    PREVENTATIVE INTERVENTIONS:    Q shift Jae - performed per nursing policy  Q shift pressure point assessments - performed per nursing policy    Surface/Positioning  Low Airloss - Currently in Place  Reposition q 2 hours with wedges - Currently in Place    Offloading/Redistribution  Sacral offloading dressing (Silicone dressing) - Currently in Place  Heel offloading dressing (Silicone dressing) - Currently in Place    Anticipated discharge plans:  TBD        Vac Discharge Needs:  Vac Discharge plan is purely a recommendation from wound team and not a requirement for discharge unless otherwise stated by physician.  Not Applicable Pt not on a wound vac          [1]   Past Surgical History:  Procedure Laterality Date    INCISION AND DRAINAGE Left 11/14/2023    Procedure: Sharp exicisional debridement of skin subqutaneous fat and fasha 10cm squard;  Surgeon: Thom Christianson M.D.;  Location: [facility];  Service: General    INCISION AND DRAINAGE Left 10/23/2023    Procedure: INCISION AND DRAINAGE;  Surgeon: Keith Navarro M.D.;  Location: [facility];  Service:  Vascular    APPLICATION OR REPLACEMENT, WOUND VAC  10/23/2023    Procedure: APPLICATION OR REPLACEMENT, WOUND VAC;  Surgeon: Keith Navarro M.D.;  Location: [facility];  Service: Vascular    KNEE AMPUTATION ABOVE Left 10/23/2023    Procedure: AMPUTATION, ABOVE KNEE;  Surgeon: Keith Navarro M.D.;  Location: [facility];  Service: Vascular    KNEE AMPUTATION BELOW Left 10/20/2023    Procedure: AMPUTATION, BELOW KNEE WOUND REVISION;  Surgeon: Pradip Altamirano M.D.;  Location: [facility];  Service: Orthopedics    FEMORAL ENDARTERECTOMY Bilateral 9/30/2023    Procedure: ENDARTERECTOMY, FEMORAL;  Surgeon: Keith Navarro M.D.;  Location: [facility];  Service: Vascular    ANGIOGRAM, WITH ANGIOPLASTY, AND STENT INSERTION IF INDICATED Right 9/30/2023    Procedure: ANGIOGRAM, WITH ILIAC STENT;  Surgeon: Keith Navarro M.D.;  Location: [facility];  Service: Vascular    KNEE AMPUTATION BELOW Left 9/27/2023    Procedure: AMPUTATION, BELOW KNEE;  Surgeon: Pradip Altamirano M.D.;  Location: [facility];  Service: Orthopedics    IRRIGATION & DEBRIDEMENT ORTHO Left 3/4/2018    Procedure: IRRIGATION & DEBRIDEMENT ORTHO - HUMERUS;  Surgeon: Sergio Watkins M.D.;  Location: [facility];  Service: Orthopedics    ORIF, SHOULDER Left 1/4/2018    Procedure: SHOULDER ORIF;  Surgeon: Sergio Watkins M.D.;  Location: [facility];  Service: Orthopedics    CLOSED REDUCTION Left 12/24/2017    Procedure: CLOSED REDUCTION;  Surgeon: Keith Subramanian M.D.;  Location: [facility];  Service: Orthopedics    OTHER      multiple surgeries lower legs from past injuries

## 2025-05-29 NOTE — PROGRESS NOTES
4 Eyes Skin Assessment Completed by MARIBEL Salvador and MARIBEL Barfield.    Skin assessment is primarily focused on high risk bony prominences. Pay special attention to skin beneath and around medical devices, high risk bony prominences, skin to skin areas and areas where the patient lacks sensation to feel pain and areas where the patient previously had breakdown.     Head (Occipital): WDL WDL   Ears (Under Medical Devices): WDL   WDL   Nose (Under Medical Devices):WDL WDL   Mouth: Missing teeth WDL   Neck: WDL WDL   Breast/Chest:  WDL WDL   Shoulder Blades: WDL WDL   Spine:  WDL WDl   (R) Arm/Elbow/Hand: IV in place IV in place   (L) Arm/Elbow/Hand: Rash in axilla Rash in axilla   Abdomen: WDL WDL   Pannus/Groin: redness in R groin R groin   Sacrum/Coccyx:  Open wound on L buttock. Excoriation on L hip. Open wound on L buttock. Excoriation on L hip.   (R) Ischial Tuberosity (Sit Bones):   WDL   (L) Ischial Tuberosity (Sit Bones):  WDL   (R) Leg: WDL WDL   (L) Leg: L BKA L BKA   (R) Heel: Small sore on Hallux toe  Small sore on Hallux toe   (R) Foot/Toe: Hallux toe wound Toe hallex on wound   (L) Heel: L BKA L BKA   (L) Foot/Toe: : BKA L BKA       DEVICES IN USE:   Respiratory Devices:  Nasal cannula  Feeding Devices:  N/A   Lines & BP Monitoring Devices:  N/A    Orthopedic Devices:  N/A  Miscellaneous Devices:  N/A    PROTOCOL INTERVENTIONS:   Standard/Trauma Bed:  Applied this assessment    WOUND PHOTOS:   Completed and in EPIC     WOUND CONSULT:   Consult ordered for the following areas L buttock, L hip, R groin, and R toe.

## 2025-05-29 NOTE — THERAPY
Physical Therapy   Initial Evaluation     Patient Name: Dillon Centeno  Age:  60 y.o., Sex:  male  Medical Record #: [id number]  Today's Date: 5/29/2025     Precautions  Precautions: (P) Fall Risk  Comments: (P) h/o Left BKA    Assessment  Patient is 60 y.o. male with a diagnosis of Seizures.  Additional factors influencing patient status / progress PAD with L BKA and R 3rd toe amputation, HTN, (+) tobacco, h/o TBI, and seizures.  Pt resides in a single level home with ramp access.   Pt has an in home roommate/caregiver who is a CNA, caregiver assists with transfers, ADL's and IADL's.   Pt required set up and one person assistance for bed to commode transfers.   Pt appears to be a his baseline level of function. No new DME or post acute needs.   Patient will not be actively followed for physical therapy services at this time, however may be seen if requested by physician for 1 more visit within 30 days to address any discharge or equipment needs.     Plan    Physical Therapy Initial Treatment Plan   Duration: (P) Discharge Needs Only    DC Equipment Recommendations: (P) None  Discharge Recommendations: (P) Anticipate that the patient will have no further physical therapy needs after discharge from the hospital         Initial Contact Note    Initial Contact Note Order Received and Verified, Physical Therapy Evaluation in Progress with Full Report to Follow.   Precautions   Precautions Fall Risk   Comments h/o Left BKA   Prior Living Situation   Prior Services correction Home Aide Services  (/ CNA)   Housing / Facility 1 Story House   Equipment Owned Wheelchair  (WC with drop arm)   Lives with - Patient's Self Care Capacity Attendant / Paid Care Giver   Comments per pt home is handicapped accessible. Caregiver assist with transfers, ADL's and IADL's.   Prior Level of Functional Mobility   Bed Mobility Independent   Transfer Status Required Assist   Ambulation Distance WC level   Wheelchair  Independent   Comments caregiver assists with transfers.   Cognition    Level of Consciousness Alert   Comments cooperative with assessment.   Passive ROM Lower Body   Passive ROM Lower Body WDL   Active ROM Lower Body    Active ROM Lower Body  WDL   Strength Lower Body   Comments RLE WFL for squat pivot to chair   Balance Assessment   Sitting Balance (Static) Good   Sitting Balance (Dynamic) Fair   Weight Shift Sitting Fair   Bed Mobility    Supine to Sit Moderate Assist   Sit to Supine Supervised   Gait Analysis   Gait Level Of Assist Unable to Participate   Comments WC bound at baseline   Functional Mobility   Toilet Transfers Minimal Assist   Transfer Method Squat Pivot   6 Clicks Assessment - How much HELP from another person do you currently need... (If the patient hasn't done an activity recently, how much help from another person do you think he/she would need if he/she tried?)   Turning from your back to your side while in a flat bed without using bedrails? 4   Moving from lying on your back to sitting on the side of a flat bed without using bedrails? 3   Moving to and from a bed to a chair (including a wheelchair)? 3   Standing up from a chair using your arms (e.g., wheelchair, or bedside chair)? 1   Walking in hospital room? 1   Climbing 3-5 steps with a railing? 1   6 clicks Mobility Score 13   Edema / Skin Assessment   Comments wound left lateral sacrum ; wound care RN to address.   Education Group   Education Provided Role of Physical Therapist   Role of Physical Therapist Patient Response Patient;Acceptance;Explanation;Verbal Demonstration   Physical Therapy Initial Treatment Plan    Duration Discharge Needs Only   Anticipated Discharge Equipment and Recommendations   DC Equipment Recommendations None   Discharge Recommendations Anticipate that the patient will have no further physical therapy needs after discharge from the hospital   Interdisciplinary Plan of Care Collaboration   IDT Collaboration  with  Nursing;Occupational Therapist   Patient Position at End of Therapy In Bed;Call Light within Reach;Phone within Reach;Tray Table within Reach   Collaboration Comments staff updated.   Session Information   Date / Session Number  5/29- DC needs only.

## 2025-05-29 NOTE — THERAPY
Occupational Therapy   Initial Evaluation     Patient Name: Dillon Centeno  Age:  60 y.o., Sex:  male  Medical Record #: [id number]  Today's Date: 5/29/2025     Precautions  Precautions: (P) Fall Risk  Comments: h/o Left BKA    Assessment    Patient is 60 y.o. male admitted for seizures; PMH of L BKA, R 3rd toe amputation, HTN, TBI, seizures. Pt normally requires assistance with mobility and Adls from roommate who is a CNA. Pt required CGA for bed mobility, modA for transfer to/from [facility] with drop arm, maxA for toileting/LB ADLs which patient states roommate assists him with. Anticipate pt is at or near his functional baseline.     Plan    DC Equipment Recommendations: (P) None  Discharge Recommendations: (P) Anticipate that the patient will have no further occupational therapy needs after discharge from the hospital     Objective       05/29/25 1128   Prior Living Situation   Prior Services residential Home Aide Services   Housing / Facility [address] [facility]   Steps Into Home   (ramp)   Bathroom Set up Walk In Shower;Shower Chair   Equipment Owned Wheelchair   Lives with - Patient's Self Care Capacity Attendant / Paid Care Giver   Prior Level of ADL Function   Self Feeding Independent   Grooming / Hygiene Independent   Bathing Requires Assist   Dressing Requires Assist   Toileting Independent   Prior Level of IADL Function   Medication Management Requires Assist   Laundry Requires Assist   Kitchen Mobility Requires Assist   Finances Requires Assist   Home Management Requires Assist   Shopping Requires Assist   Prior Level Of Mobility Uses Wheel Chair for Community Mobility;Uses Wheel Chair for in Home Mobility   Precautions   Precautions Fall Risk   Vitals   O2 Delivery Device None - Room Air   Pain 0 - 10 Group   Therapist Pain Assessment Nurse Notified;Post Activity Pain Same as Prior to Activity   Cognition    Cognition / Consciousness X   Level of Consciousness Alert   New Learning Impaired   Attention Impaired    Active ROM Upper Body   Active ROM Upper Body  WDL   Strength Upper Body   Upper Body Strength  WDL   Sensation Upper Body   Upper Extremity Sensation  WDL   Balance Assessment   Sitting Balance (Static) Good   Sitting Balance (Dynamic) Fair   Weight Shift Sitting Fair   Bed Mobility    Supine to Sit Moderate Assist   Sit to Supine Supervised   ADL Assessment   Grooming Supervision   Upper Body Dressing Supervision   Lower Body Dressing Maximal Assist   Toileting Maximal Assist   How much help from another person does the patient currently need...   Putting on and taking off regular lower body clothing? 2   Bathing (including washing, rinsing, and drying)? 2   Toileting, which includes using a toilet, bedpan, or urinal? 2   Putting on and taking off regular upper body clothing? 3   Taking care of personal grooming such as brushing teeth? 3   Eating meals? 4   6 Clicks Daily Activity Score 16   Functional Mobility   Toilet Transfers Minimal Assist   Transfer Method Squat Pivot   Activity Tolerance   Sitting in Chair 8 min   Sitting Edge of Bed 5 min   Education Group   Education Provided Role of Occupational Therapist   Role of Occupational Therapist Patient Response Patient;Acceptance;Explanation   Problem List   Problem List None   Anticipated Discharge Equipment and Recommendations   DC Equipment Recommendations None   Discharge Recommendations Anticipate that the patient will have no further occupational therapy needs after discharge from the hospital   Interdisciplinary Plan of Care Collaboration   IDT Collaboration with  Nursing;Physical Therapist   Patient Position at End of Therapy In Bed;Bed Alarm On;Call Light within Reach;Tray Table within Reach;Phone within Reach   Collaboration Comments RN updated

## 2025-05-29 NOTE — CARE PLAN
The patient is Stable - Low risk of patient condition declining or worsening    Shift Goals  Clinical Goals: Pt will maintain seizure precautions and remain free from falls  Patient Goals: Pt wants to rest and be udpated with POC    Progress made toward(s) clinical / shift goals:      Pt seizure precautions maintained and received medications during shift. Pt remained free from falls with pain remaining at baseline and did not require interventions. All needs met and all questions answered during shift.    Problem: Knowledge Deficit - Standard  Goal: Patient and family/care givers will demonstrate understanding of plan of care, disease process/condition, diagnostic tests and medications  Outcome: Progressing  Note: Pt updated with POC and expressed their understanding with verbal acknowledgement.      Problem: Skin Integrity  Goal: Skin integrity is maintained or improved  Outcome: Progressing  Note: Pt skin integrity maintained with q2 turns and bed bath.     Problem: Seizure Precautions  Goal: Implementation of seizure precautions  Outcome: Progressing     Problem: Physical Regulation  Goal: Decrease in complications related to the disease process, condition or treatment  Outcome: Progressing     Problem: Medication  Goal: Risk for seizure medication side effects will decrease  Outcome: Progressing       Patient is not progressing towards the following goals:

## 2025-05-29 NOTE — PROGRESS NOTES
Yesiac from Lab called and stated blood cultures are gram positive for Cocci in clusters. Results read back. Provider notified.

## 2025-05-29 NOTE — DISCHARGE PLANNING
Care Transition Team Assessment    Emergency Contact Anastacio Cano [phone]    CM RN met with pt at bedside. Pt stating lives with his roommate, Anastacio who is like his brother. Anastacio does work 12 hours shifts. Pt does not have a drivers license and hasn't since he was a teenager. Pt uses the bus and his power WC to get around when needed. Pt bought his power WC out of pocket for $3300.     Information Source  Orientation Level: Oriented X4  Information Given By: Patient  Informant's Name: Dillon  Who is responsible for making decisions for patient? : Patient    Readmission Evaluation  Is this a readmission?: No    Elopement Risk  Legal Hold: No  Ambulatory or Self Mobile in Wheelchair: Yes  Disoriented: No  Psychiatric Symptoms: None  History of Wandering: No  Elopement this Admit: No  Vocalizing Wanting to Leave: No  Displays Behaviors, Body Language Wanting to Leave: No-Not at Risk for Elopement  Elopement Risk: Not at Risk for Elopement    Interdisciplinary Discharge Planning  Lives with - Patient's Self Care Capacity: Friends  Patient or legal guardian wants to designate a caregiver: No  Support Systems: Friends / Neighbors  Housing / Facility: [address] [facility]  Name of Care Facility: n/a    Discharge Preparedness  What is your plan after discharge?: Home with help  What are your discharge supports?: Other (comment) (roommate)  Prior Functional Level: Uses Wheelchair    Functional Assesment  Prior Functional Level: Uses Wheelchair    Finances  Financial Barriers to Discharge: No  Prescription Coverage: Yes    Vision / Hearing Impairment  Vision Impairment : No  Hearing Impairment : Yes  Hearing Impairment: Both Ears, Hearing Device Not Available  Does Pt Need Special Equipment for the Hearing Impaired?: Yes-But Does not Need for Facility to Arrange Equipment              Domestic Abuse  Have you ever been the victim of abuse or violence?: No  Possible Abuse/Neglect Reported to:: Not Applicable          Discharge Risks or Barriers  Discharge risks or barriers?: Complex medical needs  Patient risk factors: Vulnerable adult    Anticipated Discharge Information  Discharge Disposition: Discharged to home/self care (01)  Discharge Address: [address] [place] [id number]  Discharge Contact Phone Number: [phone]

## 2025-05-30 LAB
ALBUMIN SERPL BCP-MCNC: 3.7 G/DL (ref 3.2–4.9)
ALBUMIN/GLOB SERPL: 1.4 G/DL
ALP SERPL-CCNC: 64 U/L (ref 30–99)
ALT SERPL-CCNC: 25 U/L (ref 2–50)
ANION GAP SERPL CALC-SCNC: 16 MMOL/L (ref 7–16)
AST SERPL-CCNC: 84 U/L (ref 12–45)
BACTERIA BLD CULT: ABNORMAL
BACTERIA BLD CULT: ABNORMAL
BACTERIA UR CULT: ABNORMAL
BACTERIA UR CULT: ABNORMAL
BILIRUB SERPL-MCNC: 0.8 MG/DL (ref 0.1–1.5)
BUN SERPL-MCNC: 6 MG/DL (ref 8–22)
CALCIUM ALBUM COR SERPL-MCNC: 8.5 MG/DL (ref 8.5–10.5)
CALCIUM SERPL-MCNC: 8.3 MG/DL (ref 8.5–10.5)
CHLORIDE SERPL-SCNC: 95 MMOL/L (ref 96–112)
CO2 SERPL-SCNC: 20 MMOL/L (ref 20–33)
CREAT SERPL-MCNC: 0.49 MG/DL (ref 0.5–1.4)
ERYTHROCYTE [DISTWIDTH] IN BLOOD BY AUTOMATED COUNT: 58.1 FL (ref 35.9–50)
GFR SERPLBLD CREATININE-BSD FMLA CKD-EPI: 117 ML/MIN/1.73 M 2
GLOBULIN SER CALC-MCNC: 2.7 G/DL (ref 1.9–3.5)
GLUCOSE SERPL-MCNC: 81 MG/DL (ref 65–99)
HCT VFR BLD AUTO: 39.4 % (ref 42–52)
HGB BLD-MCNC: 13.3 G/DL (ref 14–18)
MCH RBC QN AUTO: 34.3 PG (ref 27–33)
MCHC RBC AUTO-ENTMCNC: 33.8 G/DL (ref 32.3–36.5)
MCV RBC AUTO: 101.5 FL (ref 81.4–97.8)
PLATELET # BLD AUTO: 170 K/UL (ref 164–446)
PMV BLD AUTO: 9.4 FL (ref 9–12.9)
POTASSIUM SERPL-SCNC: 3.3 MMOL/L (ref 3.6–5.5)
PROT SERPL-MCNC: 6.4 G/DL (ref 6–8.2)
RBC # BLD AUTO: 3.88 M/UL (ref 4.7–6.1)
SIGNIFICANT IND 70042: ABNORMAL
SIGNIFICANT IND 70042: ABNORMAL
SITE SITE: ABNORMAL
SITE SITE: ABNORMAL
SODIUM SERPL-SCNC: 130 MMOL/L (ref 135–145)
SODIUM SERPL-SCNC: 130 MMOL/L (ref 135–145)
SODIUM SERPL-SCNC: 131 MMOL/L (ref 135–145)
SODIUM SERPL-SCNC: 131 MMOL/L (ref 135–145)
SOURCE SOURCE: ABNORMAL
SOURCE SOURCE: ABNORMAL
WBC # BLD AUTO: 6.3 K/UL (ref 4.8–10.8)

## 2025-05-30 PROCEDURE — 99233 SBSQ HOSP IP/OBS HIGH 50: CPT | Performed by: INTERNAL MEDICINE

## 2025-05-30 PROCEDURE — 700102 HCHG RX REV CODE 250 W/ 637 OVERRIDE(OP): Performed by: INTERNAL MEDICINE

## 2025-05-30 PROCEDURE — 700105 HCHG RX REV CODE 258: Performed by: INTERNAL MEDICINE

## 2025-05-30 PROCEDURE — 85027 COMPLETE CBC AUTOMATED: CPT

## 2025-05-30 PROCEDURE — 770006 HCHG ROOM/CARE - MED/SURG/GYN SEMI*

## 2025-05-30 PROCEDURE — 84295 ASSAY OF SERUM SODIUM: CPT

## 2025-05-30 PROCEDURE — 36415 COLL VENOUS BLD VENIPUNCTURE: CPT

## 2025-05-30 PROCEDURE — A9270 NON-COVERED ITEM OR SERVICE: HCPCS | Performed by: INTERNAL MEDICINE

## 2025-05-30 PROCEDURE — 700111 HCHG RX REV CODE 636 W/ 250 OVERRIDE (IP): Mod: JZ | Performed by: NURSE PRACTITIONER

## 2025-05-30 PROCEDURE — A9270 NON-COVERED ITEM OR SERVICE: HCPCS | Performed by: NURSE PRACTITIONER

## 2025-05-30 PROCEDURE — 80053 COMPREHEN METABOLIC PANEL: CPT

## 2025-05-30 PROCEDURE — 700111 HCHG RX REV CODE 636 W/ 250 OVERRIDE (IP): Mod: JZ | Performed by: INTERNAL MEDICINE

## 2025-05-30 PROCEDURE — 700102 HCHG RX REV CODE 250 W/ 637 OVERRIDE(OP): Performed by: NURSE PRACTITIONER

## 2025-05-30 RX ORDER — HALOPERIDOL 5 MG/ML
5 INJECTION INTRAMUSCULAR EVERY 6 HOURS PRN
Status: DISCONTINUED | OUTPATIENT
Start: 2025-05-30 | End: 2025-06-01 | Stop reason: HOSPADM

## 2025-05-30 RX ORDER — NICOTINE 21 MG/24HR
14 PATCH, TRANSDERMAL 24 HOURS TRANSDERMAL
Status: DISCONTINUED | OUTPATIENT
Start: 2025-05-30 | End: 2025-06-01 | Stop reason: HOSPADM

## 2025-05-30 RX ORDER — SODIUM CHLORIDE 9 MG/ML
INJECTION, SOLUTION INTRAVENOUS CONTINUOUS
Status: DISCONTINUED | OUTPATIENT
Start: 2025-05-30 | End: 2025-06-01 | Stop reason: HOSPADM

## 2025-05-30 RX ADMIN — SODIUM CHLORIDE: 9 INJECTION, SOLUTION INTRAVENOUS at 21:18

## 2025-05-30 RX ADMIN — PHENYTOIN SODIUM 100 MG: 100 CAPSULE, EXTENDED RELEASE ORAL at 21:13

## 2025-05-30 RX ADMIN — THERA TABS 1 TABLET: TAB at 04:43

## 2025-05-30 RX ADMIN — CEFAZOLIN 2 G: 2 INJECTION, POWDER, FOR SOLUTION INTRAVENOUS at 13:30

## 2025-05-30 RX ADMIN — ENOXAPARIN SODIUM 40 MG: 100 INJECTION SUBCUTANEOUS at 17:27

## 2025-05-30 RX ADMIN — CEFAZOLIN 2 G: 2 INJECTION, POWDER, FOR SOLUTION INTRAVENOUS at 05:49

## 2025-05-30 RX ADMIN — FOLIC ACID 1 MG: 1 TABLET ORAL at 04:43

## 2025-05-30 RX ADMIN — CEFAZOLIN 2 G: 2 INJECTION, POWDER, FOR SOLUTION INTRAVENOUS at 21:19

## 2025-05-30 RX ADMIN — ACETAMINOPHEN 650 MG: 325 TABLET ORAL at 21:12

## 2025-05-30 RX ADMIN — THIAMINE HYDROCHLORIDE 100 MG: 100 INJECTION, SOLUTION INTRAMUSCULAR; INTRAVENOUS at 04:41

## 2025-05-30 RX ADMIN — NICOTINE 14 MG: 14 PATCH TRANSDERMAL at 04:44

## 2025-05-30 RX ADMIN — PHENYTOIN SODIUM 100 MG: 100 CAPSULE, EXTENDED RELEASE ORAL at 04:44

## 2025-05-30 RX ADMIN — PHENYTOIN SODIUM 100 MG: 100 CAPSULE, EXTENDED RELEASE ORAL at 13:29

## 2025-05-30 RX ADMIN — NYSTATIN: 100000 POWDER TOPICAL at 17:28

## 2025-05-30 RX ADMIN — HALOPERIDOL LACTATE 5 MG: 5 INJECTION, SOLUTION INTRAMUSCULAR at 04:42

## 2025-05-30 RX ADMIN — NYSTATIN: 100000 POWDER TOPICAL at 05:49

## 2025-05-30 ASSESSMENT — LIFESTYLE VARIABLES
VISUAL DISTURBANCES: NOT PRESENT
TOTAL SCORE: 1
VISUAL DISTURBANCES: NOT PRESENT
TREMOR: TREMOR NOT VISIBLE BUT CAN BE FELT, FINGERTIP TO FINGERTIP
TREMOR: TREMOR NOT VISIBLE BUT CAN BE FELT, FINGERTIP TO FINGERTIP
HEADACHE, FULLNESS IN HEAD: NOT PRESENT
PAROXYSMAL SWEATS: NO SWEAT VISIBLE
ORIENTATION AND CLOUDING OF SENSORIUM: CANNOT DO SERIAL ADDITIONS OR IS UNCERTAIN ABOUT DATE
HEADACHE, FULLNESS IN HEAD: NOT PRESENT
TOTAL SCORE: 2
ANXIETY: NO ANXIETY (AT EASE)
VISUAL DISTURBANCES: NOT PRESENT
HEADACHE, FULLNESS IN HEAD: NOT PRESENT
PAROXYSMAL SWEATS: NO SWEAT VISIBLE
ORIENTATION AND CLOUDING OF SENSORIUM: ORIENTED AND CAN DO SERIAL ADDITIONS
NAUSEA AND VOMITING: NO NAUSEA AND NO VOMITING
PAROXYSMAL SWEATS: NO SWEAT VISIBLE
TREMOR: TREMOR NOT VISIBLE BUT CAN BE FELT, FINGERTIP TO FINGERTIP
NAUSEA AND VOMITING: NO NAUSEA AND NO VOMITING
TREMOR: TREMOR NOT VISIBLE BUT CAN BE FELT, FINGERTIP TO FINGERTIP
AGITATION: NORMAL ACTIVITY
ANXIETY: NO ANXIETY (AT EASE)
ORIENTATION AND CLOUDING OF SENSORIUM: DATE DISORIENTATION BY MORE THAN TWO CALENDAR DAYS
TOTAL SCORE: 3
ANXIETY: NO ANXIETY (AT EASE)
AUDITORY DISTURBANCES: NOT PRESENT
NAUSEA AND VOMITING: NO NAUSEA AND NO VOMITING
AUDITORY DISTURBANCES: NOT PRESENT
HEADACHE, FULLNESS IN HEAD: NOT PRESENT
TOTAL SCORE: 4
AGITATION: NORMAL ACTIVITY
ORIENTATION AND CLOUDING OF SENSORIUM: DATE DISORIENTATION BY MORE THAN TWO CALENDAR DAYS
AUDITORY DISTURBANCES: NOT PRESENT
AUDITORY DISTURBANCES: NOT PRESENT
AGITATION: NORMAL ACTIVITY
ANXIETY: NO ANXIETY (AT EASE)
VISUAL DISTURBANCES: NOT PRESENT
AUDITORY DISTURBANCES: NOT PRESENT
PAROXYSMAL SWEATS: NO SWEAT VISIBLE
AGITATION: NORMAL ACTIVITY
ANXIETY: NO ANXIETY (AT EASE)
PAROXYSMAL SWEATS: NO SWEAT VISIBLE
NAUSEA AND VOMITING: NO NAUSEA AND NO VOMITING
AGITATION: NORMAL ACTIVITY
HEADACHE, FULLNESS IN HEAD: NOT PRESENT
NAUSEA AND VOMITING: NO NAUSEA AND NO VOMITING
ORIENTATION AND CLOUDING OF SENSORIUM: DATE DISORIENTATION BY NO MORE THAN TWO CALENDAR DAYS
TREMOR: TREMOR NOT VISIBLE BUT CAN BE FELT, FINGERTIP TO FINGERTIP
TOTAL SCORE: 4
VISUAL DISTURBANCES: NOT PRESENT

## 2025-05-30 ASSESSMENT — PAIN DESCRIPTION - PAIN TYPE
TYPE: ACUTE PAIN

## 2025-05-30 NOTE — DIETARY
"Nutrition Services: Initial Assessment     Day 2 of admit. Dillon Centeno is 60 y.o., male with admitting DX of Seizures.    Consult Received for: Wound    Current Hospital Problems List:    Principal Problem:    Seizures (HCC) (POA: Yes)  Active Problems:    Leukocytosis (POA: Yes)    Hyponatremia (POA: Yes)    Cystitis (POA: Unknown)    Alcohol abuse (POA: Yes)    Lactic acidosis (POA: Yes)    Type 2 diabetes mellitus with hyperglycemia, without long-term current use of insulin (HCC) (POA: Yes)    Essential hypertension (POA: Yes)  Resolved Problems:    * No resolved hospital problems. *    Nutrition Assessment:      Height: 180.3 cm (5' 11\")  Weight: 81.6 kg (180 lb)  Weight taken via: Other Healthcare Provider  BMI Calculated: 25.1  BMI Classification: Overweight       Weight Readings from Last 5 encounters:   Wt Readings from Last 5 Encounters:   05/28/25 81.6 kg (180 lb)   10/26/24 73 kg (160 lb 15 oz)   11/23/23 60.5 kg (133 lb 6.1 oz)   10/28/23 62.9 kg (138 lb 10.7 oz)   03/21/23 81.6 kg (180 lb)         Objective:   Pertinent Labs: 5/30: Na 131 (L), K+ 3.3 (L), Glu 81, BUN 6 (L), Creat 0.49 (L), Alb 3.7  Pertinent Meds: Ancef, Folic acid, MVI, Thiamine, Dilantin  Skin/Wounds:  Unstageable wounds to left buttock and left hip  Food Allergies: None known  Last BM:  Type 4: Like a sausage or snake, smooth and soft  05/29/25       Current Diet Order/Intake:   Consistent CHO diet, Free water restrictions only  Recorded PO intake 25-50%.    Subjective:   Patient sleeping at time of visit.      Nutrition Focused Physical Exam (NFPE)  Weight Loss: No weight loss per chart review.  Muscle Mass: Unable to identify at this time, no loss per visual assessment  Subcutaneous Fat: Unable to identify at this time  Fluid Accumulation: None noted  Reduced  Strength: N/A in acute care setting.    Nutrition Diagnosis:      Increased nutrient Needs related to wound healing needs as evidenced by unstageable wounds to " left buttock and left hip.      Based on RD assessment at this time, Unable to fully assess for malnutrition at this time    Nutrition Interventions:      Recommend Ensure Max Protein (provides 150 calories, 30 g protein per 11 fl oz) TID.  Patient aware of active plan of care as appropriate.       Nutrition Monitoring and Evaluation:      Monitor nutrition POC, goal for >50% intake from meals and supplements.  Additional fluids per MD/DO  Monitor vital signs pertinent to nutrition.    RD following and will provide updated recommendations as indicated.      Heike Chavez R.D.                                         [facility] CRITERIA FOR MALNUTRITION

## 2025-05-30 NOTE — PROGRESS NOTES
Received bedside report and assumed care of patient at change of shift. Patient is alert and oriented × 2, currently on RA (refusing O2 supplement. Patient sleep calm at this time. Initial assessment completed; Bed is in the lowest and locked position, call light and personal belongings placed within reach, and environment is safe and free of hazards. Fall risk precautions in place as appropriate. Patient verbalized understanding of plan of care and expressed no additional needs or concerns at this time. Will continue to monitor and reassess as needed.

## 2025-05-30 NOTE — CARE PLAN
The patient is Stable - Low risk of patient condition declining or worsening    Shift Goals  Clinical Goals: Pt will maintain seizure precautions, monitor NA levels, CIWA protocol  Patient Goals: rest, sleep  Family Goals: LUTHER    Progress made toward(s) clinical / shift goals: Patient rounded on hourly. Bed locked to its lowest position. Personal belongings and call light within reach.  Q2 turns in place. Patient is on tele sitter for agitation, confusion and impulsiveness. Pain is being assessed Q4 and reassessed Q1 post prn pain medication . Patient remains free from falls at this time and pain has been managed. CIWA protocol in place.      Patient is not progressing towards the following goals:    Problem: Skin Integrity  Goal: Skin integrity is maintained or improved  Description: Target End Date:  Prior to discharge or change in level of careDocument interventions on Skin Risk/Jae flowsheet groups and corresponding LDA1.  Assess and monitor skin integrity, appearance and/or temperature2.  Assess risk factors for impaired skin integrity and/or pressures ulcers3.  Implement precautions to protect skin integrity in collaboration with interdisciplinary team4.  Implement pressure ulcer prevention protocol if at risk for skin breakdown5.  Confirm wound care consult if at risk for skin breakdown6.  Ensure patient use of pressure relieving devices  (Low air loss bed, waffle overlay, heel protectors, ROHO cushion, etc)  Outcome: Progressing     Problem: Fall Risk  Goal: Patient will remain free from falls  Description: Target End Date:  Prior to discharge or change in level of careDocument interventions on the Michaela Marques Fall Risk Assessment1.  Assess for fall risk factors2.  Implement fall precautions  Outcome: Progressing           Patient is not progressing towards the following goals:    Problem: Knowledge Deficit - Standard  Goal: Patient and family/care givers will demonstrate understanding of plan of care,  disease process/condition, diagnostic tests and medications  Description: Target End Date:  1-3 days or as soon as patient condition allowsDocument in Patient Education1.  Patient and family/caregiver oriented to unit, equipment, visitation policy and means for communicating concern2.  Complete/review Learning Assessment3.  Assess knowledge level of disease process/condition, treatment plan, diagnostic tests nd medications4.  Explain disease process/condition, treatment plan, diagnostic tests and medications  Outcome: Not Progressing     Problem: Knowledge Deficit - Seizures  Goal: Knowledge of seizure treatment regimen will improve  Description: Target End Date:  1-3 days or as soon as patient condition allowsDocument in Patient Education1.  Educate patient and family/caregiver of importance of seizure precautions in hospital2.  Educate patient and family/caregiver to report auras and triggers prior to events/ EMU event reporting3.  Review anticonvulsant medication regimen and side effects  Outcome: Not Progressing    Patient is not progressing towards the following goals:

## 2025-05-30 NOTE — PROGRESS NOTES
Assumed patient care and received bedside report from Day RN. Patient is A&Ox3 and reports no pain at this time. Non-pharmacological interventions encourage like ice/heat pack. Patient on 2L of O2/NC, awake, and alert.     Patient educated on the use of the call light and verbalized understanding. Bed in the lowest and locked position with alarm on. Personal belongings and call light within reach. Tele sitter in place for agitation, confusion and impulsiveness. High Fall Risk precautions and hourly rounding in place. Safety education provided. POC discussed and patient declines any further needs at this time. Orders carried out.

## 2025-05-30 NOTE — PROGRESS NOTES
Assumed patient care from Easton LÓPEZ, received report. No acute changes, patient resting comfortably with no needs. Call light and belongings in reach. Will continue to monitor.

## 2025-05-30 NOTE — PROGRESS NOTES
Patient increasingly agitated and confused at this time. Patient threatening to get out of bed without calling. Patient on frame alarm with a low airl oss in place. Patient now has tele-sitting initiated for impulsiveness, confusion, and aggression. Patient was found attempting to light a cigarette he got from [facility]. Patient has 2L via NC. Patient was educated. Aggression increased, MARIBEL Dickerson removed the cigarette from his hand and he grabbed her to get it back. Security called, charge RN notified.

## 2025-05-31 VITALS
DIASTOLIC BLOOD PRESSURE: 81 MMHG | RESPIRATION RATE: 18 BRPM | TEMPERATURE: 96.6 F | BODY MASS INDEX: 23.67 KG/M2 | OXYGEN SATURATION: 96 % | WEIGHT: 169.09 LBS | HEART RATE: 91 BPM | SYSTOLIC BLOOD PRESSURE: 137 MMHG | HEIGHT: 71 IN

## 2025-05-31 LAB
ALBUMIN SERPL BCP-MCNC: 3.6 G/DL (ref 3.2–4.9)
ANION GAP SERPL CALC-SCNC: 11 MMOL/L (ref 7–16)
BACTERIA BLD CULT: NORMAL
BUN SERPL-MCNC: 13 MG/DL (ref 8–22)
CALCIUM ALBUM COR SERPL-MCNC: 9.4 MG/DL (ref 8.5–10.5)
CALCIUM SERPL-MCNC: 9.1 MG/DL (ref 8.5–10.5)
CHLORIDE SERPL-SCNC: 96 MMOL/L (ref 96–112)
CO2 SERPL-SCNC: 25 MMOL/L (ref 20–33)
CREAT SERPL-MCNC: 0.58 MG/DL (ref 0.5–1.4)
ERYTHROCYTE [DISTWIDTH] IN BLOOD BY AUTOMATED COUNT: 58.6 FL (ref 35.9–50)
GFR SERPLBLD CREATININE-BSD FMLA CKD-EPI: 111 ML/MIN/1.73 M 2
GLUCOSE SERPL-MCNC: 96 MG/DL (ref 65–99)
HCT VFR BLD AUTO: 41 % (ref 42–52)
HGB BLD-MCNC: 13.9 G/DL (ref 14–18)
MCH RBC QN AUTO: 34.2 PG (ref 27–33)
MCHC RBC AUTO-ENTMCNC: 33.9 G/DL (ref 32.3–36.5)
MCV RBC AUTO: 101 FL (ref 81.4–97.8)
PHENYTOIN SERPL-MCNC: 11.2 UG/ML (ref 10–20)
PHOSPHATE SERPL-MCNC: 3.7 MG/DL (ref 2.5–4.5)
PLATELET # BLD AUTO: 202 K/UL (ref 164–446)
PMV BLD AUTO: 9.3 FL (ref 9–12.9)
POTASSIUM SERPL-SCNC: 3.5 MMOL/L (ref 3.6–5.5)
RBC # BLD AUTO: 4.06 M/UL (ref 4.7–6.1)
SIGNIFICANT IND 70042: NORMAL
SITE SITE: NORMAL
SODIUM SERPL-SCNC: 132 MMOL/L (ref 135–145)
SOURCE SOURCE: NORMAL
WBC # BLD AUTO: 5.8 K/UL (ref 4.8–10.8)

## 2025-05-31 PROCEDURE — 4A00X4Z MEASUREMENT OF CENTRAL NERVOUS ELECTRICAL ACTIVITY, EXTERNAL APPROACH: ICD-10-PCS | Performed by: INTERNAL MEDICINE

## 2025-05-31 PROCEDURE — 700102 HCHG RX REV CODE 250 W/ 637 OVERRIDE(OP): Performed by: INTERNAL MEDICINE

## 2025-05-31 PROCEDURE — A9270 NON-COVERED ITEM OR SERVICE: HCPCS | Performed by: INTERNAL MEDICINE

## 2025-05-31 PROCEDURE — 85027 COMPLETE CBC AUTOMATED: CPT

## 2025-05-31 PROCEDURE — 770006 HCHG ROOM/CARE - MED/SURG/GYN SEMI*

## 2025-05-31 PROCEDURE — 700111 HCHG RX REV CODE 636 W/ 250 OVERRIDE (IP): Performed by: INTERNAL MEDICINE

## 2025-05-31 PROCEDURE — 95819 EEG AWAKE AND ASLEEP: CPT | Mod: 26 | Performed by: STUDENT IN AN ORGANIZED HEALTH CARE EDUCATION/TRAINING PROGRAM

## 2025-05-31 PROCEDURE — 700111 HCHG RX REV CODE 636 W/ 250 OVERRIDE (IP): Mod: JZ | Performed by: INTERNAL MEDICINE

## 2025-05-31 PROCEDURE — 700102 HCHG RX REV CODE 250 W/ 637 OVERRIDE(OP): Performed by: NURSE PRACTITIONER

## 2025-05-31 PROCEDURE — A9270 NON-COVERED ITEM OR SERVICE: HCPCS | Performed by: NURSE PRACTITIONER

## 2025-05-31 PROCEDURE — 80185 ASSAY OF PHENYTOIN TOTAL: CPT

## 2025-05-31 PROCEDURE — 302146: Performed by: INTERNAL MEDICINE

## 2025-05-31 PROCEDURE — 87040 BLOOD CULTURE FOR BACTERIA: CPT

## 2025-05-31 PROCEDURE — 95819 EEG AWAKE AND ASLEEP: CPT | Performed by: STUDENT IN AN ORGANIZED HEALTH CARE EDUCATION/TRAINING PROGRAM

## 2025-05-31 PROCEDURE — 36415 COLL VENOUS BLD VENIPUNCTURE: CPT

## 2025-05-31 PROCEDURE — 99233 SBSQ HOSP IP/OBS HIGH 50: CPT | Performed by: INTERNAL MEDICINE

## 2025-05-31 PROCEDURE — 700105 HCHG RX REV CODE 258: Performed by: INTERNAL MEDICINE

## 2025-05-31 PROCEDURE — 80069 RENAL FUNCTION PANEL: CPT

## 2025-05-31 RX ADMIN — THERA TABS 1 TABLET: TAB at 05:22

## 2025-05-31 RX ADMIN — CEFTRIAXONE SODIUM 1000 MG: 10 INJECTION, POWDER, FOR SOLUTION INTRAVENOUS at 11:45

## 2025-05-31 RX ADMIN — NYSTATIN: 100000 POWDER TOPICAL at 05:22

## 2025-05-31 RX ADMIN — FOLIC ACID 1 MG: 1 TABLET ORAL at 05:21

## 2025-05-31 RX ADMIN — SODIUM CHLORIDE: 9 INJECTION, SOLUTION INTRAVENOUS at 05:27

## 2025-05-31 RX ADMIN — PHENYTOIN SODIUM 100 MG: 100 CAPSULE, EXTENDED RELEASE ORAL at 20:22

## 2025-05-31 RX ADMIN — CEFAZOLIN 2 G: 2 INJECTION, POWDER, FOR SOLUTION INTRAVENOUS at 05:30

## 2025-05-31 RX ADMIN — NYSTATIN: 100000 POWDER TOPICAL at 17:14

## 2025-05-31 RX ADMIN — THIAMINE HYDROCHLORIDE 100 MG: 100 INJECTION, SOLUTION INTRAMUSCULAR; INTRAVENOUS at 05:22

## 2025-05-31 RX ADMIN — ENOXAPARIN SODIUM 40 MG: 100 INJECTION SUBCUTANEOUS at 17:14

## 2025-05-31 RX ADMIN — PHENYTOIN SODIUM 100 MG: 100 CAPSULE, EXTENDED RELEASE ORAL at 05:21

## 2025-05-31 RX ADMIN — NICOTINE 14 MG: 14 PATCH TRANSDERMAL at 05:22

## 2025-05-31 RX ADMIN — PHENYTOIN SODIUM 100 MG: 100 CAPSULE, EXTENDED RELEASE ORAL at 14:28

## 2025-05-31 ASSESSMENT — LIFESTYLE VARIABLES
ORIENTATION AND CLOUDING OF SENSORIUM: ORIENTED AND CAN DO SERIAL ADDITIONS
TREMOR: TREMOR NOT VISIBLE BUT CAN BE FELT, FINGERTIP TO FINGERTIP
ANXIETY: NO ANXIETY (AT EASE)
TOTAL SCORE: 1
NAUSEA AND VOMITING: NO NAUSEA AND NO VOMITING
PAROXYSMAL SWEATS: NO SWEAT VISIBLE
AUDITORY DISTURBANCES: NOT PRESENT
AGITATION: NORMAL ACTIVITY
VISUAL DISTURBANCES: NOT PRESENT
HEADACHE, FULLNESS IN HEAD: NOT PRESENT

## 2025-05-31 ASSESSMENT — PAIN DESCRIPTION - PAIN TYPE: TYPE: ACUTE PAIN

## 2025-05-31 ASSESSMENT — FIBROSIS 4 INDEX: FIB4 SCORE: 5.07

## 2025-05-31 NOTE — CARE PLAN
The patient is Stable - Low risk of patient condition declining or worsening    Shift Goals  Clinical Goals: pt will remain free of falls and maintain seizure precautions this shift  Patient Goals: sleep  Family Goals: LUTHER    Progress made toward(s) clinical / shift goals:  free of falls, seizure precautioned maintained    Problem: Knowledge Deficit - Standard  Goal: Patient and family/care givers will demonstrate understanding of plan of care, disease process/condition, diagnostic tests and medications  Outcome: Progressing     Problem: Skin Integrity  Goal: Skin integrity is maintained or improved  Outcome: Progressing     Problem: Seizure Precautions  Goal: Implementation of seizure precautions  Outcome: Progressing     Problem: Pain - Standard  Goal: Alleviation of pain or a reduction in pain to the patient’s comfort goal  Outcome: Progressing     Problem: Fall Risk  Goal: Patient will remain free from falls  Outcome: Progressing     Patient is not progressing towards the following goals:

## 2025-05-31 NOTE — PROGRESS NOTES
"Assumed care of patient at 1900 from day RN. Patient is A&O x 3. Disoriented to time Bed locked in the lowest position, 3 side rails up, telesitter in place. call light is within reach, belongings at bedside. Mobility +2. Oxygen 2L. L BKA. Explained plan of care and safety precautions to pt, pt has no questions at this time. Hourly rounding is in place. /85   Pulse 96   Temp 36.5 °C (97.7 °F) (Temporal)   Resp 16   Ht 1.803 m (5' 11\")   Wt 81.6 kg (180 lb)   SpO2 94%   BMI 25.10 kg/m²   "
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
## 2025-05-31 NOTE — PROGRESS NOTES
Received call from IR pt will go through procedure tomorrow with sedation and required to be NPO after midnight

## 2025-05-31 NOTE — PROGRESS NOTES
Patient was NPO since midnight per pre-procedure protocol; however, breakfast was provided and consumed this morning. This occurred due to a combination of miscommunication and oversight

## 2025-05-31 NOTE — PROCEDURES
INPATIENT ROUTINE VIDEO ELECTROENCEPHALOGRAM REPORT    REFERRING PROVIDER: Dom Driscoll M.d.  DOS: 5/31/2025  STUDY DURATION: 0 hours and 29 minutes of total recording time.     INDICATION:  Dillon Centeno 60 y.o. male presenting with unspecified convulsions    RELEVANT MEDICATIONS/TREATMENTS:   Scheduled Medications[1]    TECHNIQUE:   Routine VEEG was set up by a Neurodiagnostic technologist who performed education to the patient and staff. A minimum of 23 electrodes and 23 channel recording was setup and performed by Neurodiagnostic technologist, in accordance with the international 10-20 system. The study was reviewed in bipolar and referential montages. The recording examined the patient in the  awake, drowsy, and sleep state(s).     DESCRIPTION OF THE RECORD:  During wakefulness, the background was continuous and showed a 7.5 Hz posterior dominant rhythm.  There was reactivity to eye closure/opening.  An anterior-posterior gradient was noted with faster beta frequencies seen anteriorly.  During drowsiness, theta/delta frequencies were seen.    EEG Sleep: Sleep was briefly captured and was characterized by diffuse background delta/theta activity with a loss of myogenic artifact.  N2 sleep transients in the form of sleep spindles and vertex waves were seen in the leads over the central regions.     ICTAL AND INTERICTAL FINDINGS:   No focal or generalized epileptiform activity noted.     No regional slowing or persistent focal asymmetries were seen.    No seizures.     ACTIVATION PROCEDURES:   Intermittent Photic stimulation was performed in a stepwise fashion from 1 to 30 Hz and did not elicited any abnormalities on EEG.     EKG: Sampling of the EKG recording showed sinus rhythm    EVENTS:  None    INTERPRETATION:   Abnormal video EEG recording in the awake, drowsy, and sleep state(s):  - Mild generalized slowing of the background, suggestive of mild nonspecific diffuse cerebral dysfunction.   - No  regional slowing or persistent focal asymmetries were seen.  - No epileptiform discharges were seen.  - No seizures.       Note: This EEG does not rule out the possibility of seizures or exclude a diagnosis of epilepsy.  If the clinical suspicion remains high for seizures, a prolonged video EEG recording to capture clinical or subclinical events may be helpful.    Fartun Duff MD  Department of Neurology at [facility]  General Neurologist and Epileptologist   of Clinical Neurology, [facility].          [1]   Scheduled Medications   Medication Dose Frequency    cefTRIAXone (ROCEPHIN) IV  1,000 mg Q24HRS    nicotine  14 mg Daily-0600    nystatin   BID    multivitamin  1 Tablet DAILY    folic acid  1 mg DAILY    enoxaparin (LOVENOX) injection  40 mg DAILY AT 1800    phenytoin ER  100 mg TID

## 2025-06-01 VITALS
DIASTOLIC BLOOD PRESSURE: 79 MMHG | SYSTOLIC BLOOD PRESSURE: 133 MMHG | BODY MASS INDEX: 23.67 KG/M2 | OXYGEN SATURATION: 93 % | WEIGHT: 169.09 LBS | RESPIRATION RATE: 17 BRPM | HEART RATE: 86 BPM | TEMPERATURE: 97.4 F | HEIGHT: 71 IN

## 2025-06-01 LAB
ALBUMIN SERPL BCP-MCNC: 3.5 G/DL (ref 3.2–4.9)
ANION GAP SERPL CALC-SCNC: 12 MMOL/L (ref 7–16)
BUN SERPL-MCNC: 11 MG/DL (ref 8–22)
CALCIUM ALBUM COR SERPL-MCNC: 9.3 MG/DL (ref 8.5–10.5)
CALCIUM SERPL-MCNC: 8.9 MG/DL (ref 8.5–10.5)
CHLORIDE SERPL-SCNC: 95 MMOL/L (ref 96–112)
CO2 SERPL-SCNC: 21 MMOL/L (ref 20–33)
CREAT SERPL-MCNC: 0.62 MG/DL (ref 0.5–1.4)
ERYTHROCYTE [DISTWIDTH] IN BLOOD BY AUTOMATED COUNT: 61.2 FL (ref 35.9–50)
GFR SERPLBLD CREATININE-BSD FMLA CKD-EPI: 109 ML/MIN/1.73 M 2
GLUCOSE SERPL-MCNC: 93 MG/DL (ref 65–99)
HCT VFR BLD AUTO: 39.9 % (ref 42–52)
HGB BLD-MCNC: 13.6 G/DL (ref 14–18)
MCH RBC QN AUTO: 34.7 PG (ref 27–33)
MCHC RBC AUTO-ENTMCNC: 34.1 G/DL (ref 32.3–36.5)
MCV RBC AUTO: 101.8 FL (ref 81.4–97.8)
PHOSPHATE SERPL-MCNC: 3.1 MG/DL (ref 2.5–4.5)
PLATELET # BLD AUTO: 210 K/UL (ref 164–446)
PMV BLD AUTO: 8.7 FL (ref 9–12.9)
POTASSIUM SERPL-SCNC: 4 MMOL/L (ref 3.6–5.5)
RBC # BLD AUTO: 3.92 M/UL (ref 4.7–6.1)
SODIUM SERPL-SCNC: 128 MMOL/L (ref 135–145)
WBC # BLD AUTO: 6.6 K/UL (ref 4.8–10.8)

## 2025-06-01 PROCEDURE — A9270 NON-COVERED ITEM OR SERVICE: HCPCS | Performed by: INTERNAL MEDICINE

## 2025-06-01 PROCEDURE — 99406 BEHAV CHNG SMOKING 3-10 MIN: CPT | Performed by: INTERNAL MEDICINE

## 2025-06-01 PROCEDURE — 700102 HCHG RX REV CODE 250 W/ 637 OVERRIDE(OP): Performed by: INTERNAL MEDICINE

## 2025-06-01 PROCEDURE — 700105 HCHG RX REV CODE 258: Performed by: INTERNAL MEDICINE

## 2025-06-01 PROCEDURE — 700102 HCHG RX REV CODE 250 W/ 637 OVERRIDE(OP): Performed by: NURSE PRACTITIONER

## 2025-06-01 PROCEDURE — 700111 HCHG RX REV CODE 636 W/ 250 OVERRIDE (IP): Performed by: INTERNAL MEDICINE

## 2025-06-01 PROCEDURE — 99239 HOSP IP/OBS DSCHRG MGMT >30: CPT | Mod: 25 | Performed by: INTERNAL MEDICINE

## 2025-06-01 PROCEDURE — 80069 RENAL FUNCTION PANEL: CPT

## 2025-06-01 PROCEDURE — A9270 NON-COVERED ITEM OR SERVICE: HCPCS | Performed by: NURSE PRACTITIONER

## 2025-06-01 PROCEDURE — 85027 COMPLETE CBC AUTOMATED: CPT

## 2025-06-01 PROCEDURE — 36415 COLL VENOUS BLD VENIPUNCTURE: CPT

## 2025-06-01 RX ORDER — CEFUROXIME AXETIL 500 MG/1
500 TABLET ORAL 2 TIMES DAILY
Qty: 6 TABLET | Refills: 0 | Status: ACTIVE | OUTPATIENT
Start: 2025-06-01 | End: 2025-06-04

## 2025-06-01 RX ORDER — ACETAMINOPHEN 160 MG
1 TABLET,DISINTEGRATING ORAL DAILY
Qty: 5 CAPSULE | Refills: 0 | Status: SHIPPED | OUTPATIENT
Start: 2025-06-01 | End: 2025-06-06

## 2025-06-01 RX ORDER — LANOLIN ALCOHOL/MO/W.PET/CERES
100 CREAM (GRAM) TOPICAL DAILY
Qty: 30 TABLET | Refills: 0 | Status: SHIPPED | OUTPATIENT
Start: 2025-06-01

## 2025-06-01 RX ORDER — NYSTATIN 100000 [USP'U]/G
1 POWDER TOPICAL 2 TIMES DAILY
COMMUNITY
Start: 2025-06-01 | End: 2025-06-08

## 2025-06-01 RX ORDER — FOLIC ACID 1 MG/1
1 TABLET ORAL DAILY
COMMUNITY
Start: 2025-06-02

## 2025-06-01 RX ORDER — PHENYTOIN SODIUM 100 MG/1
100 CAPSULE, EXTENDED RELEASE ORAL 3 TIMES DAILY
Qty: 90 CAPSULE | Refills: 11 | Status: SHIPPED | OUTPATIENT
Start: 2025-06-01

## 2025-06-01 RX ORDER — LACTOBACILLUS RHAMNOSUS GG 10B CELL
CAPSULE ORAL
Qty: 20 CAPSULE | Refills: 0 | Status: SHIPPED | OUTPATIENT
Start: 2025-06-01

## 2025-06-01 RX ADMIN — PHENYTOIN SODIUM 100 MG: 100 CAPSULE, EXTENDED RELEASE ORAL at 14:48

## 2025-06-01 RX ADMIN — FOLIC ACID 1 MG: 1 TABLET ORAL at 05:19

## 2025-06-01 RX ADMIN — NYSTATIN: 100000 POWDER TOPICAL at 05:21

## 2025-06-01 RX ADMIN — ACETAMINOPHEN 650 MG: 325 TABLET ORAL at 05:19

## 2025-06-01 RX ADMIN — THERA TABS 1 TABLET: TAB at 05:19

## 2025-06-01 RX ADMIN — NICOTINE 14 MG: 14 PATCH TRANSDERMAL at 05:19

## 2025-06-01 RX ADMIN — PHENYTOIN SODIUM 100 MG: 100 CAPSULE, EXTENDED RELEASE ORAL at 05:19

## 2025-06-01 RX ADMIN — CEFTRIAXONE SODIUM 1000 MG: 10 INJECTION, POWDER, FOR SOLUTION INTRAVENOUS at 05:21

## 2025-06-01 ASSESSMENT — LIFESTYLE VARIABLES
AGITATION: NORMAL ACTIVITY
TREMOR: NO TREMOR
VISUAL DISTURBANCES: NOT PRESENT
ANXIETY: NO ANXIETY (AT EASE)
TOTAL SCORE: 0
ORIENTATION AND CLOUDING OF SENSORIUM: ORIENTED AND CAN DO SERIAL ADDITIONS
PAROXYSMAL SWEATS: NO SWEAT VISIBLE
AUDITORY DISTURBANCES: NOT PRESENT
NAUSEA AND VOMITING: NO NAUSEA AND NO VOMITING
HEADACHE, FULLNESS IN HEAD: NOT PRESENT

## 2025-06-01 ASSESSMENT — PAIN DESCRIPTION - PAIN TYPE: TYPE: ACUTE PAIN
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
## 2025-06-01 NOTE — PROGRESS NOTES
"Assumed care of patient at 1900 from day RN. Patient is A&O x 4. Bed locked in the lowest position, 2 side rails up, call light is within reach, belongings at bedside. Pt reports pain level of 0 /10. Mobility +2. Oxygen rm air Telesitter in place. Explained plan of care and safety precautions to pt, pt has no questions at this time. Hourly rounding is in place. /81   Pulse 91   Temp 35.9 °C (96.6 °F) (Temporal)   Resp 18   Ht 1.803 m (5' 11\")   Wt 81.6 kg (180 lb)   SpO2 96%   BMI 25.10 kg/m²   "

## 2025-06-01 NOTE — DISCHARGE PLANNING
DC Transport Scheduled    Transport Company Scheduled:  [facility]    Scheduled Date: 6/1/2025  Scheduled Time: 1700    Transport Type: Wheelchair  Destination: Home  [address] [place]     Notified care team of scheduled transport via Voalte.     If there are any changes needed to the DC transportation scheduled, please contact [facility] at ext. [phone] between the hours of 9011-0319. If outside those hours, contact the ED Case Manager at ext. [phone].

## 2025-06-01 NOTE — DISCHARGE PLANNING
DC Transport Scheduled    Transport Company Scheduled:  [employer]  Spoke with Charisma at [facility] to schedule transport.  [facility] Trip #: [id number]     Scheduled Date: 6/1/2025  Scheduled Time: 1400    Transport Type: Wheelchair  Destination: Home  [address] [place]    Notified care team of scheduled transport via Voalte.     If there are any changes needed to the DC transportation scheduled, please contact [facility] at ext. [phone] between the hours of 0581-0576. If outside those hours, contact the ED Case Manager at ext. [phone].

## 2025-06-01 NOTE — DISCHARGE PLANNING
Case Management Discharge Planning    Admission Date: 5/28/2025  GMLOS: 2.7  ALOS: 4    6-Clicks ADL Score: 16  6-Clicks Mobility Score: 13  PT and/or OT Eval ordered: No  Post-acute Referrals Ordered: NA  Post-acute Choice Obtained: NA  Has referral(s) been sent to post-acute provider:  BRYAN      Anticipated Discharge Dispo: Discharge Disposition: Discharged to home/self care (01)  Discharge Address: [address] [place] [id number]  Discharge Contact Phone Number: [phone]    DME Needed: No    Action(s) Taken:     Chart review completed.     RN CM updated via Voalte that patient is medically clear for discharge today.    1025: RN CM met with patient at bedside to review information to setup transportation home. Per pt, he does not have keys to enter his home and his roommate is at work all day today. He said that he can go home tomorrow when his roommate is home.     1030: RN CM spoke with charge RN and bedside RN. They both relayed that pt's roommate should be home today per conversations from yesterday.    1035: RN CM confirmed with patient's roommate Otis, that he is home from work today. Per Otis, today is his first day off from work and that he works night shifts. He plans to go to sleep now. He unlocked the front door for transport to enter the home with the patient. Otis also stated that he plugged in pt's electric wheelchair in, so it will be charged when he arrives home. Wheelchair is located in the living room.     1043: RN CM updated bedside RN and charge RN that transport would be setup for approximately 1400 today.     1055: [employer] order placed for 1400. RN CM sent message to [employer] coordinator.     Escalations Completed: Ride Line    Medically Clear: Yes

## 2025-06-01 NOTE — CARE PLAN
The patient is Stable - Low risk of patient condition declining or worsening    Shift Goals  Clinical Goals: pt will remain free of falls and maintain skin integrity this shift  Patient Goals: sleep  Family Goals: LUTHER    Progress made toward(s) clinical / shift goals:  no falls, no seizures, skin integrity maintained this shift    Problem: Skin Integrity  Goal: Skin integrity is maintained or improved  Outcome: Progressing     Problem: Seizure Precautions  Goal: Implementation of seizure precautions  Outcome: Progressing     Problem: Fall Risk  Goal: Patient will remain free from falls  Outcome: Progressing     Patient is not progressing towards the following goals:
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
## 2025-06-01 NOTE — CARE PLAN
The patient is Stable - Low risk of patient condition declining or worsening    Shift Goals  Clinical Goals: fall prevention, promote mobility and independance, support nutritional and hydration needs.  Patient Goals: rst and recovery  Family Goals: Updates    Progress made toward(s) clinical / shift goals:    Problem: Knowledge Deficit - Standard  Goal: Patient and family/care givers will demonstrate understanding of plan of care, disease process/condition, diagnostic tests and medications  Outcome: Progressing  Note: Assessed patient's understanding, provided clear education, and reinforced key information, to promote comprehension and adherence.     Problem: Skin Integrity  Goal: Skin integrity is maintained or improved  Outcome: Progressing  Note: Assessed skin integrity regularly, repositioned every 2 hour and as needed, educated patient on proper skin care and pressure injury     Problem: Fall Risk  Goal: Patient will remain free from falls  Outcome: Progressing  Note: Implemented fall precautions, educated the patient on safety measures, and assisted with mobility as needed.       Patient is not progressing towards the following goals:

## 2025-06-01 NOTE — PROGRESS NOTES
PIV D/C'd.  Discharge instructions provided to pt.  Pt states understanding.  Pt states all questions have been answered.  Copy of discharge provided to pt.  Signed copy in chart.  Prescriptions provided to pt, copy in chart. Pt states that all personal belongings are in possession.   Pt escorted off unit with assistance from medical transport without incident.

## 2025-06-02 LAB
BACTERIA BLD CULT: NORMAL
SIGNIFICANT IND 70042: NORMAL
SITE SITE: NORMAL
SOURCE SOURCE: NORMAL

## 2025-06-05 LAB
BACTERIA BLD CULT: NORMAL
SIGNIFICANT IND 70042: NORMAL
SITE SITE: NORMAL
SOURCE SOURCE: NORMAL

## 2025-06-10 NOTE — DOCUMENTATION QUERY
Formerly [facility], [facility]                                                                       Query Response Note      PATIENT:               ELI GREWAL  ACCT #:                  [id number]  MRN:                     [id number]  :                      1965  ADMIT DATE:       2025 11:30 AM  DISCH DATE:        2025 4:56 PM  RESPONDING  PROVIDER #:        [id number]           QUERY TEXT:    Per  Wound note:      2023 Pressure Injury Sacrum Left POA healing stage IV (Active) ->  No specific treatment documented      2025 Pressure Injury Buttocks Left unstag (Active); 25 Pressure Injury Hip Left unstag (Active) ->  Cleansed; Site care; Collagen Dressing; Hydrofera Blue Ready; Hydrocolloid Thin; Sacral Dressing - Offloading.    Please clarify the clinical/diagnostic relevance for the documented findings.    Note: If you agree with a diagnosis listed, please remember to include it in your concurrent daily documentation and onto the Discharge Summary.    The patient's clinical indicators include:   Wound note:  2023 Pressure Injury Sacrum Left POA healing stage IV (Active);  2025 Pressure Injury Buttocks Left unstag (Active); 25 Pressure Injury Hip Left unstag (Active)    Treatment: Wound consult; Cleansed; Site care; Collagen Dressing;Hydrofera Blue Ready; Hydrocolloid Thin;Sacral Dressing - Offloading    Risk factors: DM type 2 with medication non compliance for DM    Thank you,  Marcela SANTILLANN  Clinical   Connect via [employer]  Options provided:   -- All 3 pressure injuries are clinically significant and ruled in (Pressure Injury Sacrum Left POA healing stage IV (Active);  2025 Pressure Injury Buttocks Left unstag (Active); 25 Pressure Injury Hip Left unstag (Active))   -- Only 2 pressure injuries are clinically significant and ruled in ( 2025 Pressure  Injury Buttocks Left unstag (Active); 05/28/25 Pressure Injury Hip Left unstag (Active))   -- Findings of no clinical significance   -- Other explanation, (Please specify the other explanation)      Query created by: Marcela Mon on 6/6/2025 2:08 PM    RESPONSE TEXT:    Only 2 pressure injuries are clinically significant and ruled in ( 5/28/2025 Pressure Injury Buttocks Left unstag (Active); 05/28/25 Pressure Injury Hip Left unstag (Active))          Electronically signed by:  SHELBI VARGAS MD 6/10/2025 6:38 AM

## 2025-06-14 NOTE — CARE PLAN
The patient is Stable - Low risk of patient condition declining or worsening    Shift Goals  Clinical Goals: Pts pain will be below an 8 during this shift  Patient Goals: pain management  Family Goals: LUTHER    Progress made toward(s) clinical / shift goals:  Patient complained of 10/10 pain during the shift. Medication given per MAR. PRN medication given. Patient denied transfer to new air loss bed and to get clean sheets under his bed since he did not want to get moved. Patient able to sleep at moments throughout night and then when woken up he complains of pain.     Patient is not progressing towards the following goals:      Problem: Pain - Standard  Goal: Alleviation of pain or a reduction in pain to the patient’s comfort goal  Outcome: Not Progressing     patient complained of pain during the shift. PRN medication given. Patient able to sleep at moments throughout night but then wakes up and complains of pain.Able to rest for a bit after medication is given but needs PRN medication throughout the shift.    Catabolic illness

## (undated) DEVICE — DRAPE SURGICAL U 77X120 - (10/CA)

## (undated) DEVICE — FIBERWIRE 2.0 (12EA/BX)

## (undated) DEVICE — BLADE SURGICAL #15 - (50/BX 3BX/CA)

## (undated) DEVICE — SUTURE 0 VICRYL PLUS CT-1 - 36 INCH (36/BX)

## (undated) DEVICE — DRAPE STRLE REG TOWEL 18X24 - (10/BX 4BX/CA)"

## (undated) DEVICE — BAG RESUSCITATION DISPOSABLE - WITH MASK (10 EA/CA)

## (undated) DEVICE — DRAIN JACKSON PRATT 15FR - (10EA/CA)

## (undated) DEVICE — ELECTRODE 850 FOAM ADHESIVE - HYDROGEL RADIOTRNSPRNT (50/PK)

## (undated) DEVICE — CLIP MED INTNL HRZN TI ESCP - (25/BX)

## (undated) DEVICE — PACK MAJOR BASIN - (2EA/CA)

## (undated) DEVICE — PENCIL ELECTSURG 10FT BTN SWH - (50/CA)

## (undated) DEVICE — SUTURE GENERAL

## (undated) DEVICE — GLOVE BIOGEL SZ 7.5 SURGICAL PF LTX - (50PR/BX 4BX/CA)

## (undated) DEVICE — DRESSING ABDOMINAL PAD STERILE 8 X 10" (360EA/CA)"

## (undated) DEVICE — BAG ISOLATION 20X20 INVISI - SHEILD (10EA/BX)

## (undated) DEVICE — SET LEADWIRE 5 LEAD BEDSIDE DISPOSABLE ECG (1SET OF 5/EA)

## (undated) DEVICE — CHLORAPREP 26 ML APPLICATOR - ORANGE TINT(25/CA)

## (undated) DEVICE — SUCTION INSTRUMENT YANKAUER BULBOUS TIP W/O VENT (50EA/CA)

## (undated) DEVICE — TRAY SRGPRP PVP IOD WT PRP - (20/CA)

## (undated) DEVICE — SET EXTENSION WITH 2 PORTS (48EA/CA) ***PART #2C8610 IS A SUBSTITUTE*****

## (undated) DEVICE — CANISTER SUCTION 3000ML MECHANICAL FILTER AUTO SHUTOFF MEDI-VAC NONSTERILE LF DISP  (40EA/CA)

## (undated) DEVICE — STAPLER SKIN DISP - (6/BX 10BX/CA) VISISTAT

## (undated) DEVICE — WATER IRRIGATION STERILE 1000ML (12EA/CA)

## (undated) DEVICE — SUTURE 5-0 PROLENE BLUE C-1 HS 1 X 30 (36EA/BX)"

## (undated) DEVICE — WRAP COBAN SELF-ADHERENT 6 IN X  5YDS STERILE TAN (12/CA)

## (undated) DEVICE — TUBE CONNECTING SUCTION - CLEAR PLASTIC STERILE 72 IN (50EA/CA)

## (undated) DEVICE — GLOVE BIOGEL PI INDICATOR SZ 7.5 SURGICAL PF LF -(50/BX 4BX/CA)

## (undated) DEVICE — PAD LAP STERILE 18 X 18 - (5/PK 40PK/CA)

## (undated) DEVICE — SHEATH RO 6F 10CM (10EA/BX)

## (undated) DEVICE — SENSOR SPO2 NEO LNCS ADHESIVE (20/BX) SEE USER NOTES

## (undated) DEVICE — LACTATED RINGERS INJ 1000 ML - (14EA/CA 60CA/PF)

## (undated) DEVICE — GOWN WARMING STANDARD FLEX - (30/CA)

## (undated) DEVICE — DRESSING PETROLEUM GAUZE 5 X 9" (50EA/BX 4BX/CA)"

## (undated) DEVICE — SPONGE RADIOPAQUE CTN X-LG - STERILE (50PK/CA) MADE TO ORDER ITEM AND HAS A 4-6 WEEK LEAD TIME

## (undated) DEVICE — DRAPE C-ARM LARGE 41IN X 74 IN - (10/BX 2BX/CA)

## (undated) DEVICE — PACK MAJOR ORTHO - (2EA/CA)

## (undated) DEVICE — SODIUM CHL IRRIGATION 0.9% 1000ML (12EA/CA)

## (undated) DEVICE — DRESSING XEROFORM 1X8 - (50/BX 4BX/CA)

## (undated) DEVICE — CANISTER INFO VAC 1000ML (5EA/CA)

## (undated) DEVICE — CORDS BIPOLAR COAGULATION - 12FT STERILE DISP. (10EA/BX)

## (undated) DEVICE — GLOVE SZ 6 BIOGEL PI MICRO - PF LF (50PR/BX 4BX/CA)

## (undated) DEVICE — GLOVE BIOGEL INDICATOR SZ 7.5 SURGICAL PF LTX - (50PR/BX 4BX/CA)

## (undated) DEVICE — KIT ANESTHESIA W/CIRCUIT & 3/LT BAG W/FILTER (20EA/CA)

## (undated) DEVICE — CATHETER EMBOLECTOMY 5FR

## (undated) DEVICE — KIT SURGIFLO W/OUT THROMBIN - (6EA/CA)

## (undated) DEVICE — ELECTRODE DUAL RETURN W/ CORD - (50/PK)

## (undated) DEVICE — SYRINGE 30 ML LL (56/BX)

## (undated) DEVICE — GOWN SURGEONS X-LARGE - DISP. (30/CA)

## (undated) DEVICE — MASK ANESTHESIA ADULT  - (100/CA)

## (undated) DEVICE — GLOVE BIOGEL PI INDICATOR SZ 7.0 SURGICAL PF LF - (50/BX 4BX/CA)

## (undated) DEVICE — SURGIFOAM (12X7) - (12EA/CA)

## (undated) DEVICE — BOVIE BLADE COATED - (50/PK)

## (undated) DEVICE — SURGIFOAM (SIZE 100) - (6EA/CA)

## (undated) DEVICE — TOWELS CLOTH SURGICAL - (4/PK 20PK/CA)

## (undated) DEVICE — AMPLATZ SUPER STIFF 180CM - (5/BX)

## (undated) DEVICE — DRAPE LARGE 3 QUARTER - (20/CA)

## (undated) DEVICE — TIP INTPLS HFLO ML ORFC BTRY - (12/CS)  FOR SURGILAV

## (undated) DEVICE — SYRINGE NON SAFETY 10 CC 20 GA X 1-1/2 IN (100/BX 4BX/CA)

## (undated) DEVICE — DECANTER FLD BLS - (50/CA)

## (undated) DEVICE — SOD. CHL. INJ. 0.9% 1000 ML - (14EA/CA 60CA/PF)

## (undated) DEVICE — TUBING CLEARLINK DUO-VENT - C-FLO (48EA/CA)

## (undated) DEVICE — SLEEVE VASO CALF MED - (10PR/CA)

## (undated) DEVICE — SUTURE 2-0 VICRYL PLUS CT-1 36 (36PK/BX)"

## (undated) DEVICE — MASK OXYGEN VNYL ADLT MED CONC WITH 7 FOOT TUBING  - (50EA/CA)

## (undated) DEVICE — SET INTRO MIRCROPUNCTURE - MPIS-501-SST

## (undated) DEVICE — TUBE NG SALEM SUMP 16FR (50EA/CA)

## (undated) DEVICE — TOWEL STOP TIMEOUT SAFETY FLAG (40EA/CA)

## (undated) DEVICE — CATHETER IV NON-SAFETY 16 GA X 1 1/4 JELCO (50EA/BX 4BX/CA)

## (undated) DEVICE — SUTURE 3-0 VICRYL PLUS SH - 8X 18 INCH (12/BX)

## (undated) DEVICE — DRAPE LAPAROTOMY T SHEET - (12EA/CA)

## (undated) DEVICE — DRAIN JACKSON PRATT 19FR - (10/CS)

## (undated) DEVICE — SET BIFURCATED BLOOD - (48EA/CS)

## (undated) DEVICE — SUTURE 2-0 VICRYL PLUS SH - 8 X 18 INCH (12/BX)

## (undated) DEVICE — SENSOR OXIMETER ADULT SPO2 RD SET (20EA/BX)

## (undated) DEVICE — SYSTEM PREVENA INCISION MNGM - (1/EA)

## (undated) DEVICE — HEAD HOLDER JUNIOR/ADULT

## (undated) DEVICE — DEVICE INFLATION DIGITAL BLUE DIAMOND (5EA/BX)

## (undated) DEVICE — DRAPE U ORTHOPEDIC - (10/BX)

## (undated) DEVICE — BAG SPONGE COUNT 10.25 X 32 - BLUE (250/CA)

## (undated) DEVICE — CANISTER SUCTION RIGID RED 1500CC (40EA/CA)

## (undated) DEVICE — GLOVE BIOGEL ECLIPSE  PF LATEX SIZE 6.5 (50PR/BX)

## (undated) DEVICE — KIT ROOM DECONTAMINATION

## (undated) DEVICE — SUTURE CV

## (undated) DEVICE — SLEEVE, VASO, THIGH, MED

## (undated) DEVICE — SYRINGE 10 ML CONTROL LL (25EA/BX 4BX/CA)

## (undated) DEVICE — SUTURE 2-0 SILK 12 X 18" (36PK/BX)"

## (undated) DEVICE — DETERGENT RENUZYME PLUS 10 OZ PACKET (50/BX)

## (undated) DEVICE — WATER IRRIG. STER. 1500 ML - (9/CA)

## (undated) DEVICE — PACK LOWER EXTREMITY - (2/CA)

## (undated) DEVICE — PACK MINOR BASIN - (2EA/CA)

## (undated) DEVICE — SUTURE 3-0 SILK 12 X 18 IN - (36/BX)

## (undated) DEVICE — BANDAGE ELASTIC 6 HONEYCOMB - 6X5YD LF (20/CA)"

## (undated) DEVICE — CANNULA O2 COMFORT SOFT EAR ADULT 7 FT TUBING (50/CA)

## (undated) DEVICE — CHLORAPREP 3 ML APPLICATOR - (25/BX 4BX/CS 100/CS)

## (undated) DEVICE — DRAPE IOBAN LARGE 2 INCISE FILM (5EA/CA)

## (undated) DEVICE — TUBE E-T HI-LO CUFF 7.0MM (10EA/PK)

## (undated) DEVICE — SPONGE GAUZESTER 4 X 4 4PLY - (128PK/CA)

## (undated) DEVICE — TOURNIQUET CUFF 24 X 4 ONE PORT - STERILE (10/BX)

## (undated) DEVICE — SUTURE 3-0 ETHILON FSLX 30 (36PK/BX)"

## (undated) DEVICE — SUTURE 6-0 PROLENE BV-1 D/A 24 (36PK/BX)"

## (undated) DEVICE — GOWN SURGEONS LARGE - (32/CA)

## (undated) DEVICE — HANDPIECE 10FT INTPLS SCT PLS IRRIGATION HAND CONTROL SET (6/PK)

## (undated) DEVICE — SODIUM CHL. INJ. 0.9% 500ML (24EA/CA 50CA/PF)

## (undated) DEVICE — VESSELOOP MAXI BLUE STERILE- SURG-I-LOOP (10EA/BX)

## (undated) DEVICE — DRAPE IOBAN II INCISE 23X17 - (10EA/BX 4BX/CA)

## (undated) DEVICE — GLOVE BIOGEL INDICATOR SZ 7SURGICAL PF LTX - (50/BX 4BX/CA)

## (undated) DEVICE — SODIUM CHL. IRRIGATION 0.9% 3000ML (4EA/CA 65CA/PF)

## (undated) DEVICE — SUTURE  0 ETHIBOND CT-1 30 IN (36PK/BX)

## (undated) DEVICE — DRESSING VAC MED VERAFLO (5EA/PK)

## (undated) DEVICE — SUTURE 2-0 VICRYL PLUS CT-1 - 8 X 18 INCH(12/BX)

## (undated) DEVICE — PROTECTOR ULNA NERVE - (36PR/CA)

## (undated) DEVICE — GLOVE BIOGEL SZ 6.5 SURGICAL PF LTX (50PR/BX 4BX/CA)

## (undated) DEVICE — NEPTUNE 4 PORT MANIFOLD - (20/PK)

## (undated) DEVICE — SUTURE 5-0 PROLENE C-1 -(36PK/BX)

## (undated) DEVICE — CATHETER EMBOLECTOMY 3FR (1EA)

## (undated) DEVICE — GLOVE SZ 7.5 BIOGEL PI MICRO - PF LF (50PR/BX)

## (undated) DEVICE — DRAPE IOBAN II 23 IN X 33 IN - (10/BX)

## (undated) DEVICE — RESERVOIR SUCTION 100 CC - SILICONE (20EA/CA)

## (undated) DEVICE — SET IRRIGATION CYSTOSCOPY TUBE L80 IN (20EA/CA)

## (undated) DEVICE — SUTURE 0 VICRYL PLUS CT-2 - 8 X 18 INCH (12/BX)

## (undated) DEVICE — DRILL BIT 3.5X110 QC OIC - (10TX2=20)

## (undated) DEVICE — GLOVE BIOGEL PI INDICATOR SZ 6.0 SURGICAL PF LF -(200PR/CA)

## (undated) DEVICE — Device

## (undated) DEVICE — SUTURE 4-0 30CM STRATAFIX SPIRAL PS-2 (12EA/BX)

## (undated) DEVICE — PADDING CAST 4 IN STERILE - 4 X 4 YDS (24/CA)

## (undated) DEVICE — SUTURE 0 VICRYL PLUS CT-1 - 8 X 18 INCH (12/BX)

## (undated) DEVICE — DISH PETRI STERILE (50EA/CA)

## (undated) DEVICE — TRAY SKIN SCRUB PVP WET (20EA/CA) PART #DYND70356 DISCONTINUED

## (undated) DEVICE — VESSELOOP MINI BLUE STERILE - SURG-I-LOOP (10EA/BX)

## (undated) DEVICE — PACK AV FISTULA (4EA/CA)

## (undated) DEVICE — STOPCOCK 3-WAY W/SWIVEL LEVER LOCK (50EA/CA)

## (undated) DEVICE — BLADE SAW 90X25X1.37MM SAGITTAL DUAL CUT

## (undated) DEVICE — BANDAGE ROLL STERILE BULKEE 4.5 IN X 4 YD (100EA/CA)

## (undated) DEVICE — BLADE SURGICAL #10 - (50/BX)

## (undated) DEVICE — DRESSING TRANSPARENT FILM TEGADERM 4 X 4.75" (50EA/BX)"

## (undated) DEVICE — SHEET CARDIOVASCULAR - (4/CA)

## (undated) DEVICE — SUTURE 1 VICRYL PLUS CTX - 8 X 18 INCH (12/BX)

## (undated) DEVICE — COVER MAYO STAND X-LG - (22EA/CA)

## (undated) DEVICE — GLOVE BIOGEL SZ 7 SURGICAL PF LTX - (50PR/BX 4BX/CA)

## (undated) DEVICE — GELAQUASONIC 100 ULTRASOUND - 48/BX 20GM STERILE FOIL POUCH

## (undated) DEVICE — SET FLUID WARMING STANDARD FLOW - (10/CA)

## (undated) DEVICE — SUTURE 0 SILK TIES (36PK/BX)

## (undated) DEVICE — SPONGE GAUZE STER 4X4 8-PL - (2/PK 50PK/BX 12BX/CS)

## (undated) DEVICE — KIT  I.V. START (100EA/CA)

## (undated) DEVICE — STOCKINET TUBULAR 6IN STERILE - 6 X 48YDS (25/CA)

## (undated) DEVICE — SUTURE 6-0 PROLENE C-1 D/A 24 (36PK/BX)"

## (undated) DEVICE — IMMOBILIZER SHOULDER UNIVERSAL - SURGERY ONLY

## (undated) DEVICE — SYSTEM PEEL & PLACE 13CM INCISIONS

## (undated) DEVICE — BLADE SAGITTAL SAW 9.4MM X 25.5MM X .4MM FINE TOOTH (1/EA)

## (undated) DEVICE — CONTAINER, SPECIMEN, STERILE

## (undated) DEVICE — SUTURE 4-0 PROLENE PS-2 18 (36PK/BX)"

## (undated) DEVICE — WIPE SURGICAL INSTRUMENT VISIWIPE 2-7/8IN X 2-7/8IN (20EA/PK)

## (undated) DEVICE — GLOVE BIOGEL SZ 8 SURGICAL PF LTX - (50PR/BX 4BX/CA)

## (undated) DEVICE — GLOVE BIOGEL PI ORTHO SZ 6 1/2 SURGICAL PF LF (40PR/BX)

## (undated) DEVICE — IMMOBILIZER KNEE 20 INCH - (1/EA)

## (undated) DEVICE — SUTURE 2-0 MONOCRYL PLUS UNDYED CT-1 1 X 36 (36EA/BX)"

## (undated) DEVICE — FILTER BLOOD TRANSFUSION - (40/CA) (PALL)

## (undated) DEVICE — GLOVE BIOGEL ECLIPSE PF LATEX SIZE 9.0

## (undated) DEVICE — SUTURE 3-0 30 CM X 30 CM STRATAFIX SPRIAL PS-1 NEEDLE (12/BX)

## (undated) DEVICE — COVER LIGHT HANDLE ALC PLUS DISP (18EA/BX)

## (undated) DEVICE — GLOVE BIOGEL SZ 6 PF LATEX - (50EA/BX 4BX/CA)

## (undated) DEVICE — GLOVE SZ 6.5 BIOGEL PI MICRO - PF LF (50PR/BX)

## (undated) DEVICE — TRANSDUCER ADULT DISP. SINGLE BONDED STERILE - (20EA/CA)

## (undated) DEVICE — BANDAGE ELASTIC 4 HONEYCOMB - 4"X5YD LF (20/CA)"

## (undated) DEVICE — PADDING CAST 6 IN STERILE - 6 X 4 YDS (24/CA)